# Patient Record
Sex: FEMALE | Race: WHITE | NOT HISPANIC OR LATINO | Employment: OTHER | URBAN - METROPOLITAN AREA
[De-identification: names, ages, dates, MRNs, and addresses within clinical notes are randomized per-mention and may not be internally consistent; named-entity substitution may affect disease eponyms.]

---

## 2017-01-05 ENCOUNTER — AMBULATORY - HEALTHEAST (OUTPATIENT)
Dept: NURSING | Facility: CLINIC | Age: 62
End: 2017-01-05

## 2017-01-05 DIAGNOSIS — Z86.711 HISTORY OF PULMONARY EMBOLISM: ICD-10-CM

## 2017-01-12 ENCOUNTER — AMBULATORY - HEALTHEAST (OUTPATIENT)
Dept: LAB | Facility: CLINIC | Age: 62
End: 2017-01-12

## 2017-01-12 ENCOUNTER — COMMUNICATION - HEALTHEAST (OUTPATIENT)
Dept: NURSING | Facility: CLINIC | Age: 62
End: 2017-01-12

## 2017-01-12 DIAGNOSIS — Z86.711 HISTORY OF PULMONARY EMBOLISM: ICD-10-CM

## 2017-01-13 ENCOUNTER — COMMUNICATION - HEALTHEAST (OUTPATIENT)
Dept: FAMILY MEDICINE | Facility: CLINIC | Age: 62
End: 2017-01-13

## 2017-01-20 ENCOUNTER — OFFICE VISIT - HEALTHEAST (OUTPATIENT)
Dept: FAMILY MEDICINE | Facility: CLINIC | Age: 62
End: 2017-01-20

## 2017-01-20 DIAGNOSIS — Z86.69 HISTORY OF SLEEP APNEA: ICD-10-CM

## 2017-01-20 DIAGNOSIS — R73.03 PREDIABETES: ICD-10-CM

## 2017-01-20 DIAGNOSIS — Z01.818 PREOPERATIVE EXAMINATION: ICD-10-CM

## 2017-01-20 LAB
ATRIAL RATE - MUSE: 90 BPM
CREAT SERPL-MCNC: 0.87 MG/DL (ref 0.6–1.1)
DIASTOLIC BLOOD PRESSURE - MUSE: NORMAL MMHG
GFR SERPL CREATININE-BSD FRML MDRD: >60 ML/MIN/1.73M2
HBA1C MFR BLD: 6.1 % (ref 3.5–6)
INTERPRETATION ECG - MUSE: NORMAL
P AXIS - MUSE: -3 DEGREES
PR INTERVAL - MUSE: 158 MS
QRS DURATION - MUSE: 80 MS
QT - MUSE: 370 MS
QTC - MUSE: 452 MS
R AXIS - MUSE: 31 DEGREES
SYSTOLIC BLOOD PRESSURE - MUSE: NORMAL MMHG
T AXIS - MUSE: 65 DEGREES
VENTRICULAR RATE- MUSE: 90 BPM

## 2017-01-20 ASSESSMENT — MIFFLIN-ST. JEOR: SCORE: 2031.86

## 2017-01-31 ENCOUNTER — RECORDS - HEALTHEAST (OUTPATIENT)
Dept: ADMINISTRATIVE | Facility: OTHER | Age: 62
End: 2017-01-31

## 2017-02-01 ENCOUNTER — AMBULATORY - HEALTHEAST (OUTPATIENT)
Dept: ADMINISTRATIVE | Facility: CLINIC | Age: 62
End: 2017-02-01

## 2017-02-02 ENCOUNTER — AMBULATORY - HEALTHEAST (OUTPATIENT)
Dept: GERIATRICS | Facility: CLINIC | Age: 62
End: 2017-02-02

## 2017-02-06 ENCOUNTER — OFFICE VISIT - HEALTHEAST (OUTPATIENT)
Dept: GERIATRICS | Facility: CLINIC | Age: 62
End: 2017-02-06

## 2017-02-06 DIAGNOSIS — Z95.2 S/P AVR (AORTIC VALVE REPLACEMENT): ICD-10-CM

## 2017-02-06 DIAGNOSIS — G47.9 SLEEP DISTURBANCE: ICD-10-CM

## 2017-02-06 DIAGNOSIS — I10 ESSENTIAL HYPERTENSION: ICD-10-CM

## 2017-02-06 DIAGNOSIS — D62 ACUTE BLOOD LOSS ANEMIA: ICD-10-CM

## 2017-02-06 DIAGNOSIS — I26.99 PULMONARY EMBOLISM (H): ICD-10-CM

## 2017-02-06 DIAGNOSIS — I48.0 PAROXYSMAL ATRIAL FIBRILLATION (H): ICD-10-CM

## 2017-02-07 ENCOUNTER — AMBULATORY - HEALTHEAST (OUTPATIENT)
Dept: CARDIAC REHAB | Facility: HOSPITAL | Age: 62
End: 2017-02-07

## 2017-02-07 ENCOUNTER — AMBULATORY - HEALTHEAST (OUTPATIENT)
Dept: GERIATRICS | Facility: CLINIC | Age: 62
End: 2017-02-07

## 2017-02-07 DIAGNOSIS — Z95.2 S/P AVR (AORTIC VALVE REPLACEMENT): ICD-10-CM

## 2017-02-09 ENCOUNTER — OFFICE VISIT - HEALTHEAST (OUTPATIENT)
Dept: GERIATRICS | Facility: CLINIC | Age: 62
End: 2017-02-09

## 2017-02-09 DIAGNOSIS — I10 ESSENTIAL HYPERTENSION: ICD-10-CM

## 2017-02-09 DIAGNOSIS — Z95.2 S/P AVR (AORTIC VALVE REPLACEMENT): ICD-10-CM

## 2017-02-09 DIAGNOSIS — I48.0 PAROXYSMAL ATRIAL FIBRILLATION (H): ICD-10-CM

## 2017-02-09 DIAGNOSIS — G47.9 SLEEP DISTURBANCE: ICD-10-CM

## 2017-02-09 DIAGNOSIS — I26.99 PULMONARY EMBOLISM (H): ICD-10-CM

## 2017-02-09 DIAGNOSIS — D62 ACUTE BLOOD LOSS ANEMIA: ICD-10-CM

## 2017-02-10 ENCOUNTER — AMBULATORY - HEALTHEAST (OUTPATIENT)
Dept: GERIATRICS | Facility: CLINIC | Age: 62
End: 2017-02-10

## 2017-02-13 ENCOUNTER — AMBULATORY - HEALTHEAST (OUTPATIENT)
Dept: GERIATRICS | Facility: CLINIC | Age: 62
End: 2017-02-13

## 2017-02-13 ENCOUNTER — COMMUNICATION - HEALTHEAST (OUTPATIENT)
Dept: NURSING | Facility: CLINIC | Age: 62
End: 2017-02-13

## 2017-02-13 ENCOUNTER — AMBULATORY - HEALTHEAST (OUTPATIENT)
Dept: LAB | Facility: CLINIC | Age: 62
End: 2017-02-13

## 2017-02-13 DIAGNOSIS — Z86.711 HISTORY OF PULMONARY EMBOLISM: ICD-10-CM

## 2017-02-15 ENCOUNTER — COMMUNICATION - HEALTHEAST (OUTPATIENT)
Dept: NURSING | Facility: CLINIC | Age: 62
End: 2017-02-15

## 2017-02-15 ENCOUNTER — COMMUNICATION - HEALTHEAST (OUTPATIENT)
Dept: GERIATRICS | Facility: CLINIC | Age: 62
End: 2017-02-15

## 2017-02-15 ENCOUNTER — OFFICE VISIT - HEALTHEAST (OUTPATIENT)
Dept: FAMILY MEDICINE | Facility: CLINIC | Age: 62
End: 2017-02-15

## 2017-02-15 DIAGNOSIS — Z86.711 HISTORY OF PULMONARY EMBOLISM: ICD-10-CM

## 2017-02-15 DIAGNOSIS — Z95.2 AORTIC VALVE REPLACED: ICD-10-CM

## 2017-02-16 ENCOUNTER — AMBULATORY - HEALTHEAST (OUTPATIENT)
Dept: CARDIAC REHAB | Facility: HOSPITAL | Age: 62
End: 2017-02-16

## 2017-02-16 DIAGNOSIS — Z95.2 S/P AVR (AORTIC VALVE REPLACEMENT): ICD-10-CM

## 2017-02-21 ENCOUNTER — COMMUNICATION - HEALTHEAST (OUTPATIENT)
Dept: NURSING | Facility: CLINIC | Age: 62
End: 2017-02-21

## 2017-02-21 ENCOUNTER — AMBULATORY - HEALTHEAST (OUTPATIENT)
Dept: LAB | Facility: CLINIC | Age: 62
End: 2017-02-21

## 2017-02-21 DIAGNOSIS — Z86.711 HISTORY OF PULMONARY EMBOLISM: ICD-10-CM

## 2017-02-22 ENCOUNTER — COMMUNICATION - HEALTHEAST (OUTPATIENT)
Dept: FAMILY MEDICINE | Facility: CLINIC | Age: 62
End: 2017-02-22

## 2017-02-22 ENCOUNTER — RECORDS - HEALTHEAST (OUTPATIENT)
Dept: ADMINISTRATIVE | Facility: OTHER | Age: 62
End: 2017-02-22

## 2017-02-28 ENCOUNTER — COMMUNICATION - HEALTHEAST (OUTPATIENT)
Dept: NURSING | Facility: CLINIC | Age: 62
End: 2017-02-28

## 2017-02-28 ENCOUNTER — RECORDS - HEALTHEAST (OUTPATIENT)
Dept: ADMINISTRATIVE | Facility: OTHER | Age: 62
End: 2017-02-28

## 2017-02-28 ENCOUNTER — COMMUNICATION - HEALTHEAST (OUTPATIENT)
Dept: SCHEDULING | Facility: CLINIC | Age: 62
End: 2017-02-28

## 2017-03-01 ENCOUNTER — COMMUNICATION - HEALTHEAST (OUTPATIENT)
Dept: NURSING | Facility: CLINIC | Age: 62
End: 2017-03-01

## 2017-03-01 ENCOUNTER — AMBULATORY - HEALTHEAST (OUTPATIENT)
Dept: CARDIAC REHAB | Facility: HOSPITAL | Age: 62
End: 2017-03-01

## 2017-03-01 DIAGNOSIS — Z86.711 HISTORY OF PULMONARY EMBOLISM: ICD-10-CM

## 2017-03-01 DIAGNOSIS — Z95.2 S/P AVR (AORTIC VALVE REPLACEMENT): ICD-10-CM

## 2017-03-03 ENCOUNTER — COMMUNICATION - HEALTHEAST (OUTPATIENT)
Dept: FAMILY MEDICINE | Facility: CLINIC | Age: 62
End: 2017-03-03

## 2017-03-03 DIAGNOSIS — F32.A DEPRESSION: ICD-10-CM

## 2017-03-03 DIAGNOSIS — E78.5 HYPERLIPIDEMIA: ICD-10-CM

## 2017-03-04 ENCOUNTER — COMMUNICATION - HEALTHEAST (OUTPATIENT)
Dept: FAMILY MEDICINE | Facility: CLINIC | Age: 62
End: 2017-03-04

## 2017-03-04 DIAGNOSIS — J45.21 MILD INTERMITTENT ASTHMA WITH EXACERBATION: ICD-10-CM

## 2017-03-06 ENCOUNTER — AMBULATORY - HEALTHEAST (OUTPATIENT)
Dept: CARDIAC REHAB | Facility: HOSPITAL | Age: 62
End: 2017-03-06

## 2017-03-06 DIAGNOSIS — Z95.2 S/P AVR (AORTIC VALVE REPLACEMENT): ICD-10-CM

## 2017-03-08 ENCOUNTER — AMBULATORY - HEALTHEAST (OUTPATIENT)
Dept: CARDIAC REHAB | Facility: HOSPITAL | Age: 62
End: 2017-03-08

## 2017-03-08 ENCOUNTER — COMMUNICATION - HEALTHEAST (OUTPATIENT)
Dept: NURSING | Facility: CLINIC | Age: 62
End: 2017-03-08

## 2017-03-08 DIAGNOSIS — Z86.711 HISTORY OF PULMONARY EMBOLISM: ICD-10-CM

## 2017-03-08 DIAGNOSIS — Z95.2 S/P AVR (AORTIC VALVE REPLACEMENT): ICD-10-CM

## 2017-03-09 ENCOUNTER — COMMUNICATION - HEALTHEAST (OUTPATIENT)
Dept: FAMILY MEDICINE | Facility: CLINIC | Age: 62
End: 2017-03-09

## 2017-03-09 ENCOUNTER — COMMUNICATION - HEALTHEAST (OUTPATIENT)
Dept: NURSING | Facility: CLINIC | Age: 62
End: 2017-03-09

## 2017-03-09 DIAGNOSIS — Z95.2 AORTIC VALVE REPLACED: ICD-10-CM

## 2017-03-09 DIAGNOSIS — Z86.711 HISTORY OF PULMONARY EMBOLISM: ICD-10-CM

## 2017-03-13 ENCOUNTER — AMBULATORY - HEALTHEAST (OUTPATIENT)
Dept: CARDIAC REHAB | Facility: HOSPITAL | Age: 62
End: 2017-03-13

## 2017-03-13 DIAGNOSIS — Z95.2 S/P AVR (AORTIC VALVE REPLACEMENT): ICD-10-CM

## 2017-03-15 ENCOUNTER — AMBULATORY - HEALTHEAST (OUTPATIENT)
Dept: CARDIAC REHAB | Facility: HOSPITAL | Age: 62
End: 2017-03-15

## 2017-03-15 DIAGNOSIS — Z95.2 S/P AVR (AORTIC VALVE REPLACEMENT): ICD-10-CM

## 2017-03-16 ENCOUNTER — OFFICE VISIT - HEALTHEAST (OUTPATIENT)
Dept: FAMILY MEDICINE | Facility: CLINIC | Age: 62
End: 2017-03-16

## 2017-03-16 DIAGNOSIS — Z23 NEED FOR VACCINATION: ICD-10-CM

## 2017-03-16 DIAGNOSIS — D17.79 LIPOMA OF OTHER SPECIFIED SITES: ICD-10-CM

## 2017-03-22 ENCOUNTER — AMBULATORY - HEALTHEAST (OUTPATIENT)
Dept: CARDIAC REHAB | Facility: HOSPITAL | Age: 62
End: 2017-03-22

## 2017-03-22 ENCOUNTER — AMBULATORY - HEALTHEAST (OUTPATIENT)
Dept: LAB | Facility: HOSPITAL | Age: 62
End: 2017-03-22

## 2017-03-22 ENCOUNTER — COMMUNICATION - HEALTHEAST (OUTPATIENT)
Dept: NURSING | Facility: CLINIC | Age: 62
End: 2017-03-22

## 2017-03-22 DIAGNOSIS — Z86.711 HISTORY OF PULMONARY EMBOLISM: ICD-10-CM

## 2017-03-22 DIAGNOSIS — Z95.2 AORTIC VALVE REPLACED: ICD-10-CM

## 2017-03-22 DIAGNOSIS — Z95.2 S/P AVR (AORTIC VALVE REPLACEMENT): ICD-10-CM

## 2017-03-23 ENCOUNTER — COMMUNICATION - HEALTHEAST (OUTPATIENT)
Dept: FAMILY MEDICINE | Facility: CLINIC | Age: 62
End: 2017-03-23

## 2017-03-23 DIAGNOSIS — J45.21 MILD INTERMITTENT ASTHMA WITH EXACERBATION: ICD-10-CM

## 2017-03-27 ENCOUNTER — AMBULATORY - HEALTHEAST (OUTPATIENT)
Dept: CARDIAC REHAB | Facility: HOSPITAL | Age: 62
End: 2017-03-27

## 2017-03-27 DIAGNOSIS — Z95.2 S/P AVR (AORTIC VALVE REPLACEMENT): ICD-10-CM

## 2017-03-29 ENCOUNTER — AMBULATORY - HEALTHEAST (OUTPATIENT)
Dept: CARDIAC REHAB | Facility: HOSPITAL | Age: 62
End: 2017-03-29

## 2017-03-29 DIAGNOSIS — Z95.2 S/P AVR (AORTIC VALVE REPLACEMENT): ICD-10-CM

## 2017-03-30 ENCOUNTER — COMMUNICATION - HEALTHEAST (OUTPATIENT)
Dept: NURSING | Facility: CLINIC | Age: 62
End: 2017-03-30

## 2017-04-03 ENCOUNTER — OFFICE VISIT - HEALTHEAST (OUTPATIENT)
Dept: RHEUMATOLOGY | Facility: CLINIC | Age: 62
End: 2017-04-03

## 2017-04-03 ENCOUNTER — AMBULATORY - HEALTHEAST (OUTPATIENT)
Dept: CARDIAC REHAB | Facility: HOSPITAL | Age: 62
End: 2017-04-03

## 2017-04-03 ENCOUNTER — COMMUNICATION - HEALTHEAST (OUTPATIENT)
Dept: NURSING | Facility: CLINIC | Age: 62
End: 2017-04-03

## 2017-04-03 DIAGNOSIS — Z86.711 HISTORY OF PULMONARY EMBOLISM: ICD-10-CM

## 2017-04-03 DIAGNOSIS — M19.041 PRIMARY OSTEOARTHRITIS OF BOTH HANDS: ICD-10-CM

## 2017-04-03 DIAGNOSIS — Z95.2 AORTIC VALVE REPLACED: ICD-10-CM

## 2017-04-03 DIAGNOSIS — Z95.2 S/P AVR (AORTIC VALVE REPLACEMENT): ICD-10-CM

## 2017-04-03 DIAGNOSIS — M19.042 PRIMARY OSTEOARTHRITIS OF BOTH HANDS: ICD-10-CM

## 2017-04-03 ASSESSMENT — MIFFLIN-ST. JEOR: SCORE: 1989.23

## 2017-04-05 ENCOUNTER — AMBULATORY - HEALTHEAST (OUTPATIENT)
Dept: CARDIAC REHAB | Facility: HOSPITAL | Age: 62
End: 2017-04-05

## 2017-04-05 DIAGNOSIS — Z95.2 S/P AVR (AORTIC VALVE REPLACEMENT): ICD-10-CM

## 2017-04-10 ENCOUNTER — AMBULATORY - HEALTHEAST (OUTPATIENT)
Dept: CARDIAC REHAB | Facility: HOSPITAL | Age: 62
End: 2017-04-10

## 2017-04-10 DIAGNOSIS — Z95.2 S/P AVR (AORTIC VALVE REPLACEMENT): ICD-10-CM

## 2017-04-17 ENCOUNTER — AMBULATORY - HEALTHEAST (OUTPATIENT)
Dept: CARDIAC REHAB | Facility: HOSPITAL | Age: 62
End: 2017-04-17

## 2017-04-17 DIAGNOSIS — Z95.2 S/P AVR (AORTIC VALVE REPLACEMENT): ICD-10-CM

## 2017-04-24 ENCOUNTER — AMBULATORY - HEALTHEAST (OUTPATIENT)
Dept: CARDIAC REHAB | Facility: HOSPITAL | Age: 62
End: 2017-04-24

## 2017-04-24 DIAGNOSIS — Z95.2 S/P AVR (AORTIC VALVE REPLACEMENT): ICD-10-CM

## 2017-04-25 ENCOUNTER — COMMUNICATION - HEALTHEAST (OUTPATIENT)
Dept: NURSING | Facility: CLINIC | Age: 62
End: 2017-04-25

## 2017-04-27 ENCOUNTER — COMMUNICATION - HEALTHEAST (OUTPATIENT)
Dept: NURSING | Facility: CLINIC | Age: 62
End: 2017-04-27

## 2017-04-27 ENCOUNTER — COMMUNICATION - HEALTHEAST (OUTPATIENT)
Dept: FAMILY MEDICINE | Facility: CLINIC | Age: 62
End: 2017-04-27

## 2017-04-27 ENCOUNTER — OFFICE VISIT - HEALTHEAST (OUTPATIENT)
Dept: FAMILY MEDICINE | Facility: CLINIC | Age: 62
End: 2017-04-27

## 2017-04-27 DIAGNOSIS — Z95.2 AORTIC VALVE REPLACED: ICD-10-CM

## 2017-04-27 DIAGNOSIS — Z86.711 HISTORY OF PULMONARY EMBOLISM: ICD-10-CM

## 2017-04-27 DIAGNOSIS — R05.9 COUGH: ICD-10-CM

## 2017-04-27 DIAGNOSIS — J40 BRONCHITIS: ICD-10-CM

## 2017-05-02 ENCOUNTER — COMMUNICATION - HEALTHEAST (OUTPATIENT)
Dept: FAMILY MEDICINE | Facility: CLINIC | Age: 62
End: 2017-05-02

## 2017-05-02 ENCOUNTER — AMBULATORY - HEALTHEAST (OUTPATIENT)
Dept: FAMILY MEDICINE | Facility: CLINIC | Age: 62
End: 2017-05-02

## 2017-05-04 ENCOUNTER — RECORDS - HEALTHEAST (OUTPATIENT)
Dept: GENERAL RADIOLOGY | Facility: CLINIC | Age: 62
End: 2017-05-04

## 2017-05-04 ENCOUNTER — COMMUNICATION - HEALTHEAST (OUTPATIENT)
Dept: NURSING | Facility: CLINIC | Age: 62
End: 2017-05-04

## 2017-05-04 ENCOUNTER — OFFICE VISIT - HEALTHEAST (OUTPATIENT)
Dept: FAMILY MEDICINE | Facility: CLINIC | Age: 62
End: 2017-05-04

## 2017-05-04 DIAGNOSIS — Z86.711 HISTORY OF PULMONARY EMBOLISM: ICD-10-CM

## 2017-05-04 DIAGNOSIS — J45.909 ASTHMA: ICD-10-CM

## 2017-05-04 DIAGNOSIS — S69.91XA UNSPECIFIED INJURY OF RIGHT WRIST, HAND AND FINGER(S), INITIAL ENCOUNTER: ICD-10-CM

## 2017-05-04 DIAGNOSIS — S69.91XA FINGER INJURY, RIGHT, INITIAL ENCOUNTER: ICD-10-CM

## 2017-05-04 DIAGNOSIS — Z95.2 AORTIC VALVE REPLACED: ICD-10-CM

## 2017-05-04 ASSESSMENT — MIFFLIN-ST. JEOR: SCORE: 1967

## 2017-05-08 ENCOUNTER — COMMUNICATION - HEALTHEAST (OUTPATIENT)
Dept: FAMILY MEDICINE | Facility: CLINIC | Age: 62
End: 2017-05-08

## 2017-05-08 ENCOUNTER — COMMUNICATION - HEALTHEAST (OUTPATIENT)
Dept: SCHEDULING | Facility: CLINIC | Age: 62
End: 2017-05-08

## 2017-05-09 ENCOUNTER — OFFICE VISIT - HEALTHEAST (OUTPATIENT)
Dept: FAMILY MEDICINE | Facility: CLINIC | Age: 62
End: 2017-05-09

## 2017-05-09 ENCOUNTER — COMMUNICATION - HEALTHEAST (OUTPATIENT)
Dept: NURSING | Facility: CLINIC | Age: 62
End: 2017-05-09
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
## 2017-05-09 DIAGNOSIS — J20.9 ACUTE BRONCHITIS: ICD-10-CM

## 2017-05-09 DIAGNOSIS — Z86.711 HISTORY OF PULMONARY EMBOLISM: ICD-10-CM

## 2017-05-09 DIAGNOSIS — Z95.2 AORTIC VALVE REPLACED: ICD-10-CM

## 2017-05-12 ENCOUNTER — COMMUNICATION - HEALTHEAST (OUTPATIENT)
Dept: FAMILY MEDICINE | Facility: CLINIC | Age: 62
End: 2017-05-12

## 2017-05-12 DIAGNOSIS — K21.9 GERD (GASTROESOPHAGEAL REFLUX DISEASE): ICD-10-CM

## 2017-05-17 ENCOUNTER — AMBULATORY - HEALTHEAST (OUTPATIENT)
Dept: CARDIAC REHAB | Facility: HOSPITAL | Age: 62
End: 2017-05-17

## 2017-05-17 ENCOUNTER — AMBULATORY - HEALTHEAST (OUTPATIENT)
Dept: LAB | Facility: HOSPITAL | Age: 62
End: 2017-05-17

## 2017-05-17 ENCOUNTER — COMMUNICATION - HEALTHEAST (OUTPATIENT)
Dept: NURSING | Facility: CLINIC | Age: 62
End: 2017-05-17

## 2017-05-17 DIAGNOSIS — Z95.2 S/P AVR (AORTIC VALVE REPLACEMENT): ICD-10-CM

## 2017-05-17 DIAGNOSIS — Z95.2 AORTIC VALVE REPLACED: ICD-10-CM

## 2017-05-17 DIAGNOSIS — Z86.711 HISTORY OF PULMONARY EMBOLISM: ICD-10-CM

## 2017-05-18 ENCOUNTER — COMMUNICATION - HEALTHEAST (OUTPATIENT)
Dept: FAMILY MEDICINE | Facility: CLINIC | Age: 62
End: 2017-05-18

## 2017-05-22 ENCOUNTER — COMMUNICATION - HEALTHEAST (OUTPATIENT)
Dept: FAMILY MEDICINE | Facility: CLINIC | Age: 62
End: 2017-05-22

## 2017-05-22 ENCOUNTER — OFFICE VISIT - HEALTHEAST (OUTPATIENT)
Dept: FAMILY MEDICINE | Facility: CLINIC | Age: 62
End: 2017-05-22

## 2017-05-22 ENCOUNTER — COMMUNICATION - HEALTHEAST (OUTPATIENT)
Dept: NURSING | Facility: CLINIC | Age: 62
End: 2017-05-22

## 2017-05-22 DIAGNOSIS — K62.5 BRBPR (BRIGHT RED BLOOD PER RECTUM): ICD-10-CM

## 2017-05-22 DIAGNOSIS — R05.9 COUGH: ICD-10-CM

## 2017-05-22 DIAGNOSIS — Z86.711 HISTORY OF PULMONARY EMBOLISM: ICD-10-CM

## 2017-05-22 DIAGNOSIS — Z95.2 AORTIC VALVE REPLACED: ICD-10-CM

## 2017-05-22 DIAGNOSIS — R42 LIGHTHEADED: ICD-10-CM

## 2017-05-22 DIAGNOSIS — R58 BLEEDING: ICD-10-CM

## 2017-05-31 ENCOUNTER — COMMUNICATION - HEALTHEAST (OUTPATIENT)
Dept: NURSING | Facility: CLINIC | Age: 62
End: 2017-05-31

## 2017-06-02 ENCOUNTER — AMBULATORY - HEALTHEAST (OUTPATIENT)
Dept: LAB | Facility: CLINIC | Age: 62
End: 2017-06-02

## 2017-06-02 ENCOUNTER — COMMUNICATION - HEALTHEAST (OUTPATIENT)
Dept: NURSING | Facility: CLINIC | Age: 62
End: 2017-06-02

## 2017-06-02 DIAGNOSIS — Z86.711 HISTORY OF PULMONARY EMBOLISM: ICD-10-CM

## 2017-06-02 DIAGNOSIS — Z95.2 AORTIC VALVE REPLACED: ICD-10-CM

## 2017-06-05 ENCOUNTER — AMBULATORY - HEALTHEAST (OUTPATIENT)
Dept: CARDIAC REHAB | Facility: HOSPITAL | Age: 62
End: 2017-06-05

## 2017-06-05 DIAGNOSIS — Z95.2 S/P AVR (AORTIC VALVE REPLACEMENT): ICD-10-CM

## 2017-06-06 ENCOUNTER — COMMUNICATION - HEALTHEAST (OUTPATIENT)
Dept: FAMILY MEDICINE | Facility: CLINIC | Age: 62
End: 2017-06-06

## 2017-06-06 DIAGNOSIS — I10 HTN (HYPERTENSION): ICD-10-CM

## 2017-06-06 DIAGNOSIS — Z86.711 HISTORY OF PULMONARY EMBOLISM: ICD-10-CM

## 2017-06-09 ENCOUNTER — COMMUNICATION - HEALTHEAST (OUTPATIENT)
Dept: NURSING | Facility: CLINIC | Age: 62
End: 2017-06-09

## 2017-06-09 ENCOUNTER — AMBULATORY - HEALTHEAST (OUTPATIENT)
Dept: LAB | Facility: CLINIC | Age: 62
End: 2017-06-09

## 2017-06-09 DIAGNOSIS — Z95.2 AORTIC VALVE REPLACED: ICD-10-CM

## 2017-06-09 DIAGNOSIS — Z86.711 HISTORY OF PULMONARY EMBOLISM: ICD-10-CM

## 2017-06-09 DIAGNOSIS — R58 BLEEDING: ICD-10-CM

## 2017-06-12 ENCOUNTER — AMBULATORY - HEALTHEAST (OUTPATIENT)
Dept: CARDIAC REHAB | Facility: HOSPITAL | Age: 62
End: 2017-06-12

## 2017-06-12 DIAGNOSIS — Z95.2 S/P AVR (AORTIC VALVE REPLACEMENT): ICD-10-CM

## 2017-06-15 ENCOUNTER — RECORDS - HEALTHEAST (OUTPATIENT)
Dept: GENERAL RADIOLOGY | Facility: CLINIC | Age: 62
End: 2017-06-15

## 2017-06-15 ENCOUNTER — OFFICE VISIT - HEALTHEAST (OUTPATIENT)
Dept: FAMILY MEDICINE | Facility: CLINIC | Age: 62
End: 2017-06-15

## 2017-06-15 ENCOUNTER — COMMUNICATION - HEALTHEAST (OUTPATIENT)
Dept: NURSING | Facility: CLINIC | Age: 62
End: 2017-06-15

## 2017-06-15 DIAGNOSIS — Z95.2 AORTIC VALVE REPLACED: ICD-10-CM

## 2017-06-15 DIAGNOSIS — Z86.711 HISTORY OF PULMONARY EMBOLISM: ICD-10-CM

## 2017-06-15 DIAGNOSIS — J45.909 ASTHMA: ICD-10-CM
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
## 2017-06-15 DIAGNOSIS — I10 ESSENTIAL HYPERTENSION: ICD-10-CM
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
## 2017-06-17 ENCOUNTER — COMMUNICATION - HEALTHEAST (OUTPATIENT)
Dept: FAMILY MEDICINE | Facility: CLINIC | Age: 62
End: 2017-06-17

## 2017-06-17 DIAGNOSIS — Z86.711 HISTORY OF PULMONARY EMBOLISM: ICD-10-CM

## 2017-06-17 DIAGNOSIS — Z95.2 AORTIC VALVE REPLACED: ICD-10-CM

## 2017-06-21 ENCOUNTER — AMBULATORY - HEALTHEAST (OUTPATIENT)
Dept: CARDIAC REHAB | Facility: HOSPITAL | Age: 62
End: 2017-06-21

## 2017-06-21 DIAGNOSIS — Z95.2 S/P AVR (AORTIC VALVE REPLACEMENT): ICD-10-CM

## 2017-06-23 ENCOUNTER — HOSPITAL ENCOUNTER (OUTPATIENT)
Dept: ULTRASOUND IMAGING | Facility: HOSPITAL | Age: 62
Discharge: HOME | End: 2017-06-23
Attending: FAMILY MEDICINE

## 2017-06-23 ENCOUNTER — OFFICE VISIT - HEALTHEAST (OUTPATIENT)
Dept: FAMILY MEDICINE | Facility: CLINIC | Age: 62
End: 2017-06-23

## 2017-06-23 ENCOUNTER — COMMUNICATION - HEALTHEAST (OUTPATIENT)
Dept: FAMILY MEDICINE | Facility: CLINIC | Age: 62
End: 2017-06-23

## 2017-06-23 DIAGNOSIS — Z95.2 AORTIC VALVE REPLACED: ICD-10-CM

## 2017-06-23 DIAGNOSIS — N93.9 VAGINAL BLEEDING: ICD-10-CM

## 2017-06-23 DIAGNOSIS — Z86.711 HISTORY OF PULMONARY EMBOLISM: ICD-10-CM

## 2017-06-26 ENCOUNTER — AMBULATORY - HEALTHEAST (OUTPATIENT)
Dept: FAMILY MEDICINE | Facility: CLINIC | Age: 62
End: 2017-06-26

## 2017-06-26 DIAGNOSIS — N93.9 VAGINAL BLEEDING: ICD-10-CM

## 2017-06-28 ENCOUNTER — AMBULATORY - HEALTHEAST (OUTPATIENT)
Dept: CARDIAC REHAB | Facility: HOSPITAL | Age: 62
End: 2017-06-28

## 2017-06-28 ENCOUNTER — COMMUNICATION - HEALTHEAST (OUTPATIENT)
Dept: NURSING | Facility: CLINIC | Age: 62
End: 2017-06-28

## 2017-06-28 ENCOUNTER — AMBULATORY - HEALTHEAST (OUTPATIENT)
Dept: LAB | Facility: HOSPITAL | Age: 62
End: 2017-06-28

## 2017-06-28 DIAGNOSIS — Z86.711 HISTORY OF PULMONARY EMBOLISM: ICD-10-CM

## 2017-06-28 DIAGNOSIS — Z95.2 S/P AVR (AORTIC VALVE REPLACEMENT): ICD-10-CM

## 2017-06-28 DIAGNOSIS — Z95.2 AORTIC VALVE REPLACED: ICD-10-CM

## 2017-07-03 ENCOUNTER — AMBULATORY - HEALTHEAST (OUTPATIENT)
Dept: CARDIAC REHAB | Facility: HOSPITAL | Age: 62
End: 2017-07-03

## 2017-07-03 DIAGNOSIS — Z95.2 S/P AVR (AORTIC VALVE REPLACEMENT): ICD-10-CM

## 2017-07-05 ENCOUNTER — AMBULATORY - HEALTHEAST (OUTPATIENT)
Dept: CARDIAC REHAB | Facility: HOSPITAL | Age: 62
End: 2017-07-05

## 2017-07-05 DIAGNOSIS — Z95.2 S/P AVR (AORTIC VALVE REPLACEMENT): ICD-10-CM

## 2017-07-07 ENCOUNTER — RECORDS - HEALTHEAST (OUTPATIENT)
Dept: ADMINISTRATIVE | Facility: OTHER | Age: 62
End: 2017-07-07

## 2017-07-10 ENCOUNTER — COMMUNICATION - HEALTHEAST (OUTPATIENT)
Dept: FAMILY MEDICINE | Facility: CLINIC | Age: 62
End: 2017-07-10

## 2017-07-10 DIAGNOSIS — J45.909 ASTHMA: ICD-10-CM

## 2017-07-15 ENCOUNTER — COMMUNICATION - HEALTHEAST (OUTPATIENT)
Dept: FAMILY MEDICINE | Facility: CLINIC | Age: 62
End: 2017-07-15

## 2017-07-15 DIAGNOSIS — Z95.2 AORTIC VALVE REPLACED: ICD-10-CM

## 2017-07-15 DIAGNOSIS — Z79.01 LONG TERM (CURRENT) USE OF ANTICOAGULANTS: ICD-10-CM

## 2017-07-19 ENCOUNTER — COMMUNICATION - HEALTHEAST (OUTPATIENT)
Dept: NURSING | Facility: CLINIC | Age: 62
End: 2017-07-19

## 2017-07-24 ENCOUNTER — COMMUNICATION - HEALTHEAST (OUTPATIENT)
Dept: NURSING | Facility: CLINIC | Age: 62
End: 2017-07-24

## 2017-07-24 ENCOUNTER — AMBULATORY - HEALTHEAST (OUTPATIENT)
Dept: LAB | Facility: CLINIC | Age: 62
End: 2017-07-24

## 2017-07-24 DIAGNOSIS — Z86.711 HISTORY OF PULMONARY EMBOLISM: ICD-10-CM

## 2017-07-24 DIAGNOSIS — Z95.2 AORTIC VALVE REPLACED: ICD-10-CM

## 2017-07-25 ENCOUNTER — COMMUNICATION - HEALTHEAST (OUTPATIENT)
Dept: FAMILY MEDICINE | Facility: CLINIC | Age: 62
End: 2017-07-25

## 2017-07-25 DIAGNOSIS — F32.A DEPRESSION: ICD-10-CM

## 2017-07-28 ENCOUNTER — OFFICE VISIT - HEALTHEAST (OUTPATIENT)
Dept: FAMILY MEDICINE | Facility: CLINIC | Age: 62
End: 2017-07-28

## 2017-07-28 DIAGNOSIS — J45.909 ASTHMA: ICD-10-CM

## 2017-07-28 DIAGNOSIS — Z87.898 HISTORY OF URINE COLOR CHANGES: ICD-10-CM

## 2017-07-28 DIAGNOSIS — R53.83 FATIGUE: ICD-10-CM

## 2017-07-28 DIAGNOSIS — F32.A DEPRESSION: ICD-10-CM

## 2017-07-31 ENCOUNTER — AMBULATORY - HEALTHEAST (OUTPATIENT)
Dept: FAMILY MEDICINE | Facility: CLINIC | Age: 62
End: 2017-07-31

## 2017-08-01 ENCOUNTER — COMMUNICATION - HEALTHEAST (OUTPATIENT)
Dept: FAMILY MEDICINE | Facility: CLINIC | Age: 62
End: 2017-08-01

## 2017-08-08 ENCOUNTER — AMBULATORY - HEALTHEAST (OUTPATIENT)
Dept: NURSING | Facility: CLINIC | Age: 62
End: 2017-08-08

## 2017-08-08 ENCOUNTER — COMMUNICATION - HEALTHEAST (OUTPATIENT)
Dept: FAMILY MEDICINE | Facility: CLINIC | Age: 62
End: 2017-08-08

## 2017-08-09 ENCOUNTER — OFFICE VISIT - HEALTHEAST (OUTPATIENT)
Dept: FAMILY MEDICINE | Facility: CLINIC | Age: 62
End: 2017-08-09

## 2017-08-09 DIAGNOSIS — R06.02 SHORTNESS OF BREATH: ICD-10-CM

## 2017-08-09 DIAGNOSIS — E66.9 OBESITY: ICD-10-CM

## 2017-08-09 DIAGNOSIS — J45.909 ASTHMA: ICD-10-CM

## 2017-08-09 DIAGNOSIS — K14.6 SORENESS OF TONGUE: ICD-10-CM

## 2017-08-13 ENCOUNTER — COMMUNICATION - HEALTHEAST (OUTPATIENT)
Dept: FAMILY MEDICINE | Facility: CLINIC | Age: 62
End: 2017-08-13

## 2017-08-13 DIAGNOSIS — K21.9 GERD (GASTROESOPHAGEAL REFLUX DISEASE): ICD-10-CM

## 2017-08-14 ENCOUNTER — RECORDS - HEALTHEAST (OUTPATIENT)
Dept: ADMINISTRATIVE | Facility: OTHER | Age: 62
End: 2017-08-14

## 2017-08-24 ENCOUNTER — AMBULATORY - HEALTHEAST (OUTPATIENT)
Dept: FAMILY MEDICINE | Facility: CLINIC | Age: 62
End: 2017-08-24

## 2017-08-24 ENCOUNTER — COMMUNICATION - HEALTHEAST (OUTPATIENT)
Dept: FAMILY MEDICINE | Facility: CLINIC | Age: 62
End: 2017-08-24

## 2017-08-28 ENCOUNTER — COMMUNICATION - HEALTHEAST (OUTPATIENT)
Dept: NURSING | Facility: CLINIC | Age: 62
End: 2017-08-28

## 2017-09-01 ENCOUNTER — AMBULATORY - HEALTHEAST (OUTPATIENT)
Dept: LAB | Facility: CLINIC | Age: 62
End: 2017-09-01

## 2017-09-01 ENCOUNTER — COMMUNICATION - HEALTHEAST (OUTPATIENT)
Dept: NURSING | Facility: CLINIC | Age: 62
End: 2017-09-01

## 2017-09-01 DIAGNOSIS — Z95.2 AORTIC VALVE REPLACED: ICD-10-CM

## 2017-09-01 DIAGNOSIS — Z86.711 HISTORY OF PULMONARY EMBOLISM: ICD-10-CM

## 2017-09-12 ENCOUNTER — COMMUNICATION - HEALTHEAST (OUTPATIENT)
Dept: SCHEDULING | Facility: CLINIC | Age: 62
End: 2017-09-12

## 2017-09-13 ENCOUNTER — AMBULATORY - HEALTHEAST (OUTPATIENT)
Dept: FAMILY MEDICINE | Facility: CLINIC | Age: 62
End: 2017-09-13

## 2017-09-13 DIAGNOSIS — D64.9 ANEMIA: ICD-10-CM

## 2017-09-15 ENCOUNTER — AMBULATORY - HEALTHEAST (OUTPATIENT)
Dept: LAB | Facility: CLINIC | Age: 62
End: 2017-09-15

## 2017-09-15 DIAGNOSIS — D64.9 ANEMIA: ICD-10-CM

## 2017-09-25 ENCOUNTER — COMMUNICATION - HEALTHEAST (OUTPATIENT)
Dept: FAMILY MEDICINE | Facility: CLINIC | Age: 62
End: 2017-09-25

## 2017-09-25 ENCOUNTER — COMMUNICATION - HEALTHEAST (OUTPATIENT)
Dept: SCHEDULING | Facility: CLINIC | Age: 62
End: 2017-09-25

## 2017-09-29 ENCOUNTER — OFFICE VISIT - HEALTHEAST (OUTPATIENT)
Dept: FAMILY MEDICINE | Facility: CLINIC | Age: 62
End: 2017-09-29

## 2017-09-29 DIAGNOSIS — Z23 FLU VACCINE NEED: ICD-10-CM

## 2017-09-29 DIAGNOSIS — D50.9 IRON DEFICIENCY ANEMIA: ICD-10-CM

## 2017-09-29 DIAGNOSIS — J06.9 URI (UPPER RESPIRATORY INFECTION): ICD-10-CM

## 2017-10-03 ENCOUNTER — OFFICE VISIT - HEALTHEAST (OUTPATIENT)
Dept: FAMILY MEDICINE | Facility: CLINIC | Age: 62
End: 2017-10-03

## 2017-10-03 ENCOUNTER — COMMUNICATION - HEALTHEAST (OUTPATIENT)
Dept: SCHEDULING | Facility: CLINIC | Age: 62
End: 2017-10-03

## 2017-10-03 DIAGNOSIS — R35.0 URINE FREQUENCY: ICD-10-CM

## 2017-10-03 DIAGNOSIS — N30.00 ACUTE CYSTITIS: ICD-10-CM

## 2017-10-05 ENCOUNTER — COMMUNICATION - HEALTHEAST (OUTPATIENT)
Dept: FAMILY MEDICINE | Facility: CLINIC | Age: 62
End: 2017-10-05

## 2017-10-05 DIAGNOSIS — J45.909 ASTHMA: ICD-10-CM

## 2017-10-06 ENCOUNTER — COMMUNICATION - HEALTHEAST (OUTPATIENT)
Dept: NURSING | Facility: CLINIC | Age: 62
End: 2017-10-06

## 2017-10-06 DIAGNOSIS — Z86.711 HISTORY OF PULMONARY EMBOLISM: ICD-10-CM

## 2017-10-13 ENCOUNTER — RECORDS - HEALTHEAST (OUTPATIENT)
Dept: ADMINISTRATIVE | Facility: OTHER | Age: 62
End: 2017-10-13

## 2017-11-03 ENCOUNTER — COMMUNICATION - HEALTHEAST (OUTPATIENT)
Dept: NURSING | Facility: CLINIC | Age: 62
End: 2017-11-03

## 2017-11-03 ENCOUNTER — RECORDS - HEALTHEAST (OUTPATIENT)
Dept: ADMINISTRATIVE | Facility: OTHER | Age: 62
End: 2017-11-03

## 2017-11-03 ENCOUNTER — OFFICE VISIT - HEALTHEAST (OUTPATIENT)
Dept: FAMILY MEDICINE | Facility: CLINIC | Age: 62
End: 2017-11-03

## 2017-11-03 DIAGNOSIS — Z86.711 HISTORY OF PULMONARY EMBOLISM: ICD-10-CM

## 2017-11-03 DIAGNOSIS — Z95.2 AORTIC VALVE REPLACED: ICD-10-CM

## 2017-11-03 DIAGNOSIS — D64.9 ANEMIA: ICD-10-CM

## 2017-11-03 DIAGNOSIS — F32.A DEPRESSION: ICD-10-CM

## 2017-11-03 DIAGNOSIS — R73.03 PREDIABETES: ICD-10-CM

## 2017-11-03 DIAGNOSIS — J45.909 REACTIVE AIRWAY DISEASE: ICD-10-CM

## 2017-11-03 LAB — HBA1C MFR BLD: 5.7 % (ref 3.5–6)

## 2017-11-07 ENCOUNTER — COMMUNICATION - HEALTHEAST (OUTPATIENT)
Dept: FAMILY MEDICINE | Facility: CLINIC | Age: 62
End: 2017-11-07

## 2017-11-07 ENCOUNTER — AMBULATORY - HEALTHEAST (OUTPATIENT)
Dept: PULMONOLOGY | Facility: OTHER | Age: 62
End: 2017-11-07

## 2017-11-07 DIAGNOSIS — J45.909 REACTIVE AIRWAY DISEASE: ICD-10-CM

## 2017-11-07 DIAGNOSIS — Z95.2 AORTIC VALVE REPLACED: ICD-10-CM

## 2017-11-07 DIAGNOSIS — Z79.01 LONG TERM CURRENT USE OF ANTICOAGULANT THERAPY: ICD-10-CM

## 2017-11-08 ENCOUNTER — COMMUNICATION - HEALTHEAST (OUTPATIENT)
Dept: SLEEP MEDICINE | Facility: CLINIC | Age: 62
End: 2017-11-08

## 2017-12-05 ENCOUNTER — RECORDS - HEALTHEAST (OUTPATIENT)
Dept: PULMONOLOGY | Facility: OTHER | Age: 62
End: 2017-12-05

## 2017-12-05 ENCOUNTER — RECORDS - HEALTHEAST (OUTPATIENT)
Dept: ADMINISTRATIVE | Facility: OTHER | Age: 62
End: 2017-12-05

## 2017-12-05 DIAGNOSIS — J45.909 UNSPECIFIED ASTHMA, UNCOMPLICATED: ICD-10-CM

## 2017-12-06 ENCOUNTER — COMMUNICATION - HEALTHEAST (OUTPATIENT)
Dept: FAMILY MEDICINE | Facility: CLINIC | Age: 62
End: 2017-12-06

## 2017-12-06 DIAGNOSIS — F32.A DEPRESSION: ICD-10-CM

## 2017-12-08 ENCOUNTER — COMMUNICATION - HEALTHEAST (OUTPATIENT)
Dept: NURSING | Facility: CLINIC | Age: 62
End: 2017-12-08

## 2017-12-12 ENCOUNTER — HOSPITAL ENCOUNTER (OUTPATIENT)
Dept: MAMMOGRAPHY | Facility: HOSPITAL | Age: 62
Discharge: HOME | End: 2017-12-12
Attending: FAMILY MEDICINE

## 2017-12-12 DIAGNOSIS — Z12.31 VISIT FOR SCREENING MAMMOGRAM: ICD-10-CM

## 2017-12-13 ENCOUNTER — AMBULATORY - HEALTHEAST (OUTPATIENT)
Dept: LAB | Facility: CLINIC | Age: 62
End: 2017-12-13

## 2017-12-13 ENCOUNTER — COMMUNICATION - HEALTHEAST (OUTPATIENT)
Dept: FAMILY MEDICINE | Facility: CLINIC | Age: 62
End: 2017-12-13

## 2017-12-13 ENCOUNTER — COMMUNICATION - HEALTHEAST (OUTPATIENT)
Dept: NURSING | Facility: CLINIC | Age: 62
End: 2017-12-13

## 2017-12-13 DIAGNOSIS — Z86.711 HISTORY OF PULMONARY EMBOLISM: ICD-10-CM

## 2017-12-13 DIAGNOSIS — Z95.2 AORTIC VALVE REPLACED: ICD-10-CM

## 2017-12-14 ENCOUNTER — OFFICE VISIT - HEALTHEAST (OUTPATIENT)
Dept: PULMONOLOGY | Facility: OTHER | Age: 62
End: 2017-12-14

## 2017-12-14 DIAGNOSIS — J45.40 MODERATE PERSISTENT ASTHMA, UNSPECIFIED WHETHER COMPLICATED: ICD-10-CM

## 2017-12-14 ASSESSMENT — MIFFLIN-ST. JEOR: SCORE: 2066.79

## 2018-01-03 ENCOUNTER — COMMUNICATION - HEALTHEAST (OUTPATIENT)
Dept: FAMILY MEDICINE | Facility: CLINIC | Age: 63
End: 2018-01-03

## 2018-01-03 DIAGNOSIS — J45.909 REACTIVE AIRWAY DISEASE: ICD-10-CM

## 2018-01-13 ENCOUNTER — COMMUNICATION - HEALTHEAST (OUTPATIENT)
Dept: FAMILY MEDICINE | Facility: CLINIC | Age: 63
End: 2018-01-13

## 2018-01-13 DIAGNOSIS — Z86.711 HISTORY OF PULMONARY EMBOLISM: ICD-10-CM

## 2018-01-13 DIAGNOSIS — Z95.2 AORTIC VALVE REPLACED: ICD-10-CM

## 2018-01-13 DIAGNOSIS — Z79.01 LONG TERM CURRENT USE OF ANTICOAGULANT THERAPY: ICD-10-CM

## 2018-01-17 ENCOUNTER — COMMUNICATION - HEALTHEAST (OUTPATIENT)
Dept: NURSING | Facility: CLINIC | Age: 63
End: 2018-01-17

## 2018-01-19 ENCOUNTER — COMMUNICATION - HEALTHEAST (OUTPATIENT)
Dept: NURSING | Facility: CLINIC | Age: 63
End: 2018-01-19

## 2018-01-19 ENCOUNTER — AMBULATORY - HEALTHEAST (OUTPATIENT)
Dept: LAB | Facility: CLINIC | Age: 63
End: 2018-01-19

## 2018-01-19 DIAGNOSIS — Z95.2 AORTIC VALVE REPLACED: ICD-10-CM

## 2018-01-19 DIAGNOSIS — Z86.711 HISTORY OF PULMONARY EMBOLISM: ICD-10-CM

## 2018-01-19 LAB — INR PPP: 2.3 (ref 0.9–1.1)

## 2018-01-24 ENCOUNTER — OFFICE VISIT - HEALTHEAST (OUTPATIENT)
Dept: FAMILY MEDICINE | Facility: CLINIC | Age: 63
End: 2018-01-24

## 2018-01-24 DIAGNOSIS — F43.9 STRESS AT HOME: ICD-10-CM

## 2018-01-24 DIAGNOSIS — G35 MS (MULTIPLE SCLEROSIS) (H): ICD-10-CM

## 2018-01-24 DIAGNOSIS — R49.0 HOARSENESS: ICD-10-CM

## 2018-01-24 DIAGNOSIS — R05.9 COUGH: ICD-10-CM

## 2018-02-01 ENCOUNTER — COMMUNICATION - HEALTHEAST (OUTPATIENT)
Dept: NURSING | Facility: CLINIC | Age: 63
End: 2018-02-01

## 2018-02-01 ENCOUNTER — COMMUNICATION - HEALTHEAST (OUTPATIENT)
Dept: FAMILY MEDICINE | Facility: CLINIC | Age: 63
End: 2018-02-01

## 2018-02-01 DIAGNOSIS — F32.A DEPRESSION: ICD-10-CM

## 2018-02-01 DIAGNOSIS — Z95.2 AORTIC VALVE REPLACED: ICD-10-CM

## 2018-02-01 DIAGNOSIS — Z86.711 HISTORY OF PULMONARY EMBOLISM: ICD-10-CM

## 2018-02-02 ENCOUNTER — COMMUNICATION - HEALTHEAST (OUTPATIENT)
Dept: NURSING | Facility: CLINIC | Age: 63
End: 2018-02-02

## 2018-02-02 ENCOUNTER — AMBULATORY - HEALTHEAST (OUTPATIENT)
Dept: LAB | Facility: CLINIC | Age: 63
End: 2018-02-02

## 2018-02-02 DIAGNOSIS — Z95.2 AORTIC VALVE REPLACED: ICD-10-CM

## 2018-02-02 DIAGNOSIS — Z86.711 HISTORY OF PULMONARY EMBOLISM: ICD-10-CM

## 2018-02-02 LAB — INR PPP: 2.9 (ref 0.9–1.1)

## 2018-02-07 ENCOUNTER — COMMUNICATION - HEALTHEAST (OUTPATIENT)
Dept: FAMILY MEDICINE | Facility: CLINIC | Age: 63
End: 2018-02-07

## 2018-02-07 DIAGNOSIS — E78.5 HYPERLIPIDEMIA: ICD-10-CM

## 2018-02-16 ENCOUNTER — AMBULATORY - HEALTHEAST (OUTPATIENT)
Dept: LAB | Facility: CLINIC | Age: 63
End: 2018-02-16

## 2018-02-16 ENCOUNTER — COMMUNICATION - HEALTHEAST (OUTPATIENT)
Dept: NURSING | Facility: CLINIC | Age: 63
End: 2018-02-16

## 2018-02-16 DIAGNOSIS — Z86.711 HISTORY OF PULMONARY EMBOLISM: ICD-10-CM

## 2018-02-16 DIAGNOSIS — Z95.2 AORTIC VALVE REPLACED: ICD-10-CM

## 2018-02-16 LAB — INR PPP: 3.9 (ref 0.9–1.1)

## 2018-03-02 ENCOUNTER — COMMUNICATION - HEALTHEAST (OUTPATIENT)
Dept: NURSING | Facility: CLINIC | Age: 63
End: 2018-03-02

## 2018-03-02 ENCOUNTER — AMBULATORY - HEALTHEAST (OUTPATIENT)
Dept: LAB | Facility: CLINIC | Age: 63
End: 2018-03-02

## 2018-03-02 DIAGNOSIS — Z86.711 HISTORY OF PULMONARY EMBOLISM: ICD-10-CM

## 2018-03-02 DIAGNOSIS — Z95.2 AORTIC VALVE REPLACED: ICD-10-CM

## 2018-03-02 LAB — INR PPP: 3.2 (ref 0.9–1.1)

## 2018-03-12 ENCOUNTER — OFFICE VISIT - HEALTHEAST (OUTPATIENT)
Dept: OTOLARYNGOLOGY | Facility: CLINIC | Age: 63
End: 2018-03-12

## 2018-03-12 DIAGNOSIS — R49.0 HOARSENESS OF VOICE: ICD-10-CM

## 2018-03-14 ENCOUNTER — OFFICE VISIT - HEALTHEAST (OUTPATIENT)
Dept: FAMILY MEDICINE | Facility: CLINIC | Age: 63
End: 2018-03-14

## 2018-03-14 ENCOUNTER — RECORDS - HEALTHEAST (OUTPATIENT)
Dept: ADMINISTRATIVE | Facility: OTHER | Age: 63
End: 2018-03-14

## 2018-03-14 DIAGNOSIS — Z12.11 SPECIAL SCREENING FOR MALIGNANT NEOPLASMS, COLON: ICD-10-CM

## 2018-03-14 DIAGNOSIS — Z01.818 PREOP EXAMINATION: ICD-10-CM

## 2018-03-16 ENCOUNTER — OFFICE VISIT - HEALTHEAST (OUTPATIENT)
Dept: PULMONOLOGY | Facility: OTHER | Age: 63
End: 2018-03-16

## 2018-03-16 DIAGNOSIS — R06.02 SHORTNESS OF BREATH: ICD-10-CM

## 2018-03-16 DIAGNOSIS — R94.2 RESTRICTIVE VENTILATORY DEFECT: ICD-10-CM

## 2018-03-16 DIAGNOSIS — J45.909 ASTHMA: ICD-10-CM

## 2018-03-21 ENCOUNTER — COMMUNICATION - HEALTHEAST (OUTPATIENT)
Dept: NURSING | Facility: CLINIC | Age: 63
End: 2018-03-21

## 2018-03-21 DIAGNOSIS — Z86.711 HISTORY OF PULMONARY EMBOLISM: ICD-10-CM

## 2018-03-25 ENCOUNTER — RECORDS - HEALTHEAST (OUTPATIENT)
Dept: ADMINISTRATIVE | Facility: OTHER | Age: 63
End: 2018-03-25

## 2018-03-26 ENCOUNTER — RECORDS - HEALTHEAST (OUTPATIENT)
Dept: ADMINISTRATIVE | Facility: OTHER | Age: 63
End: 2018-03-26

## 2018-04-16 ENCOUNTER — RECORDS - HEALTHEAST (OUTPATIENT)
Dept: ADMINISTRATIVE | Facility: OTHER | Age: 63
End: 2018-04-16

## 2018-04-24 ENCOUNTER — RECORDS - HEALTHEAST (OUTPATIENT)
Dept: ADMINISTRATIVE | Facility: OTHER | Age: 63
End: 2018-04-24

## 2018-04-24 ENCOUNTER — TELEPHONE (OUTPATIENT)
Dept: OTOLARYNGOLOGY | Facility: CLINIC | Age: 63
End: 2018-04-24

## 2018-04-24 LAB — INR PPP: 3.2 (ref 0.9–1.1)

## 2018-04-24 NOTE — TELEPHONE ENCOUNTER
Patient's sister is calling to request an appt or work in with Dr. Henriquez on Friday. Patient was seen in ER at [place] and had a balloon placed in her nose for a nose bleed, She was advised to follow up with an ENT on Friday 4/27 to have it removed. Please advise. [phone]

## 2018-04-25 NOTE — TELEPHONE ENCOUNTER
Phone call to Glo, pts sister, who states they have an appointment already scheduled with another ENT clinic.  Ivonne Sultana RN

## 2018-04-27 ENCOUNTER — COMMUNICATION - HEALTHEAST (OUTPATIENT)
Dept: FAMILY MEDICINE | Facility: CLINIC | Age: 63
End: 2018-04-27

## 2018-04-27 DIAGNOSIS — F32.A DEPRESSION: ICD-10-CM

## 2018-04-27 DIAGNOSIS — J45.909 REACTIVE AIRWAY DISEASE: ICD-10-CM

## 2018-05-02 ENCOUNTER — COMMUNICATION - HEALTHEAST (OUTPATIENT)
Dept: SCHEDULING | Facility: CLINIC | Age: 63
End: 2018-05-02

## 2018-05-02 ENCOUNTER — COMMUNICATION - HEALTHEAST (OUTPATIENT)
Dept: FAMILY MEDICINE | Facility: CLINIC | Age: 63
End: 2018-05-02

## 2018-05-02 DIAGNOSIS — F32.A DEPRESSION: ICD-10-CM

## 2018-05-03 ENCOUNTER — RECORDS - HEALTHEAST (OUTPATIENT)
Dept: ADMINISTRATIVE | Facility: OTHER | Age: 63
End: 2018-05-03

## 2018-05-03 LAB — INR PPP: 2.8 (ref 0.9–1.1)

## 2018-05-04 ENCOUNTER — OFFICE VISIT - HEALTHEAST (OUTPATIENT)
Dept: FAMILY MEDICINE | Facility: CLINIC | Age: 63
End: 2018-05-04

## 2018-05-04 ENCOUNTER — COMMUNICATION - HEALTHEAST (OUTPATIENT)
Dept: FAMILY MEDICINE | Facility: CLINIC | Age: 63
End: 2018-05-04

## 2018-05-04 DIAGNOSIS — K62.5 RECTAL BLEEDING: ICD-10-CM

## 2018-05-04 DIAGNOSIS — K64.9 HEMORRHOID: ICD-10-CM

## 2018-05-07 ENCOUNTER — RECORDS - HEALTHEAST (OUTPATIENT)
Dept: ADMINISTRATIVE | Facility: OTHER | Age: 63
End: 2018-05-07

## 2018-05-10 ENCOUNTER — COMMUNICATION - HEALTHEAST (OUTPATIENT)
Dept: SCHEDULING | Facility: CLINIC | Age: 63
End: 2018-05-10

## 2018-05-10 ENCOUNTER — COMMUNICATION - HEALTHEAST (OUTPATIENT)
Dept: NURSING | Facility: CLINIC | Age: 63
End: 2018-05-10

## 2018-05-10 DIAGNOSIS — K21.9 GERD (GASTROESOPHAGEAL REFLUX DISEASE): ICD-10-CM

## 2018-05-11 ENCOUNTER — COMMUNICATION - HEALTHEAST (OUTPATIENT)
Dept: FAMILY MEDICINE | Facility: CLINIC | Age: 63
End: 2018-05-11

## 2018-05-11 DIAGNOSIS — K21.9 GERD (GASTROESOPHAGEAL REFLUX DISEASE): ICD-10-CM

## 2018-05-13 ENCOUNTER — COMMUNICATION - HEALTHEAST (OUTPATIENT)
Dept: NURSING | Facility: CLINIC | Age: 63
End: 2018-05-13

## 2018-05-13 DIAGNOSIS — I10 ESSENTIAL HYPERTENSION: ICD-10-CM

## 2018-05-19 ENCOUNTER — RECORDS - HEALTHEAST (OUTPATIENT)
Dept: ADMINISTRATIVE | Facility: OTHER | Age: 63
End: 2018-05-19

## 2018-05-19 LAB — INR PPP: 2.9 (ref 0.9–1.1)

## 2018-05-23 ENCOUNTER — COMMUNICATION - HEALTHEAST (OUTPATIENT)
Dept: FAMILY MEDICINE | Facility: CLINIC | Age: 63
End: 2018-05-23

## 2018-05-24 ENCOUNTER — OFFICE VISIT - HEALTHEAST (OUTPATIENT)
Dept: OTOLARYNGOLOGY | Facility: CLINIC | Age: 63
End: 2018-05-24

## 2018-05-24 DIAGNOSIS — R04.0 EPISTAXIS: ICD-10-CM

## 2018-05-26 ENCOUNTER — COMMUNICATION - HEALTHEAST (OUTPATIENT)
Dept: FAMILY MEDICINE | Facility: CLINIC | Age: 63
End: 2018-05-26

## 2018-05-26 DIAGNOSIS — F32.A DEPRESSION: ICD-10-CM

## 2018-05-29 ENCOUNTER — OFFICE VISIT - HEALTHEAST (OUTPATIENT)
Dept: FAMILY MEDICINE | Facility: CLINIC | Age: 63
End: 2018-05-29

## 2018-05-29 DIAGNOSIS — J45.909 ASTHMA: ICD-10-CM

## 2018-05-29 DIAGNOSIS — R73.09 IMPAIRED GLUCOSE TOLERANCE TEST: ICD-10-CM

## 2018-05-29 DIAGNOSIS — G35 MULTIPLE SCLEROSIS (H): ICD-10-CM

## 2018-05-29 DIAGNOSIS — F32.A DEPRESSION: ICD-10-CM

## 2018-05-29 DIAGNOSIS — Z79.899 CONTROLLED SUBSTANCE AGREEMENT SIGNED: ICD-10-CM

## 2018-05-29 DIAGNOSIS — F41.1 GENERALIZED ANXIETY DISORDER: ICD-10-CM

## 2018-05-29 DIAGNOSIS — Z86.711 HISTORY OF PULMONARY EMBOLISM: ICD-10-CM

## 2018-05-29 DIAGNOSIS — I10 ESSENTIAL HYPERTENSION: ICD-10-CM

## 2018-05-29 DIAGNOSIS — Z95.2 AORTIC VALVE REPLACED: ICD-10-CM

## 2018-05-29 LAB
HBA1C MFR BLD: 6.1 % (ref 3.5–6)
INR PPP: 3.6 (ref 0.9–1.1)
INR PPP: 3.6 (ref 0.9–1.1)

## 2018-06-04 ENCOUNTER — RECORDS - HEALTHEAST (OUTPATIENT)
Dept: ADMINISTRATIVE | Facility: OTHER | Age: 63
End: 2018-06-04

## 2018-06-19 ENCOUNTER — COMMUNICATION - HEALTHEAST (OUTPATIENT)
Dept: PULMONOLOGY | Facility: OTHER | Age: 63
End: 2018-06-19

## 2018-06-19 DIAGNOSIS — J45.40 MODERATE PERSISTENT ASTHMA, UNSPECIFIED WHETHER COMPLICATED: ICD-10-CM

## 2018-06-22 ENCOUNTER — COMMUNICATION - HEALTHEAST (OUTPATIENT)
Dept: FAMILY MEDICINE | Facility: CLINIC | Age: 63
End: 2018-06-22

## 2018-06-22 DIAGNOSIS — F32.A DEPRESSION: ICD-10-CM

## 2018-06-26 ENCOUNTER — COMMUNICATION - HEALTHEAST (OUTPATIENT)
Dept: ANTICOAGULATION | Facility: CLINIC | Age: 63
End: 2018-06-26

## 2018-06-26 ENCOUNTER — OFFICE VISIT - HEALTHEAST (OUTPATIENT)
Dept: FAMILY MEDICINE | Facility: CLINIC | Age: 63
End: 2018-06-26

## 2018-06-26 DIAGNOSIS — F33.41 RECURRENT MAJOR DEPRESSIVE DISORDER, IN PARTIAL REMISSION (H): ICD-10-CM

## 2018-06-26 DIAGNOSIS — R60.9 EDEMA: ICD-10-CM

## 2018-06-26 DIAGNOSIS — Z95.2 HEART VALVE REPLACED: ICD-10-CM

## 2018-06-26 DIAGNOSIS — F41.1 GENERALIZED ANXIETY DISORDER: ICD-10-CM

## 2018-06-26 LAB
ALBUMIN SERPL-MCNC: 3.8 G/DL (ref 3.5–5)
ALP SERPL-CCNC: 116 U/L (ref 45–120)
ALT SERPL W P-5'-P-CCNC: 20 U/L (ref 0–45)
ANION GAP SERPL CALCULATED.3IONS-SCNC: 11 MMOL/L (ref 5–18)
AST SERPL W P-5'-P-CCNC: 32 U/L (ref 0–40)
BILIRUB SERPL-MCNC: 0.4 MG/DL (ref 0–1)
BUN SERPL-MCNC: 12 MG/DL (ref 8–22)
CALCIUM SERPL-MCNC: 8.9 MG/DL (ref 8.5–10.5)
CHLORIDE BLD-SCNC: 103 MMOL/L (ref 98–107)
CO2 SERPL-SCNC: 27 MMOL/L (ref 22–31)
CREAT SERPL-MCNC: 0.81 MG/DL (ref 0.6–1.1)
GFR SERPL CREATININE-BSD FRML MDRD: >60 ML/MIN/1.73M2
GLUCOSE BLD-MCNC: 123 MG/DL (ref 70–125)
INR PPP: 4.6 (ref 0.9–1.1)
POTASSIUM BLD-SCNC: 4.4 MMOL/L (ref 3.5–5)
PROT SERPL-MCNC: 7.1 G/DL (ref 6–8)
SODIUM SERPL-SCNC: 141 MMOL/L (ref 136–145)

## 2018-06-26 ASSESSMENT — MIFFLIN-ST. JEOR: SCORE: 2097.93

## 2018-06-28 ENCOUNTER — AMBULATORY - HEALTHEAST (OUTPATIENT)
Dept: FAMILY MEDICINE | Facility: CLINIC | Age: 63
End: 2018-06-28

## 2018-06-28 ENCOUNTER — COMMUNICATION - HEALTHEAST (OUTPATIENT)
Dept: FAMILY MEDICINE | Facility: CLINIC | Age: 63
End: 2018-06-28

## 2018-06-28 DIAGNOSIS — G35 MS (MULTIPLE SCLEROSIS) (H): ICD-10-CM

## 2018-07-06 ENCOUNTER — AMBULATORY - HEALTHEAST (OUTPATIENT)
Dept: LAB | Facility: CLINIC | Age: 63
End: 2018-07-06

## 2018-07-06 ENCOUNTER — COMMUNICATION - HEALTHEAST (OUTPATIENT)
Dept: ANTICOAGULATION | Facility: CLINIC | Age: 63
End: 2018-07-06

## 2018-07-06 DIAGNOSIS — Z95.2 HEART VALVE REPLACED: ICD-10-CM

## 2018-07-06 LAB — INR PPP: 4.3 (ref 0.9–1.1)

## 2018-07-11 ENCOUNTER — COMMUNICATION - HEALTHEAST (OUTPATIENT)
Dept: FAMILY MEDICINE | Facility: CLINIC | Age: 63
End: 2018-07-11

## 2018-07-11 DIAGNOSIS — J45.909 REACTIVE AIRWAY DISEASE: ICD-10-CM

## 2018-07-13 ENCOUNTER — RECORDS - HEALTHEAST (OUTPATIENT)
Dept: ADMINISTRATIVE | Facility: OTHER | Age: 63
End: 2018-07-13

## 2018-07-13 ENCOUNTER — COMMUNICATION - HEALTHEAST (OUTPATIENT)
Dept: FAMILY MEDICINE | Facility: CLINIC | Age: 63
End: 2018-07-13

## 2018-07-13 DIAGNOSIS — Z95.2 AORTIC VALVE REPLACED: ICD-10-CM

## 2018-07-13 DIAGNOSIS — Z79.01 LONG TERM CURRENT USE OF ANTICOAGULANT THERAPY: ICD-10-CM

## 2018-07-20 ENCOUNTER — RECORDS - HEALTHEAST (OUTPATIENT)
Dept: ADMINISTRATIVE | Facility: OTHER | Age: 63
End: 2018-07-20

## 2018-07-25 ENCOUNTER — COMMUNICATION - HEALTHEAST (OUTPATIENT)
Dept: ANTICOAGULATION | Facility: CLINIC | Age: 63
End: 2018-07-25

## 2018-07-25 ENCOUNTER — AMBULATORY - HEALTHEAST (OUTPATIENT)
Dept: LAB | Facility: CLINIC | Age: 63
End: 2018-07-25

## 2018-07-25 DIAGNOSIS — Z95.2 HEART VALVE REPLACED: ICD-10-CM

## 2018-07-25 LAB — INR PPP: 3.4 (ref 0.9–1.1)

## 2018-07-30 ENCOUNTER — COMMUNICATION - HEALTHEAST (OUTPATIENT)
Dept: FAMILY MEDICINE | Facility: CLINIC | Age: 63
End: 2018-07-30

## 2018-07-30 DIAGNOSIS — F32.A DEPRESSION: ICD-10-CM

## 2018-08-07 ENCOUNTER — COMMUNICATION - HEALTHEAST (OUTPATIENT)
Dept: FAMILY MEDICINE | Facility: CLINIC | Age: 63
End: 2018-08-07

## 2018-08-07 DIAGNOSIS — R13.10 DYSPHAGIA: ICD-10-CM

## 2018-08-14 ENCOUNTER — COMMUNICATION - HEALTHEAST (OUTPATIENT)
Dept: FAMILY MEDICINE | Facility: CLINIC | Age: 63
End: 2018-08-14

## 2018-08-14 DIAGNOSIS — F32.A DEPRESSION: ICD-10-CM

## 2018-08-15 ENCOUNTER — COMMUNICATION - HEALTHEAST (OUTPATIENT)
Dept: ANTICOAGULATION | Facility: CLINIC | Age: 63
End: 2018-08-15

## 2018-08-15 ENCOUNTER — OFFICE VISIT - HEALTHEAST (OUTPATIENT)
Dept: FAMILY MEDICINE | Facility: CLINIC | Age: 63
End: 2018-08-15

## 2018-08-15 DIAGNOSIS — F32.A DEPRESSION: ICD-10-CM

## 2018-08-15 DIAGNOSIS — Z95.2 HEART VALVE REPLACED: ICD-10-CM

## 2018-08-15 DIAGNOSIS — G35 MULTIPLE SCLEROSIS (H): ICD-10-CM

## 2018-08-15 LAB — INR PPP: 3.5 (ref 0.9–1.1)

## 2018-08-30 ENCOUNTER — COMMUNICATION - HEALTHEAST (OUTPATIENT)
Dept: FAMILY MEDICINE | Facility: CLINIC | Age: 63
End: 2018-08-30

## 2018-08-30 DIAGNOSIS — F32.A DEPRESSION: ICD-10-CM

## 2018-09-12 ENCOUNTER — COMMUNICATION - HEALTHEAST (OUTPATIENT)
Dept: ANTICOAGULATION | Facility: CLINIC | Age: 63
End: 2018-09-12

## 2018-09-20 ENCOUNTER — RECORDS - HEALTHEAST (OUTPATIENT)
Dept: ADMINISTRATIVE | Facility: OTHER | Age: 63
End: 2018-09-20

## 2018-09-28 ENCOUNTER — OFFICE VISIT - HEALTHEAST (OUTPATIENT)
Dept: PULMONOLOGY | Facility: OTHER | Age: 63
End: 2018-09-28

## 2018-09-28 ENCOUNTER — COMMUNICATION - HEALTHEAST (OUTPATIENT)
Dept: ANTICOAGULATION | Facility: CLINIC | Age: 63
End: 2018-09-28

## 2018-09-28 DIAGNOSIS — R94.2 RESTRICTIVE VENTILATORY DEFECT: ICD-10-CM

## 2018-09-28 DIAGNOSIS — J45.21 MILD INTERMITTENT ASTHMA WITH EXACERBATION: ICD-10-CM

## 2018-09-28 DIAGNOSIS — R06.09 DYSPNEA ON EXERTION: ICD-10-CM

## 2018-09-28 DIAGNOSIS — Z95.2 HEART VALVE REPLACED: ICD-10-CM

## 2018-09-28 LAB — INR PPP: 3.5 (ref 0.9–1.1)

## 2018-09-28 ASSESSMENT — MIFFLIN-ST. JEOR: SCORE: 2094.24

## 2018-10-03 ENCOUNTER — AMBULATORY - HEALTHEAST (OUTPATIENT)
Dept: NURSING | Facility: CLINIC | Age: 63
End: 2018-10-03

## 2018-10-10 ENCOUNTER — COMMUNICATION - HEALTHEAST (OUTPATIENT)
Dept: FAMILY MEDICINE | Facility: CLINIC | Age: 63
End: 2018-10-10

## 2018-10-10 DIAGNOSIS — J45.909 REACTIVE AIRWAY DISEASE: ICD-10-CM

## 2018-11-01 ENCOUNTER — HOSPITAL ENCOUNTER (OUTPATIENT)
Dept: ULTRASOUND IMAGING | Facility: HOSPITAL | Age: 63
Discharge: HOME | End: 2018-11-01

## 2018-11-01 ENCOUNTER — OFFICE VISIT - HEALTHEAST (OUTPATIENT)
Dept: FAMILY MEDICINE | Facility: CLINIC | Age: 63
End: 2018-11-01

## 2018-11-01 ENCOUNTER — COMMUNICATION - HEALTHEAST (OUTPATIENT)
Dept: FAMILY MEDICINE | Facility: CLINIC | Age: 63
End: 2018-11-01

## 2018-11-01 DIAGNOSIS — R22.2 ABDOMINAL WALL LUMP: ICD-10-CM

## 2018-11-01 DIAGNOSIS — R06.02 SOB (SHORTNESS OF BREATH): ICD-10-CM

## 2018-11-05 ENCOUNTER — COMMUNICATION - HEALTHEAST (OUTPATIENT)
Dept: FAMILY MEDICINE | Facility: CLINIC | Age: 63
End: 2018-11-05

## 2018-11-05 DIAGNOSIS — E78.5 HYPERLIPIDEMIA: ICD-10-CM

## 2018-11-14 ENCOUNTER — RECORDS - HEALTHEAST (OUTPATIENT)
Dept: ADMINISTRATIVE | Facility: OTHER | Age: 63
End: 2018-11-14

## 2018-11-16 ENCOUNTER — COMMUNICATION - HEALTHEAST (OUTPATIENT)
Dept: ANTICOAGULATION | Facility: CLINIC | Age: 63
End: 2018-11-16

## 2018-11-19 ENCOUNTER — COMMUNICATION - HEALTHEAST (OUTPATIENT)
Dept: FAMILY MEDICINE | Facility: CLINIC | Age: 63
End: 2018-11-19

## 2018-11-19 DIAGNOSIS — Z23 NEED FOR SHINGLES VACCINE: ICD-10-CM

## 2018-11-20 ENCOUNTER — COMMUNICATION - HEALTHEAST (OUTPATIENT)
Dept: ANTICOAGULATION | Facility: CLINIC | Age: 63
End: 2018-11-20

## 2018-11-20 ENCOUNTER — AMBULATORY - HEALTHEAST (OUTPATIENT)
Dept: NURSING | Facility: CLINIC | Age: 63
End: 2018-11-20

## 2018-11-20 ENCOUNTER — AMBULATORY - HEALTHEAST (OUTPATIENT)
Dept: LAB | Facility: CLINIC | Age: 63
End: 2018-11-20

## 2018-11-20 DIAGNOSIS — Z95.2 HEART VALVE REPLACED: ICD-10-CM

## 2018-11-20 DIAGNOSIS — Z23 NEED FOR SHINGLES VACCINE: ICD-10-CM

## 2018-11-20 LAB — INR PPP: 2.4 (ref 0.9–1.1)

## 2018-12-11 ENCOUNTER — COMMUNICATION - HEALTHEAST (OUTPATIENT)
Dept: ANTICOAGULATION | Facility: CLINIC | Age: 63
End: 2018-12-11

## 2018-12-13 ENCOUNTER — AMBULATORY - HEALTHEAST (OUTPATIENT)
Dept: LAB | Facility: CLINIC | Age: 63
End: 2018-12-13

## 2018-12-13 ENCOUNTER — COMMUNICATION - HEALTHEAST (OUTPATIENT)
Dept: ANTICOAGULATION | Facility: CLINIC | Age: 63
End: 2018-12-13

## 2018-12-13 DIAGNOSIS — Z95.2 HEART VALVE REPLACED: ICD-10-CM

## 2018-12-13 LAB — INR PPP: 5.1 (ref 0.9–1.1)

## 2018-12-17 ENCOUNTER — AMBULATORY - HEALTHEAST (OUTPATIENT)
Dept: PULMONOLOGY | Facility: OTHER | Age: 63
End: 2018-12-17

## 2018-12-17 ENCOUNTER — AMBULATORY - HEALTHEAST (OUTPATIENT)
Dept: LAB | Facility: CLINIC | Age: 63
End: 2018-12-17

## 2018-12-17 ENCOUNTER — COMMUNICATION - HEALTHEAST (OUTPATIENT)
Dept: ANTICOAGULATION | Facility: CLINIC | Age: 63
End: 2018-12-17

## 2018-12-17 DIAGNOSIS — J45.40 MODERATE PERSISTENT ASTHMA, UNSPECIFIED WHETHER COMPLICATED: ICD-10-CM

## 2018-12-17 DIAGNOSIS — Z95.2 HEART VALVE REPLACED: ICD-10-CM

## 2018-12-17 LAB — INR PPP: 1.8 (ref 0.9–1.1)

## 2018-12-28 ENCOUNTER — COMMUNICATION - HEALTHEAST (OUTPATIENT)
Dept: FAMILY MEDICINE | Facility: CLINIC | Age: 63
End: 2018-12-28

## 2019-01-02 ENCOUNTER — AMBULATORY - HEALTHEAST (OUTPATIENT)
Dept: LAB | Facility: CLINIC | Age: 64
End: 2019-01-02

## 2019-01-02 ENCOUNTER — COMMUNICATION - HEALTHEAST (OUTPATIENT)
Dept: ANTICOAGULATION | Facility: CLINIC | Age: 64
End: 2019-01-02

## 2019-01-02 DIAGNOSIS — Z95.2 HEART VALVE REPLACED: ICD-10-CM

## 2019-01-02 LAB — INR PPP: 3.5 (ref 0.9–1.1)

## 2019-01-08 ENCOUNTER — COMMUNICATION - HEALTHEAST (OUTPATIENT)
Dept: ANTICOAGULATION | Facility: CLINIC | Age: 64
End: 2019-01-08

## 2019-01-08 DIAGNOSIS — I26.99 PULMONARY EMBOLISM (H): ICD-10-CM

## 2019-01-08 DIAGNOSIS — Z95.2 H/O AORTIC VALVE REPLACEMENT: ICD-10-CM

## 2019-01-11 ENCOUNTER — COMMUNICATION - HEALTHEAST (OUTPATIENT)
Dept: FAMILY MEDICINE | Facility: CLINIC | Age: 64
End: 2019-01-11

## 2019-01-11 ENCOUNTER — AMBULATORY - HEALTHEAST (OUTPATIENT)
Dept: LAB | Facility: CLINIC | Age: 64
End: 2019-01-11

## 2019-01-11 ENCOUNTER — COMMUNICATION - HEALTHEAST (OUTPATIENT)
Dept: ANTICOAGULATION | Facility: CLINIC | Age: 64
End: 2019-01-11

## 2019-01-11 DIAGNOSIS — I26.99 PULMONARY EMBOLISM (H): ICD-10-CM

## 2019-01-11 DIAGNOSIS — Z79.01 LONG TERM CURRENT USE OF ANTICOAGULANT THERAPY: ICD-10-CM

## 2019-01-11 DIAGNOSIS — Z95.2 HEART VALVE REPLACED: ICD-10-CM

## 2019-01-11 DIAGNOSIS — Z95.2 H/O AORTIC VALVE REPLACEMENT: ICD-10-CM

## 2019-01-11 DIAGNOSIS — Z95.2 AORTIC VALVE REPLACED: ICD-10-CM

## 2019-01-11 LAB — INR PPP: 2.9 (ref 0.9–1.1)

## 2019-01-13 ENCOUNTER — COMMUNICATION - HEALTHEAST (OUTPATIENT)
Dept: FAMILY MEDICINE | Facility: CLINIC | Age: 64
End: 2019-01-13

## 2019-01-13 DIAGNOSIS — J45.909 REACTIVE AIRWAY DISEASE: ICD-10-CM

## 2019-01-16 ENCOUNTER — RECORDS - HEALTHEAST (OUTPATIENT)
Dept: ADMINISTRATIVE | Facility: OTHER | Age: 64
End: 2019-01-16

## 2019-01-18 ENCOUNTER — COMMUNICATION - HEALTHEAST (OUTPATIENT)
Dept: SCHEDULING | Facility: CLINIC | Age: 64
End: 2019-01-18

## 2019-01-21 ENCOUNTER — AMBULATORY - HEALTHEAST (OUTPATIENT)
Dept: NURSING | Facility: CLINIC | Age: 64
End: 2019-01-21

## 2019-01-21 ENCOUNTER — COMMUNICATION - HEALTHEAST (OUTPATIENT)
Dept: ANTICOAGULATION | Facility: CLINIC | Age: 64
End: 2019-01-21

## 2019-01-21 ENCOUNTER — AMBULATORY - HEALTHEAST (OUTPATIENT)
Dept: LAB | Facility: CLINIC | Age: 64
End: 2019-01-21

## 2019-01-21 DIAGNOSIS — Z23 NEED FOR SHINGLES VACCINE: ICD-10-CM

## 2019-01-21 DIAGNOSIS — Z95.2 H/O AORTIC VALVE REPLACEMENT: ICD-10-CM

## 2019-01-21 DIAGNOSIS — I26.99 PULMONARY EMBOLISM (H): ICD-10-CM

## 2019-01-21 DIAGNOSIS — Z95.2 HEART VALVE REPLACED: ICD-10-CM

## 2019-01-21 LAB — INR PPP: 3.3 (ref 0.9–1.1)

## 2019-01-24 ENCOUNTER — RECORDS - HEALTHEAST (OUTPATIENT)
Dept: ADMINISTRATIVE | Facility: OTHER | Age: 64
End: 2019-01-24

## 2019-02-14 ENCOUNTER — RECORDS - HEALTHEAST (OUTPATIENT)
Dept: ADMINISTRATIVE | Facility: OTHER | Age: 64
End: 2019-02-14

## 2019-02-19 ENCOUNTER — OFFICE VISIT - HEALTHEAST (OUTPATIENT)
Dept: PULMONOLOGY | Facility: OTHER | Age: 64
End: 2019-02-19

## 2019-02-19 DIAGNOSIS — J45.40 MODERATE PERSISTENT ASTHMA WITHOUT COMPLICATION: ICD-10-CM

## 2019-02-19 DIAGNOSIS — R06.00 DYSPNEA, UNSPECIFIED TYPE: ICD-10-CM

## 2019-02-21 ENCOUNTER — OFFICE VISIT - HEALTHEAST (OUTPATIENT)
Dept: FAMILY MEDICINE | Facility: CLINIC | Age: 64
End: 2019-02-21

## 2019-02-21 DIAGNOSIS — L72.9 INFECTED CYST OF SKIN: ICD-10-CM

## 2019-02-21 DIAGNOSIS — S42.292A OTHER CLOSED DISPLACED FRACTURE OF PROXIMAL END OF LEFT HUMERUS, INITIAL ENCOUNTER: ICD-10-CM

## 2019-02-21 DIAGNOSIS — J45.40 MODERATE PERSISTENT ASTHMA, UNSPECIFIED WHETHER COMPLICATED: ICD-10-CM

## 2019-02-21 DIAGNOSIS — L08.9 INFECTED CYST OF SKIN: ICD-10-CM

## 2019-02-25 ENCOUNTER — COMMUNICATION - HEALTHEAST (OUTPATIENT)
Dept: ANTICOAGULATION | Facility: CLINIC | Age: 64
End: 2019-02-25

## 2019-03-04 ENCOUNTER — COMMUNICATION - HEALTHEAST (OUTPATIENT)
Dept: ANTICOAGULATION | Facility: CLINIC | Age: 64
End: 2019-03-04

## 2019-03-04 ENCOUNTER — AMBULATORY - HEALTHEAST (OUTPATIENT)
Dept: LAB | Facility: CLINIC | Age: 64
End: 2019-03-04

## 2019-03-04 DIAGNOSIS — I26.99 PULMONARY EMBOLISM (H): ICD-10-CM

## 2019-03-04 DIAGNOSIS — Z95.2 HEART VALVE REPLACED: ICD-10-CM

## 2019-03-04 DIAGNOSIS — Z95.2 H/O AORTIC VALVE REPLACEMENT: ICD-10-CM

## 2019-03-04 LAB — INR PPP: 3.1 (ref 0.9–1.1)

## 2019-03-08 ENCOUNTER — COMMUNICATION - HEALTHEAST (OUTPATIENT)
Dept: PULMONOLOGY | Facility: OTHER | Age: 64
End: 2019-03-08

## 2019-03-20 ENCOUNTER — AMBULATORY - HEALTHEAST (OUTPATIENT)
Dept: LAB | Facility: CLINIC | Age: 64
End: 2019-03-20

## 2019-03-20 ENCOUNTER — COMMUNICATION - HEALTHEAST (OUTPATIENT)
Dept: ANTICOAGULATION | Facility: CLINIC | Age: 64
End: 2019-03-20

## 2019-03-20 DIAGNOSIS — Z95.2 H/O AORTIC VALVE REPLACEMENT: ICD-10-CM

## 2019-03-20 DIAGNOSIS — Z95.2 HEART VALVE REPLACED: ICD-10-CM

## 2019-03-20 DIAGNOSIS — I26.99 PULMONARY EMBOLISM (H): ICD-10-CM

## 2019-03-20 LAB — INR PPP: 3.3 (ref 0.9–1.1)

## 2019-04-02 ENCOUNTER — OFFICE VISIT - HEALTHEAST (OUTPATIENT)
Dept: FAMILY MEDICINE | Facility: CLINIC | Age: 64
End: 2019-04-02

## 2019-04-02 ENCOUNTER — COMMUNICATION - HEALTHEAST (OUTPATIENT)
Dept: ANTICOAGULATION | Facility: CLINIC | Age: 64
End: 2019-04-02

## 2019-04-02 ENCOUNTER — COMMUNICATION - HEALTHEAST (OUTPATIENT)
Dept: FAMILY MEDICINE | Facility: CLINIC | Age: 64
End: 2019-04-02

## 2019-04-02 DIAGNOSIS — Z95.2 HEART VALVE REPLACED: ICD-10-CM

## 2019-04-02 DIAGNOSIS — Z01.818 PREOPERATIVE EXAMINATION: ICD-10-CM

## 2019-04-02 DIAGNOSIS — Z12.31 VISIT FOR SCREENING MAMMOGRAM: ICD-10-CM

## 2019-04-02 DIAGNOSIS — I26.99 PULMONARY EMBOLISM (H): ICD-10-CM

## 2019-04-02 DIAGNOSIS — Z95.2 H/O AORTIC VALVE REPLACEMENT: ICD-10-CM

## 2019-04-02 DIAGNOSIS — Z12.11 SCREEN FOR COLON CANCER: ICD-10-CM

## 2019-04-02 DIAGNOSIS — S42.292G OTHER CLOSED DISPLACED FRACTURE OF PROXIMAL END OF LEFT HUMERUS WITH DELAYED HEALING, SUBSEQUENT ENCOUNTER: ICD-10-CM

## 2019-04-02 DIAGNOSIS — F32.A DEPRESSION: ICD-10-CM

## 2019-04-02 LAB
ANION GAP SERPL CALCULATED.3IONS-SCNC: 12 MMOL/L (ref 5–18)
ATRIAL RATE - MUSE: 75 BPM
BUN SERPL-MCNC: 12 MG/DL (ref 8–22)
CALCIUM SERPL-MCNC: 9.4 MG/DL (ref 8.5–10.5)
CHLORIDE BLD-SCNC: 103 MMOL/L (ref 98–107)
CO2 SERPL-SCNC: 30 MMOL/L (ref 22–31)
CREAT SERPL-MCNC: 0.87 MG/DL (ref 0.6–1.1)
DIASTOLIC BLOOD PRESSURE - MUSE: NORMAL MMHG
ERYTHROCYTE [DISTWIDTH] IN BLOOD BY AUTOMATED COUNT: 14.5 % (ref 11–14.5)
GFR SERPL CREATININE-BSD FRML MDRD: >60 ML/MIN/1.73M2
GLUCOSE BLD-MCNC: 115 MG/DL (ref 70–125)
HCT VFR BLD AUTO: 42.1 % (ref 35–47)
HGB BLD-MCNC: 13.5 G/DL (ref 12–16)
INR PPP: 3.3 (ref 0.9–1.1)
INTERPRETATION ECG - MUSE: NORMAL
MCH RBC QN AUTO: 29.6 PG (ref 27–34)
MCHC RBC AUTO-ENTMCNC: 32.1 G/DL (ref 32–36)
MCV RBC AUTO: 92 FL (ref 80–100)
P AXIS - MUSE: 14 DEGREES
PLATELET # BLD AUTO: 250 THOU/UL (ref 140–440)
PMV BLD AUTO: 8.5 FL (ref 7–10)
POTASSIUM BLD-SCNC: 4.4 MMOL/L (ref 3.5–5)
PR INTERVAL - MUSE: 142 MS
QRS DURATION - MUSE: 80 MS
QT - MUSE: 408 MS
QTC - MUSE: 455 MS
R AXIS - MUSE: 46 DEGREES
RBC # BLD AUTO: 4.57 MILL/UL (ref 3.8–5.4)
SODIUM SERPL-SCNC: 145 MMOL/L (ref 136–145)
SYSTOLIC BLOOD PRESSURE - MUSE: NORMAL MMHG
T AXIS - MUSE: 103 DEGREES
VENTRICULAR RATE- MUSE: 75 BPM
WBC: 8 THOU/UL (ref 4–11)

## 2019-04-04 ASSESSMENT — MIFFLIN-ST. JEOR: SCORE: 2067.02

## 2019-04-08 ENCOUNTER — SURGERY - HEALTHEAST (OUTPATIENT)
Dept: SURGERY | Facility: CLINIC | Age: 64
End: 2019-04-08

## 2019-04-09 ENCOUNTER — ANESTHESIA - HEALTHEAST (OUTPATIENT)
Dept: SURGERY | Facility: CLINIC | Age: 64
End: 2019-04-09

## 2019-04-10 ENCOUNTER — SURGERY - HEALTHEAST (OUTPATIENT)
Dept: SURGERY | Facility: CLINIC | Age: 64
End: 2019-04-10

## 2019-04-10 ASSESSMENT — MIFFLIN-ST. JEOR: SCORE: 2038.45

## 2019-04-11 ASSESSMENT — MIFFLIN-ST. JEOR: SCORE: 2038.45

## 2019-04-12 ENCOUNTER — AMBULATORY - HEALTHEAST (OUTPATIENT)
Dept: MEDSURG UNIT | Facility: CLINIC | Age: 64
End: 2019-04-12

## 2019-04-12 ENCOUNTER — HOME CARE/HOSPICE - HEALTHEAST (OUTPATIENT)
Dept: HOME HEALTH SERVICES | Facility: HOME HEALTH | Age: 64
End: 2019-04-12

## 2019-04-14 ENCOUNTER — COMMUNICATION - HEALTHEAST (OUTPATIENT)
Dept: FAMILY MEDICINE | Facility: CLINIC | Age: 64
End: 2019-04-14

## 2019-04-14 DIAGNOSIS — J45.909 REACTIVE AIRWAY DISEASE: ICD-10-CM

## 2019-04-15 ENCOUNTER — AMBULATORY - HEALTHEAST (OUTPATIENT)
Dept: CARE COORDINATION | Facility: CLINIC | Age: 64
End: 2019-04-15

## 2019-04-15 ENCOUNTER — COMMUNICATION - HEALTHEAST (OUTPATIENT)
Dept: ANTICOAGULATION | Facility: CLINIC | Age: 64
End: 2019-04-15

## 2019-04-15 ENCOUNTER — COMMUNICATION - HEALTHEAST (OUTPATIENT)
Dept: FAMILY MEDICINE | Facility: CLINIC | Age: 64
End: 2019-04-15

## 2019-04-15 DIAGNOSIS — I27.20 PULMONARY HYPERTENSION (H): ICD-10-CM

## 2019-04-15 DIAGNOSIS — Z95.2 HEART VALVE REPLACED: ICD-10-CM

## 2019-04-15 DIAGNOSIS — S42.292G CLOSED 3-PART FRACTURE OF PROXIMAL HUMERUS WITH DELAYED HEALING, LEFT: ICD-10-CM

## 2019-04-15 DIAGNOSIS — F41.1 GENERALIZED ANXIETY DISORDER: ICD-10-CM

## 2019-04-15 DIAGNOSIS — G35 MULTIPLE SCLEROSIS (H): ICD-10-CM

## 2019-04-15 LAB — INR PPP: 2.2 (ref 0.9–1.1)

## 2019-04-16 ENCOUNTER — RECORDS - HEALTHEAST (OUTPATIENT)
Dept: ADMINISTRATIVE | Facility: OTHER | Age: 64
End: 2019-04-16

## 2019-04-16 ENCOUNTER — COMMUNICATION - HEALTHEAST (OUTPATIENT)
Dept: NURSING | Facility: CLINIC | Age: 64
End: 2019-04-16

## 2019-04-16 ENCOUNTER — COMMUNICATION - HEALTHEAST (OUTPATIENT)
Dept: CARE COORDINATION | Facility: CLINIC | Age: 64
End: 2019-04-16

## 2019-04-16 ENCOUNTER — COMMUNICATION - HEALTHEAST (OUTPATIENT)
Dept: FAMILY MEDICINE | Facility: CLINIC | Age: 64
End: 2019-04-16

## 2019-04-16 DIAGNOSIS — Z86.711 HISTORY OF PULMONARY EMBOLISM: ICD-10-CM

## 2019-04-16 DIAGNOSIS — Z01.818 PREOPERATIVE EXAMINATION: ICD-10-CM

## 2019-04-16 DIAGNOSIS — Z95.2 AORTIC VALVE REPLACED: ICD-10-CM

## 2019-04-16 DIAGNOSIS — Z95.2 HEART VALVE REPLACED: ICD-10-CM

## 2019-04-17 ENCOUNTER — RECORDS - HEALTHEAST (OUTPATIENT)
Dept: ADMINISTRATIVE | Facility: OTHER | Age: 64
End: 2019-04-17

## 2019-04-17 ENCOUNTER — OFFICE VISIT - HEALTHEAST (OUTPATIENT)
Dept: FAMILY MEDICINE | Facility: CLINIC | Age: 64
End: 2019-04-17

## 2019-04-17 ENCOUNTER — COMMUNICATION - HEALTHEAST (OUTPATIENT)
Dept: ANTICOAGULATION | Facility: CLINIC | Age: 64
End: 2019-04-17

## 2019-04-17 ENCOUNTER — AMBULATORY - HEALTHEAST (OUTPATIENT)
Dept: FAMILY MEDICINE | Facility: CLINIC | Age: 64
End: 2019-04-17

## 2019-04-17 DIAGNOSIS — I26.99 PULMONARY EMBOLISM (H): ICD-10-CM

## 2019-04-17 DIAGNOSIS — D64.9 ANEMIA, UNSPECIFIED TYPE: ICD-10-CM

## 2019-04-17 DIAGNOSIS — Z95.2 H/O AORTIC VALVE REPLACEMENT: ICD-10-CM

## 2019-04-17 DIAGNOSIS — S42.202S CLOSED FRACTURE OF PROXIMAL END OF LEFT HUMERUS, UNSPECIFIED FRACTURE MORPHOLOGY, SEQUELA: ICD-10-CM

## 2019-04-17 DIAGNOSIS — Z95.2 HEART VALVE REPLACED: ICD-10-CM

## 2019-04-17 DIAGNOSIS — E83.42 HYPOMAGNESEMIA: ICD-10-CM

## 2019-04-17 LAB
ERYTHROCYTE [DISTWIDTH] IN BLOOD BY AUTOMATED COUNT: 14.8 % (ref 11–14.5)
HCT VFR BLD AUTO: 27.5 % (ref 35–47)
HGB BLD-MCNC: 8.8 G/DL (ref 12–16)
INR PPP: 2.3 (ref 0.9–1.1)
MAGNESIUM SERPL-MCNC: 1.7 MG/DL (ref 1.8–2.6)
MCH RBC QN AUTO: 28.5 PG (ref 27–34)
MCHC RBC AUTO-ENTMCNC: 32 G/DL (ref 32–36)
MCV RBC AUTO: 89 FL (ref 80–100)
PLATELET # BLD AUTO: 315 THOU/UL (ref 140–440)
PMV BLD AUTO: 7.9 FL (ref 7–10)
RBC # BLD AUTO: 3.09 MILL/UL (ref 3.8–5.4)
WBC: 8.9 THOU/UL (ref 4–11)

## 2019-04-18 ENCOUNTER — RECORDS - HEALTHEAST (OUTPATIENT)
Dept: ADMINISTRATIVE | Facility: OTHER | Age: 64
End: 2019-04-18

## 2019-04-18 ENCOUNTER — AMBULATORY - HEALTHEAST (OUTPATIENT)
Dept: FAMILY MEDICINE | Facility: CLINIC | Age: 64
End: 2019-04-18

## 2019-04-18 ENCOUNTER — COMMUNICATION - HEALTHEAST (OUTPATIENT)
Dept: FAMILY MEDICINE | Facility: CLINIC | Age: 64
End: 2019-04-18

## 2019-04-18 DIAGNOSIS — E83.42 HYPOMAGNESEMIA: ICD-10-CM

## 2019-04-19 ENCOUNTER — COMMUNICATION - HEALTHEAST (OUTPATIENT)
Dept: FAMILY MEDICINE | Facility: CLINIC | Age: 64
End: 2019-04-19

## 2019-04-19 ENCOUNTER — AMBULATORY - HEALTHEAST (OUTPATIENT)
Dept: FAMILY MEDICINE | Facility: CLINIC | Age: 64
End: 2019-04-19

## 2019-04-19 DIAGNOSIS — E83.42 HYPOMAGNESEMIA: ICD-10-CM

## 2019-04-19 DIAGNOSIS — Z95.2 HEART VALVE REPLACED: ICD-10-CM

## 2019-04-19 LAB — INR PPP: 2.5 (ref 0.9–1.1)

## 2019-04-22 ENCOUNTER — COMMUNICATION - HEALTHEAST (OUTPATIENT)
Dept: NURSING | Facility: CLINIC | Age: 64
End: 2019-04-22

## 2019-04-22 ENCOUNTER — COMMUNICATION - HEALTHEAST (OUTPATIENT)
Dept: FAMILY MEDICINE | Facility: CLINIC | Age: 64
End: 2019-04-22

## 2019-04-22 ENCOUNTER — RECORDS - HEALTHEAST (OUTPATIENT)
Dept: ADMINISTRATIVE | Facility: OTHER | Age: 64
End: 2019-04-22

## 2019-04-22 DIAGNOSIS — F32.A DEPRESSION: ICD-10-CM

## 2019-04-22 DIAGNOSIS — Z95.2 HEART VALVE REPLACED: ICD-10-CM

## 2019-04-22 LAB — INR PPP: 2.3 (ref 0.9–1.1)

## 2019-04-24 LAB
BASOPHILS # BLD AUTO: 0 10E9/L (ref 0–0.2)
BASOPHILS NFR BLD AUTO: 0.4 %
DIFFERENTIAL METHOD BLD: ABNORMAL
EOSINOPHIL # BLD AUTO: 0.3 10E9/L (ref 0–0.7)
EOSINOPHIL NFR BLD AUTO: 3.5 %
ERYTHROCYTE [DISTWIDTH] IN BLOOD BY AUTOMATED COUNT: 17.5 % (ref 10–15)
HCT VFR BLD AUTO: 28.5 % (ref 35–47)
HGB BLD-MCNC: 8.1 G/DL (ref 11.7–15.7)
IMM GRANULOCYTES # BLD: 0.1 10E9/L (ref 0–0.4)
IMM GRANULOCYTES NFR BLD: 0.7 %
LYMPHOCYTES # BLD AUTO: 1.9 10E9/L (ref 0.8–5.3)
LYMPHOCYTES NFR BLD AUTO: 25.9 %
MCH RBC QN AUTO: 28.5 PG (ref 26.5–33)
MCHC RBC AUTO-ENTMCNC: 28.4 G/DL (ref 31.5–36.5)
MCV RBC AUTO: 100 FL (ref 78–100)
MONOCYTES # BLD AUTO: 0.6 10E9/L (ref 0–1.3)
MONOCYTES NFR BLD AUTO: 8.1 %
NEUTROPHILS # BLD AUTO: 4.4 10E9/L (ref 1.6–8.3)
NEUTROPHILS NFR BLD AUTO: 61.4 %
NRBC # BLD AUTO: 0.2 10*3/UL
NRBC BLD AUTO-RTO: 2 /100
PLATELET # BLD AUTO: 451 10E9/L (ref 150–450)
RBC # BLD AUTO: 2.84 10E12/L (ref 3.8–5.2)
WBC # BLD AUTO: 7.2 10E9/L (ref 4–11)

## 2019-04-24 PROCEDURE — 85025 COMPLETE CBC W/AUTO DIFF WBC: CPT | Performed by: FAMILY MEDICINE

## 2019-04-26 ENCOUNTER — COMMUNICATION - HEALTHEAST (OUTPATIENT)
Dept: FAMILY MEDICINE | Facility: CLINIC | Age: 64
End: 2019-04-26

## 2019-04-26 ENCOUNTER — RECORDS - HEALTHEAST (OUTPATIENT)
Dept: FAMILY MEDICINE | Facility: CLINIC | Age: 64
End: 2019-04-26

## 2019-04-26 DIAGNOSIS — Z95.2 HEART VALVE REPLACED: ICD-10-CM

## 2019-04-26 LAB — INR PPP: 2.6 (ref 0.9–1.1)

## 2019-04-30 ENCOUNTER — COMMUNICATION - HEALTHEAST (OUTPATIENT)
Dept: NURSING | Facility: CLINIC | Age: 64
End: 2019-04-30

## 2019-04-30 ENCOUNTER — MEDICAL CORRESPONDENCE (OUTPATIENT)
Dept: HEALTH INFORMATION MANAGEMENT | Facility: CLINIC | Age: 64
End: 2019-04-30

## 2019-04-30 ENCOUNTER — RECORDS - HEALTHEAST (OUTPATIENT)
Dept: ADMINISTRATIVE | Facility: OTHER | Age: 64
End: 2019-04-30

## 2019-04-30 ENCOUNTER — COMMUNICATION - HEALTHEAST (OUTPATIENT)
Dept: FAMILY MEDICINE | Facility: CLINIC | Age: 64
End: 2019-04-30

## 2019-04-30 DIAGNOSIS — Z95.2 HEART VALVE REPLACED: ICD-10-CM

## 2019-04-30 LAB
ERYTHROCYTE [DISTWIDTH] IN BLOOD BY AUTOMATED COUNT: 16.8 % (ref 10–15)
FERRITIN SERPL-MCNC: 126 NG/ML (ref 8–252)
HCT VFR BLD AUTO: 33.5 % (ref 35–47)
HGB BLD-MCNC: 9.2 G/DL (ref 11.7–15.7)
INR PPP: 2.6 (ref 0.9–1.1)
MAGNESIUM SERPL-MCNC: 2.6 MG/DL (ref 1.6–2.3)
MCH RBC QN AUTO: 27.4 PG (ref 26.5–33)
MCHC RBC AUTO-ENTMCNC: 27.5 G/DL (ref 31.5–36.5)
MCV RBC AUTO: 100 FL (ref 78–100)
PLATELET # BLD AUTO: 513 10E9/L (ref 150–450)
RBC # BLD AUTO: 3.36 10E12/L (ref 3.8–5.2)
WBC # BLD AUTO: 6.1 10E9/L (ref 4–11)

## 2019-04-30 PROCEDURE — 85027 COMPLETE CBC AUTOMATED: CPT | Performed by: FAMILY MEDICINE

## 2019-04-30 PROCEDURE — 82728 ASSAY OF FERRITIN: CPT | Performed by: FAMILY MEDICINE

## 2019-04-30 PROCEDURE — 83735 ASSAY OF MAGNESIUM: CPT | Performed by: FAMILY MEDICINE

## 2019-05-01 ENCOUNTER — COMMUNICATION - HEALTHEAST (OUTPATIENT)
Dept: FAMILY MEDICINE | Facility: CLINIC | Age: 64
End: 2019-05-01

## 2019-05-01 DIAGNOSIS — Z95.2 AORTIC VALVE REPLACED: ICD-10-CM

## 2019-05-01 DIAGNOSIS — Z79.01 LONG TERM CURRENT USE OF ANTICOAGULANT THERAPY: ICD-10-CM

## 2019-05-07 ENCOUNTER — COMMUNICATION - HEALTHEAST (OUTPATIENT)
Dept: FAMILY MEDICINE | Facility: CLINIC | Age: 64
End: 2019-05-07

## 2019-05-07 DIAGNOSIS — Z95.2 HEART VALVE REPLACED: ICD-10-CM

## 2019-05-07 LAB — INR PPP: 2 (ref 0.9–1.1)

## 2019-05-08 ENCOUNTER — COMMUNICATION - HEALTHEAST (OUTPATIENT)
Dept: FAMILY MEDICINE | Facility: CLINIC | Age: 64
End: 2019-05-08

## 2019-05-08 DIAGNOSIS — I10 ESSENTIAL HYPERTENSION: ICD-10-CM

## 2019-05-14 ENCOUNTER — COMMUNICATION - HEALTHEAST (OUTPATIENT)
Dept: FAMILY MEDICINE | Facility: CLINIC | Age: 64
End: 2019-05-14

## 2019-05-14 DIAGNOSIS — Z95.2 HEART VALVE REPLACED: ICD-10-CM

## 2019-05-14 LAB — INR PPP: 1.5 (ref 0.9–1.1)

## 2019-05-15 ENCOUNTER — RECORDS - HEALTHEAST (OUTPATIENT)
Dept: ADMINISTRATIVE | Facility: OTHER | Age: 64
End: 2019-05-15

## 2019-05-17 ENCOUNTER — COMMUNICATION - HEALTHEAST (OUTPATIENT)
Dept: FAMILY MEDICINE | Facility: CLINIC | Age: 64
End: 2019-05-17

## 2019-05-17 DIAGNOSIS — Z95.2 HEART VALVE REPLACED: ICD-10-CM

## 2019-05-17 LAB — INR PPP: 2.7 (ref 0.9–1.1)

## 2019-05-20 ENCOUNTER — RECORDS - HEALTHEAST (OUTPATIENT)
Dept: ADMINISTRATIVE | Facility: OTHER | Age: 64
End: 2019-05-20

## 2019-05-21 ENCOUNTER — COMMUNICATION - HEALTHEAST (OUTPATIENT)
Dept: FAMILY MEDICINE | Facility: CLINIC | Age: 64
End: 2019-05-21

## 2019-05-21 ENCOUNTER — RECORDS - HEALTHEAST (OUTPATIENT)
Dept: ADMINISTRATIVE | Facility: OTHER | Age: 64
End: 2019-05-21

## 2019-05-21 DIAGNOSIS — Z95.2 HEART VALVE REPLACED: ICD-10-CM

## 2019-05-21 LAB — INR PPP: 3.6 (ref 0.9–1.1)

## 2019-05-24 ENCOUNTER — COMMUNICATION - HEALTHEAST (OUTPATIENT)
Dept: FAMILY MEDICINE | Facility: CLINIC | Age: 64
End: 2019-05-24

## 2019-05-24 DIAGNOSIS — Z95.2 HEART VALVE REPLACED: ICD-10-CM

## 2019-05-24 LAB — INR PPP: 3.6 (ref 0.9–1.1)

## 2019-05-31 ENCOUNTER — COMMUNICATION - HEALTHEAST (OUTPATIENT)
Dept: FAMILY MEDICINE | Facility: CLINIC | Age: 64
End: 2019-05-31

## 2019-05-31 DIAGNOSIS — Z95.2 HEART VALVE REPLACED: ICD-10-CM

## 2019-05-31 LAB — INR PPP: 4.1 (ref 0.9–1.1)

## 2019-06-03 ENCOUNTER — RECORDS - HEALTHEAST (OUTPATIENT)
Dept: ADMINISTRATIVE | Facility: OTHER | Age: 64
End: 2019-06-03

## 2019-06-06 ENCOUNTER — AMBULATORY - HEALTHEAST (OUTPATIENT)
Dept: LAB | Facility: CLINIC | Age: 64
End: 2019-06-06

## 2019-06-06 ENCOUNTER — RECORDS - HEALTHEAST (OUTPATIENT)
Dept: ADMINISTRATIVE | Facility: OTHER | Age: 64
End: 2019-06-06

## 2019-06-06 ENCOUNTER — COMMUNICATION - HEALTHEAST (OUTPATIENT)
Dept: ANTICOAGULATION | Facility: CLINIC | Age: 64
End: 2019-06-06

## 2019-06-06 DIAGNOSIS — E83.42 HYPOMAGNESEMIA: ICD-10-CM

## 2019-06-06 DIAGNOSIS — D64.9 ANEMIA, UNSPECIFIED TYPE: ICD-10-CM

## 2019-06-06 DIAGNOSIS — I26.99 PULMONARY EMBOLISM (H): ICD-10-CM

## 2019-06-06 DIAGNOSIS — Z95.2 HEART VALVE REPLACED: ICD-10-CM

## 2019-06-06 DIAGNOSIS — Z95.2 H/O AORTIC VALVE REPLACEMENT: ICD-10-CM

## 2019-06-06 LAB
ERYTHROCYTE [DISTWIDTH] IN BLOOD BY AUTOMATED COUNT: 15.3 % (ref 11–14.5)
HCT VFR BLD AUTO: 36.6 % (ref 35–47)
HGB BLD-MCNC: 11.4 G/DL (ref 12–16)
INR PPP: 3.2 (ref 0.9–1.1)
MAGNESIUM SERPL-MCNC: 2.1 MG/DL (ref 1.8–2.6)
MCH RBC QN AUTO: 26.2 PG (ref 27–34)
MCHC RBC AUTO-ENTMCNC: 31.1 G/DL (ref 32–36)
MCV RBC AUTO: 84 FL (ref 80–100)
PLATELET # BLD AUTO: 294 THOU/UL (ref 140–440)
PMV BLD AUTO: 8.2 FL (ref 7–10)
RBC # BLD AUTO: 4.35 MILL/UL (ref 3.8–5.4)
WBC: 6.6 THOU/UL (ref 4–11)

## 2019-06-12 ENCOUNTER — COMMUNICATION - HEALTHEAST (OUTPATIENT)
Dept: FAMILY MEDICINE | Facility: CLINIC | Age: 64
End: 2019-06-12

## 2019-06-20 ENCOUNTER — COMMUNICATION - HEALTHEAST (OUTPATIENT)
Dept: ANTICOAGULATION | Facility: CLINIC | Age: 64
End: 2019-06-20

## 2019-06-24 ENCOUNTER — COMMUNICATION - HEALTHEAST (OUTPATIENT)
Dept: ANTICOAGULATION | Facility: CLINIC | Age: 64
End: 2019-06-24

## 2019-06-24 ENCOUNTER — AMBULATORY - HEALTHEAST (OUTPATIENT)
Dept: LAB | Facility: CLINIC | Age: 64
End: 2019-06-24

## 2019-06-24 DIAGNOSIS — I26.99 PULMONARY EMBOLISM (H): ICD-10-CM

## 2019-06-24 DIAGNOSIS — Z95.2 HEART VALVE REPLACED: ICD-10-CM

## 2019-06-24 DIAGNOSIS — Z95.2 H/O AORTIC VALVE REPLACEMENT: ICD-10-CM

## 2019-06-24 LAB — INR PPP: 3 (ref 0.9–1.1)

## 2019-06-28 ENCOUNTER — OFFICE VISIT - HEALTHEAST (OUTPATIENT)
Dept: PULMONOLOGY | Facility: OTHER | Age: 64
End: 2019-06-28

## 2019-06-28 DIAGNOSIS — J45.40 MODERATE PERSISTENT ASTHMA WITHOUT COMPLICATION: ICD-10-CM

## 2019-06-28 DIAGNOSIS — R94.2 RESTRICTIVE VENTILATORY DEFECT: ICD-10-CM

## 2019-06-28 ASSESSMENT — MIFFLIN-ST. JEOR: SCORE: 2085.17

## 2019-07-02 ENCOUNTER — COMMUNICATION - HEALTHEAST (OUTPATIENT)
Dept: ANTICOAGULATION | Facility: CLINIC | Age: 64
End: 2019-07-02

## 2019-07-02 ENCOUNTER — RECORDS - HEALTHEAST (OUTPATIENT)
Dept: ADMINISTRATIVE | Facility: OTHER | Age: 64
End: 2019-07-02

## 2019-07-02 ENCOUNTER — OFFICE VISIT - HEALTHEAST (OUTPATIENT)
Dept: FAMILY MEDICINE | Facility: CLINIC | Age: 64
End: 2019-07-02

## 2019-07-02 DIAGNOSIS — E83.42 HYPOMAGNESEMIA: ICD-10-CM

## 2019-07-02 DIAGNOSIS — Z95.2 H/O AORTIC VALVE REPLACEMENT: ICD-10-CM

## 2019-07-02 DIAGNOSIS — F33.41 RECURRENT MAJOR DEPRESSIVE DISORDER, IN PARTIAL REMISSION (H): ICD-10-CM

## 2019-07-02 DIAGNOSIS — Z96.612 HISTORY OF LEFT SHOULDER REPLACEMENT: ICD-10-CM

## 2019-07-02 DIAGNOSIS — D64.9 ANEMIA, UNSPECIFIED TYPE: ICD-10-CM

## 2019-07-02 DIAGNOSIS — E87.6 HYPOKALEMIA: ICD-10-CM

## 2019-07-02 DIAGNOSIS — Z95.2 HEART VALVE REPLACED: ICD-10-CM

## 2019-07-02 DIAGNOSIS — I26.99 PULMONARY EMBOLISM (H): ICD-10-CM

## 2019-07-02 LAB
ANION GAP SERPL CALCULATED.3IONS-SCNC: 9 MMOL/L (ref 5–18)
BUN SERPL-MCNC: 13 MG/DL (ref 8–22)
CALCIUM SERPL-MCNC: 9 MG/DL (ref 8.5–10.5)
CHLORIDE BLD-SCNC: 100 MMOL/L (ref 98–107)
CO2 SERPL-SCNC: 30 MMOL/L (ref 22–31)
CREAT SERPL-MCNC: 0.8 MG/DL (ref 0.6–1.1)
ERYTHROCYTE [DISTWIDTH] IN BLOOD BY AUTOMATED COUNT: 15.5 % (ref 11–14.5)
GFR SERPL CREATININE-BSD FRML MDRD: >60 ML/MIN/1.73M2
GLUCOSE BLD-MCNC: 143 MG/DL (ref 70–125)
HCT VFR BLD AUTO: 35.1 % (ref 35–47)
HGB BLD-MCNC: 10.9 G/DL (ref 12–16)
INR PPP: 3.5 (ref 0.9–1.1)
MAGNESIUM SERPL-MCNC: 2 MG/DL (ref 1.8–2.6)
MCH RBC QN AUTO: 26.3 PG (ref 27–34)
MCHC RBC AUTO-ENTMCNC: 31.1 G/DL (ref 32–36)
MCV RBC AUTO: 85 FL (ref 80–100)
PLATELET # BLD AUTO: 288 THOU/UL (ref 140–440)
PMV BLD AUTO: 8.5 FL (ref 7–10)
POTASSIUM BLD-SCNC: 4.1 MMOL/L (ref 3.5–5)
RBC # BLD AUTO: 4.15 MILL/UL (ref 3.8–5.4)
SODIUM SERPL-SCNC: 139 MMOL/L (ref 136–145)
WBC: 7.8 THOU/UL (ref 4–11)

## 2019-07-09 ENCOUNTER — RECORDS - HEALTHEAST (OUTPATIENT)
Dept: ADMINISTRATIVE | Facility: OTHER | Age: 64
End: 2019-07-09

## 2019-07-19 ENCOUNTER — COMMUNICATION - HEALTHEAST (OUTPATIENT)
Dept: FAMILY MEDICINE | Facility: CLINIC | Age: 64
End: 2019-07-19

## 2019-07-22 ENCOUNTER — RECORDS - HEALTHEAST (OUTPATIENT)
Dept: ADMINISTRATIVE | Facility: OTHER | Age: 64
End: 2019-07-22

## 2019-07-30 ENCOUNTER — COMMUNICATION - HEALTHEAST (OUTPATIENT)
Dept: ANTICOAGULATION | Facility: CLINIC | Age: 64
End: 2019-07-30

## 2019-08-01 ENCOUNTER — AMBULATORY - HEALTHEAST (OUTPATIENT)
Dept: LAB | Facility: CLINIC | Age: 64
End: 2019-08-01

## 2019-08-01 ENCOUNTER — COMMUNICATION - HEALTHEAST (OUTPATIENT)
Dept: ANTICOAGULATION | Facility: CLINIC | Age: 64
End: 2019-08-01

## 2019-08-01 DIAGNOSIS — Z95.2 H/O AORTIC VALVE REPLACEMENT: ICD-10-CM

## 2019-08-01 DIAGNOSIS — I26.99 PULMONARY EMBOLISM (H): ICD-10-CM

## 2019-08-01 DIAGNOSIS — Z95.2 HEART VALVE REPLACED: ICD-10-CM

## 2019-08-01 LAB — INR PPP: 3.3 (ref 0.9–1.1)

## 2019-08-05 ENCOUNTER — RECORDS - HEALTHEAST (OUTPATIENT)
Dept: ADMINISTRATIVE | Facility: OTHER | Age: 64
End: 2019-08-05

## 2019-08-05 ENCOUNTER — COMMUNICATION - HEALTHEAST (OUTPATIENT)
Dept: FAMILY MEDICINE | Facility: CLINIC | Age: 64
End: 2019-08-05

## 2019-08-05 DIAGNOSIS — R13.10 DYSPHAGIA: ICD-10-CM

## 2019-08-05 DIAGNOSIS — E78.5 HYPERLIPIDEMIA: ICD-10-CM

## 2019-08-09 ENCOUNTER — COMMUNICATION - HEALTHEAST (OUTPATIENT)
Dept: FAMILY MEDICINE | Facility: CLINIC | Age: 64
End: 2019-08-09

## 2019-08-15 ENCOUNTER — COMMUNICATION - HEALTHEAST (OUTPATIENT)
Dept: FAMILY MEDICINE | Facility: CLINIC | Age: 64
End: 2019-08-15

## 2019-08-15 DIAGNOSIS — F33.41 RECURRENT MAJOR DEPRESSIVE DISORDER, IN PARTIAL REMISSION (H): ICD-10-CM

## 2019-08-16 ENCOUNTER — COMMUNICATION - HEALTHEAST (OUTPATIENT)
Dept: FAMILY MEDICINE | Facility: CLINIC | Age: 64
End: 2019-08-16

## 2019-08-21 ENCOUNTER — COMMUNICATION - HEALTHEAST (OUTPATIENT)
Dept: FAMILY MEDICINE | Facility: CLINIC | Age: 64
End: 2019-08-21

## 2019-08-21 DIAGNOSIS — J45.40 MODERATE PERSISTENT ASTHMA, UNSPECIFIED WHETHER COMPLICATED: ICD-10-CM

## 2019-08-26 ENCOUNTER — COMMUNICATION - HEALTHEAST (OUTPATIENT)
Dept: FAMILY MEDICINE | Facility: CLINIC | Age: 64
End: 2019-08-26

## 2019-08-28 ENCOUNTER — COMMUNICATION - HEALTHEAST (OUTPATIENT)
Dept: FAMILY MEDICINE | Facility: CLINIC | Age: 64
End: 2019-08-28

## 2019-09-05 ENCOUNTER — AMBULATORY - HEALTHEAST (OUTPATIENT)
Dept: FAMILY MEDICINE | Facility: CLINIC | Age: 64
End: 2019-09-05

## 2019-09-06 ENCOUNTER — COMMUNICATION - HEALTHEAST (OUTPATIENT)
Dept: ANTICOAGULATION | Facility: CLINIC | Age: 64
End: 2019-09-06

## 2019-09-08 ENCOUNTER — COMMUNICATION - HEALTHEAST (OUTPATIENT)
Dept: FAMILY MEDICINE | Facility: CLINIC | Age: 64
End: 2019-09-08

## 2019-09-08 DIAGNOSIS — Z95.2 AORTIC VALVE REPLACED: ICD-10-CM

## 2019-09-08 DIAGNOSIS — Z79.01 LONG TERM CURRENT USE OF ANTICOAGULANT THERAPY: ICD-10-CM

## 2019-09-17 ENCOUNTER — OFFICE VISIT - HEALTHEAST (OUTPATIENT)
Dept: FAMILY MEDICINE | Facility: CLINIC | Age: 64
End: 2019-09-17

## 2019-09-17 ENCOUNTER — COMMUNICATION - HEALTHEAST (OUTPATIENT)
Dept: ANTICOAGULATION | Facility: CLINIC | Age: 64
End: 2019-09-17

## 2019-09-17 DIAGNOSIS — Z95.2 HEART VALVE REPLACED: ICD-10-CM

## 2019-09-17 DIAGNOSIS — M25.50 POLYARTHRALGIA: ICD-10-CM

## 2019-09-17 DIAGNOSIS — I10 ESSENTIAL HYPERTENSION: ICD-10-CM

## 2019-09-17 DIAGNOSIS — Z00.00 HEALTH CARE MAINTENANCE: ICD-10-CM

## 2019-09-17 DIAGNOSIS — F41.1 GENERALIZED ANXIETY DISORDER: ICD-10-CM

## 2019-09-17 DIAGNOSIS — I48.0 PAROXYSMAL ATRIAL FIBRILLATION (H): ICD-10-CM

## 2019-09-17 DIAGNOSIS — E66.01 MORBID OBESITY (H): ICD-10-CM

## 2019-09-17 DIAGNOSIS — I26.99 PULMONARY EMBOLISM (H): ICD-10-CM

## 2019-09-17 DIAGNOSIS — E78.2 MIXED HYPERLIPIDEMIA: ICD-10-CM

## 2019-09-17 DIAGNOSIS — G35 MULTIPLE SCLEROSIS (H): ICD-10-CM

## 2019-09-17 DIAGNOSIS — Z95.2 H/O AORTIC VALVE REPLACEMENT: ICD-10-CM

## 2019-09-17 DIAGNOSIS — I27.20 PULMONARY HYPERTENSION (H): ICD-10-CM

## 2019-09-17 LAB
ERYTHROCYTE [DISTWIDTH] IN BLOOD BY AUTOMATED COUNT: 16.4 % (ref 11–14.5)
HCT VFR BLD AUTO: 36.8 % (ref 35–47)
HGB BLD-MCNC: 11.7 G/DL (ref 12–16)
INR PPP: 3.1 (ref 0.9–1.1)
MCH RBC QN AUTO: 26.8 PG (ref 27–34)
MCHC RBC AUTO-ENTMCNC: 31.7 G/DL (ref 32–36)
MCV RBC AUTO: 84 FL (ref 80–100)
PLATELET # BLD AUTO: 284 THOU/UL (ref 140–440)
PMV BLD AUTO: 8.2 FL (ref 7–10)
RBC # BLD AUTO: 4.36 MILL/UL (ref 3.8–5.4)
WBC: 7.4 THOU/UL (ref 4–11)

## 2019-09-17 ASSESSMENT — MIFFLIN-ST. JEOR: SCORE: 2039.81

## 2019-09-23 ENCOUNTER — COMMUNICATION - HEALTHEAST (OUTPATIENT)
Dept: FAMILY MEDICINE | Facility: CLINIC | Age: 64
End: 2019-09-23

## 2019-09-26 ENCOUNTER — OFFICE VISIT - HEALTHEAST (OUTPATIENT)
Dept: PULMONOLOGY | Facility: OTHER | Age: 64
End: 2019-09-26

## 2019-09-26 ENCOUNTER — COMMUNICATION - HEALTHEAST (OUTPATIENT)
Dept: PULMONOLOGY | Facility: OTHER | Age: 64
End: 2019-09-26

## 2019-09-26 DIAGNOSIS — J96.01 ACUTE RESPIRATORY FAILURE WITH HYPOXIA (H): ICD-10-CM

## 2019-09-26 DIAGNOSIS — J96.11 CHRONIC RESPIRATORY FAILURE WITH HYPOXIA (H): ICD-10-CM

## 2019-09-26 DIAGNOSIS — J45.50 SEVERE PERSISTENT ASTHMA, UNSPECIFIED WHETHER COMPLICATED (H): ICD-10-CM

## 2019-10-03 ENCOUNTER — COMMUNICATION - HEALTHEAST (OUTPATIENT)
Dept: PULMONOLOGY | Facility: OTHER | Age: 64
End: 2019-10-03

## 2019-10-03 ENCOUNTER — OFFICE VISIT - HEALTHEAST (OUTPATIENT)
Dept: FAMILY MEDICINE | Facility: CLINIC | Age: 64
End: 2019-10-03

## 2019-10-03 ENCOUNTER — AMBULATORY - HEALTHEAST (OUTPATIENT)
Dept: FAMILY MEDICINE | Facility: CLINIC | Age: 64
End: 2019-10-03

## 2019-10-03 DIAGNOSIS — K13.79 MOUTH SORE: ICD-10-CM

## 2019-10-03 DIAGNOSIS — Z12.31 VISIT FOR SCREENING MAMMOGRAM: ICD-10-CM

## 2019-10-03 DIAGNOSIS — I27.20 PULMONARY HYPERTENSION (H): ICD-10-CM

## 2019-10-03 DIAGNOSIS — Z95.2 AORTIC VALVE REPLACED: ICD-10-CM

## 2019-10-03 DIAGNOSIS — E66.01 MORBID OBESITY (H): ICD-10-CM

## 2019-10-03 DIAGNOSIS — M19.90 OSTEOARTHRITIS, UNSPECIFIED OSTEOARTHRITIS TYPE, UNSPECIFIED SITE: ICD-10-CM

## 2019-10-03 DIAGNOSIS — G35 MULTIPLE SCLEROSIS (H): ICD-10-CM

## 2019-10-03 DIAGNOSIS — Z96.612 HISTORY OF LEFT SHOULDER REPLACEMENT: ICD-10-CM

## 2019-10-03 DIAGNOSIS — G35 MS (MULTIPLE SCLEROSIS) (H): ICD-10-CM

## 2019-10-03 DIAGNOSIS — Z95.2 HEART VALVE REPLACED: ICD-10-CM

## 2019-10-03 ASSESSMENT — PATIENT HEALTH QUESTIONNAIRE - PHQ9: SUM OF ALL RESPONSES TO PHQ QUESTIONS 1-9: 9

## 2019-10-09 ENCOUNTER — COMMUNICATION - HEALTHEAST (OUTPATIENT)
Dept: FAMILY MEDICINE | Facility: CLINIC | Age: 64
End: 2019-10-09

## 2019-10-10 ENCOUNTER — COMMUNICATION - HEALTHEAST (OUTPATIENT)
Dept: SCHEDULING | Facility: CLINIC | Age: 64
End: 2019-10-10

## 2019-10-10 ENCOUNTER — COMMUNICATION - HEALTHEAST (OUTPATIENT)
Dept: FAMILY MEDICINE | Facility: CLINIC | Age: 64
End: 2019-10-10

## 2019-10-10 ENCOUNTER — OFFICE VISIT - HEALTHEAST (OUTPATIENT)
Dept: FAMILY MEDICINE | Facility: CLINIC | Age: 64
End: 2019-10-10

## 2019-10-10 DIAGNOSIS — M79.644 PAIN OF RIGHT MIDDLE FINGER: ICD-10-CM

## 2019-10-10 DIAGNOSIS — M79.642 PAIN OF LEFT HAND: ICD-10-CM

## 2019-10-10 DIAGNOSIS — M25.532 LEFT WRIST PAIN: ICD-10-CM

## 2019-10-11 ENCOUNTER — COMMUNICATION - HEALTHEAST (OUTPATIENT)
Dept: FAMILY MEDICINE | Facility: CLINIC | Age: 64
End: 2019-10-11

## 2019-10-15 ENCOUNTER — RECORDS - HEALTHEAST (OUTPATIENT)
Dept: ADMINISTRATIVE | Facility: OTHER | Age: 64
End: 2019-10-15

## 2019-10-16 ENCOUNTER — RECORDS - HEALTHEAST (OUTPATIENT)
Dept: ADMINISTRATIVE | Facility: OTHER | Age: 64
End: 2019-10-16

## 2019-10-21 ENCOUNTER — RECORDS - HEALTHEAST (OUTPATIENT)
Dept: ADMINISTRATIVE | Facility: OTHER | Age: 64
End: 2019-10-21

## 2019-10-22 ENCOUNTER — RECORDS - HEALTHEAST (OUTPATIENT)
Dept: ADMINISTRATIVE | Facility: OTHER | Age: 64
End: 2019-10-22

## 2019-10-23 ENCOUNTER — COMMUNICATION - HEALTHEAST (OUTPATIENT)
Dept: FAMILY MEDICINE | Facility: CLINIC | Age: 64
End: 2019-10-23

## 2019-10-23 DIAGNOSIS — F32.A DEPRESSION: ICD-10-CM

## 2019-11-05 ENCOUNTER — COMMUNICATION - HEALTHEAST (OUTPATIENT)
Dept: ANTICOAGULATION | Facility: CLINIC | Age: 64
End: 2019-11-05

## 2019-11-21 ENCOUNTER — AMBULATORY - HEALTHEAST (OUTPATIENT)
Dept: LAB | Facility: CLINIC | Age: 64
End: 2019-11-21

## 2019-11-21 ENCOUNTER — HOSPITAL ENCOUNTER (OUTPATIENT)
Dept: MAMMOGRAPHY | Facility: CLINIC | Age: 64
Discharge: HOME | End: 2019-11-21
Attending: FAMILY MEDICINE

## 2019-11-21 ENCOUNTER — COMMUNICATION - HEALTHEAST (OUTPATIENT)
Dept: ANTICOAGULATION | Facility: CLINIC | Age: 64
End: 2019-11-21

## 2019-11-21 ENCOUNTER — COMMUNICATION - HEALTHEAST (OUTPATIENT)
Dept: FAMILY MEDICINE | Facility: CLINIC | Age: 64
End: 2019-11-21

## 2019-11-21 DIAGNOSIS — I26.99 PULMONARY EMBOLISM (H): ICD-10-CM

## 2019-11-21 DIAGNOSIS — Z95.2 H/O AORTIC VALVE REPLACEMENT: ICD-10-CM

## 2019-11-21 DIAGNOSIS — Z95.2 HEART VALVE REPLACED: ICD-10-CM

## 2019-11-21 DIAGNOSIS — Z12.31 VISIT FOR SCREENING MAMMOGRAM: ICD-10-CM

## 2019-11-21 LAB — INR PPP: 2.9 (ref 0.9–1.1)

## 2019-11-25 ENCOUNTER — OFFICE VISIT - HEALTHEAST (OUTPATIENT)
Dept: PULMONOLOGY | Facility: OTHER | Age: 64
End: 2019-11-25

## 2019-11-25 DIAGNOSIS — J96.11 CHRONIC RESPIRATORY FAILURE WITH HYPOXIA (H): ICD-10-CM

## 2019-11-25 DIAGNOSIS — J45.40 MODERATE PERSISTENT ASTHMA WITHOUT COMPLICATION: ICD-10-CM

## 2019-11-26 ENCOUNTER — RECORDS - HEALTHEAST (OUTPATIENT)
Dept: ADMINISTRATIVE | Facility: OTHER | Age: 64
End: 2019-11-26

## 2019-11-26 ENCOUNTER — AMBULATORY - HEALTHEAST (OUTPATIENT)
Dept: PULMONOLOGY | Facility: OTHER | Age: 64
End: 2019-11-26

## 2019-12-11 ENCOUNTER — AMBULATORY - HEALTHEAST (OUTPATIENT)
Dept: LAB | Facility: CLINIC | Age: 64
End: 2019-12-11

## 2019-12-11 ENCOUNTER — COMMUNICATION - HEALTHEAST (OUTPATIENT)
Dept: ANTICOAGULATION | Facility: CLINIC | Age: 64
End: 2019-12-11

## 2019-12-11 DIAGNOSIS — Z95.2 H/O AORTIC VALVE REPLACEMENT: ICD-10-CM

## 2019-12-11 DIAGNOSIS — I26.99 PULMONARY EMBOLISM (H): ICD-10-CM

## 2019-12-11 DIAGNOSIS — Z95.2 HEART VALVE REPLACED: ICD-10-CM

## 2019-12-11 LAB — INR PPP: 1.8 (ref 0.9–1.1)

## 2019-12-13 ENCOUNTER — COMMUNICATION - HEALTHEAST (OUTPATIENT)
Dept: ANTICOAGULATION | Facility: CLINIC | Age: 64
End: 2019-12-13

## 2019-12-13 DIAGNOSIS — Z95.2 H/O AORTIC VALVE REPLACEMENT: ICD-10-CM

## 2019-12-13 DIAGNOSIS — I26.99 PULMONARY EMBOLISM (H): ICD-10-CM

## 2019-12-17 ENCOUNTER — AMBULATORY - HEALTHEAST (OUTPATIENT)
Dept: FAMILY MEDICINE | Facility: CLINIC | Age: 64
End: 2019-12-17

## 2019-12-17 ENCOUNTER — COMMUNICATION - HEALTHEAST (OUTPATIENT)
Dept: ANTICOAGULATION | Facility: CLINIC | Age: 64
End: 2019-12-17

## 2019-12-17 ENCOUNTER — COMMUNICATION - HEALTHEAST (OUTPATIENT)
Dept: FAMILY MEDICINE | Facility: CLINIC | Age: 64
End: 2019-12-17

## 2019-12-17 DIAGNOSIS — Z95.2 HEART VALVE REPLACED: ICD-10-CM

## 2019-12-17 DIAGNOSIS — B37.2 YEAST INFECTION OF THE SKIN: ICD-10-CM

## 2019-12-20 ENCOUNTER — COMMUNICATION - HEALTHEAST (OUTPATIENT)
Dept: ANTICOAGULATION | Facility: CLINIC | Age: 64
End: 2019-12-20

## 2019-12-26 ENCOUNTER — AMBULATORY - HEALTHEAST (OUTPATIENT)
Dept: LAB | Facility: CLINIC | Age: 64
End: 2019-12-26

## 2019-12-26 ENCOUNTER — COMMUNICATION - HEALTHEAST (OUTPATIENT)
Dept: ANTICOAGULATION | Facility: CLINIC | Age: 64
End: 2019-12-26

## 2019-12-26 DIAGNOSIS — I26.99 PULMONARY EMBOLISM (H): ICD-10-CM

## 2019-12-26 DIAGNOSIS — Z95.2 HEART VALVE REPLACED: ICD-10-CM

## 2019-12-26 DIAGNOSIS — Z95.2 H/O AORTIC VALVE REPLACEMENT: ICD-10-CM

## 2019-12-26 LAB — INR PPP: 4 (ref 0.9–1.1)

## 2020-01-02 ENCOUNTER — COMMUNICATION - HEALTHEAST (OUTPATIENT)
Dept: FAMILY MEDICINE | Facility: CLINIC | Age: 65
End: 2020-01-02

## 2020-01-13 ENCOUNTER — AMBULATORY - HEALTHEAST (OUTPATIENT)
Dept: PULMONOLOGY | Facility: OTHER | Age: 65
End: 2020-01-13

## 2020-01-13 ENCOUNTER — COMMUNICATION - HEALTHEAST (OUTPATIENT)
Dept: ANTICOAGULATION | Facility: CLINIC | Age: 65
End: 2020-01-13

## 2020-01-15 ENCOUNTER — AMBULATORY - HEALTHEAST (OUTPATIENT)
Dept: LAB | Facility: CLINIC | Age: 65
End: 2020-01-15

## 2020-01-15 ENCOUNTER — COMMUNICATION - HEALTHEAST (OUTPATIENT)
Dept: ANTICOAGULATION | Facility: CLINIC | Age: 65
End: 2020-01-15

## 2020-01-15 DIAGNOSIS — Z95.2 H/O AORTIC VALVE REPLACEMENT: ICD-10-CM

## 2020-01-15 DIAGNOSIS — Z95.2 HEART VALVE REPLACED: ICD-10-CM

## 2020-01-15 DIAGNOSIS — I26.99 PULMONARY EMBOLISM (H): ICD-10-CM

## 2020-01-15 DIAGNOSIS — Z00.00 HEALTH CARE MAINTENANCE: ICD-10-CM

## 2020-01-15 LAB
ALBUMIN SERPL-MCNC: 3.7 G/DL (ref 3.5–5)
ALP SERPL-CCNC: 108 U/L (ref 45–120)
ALT SERPL W P-5'-P-CCNC: 19 U/L (ref 0–45)
ANION GAP SERPL CALCULATED.3IONS-SCNC: 8 MMOL/L (ref 5–18)
AST SERPL W P-5'-P-CCNC: 35 U/L (ref 0–40)
BILIRUB SERPL-MCNC: 0.6 MG/DL (ref 0–1)
BUN SERPL-MCNC: 14 MG/DL (ref 8–22)
CALCIUM SERPL-MCNC: 9.3 MG/DL (ref 8.5–10.5)
CHLORIDE BLD-SCNC: 97 MMOL/L (ref 98–107)
CO2 SERPL-SCNC: 36 MMOL/L (ref 22–31)
CREAT SERPL-MCNC: 0.91 MG/DL (ref 0.6–1.1)
GFR SERPL CREATININE-BSD FRML MDRD: >60 ML/MIN/1.73M2
GLUCOSE BLD-MCNC: 150 MG/DL (ref 70–125)
INR PPP: 3.8 (ref 0.9–1.1)
POTASSIUM BLD-SCNC: 4.2 MMOL/L (ref 3.5–5)
PROT SERPL-MCNC: 7 G/DL (ref 6–8)
SODIUM SERPL-SCNC: 141 MMOL/L (ref 136–145)

## 2020-01-16 ENCOUNTER — AMBULATORY - HEALTHEAST (OUTPATIENT)
Dept: FAMILY MEDICINE | Facility: CLINIC | Age: 65
End: 2020-01-16

## 2020-01-16 ENCOUNTER — COMMUNICATION - HEALTHEAST (OUTPATIENT)
Dept: FAMILY MEDICINE | Facility: CLINIC | Age: 65
End: 2020-01-16

## 2020-01-16 DIAGNOSIS — R73.9 HYPERGLYCEMIA: ICD-10-CM

## 2020-01-16 DIAGNOSIS — Z95.2 AORTIC VALVE REPLACED: ICD-10-CM

## 2020-01-16 DIAGNOSIS — Z79.01 LONG TERM CURRENT USE OF ANTICOAGULANT THERAPY: ICD-10-CM

## 2020-01-30 ENCOUNTER — COMMUNICATION - HEALTHEAST (OUTPATIENT)
Dept: ANTICOAGULATION | Facility: CLINIC | Age: 65
End: 2020-01-30

## 2020-02-06 ENCOUNTER — RECORDS - HEALTHEAST (OUTPATIENT)
Dept: ANTICOAGULATION | Facility: CLINIC | Age: 65
End: 2020-02-06

## 2020-02-13 ENCOUNTER — OFFICE VISIT - HEALTHEAST (OUTPATIENT)
Dept: PULMONOLOGY | Facility: OTHER | Age: 65
End: 2020-02-13

## 2020-02-13 DIAGNOSIS — J45.50 SEVERE PERSISTENT ASTHMA, UNSPECIFIED WHETHER COMPLICATED (H): ICD-10-CM

## 2020-02-19 ENCOUNTER — AMBULATORY - HEALTHEAST (OUTPATIENT)
Dept: LAB | Facility: CLINIC | Age: 65
End: 2020-02-19

## 2020-02-19 ENCOUNTER — COMMUNICATION - HEALTHEAST (OUTPATIENT)
Dept: ANTICOAGULATION | Facility: CLINIC | Age: 65
End: 2020-02-19

## 2020-02-19 DIAGNOSIS — I26.99 PULMONARY EMBOLISM (H): ICD-10-CM

## 2020-02-19 DIAGNOSIS — Z95.2 H/O AORTIC VALVE REPLACEMENT: ICD-10-CM

## 2020-02-19 DIAGNOSIS — Z95.2 HEART VALVE REPLACED: ICD-10-CM

## 2020-02-19 LAB — INR PPP: 4.5 (ref 0.9–1.1)

## 2020-02-23 ENCOUNTER — COMMUNICATION - HEALTHEAST (OUTPATIENT)
Dept: FAMILY MEDICINE | Facility: CLINIC | Age: 65
End: 2020-02-23

## 2020-02-23 DIAGNOSIS — F32.A DEPRESSION: ICD-10-CM

## 2020-02-26 ENCOUNTER — COMMUNICATION - HEALTHEAST (OUTPATIENT)
Dept: ANTICOAGULATION | Facility: CLINIC | Age: 65
End: 2020-02-26

## 2020-02-26 ENCOUNTER — AMBULATORY - HEALTHEAST (OUTPATIENT)
Dept: LAB | Facility: CLINIC | Age: 65
End: 2020-02-26

## 2020-02-26 DIAGNOSIS — I26.99 PULMONARY EMBOLISM (H): ICD-10-CM

## 2020-02-26 DIAGNOSIS — Z95.2 H/O AORTIC VALVE REPLACEMENT: ICD-10-CM

## 2020-02-26 DIAGNOSIS — Z95.2 HEART VALVE REPLACED: ICD-10-CM

## 2020-02-26 LAB — INR PPP: 3.4 (ref 0.9–1.1)

## 2020-03-09 ENCOUNTER — COMMUNICATION - HEALTHEAST (OUTPATIENT)
Dept: FAMILY MEDICINE | Facility: CLINIC | Age: 65
End: 2020-03-09

## 2020-03-09 DIAGNOSIS — Z23 NEED FOR HEPATITIS B VACCINATION: ICD-10-CM

## 2020-03-11 ENCOUNTER — AMBULATORY - HEALTHEAST (OUTPATIENT)
Dept: NURSING | Facility: CLINIC | Age: 65
End: 2020-03-11

## 2020-03-11 ENCOUNTER — COMMUNICATION - HEALTHEAST (OUTPATIENT)
Dept: ANTICOAGULATION | Facility: CLINIC | Age: 65
End: 2020-03-11

## 2020-03-11 ENCOUNTER — AMBULATORY - HEALTHEAST (OUTPATIENT)
Dept: LAB | Facility: CLINIC | Age: 65
End: 2020-03-11

## 2020-03-11 DIAGNOSIS — Z95.2 H/O AORTIC VALVE REPLACEMENT: ICD-10-CM

## 2020-03-11 DIAGNOSIS — Z95.2 HEART VALVE REPLACED: ICD-10-CM

## 2020-03-11 DIAGNOSIS — R73.9 HYPERGLYCEMIA: ICD-10-CM

## 2020-03-11 DIAGNOSIS — Z23 NEED FOR HEPATITIS B VACCINATION: ICD-10-CM

## 2020-03-11 DIAGNOSIS — I26.99 PULMONARY EMBOLISM (H): ICD-10-CM

## 2020-03-11 LAB
HBA1C MFR BLD: 6.5 % (ref 3.5–6)
INR PPP: 4.6 (ref 0.9–1.1)

## 2020-03-17 ENCOUNTER — COMMUNICATION - HEALTHEAST (OUTPATIENT)
Dept: FAMILY MEDICINE | Facility: CLINIC | Age: 65
End: 2020-03-17

## 2020-03-18 ENCOUNTER — COMMUNICATION - HEALTHEAST (OUTPATIENT)
Dept: FAMILY MEDICINE | Facility: CLINIC | Age: 65
End: 2020-03-18

## 2020-03-18 DIAGNOSIS — Z23 NEED FOR HEPATITIS B BOOSTER VACCINATION: ICD-10-CM

## 2020-03-27 ENCOUNTER — COMMUNICATION - HEALTHEAST (OUTPATIENT)
Dept: ANTICOAGULATION | Facility: CLINIC | Age: 65
End: 2020-03-27

## 2020-03-27 ENCOUNTER — COMMUNICATION - HEALTHEAST (OUTPATIENT)
Dept: FAMILY MEDICINE | Facility: CLINIC | Age: 65
End: 2020-03-27

## 2020-03-27 ENCOUNTER — NURSE TRIAGE (OUTPATIENT)
Dept: NURSING | Facility: CLINIC | Age: 65
End: 2020-03-27

## 2020-04-01 ENCOUNTER — COMMUNICATION - HEALTHEAST (OUTPATIENT)
Dept: FAMILY MEDICINE | Facility: CLINIC | Age: 65
End: 2020-04-01

## 2020-04-06 ENCOUNTER — COMMUNICATION - HEALTHEAST (OUTPATIENT)
Dept: FAMILY MEDICINE | Facility: CLINIC | Age: 65
End: 2020-04-06

## 2020-04-06 ENCOUNTER — OFFICE VISIT - HEALTHEAST (OUTPATIENT)
Dept: FAMILY MEDICINE | Facility: CLINIC | Age: 65
End: 2020-04-06

## 2020-04-06 DIAGNOSIS — G35 MULTIPLE SCLEROSIS (H): ICD-10-CM

## 2020-04-06 DIAGNOSIS — M25.50 POLYARTHRALGIA: ICD-10-CM

## 2020-04-06 NOTE — ASSESSMENT & PLAN NOTE
Sever degree in hips and back. Hospital bed to improve sleep and breathing as well as transitions still needed. Continue current hospital bed use.

## 2020-04-06 NOTE — ASSESSMENT & PLAN NOTE
Recommended by neurology that she no longer drives. Was signed up for cardiopulmonary rehab but had no transportation. Will work with care management for possible metro mobility or other transportation options for medical therapies now that not driving.

## 2020-04-09 ENCOUNTER — COMMUNICATION - HEALTHEAST (OUTPATIENT)
Dept: ANTICOAGULATION | Facility: CLINIC | Age: 65
End: 2020-04-09

## 2020-04-09 ENCOUNTER — AMBULATORY - HEALTHEAST (OUTPATIENT)
Dept: LAB | Facility: CLINIC | Age: 65
End: 2020-04-09

## 2020-04-09 DIAGNOSIS — I26.99 PULMONARY EMBOLISM (H): ICD-10-CM

## 2020-04-09 DIAGNOSIS — Z95.2 HEART VALVE REPLACED: ICD-10-CM

## 2020-04-09 DIAGNOSIS — Z95.2 H/O AORTIC VALVE REPLACEMENT: ICD-10-CM

## 2020-04-09 LAB — INR PPP: 2.2 (ref 0.9–1.1)

## 2020-04-14 ENCOUNTER — COMMUNICATION - HEALTHEAST (OUTPATIENT)
Dept: FAMILY MEDICINE | Facility: CLINIC | Age: 65
End: 2020-04-14

## 2020-04-23 ENCOUNTER — COMMUNICATION - HEALTHEAST (OUTPATIENT)
Dept: ANTICOAGULATION | Facility: CLINIC | Age: 65
End: 2020-04-23

## 2020-04-23 ENCOUNTER — AMBULATORY - HEALTHEAST (OUTPATIENT)
Dept: LAB | Facility: CLINIC | Age: 65
End: 2020-04-23

## 2020-04-23 DIAGNOSIS — I26.99 PULMONARY EMBOLISM (H): ICD-10-CM

## 2020-04-23 DIAGNOSIS — Z95.2 HEART VALVE REPLACED: ICD-10-CM

## 2020-04-23 DIAGNOSIS — Z95.2 H/O AORTIC VALVE REPLACEMENT: ICD-10-CM

## 2020-04-23 LAB — INR PPP: 3.8 (ref 0.9–1.1)

## 2020-05-01 ENCOUNTER — COMMUNICATION - HEALTHEAST (OUTPATIENT)
Dept: FAMILY MEDICINE | Facility: CLINIC | Age: 65
End: 2020-05-01

## 2020-05-01 DIAGNOSIS — I10 ESSENTIAL HYPERTENSION: ICD-10-CM

## 2020-05-04 ENCOUNTER — COMMUNICATION - HEALTHEAST (OUTPATIENT)
Dept: FAMILY MEDICINE | Facility: CLINIC | Age: 65
End: 2020-05-04

## 2020-05-04 DIAGNOSIS — J45.909 REACTIVE AIRWAY DISEASE: ICD-10-CM

## 2020-05-08 ENCOUNTER — COMMUNICATION - HEALTHEAST (OUTPATIENT)
Dept: ANTICOAGULATION | Facility: CLINIC | Age: 65
End: 2020-05-08

## 2020-05-13 ENCOUNTER — OFFICE VISIT - HEALTHEAST (OUTPATIENT)
Dept: PULMONOLOGY | Facility: OTHER | Age: 65
End: 2020-05-13

## 2020-05-13 DIAGNOSIS — J45.40 MODERATE PERSISTENT ASTHMA WITHOUT COMPLICATION: ICD-10-CM

## 2020-05-13 DIAGNOSIS — J45.41 MODERATE PERSISTENT ASTHMA WITH EXACERBATION: ICD-10-CM

## 2020-05-13 ASSESSMENT — MIFFLIN-ST. JEOR: SCORE: 2027.33

## 2020-05-14 ENCOUNTER — AMBULATORY - HEALTHEAST (OUTPATIENT)
Dept: LAB | Facility: CLINIC | Age: 65
End: 2020-05-14

## 2020-05-14 ENCOUNTER — COMMUNICATION - HEALTHEAST (OUTPATIENT)
Dept: ANTICOAGULATION | Facility: CLINIC | Age: 65
End: 2020-05-14

## 2020-05-14 DIAGNOSIS — Z95.2 H/O AORTIC VALVE REPLACEMENT: ICD-10-CM

## 2020-05-14 DIAGNOSIS — Z95.2 HEART VALVE REPLACED: ICD-10-CM

## 2020-05-14 DIAGNOSIS — I26.99 PULMONARY EMBOLISM (H): ICD-10-CM

## 2020-05-14 LAB — INR PPP: 4.4 (ref 0.9–1.1)

## 2020-05-25 ENCOUNTER — COMMUNICATION - HEALTHEAST (OUTPATIENT)
Dept: FAMILY MEDICINE | Facility: CLINIC | Age: 65
End: 2020-05-25

## 2020-05-25 DIAGNOSIS — Z79.01 LONG TERM CURRENT USE OF ANTICOAGULANT THERAPY: ICD-10-CM

## 2020-05-25 DIAGNOSIS — Z95.2 AORTIC VALVE REPLACED: ICD-10-CM

## 2020-05-27 ENCOUNTER — COMMUNICATION - HEALTHEAST (OUTPATIENT)
Dept: ANTICOAGULATION | Facility: CLINIC | Age: 65
End: 2020-05-27

## 2020-05-27 ENCOUNTER — AMBULATORY - HEALTHEAST (OUTPATIENT)
Dept: LAB | Facility: CLINIC | Age: 65
End: 2020-05-27

## 2020-05-27 DIAGNOSIS — Z95.2 H/O AORTIC VALVE REPLACEMENT: ICD-10-CM

## 2020-05-27 DIAGNOSIS — I26.99 PULMONARY EMBOLISM (H): ICD-10-CM

## 2020-05-27 DIAGNOSIS — Z95.2 HEART VALVE REPLACED: ICD-10-CM

## 2020-05-27 LAB — INR PPP: 2.4 (ref 0.9–1.1)

## 2020-06-03 ENCOUNTER — COMMUNICATION - HEALTHEAST (OUTPATIENT)
Dept: FAMILY MEDICINE | Facility: CLINIC | Age: 65
End: 2020-06-03

## 2020-06-03 DIAGNOSIS — F32.A DEPRESSION: ICD-10-CM

## 2020-06-09 ENCOUNTER — COMMUNICATION - HEALTHEAST (OUTPATIENT)
Dept: FAMILY MEDICINE | Facility: CLINIC | Age: 65
End: 2020-06-09

## 2020-06-09 DIAGNOSIS — F32.A DEPRESSION: ICD-10-CM

## 2020-06-12 ENCOUNTER — COMMUNICATION - HEALTHEAST (OUTPATIENT)
Dept: FAMILY MEDICINE | Facility: CLINIC | Age: 65
End: 2020-06-12

## 2020-06-12 ENCOUNTER — COMMUNICATION - HEALTHEAST (OUTPATIENT)
Dept: ANTICOAGULATION | Facility: CLINIC | Age: 65
End: 2020-06-12

## 2020-06-17 ENCOUNTER — COMMUNICATION - HEALTHEAST (OUTPATIENT)
Dept: ANTICOAGULATION | Facility: CLINIC | Age: 65
End: 2020-06-17

## 2020-06-17 ENCOUNTER — AMBULATORY - HEALTHEAST (OUTPATIENT)
Dept: LAB | Facility: CLINIC | Age: 65
End: 2020-06-17

## 2020-06-17 DIAGNOSIS — I26.99 PULMONARY EMBOLISM (H): ICD-10-CM

## 2020-06-17 DIAGNOSIS — Z95.2 HEART VALVE REPLACED: ICD-10-CM

## 2020-06-17 DIAGNOSIS — Z95.2 H/O AORTIC VALVE REPLACEMENT: ICD-10-CM

## 2020-06-17 LAB — INR PPP: 3.4 (ref 0.9–1.1)

## 2020-07-01 ENCOUNTER — AMBULATORY - HEALTHEAST (OUTPATIENT)
Dept: LAB | Facility: CLINIC | Age: 65
End: 2020-07-01

## 2020-07-01 ENCOUNTER — COMMUNICATION - HEALTHEAST (OUTPATIENT)
Dept: ANTICOAGULATION | Facility: CLINIC | Age: 65
End: 2020-07-01

## 2020-07-01 ENCOUNTER — AMBULATORY - HEALTHEAST (OUTPATIENT)
Dept: NURSING | Facility: CLINIC | Age: 65
End: 2020-07-01

## 2020-07-01 DIAGNOSIS — Z95.2 HEART VALVE REPLACED: ICD-10-CM

## 2020-07-01 DIAGNOSIS — Z23 NEED FOR HEPATITIS B BOOSTER VACCINATION: ICD-10-CM

## 2020-07-01 DIAGNOSIS — I26.99 PULMONARY EMBOLISM (H): ICD-10-CM

## 2020-07-01 DIAGNOSIS — Z95.2 H/O AORTIC VALVE REPLACEMENT: ICD-10-CM

## 2020-07-01 LAB — INR PPP: 3.2 (ref 0.9–1.1)

## 2020-07-31 ENCOUNTER — COMMUNICATION - HEALTHEAST (OUTPATIENT)
Dept: FAMILY MEDICINE | Facility: CLINIC | Age: 65
End: 2020-07-31

## 2020-07-31 DIAGNOSIS — E78.5 HYPERLIPIDEMIA: ICD-10-CM

## 2020-07-31 DIAGNOSIS — R13.10 DYSPHAGIA: ICD-10-CM

## 2020-08-02 ENCOUNTER — COMMUNICATION - HEALTHEAST (OUTPATIENT)
Dept: FAMILY MEDICINE | Facility: CLINIC | Age: 65
End: 2020-08-02

## 2020-08-02 DIAGNOSIS — F33.41 RECURRENT MAJOR DEPRESSIVE DISORDER, IN PARTIAL REMISSION (H): ICD-10-CM

## 2020-08-02 DIAGNOSIS — J45.909 REACTIVE AIRWAY DISEASE: ICD-10-CM

## 2020-08-18 ENCOUNTER — AMBULATORY - HEALTHEAST (OUTPATIENT)
Dept: LAB | Facility: CLINIC | Age: 65
End: 2020-08-18

## 2020-08-18 ENCOUNTER — OFFICE VISIT - HEALTHEAST (OUTPATIENT)
Dept: PULMONOLOGY | Facility: OTHER | Age: 65
End: 2020-08-18

## 2020-08-18 ENCOUNTER — COMMUNICATION - HEALTHEAST (OUTPATIENT)
Dept: ANTICOAGULATION | Facility: CLINIC | Age: 65
End: 2020-08-18

## 2020-08-18 DIAGNOSIS — R06.89 HYPOVENTILATION: ICD-10-CM

## 2020-08-18 DIAGNOSIS — Z95.2 H/O AORTIC VALVE REPLACEMENT: ICD-10-CM

## 2020-08-18 DIAGNOSIS — I26.99 PULMONARY EMBOLISM (H): ICD-10-CM

## 2020-08-18 DIAGNOSIS — Z95.2 HEART VALVE REPLACED: ICD-10-CM

## 2020-08-18 DIAGNOSIS — J98.4 RESTRICTIVE LUNG DISEASE: ICD-10-CM

## 2020-08-18 DIAGNOSIS — J45.50 SEVERE PERSISTENT ASTHMA, UNSPECIFIED WHETHER COMPLICATED (H): ICD-10-CM

## 2020-08-18 DIAGNOSIS — R53.82 CHRONIC FATIGUE: ICD-10-CM

## 2020-08-18 LAB — INR PPP: 2.3 (ref 0.9–1.1)

## 2020-08-18 ASSESSMENT — MIFFLIN-ST. JEOR: SCORE: 1981.97

## 2020-09-09 ENCOUNTER — HOSPITAL ENCOUNTER (OUTPATIENT)
Dept: RESPIRATORY THERAPY | Facility: CLINIC | Age: 65
Discharge: HOME | End: 2020-09-09

## 2020-09-09 DIAGNOSIS — J98.4 RESTRICTIVE LUNG DISEASE: ICD-10-CM

## 2020-09-09 DIAGNOSIS — R06.89 HYPOVENTILATION: ICD-10-CM

## 2020-09-09 LAB
BASE EXCESS BLDA CALC-SCNC: 2 MMOL/L
COHGB MFR BLD: 95.9 % (ref 96–97)
HCO3, ARTERIAL CALC - HISTORICAL: 25.6 MMOL/L (ref 23–29)
O2/TOTAL GAS SETTING VFR VENT: ABNORMAL %
OXYHEMOGLOBIN - HISTORICAL: 94.5 % (ref 96–97)
PCO2 BLD: 45 MM HG (ref 35–45)
PH BLD: 7.38 [PH] (ref 7.37–7.44)
PO2 BLD: 80 MM HG (ref 80–90)
TEMPERATURE: 37 DEGREES C
VENTILATION MODE: ABNORMAL

## 2020-09-11 ENCOUNTER — COMMUNICATION - HEALTHEAST (OUTPATIENT)
Dept: ANTICOAGULATION | Facility: CLINIC | Age: 65
End: 2020-09-11

## 2020-09-17 ENCOUNTER — AMBULATORY - HEALTHEAST (OUTPATIENT)
Dept: NURSING | Facility: CLINIC | Age: 65
End: 2020-09-17

## 2020-09-17 ENCOUNTER — AMBULATORY - HEALTHEAST (OUTPATIENT)
Dept: LAB | Facility: CLINIC | Age: 65
End: 2020-09-17

## 2020-09-17 ENCOUNTER — COMMUNICATION - HEALTHEAST (OUTPATIENT)
Dept: ANTICOAGULATION | Facility: CLINIC | Age: 65
End: 2020-09-17

## 2020-09-17 ENCOUNTER — COMMUNICATION - HEALTHEAST (OUTPATIENT)
Dept: FAMILY MEDICINE | Facility: CLINIC | Age: 65
End: 2020-09-17

## 2020-09-17 DIAGNOSIS — Z23 NEED FOR HEPATITIS B BOOSTER VACCINATION: ICD-10-CM

## 2020-09-17 DIAGNOSIS — I26.99 PULMONARY EMBOLISM (H): ICD-10-CM

## 2020-09-17 DIAGNOSIS — Z95.2 HEART VALVE REPLACED: ICD-10-CM

## 2020-09-17 DIAGNOSIS — Z95.2 H/O AORTIC VALVE REPLACEMENT: ICD-10-CM

## 2020-09-17 DIAGNOSIS — F32.A DEPRESSION: ICD-10-CM

## 2020-09-17 LAB — INR PPP: 4 (ref 0.9–1.1)

## 2020-09-22 ENCOUNTER — COMMUNICATION - HEALTHEAST (OUTPATIENT)
Dept: PULMONOLOGY | Facility: OTHER | Age: 65
End: 2020-09-22

## 2020-09-24 ENCOUNTER — COMMUNICATION - HEALTHEAST (OUTPATIENT)
Dept: PULMONOLOGY | Facility: OTHER | Age: 65
End: 2020-09-24

## 2020-09-25 VITALS — WEIGHT: 293 LBS | BODY MASS INDEX: 41.95 KG/M2 | HEIGHT: 70 IN

## 2020-09-25 RX ORDER — ALBUTEROL SULFATE 90 UG/1
2 AEROSOL, METERED RESPIRATORY (INHALATION) EVERY 6 HOURS PRN
COMMUNITY
Start: 2015-12-23 | End: 2024-08-16

## 2020-09-25 RX ORDER — WARFARIN SODIUM 2.5 MG/1
2.5-5 TABLET ORAL
COMMUNITY
Start: 2019-01-14 | End: 2021-09-27

## 2020-09-25 RX ORDER — LORAZEPAM 0.5 MG/1
0.5 TABLET ORAL
COMMUNITY
Start: 2016-08-24 | End: 2021-09-01

## 2020-09-25 RX ORDER — METOPROLOL TARTRATE 25 MG/1
25 TABLET, FILM COATED ORAL 2 TIMES DAILY
COMMUNITY
Start: 2017-01-31 | End: 2022-03-28

## 2020-09-25 RX ORDER — ACYCLOVIR 400 MG/1
400 TABLET ORAL 2 TIMES DAILY
COMMUNITY
Start: 2016-09-30

## 2020-09-25 RX ORDER — MAGNESIUM OXIDE 400 MG/1
400 TABLET ORAL DAILY
COMMUNITY
Start: 2019-04-18 | End: 2023-05-18

## 2020-09-25 RX ORDER — ACETAMINOPHEN 325 MG/1
325-650 TABLET ORAL EVERY 6 HOURS PRN
COMMUNITY
Start: 2017-01-31

## 2020-09-25 RX ORDER — ASPIRIN 81 MG/1
81 TABLET, CHEWABLE ORAL DAILY
COMMUNITY
Start: 2017-01-31

## 2020-09-25 RX ORDER — SIMVASTATIN 20 MG
20 TABLET ORAL AT BEDTIME
COMMUNITY
Start: 2016-11-04 | End: 2022-06-14

## 2020-09-25 RX ORDER — POTASSIUM CHLORIDE 750 MG/1
10 CAPSULE, EXTENDED RELEASE ORAL DAILY
COMMUNITY
Start: 2018-05-22

## 2020-09-25 RX ORDER — LISINOPRIL 5 MG/1
5 TABLET ORAL DAILY
COMMUNITY
Start: 2017-01-31 | End: 2022-03-28

## 2020-09-25 RX ORDER — MONTELUKAST SODIUM 10 MG/1
10 TABLET ORAL AT BEDTIME
COMMUNITY
Start: 2019-02-21 | End: 2022-06-28

## 2020-09-25 RX ORDER — BUDESONIDE AND FORMOTEROL FUMARATE DIHYDRATE 160; 4.5 UG/1; UG/1
2 AEROSOL RESPIRATORY (INHALATION) 2 TIMES DAILY
COMMUNITY
Start: 2018-10-11 | End: 2023-05-15

## 2020-09-25 RX ORDER — NYSTATIN 100000 [USP'U]/G
POWDER TOPICAL
COMMUNITY
Start: 2016-11-20 | End: 2021-09-28

## 2020-09-25 RX ORDER — VENLAFAXINE HYDROCHLORIDE 75 MG/1
CAPSULE, EXTENDED RELEASE ORAL
COMMUNITY
Start: 2016-11-09 | End: 2022-05-04

## 2020-09-25 RX ORDER — FUROSEMIDE 40 MG
60 TABLET ORAL DAILY
COMMUNITY
Start: 2019-01-29 | End: 2022-06-28

## 2020-09-25 RX ORDER — ALBUTEROL SULFATE 0.83 MG/ML
2.5 SOLUTION RESPIRATORY (INHALATION)
COMMUNITY
Start: 2017-08-09 | End: 2021-09-01

## 2020-09-25 RX ORDER — AMOXICILLIN 500 MG/1
CAPSULE ORAL
COMMUNITY
Start: 2018-10-12 | End: 2021-08-06

## 2020-09-25 RX ORDER — AMOXICILLIN 250 MG
1 CAPSULE ORAL
COMMUNITY
Start: 2019-04-12 | End: 2021-09-28

## 2020-09-25 ASSESSMENT — MIFFLIN-ST. JEOR: SCORE: 2018.72

## 2020-09-25 NOTE — PROGRESS NOTES
"Eileen Donald is a 64 year old female who is being evaluated via a billable telephone visit.      The patient has been notified of following:     \"This telephone visit will be conducted via a call between you and your physician/provider. We have found that certain health care needs can be provided without the need for a physical exam.  This service lets us provide the care you need with a short phone conversation.  If a prescription is necessary we can send it directly to your pharmacy.  If lab work is needed we can place an order for that and you can then stop by our lab to have the test done at a later time.    Telephone visits are billed at different rates depending on your insurance coverage. During this emergency period, for some insurers they may be billed the same as an in-person visit.  Please reach out to your insurance provider with any questions.    If during the course of the call the physician/provider feels a telephone visit is not appropriate, you will not be charged for this service.\"    Patient has given verbal consent for Telephone visit?  Yes    What phone number would you like to be contacted at? [phone]    How would you like to obtain your AVS? Mail a copy    Tri[employer] was used for audio/visual.    Video was not available for this visit.    I spent a total of 21 minutes of  phone encounter and in preparation for this clinic visit.    Enhanced STOP-BAN    Thank you for the opportunity to participate in the care of  Eileen Donald.    Assessment and Plan:    In summary Eileen Donald is a 64 year old year old female here for sleep disturbance.  1.  Hypersomnia/snoring/dyspnea on exertion/hypercarbia   Eileen Donald has high risk for obstructive sleep apnea based on the history of hypersomnia, snoring and dyspnea on exertion. I educated the patient on the underlying pathophysiology of obstructive sleep apnea. We reviewed the risks associated with sleep apnea, including " increased cardiovascular risk and overall death. We talked about treatments briefly. I recommend getting an baseline nocturnal polysomnography. The patient should return to the clinic to discuss results and treatment option in a patient-centered approach.    History of cranial facial surgery? No    History of present illness:    She is a 64 year old female who comes to the virtual clinic with chief complaint of excessive daytime sleepiness that is been going on for approximately 3 to 4 years.  While the patient denies any episodes of witnessed apnea, she has been told that she does have loud snoring during sleep.  She is currently being treated for high blood pressure.  She does have a neck collar size of 18 inches or more.  Her pulmonologist is concerned because of her dyspnea on exertion with hypercarbia on blood gas.  Would like her to get ruled out for obstructive sleep.  The patient's review of systems is otherwise negative.     Ideal Sleep-Wake Cycle(devoid of societal pressure):    Patient would try to initiate sleep at around 2AM with a sleep latency of 5 minutes. The patient would have zero awakenings. Final wake up time is around 10AM.    Patient told to return in one week after the sleep study is interpreted.    Past Medical History  Past Medical History:   Diagnosis Date     Anxiety      Asthma      Depression      History of pulmonary embolism      HTN (hypertension)      Multiple sclerosis (H)      Obesity      Osteoarthritis         Past Surgical History  Past Surgical History:   Procedure Laterality Date     aortic valve surgery          Meds  Current Outpatient Medications   Medication Sig Dispense Refill     acetaminophen (TYLENOL) 325 MG tablet Take 325-650 mg by mouth       acyclovir (ZOVIRAX) 400 MG tablet Take 400 mg by mouth       albuterol (PROVENTIL HFA) 108 (90 Base) MCG/ACT inhaler Inhale 2 puffs into the lungs       albuterol (PROVENTIL) (2.5 MG/3ML) 0.083% neb solution Inhale 2.5 mg into  the lungs       amoxicillin (AMOXIL) 500 MG capsule TAKE 4 CAPSULES BY MOUTH 30 TO 60 MINUTES PRIOR TO PROCEDURE       aspirin (ASA) 81 MG chewable tablet Take 81 mg by mouth       budesonide-formoterol (SYMBICORT) 160-4.5 MCG/ACT Inhaler USE 2 INHALATIONS TWICE A DAY       esomeprazole (NEXIUM) 20 MG DR capsule TAKE 1 CAPSULE DAILY       furosemide (LASIX) 40 MG tablet Take 60 mg by mouth       lisinopril (ZESTRIL) 5 MG tablet TAKE 1 TABLET DAILY       LORazepam (ATIVAN) 0.5 MG tablet Take 0.5 mg by mouth       magnesium oxide (MAG-OX) 400 MG tablet Take 400 mg by mouth       metoprolol tartrate (LOPRESSOR) 25 MG tablet TAKE 1 TABLET TWICE A DAY       montelukast (SINGULAIR) 10 MG tablet Take 10 mg by mouth       nystatin (NYSTOP) 501572 UNIT/GM external powder        potassium chloride ER (MICRO-K) 10 MEQ CR capsule Take 20 mEq by mouth       senna-docusate (SENOKOT-S/PERICOLACE) 8.6-50 MG tablet Take 1 tablet by mouth       simvastatin (ZOCOR) 20 MG tablet Take 20 mg by mouth       venlafaxine (EFFEXOR-XR) 75 MG 24 hr capsule TAKE 3 CAPSULES BY MOUTH EVERY DAY       warfarin ANTICOAGULANT (COUMADIN) 2.5 MG tablet Take 2.5-5 mg by mouth       witch hazel-glycerin (MEDI PADS) pad Apply 1 each topically          Allergies  Morphine; Sulfa drugs; and Azithromycin     Social History  Social History     Socioeconomic History     Marital status: Single     Spouse name: Not on file     Number of children: Not on file     Years of education: Not on file     Highest education level: Not on file   Occupational History     Not on file   Social Needs     Financial resource strain: Not on file     Food insecurity     Worry: Not on file     Inability: Not on file     Transportation needs     Medical: Not on file     Non-medical: Not on file   Tobacco Use     Smoking status: Never Smoker     Smokeless tobacco: Never Used   Substance and Sexual Activity     Alcohol use: Not Currently     Drug use: Never     Sexual activity: Not  Currently   Lifestyle     Physical activity     Days per week: Not on file     Minutes per session: Not on file     Stress: Not on file   Relationships     Social connections     Talks on phone: Not on file     Gets together: Not on file     Attends Mu-ism service: Not on file     Active member of club or organization: Not on file     Attends meetings of clubs or organizations: Not on file     Relationship status: Not on file     Intimate partner violence     Fear of current or ex partner: Not on file     Emotionally abused: Not on file     Physically abused: Not on file     Forced sexual activity: Not on file   Other Topics Concern     Not on file   Social History Narrative     Not on file        Family History  Family History   Problem Relation Age of Onset     Snoring Mother      Snoring Father      Sleep Apnea Sister       Review of Systems:  Constitutional: Negative except as noted in HPI.   Eyes: Negative except as noted in HPI.   ENT: Negative except as noted in HPI.   Cardiovascular: Negative except as noted in HPI.   Respiratory: Negative except as noted in HPI.   Gastrointestinal: Negative except as noted in HPI.   Genitourinary: Negative except as noted in HPI.   Musculoskeletal: Negative except as noted in HPI.   Integumentary: Negative except as noted in HPI.   Neurological: Negative except as noted in HPI.   Psychiatric: Negative except as noted in HPI.   Endocrine: Negative except as noted in HPI.   Hematologic/Lymphatic: Negative except as noted in HPI.      Electrophysiology Latest Ref Rng & Units 4/24/2019 4/30/2019   WBC 4.0 - 11.0 10e9/L 7.2 6.1   RBC 3.8 - 5.2 10e12/L 2.84(L) 3.36(L)   Hemoglobin 11.7 - 15.7 g/dL 8.1(L) 9.2(L)   Hematocrit 35.0 - 47.0 % 28.5(L) 33.5(L)   MCV 78 - 100 fl 100 100   MCHC 31.5 - 36.5 g/dL 28.4(L) 27.5(L)   RDW 10.0 - 15.0 % 17.5(H) 16.8(H)   Platelets 150 - 450 10e9/L 451(H) 513(H)          Labs/Studies:     No results found for: PH, PHARTERIAL, PO2, [id number],  SAT, PCO2, HCO3, BASEEXCESS, JENNIFER, BEB  No results found for: TSH  No results found for: GLC  Lab Results   Component Value Date    HGB 9.2 (L) 04/30/2019    HGB 8.1 (L) 04/24/2019     No results found for: BUN, CR  No results found for: AST, ALT, GGT, ALKPHOS, BILITOTAL, BILICONJ, BILIDIRECT, DANE  No results found for: UAMP, UBARB, BENZODIAZEUR, UCANN, UCOC, OPIT, UPCP    No components found for: FERRITIN      Patient verbalized understanding of these issues, agrees with the plan and all questions were answered today. Patient was given an opportuntity to voice any other symptoms or concerns not listed above. Patient did not have any other symptoms or concerns.        Charles Padilla DO  Board Certified in Internal Medicine and Sleep Medicine    (Note created with Dragon voice recognition and unintended spelling errors and word substitutions may occur)

## 2020-09-25 NOTE — PATIENT INSTRUCTIONS
Your BMI is Body mass index is 43.76 kg/m .  Weight management is a personal decision.  If you are interested in exploring weight loss strategies, the following discussion covers the approaches that may be successful. Body mass index (BMI) is one way to tell whether you are at a healthy weight, overweight, or obese. It measures your weight in relation to your height.  A BMI of 18.5 to 24.9 is in the healthy range. A person with a BMI of 25 to 29.9 is considered overweight, and someone with a BMI of 30 or greater is considered obese. More than two-thirds of American adults are considered overweight or obese.  Being overweight or obese increases the risk for further weight gain. Excess weight may lead to heart disease and diabetes.  Creating and following plans for healthy eating and physical activity may help you improve your health.  Weight control is part of healthy lifestyle and includes exercise, emotional health, and healthy eating habits. Careful eating habits lifelong are the mainstay of weight control. Though there are significant health benefits from weight loss, long-term weight loss with diet alone may be very difficult to achieve- studies show long-term success with dietary management in less than 10% of people. Attaining a healthy weight may be especially difficult to achieve in those with severe obesity. In some cases, medications, devices and surgical management might be considered.  What can you do?  If you are overweight or obese and are interested in methods for weight loss, you should discuss this with your provider.     Consider reducing daily calorie intake by 500 calories.     Keep a food journal.     Avoiding skipping meals, consider cutting portions instead.    Diet combined with exercise helps maintain muscle while optimizing fat loss. Strength training is particularly important for building and maintaining muscle mass. Exercise helps reduce stress, increase energy, and improves fitness.  Increasing exercise without diet control, however, may not burn enough calories to loose weight.       Start walking three days a week 10-20 minutes at a time    Work towards walking thirty minutes five days a week     Eventually, increase the speed of your walking for 1-2 minutes at time    In addition, we recommend that you review healthy lifestyles and methods for weight loss available through the National Institutes of Health patient information sites:  http://win.niddk.nih.gov/publications/index.htm    And look into health and wellness programs that may be available through your health insurance provider, employer, local community center, or bianca club.    Weight management plan: Patient was referred to their PCP to discuss a diet and exercise plan.  Patient education: What is a sleep study?     What is a sleep study? -- A sleep study is a test that measures how well you sleep and checks for sleep problems. For some sleep studies, you stay overnight in a sleep lab at a hospital or sleep center.     What happens during a sleep study? -- Before you go to sleep, a technician attaches small, sticky patches called  electrodes  to your head, chest, and legs. He or she will also place a small tube beneath your nose and might wrap 1 or 2 belts around your chest.   Each of these items has wires that connect to monitors. The monitors record your movement, brain activity, breathing, and other body functions while you sleep.  If you have a history of trouble falling asleep, your doctor might prescribe a medicine to help you fall asleep in the lab. If you have never taken the medicine before, your doctor might ask you take it on a night before your sleep study to see how it affects you.   Why might my doctor order a sleep study? -- Your doctor will order a sleep study if he or she thinks you have sleep apnea or a different condition that makes you:   ?Have sudden jerking leg movements while you sleep, called  periodic limb  movements.    ?Feel very sleepy during the day and fall asleep all of a sudden, called  narcolepsy.    ?Have trouble falling asleep or staying asleep over a long period of time, called  chronic insomnia.    ?Do odd things while you sleep, such as walking.  How should I prepare for a sleep study? -- On the day of your sleep study, you should:   ?Avoid alcohol   ?Avoid drinking coffee, tea, sodas, and other drinks that have caffeine in the afternoon and evening   ?Take all of your regular medicines     The cost of care estimate line is [phone]. They are able to give the patient an estimate of the charges and also an estimate of their insurance coverage/patient responsibility.   After your sleep study is performed, please call us at [phone] or [phone]  to schedule for a follow up to review the results of the sleep study.    Please bring one tab of low dose melatonin 3 mg or less to the night of the study.    Melatonin intake is completely voluntary.    You may take own melatonin after arrival to sleep center. Do not drive or operate machinery after intake of melatonin.

## 2020-09-28 ENCOUNTER — VIRTUAL VISIT (OUTPATIENT)
Dept: SLEEP MEDICINE | Facility: CLINIC | Age: 65
End: 2020-09-28
Payer: COMMERCIAL

## 2020-09-28 DIAGNOSIS — R06.83 SNORING: ICD-10-CM

## 2020-09-28 DIAGNOSIS — R06.09 DOE (DYSPNEA ON EXERTION): ICD-10-CM

## 2020-09-28 DIAGNOSIS — G47.10 HYPERSOMNIA: Primary | ICD-10-CM

## 2020-09-28 DIAGNOSIS — R06.89 HYPERCARBIA: ICD-10-CM

## 2020-09-28 PROCEDURE — 99202 OFFICE O/P NEW SF 15 MIN: CPT | Mod: 95 | Performed by: INTERNAL MEDICINE

## 2020-10-02 ENCOUNTER — COMMUNICATION - HEALTHEAST (OUTPATIENT)
Dept: FAMILY MEDICINE | Facility: CLINIC | Age: 65
End: 2020-10-02

## 2020-10-02 ENCOUNTER — COMMUNICATION - HEALTHEAST (OUTPATIENT)
Dept: ANTICOAGULATION | Facility: CLINIC | Age: 65
End: 2020-10-02

## 2020-10-02 DIAGNOSIS — J45.909 REACTIVE AIRWAY DISEASE: ICD-10-CM

## 2020-10-08 ENCOUNTER — COMMUNICATION - HEALTHEAST (OUTPATIENT)
Dept: ANTICOAGULATION | Facility: CLINIC | Age: 65
End: 2020-10-08

## 2020-10-08 ENCOUNTER — AMBULATORY - HEALTHEAST (OUTPATIENT)
Dept: LAB | Facility: CLINIC | Age: 65
End: 2020-10-08

## 2020-10-08 DIAGNOSIS — Z95.2 H/O AORTIC VALVE REPLACEMENT: ICD-10-CM

## 2020-10-08 DIAGNOSIS — Z95.2 HEART VALVE REPLACED: ICD-10-CM

## 2020-10-08 DIAGNOSIS — I26.99 PULMONARY EMBOLISM (H): ICD-10-CM

## 2020-10-08 LAB — INR PPP: 3.5 (ref 0.9–1.1)

## 2020-10-09 ENCOUNTER — COMMUNICATION - HEALTHEAST (OUTPATIENT)
Dept: FAMILY MEDICINE | Facility: CLINIC | Age: 65
End: 2020-10-09

## 2020-10-09 DIAGNOSIS — Z95.2 AORTIC VALVE REPLACED: ICD-10-CM

## 2020-10-09 DIAGNOSIS — Z79.01 LONG TERM CURRENT USE OF ANTICOAGULANT THERAPY: ICD-10-CM

## 2020-10-12 ENCOUNTER — COMMUNICATION - HEALTHEAST (OUTPATIENT)
Dept: FAMILY MEDICINE | Facility: CLINIC | Age: 65
End: 2020-10-12

## 2020-10-12 DIAGNOSIS — F32.A DEPRESSION: ICD-10-CM

## 2020-10-13 ENCOUNTER — COMMUNICATION - HEALTHEAST (OUTPATIENT)
Dept: FAMILY MEDICINE | Facility: CLINIC | Age: 65
End: 2020-10-13

## 2020-10-13 DIAGNOSIS — E56.9 VITAMIN DEFICIENCY: ICD-10-CM

## 2020-10-22 ENCOUNTER — COMMUNICATION - HEALTHEAST (OUTPATIENT)
Dept: FAMILY MEDICINE | Facility: CLINIC | Age: 65
End: 2020-10-22

## 2020-10-22 DIAGNOSIS — J45.40 MODERATE PERSISTENT ASTHMA, UNSPECIFIED WHETHER COMPLICATED: ICD-10-CM

## 2020-10-29 ENCOUNTER — COMMUNICATION - HEALTHEAST (OUTPATIENT)
Dept: ANTICOAGULATION | Facility: CLINIC | Age: 65
End: 2020-10-29

## 2020-11-09 ENCOUNTER — AMBULATORY - HEALTHEAST (OUTPATIENT)
Dept: LAB | Facility: CLINIC | Age: 65
End: 2020-11-09

## 2020-11-09 ENCOUNTER — COMMUNICATION - HEALTHEAST (OUTPATIENT)
Dept: ANTICOAGULATION | Facility: CLINIC | Age: 65
End: 2020-11-09

## 2020-11-09 DIAGNOSIS — I26.99 PULMONARY EMBOLISM (H): ICD-10-CM

## 2020-11-09 DIAGNOSIS — Z95.2 H/O AORTIC VALVE REPLACEMENT: ICD-10-CM

## 2020-11-09 DIAGNOSIS — Z95.2 HEART VALVE REPLACED: ICD-10-CM

## 2020-11-09 LAB — INR PPP: 2.7 (ref 0.9–1.1)

## 2020-11-23 ENCOUNTER — RECORDS - HEALTHEAST (OUTPATIENT)
Dept: ADMINISTRATIVE | Facility: OTHER | Age: 65
End: 2020-11-23

## 2020-11-25 ENCOUNTER — AMBULATORY - HEALTHEAST (OUTPATIENT)
Dept: ANTICOAGULATION | Facility: CLINIC | Age: 65
End: 2020-11-25

## 2020-12-01 ENCOUNTER — COMMUNICATION - HEALTHEAST (OUTPATIENT)
Dept: FAMILY MEDICINE | Facility: CLINIC | Age: 65
End: 2020-12-01

## 2020-12-01 DIAGNOSIS — J45.909 REACTIVE AIRWAY DISEASE: ICD-10-CM

## 2020-12-10 ENCOUNTER — RECORDS - HEALTHEAST (OUTPATIENT)
Dept: ADMINISTRATIVE | Facility: OTHER | Age: 65
End: 2020-12-10

## 2020-12-10 ENCOUNTER — COMMUNICATION - HEALTHEAST (OUTPATIENT)
Dept: ANTICOAGULATION | Facility: CLINIC | Age: 65
End: 2020-12-10

## 2020-12-10 ENCOUNTER — AMBULATORY - HEALTHEAST (OUTPATIENT)
Dept: LAB | Facility: CLINIC | Age: 65
End: 2020-12-10

## 2020-12-10 DIAGNOSIS — Z95.2 H/O AORTIC VALVE REPLACEMENT: ICD-10-CM

## 2020-12-10 DIAGNOSIS — I26.99 PULMONARY EMBOLISM (H): ICD-10-CM

## 2020-12-10 DIAGNOSIS — Z95.2 HEART VALVE REPLACED: ICD-10-CM

## 2020-12-10 LAB — INR PPP: 2.9 (ref 0.9–1.1)

## 2021-01-15 ENCOUNTER — COMMUNICATION - HEALTHEAST (OUTPATIENT)
Dept: LAB | Facility: CLINIC | Age: 66
End: 2021-01-15

## 2021-01-15 ENCOUNTER — COMMUNICATION - HEALTHEAST (OUTPATIENT)
Dept: ANTICOAGULATION | Facility: CLINIC | Age: 66
End: 2021-01-15

## 2021-01-15 DIAGNOSIS — Z86.711 HISTORY OF PULMONARY EMBOLISM: ICD-10-CM

## 2021-01-15 DIAGNOSIS — I48.0 PAROXYSMAL ATRIAL FIBRILLATION (H): ICD-10-CM

## 2021-01-15 DIAGNOSIS — Z95.2 AORTIC VALVE REPLACED: ICD-10-CM

## 2021-01-21 ENCOUNTER — COMMUNICATION - HEALTHEAST (OUTPATIENT)
Dept: ANTICOAGULATION | Facility: CLINIC | Age: 66
End: 2021-01-21

## 2021-01-21 ENCOUNTER — COMMUNICATION - HEALTHEAST (OUTPATIENT)
Dept: FAMILY MEDICINE | Facility: CLINIC | Age: 66
End: 2021-01-21

## 2021-01-21 ENCOUNTER — AMBULATORY - HEALTHEAST (OUTPATIENT)
Dept: LAB | Facility: CLINIC | Age: 66
End: 2021-01-21

## 2021-01-21 DIAGNOSIS — I48.0 PAROXYSMAL ATRIAL FIBRILLATION (H): ICD-10-CM

## 2021-01-21 DIAGNOSIS — Z86.711 HISTORY OF PULMONARY EMBOLISM: ICD-10-CM

## 2021-01-21 DIAGNOSIS — Z95.2 AORTIC VALVE REPLACED: ICD-10-CM

## 2021-01-21 DIAGNOSIS — Z79.01 LONG TERM CURRENT USE OF ANTICOAGULANT THERAPY: ICD-10-CM

## 2021-01-21 DIAGNOSIS — Z95.2 HEART VALVE REPLACED: ICD-10-CM

## 2021-01-21 LAB — INR PPP: 2.1 (ref 0.9–1.1)

## 2021-01-22 ENCOUNTER — COMMUNICATION - HEALTHEAST (OUTPATIENT)
Dept: PULMONOLOGY | Facility: OTHER | Age: 66
End: 2021-01-22

## 2021-01-28 ENCOUNTER — RECORDS - HEALTHEAST (OUTPATIENT)
Dept: ADMINISTRATIVE | Facility: OTHER | Age: 66
End: 2021-01-28

## 2021-01-29 ENCOUNTER — COMMUNICATION - HEALTHEAST (OUTPATIENT)
Dept: FAMILY MEDICINE | Facility: CLINIC | Age: 66
End: 2021-01-29

## 2021-01-29 DIAGNOSIS — I10 ESSENTIAL HYPERTENSION: ICD-10-CM

## 2021-02-01 ENCOUNTER — COMMUNICATION - HEALTHEAST (OUTPATIENT)
Dept: ANTICOAGULATION | Facility: CLINIC | Age: 66
End: 2021-02-01

## 2021-02-01 ENCOUNTER — OFFICE VISIT - HEALTHEAST (OUTPATIENT)
Dept: FAMILY MEDICINE | Facility: CLINIC | Age: 66
End: 2021-02-01

## 2021-02-01 DIAGNOSIS — I27.20 PULMONARY HYPERTENSION (H): ICD-10-CM

## 2021-02-01 DIAGNOSIS — Z95.2 AORTIC VALVE REPLACED: ICD-10-CM

## 2021-02-01 DIAGNOSIS — Z12.31 VISIT FOR SCREENING MAMMOGRAM: ICD-10-CM

## 2021-02-01 DIAGNOSIS — I26.99 OTHER PULMONARY EMBOLISM WITHOUT ACUTE COR PULMONALE, UNSPECIFIED CHRONICITY (H): ICD-10-CM

## 2021-02-01 DIAGNOSIS — Z86.711 HISTORY OF PULMONARY EMBOLISM: ICD-10-CM

## 2021-02-01 DIAGNOSIS — R06.09 DYSPNEA ON EXERTION: ICD-10-CM

## 2021-02-01 DIAGNOSIS — E66.01 MORBID OBESITY (H): ICD-10-CM

## 2021-02-01 DIAGNOSIS — G35 MULTIPLE SCLEROSIS (H): ICD-10-CM

## 2021-02-01 DIAGNOSIS — F33.41 RECURRENT MAJOR DEPRESSIVE DISORDER, IN PARTIAL REMISSION (H): ICD-10-CM

## 2021-02-01 DIAGNOSIS — J96.11 CHRONIC RESPIRATORY FAILURE WITH HYPOXIA (H): ICD-10-CM

## 2021-02-01 DIAGNOSIS — Z95.2 HEART VALVE REPLACED: ICD-10-CM

## 2021-02-01 DIAGNOSIS — I48.0 PAROXYSMAL ATRIAL FIBRILLATION (H): ICD-10-CM

## 2021-02-01 DIAGNOSIS — E11.9 DIABETES MELLITUS, NEW ONSET (H): ICD-10-CM

## 2021-02-01 DIAGNOSIS — Z12.11 SPECIAL SCREENING FOR MALIGNANT NEOPLASMS, COLON: ICD-10-CM

## 2021-02-01 DIAGNOSIS — E56.9 VITAMIN DEFICIENCY: ICD-10-CM

## 2021-02-01 DIAGNOSIS — Z00.00 ANNUAL PHYSICAL EXAM: ICD-10-CM

## 2021-02-01 DIAGNOSIS — Z11.59 SCREENING FOR VIRAL DISEASE: ICD-10-CM

## 2021-02-01 DIAGNOSIS — R73.09 IMPAIRED GLUCOSE TOLERANCE TEST: ICD-10-CM

## 2021-02-01 DIAGNOSIS — J44.9 CHRONIC OBSTRUCTIVE PULMONARY DISEASE, UNSPECIFIED COPD TYPE (H): ICD-10-CM

## 2021-02-01 LAB
BASOPHILS # BLD AUTO: 0.1 THOU/UL (ref 0–0.2)
BASOPHILS NFR BLD AUTO: 1 % (ref 0–2)
CHOLEST SERPL-MCNC: 245 MG/DL
EOSINOPHIL # BLD AUTO: 0.3 THOU/UL (ref 0–0.4)
EOSINOPHIL NFR BLD AUTO: 4 % (ref 0–6)
ERYTHROCYTE [DISTWIDTH] IN BLOOD BY AUTOMATED COUNT: 14.6 % (ref 11–14.5)
FASTING STATUS PATIENT QL REPORTED: NO
HBA1C MFR BLD: 7.5 %
HCT VFR BLD AUTO: 43.8 % (ref 35–47)
HDLC SERPL-MCNC: 72 MG/DL
HGB BLD-MCNC: 13.5 G/DL (ref 12–16)
HIV 1+2 AB+HIV1 P24 AG SERPL QL IA: NEGATIVE
IMM GRANULOCYTES # BLD: 0 THOU/UL
IMM GRANULOCYTES NFR BLD: 0 %
INR PPP: 2.1 (ref 0.9–1.1)
LDLC SERPL CALC-MCNC: 122 MG/DL
LYMPHOCYTES # BLD AUTO: 1.8 THOU/UL (ref 0.8–4.4)
LYMPHOCYTES NFR BLD AUTO: 21 % (ref 20–40)
MCH RBC QN AUTO: 29.7 PG (ref 27–34)
MCHC RBC AUTO-ENTMCNC: 30.8 G/DL (ref 32–36)
MCV RBC AUTO: 97 FL (ref 80–100)
MONOCYTES # BLD AUTO: 0.9 THOU/UL (ref 0–0.9)
MONOCYTES NFR BLD AUTO: 11 % (ref 2–10)
NEUTROPHILS # BLD AUTO: 5.5 THOU/UL (ref 2–7.7)
NEUTROPHILS NFR BLD AUTO: 64 % (ref 50–70)
PLATELET # BLD AUTO: 272 THOU/UL (ref 140–440)
PMV BLD AUTO: 11.6 FL (ref 8.5–12.5)
RBC # BLD AUTO: 4.54 MILL/UL (ref 3.8–5.4)
TRIGL SERPL-MCNC: 256 MG/DL
TSH SERPL DL<=0.005 MIU/L-ACNC: 2.54 UIU/ML (ref 0.3–5)
WBC: 8.6 THOU/UL (ref 4–11)

## 2021-02-01 ASSESSMENT — PATIENT HEALTH QUESTIONNAIRE - PHQ9: SUM OF ALL RESPONSES TO PHQ QUESTIONS 1-9: 7

## 2021-02-02 LAB
25(OH)D3 SERPL-MCNC: 48.8 NG/ML (ref 30–80)
25(OH)D3 SERPL-MCNC: 48.8 NG/ML (ref 30–80)
HCV AB SERPL QL IA: NEGATIVE

## 2021-02-03 ENCOUNTER — AMBULATORY - HEALTHEAST (OUTPATIENT)
Dept: FAMILY MEDICINE | Facility: CLINIC | Age: 66
End: 2021-02-03

## 2021-02-03 DIAGNOSIS — E11.9 DIABETES MELLITUS, NEW ONSET (H): ICD-10-CM

## 2021-02-04 ENCOUNTER — COMMUNICATION - HEALTHEAST (OUTPATIENT)
Dept: FAMILY MEDICINE | Facility: CLINIC | Age: 66
End: 2021-02-04

## 2021-02-04 ENCOUNTER — AMBULATORY - HEALTHEAST (OUTPATIENT)
Dept: FAMILY MEDICINE | Facility: CLINIC | Age: 66
End: 2021-02-04

## 2021-02-04 DIAGNOSIS — E11.9 DIABETES MELLITUS, NEW ONSET (H): ICD-10-CM

## 2021-02-04 LAB
ALBUMIN SERPL-MCNC: 4 G/DL (ref 3.5–5)
ALP SERPL-CCNC: 107 U/L (ref 45–120)
ALT SERPL W P-5'-P-CCNC: 24 U/L (ref 0–45)
ANION GAP SERPL CALCULATED.3IONS-SCNC: 17 MMOL/L (ref 5–18)
AST SERPL W P-5'-P-CCNC: 36 U/L (ref 0–40)
BILIRUB SERPL-MCNC: 0.5 MG/DL (ref 0–1)
BUN SERPL-MCNC: 17 MG/DL (ref 8–22)
CALCIUM SERPL-MCNC: 9.4 MG/DL (ref 8.5–10.5)
CHLORIDE BLD-SCNC: 99 MMOL/L (ref 98–107)
CO2 SERPL-SCNC: 26 MMOL/L (ref 22–31)
CREAT SERPL-MCNC: 0.83 MG/DL (ref 0.6–1.1)
GFR SERPL CREATININE-BSD FRML MDRD: >60 ML/MIN/1.73M2
GLUCOSE BLD-MCNC: 131 MG/DL (ref 70–125)
POTASSIUM BLD-SCNC: 4 MMOL/L (ref 3.5–5)
PROT SERPL-MCNC: 7.1 G/DL (ref 6–8)
SODIUM SERPL-SCNC: 142 MMOL/L (ref 136–145)

## 2021-02-05 ENCOUNTER — AMBULATORY - HEALTHEAST (OUTPATIENT)
Dept: GASTROENTEROLOGY | Facility: CLINIC | Age: 66
End: 2021-02-05

## 2021-02-05 DIAGNOSIS — Z11.59 ENCOUNTER FOR SCREENING FOR OTHER VIRAL DISEASES: ICD-10-CM

## 2021-02-12 ENCOUNTER — COMMUNICATION - HEALTHEAST (OUTPATIENT)
Dept: FAMILY MEDICINE | Facility: CLINIC | Age: 66
End: 2021-02-12

## 2021-02-17 ENCOUNTER — COMMUNICATION - HEALTHEAST (OUTPATIENT)
Dept: ANTICOAGULATION | Facility: CLINIC | Age: 66
End: 2021-02-17

## 2021-02-17 ENCOUNTER — AMBULATORY - HEALTHEAST (OUTPATIENT)
Dept: LAB | Facility: CLINIC | Age: 66
End: 2021-02-17

## 2021-02-17 DIAGNOSIS — Z86.711 HISTORY OF PULMONARY EMBOLISM: ICD-10-CM

## 2021-02-17 DIAGNOSIS — Z95.2 HEART VALVE REPLACED: ICD-10-CM

## 2021-02-17 DIAGNOSIS — Z95.2 AORTIC VALVE REPLACED: ICD-10-CM

## 2021-02-17 DIAGNOSIS — I48.0 PAROXYSMAL ATRIAL FIBRILLATION (H): ICD-10-CM

## 2021-02-17 LAB — INR PPP: 2.6 (ref 0.9–1.1)

## 2021-02-19 ENCOUNTER — AMBULATORY - HEALTHEAST (OUTPATIENT)
Dept: EDUCATION SERVICES | Facility: CLINIC | Age: 66
End: 2021-02-19

## 2021-02-19 DIAGNOSIS — E11.9 DIABETES MELLITUS, NEW ONSET (H): ICD-10-CM

## 2021-02-24 ENCOUNTER — COMMUNICATION - HEALTHEAST (OUTPATIENT)
Dept: ANTICOAGULATION | Facility: CLINIC | Age: 66
End: 2021-02-24

## 2021-02-28 ENCOUNTER — COMMUNICATION - HEALTHEAST (OUTPATIENT)
Dept: FAMILY MEDICINE | Facility: CLINIC | Age: 66
End: 2021-02-28

## 2021-02-28 DIAGNOSIS — F32.A DEPRESSION: ICD-10-CM

## 2021-03-01 ENCOUNTER — COMMUNICATION - HEALTHEAST (OUTPATIENT)
Dept: FAMILY MEDICINE | Facility: CLINIC | Age: 66
End: 2021-03-01

## 2021-03-01 DIAGNOSIS — J45.909 REACTIVE AIRWAY DISEASE: ICD-10-CM

## 2021-03-03 ENCOUNTER — OFFICE VISIT - HEALTHEAST (OUTPATIENT)
Dept: PULMONOLOGY | Facility: OTHER | Age: 66
End: 2021-03-03

## 2021-03-03 ENCOUNTER — TELEPHONE (OUTPATIENT)
Dept: SLEEP MEDICINE | Facility: CLINIC | Age: 66
End: 2021-03-03

## 2021-03-03 DIAGNOSIS — J45.40 MODERATE PERSISTENT ASTHMA, UNSPECIFIED WHETHER COMPLICATED: ICD-10-CM

## 2021-03-03 DIAGNOSIS — J45.50 SEVERE PERSISTENT ASTHMA, UNSPECIFIED WHETHER COMPLICATED (H): ICD-10-CM

## 2021-03-03 DIAGNOSIS — J45.909 REACTIVE AIRWAY DISEASE: ICD-10-CM

## 2021-03-03 ASSESSMENT — MIFFLIN-ST. JEOR: SCORE: 1936.61

## 2021-03-04 ENCOUNTER — TELEPHONE (OUTPATIENT)
Dept: SLEEP MEDICINE | Facility: CLINIC | Age: 66
End: 2021-03-04

## 2021-03-04 NOTE — TELEPHONE ENCOUNTER
LVMTCB- informed pt she is approved for her sleep study and if she wants to move her study to a sooner date she can call me to reschedule. em  
No

## 2021-03-11 ENCOUNTER — AMBULATORY - HEALTHEAST (OUTPATIENT)
Dept: LAB | Facility: CLINIC | Age: 66
End: 2021-03-11

## 2021-03-11 ENCOUNTER — COMMUNICATION - HEALTHEAST (OUTPATIENT)
Dept: ANTICOAGULATION | Facility: CLINIC | Age: 66
End: 2021-03-11

## 2021-03-11 DIAGNOSIS — I48.0 PAROXYSMAL ATRIAL FIBRILLATION (H): ICD-10-CM

## 2021-03-11 DIAGNOSIS — Z95.2 AORTIC VALVE REPLACED: ICD-10-CM

## 2021-03-11 DIAGNOSIS — Z86.711 HISTORY OF PULMONARY EMBOLISM: ICD-10-CM

## 2021-03-11 DIAGNOSIS — Z95.2 HEART VALVE REPLACED: ICD-10-CM

## 2021-03-11 LAB — INR PPP: 3 (ref 0.9–1.1)

## 2021-03-15 ENCOUNTER — THERAPY VISIT (OUTPATIENT)
Dept: SLEEP MEDICINE | Facility: CLINIC | Age: 66
End: 2021-03-15
Payer: COMMERCIAL

## 2021-03-15 ENCOUNTER — COMMUNICATION - HEALTHEAST (OUTPATIENT)
Dept: FAMILY MEDICINE | Facility: CLINIC | Age: 66
End: 2021-03-15

## 2021-03-15 DIAGNOSIS — R06.89 HYPERCARBIA: ICD-10-CM

## 2021-03-15 DIAGNOSIS — R06.83 SNORING: ICD-10-CM

## 2021-03-15 DIAGNOSIS — R06.09 DOE (DYSPNEA ON EXERTION): ICD-10-CM

## 2021-03-15 DIAGNOSIS — Z95.2 HEART VALVE REPLACED: ICD-10-CM

## 2021-03-15 DIAGNOSIS — G47.10 HYPERSOMNIA: ICD-10-CM

## 2021-03-15 PROCEDURE — 95810 POLYSOM 6/> YRS 4/> PARAM: CPT | Performed by: INTERNAL MEDICINE

## 2021-03-17 ENCOUNTER — OFFICE VISIT - HEALTHEAST (OUTPATIENT)
Dept: FAMILY MEDICINE | Facility: CLINIC | Age: 66
End: 2021-03-17

## 2021-03-17 DIAGNOSIS — Z01.818 PREOP GENERAL PHYSICAL EXAM: ICD-10-CM

## 2021-03-17 LAB
ALBUMIN SERPL-MCNC: 3.9 G/DL (ref 3.5–5)
ALP SERPL-CCNC: 108 U/L (ref 45–120)
ALT SERPL W P-5'-P-CCNC: 33 U/L (ref 0–45)
ANION GAP SERPL CALCULATED.3IONS-SCNC: 14 MMOL/L (ref 5–18)
AST SERPL W P-5'-P-CCNC: 49 U/L (ref 0–40)
ATRIAL RATE - MUSE: 80 BPM
BILIRUB SERPL-MCNC: 0.4 MG/DL (ref 0–1)
BUN SERPL-MCNC: 14 MG/DL (ref 8–22)
CALCIUM SERPL-MCNC: 8.8 MG/DL (ref 8.5–10.5)
CHLORIDE BLD-SCNC: 99 MMOL/L (ref 98–107)
CO2 SERPL-SCNC: 30 MMOL/L (ref 22–31)
CREAT SERPL-MCNC: 0.92 MG/DL (ref 0.6–1.1)
DIASTOLIC BLOOD PRESSURE - MUSE: NORMAL
ERYTHROCYTE [DISTWIDTH] IN BLOOD BY AUTOMATED COUNT: 14 % (ref 11–14.5)
GFR SERPL CREATININE-BSD FRML MDRD: >60 ML/MIN/1.73M2
GLUCOSE BLD-MCNC: 129 MG/DL (ref 70–125)
HCT VFR BLD AUTO: 43.4 % (ref 35–47)
HGB BLD-MCNC: 13.6 G/DL (ref 12–16)
INTERPRETATION ECG - MUSE: NORMAL
MCH RBC QN AUTO: 30.3 PG (ref 27–34)
MCHC RBC AUTO-ENTMCNC: 31.3 G/DL (ref 32–36)
MCV RBC AUTO: 97 FL (ref 80–100)
P AXIS - MUSE: 56 DEGREES
PLATELET # BLD AUTO: 284 THOU/UL (ref 140–440)
PMV BLD AUTO: 10.6 FL (ref 7–10)
POTASSIUM BLD-SCNC: 4.1 MMOL/L (ref 3.5–5)
PR INTERVAL - MUSE: 168 MS
PROT SERPL-MCNC: 7 G/DL (ref 6–8)
QRS DURATION - MUSE: 80 MS
QT - MUSE: 392 MS
QTC - MUSE: 452 MS
R AXIS - MUSE: 42 DEGREES
RBC # BLD AUTO: 4.49 MILL/UL (ref 3.8–5.4)
SODIUM SERPL-SCNC: 143 MMOL/L (ref 136–145)
SYSTOLIC BLOOD PRESSURE - MUSE: NORMAL
T AXIS - MUSE: 95 DEGREES
VENTRICULAR RATE- MUSE: 80 BPM
WBC: 7.2 THOU/UL (ref 4–11)

## 2021-03-19 ENCOUNTER — AMBULATORY - HEALTHEAST (OUTPATIENT)
Dept: LAB | Facility: CLINIC | Age: 66
End: 2021-03-19

## 2021-03-19 DIAGNOSIS — Z11.59 ENCOUNTER FOR SCREENING FOR OTHER VIRAL DISEASES: ICD-10-CM

## 2021-03-20 LAB
SARS-COV-2 PCR COMMENT: NORMAL
SARS-COV-2 RNA SPEC QL NAA+PROBE: NEGATIVE
SARS-COV-2 VIRUS SPECIMEN SOURCE: NORMAL

## 2021-03-21 ENCOUNTER — COMMUNICATION - HEALTHEAST (OUTPATIENT)
Dept: SCHEDULING | Facility: CLINIC | Age: 66
End: 2021-03-21

## 2021-03-22 ENCOUNTER — COMMUNICATION - HEALTHEAST (OUTPATIENT)
Dept: FAMILY MEDICINE | Facility: CLINIC | Age: 66
End: 2021-03-22

## 2021-03-22 ENCOUNTER — ANESTHESIA - HEALTHEAST (OUTPATIENT)
Dept: SURGERY | Facility: CLINIC | Age: 66
End: 2021-03-22

## 2021-03-22 ENCOUNTER — SURGERY - HEALTHEAST (OUTPATIENT)
Dept: SURGERY | Facility: CLINIC | Age: 66
End: 2021-03-22

## 2021-03-22 ENCOUNTER — AMBULATORY - HEALTHEAST (OUTPATIENT)
Dept: ANTICOAGULATION | Facility: CLINIC | Age: 66
End: 2021-03-22

## 2021-03-22 DIAGNOSIS — Z95.2 HEART VALVE REPLACED: ICD-10-CM

## 2021-03-24 LAB — SLPCOMP: NORMAL

## 2021-03-26 ENCOUNTER — COMMUNICATION - HEALTHEAST (OUTPATIENT)
Dept: ANTICOAGULATION | Facility: CLINIC | Age: 66
End: 2021-03-26

## 2021-03-26 ENCOUNTER — AMBULATORY - HEALTHEAST (OUTPATIENT)
Dept: LAB | Facility: CLINIC | Age: 66
End: 2021-03-26

## 2021-03-26 DIAGNOSIS — Z86.711 HISTORY OF PULMONARY EMBOLISM: ICD-10-CM

## 2021-03-26 DIAGNOSIS — I48.0 PAROXYSMAL ATRIAL FIBRILLATION (H): ICD-10-CM

## 2021-03-26 DIAGNOSIS — Z95.2 AORTIC VALVE REPLACED: ICD-10-CM

## 2021-03-26 DIAGNOSIS — Z95.2 HEART VALVE REPLACED: ICD-10-CM

## 2021-03-26 LAB — INR PPP: 1.5 (ref 0.9–1.1)

## 2021-03-30 ENCOUNTER — COMMUNICATION - HEALTHEAST (OUTPATIENT)
Dept: ANTICOAGULATION | Facility: CLINIC | Age: 66
End: 2021-03-30

## 2021-03-30 ENCOUNTER — AMBULATORY - HEALTHEAST (OUTPATIENT)
Dept: LAB | Facility: CLINIC | Age: 66
End: 2021-03-30

## 2021-03-30 DIAGNOSIS — I48.0 PAROXYSMAL ATRIAL FIBRILLATION (H): ICD-10-CM

## 2021-03-30 DIAGNOSIS — Z95.2 HEART VALVE REPLACED: ICD-10-CM

## 2021-03-30 DIAGNOSIS — Z86.711 HISTORY OF PULMONARY EMBOLISM: ICD-10-CM

## 2021-03-30 DIAGNOSIS — Z95.2 AORTIC VALVE REPLACED: ICD-10-CM

## 2021-03-30 LAB — INR PPP: 2.6 (ref 0.9–1.1)

## 2021-04-01 NOTE — PROGRESS NOTES
Vinicio Donald is a 65 year old female who is being evaluated via a billable telephone visit.       What phone number would you like to be contacted at?@ [phone]  Home Phone [phone]   Work Phone Not on file.   Mobile [phone]   Home Phone [phone]       How would you like to obtain your AVS? MyChart       Telephone Virtual Visit Details     Type of service:  Telephone Virtual Visit     Start Time: 3:30pm  End Time: 4pm    Virtual visit for review of sleep testing results.     Assessment:  - Technically inadequate PSG with  minutes.  - No significant sleep-disordered breathing observed, though with mildly elevated RDI (AHI 0.4, RDI 9.7)  - Potential that severity under-reported given that patient on nocturnal supplemental oxygen at 1 LPM (was added prior to sleep onset for SpO2 <= 88% for 5+ minutes during wakefulness), highly fragmented sleep and minimal REM observed.  - TCM not suggestive of acute hypoventilation, though pre-study blood gases were not collected and evidence of elevated CO2 on BMP's.  - Bruxism observed.    Plan:  - Recommend continuing nocturnal supplemental oxygen at 1-2 LPM  - Given her significant daytime symptoms, multi-factorial SOB / KHAN, evidence of hypercarbia, low total sleep time and potential that sleep-disordered breathing were masked by oxygen, we will plan to repeat PSG.  For the PSG, will order pre-study VBG, perform TCM, and plan to start on room air and to start treatment with bilevel PAP spontaneous if / when sleep-associated hypoxemia observed.   Will also prescribe zolpidem 10mg for night of sleep test.    SUBJECTIVE:  Eileen Donald is a 65 year old year old female.    9/28/2020 - Initial consult with Dr. Padilla.  Concerns of snoring, KHAN, hypersomnia, report of hypercarbia on blood gases from pulmonology.  STOPBANG 6.  Probable delayed sleep phase.  Plan for in-lab PSG.    Today - She returns for review of sleep testing.    DIAGNOSTIC  "POLYSOMNOGRAPHY REPORT   Patient: PALOMA OSEGUERA   YOB: 1955   Study Date: 3/15/2021   MRN: [id number]   Referring Provider: -   Ordering Provider: Charles Padilla DO   Indications for Polysomnography: The patient is a 65-year-old Female who is 5' 10\" and weighs 305.0 lbs. Her BMI is 44.2, Lottie sleepiness scale 6 and neck circumference is 49 cm. Relevant medical history includes (PTSD and TBI). A diagnostic polysomnogram was performed to re-evaluate for sleep apnea.   Polysomnogram Data: A full night polysomnogram recorded the standard physiologic parameters including EEG, EOG, EMG, ECG, nasal and oral airflow. Respiratory parameters of chest and abdominal movements were recorded with respiratory inductance plethysmography. Oxygen saturation was recorded by pulse oximetry. Hypopnea scoring rule used: 1B 4%.   Sleep Architecture: The patient had a grossly elevated sleep latency with poor sleep efficiency. The patient s sleep architecture was fragmented.   The total recording time of the polysomnogram was 431.6 minutes. The total sleep time was 160.5 minutes. Sleep latency was grossly elevated at 110.4 minutes without the use of a sleep aid. REM latency was 252.5 minutes. Arousal index was increased at 81.1 arousals per hour. Sleep efficiency was decreased at 37.2%. Wake after sleep onset was 160.0 minutes. The patient spent 50.8% of total sleep time in Stage N1, 42.7% in Stage N2, 4.7% in Stage N3, and 1.9% in REM. Time in REM supine was 3.0 minutes.   Respiration: The patient s RDI was elevated at 9.7 events per hour consistent with Upper Airway Resistance Syndrome (UARS) with the lowest O2 Sat of 82.2%. However, the patient s AHI might have been blunted by the addition of nasal canula oxygen at 1L/min.       Events ? The polysomnogram revealed a presence of 0 obstructive, 0 central, and 0 mixed apneas resulting in an apnea index of - events per hour. There were 1 obstructive hypopnea and 0 " central hypopneas resulting in an obstructive hypopnea index of 0.4 and central hypopnea index of 0 events per hour. The combined apnea/hypopnea index was 0.4 events per hour (central apnea/hypopnea index was 0 events per hour). The REM AHI was 0 events per hour. The supine AHI was 0.4 events per hour. The RERA index was 9.3 events per hour. The RDI was 9.7 events per hour. The patient s overall AHI might have been blunted by the addition of oxygen via nasal canula 1L/min.       Snoring - was reported as moderate.       Respiratory rate and pattern - were notable for obstructive apneas, hypopneas and RERA s       Sustained Sleep Associated Hypoventilation - Transcutaneous carbon dioxide monitoring was used; however significant hypoventilation was not present with a maximum change from 44.5 to 51.0 mmHg and 0 minutes at or greater than 55 mmHg.       Sleep Associated Hypoxemia - (Greater than 5 minutes O2 sat at or below 88%) was not present. Baseline oxygen saturation was 94.4%. Lowest oxygen saturation was 82.2%. Time spent less than or equal to 88% was 0 minutes. Time spent less than or equal to 89% was 0 minutes. However, oxygen via nasal canula was added at 1L/min     Movement Activity: Sleep related bruxism was appreciated.       Periodic Limb Activity - There were 24 PLMs during the entire study. The PLM index was 9.0 movements per hour. The PLM Arousal Index was 4.1 per hour.       REM EMG Activity - Excessive transient/sustained muscle activity was not present.       Nocturnal Behavior - Abnormal sleep related behaviors were not noted during sleep.       Bruxism - appreciated.     Cardiac Summary: Within normal limits   The average pulse rate was 80.1 bpm. The minimum pulse rate was 68.5 bpm while the maximum pulse rate was 90.0 bpm. Arrhythmias were not noted.   Assessment:       The patient had a grossly elevated sleep latency with poor sleep efficiency. The patient s sleep architecture was fragmented.        The patient s RDI was elevated at 9.7 events per hour consistent with Upper Airway Resistance Syndrome (UARS) with the lowest O2 Sat of 82.2%. However, the patient s AHI might have been blunted by the addition of nasal canula oxygen at 1L/min.       Sleep related bruxism was appreciated.     Recommendations:       Patient may be a candidate for dental appliance through referral to Sleep Dentistry for the treatment of UARS or socially disruptive snoring.       Consider use of nocturnal mouthguard to prevent enamel erosion.       Consider repeat sleep study without nasal canula oxygen       Advice regarding the risks of drowsy driving.       Suggest optimizing sleep schedule and avoiding sleep deprivation.       Weight management (if BMI > 30).     Diagnostic Codes:   Unspecified Sleep Disturbance G47.9   Bruxism G47.63   3/15/2021 [facility] Study (305.0 lbs) - AHI 0.4, RDI 9.7, Supine AHI 0.4, REM AHI -, Low O2 82.2%, Time Spent ?88% 0 minutes / Time Spent ?89% 0 minutes. _____________________________________    ELECTRONICALLY SIGNED BY: EUSEBIA MORALES DO 03/23/21          HEMALATHA Total Score: 16    Past medical history:    There are no active problems to display for this patient.      10 point ROS of systems including Constitutional, Eyes, Respiratory, Cardiovascular, Gastroenterology, Genitourinary, Integumentary, Muscularskeletal, Psychiatric were all negative except for pertinent positives noted in my HPI.    OBJECTIVE:  There were no vitals taken for this visit.    Physical Exam:  healthy, alert and no distress  PSYCH: Alert and oriented times 3; coherent speech, normal   rate and volume, able to articulate logical thoughts, able   to abstract reason, no tangential thoughts, no hallucinations   or delusions  His affect is normal  RESP: No cough, no audible wheezing, able to talk in full sentences  Remainder of exam unable to be completed due to telephone visits    ---  This note was written  with the assistance of the Dragon voice-dictation technology software. The final document, although reviewed, may contain errors. For corrections, please contact the office.    Matthew Erwin MD    Sleep Medicine  [facility]  ([phone])  [facility]  ([phone])

## 2021-04-02 ENCOUNTER — VIRTUAL VISIT (OUTPATIENT)
Dept: SLEEP MEDICINE | Facility: CLINIC | Age: 66
End: 2021-04-02
Payer: COMMERCIAL

## 2021-04-02 DIAGNOSIS — G47.10 HYPERSOMNIA: Primary | ICD-10-CM

## 2021-04-02 DIAGNOSIS — R06.83 SNORING: ICD-10-CM

## 2021-04-02 DIAGNOSIS — E11.69 TYPE 2 DIABETES MELLITUS WITH OTHER SPECIFIED COMPLICATION, UNSPECIFIED WHETHER LONG TERM INSULIN USE (H): ICD-10-CM

## 2021-04-02 DIAGNOSIS — R06.09 DOE (DYSPNEA ON EXERTION): ICD-10-CM

## 2021-04-02 DIAGNOSIS — R06.89 HYPERCARBIA: ICD-10-CM

## 2021-04-02 PROCEDURE — 99214 OFFICE O/P EST MOD 30 MIN: CPT | Mod: 95 | Performed by: FAMILY MEDICINE

## 2021-04-05 ENCOUNTER — AMBULATORY - HEALTHEAST (OUTPATIENT)
Dept: NURSING | Facility: CLINIC | Age: 66
End: 2021-04-05

## 2021-04-09 ENCOUNTER — AMBULATORY - HEALTHEAST (OUTPATIENT)
Dept: EDUCATION SERVICES | Facility: CLINIC | Age: 66
End: 2021-04-09

## 2021-04-09 ENCOUNTER — COMMUNICATION - HEALTHEAST (OUTPATIENT)
Dept: EDUCATION SERVICES | Facility: CLINIC | Age: 66
End: 2021-04-09

## 2021-04-09 DIAGNOSIS — E11.9 DIABETES MELLITUS, NEW ONSET (H): ICD-10-CM

## 2021-04-14 ENCOUNTER — COMMUNICATION - HEALTHEAST (OUTPATIENT)
Dept: ANTICOAGULATION | Facility: CLINIC | Age: 66
End: 2021-04-14

## 2021-04-14 ENCOUNTER — AMBULATORY - HEALTHEAST (OUTPATIENT)
Dept: LAB | Facility: CLINIC | Age: 66
End: 2021-04-14

## 2021-04-14 DIAGNOSIS — Z86.711 HISTORY OF PULMONARY EMBOLISM: ICD-10-CM

## 2021-04-14 DIAGNOSIS — Z95.2 HEART VALVE REPLACED: ICD-10-CM

## 2021-04-14 DIAGNOSIS — Z95.2 AORTIC VALVE REPLACED: ICD-10-CM

## 2021-04-14 DIAGNOSIS — I48.0 PAROXYSMAL ATRIAL FIBRILLATION (H): ICD-10-CM

## 2021-04-14 LAB — INR PPP: 2.6 (ref 0.9–1.1)

## 2021-04-26 ENCOUNTER — AMBULATORY - HEALTHEAST (OUTPATIENT)
Dept: NURSING | Facility: CLINIC | Age: 66
End: 2021-04-26

## 2021-04-27 ENCOUNTER — COMMUNICATION - HEALTHEAST (OUTPATIENT)
Dept: FAMILY MEDICINE | Facility: CLINIC | Age: 66
End: 2021-04-27

## 2021-04-27 ENCOUNTER — RECORDS - HEALTHEAST (OUTPATIENT)
Dept: ADMINISTRATIVE | Facility: OTHER | Age: 66
End: 2021-04-27

## 2021-04-27 DIAGNOSIS — R13.10 DYSPHAGIA: ICD-10-CM

## 2021-04-27 DIAGNOSIS — E78.5 HYPERLIPIDEMIA: ICD-10-CM

## 2021-04-29 ENCOUNTER — COMMUNICATION - HEALTHEAST (OUTPATIENT)
Dept: FAMILY MEDICINE | Facility: CLINIC | Age: 66
End: 2021-04-29

## 2021-04-29 DIAGNOSIS — I10 ESSENTIAL HYPERTENSION: ICD-10-CM

## 2021-04-29 DIAGNOSIS — F33.41 RECURRENT MAJOR DEPRESSIVE DISORDER, IN PARTIAL REMISSION (H): ICD-10-CM

## 2021-05-03 ENCOUNTER — COMMUNICATION - HEALTHEAST (OUTPATIENT)
Dept: ANTICOAGULATION | Facility: CLINIC | Age: 66
End: 2021-05-03

## 2021-05-03 ENCOUNTER — COMMUNICATION - HEALTHEAST (OUTPATIENT)
Dept: FAMILY MEDICINE | Facility: CLINIC | Age: 66
End: 2021-05-03

## 2021-05-03 ENCOUNTER — OFFICE VISIT - HEALTHEAST (OUTPATIENT)
Dept: FAMILY MEDICINE | Facility: CLINIC | Age: 66
End: 2021-05-03

## 2021-05-03 DIAGNOSIS — Z86.711 HISTORY OF PULMONARY EMBOLISM: ICD-10-CM

## 2021-05-03 DIAGNOSIS — Z95.2 HEART VALVE REPLACED: ICD-10-CM

## 2021-05-03 DIAGNOSIS — J30.2 SEASONAL ALLERGIC RHINITIS, UNSPECIFIED TRIGGER: ICD-10-CM

## 2021-05-03 DIAGNOSIS — G35 MULTIPLE SCLEROSIS (H): ICD-10-CM

## 2021-05-03 DIAGNOSIS — I48.0 PAROXYSMAL ATRIAL FIBRILLATION (H): ICD-10-CM

## 2021-05-03 DIAGNOSIS — J45.50 SEVERE PERSISTENT ASTHMA, UNSPECIFIED WHETHER COMPLICATED (H): ICD-10-CM

## 2021-05-03 DIAGNOSIS — J45.21 MILD INTERMITTENT ASTHMA WITH EXACERBATION: ICD-10-CM

## 2021-05-03 DIAGNOSIS — E11.9 DIABETES MELLITUS, NEW ONSET (H): ICD-10-CM

## 2021-05-03 DIAGNOSIS — D12.6 ADENOMATOUS POLYP OF COLON, UNSPECIFIED PART OF COLON: ICD-10-CM

## 2021-05-03 DIAGNOSIS — Z95.2 AORTIC VALVE REPLACED: ICD-10-CM

## 2021-05-03 LAB
HBA1C MFR BLD: 7.3 %
INR PPP: 1.7 (ref 0.9–1.1)

## 2021-05-03 ASSESSMENT — MIFFLIN-ST. JEOR: SCORE: 1918.47

## 2021-05-06 ENCOUNTER — COMMUNICATION - HEALTHEAST (OUTPATIENT)
Dept: FAMILY MEDICINE | Facility: CLINIC | Age: 66
End: 2021-05-06

## 2021-05-11 ENCOUNTER — COMMUNICATION - HEALTHEAST (OUTPATIENT)
Dept: ANTICOAGULATION | Facility: CLINIC | Age: 66
End: 2021-05-11

## 2021-05-11 ENCOUNTER — AMBULATORY - HEALTHEAST (OUTPATIENT)
Dept: LAB | Facility: CLINIC | Age: 66
End: 2021-05-11

## 2021-05-11 DIAGNOSIS — Z95.2 HEART VALVE REPLACED: ICD-10-CM

## 2021-05-11 DIAGNOSIS — Z95.2 AORTIC VALVE REPLACED: ICD-10-CM

## 2021-05-11 DIAGNOSIS — I48.0 PAROXYSMAL ATRIAL FIBRILLATION (H): ICD-10-CM

## 2021-05-11 DIAGNOSIS — Z86.711 HISTORY OF PULMONARY EMBOLISM: ICD-10-CM

## 2021-05-11 LAB — INR PPP: 3 (ref 0.9–1.1)

## 2021-05-18 ENCOUNTER — RECORDS - HEALTHEAST (OUTPATIENT)
Dept: FAMILY MEDICINE | Facility: CLINIC | Age: 66
End: 2021-05-18

## 2021-05-25 ENCOUNTER — RECORDS - HEALTHEAST (OUTPATIENT)
Dept: ADMINISTRATIVE | Facility: CLINIC | Age: 66
End: 2021-05-25

## 2021-05-26 ENCOUNTER — RECORDS - HEALTHEAST (OUTPATIENT)
Dept: ADMINISTRATIVE | Facility: CLINIC | Age: 66
End: 2021-05-26

## 2021-05-26 ASSESSMENT — PATIENT HEALTH QUESTIONNAIRE - PHQ9: SUM OF ALL RESPONSES TO PHQ QUESTIONS 1-9: 9

## 2021-05-27 ENCOUNTER — RECORDS - HEALTHEAST (OUTPATIENT)
Dept: ADMINISTRATIVE | Facility: CLINIC | Age: 66
End: 2021-05-27

## 2021-05-27 VITALS
OXYGEN SATURATION: 90 % | HEART RATE: 87 BPM | HEIGHT: 68 IN | SYSTOLIC BLOOD PRESSURE: 134 MMHG | BODY MASS INDEX: 44.1 KG/M2 | DIASTOLIC BLOOD PRESSURE: 71 MMHG | WEIGHT: 291 LBS

## 2021-05-27 ASSESSMENT — PATIENT HEALTH QUESTIONNAIRE - PHQ9: SUM OF ALL RESPONSES TO PHQ QUESTIONS 1-9: 7

## 2021-05-27 NOTE — TELEPHONE ENCOUNTER
ACN called and followed up with patient and she stated she did not received the dosing instructions from the Home Care nurse yet.    Updated patient that she needs to continue with bridging with Lovenox until INR is 2.5 per Dr Smith.  Instructed to take Warfarin 2.5 mg dose today, continue with Lovenox injections.  Updated that Lovenox refill sent to her pharmacy ( [facility]) today.    Made aware that INR will be checked tomorrow with OV with PCP.    Patient verbalized understanding and agrees to plan.    Radha Slater RN

## 2021-05-27 NOTE — TELEPHONE ENCOUNTER
Spoke with Home Care RN Kalyani and updated that ACN gave instructions to patient yesterday.  Instructed to have the patient take scheduled warfarin 2.5 mg today, continue Lovenox injections until INR is 2.5.  INR will be check tomorrow with scheduled OV with PCP.    Lovenox refill will be sent to patient's pharmacy : Haleigh Slater RN

## 2021-05-27 NOTE — TELEPHONE ENCOUNTER
Left message to call back for: Bobbi   Information to relay to patient:  Left detailed message ok for orders.

## 2021-05-27 NOTE — PROGRESS NOTES
Received intake call for home oxygen at 11:08AM. Reviewed patient's chart; Patient qualifies under Medicare guidelines and all documentation is in the chart including a good order.   11:25AM- Called to offer choice and patient is okay with [facility] setting them up. Discussed equipment with patient and informed them that we would be to bedside with oxygen in the next 2 hours.   12:15PM- Spoke with care coordinatorLorena, confirmed we received the order, and provided them with ETA of oxygen.

## 2021-05-27 NOTE — TELEPHONE ENCOUNTER
ACN called and left a message for Home Care nurse Kalyani and updated of INR result and dosing instructions given to patient as stated below.    Instructed to check patient's INR on 4/19/19 and to call ACN to confirm receipt of message.    Radha Slater RN

## 2021-05-27 NOTE — TELEPHONE ENCOUNTER
Who is calling:  Nurse Auguste  Reason for Call:  Kalyani still waiting for callback and instruction regarding patient dosing. Kalyani spoke with nurse yesterday and was to also get a call back, per Kalyani.  Date of last appointment with primary care: [id number]  Okay to leave a detailed message: Yes

## 2021-05-27 NOTE — PATIENT INSTRUCTIONS - HE
Hold aspirin and vitamins and herbal medications the week prior to surgery  -hold lisinopril day of surgery  -hold warfarin 4 days prior to surgery  -start lovenox injections  when not taking warfarin  -Day of surgery only take metoprolol and symbicort

## 2021-05-27 NOTE — PROGRESS NOTES
TCM DISCHARGE FOLLOW UP CALL    Discharge Date:  4/12/2019  Reason for hospital stay (discharge diagnosis)::  (L) TSA  Are you feeling better, the same or worse since your discharge?:  Patient is feeling better (pain manageable. Still has arm spasms. She is wearing immobilizer except for bathing. CMS intact . drg dry and intact)  Do you feel like you have a plan in the event of a health emergency?: Yes (She talks with her  or kids.)    As part of your discharge plan, were  home care services ordered for you?: Yes    Have you seen them yet, or are they scheduled to visit?: Yes    Do you have any follow up visits scheduled with your PCP or Specialist?:  Yes, with PCP and Yes, with Specialist (3/17 PCP.)  (RN) Is PCP appt scheduled soon enough (within 14 days of discharge date)?: Yes    Who are you seeing and when is it scheduled?:  Ortho 4/22

## 2021-05-27 NOTE — TELEPHONE ENCOUNTER
ACN called and spoke with patient and updated that ACN is still waiting for PCP response regarding bridging INR goal.  Patient reported that she has 2 more Lovenox syringes left and will need prescription refill if needs to continue doses.    Patient verbalized understanding.    Radha Slater RN

## 2021-05-27 NOTE — ANESTHESIA POSTPROCEDURE EVALUATION
Patient: Eileen Donald  LEFT REVERSE TOTAL SHOULDER ARTHROPLASTY  Anesthesia type: general    Patient location: PACU  Last vitals:   Vitals Value Taken Time   /97 4/10/2019 11:01 AM   Temp 36.8  C (98.3  F) 4/10/2019 10:12 AM   Pulse 91 4/10/2019 11:02 AM   Resp 23 4/10/2019 10:57 AM   SpO2 92 % 4/10/2019 11:02 AM   Vitals shown include unvalidated device data.  Post vital signs: stable  Level of consciousness: awake and responds to simple questions  Post-anesthesia pain: pain controlled  Post-anesthesia nausea and vomiting: no  Pulmonary: unassisted, return to baseline  Cardiovascular: stable and blood pressure at baseline  Hydration: adequate  Anesthetic events: no    QCDR Measures:  ASA# 11 - Gloria-op Cardiac Arrest: ASA11B - Patient did NOT experience unanticipated cardiac arrest  ASA# 12 - Gloria-op Mortality Rate: ASA12B - Patient did NOT die  ASA# 13 - PACU Re-Intubation Rate: ASA13B - Patient did NOT require a new airway mgmt  ASA# 10 - Composite Anes Safety: ASA10A - No serious adverse event    Additional Notes: Recovery as anticipated from general anesthetic.  Pt comfortable with block.  No apparent complications.

## 2021-05-27 NOTE — PROGRESS NOTES
Attempt 1: Care Guide called patient.  If this patient is returning my call, please transfer to Lorena at ext [phone].  LMTCB- re: [facility] Referral placed on 4/15/19    Plan- to offer [facility]   Patient does have clinic appt 4/17 @10:50am

## 2021-05-27 NOTE — TELEPHONE ENCOUNTER
ACN called [facility] to verify next Home Care visit.  Someone will send a message to the covering nurse to relay the message to have INR check on 4/19/19.    Awaiting call back.    Radha Slater RN

## 2021-05-27 NOTE — TELEPHONE ENCOUNTER
ANTICOAGULATION  MANAGEMENT    Assessment     Today's INR result of 3.3 is Therapeutic (goal INR of 2.5-3.5)        Warfarin taken as previously instructed    No new diet changes affecting INR    No new medication/supplements affecting INR    Continues to tolerate warfarin with no reported s/s of bleeding or thromboembolism     Previous INR was Therapeutic     Pre OP visit with PCP today    Scheduled Procedure: LEFT REVERSE TOTAL SHOULDER ARTHROPLASTY    Surgery Date:  4/10/2019     Surgery Location: [facility], fax [phone]      Surgeon:  Dr Luis Antonio Telles     Plan:     Spoke with Eileen regarding INR result and instructed:     Warfarin Dosing Instructions:  Continue current warfarin dose    5 mg every Mon, Thu; 2.5 mg all other days      (0 % change)    Pre procedure warfarin interruption bridging plans per PCP order:  1. Hold warfarin 4 days prior to procedure  2. Bridge with Lovenox as instructed by PCP.Reminded patient not to take Lovenox on day of surgery    Instructed patient to follow up no later than: 2-3 days after discharge    Education provided: importance of therapeutic range and target INR goal and significance of current INR result    Eileen verbalizes understanding and agrees to warfarin dosing plan.    Instructed to call the [facility] for any changes, questions or concerns. (#[phone])   ?   Radha Slater RN    Subjective/Objective:      Eileen Donald, a 63 y.o. female is on warfarin.     Eileen reports:     Home warfarin dose: as updated on anticoagulation calendar per template     Missed doses: No     Medication changes:  No     S/S of bleeding or thromboembolism:  No     New Injury or illness:  No     Changes in diet or alcohol consumption:  No     Upcoming surgery, procedure or cardioversion:  Yes: shoulder surgery    Anticoagulation Episode Summary     Current INR goal:   2.5-3.5   TTR:   83.0 % (9 mo)   Next INR check:   4/9/2019   INR from last check:   3.30  (4/2/2019)   Weekly max warfarin dose:      Target end date:      INR check location:      Preferred lab:      Send INR reminders to:   ANTICOAGULATION POOL C (DTN,VAD,CGR,GAV)    Indications    Heart valve replaced [Z95.2]           Comments:            Anticoagulation Care Providers     Provider Role Specialty Phone number    Tito Salazar MD Referring Family Medicine [phone]

## 2021-05-27 NOTE — ANESTHESIA CARE TRANSFER NOTE
Last vitals:   Vitals:    04/10/19 1012   BP: 157/73   Pulse: 82   Resp: 20   Temp: 36.8  C (98.3  F)   SpO2: 96%     Patient's level of consciousness is drowsy  Spontaneous respirations: yes  Maintains airway independently: yes  Dentition unchanged: yes  Oropharynx: oropharynx clear of all foreign objects    QCDR Measures:  ASA# 20 - Surgical Safety Checklist: WHO surgical safety checklist completed prior to induction    PQRS# 430 - Adult PONV Prevention: 4558F - Pt received => 2 anti-emetic agents (different classes) preop & intraop  ASA# 8 - Peds PONV Prevention: NA - Not pediatric patient, not GA or 2 or more risk factors NOT present  PQRS# 424 - Gloria-op Temp Management: 4559F - At least one body temp DOCUMENTED => 35.5C or 95.9F within required timeframe  PQRS# 426 - PACU Transfer Protocol: - Transfer of care checklist used  ASA# 14 - Acute Post-op Pain: ASA14B - Patient did NOT experience pain >= 7 out of 10

## 2021-05-27 NOTE — TELEPHONE ENCOUNTER
RN cannot approve Refill Request    RN can NOT refill this medication med is not covered by policy/route to provider     . Last office visit: 2/21/2019 Tito Salazar MD Last Physical: 4/2/2019 Last MTM visit: Visit date not found Last visit same specialty: 2/21/2019 Tito Salazar MD.  Next visit within 3 mo: Visit date not found  Next physical within 3 mo: Visit date not found      Daysi Mohan, [facility] Triage/Med Refill 4/15/2019    Requested Prescriptions   Pending Prescriptions Disp Refills     SYMBICORT 160-4.5 mcg/actuation inhaler [Pharmacy Med Name: SYMBICORT INH AEROSOL 160/4.5]  3     Sig: USE 2 INHALATIONS TWICE A DAY       There is no refill protocol information for this order

## 2021-05-27 NOTE — TELEPHONE ENCOUNTER
ACN called and updated patient that Home Care Nurse is coming to check her INR on 4/19.  Instructed to hold off on Lovenox injection on 4/19 until INR result is discussed with ACN.    Patient verbalized understanding and agrees to plan.    Radha Slater RN

## 2021-05-27 NOTE — TELEPHONE ENCOUNTER
----- Message from Tito Salazar MD sent at 4/17/2019  4:57 PM CDT -----  Blood levels have dropped since hospitalization from 10.5-8.8.  We will need to recheck this again in 1 week.  Please make a lab visit in 1 week

## 2021-05-27 NOTE — TELEPHONE ENCOUNTER
ACN called and spoke with patient and instructed to take scheduled 5 mg today and continue with Lovenox injections tonight and tomorrow AM.    ACN will be calling her back tomorrow with updates/response from MD.    Patient verbalized understanding and agrees to plan.    Radha Slater RN

## 2021-05-27 NOTE — TELEPHONE ENCOUNTER
Dr. Salazar or covering provider,    Marilee was discharged to home with [facility] post left reverse total shoulder arthroplasty    INR today 2.2 ( Goal l Range 2.5-3.5)    She is currently bridging with Lovenox 150 mg every 12 hours.    Do you want her to continue bridging with Lovenox until INR is = > 2.5?    Please advise.    Radha Slater RN

## 2021-05-27 NOTE — TELEPHONE ENCOUNTER
ANTICOAGULATION  MANAGEMENT    Assessment     Today's INR result of 2.3 is Subtherapeutic (goal INR of 2.5-3.5)        Warfarin taken as previously instructed     Currently bridging with Lovenox until INR is 2.5    No new diet changes affecting INR    No new medication/supplements affecting INR    Continues to tolerate warfarin with no reported s/s of bleeding or thromboembolism     Previous INR was Subtherapeutic    Plan:     Spoke with Eileen regarding INR result and instructed:     Warfarin Dosing Instructions:  Take a booster dose of 5 mg today then take scheduled dose of 5 mg tomorrow  ,continue Lovenox injections until INR is 2.5    Instructed patient to follow up no later than: 4/19/19  Updated patient that ACN will be calling Home Nurse and will update of plan.    Education provided: importance of therapeutic range and target INR goal and significance of current INR result    Eileen verbalizes understanding and agrees to warfarin dosing plan.    Instructed to call the [facility] for any changes, questions or concerns. (#[phone])   ?   Radha Slater RN    Subjective/Objective:      Eileen Donald, a 63 y.o. female is on warfarin.     Eileen reports:     Home warfarin dose: verbally confirmed home dose with Marilee and updated on anticoagulation calendar     Missed doses: No     Medication changes:  No     S/S of bleeding or thromboembolism:  No     New Injury or illness:  No     Changes in diet or alcohol consumption:  No     Upcoming surgery, procedure or cardioversion:  No    Anticoagulation Episode Summary     Current INR goal:   2.5-3.5   TTR:   81.0 % (9.3 mo)   Next INR check:   4/19/2019   INR from last check:   2.30! (4/17/2019)   Weekly max warfarin dose:      Target end date:      INR check location:      Preferred lab:      Send INR reminders to:   ANTICOAGULATION POOL C (DTN,VAD,CGR,GAV)    Indications    Heart valve replaced [Z95.2]           Comments:             Anticoagulation Care Providers     Provider Role Specialty Phone number    Tito Salazar MD Referring Family Medicine [phone]

## 2021-05-27 NOTE — TELEPHONE ENCOUNTER
ANTICOAGULATION  MANAGEMENT: Discharge Continuity of Care Review    Hospital admission on  4/10-4/12 for left reverse total shoulder arthroplasty.    Discharge disposition: Home    INR Results:       Recent labs: (last 7 days)     04/10/19  0634 04/11/19  0613 04/12/19  0320 04/15/19   INR 1.13* 1.23* 1.57* 2.20*       Warfarin inpatient management: Held and resumed on 4/10 and currently bridging with Lovenox    Warfarin discharge instructions: home regimen continued     Medication Changes Affecting Anticoagulation: Yes: Hydrocodone/acetaminophen    Additional Factors Affecting Anticoagulation: Yes: post op pian    Plan     See INR check encounter dated today.  Anticoagulation calendar updated    Radha Slater, RN

## 2021-05-27 NOTE — TELEPHONE ENCOUNTER
Notified of results and recommendations. Appointment scheduled for new Wednesday 4/24 for lab appointment.

## 2021-05-27 NOTE — TELEPHONE ENCOUNTER
RN cannot approve Refill Request    RN can NOT refill this medication overdue for office visits and/or labs.    Nitin Hagan, [facility] Triage/Med Refill 4/4/2019    Requested Prescriptions   Pending Prescriptions Disp Refills     venlafaxine (EFFEXOR-XR) 75 MG 24 hr capsule [Pharmacy Med Name: VENLAFAXINE HCL ER CAPS 75MG] 270 capsule 1     Sig: TAKE 3 CAPSULES DAILY    Venlafaxine/Desvenlafaxine Refill Protocol Failed - 4/2/2019 11:17 PM       Failed - Fasting lipid cascade in last year    Cholesterol   Date Value Ref Range Status   11/09/2016 220 (H) <=199 mg/dL Final     Triglycerides   Date Value Ref Range Status   11/09/2016 134 <=149 mg/dL Final     HDL Cholesterol   Date Value Ref Range Status   11/09/2016 70 >=50 mg/dL Final     LDL Calculated   Date Value Ref Range Status   11/09/2016 123 <=129 mg/dL Final     Patient Fasting > 8hrs?   Date Value Ref Range Status   11/09/2016 Yes  Final            Passed - LFT or AST or ALT in last year    Albumin   Date Value Ref Range Status   06/26/2018 3.8 3.5 - 5.0 g/dL Final     Bilirubin, Total   Date Value Ref Range Status   06/26/2018 0.4 0.0 - 1.0 mg/dL Final     Alkaline Phosphatase   Date Value Ref Range Status   06/26/2018 116 45 - 120 U/L Final     AST   Date Value Ref Range Status   06/26/2018 32 0 - 40 U/L Final     ALT   Date Value Ref Range Status   06/26/2018 20 0 - 45 U/L Final     Protein, Total   Date Value Ref Range Status   06/26/2018 7.1 6.0 - 8.0 g/dL Final               Passed - PCP or prescribing provider visit in last year    Last office visit with prescriber/PCP: 2/21/2019 Tito Salazar MD OR same dept: 2/21/2019 Tito Salazar MD OR same specialty: 2/21/2019 Ttio Salazar MD  Last physical: 4/2/2019 Last MTM visit: Visit date not found   Next visit within 3 mo: Visit date not found  Next physical within 3 mo: Visit date not found  Prescriber OR PCP: Tito Salazar MD  Last diagnosis associated with  med order: 1. Depression  - venlafaxine (EFFEXOR-XR) 75 MG 24 hr capsule [Pharmacy Med Name: VENLAFAXINE HCL ER CAPS 75MG]; TAKE 3 CAPSULES DAILY  Dispense: 270 capsule; Refill: 1    If protocol passes may refill for 12 months if within 3 months of last provider visit (or a total of 15 months).            Passed - Blood Pressure in last year    BP Readings from Last 1 Encounters:   04/02/19 132/74

## 2021-05-27 NOTE — PROGRESS NOTES
ASSESSMENT/PLAN  1. Closed fracture of proximal end of left humerus, unspecified fracture morphology, sequela  Patient is here for hospital follow-up  Patient was hospitalized with a left shoulder replacement total reversal secondary to humerus fracture  All medications were reconciled  Patient tolerated the procedure and is undergoing physical therapy and has follow-up scheduled with orthopedics at this time  She has home care  She is residing at her sister's home at this time  Pain is controlled with twice daily dosing of narcotic medication and currently twice a day muscle relaxants  Bandages still applied on incision line but otherwise arm is showing minimal bruising and she is in a sling restricting range of motion  No medication changes made today    2. Anemia, unspecified type  Suspected slight anemia after the surgery noted and we will recheck hemoglobin today to assess stability  Follow-up based on results  - HM2(CBC w/o Differential)    3. Hypomagnesemia  Patient had slightly lower levels of magnesium and we will recheck magnesium today-if remains low we will have to start on supplement  - Magnesium    4. Pulmonary embolism (H)  Patient is due for an INR check is currently on Lovenox injections  Plan is to remain on Lovenox injections and Coumadin until INR is above 2.5 and then continue on Coumadin  - INR    5. H/O aortic valve replacement  Please see #4 above  - INR        SUBJECTIVE:   Chief Complaint   Patient presents with     Follow-up     left shoulder replacement 4/10-4/12     Concerns     swollen tongue x 2 days     Eileen Donald 63 y.o. female    Current Outpatient Medications   Medication Sig Dispense Refill     acyclovir (ZOVIRAX) 400 MG tablet Take 400 mg by mouth 2 (two) times a day.        albuterol (PROAIR HFA;PROVENTIL HFA;VENTOLIN HFA) 90 mcg/actuation inhaler Inhale 2 puffs every 6 (six) hours as needed for wheezing. 1 Inhaler 3     aspirin 81 mg chewable tablet Chew 81 mg daily.               AVONEX 30 mcg/0.5 mL PnKt 1 application once a week.              calcium, as carbonate, (TUMS) 200 mg calcium (500 mg) chewable tablet Chew 1 tablet daily as needed.              enoxaparin (LOVENOX) 150 mg/mL injection Inject 1 mL (150 mg total) under the skin every 12 (twelve) hours. 6 Syringe 0     esomeprazole (NEXIUM) 20 MG capsule TAKE 1 CAPSULE DAILY 90 capsule 3     furosemide (LASIX) 40 MG tablet Take 1.5 tablets (60 mg total) by mouth daily. Take additional afternoon dose for weight gain > 2 lbs in one day. 60 tablet 0     gabapentin (NEURONTIN) 300 MG capsule Take 1 capsule (300 mg total) by mouth daily. 30 capsule 0     glycerin/witch hazel leaf (WITCH HAZEL-GLYCERIN, HAMAMEL, TOP) Apply 1 each topically as needed. Tucks             HYDROcodone-acetaminophen 5-325 mg per tablet Take 1-2 tablets by mouth every 4 (four) hours as needed for pain. 42 tablet 0     hydrOXYzine HCl (ATARAX) 25 MG tablet Take 1 tablet (25 mg total) by mouth every 6 (six) hours as needed (pain, muscle spasms, itching, anxiety). Hold for sedation. 20 tablet 0     lisinopril (PRINIVIL,ZESTRIL) 5 MG tablet TAKE 1 TABLET DAILY 90 tablet 3     LORazepam (ATIVAN) 0.5 MG tablet Take 1 tablet (0.5 mg total) by mouth daily as needed for anxiety. 30 tablet 0     metoprolol tartrate (LOPRESSOR) 25 MG tablet TAKE 1 TABLET TWICE A  tablet 3     potassium chloride (MICRO-K) 10 mEq CR capsule Take 20 mEq by mouth daily.       senna-docusate (PERICOLACE) 8.6-50 mg tablet Take 1 tablet by mouth 2 (two) times a day as needed for constipation.  0     simvastatin (ZOCOR) 20 MG tablet Take 1 tablet (20 mg total) by mouth at bedtime. 90 tablet 2     SYMBICORT 160-4.5 mcg/actuation inhaler USE 2 INHALATIONS TWICE A DAY 1 Inhaler 10     venlafaxine (EFFEXOR-XR) 75 MG 24 hr capsule Take 3 capsules (225 mg total) by mouth daily. 90 capsule 0     warfarin (COUMADIN/JANTOVEN) 2.5 MG tablet Take 1-2 tablets (2.5-5 mg total) by mouth  daily. Adjust dose per INR results as instructed. (Patient taking differently: Take 2.5-5 mg by mouth daily. Adjust dose per INR results as instructed.      ) 130 tablet 1     albuterol (PROVENTIL) 2.5 mg /3 mL (0.083 %) nebulizer solution Take 3 mL (2.5 mg total) by nebulization every 6 (six) hours as needed for wheezing. 75 vial 2     montelukast (SINGULAIR) 10 mg tablet Take 1 tablet (10 mg total) by mouth at bedtime. 30 tablet 6     No current facility-administered medications for this visit.      Allergies: Morphine; Sulfa (sulfonamide antibiotics); and Azithromycin   Patient's last menstrual period was 11/28/2013.    HPI:   Patient is here for hospital follow-up  Due to displacement of a humerus fracture patient underwent surgery and left total shoulder reversal  Patient tolerated procedure and is here for follow-up  She currently is pain controlled with twice daily dosing of narcotics and muscle relaxants  Her hemoglobin dropped about three-point status post surgery and we are and recheck those levels today to assess stability  She has home care at this time and physical therapy has started  She is residing at her sister's house  She periodically has some muscle spasms which is alleviated with hydroxyzine    Blood pressure is at goal range  Patient is on Lovenox with her history of valve replacement until INR is greater than 2.5  INR drawn today  Magnesium was found to be slightly low during her    Discussed the importance of physical therapy  Overall patient is doing well and tolerated the surgery well we will continue to follow with orthopedics and physical therapy    ROS: negative except as per HPI    OBJECTIVE:   The patient appears well, alert, oriented x 3, in no distress.  /74 (Patient Site: Right Arm, Patient Position: Sitting, Cuff Size: Adult Regular)   Pulse 97   Temp 97.3  F (36.3  C) (Oral)   Resp 18   Wt (!) 326 lb 6.4 oz (148.1 kg)   LMP 11/28/2013   BMI 48.91 kg/m      Lungs:  clear, good air entry, no wheezes, rhonchi or rales.   Cardiac: S1 and S2 normal, no murmurs, regular rate and rhythm.   Abdomen: normal bowel sounds, soft, obese BMI 48  Extremities:LUE in sling, minimal bruising- bandage in place- no erythema  Neurological: normal, no focal findings.  Skin: clear, dry, no rashes/lesions  Psych- normal mood and affect      Pt states an understanding and agrees to the above plan.

## 2021-05-27 NOTE — TELEPHONE ENCOUNTER
INR result is 2.2  INR   Date Value Ref Range Status   04/12/2019 1.57 (H) 0.90 - 1.10 Final       Will the patient be seen, or did they already see, MD or CNP today? No    Most Recent Warfarin dose day/week  Sunday Monday Tuesday Wednesday Thursday Friday Saturday    5 mg 2.5 mg 2.5 mg 5 mg 2.5 mg 2.5 mg     Sunday Monday Tuesday Wednesday Thursday Friday Saturday   2.5 mg             Has the patient missed any doses of Coumadin, Warfarin, Jantoven in the past 7 days? No    Has the patients medications changed since the last visit? Yes Start 4/3 Novolog 150 mg every 12 hours and 4/12 Percocet 5-325 mg 2 tabs every 4 hrs PRN and Hydroxyzine 25 mg every 6 hrs PRN and Colace and O2 therapy  Has the patient experienced any bleeding recently? No    Has the patient experienced any injuries or illness recently? Yes Left Shoulder replacement 4/10    Has the patient experienced any 'new' shortness of breath, severe headaches, or changes in vision recently? No    Has the patient had any changes in their diet, or alcohol consumption? No    Is the patient here today to prepare for any type of upcoming surgery, procedure, or for a cardioversion procedure? No    What phone number can we reach the patient at today? [phone] Kalyani.

## 2021-05-27 NOTE — TELEPHONE ENCOUNTER
RUDY called and spoke with Kalyani and she confirmed that she received the message left by ACN and she will check the patient's INR on 4/19.    Radha Slater RN

## 2021-05-27 NOTE — ANESTHESIA PREPROCEDURE EVALUATION
Anesthesia Evaluation      Patient summary reviewed   No history of anesthetic complications     Airway   Mallampati: II  Neck ROM: full   Pulmonary - normal exam   (+) asthma  shortness of breath, sleep apnea on no CPAP, ,                          Cardiovascular - normal exam  (+) hypertension, valvular problems/murmurs, CAD, ,        ROS comment: History of AVR in 2017.  Pt denies recent CP.  Chronic shortness of breath with exertion unchanged and without recent exacerbation.       Neuro/Psych - negative ROS     Endo/Other    (+) diabetes mellitus type 2, obesity,      GI/Hepatic/Renal - negative ROS           Dental                         Anesthesia Plan  Planned anesthetic: general endotracheal and peripheral nerve block    ASA 3   Induction: intravenous   Anesthetic plan and risks discussed with: patient    Post-op plan: routine recovery        Plan preoperative placement of single shot interscalene nerve block for postoperative pain control per surgeon request.

## 2021-05-27 NOTE — TELEPHONE ENCOUNTER
Who is calling:  [facility] nurse Kalyani  Reason for Call:  Nurse was calling to let acn know she just checked her messages and wanted to let ACN know she did receive the message on next due INR.  Nurse confirm 4/19/19 INR  Date of last appointment with primary care:  04/17/19  Has the patient been recently seen:  Yes  Okay to leave a detailed message: Yes

## 2021-05-27 NOTE — TELEPHONE ENCOUNTER
Orders being requested: OT 2/X a week for 3 weeks  Reason service is needed/diagnosis: Ongoing for ADL, IDL, breathing techniques, and energy conservation   When are orders needed by: as soon as possible  Where to send Orders: Phone:  [phone] Bobbi Floresay to leave detailed message?  Yes

## 2021-05-27 NOTE — PROGRESS NOTES
Preoperative Exam    Scheduled Procedure: LEFT REVERSE TOTAL SHOULDER ARTHROPLASTY  Surgery Date:  4/10/2019   Surgery Location: [facility], fax [phone]    Surgeon:  Dr Luis Antonio Telles     Assessment/Plan:     1. Preoperative examination  Patient is cleared for surgery at this time with no anticipated complication.  - Electrocardiogram Perform and Read  - enoxaparin (LOVENOX) 150 mg/mL injection; Inject 1 mL (150 mg total) under the skin every 12 (twelve) hours.  Dispense: 10 Syringe; Refill: 0  - HM2(CBC w/o Differential)  - Basic Metabolic Panel    2. Other closed displaced fracture of proximal end of left humerus with delayed healing, subsequent encounter  Patient has had an injury x2 to the shoulder first about a year ago and the second in a few months ago  Left humerus fracture with displacement  Patient is in need of a total left reverse shoulder arthroplasty    Surgical Procedure Risk: Intermediate (reported cardiac risk generally 1-5%)  Have you had prior anesthesia?: Yes  Have you or any family members had a previous anesthesia reaction:  Yes: Patient has tolerated anesthesia prior without complication  Do you or any family members have a history of a clotting or bleeding disorder?: Yes: Patient has a history of a blood clot  Cardiac Risk Assessment: increased risk for major cardiac complications based on  body habitus, shortness of breath and deconditioning and MS    Patient approved for surgery with general or local anesthesia.    Please Note: Patient has seen both her cardiologist and pulmonary specialist for shortness of breath-this is thought to be secondary to deconditioning and body habitus  Patient is being bridged with Lovenox for her aortic valve    Functional Status: Partially Dependent: Pt will have family at home for help upon discharge- unaware if she will have mobility help for at PT sessions- she is working on this.  Patient plans to recover at home with family.     Subjective:       Eileen Donald is a 63 y.o. female who presents for a preoperative consultation.  Patient is here for left-sided humeral fracture which is displaced.  Initially injured over a year ago was then reinjured total subsequent to a second fall.  Displacement of the humerus fracture is in need of surgical correction.  Patient has tolerated previous surgeries in the past.  She has baseline shortness of breath and deconditioning which is been worked up by cardiology and pulmonary-shortness of breath thought secondary to chronic deconditioning and body habitus.  She is status post aortic valve replacement.  Patient is aware of what medications to hold prior to surgery.  Due to aortic valve replacement patient will be bridged with Lovenox based on weight while holding warfarin 4 days prior to surgery.    All other systems reviewed and are negative, other than those listed in the HPI.    Pertinent History  Do you have difficulty breathing or chest pain after walking up a flight of stairs: Yes: Patient has baseline shortness of breath with minimal exertion  History of obstructive sleep apnea: Yes: Patient has sleep apnea  Steroid use in the last 6 months: Yes: Patient is not on active steroids  Frequent Aspirin/NSAID use: Yes: Warfarin will be on hold as well as aspirin  Prior Blood Transfusion: No  Prior Blood Transfusion Reaction: No  If for some reason prior to, during or after the procedure, if it is medically indicated, would you be willing to have a blood transfusion?:  There is no transfusion refusal.    Current Outpatient Medications   Medication Sig Dispense Refill     acetaminophen (TYLENOL) 500 MG tablet Take 325-650 mg by mouth every 6 (six) hours as needed for pain (1-2 tab PRN).        acyclovir (ZOVIRAX) 400 MG tablet Take 400 mg by mouth 2 (two) times a day.        albuterol (PROAIR HFA;PROVENTIL HFA;VENTOLIN HFA) 90 mcg/actuation inhaler Inhale 2 puffs every 6 (six) hours as needed for wheezing. 1  Inhaler 3     albuterol (PROVENTIL) 2.5 mg /3 mL (0.083 %) nebulizer solution Take 3 mL (2.5 mg total) by nebulization every 6 (six) hours as needed for wheezing. 75 vial 2     aspirin 81 mg chewable tablet Chew 81 mg.       AVONEX 30 mcg/0.5 mL PnKt once a week.       calcium, as carbonate, (TUMS) 200 mg calcium (500 mg) chewable tablet Chew 1 tablet daily.       esomeprazole (NEXIUM) 20 MG capsule TAKE 1 CAPSULE DAILY 90 capsule 3     furosemide (LASIX) 40 MG tablet TK 1 T PO QAM  6     gabapentin (NEURONTIN) 300 MG capsule Take 1 capsule (300 mg total) by mouth daily. 30 capsule 0     lisinopril (PRINIVIL,ZESTRIL) 5 MG tablet TAKE 1 TABLET DAILY 90 tablet 3     LORazepam (ATIVAN) 0.5 MG tablet Take 1 tablet (0.5 mg total) by mouth daily as needed for anxiety. 30 tablet 0     metoprolol tartrate (LOPRESSOR) 25 MG tablet TAKE 1 TABLET TWICE A  tablet 3     montelukast (SINGULAIR) 10 mg tablet Take 1 tablet (10 mg total) by mouth at bedtime. 30 tablet 6     potassium chloride (MICRO-K) 10 mEq CR capsule TK 2 CS PO ONCE DAILY WITH A MEAL  6     simvastatin (ZOCOR) 20 MG tablet Take 1 tablet (20 mg total) by mouth at bedtime. 90 tablet 2     SYMBICORT 160-4.5 mcg/actuation inhaler USE 2 INHALATIONS TWICE A DAY 1 Inhaler 3     venlafaxine (EFFEXOR-XR) 75 MG 24 hr capsule Take 3 capsules (225 mg total) by mouth daily. 90 capsule 0     warfarin (COUMADIN/JANTOVEN) 2.5 MG tablet Take 1-2 tablets (2.5-5 mg total) by mouth daily. Adjust dose per INR results as instructed. 130 tablet 1     No current facility-administered medications for this visit.         Allergies   Allergen Reactions     Morphine      Sulfa (Sulfonamide Antibiotics)      Azithromycin Nausea And Vomiting and Rash       Patient Active Problem List   Diagnosis     Depression     Asthma     Hypertension     Multiple Sclerosis     Hyperlipidemia     Impaired Glucose Tolerance Test     Dysphagia     Benign Adenomatous Polyp Of The Large Intestine      History of pulmonary embolism     Obesity     Generalized anxiety disorder     Primary osteoarthritis of both hands     Polyarthralgia     Aortic valve replaced     Controlled substance agreement signed     Heart valve replaced       Past Medical History:   Diagnosis Date     Aortic valve replaced 2/15/2017     Aortic valve stenosis 01/24/2017     Asthma     Created by Conversion      Hypertension     Created by Conversion  Replacement Utility updated for latest IMO load     Multiple sclerosis (H)     Created by Conversion      Obesity 10/29/2015     Pre-diabetes      Pulmonary embolism (H)        Past Surgical History:   Procedure Laterality Date     mechanical aortic prosthesis  01/24/2017     REDUCTION MAMMAPLASTY  1994       Social History     Socioeconomic History     Marital status:      Spouse name: Not on file     Number of children: Not on file     Years of education: Not on file     Highest education level: Not on file   Occupational History     Not on file   Social Needs     Financial resource strain: Not on file     Food insecurity:     Worry: Not on file     Inability: Not on file     Transportation needs:     Medical: Not on file     Non-medical: Not on file   Tobacco Use     Smoking status: Never Smoker     Smokeless tobacco: Never Used   Substance and Sexual Activity     Alcohol use: No     Drug use: No     Sexual activity: Not on file   Lifestyle     Physical activity:     Days per week: Not on file     Minutes per session: Not on file     Stress: Not on file   Relationships     Social connections:     Talks on phone: Not on file     Gets together: Not on file     Attends Voodoo service: Not on file     Active member of club or organization: Not on file     Attends meetings of clubs or organizations: Not on file     Relationship status: Not on file     Intimate partner violence:     Fear of current or ex partner: Not on file     Emotionally abused: Not on file     Physically abused: Not on  file     Forced sexual activity: Not on file   Other Topics Concern     Not on file   Social History Narrative    Lives with sister in [place] due to help with medical issues.     and daughter live in [place].    Plans to return to be with family hopefully soon.    For MS- last used medications about 3 months. In remission. Needs to be back on medications as Neurologist has left.        Drew Hahn MD  5/29/2018           Patient Care Team:  Tito Salazar MD as PCP - General (Family Medicine)          Objective:     There were no vitals filed for this visit.      Physical Exam:  Physical Examination: General appearance - alert, well appearing, and in no distress  Mental status - alert, oriented to person, place, and time  Ears - bilateral TM's and external ear canals normal  Mouth - mucous membranes moist, pharynx normal without lesions  Lymphatics - no palpable lymphadenopathy, no hepatosplenomegaly  Chest - clear to auscultation, no wheezes, rales or rhonchi, symmetric air entry  Heart - RRR, known murmur noted with valve replacement  Abdomen - soft, nontender, nondistended, no masses or organomegaly  Obese BMI 48  Back exam - full range of motion, no tenderness, palpable spasm or pain on motion  Neurological - alert, oriented, normal speech, no focal findings or movement disorder noted  Musculoskeletal - no joint tenderness, deformity or swelling, LUE with dcrease ROM secondary to fracture  Extremities - peripheral pulses normal, no pedal edema, no clubbing or cyanosis  Skin - normal coloration and turgor, no rashes, no suspicious skin lesions noted    There are no Patient Instructions on file for this visit.    EKG: EKG personally reviewed by me showing normal sinus rhythm with no changes in comparison to EKG from 2017    Labs:  No labs were ordered during this visit    Immunization History   Administered Date(s) Administered     INFLUENZA,RECOMBINANT,INJ,PF QUADRIVALENT 18+YRS  10/03/2018     Influenza [id number], 01/13/2010     Influenza, inj, historic,unspecified 10/17/2008     Influenza, seasonal,quad inj 36+ mos 09/11/2015, 09/13/2016, 09/29/2017     Influenza, seasonal,quad inj 6-35 mos 09/16/2010, 09/20/2011, 09/21/2012, 09/23/2014     Influenza,seasonal quad, PF 10/07/2013     Pneumo Conj 13-V (2010&after) 10/28/2015     Pneumo Polysac 23-V 10/31/2003     Td,adult,historic,unspecified 06/20/2006     Tdap 06/20/2006, 09/13/2016     ZOSTER, LIVE 03/16/2017     ZOSTER, RECOMBINANT, IM 11/20/2018, 01/21/2019           Electronically signed by Tito Salazar MD 04/02/19 1:06 PM

## 2021-05-27 NOTE — ANESTHESIA PROCEDURE NOTES
Peripheral Block    Patient location during procedure: pre-op  Start time: 4/10/2019 7:05 AM  End time: 4/10/2019 7:16 AM  post-op analgesia per surgeon order as noted in medical record  Staffing:  Performing  Anesthesiologist: Kurtis Ridley MD  Preanesthetic Checklist  Completed: patient identified, site marked, risks, benefits, and alternatives discussed, timeout performed, consent obtained, airway assessed, oxygen available, suction available, emergency drugs available and hand hygiene performed  Peripheral Block  Block type: brachial plexus, interscalene  Prep: ChloraPrep  Patient position: sitting  Patient monitoring: continuous pulse oximetry, heart rate, blood pressure and cardiac monitor  Laterality: left  Injection technique: ultrasound guided    Ultrasound used to visualize needle placement in proximity to nerve being blocked: yes   Permanent ultrasound image captured for medical record  Sterile gel and probe cover used for ultrasound.    Needle  Needle type: Stimuplex   Needle gauge: 21 G  Needle length: 4 in  no peripheral nerve catheter placed  Assessment  Injection assessment: no difficulty with injection, negative aspiration for heme, no paresthesia on injection and incremental injection

## 2021-05-28 ENCOUNTER — RECORDS - HEALTHEAST (OUTPATIENT)
Dept: ADMINISTRATIVE | Facility: CLINIC | Age: 66
End: 2021-05-28

## 2021-05-28 ASSESSMENT — ASTHMA QUESTIONNAIRES
ACT_TOTALSCORE: 13
ACT_TOTALSCORE: 12
ACT_TOTALSCORE: 13
ACT_TOTALSCORE: 14
ACT_TOTALSCORE: 13
ACT_TOTALSCORE: 15
ACT_TOTALSCORE: 15
ACT_TOTALSCORE: 14
ACT_TOTALSCORE: 13

## 2021-05-28 NOTE — TELEPHONE ENCOUNTER
INR result is 2.3  INR   Date Value Ref Range Status   04/19/2019 2.50 (!) 0.9 - 1.1 Final       Will the patient be seen, or did they already see, MD or CNP today? Yes remove dressing for incision (orthopedic provider)    Most Recent Warfarin dose day/week  Sunday Monday Tuesday Wednesday Thursday Friday Saturday    5 2.5 2.5 5 2.5 2.5     Sunday Monday Tuesday Wednesday Thursday Friday Saturday   2.5             Has the patient missed any doses of Coumadin, Warfarin, Jantoven in the past 7 days? No    Has the patients medications changed since the last visit? Yes Lovenox stopped on 4/20; Magnesium 40mg daily started on 4/20    Has the patient experienced any bleeding recently? No    Has the patient experienced any injuries or illness recently? No    Has the patient experienced any 'new' shortness of breath, severe headaches, or changes in vision recently? No    Has the patient had any changes in their diet, or alcohol consumption? No    Is the patient here today to prepare for any type of upcoming surgery, procedure, or for a cardioversion procedure? No    What phone number can we reach the patient at today? [facility] nurse [phone].

## 2021-05-28 NOTE — TELEPHONE ENCOUNTER
INR result is 1.5  INR   Date Value Ref Range Status   05/07/2019 2.00 (!) 0.9 - 1.1 Final       Will the patient be seen, or did they already see, MD or CNP today? no    Most Recent Warfarin dose day/week  Sunday Monday Tuesday Wednesday Thursday Friday Saturday    5 5 2.5 5 5 5     Sunday Monday Tuesday Wednesday Thursday Friday Saturday   5             Has the patient missed any doses of Coumadin, Warfarin, Jantoven in the past 7 days? No    Has the patients medications changed since the last visit? Yes , not taking magnesium anymore    Has the patient experienced any bleeding recently? No    Has the patient experienced any injuries or illness recently? No    Has the patient experienced any 'new' shortness of breath, severe headaches, or changes in vision recently? No    Has the patient had any changes in their diet, or alcohol consumption? No    Is the patient here today to prepare for any type of upcoming surgery, procedure, or for a cardioversion procedure? No    What phone number can we reach the patient at today? home phone listed in demographics.

## 2021-05-28 NOTE — TELEPHONE ENCOUNTER
ANTICOAGULATION  MANAGEMENT- Home Care/Care Facility Result    Assessment     Today's INR result of 2.0 is Subtherapeutic (goal INR of 2.5-3.5)        Warfarin taken as previously instructed    No new diet changes affecting INR    No new medication/supplements affecting INR    Continues to tolerate warfarin with no reported s/s of bleeding or thromboembolism     Previous INR was Therapeutic    Plan:     Spoke with Kalyani with [facility] discussed INR result and instructed:     Warfarin Dosing Instructions: havae the patient take 5 mg booster dose today then continue current warfarin dose    5 mg every Mon, Thu; 2.5 mg all other days      (0 % change)    Next INR to be drawn: one week.    Education provided: importance of therapeutic range and target INR goal and significance of current INR result    Kalyani verbalizes understanding and agrees to warfarin dosing plan.   ?   Radha Slater RN    Subjective/Objective:      Eileen Donald, a 63 y.o. female is established on warfarin.     Home care/care facility RN's report of Eileen INR, recent warfarin dosing, diet changes, medication changes, and symptoms is documented below.    Additional findings: none    Anticoagulation Episode Summary     Current INR goal:   2.5-3.5   TTR:   77.9 % (10 mo)   Next INR check:   5/14/2019   INR from last check:   2.00! (5/7/2019)   Weekly max warfarin dose:      Target end date:      INR check location:      Preferred lab:      Send INR reminders to:   ANTICOAGULATION POOL C (DTN,VAD,CGR,GAV)    Indications    Heart valve replaced [Z95.2]           Comments:            Anticoagulation Care Providers     Provider Role Specialty Phone number    Tito Salazar MD Referring Family Medicine [phone]

## 2021-05-28 NOTE — TELEPHONE ENCOUNTER
ANTICOAGULATION  MANAGEMENT- Home Care/Care Facility Result    Assessment     Today's INR result of 2.50 is Therapeutic (goal INR of 2.5-3.5)        Warfarin taken as previously instructed    No new diet changes affecting INR    No new medication/supplements affecting INR     Continues to tolerate warfarin with no reported s/s of bleeding or thromboembolism     Previous INR was Subtherapeutic at 2.30 on 4/17/19.    S/p LEFT reverse total shoulder arthroplasty, 4/10/19.    Plan:     Spoke with MJ Auguste from [facility] discussed INR result and instructed:    1.  INR today is now therapeutic at 2.5.  However, today is the first day is therapeutic.  Patient has 4 syringes left of Lovenox.  Advised to continue with Lovenox to overlap therapy and ensure INR stability.  Then STOP Lovenox injections on 4/20/19 to complete therapy.   2.  INR status check scheduled on Mon. 4/22/19.    Warfarin Dosing Instructions:   - Continue current warfarin dose 5 mg daily on Mon/Thurs; and 2.5 mg daily rest of week.    Next INR to be drawn:   Scheduled on 4/22/19.    Education provided: importance of consistent vitamin K intake, target INR goal and significance of current INR result and importance of notifying clinic for changes in medications    Kalyani verbalizes understanding and agrees to warfarin dosing plan.   ?   Alondra Lora RN    Subjective/Objective:      Eileen Donald, a 63 y.o. female is established on warfarin.     Home care/care facility RN's report of Eileen INR, recent warfarin dosing, diet changes, medication changes, and symptoms is documented below.    Additional findings: verbally confirmed home dose with Kalyani and updated on anticoagulation calendar    Anticoagulation Episode Summary     Current INR goal:   2.5-3.5   TTR:   80.5 % (9.4 mo)   Next INR check:   4/22/2019   INR from last check:   2.50 (4/19/2019)   Weekly max warfarin dose:      Target end date:      INR check location:       Preferred lab:      Send INR reminders to:   ANTICOAGULATION POOL C (DTN,VAD,CGR,GAV)    Indications    Heart valve replaced [Z95.2]           Comments:            Anticoagulation Care Providers     Provider Role Specialty Phone number    Tito Salazar MD Referring [facility] [phone]

## 2021-05-28 NOTE — TELEPHONE ENCOUNTER
ANTICOAGULATION  MANAGEMENT- Home Care/Care Facility Result    Assessment     Today's INR result of 2.7 is Therapeutic (goal INR of 2.5-3.5)        Warfarin taken as previously instructed    No new diet changes affecting INR    No new medication/supplements affecting INR    Continues to tolerate warfarin with no reported s/s of bleeding or thromboembolism     Previous INR was Subtherapeutic    Plan:     Spoke with Kalyani, Home Care Nurse discussed INR result and instructed:     Warfarin Dosing Instructions: Continue current warfarin dose    2.5 mg every Mon, Fri; 5 mg all other days      (0 % change)    Next INR to be drawn: 5/21/19    Education provided: importance of therapeutic range    Kalyani verbalizes understanding and agrees to warfarin dosing plan.   ?   Radha Slater RN    Subjective/Objective:      Eileen Donald, a 63 y.o. female is established on warfarin.     Home care/care facility RN's report of Eileen INR, recent warfarin dosing, diet changes, medication changes, and symptoms is documented below.    Additional findings: none    Anticoagulation Episode Summary     Current INR goal:   2.5-3.5   TTR:   75.5 % (10.3 mo)   Next INR check:   5/21/2019   INR from last check:   2.70 (5/17/2019)   Weekly max warfarin dose:      Target end date:      INR check location:      Preferred lab:      Send INR reminders to:   ANTICOAGULATION POOL C (DTN,VAD,CGR,GAV)    Indications    Heart valve replaced [Z95.2]           Comments:            Anticoagulation Care Providers     Provider Role Specialty Phone number    Tito Salaazr MD Referring Family Medicine [phone]

## 2021-05-28 NOTE — TELEPHONE ENCOUNTER
----- Message from Tito Salazar MD sent at 4/18/2019  5:30 PM CDT -----  Please inform patient that magnesium levels remain low- i sent a magnesium oral supplement for her to start to her pharmacy

## 2021-05-28 NOTE — TELEPHONE ENCOUNTER
INR result is 2.7  INR   Date Value Ref Range Status   05/14/2019 1.50 (!) 0.9 - 1.1 Final       Will the patient be seen, or did they already see, MD or CNP today? No    Most Recent Warfarin dose day/week  Sunday Monday Tuesday Wednesday Thursday Friday Saturday Sunday Monday Tuesday Wednesday Thursday Friday Saturday     7.5 5 5         Has the patient missed any doses of Coumadin, Warfarin, Jantoven in the past 7 days? No    Has the patients medications changed since the last visit? No    Has the patient experienced any bleeding recently? No    Has the patient experienced any injuries or illness recently? No    Has the patient experienced any 'new' shortness of breath, severe headaches, or changes in vision recently? No    Has the patient had any changes in their diet, or alcohol consumption? No    Is the patient here today to prepare for any type of upcoming surgery, procedure, or for a cardioversion procedure? No    What phone number can we reach the patient at today? [phone].

## 2021-05-28 NOTE — TELEPHONE ENCOUNTER
Who is calling:  Home care  Reason for Call:  hemoglobin levels on 4/17 8.8 and on 4/24 it was 8.1. Wondering if he has any new recommendations?     Started on magnesium on 4/20 and home care nurse would maximus to know if she should re draw to check levels? Also if he would like to have home care re draw also for the hemoglobin levels next week?

## 2021-05-28 NOTE — TELEPHONE ENCOUNTER
RN cannot approve Refill Request    RN can NOT refill this medication med is not covered by policy/route to provider.       Daysi Mohan, Care Connection Triage/Med Refill 5/1/2019    Requested Prescriptions   Pending Prescriptions Disp Refills     warfarin (COUMADIN/JANTOVEN) 2.5 MG tablet [Pharmacy Med Name: WARFARIN TABS 2.5MG] 130 tablet 1     Sig: TAKE 1 TO 2 TABLETS (2.5 TO 5 MG TOTAL) DAILY. ADJUST DOSE PER INR RESULTS AS INSTRUCTED.       Warfarin Refill Protocol  Failed - 5/1/2019 12:54 AM        Failed -  Route to appropriate pool/provider     Last Anticoagulation Summary:   Anticoagulation Episode Summary     Current INR goal:   2.5-3.5   TTR:   79.3 % (9.8 mo)   Next INR check:   5/7/2019   INR from last check:   2.60 (4/30/2019)   Weekly max warfarin dose:      Target end date:      INR check location:      Preferred lab:      Send INR reminders to:   ANTICOAGULATION POOL C (DTN,VAD,CGR,GAV)    Indications    Heart valve replaced [Z95.2]           Comments:            Anticoagulation Care Providers     Provider Role Specialty Phone number    Tito Salazar MD Referring Family Medicine [phone]                Passed - Provider visit in last year     Last office visit with prescriber/PCP: 4/17/2019 Tito Salazar MD OR same dept: 4/17/2019 Tito Salazar MD OR same specialty: 4/17/2019 Tito Salazar MD  Last physical: 4/2/2019 Last MTM visit: Visit date not found    Next appt within 3 mo: Visit date not found Next physical within 3 mo: Visit date not found  Prescriber OR PCP: Tito Salazar MD  Last diagnosis associated with med order: 1. Aortic valve replaced  - warfarin (COUMADIN/JANTOVEN) 2.5 MG tablet [Pharmacy Med Name: WARFARIN TABS 2.5MG]; TAKE 1 TO 2 TABLETS (2.5 TO 5 MG TOTAL) DAILY. ADJUST DOSE PER INR RESULTS AS INSTRUCTED.  Dispense: 130 tablet; Refill: 1    2. Long term current use of anticoagulant therapy  - warfarin (COUMADIN/JANTOVEN) 2.5 MG  tablet [Pharmacy Med Name: WARFARIN TABS 2.5MG]; TAKE 1 TO 2 TABLETS (2.5 TO 5 MG TOTAL) DAILY. ADJUST DOSE PER INR RESULTS AS INSTRUCTED.  Dispense: 130 tablet; Refill: 1    If protocol passes may refill for 6 months if within 3 months of last provider visit (or a total of 9 months).

## 2021-05-28 NOTE — TELEPHONE ENCOUNTER
Who is calling:  Judy guajardo [facility]  Reason for Call:  Caller needs clarification on a lab order for a Hem 2 panel.  Date of last appointment with primary care: 4/17/19  Okay to leave a detailed message: Yes

## 2021-05-28 NOTE — TELEPHONE ENCOUNTER
I called Judy back and discussed which order she needed clarification on.  She does not have a Hem2 panel.  She does not know what lab that is.  She called [place] lab and they did not know either.    I advised that it is ordered as Hem2(CBC w/o differential).  She then understood what labs were being ordered.      Advised to call back if additional questions.

## 2021-05-28 NOTE — TELEPHONE ENCOUNTER
Refill Approved    Rx renewed per Medication Renewal Policy. Medication was last renewed on: 5/13/18 for 90/3 lisinopril;  Metoprolol 5/13/18 for 180/3  Last OV 4/17/19    Suki Nolasco, [facility] Connection Triage/Med Refill 5/8/2019     Requested Prescriptions   Pending Prescriptions Disp Refills     lisinopril (PRINIVIL,ZESTRIL) 5 MG tablet [Pharmacy Med Name: LISINOPRIL TABS 5MG] 90 tablet 3     Sig: TAKE 1 TABLET DAILY       Ace Inhibitors Refill Protocol Passed - 5/8/2019 12:48 AM        Passed - PCP or prescribing provider visit in past 12 months       Last office visit with prescriber/PCP: 4/17/2019 Tito Salazar MD OR same dept: 4/17/2019 Tito Salazar MD OR same specialty: 4/17/2019 Tito Salazar MD  Last physical: 4/2/2019 Last MTM visit: Visit date not found   Next visit within 3 mo: Visit date not found  Next physical within 3 mo: Visit date not found  Prescriber OR PCP: Tito Salazar MD  Last diagnosis associated with med order: 1. Essential hypertension  - lisinopril (PRINIVIL,ZESTRIL) 5 MG tablet [Pharmacy Med Name: LISINOPRIL TABS 5MG]; TAKE 1 TABLET DAILY  Dispense: 90 tablet; Refill: 3  - metoprolol tartrate (LOPRESSOR) 25 MG tablet [Pharmacy Med Name: METOPROLOL TARTRATE TABS 25MG]; TAKE 1 TABLET TWICE A DAY  Dispense: 180 tablet; Refill: 3    If protocol passes may refill for 12 months if within 3 months of last provider visit (or a total of 15 months).             Passed - Serum Potassium in past 12 months     Lab Results   Component Value Date    Potassium 4.1 04/12/2019             Passed - Blood pressure filed in past 12 months     BP Readings from Last 1 Encounters:   04/17/19 126/74             Passed - Serum Creatinine in past 12 months     Creatinine   Date Value Ref Range Status   04/12/2019 1.09 0.60 - 1.10 mg/dL Final             metoprolol tartrate (LOPRESSOR) 25 MG tablet [Pharmacy Med Name: METOPROLOL TARTRATE TABS 25MG] 180 tablet 3     Sig: TAKE 1  TABLET TWICE A DAY       Beta-Blockers Refill Protocol Passed - 5/8/2019 12:48 AM        Passed - PCP or prescribing provider visit in past 12 months or next 3 months     Last office visit with prescriber/PCP: 4/17/2019 Tito Salazar MD OR same dept: 4/17/2019 Tito Salazar MD OR same specialty: 4/17/2019 Tito Salazar MD  Last physical: 4/2/2019 Last MTM visit: Visit date not found   Next visit within 3 mo: Visit date not found  Next physical within 3 mo: Visit date not found  Prescriber OR PCP: Tito Salazar MD  Last diagnosis associated with med order: 1. Essential hypertension  - lisinopril (PRINIVIL,ZESTRIL) 5 MG tablet [Pharmacy Med Name: LISINOPRIL TABS 5MG]; TAKE 1 TABLET DAILY  Dispense: 90 tablet; Refill: 3  - metoprolol tartrate (LOPRESSOR) 25 MG tablet [Pharmacy Med Name: METOPROLOL TARTRATE TABS 25MG]; TAKE 1 TABLET TWICE A DAY  Dispense: 180 tablet; Refill: 3    If protocol passes may refill for 12 months if within 3 months of last provider visit (or a total of 15 months).             Passed - Blood pressure filed in past 12 months     BP Readings from Last 1 Encounters:   04/17/19 126/74

## 2021-05-28 NOTE — TELEPHONE ENCOUNTER
Left message to call back for: Orders  Information to relay to patient:  See message below.  Please relay information.

## 2021-05-28 NOTE — TELEPHONE ENCOUNTER
INR result is 2.6 today  INR   Date Value Ref Range Status   04/26/2019 2.60 (!) 0.9 - 1.1 Final       Will the patient be seen, or did they already see, MD or CNP today? No, going to see Dr. Salazar on [id number]    Most Recent Warfarin dose day/week  Sunday Monday Tuesday Wednesday Thursday Friday Saturday        2.5 2.5     Sunday Monday Tuesday Wednesday Thursday Friday Saturday   2.5 5        -Last INR on Friday 04-26-19 was 2.6.    Has the patient missed any doses of Coumadin, Warfarin, Jantoven in the past 7 days? No    Has the patients medications changed since the last visit? No    Has the patient experienced any bleeding recently? No, no signs of any bleeding    Has the patient experienced any injuries or illness recently? No, patient said that she had anxiety last night but this is not new    Has the patient experienced any 'new' shortness of breath, severe headaches, or changes in vision recently? No    Has the patient had any changes in their diet, or alcohol consumption? No    Is the patient here today to prepare for any type of upcoming surgery, procedure, or for a cardioversion procedure? No    What phone number can we reach the patient at today? Transferring over

## 2021-05-28 NOTE — TELEPHONE ENCOUNTER
Lab Results   Component Value Date    INR 2.60 (!) 04/30/2019    INR 2.60 (!) 04/26/2019    INR 2.30 (!) 04/22/2019       Patient's current Warfarin doses:    5 mg every Mon, Thu; 2.5 mg all other days       Next INR check is on 5/7/19      Patient's last OV with PCP was on 4/17/19    Warfarin prescription 3 month supply and one refill sent to patient's pharmacy today.    Radha Slater RN

## 2021-05-28 NOTE — TELEPHONE ENCOUNTER
INR result is 2.6  INR   Date Value Ref Range Status   04/22/2019 2.30 (!) 0.9 - 1.1 Final       Will the patient be seen, or did they already see, MD or CNP today? No    Most Recent Warfarin dose day/week  Sunday Monday Tuesday Wednesday Thursday Friday Saturday Sunday Monday Tuesday Wednesday Thursday Friday Saturday    7.5 2.5 2.5 5         Has the patient missed any doses of Coumadin, Warfarin, Jantoven in the past 7 days? No    Has the patients medications changed since the last visit? No    Has the patient experienced any bleeding recently? No    Has the patient experienced any injuries or illness recently? No    Has the patient experienced any 'new' shortness of breath, severe headaches, or changes in vision recently? No    Has the patient had any changes in their diet, or alcohol consumption? No    Is the patient here today to prepare for any type of upcoming surgery, procedure, or for a cardioversion procedure? No    What phone number can we reach the patient at today? [phone].

## 2021-05-28 NOTE — TELEPHONE ENCOUNTER
ANTICOAGULATION  MANAGEMENT- Home Care/Care Facility Result    Assessment     Today's INR result of 1.5 is Subtherapeutic (goal INR of 2.5-3.5)        Warfarin taken as previously instructed    No new diet changes affecting INR    No new medication/supplements affecting INR    Continues to tolerate warfarin with no reported s/s of bleeding or thromboembolism     Previous INR was Subtherapeutic    Plan:     Spoke with [facility] RN discussed INR result and instructed:     Warfarin Dosing Instructions: have the patient take 7.5 mg booster dose today, 5 mg dose tomorrow and Thurs   Plan is to change dosing to 2.5 mg Monday, Fri;5 mg all other days (20 % change)    Next INR to be drawn: 5/17/19    Education provided: importance of therapeutic range and target INR goal and significance of current INR result    Kalyani  verbalizes understanding and agrees to warfarin dosing plan.   ?   Radha Slater RN    Subjective/Objective:      Eileen Donald, a 63 y.o. female is established on warfarin.     Home care/care facility RN's report of Eileen INR, recent warfarin dosing, diet changes, medication changes, and symptoms is documented below.    Additional findings: template incorrect; verbally confirmed home dose with [facility] nurse and updated on anticoagulation calendar    Anticoagulation Episode Summary     Current INR goal:   2.5-3.5   TTR:   76.1 % (10.2 mo)   Next INR check:   5/17/2019   INR from last check:   1.50! (5/14/2019)   Weekly max warfarin dose:      Target end date:      INR check location:      Preferred lab:      Send INR reminders to:   ANTICOAGULATION POOL C (DTN,VAD,CGR,GAV)    Indications    Heart valve replaced [Z95.2]           Comments:            Anticoagulation Care Providers     Provider Role Specialty Phone number    Tito Salazar MD Referring Family Medicine [phone]

## 2021-05-28 NOTE — TELEPHONE ENCOUNTER
Orders being requested: Speech therapy home visit to eval and treat.   Reason service is needed/diagnosis: OT was at the patient's home today.  The patient is coughing on water and spitting it out.  Patient reports this is a frequent event.   When are orders needed by: ASAPWhere to send Orders: Phone:  [phone]  Okay to leave detailed message?  Yes

## 2021-05-28 NOTE — TELEPHONE ENCOUNTER
ANTICOAGULATION  MANAGEMENT- Home Care/Care Facility Result    Assessment     Today's INR result of 2.6 is Therapeutic (goal INR of 2.5-3.5)        Warfarin taken as previously instructed    No new diet changes affecting INR    No new medication/supplements affecting INR    Continues to tolerate warfarin with no reported s/s of bleeding or thromboembolism     Previous INR was Subtherapeutic     Judy reported that she has a call out to patient's PCP to report hemoglobin that is trending down    Plan:     Spoke with [facility] RN discussed INR result and instructed:     Warfarin Dosing Instructions: Continue current warfarin dose    5 mg every Mon, Thu; 2.5 mg all other days       (0 % change)    Next INR to be drawn: 4/30/19    Education provided: importance of therapeutic range and target INR goal and significance of current INR result    Judy verbalizes understanding and agrees to warfarin dosing plan.   ?   Radha Slater RN    Subjective/Objective:      Eileen Donald, a 63 y.o. female is established on warfarin.     Home care/care facility RN's report of Eileen INR, recent warfarin dosing, diet changes, medication changes, and symptoms is documented below.    Additional findings: none    Anticoagulation Episode Summary     Current INR goal:   2.5-3.5   TTR:   79.0 % (9.6 mo)   Next INR check:   4/30/2019   INR from last check:   2.60 (4/26/2019)   Weekly max warfarin dose:      Target end date:      INR check location:      Preferred lab:      Send INR reminders to:   ANTICOAGULATION POOL C (DTN,VAD,CGR,GAV)    Indications    Heart valve replaced [Z95.2]           Comments:            Anticoagulation Care Providers     Provider Role Specialty Phone number    Tito Salazar MD Referring Family Medicine [phone]

## 2021-05-28 NOTE — TELEPHONE ENCOUNTER
INR result is 2.5  INR   Date Value Ref Range Status   04/17/2019 2.30 (H) 0.90 - 1.10 Final       Will the patient be seen, or did they already see, MD or CNP today? No    Most Recent Warfarin dose day/week  Sunday Monday Tuesday Wednesday Thursday Friday Saturday Sunday Monday Tuesday Wednesday Thursday Friday Saturday      5 5         Has the patient missed any doses of Coumadin, Warfarin, Jantoven in the past 7 days? No    Has the patients medications changed since the last visit? Yes, will be starting magnesium    Has the patient experienced any bleeding recently? No    Has the patient experienced any injuries or illness recently? No    Has the patient experienced any 'new' shortness of breath, severe headaches, or changes in vision recently? No    Has the patient had any changes in their diet, or alcohol consumption? No    Is the patient here today to prepare for any type of upcoming surgery, procedure, or for a cardioversion procedure? No    What phone number can we reach the patient at today? [phone].

## 2021-05-28 NOTE — PROGRESS NOTES
Patient was identified as a potential candidate for the Clinic Care Coordination program and has declined participating.   I will discontinue outreach to the patient at this time.  If the provider feels this patient is a good fit in the future I have asked them to resend to the [facility] referral bucket.   I have also provided the patient with my contact information should they change their mind.     Patient is currently receiving receiving lab draw visits at home for her INR  Patient states she is doing well, declined enrollment this time

## 2021-05-28 NOTE — TELEPHONE ENCOUNTER
INR result is 2.0  INR   Date Value Ref Range Status   04/30/2019 2.60 (!) 0.9 - 1.1 Final       Will the patient be seen, or did they already see, MD or CNP today? No    Most Recent Warfarin dose day/week  Sunday Monday Tuesday Wednesday Thursday Friday Saturday     2.5 2.5 5 2.5 2.5     Sunday Monday Tuesday Wednesday Thursday Friday Saturday   2.5 5            Has the patient missed any doses of Coumadin, Warfarin, Jantoven in the past 7 days? No    Has the patients medications changed since the last visit? No    Has the patient experienced any bleeding recently? No    Has the patient experienced any injuries or illness recently? No    Has the patient experienced any 'new' shortness of breath, severe headaches, or changes in vision recently? No    Has the patient had any changes in their diet, or alcohol consumption? No    Is the patient here today to prepare for any type of upcoming surgery, procedure, or for a cardioversion procedure? No    What phone number can we reach the patient at today? home phone listed in demographics.

## 2021-05-28 NOTE — PROGRESS NOTES
Attempt 2: Care Guide called patient.  If this patient is returning my call, please transfer to Lorena at ext [phone].  LMTCB: [facility] Referral    New Referral Entered: 4/15/19    Provider: Dr. Salazar     DX:  Pulmonary hypertension (H)  Generalized anxiety disorder  Multiple sclerosis (H)  Closed 3-part fracture of proximal humerus with delayed healing, left    Comments from Provider:  Patient was recently hospitalized at [facility] for left reverse total should arthroplasty. Patient was discharged home with home oxygen and orders for [facility]. She will additionally have family with her to assist her at all times. Patient has a history HTN, prediabetes, asthma, aortic valve replacement, history of pulmonary embolism, hyperlipidemia, generalized anxiety disorder and depression, GERD, morbid obesity, multiple sclerosis.    Insurance: [employer] of [employer]/ Medicare    Resources Needed:  SW/RN    Outreach Attempts: 4/16, 4/22    Next Outreach : 4/26/19     Patient has office visit 4/24/19 @ [facility] at 1:15pm

## 2021-05-28 NOTE — TELEPHONE ENCOUNTER
Left message to call back for: Bobbi  Information to relay to patient:  Left detailed message, ok for verbal orders

## 2021-05-28 NOTE — TELEPHONE ENCOUNTER
ANTICOAGULATION  MANAGEMENT- Home Care/Care Facility Result    Assessment     Today's INR result of 2.3 is Subtherapeutic (goal INR of 2.5-3.5)        Warfarin taken as previously instructed    No new diet changes affecting INR     Started Magnesium 40 mg daily on 4/20/19.    No interaction expected between Magnesium and warfarin    Continues to tolerate warfarin with no reported s/s of bleeding or thromboembolism     Previous INR was Therapeutic    Plan:     Spoke with Kalyani, Home Care nurse and Marilee discussed INR result and instructed:     Warfarin Dosing Instructions: take one time booster dose of 7.5 mg today then continue current warfarin dose    5 mg every Mon, Thu; 2.5 mg all other days     (0 % change)    Next INR to be drawn: 4/26/19    Education provided: importance of therapeutic range, target INR goal and significance of current INR result and no interaction anticipated between warfarin and Magnesium    Kalyani and Marilee verbalizes understanding and agrees to warfarin dosing plan.   ?   Radha Slater RN    Subjective/Objective:      Eileen Donald, a 63 y.o. female is established on warfarin.     Home care/care facility RN's report of Eileen INR, recent warfarin dosing, diet changes, medication changes, and symptoms is documented below.    Additional findings: none    Anticoagulation Episode Summary     Current INR goal:   2.5-3.5   TTR:   79.7 % (9.5 mo)   Next INR check:   4/26/2019   INR from last check:   2.30! (4/22/2019)   Weekly max warfarin dose:      Target end date:      INR check location:      Preferred lab:      Send INR reminders to:   ANTICOAGULATION POOL C (DTN,VAD,CGR,GAV)    Indications    Heart valve replaced [Z95.2]           Comments:            Anticoagulation Care Providers     Provider Role Specialty Phone number    Tito Salazar MD Referring Family Medicine [phone]

## 2021-05-28 NOTE — TELEPHONE ENCOUNTER
Patient notified and will start taking supplement. Has lab appointment scheduled for 4/24/19 to recheck Hgb, do you also want to recheck magnesium?

## 2021-05-28 NOTE — TELEPHONE ENCOUNTER
ANTICOAGULATION  MANAGEMENT- Home Care/Care Facility Result    Assessment     Today's INR result of 2.6 is Therapeutic (goal INR of 2.5-3.5)        Warfarin taken as previously instructed    No new diet changes affecting INR    No new medication/supplements affecting INR    Continues to tolerate warfarin with no reported s/s of bleeding or thromboembolism     Previous INR was Therapeutic    Plan:     Spoke with Weston with [facility] discussed INR result and instructed:     Warfarin Dosing Instructions: Continue current warfarin dose    5 mg every Mon, Thu; 2.5 mg all other days      (0 % change)    Next INR to be drawn: one week    Education provided: importance of therapeutic range    Nasmath verbalizes understanding and agrees to warfarin dosing plan.   ?   Radha Slater RN    Subjective/Objective:      Eileen Donald, a 63 y.o. female is established on warfarin.     Home care/care facility RN's report of Eileen INR, recent warfarin dosing, diet changes, medication changes, and symptoms is documented below.    Additional findings: verbally confirmed home dose with Nascristian and updated on anticoagulation calendar    Anticoagulation Episode Summary     Current INR goal:   2.5-3.5   TTR:   79.3 % (9.8 mo)   Next INR check:   5/7/2019   INR from last check:   2.60 (4/30/2019)   Weekly max warfarin dose:      Target end date:      INR check location:      Preferred lab:      Send INR reminders to:   ANTICOAGULATION POOL C (DTN,VAD,CGR,GAV)    Indications    Heart valve replaced [Z95.2]           Comments:            Anticoagulation Care Providers     Provider Role Specialty Phone number    Tito Salazar MD Referring Family Medicine [phone]

## 2021-05-29 ENCOUNTER — RECORDS - HEALTHEAST (OUTPATIENT)
Dept: ADMINISTRATIVE | Facility: CLINIC | Age: 66
End: 2021-05-29

## 2021-05-29 NOTE — TELEPHONE ENCOUNTER
ANTICOAGULATION  MANAGEMENT- Home Care/Care Facility Result    Assessment     Today's INR result of 3.60 is Supratherapeutic (goal INR of 2.5-3.5)        Warfarin taken as previously instructed    No new diet changes affecting INR     No new medication/supplements affecting INR    Continues to tolerate warfarin with YES reported s/s of bleeding.   - reported nose bleed from dryness and use of nasal cannula from O2.   - immediate hemostasis achieved.    Previous INR was Supratherapeutic at 3.60 on 5/21/19.    RN will do one more INR visit on 5/31, then discharge from Home Care.    Plan:     [facility]    1.  Marilee did not want to change warfarin dose.  She reported INR needs to be in the 3.9 range due to heart valve replaced. Informed her, PCP has her INR target goal of 2.5 - 3.5     (on warfarin for s/p AVR - [employer] mechanical prosthesis, 1/2017 and Hx of PE / blood clots).   2. (INR slightly supratherapeutic), advised to eat extra helping of salad or vegetable of her choice.    Warfarin Dosing Instructions:  (verified has 2.5mg tabs)   - Continue current warfarin dose 2.5 mg daily on Mon/Fri; and 5 mg daily rest of week.    Next INR to be drawn:  One wk.   Scheduled on 5/31/19.    Education provided: impact of vitamin K foods on INR and target INR goal and significance of current INR result    Kalyani BARKER verbalizes understanding and agrees to warfarin dosing plan.   ?   Alondra Lora RN    Subjective/Objective:      Eileen Donald, a 63 y.o. female is established on warfarin.     Home care/care facility RN's report of Eileen INR, recent warfarin dosing, diet changes, medication changes, and symptoms is documented below.    Additional findings: verbally confirmed home dose with MJ Auguste from [facility] and updated on anticoagulation calendar    Anticoagulation Episode Summary     Current INR goal:   2.5-3.5   TTR:    75.0 % (10.6 mo)   Next INR check:   5/31/2019   INR from last check:   3.60! (5/24/2019)   Weekly max warfarin dose:      Target end date:      INR check location:      Preferred lab:      Send INR reminders to:   ANTICOAGULATION POOL C (DTN,VAD,CGR,GAV)    Indications    Heart valve replaced [Z95.2]           Comments:            Anticoagulation Care Providers     Provider Role Specialty Phone number    Tito Salazar MD Referring Family Medicine [phone]

## 2021-05-29 NOTE — TELEPHONE ENCOUNTER
ANTICOAGULATION  MANAGEMENT- Home Care/Care Facility Result    Assessment     Today's INR result of 4.10 is Supratherapeutic (goal INR of 2.5-3.5)        Warfarin taken as previously instructed    No new diet changes affecting INR    No new medication/supplements affecting INR    Continues to tolerate warfarin with no reported s/s of bleeding or thromboembolism     Previous INR was slightly Supratherapeutic at 3.60 on 5/24/19.    Will be discharged from home care today, 5/31/19.  Marilee will now go to clinic for future INR's.    Plan:     Spoke with Kalyani from [facility] discussed INR result and instructed:     Warfarin Dosing Instructions:   (Verified 2.5mg tabs)   - advised to HOLD warfarin dose today,   - then continue current warfarin dose 2.5 mg daily on Monday / Friday; and 5 mg daily rest of week.    Next INR to be drawn:  One wk.   - scheduled on 6/7/19 @ [facility].    Education provided: target INR goal and significance of current INR result    Kalyani verbalizes understanding and agrees to warfarin dosing plan.   ?   Alondra Lora RN    Subjective/Objective:      Eileen Donald, a 63 y.o. female is established on warfarin.     Home care/care facility RN's report of Eileen INR, recent warfarin dosing, diet changes, medication changes, and symptoms is documented below.    Additional findings: verbally confirmed home dose with Kalyani and updated on anticoagulation calendar    - reported slight bleeding from O2 nasal cannula and relates it to dryness from her oxygen.    Anticoagulation Episode Summary     Current INR goal:   2.5-3.5   TTR:   73.4 % (10.8 mo)   Next INR check:   6/7/2019   INR from last check:   4.10! (5/31/2019)   Weekly max warfarin dose:      Target end date:      INR check location:      Preferred lab:      Send INR reminders to:   ANTICOAGULATION POOL C (DTN,VAD,CGR,GAV)    Indications    Heart valve replaced [Z95.2]           Comments:            Anticoagulation Care  Providers     Provider Role Specialty Phone number    Tito Salazar MD Referring Family Medicine [phone]

## 2021-05-29 NOTE — TELEPHONE ENCOUNTER
Medication Question or Clarification  Who is calling: Patient  What medication are you calling about? (include dose and sig)     magnesium oxide (MAG-OX) 400 mg (241.3 mg magnesium) tablet 30 tablet 2 4/18/2019     Sig - Route: Take 1 tablet (400 mg total) by mouth daily. - Oral      Who prescribed the medication?: pcp  What is your question/concern?: Patient reports she has not been taking this medication now for 3 weeks. Had a normal lab result. Should she be taking this medication? Please advise.  Pharmacy: [facility]  Okay to leave a detailed message?: Yes  Site CMT - Please call the pharmacy to obtain any additional needed information.

## 2021-05-29 NOTE — TELEPHONE ENCOUNTER
ANTICOAGULATION  MANAGEMENT    Assessment     Today's INR result of 4.1 is Supratherapeutic (goal INR of 2.5-3.5)        Warfarin taken as previously instructed    No new diet changes affecting INR    No new medication/supplements affecting INR    Small nosebleed due to oxygen  Drying out nares    Previous INR was Supratherapeutic on a lower dose of warfarin last week    Plan:     Spoke with Eileen regarding INR result. Eileen wanted clarification on her dosing as her INR was elevated this week and last week.    Warfarin Dosing Instructions:  agreeable to change maintenance dose due to patient's INR trending up . Hold warfarin today then change dose to 2.5 mg daily on Mon/Wed/Fri; and 5 mg daily rest of week  (8 % change)    Instructed patient to follow up no later than: one week    Education provided: target INR goal and significance of current INR result and importance of following up for INR monitoring at instructed interval    Eileen verbalizes understanding and agrees to warfarin dosing plan.    Instructed to call the [facility] for any changes, questions or concerns. (#[phone])   ?   Kristie Yanez RN    Subjective/Objective:      Eileen Donald, a 63 y.o. female is on warfarin.     Eileen reports:     Home warfarin dose: verbalized correct dose taken     Missed doses: No     Medication changes:  No     S/S of bleeding or thromboembolism:  Yes small nosebleed due to oxygen drying out nares     New Injury or illness:  No     Changes in diet or alcohol consumption:  No     Upcoming surgery, procedure or cardioversion:  No    Anticoagulation Episode Summary     Current INR goal:   2.5-3.5   TTR:   73.4 % (10.8 mo)   Next INR check:   6/7/2019   INR from last check:      Weekly max warfarin dose:      Target end date:      INR check location:      Preferred lab:      Send INR reminders to:   ANTICOAGULATION POOL C (DTN,VAD,CGR,GAV)    Indications    Heart valve replaced [Z95.2]            Comments:            Anticoagulation Care Providers     Provider Role Specialty Phone number    Tito Salazar MD Referring Family Medicine [phone]

## 2021-05-29 NOTE — TELEPHONE ENCOUNTER
Reason contacted:  Returning call  Information relayed:  See msg below  Additional questions:  No  Further follow-up needed:  No

## 2021-05-29 NOTE — TELEPHONE ENCOUNTER
INR result is   INR   Date Value Ref Range Status   05/24/2019 3.60 (!) 0.9 - 1.1 Final     5/31/19                    4.1    Will the patient be seen, or did they already see, MD or CNP today? No    Most Recent Warfarin dose day/week  Sunday Monday Tuesday Wednesday Thursday Friday Saturday        2.5 5.0     Sunday Monday Tuesday Wednesday Thursday Friday Saturday   5.0 2.5 5.0 5.0 5.0         Has the patient missed any doses of Coumadin, Warfarin, Jantoven in the past 7 days? No    Has the patients medications changed since the last visit? No    Has the patient experienced any bleeding recently? Yes small, little nose bleed, uses oxygen and it gets dry    Has the patient experienced any injuries or illness recently? No    Has the patient experienced any 'new' shortness of breath, severe headaches, or changes in vision recently? No    Has the patient had any changes in their diet, or alcohol consumption? No    Is the patient here today to prepare for any type of upcoming surgery, procedure, or for a cardioversion procedure? No    What phone number can we reach the patient at today? Kalyani - [phone].

## 2021-05-29 NOTE — TELEPHONE ENCOUNTER
ANTICOAGULATION  MANAGEMENT    Assessment     Today's INR result of 3.0 is Therapeutic (goal INR of 2.5-3.5)        Warfarin taken as previously instructed    No new diet changes affecting INR    No new medication/supplements affecting INR    Continues to tolerate warfarin with no reported s/s of bleeding or thromboembolism     Previous INR was Therapeutic    Plan:     Spoke with Eileen regarding INR result and instructed:     Warfarin Dosing Instructions:  Continue current warfarin dose 2.5 mg daily on mon/wed/fri; and 5 mg daily rest of week  (0 % change)    Instructed patient to follow up no later than: one week with kaela Hunt verbalizes understanding and agrees to warfarin dosing plan.    Instructed to call the [facility] for any changes, questions or concerns. (#[phone])   ?   Dany Cramer RN    Subjective/Objective:      Eileen Donald, a 63 y.o. female is on warfarin.     Eileen reports:     Home warfarin dose: as updated on anticoagulation calendar per template     Missed doses: No     Medication changes:  No     S/S of bleeding or thromboembolism:  No     New Injury or illness:  No     Changes in diet or alcohol consumption:  No     Upcoming surgery, procedure or cardioversion:  No    Anticoagulation Episode Summary     Current INR goal:   2.5-3.5   TTR:   74.0 % (11.6 mo)   Next INR check:   7/2/2019   INR from last check:   3.00 (6/24/2019)   Weekly max warfarin dose:      Target end date:      INR check location:      Preferred lab:      Send INR reminders to:   [facility]    Indications    Heart valve replaced [Z95.2]           Comments:            Anticoagulation Care Providers     Provider Role Specialty Phone number    Tito Salazar MD Referring Family Medicine [phone]

## 2021-05-29 NOTE — TELEPHONE ENCOUNTER
Who is calling:  Patient  Reason for Call:  Requests ACN call her back with dosing instructions  Date of last appointment with primary care: 4/02/19  Okay to leave a detailed message: No

## 2021-05-29 NOTE — TELEPHONE ENCOUNTER
ANTICOAGULATION  MANAGEMENT- Home Care/Care Facility Result    Assessment     Today's INR result of 3.6 is Supratherapeutic (goal INR of 2.5-3.5)        Warfarin taken as previously instructed    No new diet changes affecting INR    No new medication/supplements affecting INR    Continues to tolerate warfarin with no reported s/s of bleeding or thromboembolism     Previous INR was Therapeutic    Plan:     Spoke with home care nurse Kalyani discussed INR result and instructed:     Warfarin Dosing Instructions: Take lower dose of 2.5mg tonight, then take normal dose of 5mg Wed and Thurs      Next INR to be drawn: 5/24 to determine if a reduction in maintenance dose is necessary     Education provided: target INR goal and significance of current INR result    Kalyani verbalizes understanding and agrees to warfarin dosing plan.   Note that home care will be ending next week  ?   Kristie Yanez RN    Subjective/Objective:      Eileen Donald, a 63 y.o. female is established on warfarin.     Home care/care facility RN's report of Eileen INR, recent warfarin dosing, diet changes, medication changes, and symptoms is documented below.    Additional findings: none    Anticoagulation Episode Summary     Current INR goal:   2.5-3.5   TTR:   75.7 % (10.5 mo)   Next INR check:   5/24/2019   INR from last check:   3.60! (5/21/2019)   Weekly max warfarin dose:      Target end date:      INR check location:      Preferred lab:      Send INR reminders to:   ANTICOAGULATION POOL C (DTN,VAD,CGR,GAV)    Indications    Heart valve replaced [Z95.2]           Comments:            Anticoagulation Care Providers     Provider Role Specialty Phone number    Tito Salazar MD Referring Family Medicine [phone]

## 2021-05-29 NOTE — TELEPHONE ENCOUNTER
ANTICOAGULATION  MANAGEMENT PROGRAM    Eileenpenny Donald is overdue for INR check.  Reminder call made.    Spoke with Marilee and scheduled INR appointment on 6/24/19 .    Radha Slater RN

## 2021-05-29 NOTE — TELEPHONE ENCOUNTER
INR result is   INR   Date Value Ref Range Status   05/21/2019 3.60 (!) 0.9 - 1.1 Final     5/24/19  3.6   Will the patient be seen, or did they already see, MD or CNP today? No    Most Recent Warfarin dose day/week  Sunday Monday Tuesday Wednesday Thursday Friday Saturday        2.5 5.0     Sunday Monday Tuesday Wednesday Thursday Friday Saturday   5.0 2.5 2.5 5.0 5.0         Has the patient missed any doses of Coumadin, Warfarin, Jantoven in the past 7 days? No    Has the patients medications changed since the last visit? No    Has the patient experienced any bleeding recently? Yes, dryness from Oxygen caused a bit of bleeding from nose but has stopped    Has the patient experienced any injuries or illness recently? No    Has the patient experienced any 'new' shortness of breath, severe headaches, or changes in vision recently? No    Has the patient had any changes in their diet, or alcohol consumption? No    Is the patient here today to prepare for any type of upcoming surgery, procedure, or for a cardioversion procedure? No    What phone number can we reach the patient at today? Home Care [phone].

## 2021-05-29 NOTE — TELEPHONE ENCOUNTER
INR result is 3.6  INR   Date Value Ref Range Status   05/17/2019 2.70 (!) 0.9 - 1.1 Final       Will the patient be seen, or did they already see, MD or CNP today? No    Most Recent Warfarin dose day/week  Sunday Monday Tuesday Wednesday Thursday Friday Saturday     7.5 5 5 2.5 5     Sunday Monday Tuesday Wednesday Thursday Friday Saturday   5 2.5            Has the patient missed any doses of Coumadin, Warfarin, Jantoven in the past 7 days? No    Has the patients medications changed since the last visit? No    Has the patient experienced any bleeding recently? Yes Nose bleed when she sleeps    Has the patient experienced any injuries or illness recently? No    Has the patient experienced any 'new' shortness of breath, severe headaches, or changes in vision recently? No    Has the patient had any changes in their diet, or alcohol consumption? No    Is the patient here today to prepare for any type of upcoming surgery, procedure, or for a cardioversion procedure? No    What phone number can we reach the patient at today? Kalyani [phone]

## 2021-05-29 NOTE — TELEPHONE ENCOUNTER
ANTICOAGULATION  MANAGEMENT    Assessment     Today's INR result of 3.2 is Therapeutic (goal INR of 2.5-3.5)        Warfarin recently held as instructed which may be affecting INR    No new diet changes affecting INR    No new medication/supplements affecting INR    Continues to tolerate warfarin with no reported s/s of bleeding or thromboembolism     Previous INR was Supratherapeutic    Plan:     Spoke with Eileen regarding INR result and instructed:     Warfarin Dosing Instructions:  Continue current warfarin dose    2.5 mg every Mon, Wed, Fri; 5 mg all other days      (0 % change)    Instructed patient to follow up no later than:one week - patient stated that it does not work for her due to therapy schedule - appointment scheduled for 6/10/19.    Education provided: importance of therapeutic range, target INR goal and significance of current INR result, importance of taking warfarin as instructed and monitoring for bleeding signs and symptoms    Eileen verbalizes understanding and agrees to warfarin dosing plan.    Instructed to call the [facility] for any changes, questions or concerns. (#[phone])   ?   Radha Slater RN    Subjective/Objective:      Eileen Donald, a 63 y.o. female is on warfarin.     Eileen reports:     Home warfarin dose: verbally confirmed home dose with Marilee and updated on anticoagulation calendar     Missed doses: Yes: as instructed.     Medication changes:  No     S/S of bleeding or thromboembolism:  No     New Injury or illness:  No     Changes in diet or alcohol consumption:  No     Upcoming surgery, procedure or cardioversion:  No    Anticoagulation Episode Summary     Current INR goal:   2.5-3.5   TTR:   72.6 % (11 mo)   Next INR check:   6/13/2019   INR from last check:   3.20 (6/6/2019)   Weekly max warfarin dose:      Target end date:      INR check location:      Preferred lab:      Send INR reminders to:   [facility]    Heart  valve replaced [Z95.2]           Comments:            Anticoagulation Care Providers     Provider Role Specialty Phone number    Tito Salazar MD [facility] Medicine [phone]

## 2021-05-30 ENCOUNTER — COMMUNICATION - HEALTHEAST (OUTPATIENT)
Dept: FAMILY MEDICINE | Facility: CLINIC | Age: 66
End: 2021-05-30

## 2021-05-30 ENCOUNTER — RECORDS - HEALTHEAST (OUTPATIENT)
Dept: ADMINISTRATIVE | Facility: CLINIC | Age: 66
End: 2021-05-30

## 2021-05-30 VITALS — WEIGHT: 293 LBS | BODY MASS INDEX: 42.47 KG/M2

## 2021-05-30 VITALS — BODY MASS INDEX: 43.48 KG/M2 | WEIGHT: 293 LBS

## 2021-05-30 VITALS — BODY MASS INDEX: 42.9 KG/M2 | WEIGHT: 293 LBS

## 2021-05-30 VITALS — BODY MASS INDEX: 43.62 KG/M2 | WEIGHT: 293 LBS

## 2021-05-30 VITALS — WEIGHT: 293 LBS | HEIGHT: 70 IN | BODY MASS INDEX: 41.95 KG/M2

## 2021-05-30 VITALS — BODY MASS INDEX: 41.95 KG/M2 | HEIGHT: 70 IN | WEIGHT: 293 LBS

## 2021-05-30 VITALS — BODY MASS INDEX: 42.47 KG/M2 | WEIGHT: 293 LBS

## 2021-05-30 VITALS — WEIGHT: 293 LBS | BODY MASS INDEX: 42.18 KG/M2

## 2021-05-30 VITALS — BODY MASS INDEX: 42.76 KG/M2 | WEIGHT: 293 LBS

## 2021-05-30 VITALS — BODY MASS INDEX: 42.72 KG/M2 | WEIGHT: 293 LBS

## 2021-05-30 VITALS — BODY MASS INDEX: 42.62 KG/M2 | WEIGHT: 293 LBS

## 2021-05-30 VITALS — WEIGHT: 293 LBS | BODY MASS INDEX: 42.76 KG/M2

## 2021-05-30 VITALS — WEIGHT: 293 LBS | BODY MASS INDEX: 43.05 KG/M2

## 2021-05-30 VITALS — WEIGHT: 293 LBS | BODY MASS INDEX: 42.9 KG/M2

## 2021-05-30 DIAGNOSIS — F32.A DEPRESSION: ICD-10-CM

## 2021-05-30 NOTE — PROGRESS NOTES
ASSESSMENT/PLAN  1. History of left shoulder replacement  Patient is status post left reverse shoulder replacement a couple months ago  She is continued with physical therapy but has not been compliant with regular exercises  She has marked decreased range of motion we discussed with her the importance of doing her physical therapy 7 days of the week  Patient acknowledges and understands that she needs to do this on a more regular basis and just needs to convince herself to do so    2. Hypomagnesemia  Patient is has had problems with magnesium being low in the past and she is not taking a magnesium supplement at this time  We will check magnesium levels today and follow-up based on results  - Magnesium    3. Anemia, unspecified type  Patient has had anemia in the past with some slow improvement recently we will check hemoglobin levels today  - HM2(CBC w/o Differential)    4. Recurrent major depressive disorder, in partial remission (H)  Patient does not feel that her current depression has been under adequate control we discussed adding bupropion to her current medications to see if we can have more improvement  She has tolerated this medication in the past  She will update us in a few weeks with change of symptoms  - buPROPion (WELLBUTRIN XL) 150 MG 24 hr tablet; Take 1 tablet (150 mg total) by mouth every morning.  Dispense: 90 tablet; Refill: 0    5. Hypokalemia  The patient is inquired about potassium levels  She was supposed to be on replacement 2 tablets daily but has not been compliant with that therapy  We will check potassium levels today and follow-up based on results  - Basic Metabolic Panel    6. Pulmonary embolism (H)  Patient is due for an INR  - INR    7. H/O aortic valve replacement  Please see #6 above  - INR        SUBJECTIVE:   Chief Complaint   Patient presents with     Follow-up     Left reverse total shoulder arthroplasty on 04/10/19     INR Check     Eileen Donald 63 y.o. female    Current  Outpatient Medications   Medication Sig Dispense Refill     acyclovir (ZOVIRAX) 400 MG tablet Take 400 mg by mouth 2 (two) times a day.        albuterol (PROAIR HFA;PROVENTIL HFA;VENTOLIN HFA) 90 mcg/actuation inhaler Inhale 2 puffs every 6 (six) hours as needed for wheezing. 1 Inhaler 3     aspirin 81 mg chewable tablet Chew 81 mg daily.              AVONEX 30 mcg/0.5 mL PnKt 1 application once a week.              calcium, as carbonate, (TUMS) 200 mg calcium (500 mg) chewable tablet Chew 1 tablet daily as needed.              esomeprazole (NEXIUM) 20 MG capsule TAKE 1 CAPSULE DAILY 90 capsule 3     furosemide (LASIX) 40 MG tablet Take 1.5 tablets (60 mg total) by mouth daily. Take additional afternoon dose for weight gain > 2 lbs in one day. 60 tablet 0     glycerin/witch hazel leaf (WITCH HAZEL-GLYCERIN, HAMAMEL, TOP) Apply 1 each topically as needed. Tucks             HYDROcodone-acetaminophen 5-325 mg per tablet Take 1-2 tablets by mouth every 4 (four) hours as needed for pain. 42 tablet 0     hydrOXYzine HCl (ATARAX) 25 MG tablet Take 1 tablet (25 mg total) by mouth every 6 (six) hours as needed (pain, muscle spasms, itching, anxiety). Hold for sedation. 20 tablet 0     lisinopril (PRINIVIL,ZESTRIL) 5 MG tablet Take 1 tablet (5 mg total) by mouth daily. 90 tablet 3     LORazepam (ATIVAN) 0.5 MG tablet Take 1 tablet (0.5 mg total) by mouth daily as needed for anxiety. 30 tablet 0     metoprolol tartrate (LOPRESSOR) 25 MG tablet Take 1 tablet (25 mg total) by mouth 2 (two) times a day. 180 tablet 3     potassium chloride (MICRO-K) 10 mEq CR capsule Take 20 mEq by mouth daily.       senna-docusate (PERICOLACE) 8.6-50 mg tablet Take 1 tablet by mouth 2 (two) times a day as needed for constipation.  0     simvastatin (ZOCOR) 20 MG tablet Take 1 tablet (20 mg total) by mouth at bedtime. 90 tablet 2     SYMBICORT 160-4.5 mcg/actuation inhaler USE 2 INHALATIONS TWICE A DAY 1 Inhaler 10     venlafaxine (EFFEXOR-XR) 75  MG 24 hr capsule TAKE 3 CAPSULES BY MOUTH EVERY DAY 90 capsule 0     warfarin (COUMADIN/JANTOVEN) 2.5 MG tablet Take 1-2 tablets (2.5-5 mg total) by mouth daily. Adjust dose per INR results as instructed. 130 tablet 1     albuterol (PROVENTIL) 2.5 mg /3 mL (0.083 %) nebulizer solution Take 3 mL (2.5 mg total) by nebulization every 6 (six) hours as needed for wheezing. 75 vial 2     buPROPion (WELLBUTRIN XL) 150 MG 24 hr tablet Take 1 tablet (150 mg total) by mouth every morning. 90 tablet 0     montelukast (SINGULAIR) 10 mg tablet Take 1 tablet (10 mg total) by mouth at bedtime. 30 tablet 6     No current facility-administered medications for this visit.      Allergies: Morphine; Sulfa (sulfonamide antibiotics); and Azithromycin   Patient's last menstrual period was 11/28/2013.    HPI:   Patient is here for follow-up after left shoulder replacement  Patient has been doing some physical therapy but has not been compliant with regular exercises  She has marked decreased range of motion and we discussed with her necessity of regular therapy  She expresses understanding the need for regular PT  She is no longer receiving this at home but will be going to their clinic  Discussed with her the need of doing these exercises even when she is not a PT    Patient was started on supplemental oxygen currently is on 2 L on nasal cannula  She is hoping that she will build be removed off this in the future  She is following with pulmonary  Patient is due for an INR she has a history of valve replacement and a PE    Patient is found herself to be extremely tired and is lacking motivation  She feels that she is out of breath with minimal exertion  Discussing with her and her 2 children who are present they feel like her depression is not under adequate control and patient is in agreement  We discussed adding bupropion to her current venlafaxine    Patient has had problems with potassium and magnesium levels in the past is no longer  on magnesium supplement and has not been compliant with taking her potassium  We will check levels today follow-up based on results  Patient has had problems with anemia in the past we will check hemoglobin levels  Patient is due for an INR    ROS: negative except as per HPI    OBJECTIVE:   The patient appears well, alert, oriented x 3, in no distress.  /42 (Patient Site: Left Arm, Patient Position: Sitting, Cuff Size: Adult Regular)   Pulse 89   Temp 97.4  F (36.3  C) (Oral)   Resp 16   Wt (!) 319 lb 3.2 oz (144.8 kg)   LMP 11/28/2013   BMI 47.83 kg/m      Lungs: clear, good air entry, no wheezes, rhonchi or rales.   Cardiac: S1 and S2 normal, no murmurs, regular rate and rhythm.   Abdomen: normal bowel sounds, soft, obese BMI 47  Extremities: show no edema, normal peripheral pulses. Minimal ROM of LUE  Neurological: normal, no focal findings.  Skin: clear, dry, no rashes/lesions  Psych- normal mood and affect      Pt states an understanding and agrees to the above plan.  Greater than 25 minutes was spent today in interview and examination with Eileen Donald with more than 50% of that time in counseling and coordination of care.

## 2021-05-30 NOTE — TELEPHONE ENCOUNTER
ANTICOAGULATION  MANAGEMENT    Assessment     Today's INR result of 3.5 is Therapeutic (goal INR of 2.5-3.5)        Warfarin taken as previously instructed    No new diet changes affecting INR    No interaction expected between Bupropion and warfarin    Continues to tolerate warfarin with no reported s/s of bleeding or thromboembolism     Previous INR was Therapeutic    Plan:     Spoke with Maci regarding INR result and instructed:     Warfarin Dosing Instructions:  Continue current warfarin dose 2.5 mg daily on Mon/Wed/Fri; and 5 mg daily rest of week  (0 % change)    Instructed patient to follow up no later than: 3 weeks    Education provided: importance of therapeutic range and potential interaction between warfarin and bupropion    Maci verbalizes understanding and agrees to warfarin dosing plan.    Instructed to call the [facility] for any changes, questions or concerns. (#[phone])   ?   Kristie Yanez RN    Subjective/Objective:      Eileen CASPER Shabana, a 63 y.o. female is on warfarin.     Eileen reports:     Home warfarin dose: as updated on anticoagulation calendar per template     Missed doses: No     Medication changes:  No     S/S of bleeding or thromboembolism:  No     New Injury or illness:  No     Changes in diet or alcohol consumption:  No     Upcoming surgery, procedure or cardioversion:  No    Anticoagulation Episode Summary     Current INR goal:   2.5-3.5   TTR:   74.6 % (11.9 mo)   Next INR check:   7/23/2019   INR from last check:   3.50 (7/2/2019)   Weekly max warfarin dose:      Target end date:      INR check location:      Preferred lab:      Send INR reminders to:   [facility]    Indications    Heart valve replaced [Z95.2]           Comments:            Anticoagulation Care Providers     Provider Role Specialty Phone number    Tito Salazar MD Referring Family Medicine [phone]

## 2021-05-30 NOTE — PATIENT INSTRUCTIONS - HE
I think we should stay on the symbicort and albuterol.    We will have you come back and see me in 3 months and we will recheck your oxygen.

## 2021-05-30 NOTE — TELEPHONE ENCOUNTER
ANTICOAGULATION  MANAGEMENT PROGRAM    Eileen Donald is overdue for INR check.  Reminder call made.    Spoke with Marilee and scheduled INR appointment on 8/1 .    Kristie Yanez RN

## 2021-05-30 NOTE — PROGRESS NOTES
Pulmonary follow-up note  6/28/2019    Referring Physician: Dr. Salazar  Reason for follow-up: Wheezing and asthma with shortness of breath, moderate persistent asthma      Problem List:     Patient Active Problem List   Diagnosis     Depression     Asthma     Benign essential hypertension     Multiple Sclerosis     Hyperlipidemia     Impaired Glucose Tolerance Test     Dysphagia     Benign Adenomatous Polyp Of The Large Intestine     History of pulmonary embolism     Morbid obesity (H)     Generalized anxiety disorder     Primary osteoarthritis of both hands     Polyarthralgia     Aortic valve replaced     Controlled substance agreement signed     Heart valve replaced     Closed 3-part fracture of proximal humerus with delayed healing, left     Acute on chronic respiratory failure with hypoxia (H)     Pulmonary hypertension (H)       History:     Mrs. Eileen Donald is a 64 yo female with PMH significant for bicuspid aortic valve s/p AVR, history of EIB when she was younger in her 20s, HTN, MS that is well controlled, and PE who presented to pulmonary clinic for evaluation of asthma.  She stated she had always done well up until her AVR in 1/17.  She noticed that she was more short of breath, and that she had wheezing breath sounds.  She didn't really cough much, and it is nonproductive.  She did note that she had had increased phlegm and her throat felt congested.  She denied any orthopnea.  She was started on symbicort about 3 months prior to her initial visit and her shortness of breath improved.  But she still had some increased phlegm.  She is not sure that it is asthma though.  She does have several sisters that have asthma.    Today at follow up, she states her breathing is doing better.  It was doing much better but then she fractured her left humeral head/greater tuberosity, which she has now had repaired.  She feels that the pain is doing much better.  She is very limited in how much she can move it,  but overall feels it is okay.  She is still doing rehab for her shoulder.  We did discuss pulmonary rehab, but she needs to complete the shoulder rehab first.    Past Medical History:   Diagnosis Date     Anxiety      Aortic valve replaced 2/15/2017     Aortic valve stenosis 01/24/2017     Asthma     Created by Conversion      Chronic shortness of breath      Coronary artery disease      Diabetes mellitus (H)     borderline     History of blood clots      Hypertension     Created by Conversion  Replacement Utility updated for latest IMO load     Multiple sclerosis (H)     Created by Conversion      Obesity 10/29/2015     Panic attacks      Pre-diabetes      Pulmonary embolism (H)      Sleep apnea      Past Surgical History:   Procedure Laterality Date     CARPAL TUNNEL RELEASE       mechanical aortic prosthesis  01/24/2017     MD RECONSTR TOTAL SHOULDER IMPLANT Left 4/10/2019    Procedure: LEFT REVERSE TOTAL SHOULDER ARTHROPLASTY;  Surgeon: Luis Antonio Telles MD;  Location: [facility];  Service: Orthopedics     REDUCTION MAMMAPLASTY  1994     Social History     Socioeconomic History     Marital status:      Spouse name: Not on file     Number of children: Not on file     Years of education: Not on file     Highest education level: Not on file   Occupational History     Not on file   Social Needs     Financial resource strain: Not on file     Food insecurity:     Worry: Not on file     Inability: Not on file     Transportation needs:     Medical: Not on file     Non-medical: Not on file   Tobacco Use     Smoking status: Never Smoker     Smokeless tobacco: Never Used   Substance and Sexual Activity     Alcohol use: No     Drug use: No     Sexual activity: Not on file   Lifestyle     Physical activity:     Days per week: Not on file     Minutes per session: Not on file     Stress: Not on file   Relationships     Social connections:     Talks on phone: Not on file     Gets together: Not on file      Attends [employer]ism service: Not on file     Active member of club or organization: Not on file     Attends meetings of clubs or organizations: Not on file     Relationship status: Not on file     Intimate partner violence:     Fear of current or ex partner: Not on file     Emotionally abused: Not on file     Physically abused: Not on file     Forced sexual activity: Not on file   Other Topics Concern     Not on file   Social History Narrative    Lives with sister in [place] due to help with medical issues.     and daughter live in [place].    Plans to return to be with family hopefully soon.    For MS- last used medications about 3 months. In remission. Needs to be back on medications as Neurologist has left.        Drew Hahn MD  5/29/2018         Family History   Problem Relation Age of Onset     Heart attack Father      Heart attack Sister      Heart attack Brother      Allergies   Allergen Reactions     Morphine Other (See Comments)     hallicinations     Sulfa (Sulfonamide Antibiotics) Hives     Azithromycin Nausea And Vomiting and Rash       Review of Systems - 10 point review of systems is negative except what is mentioned in the HPI.  Noted for easing bruising and decreased wound healing.        Medications:     Current Outpatient Medications on File Prior to Visit   Medication Sig Dispense Refill     acyclovir (ZOVIRAX) 400 MG tablet Take 400 mg by mouth 2 (two) times a day.        albuterol (PROAIR HFA;PROVENTIL HFA;VENTOLIN HFA) 90 mcg/actuation inhaler Inhale 2 puffs every 6 (six) hours as needed for wheezing. 1 Inhaler 3     aspirin 81 mg chewable tablet Chew 81 mg daily.              AVONEX 30 mcg/0.5 mL PnKt 1 application once a week.              calcium, as carbonate, (TUMS) 200 mg calcium (500 mg) chewable tablet Chew 1 tablet daily as needed.              esomeprazole (NEXIUM) 20 MG capsule TAKE 1 CAPSULE DAILY 90 capsule 3     furosemide (LASIX) 40 MG tablet Take 1.5 tablets  (60 mg total) by mouth daily. Take additional afternoon dose for weight gain > 2 lbs in one day. 60 tablet 0     glycerin/witch hazel leaf (WITCH HAZEL-GLYCERIN, HAMAMEL, TOP) Apply 1 each topically as needed. Tucks             HYDROcodone-acetaminophen 5-325 mg per tablet Take 1-2 tablets by mouth every 4 (four) hours as needed for pain. 42 tablet 0     hydrOXYzine HCl (ATARAX) 25 MG tablet Take 1 tablet (25 mg total) by mouth every 6 (six) hours as needed (pain, muscle spasms, itching, anxiety). Hold for sedation. 20 tablet 0     lisinopril (PRINIVIL,ZESTRIL) 5 MG tablet Take 1 tablet (5 mg total) by mouth daily. 90 tablet 3     LORazepam (ATIVAN) 0.5 MG tablet Take 1 tablet (0.5 mg total) by mouth daily as needed for anxiety. 30 tablet 0     metoprolol tartrate (LOPRESSOR) 25 MG tablet Take 1 tablet (25 mg total) by mouth 2 (two) times a day. 180 tablet 3     potassium chloride (MICRO-K) 10 mEq CR capsule Take 20 mEq by mouth daily.       senna-docusate (PERICOLACE) 8.6-50 mg tablet Take 1 tablet by mouth 2 (two) times a day as needed for constipation.  0     simvastatin (ZOCOR) 20 MG tablet Take 1 tablet (20 mg total) by mouth at bedtime. 90 tablet 2     SYMBICORT 160-4.5 mcg/actuation inhaler USE 2 INHALATIONS TWICE A DAY 1 Inhaler 10     venlafaxine (EFFEXOR-XR) 75 MG 24 hr capsule TAKE 3 CAPSULES BY MOUTH EVERY DAY 90 capsule 0     warfarin (COUMADIN/JANTOVEN) 2.5 MG tablet Take 1-2 tablets (2.5-5 mg total) by mouth daily. Adjust dose per INR results as instructed. 130 tablet 1     [DISCONTINUED] enoxaparin (LOVENOX) 150 mg/mL injection Inject 1 mL (150 mg total) under the skin every 12 (twelve) hours. 6 Syringe 0     [DISCONTINUED] gabapentin (NEURONTIN) 300 MG capsule Take 1 capsule (300 mg total) by mouth daily. 30 capsule 0     [DISCONTINUED] magnesium oxide (MAG-OX) 400 mg (241.3 mg magnesium) tablet Take 1 tablet (400 mg total) by mouth daily. 30 tablet 2     albuterol (PROVENTIL) 2.5 mg /3 mL (0.083 %)  nebulizer solution Take 3 mL (2.5 mg total) by nebulization every 6 (six) hours as needed for wheezing. 75 vial 2     montelukast (SINGULAIR) 10 mg tablet Take 1 tablet (10 mg total) by mouth at bedtime. 30 tablet 6     No current facility-administered medications on file prior to visit.        Exam/Data:   Vitals  Vitals:    06/28/19 1431   BP: 130/80   Pulse: 89   Resp: 12   SpO2: 94%       EXAM:  GEN: Alert and oriented x3, NAD  HEENT: Nares patent, posterior oropharynx clear.  RESPIRATORY: CTAB.  No wheeze or rales  CARDIOVASCULAR: RRR, no m/r/g  GASTROINTESTINAL: Round, soft, NT/ND  HEM/ONC: no adenopathy, no ecchymosis  MSK: no clubbing.  Sitting in a wheel chair.   NEURO: cranial nerves appear grossly intact, muscle strength equal  SKIN: no rash or ulcerations      DATA    PFT DATA:  FEV1/FVC is 0.90 and is normal.  FEV1 is 58% predicted and is reduced.  FVC is 49% predicted and reduced.  There was no improvement in spirometry after a single inhaled dose of bronchodilator.  TLC is 69% predicted and is reduced.  RV is 79% predicted and is reduced.  DLCO is 82% predicted and is normal when it   is corrected for hemoglobin.     Impression:  Full Pulmonary Function Test is abnormal.  PFTs are consistent with moderate-severe restrictive disease.  Spirometry is not consistent with reversibility.  There is no hyperinflation.  There is no air-trapping.  Diffusion capacity when corrected for hemoglobin is normal.    NIOX: 20 ppb and is normal.    IMAGING:  CTA CHEST PE RUN  8/7/2017 3:29 PM     INDICATION: dyspnea  TECHNIQUE: Helical acquisition through the chest was performed during the arterial phase of contrast enhancement using IV contrast. 2D and 3D reconstructions were performed by the CT technologist. Dose reduction techniques were used.   IV CONTRAST: Iohexol (Omni) 100 mL  COMPARISON: March 10, 2016     FINDINGS:  ANGIOGRAM CHEST: Negative for pulmonary emboli. Negative for thoracic aortic dissection and  aneurysms.     LUNGS AND PLEURA: No pleural effusions. Linear atelectasis right lower lung zone unchanged from the previous study.     MEDIASTINUM: Postop median sternotomy and aortic valve.. No pericardial effusion.     LIMITED UPPER ABDOMEN: Hepatic steatosis.     MUSCULOSKELETAL: Negative.     IMPRESSION:   CONCLUSION:  1.  Negative for pulmonary emboli  2.  Postop aortic valve replacement  3.  Hepatic steatosis    Personally reviewed the CT with patient.  Appears to have scattered areas of mosaic attenuation, otherwise normal.    Assessment/Plan:   Eileen Donald is a 63 y.o. female PMH significant for bicuspid aortic valve s/p AVR, history of EIB when she was younger in her 20s, HTN, MS that is well controlled, and PE who presents to pulmonary clinic for evaluation of asthma.    1. Likely asthma, moderate persistent:  She has had good improvement in symptoms while on symbicort.  Her NIOX previously was normal, but on ICS therapy.  She has mosaic attenuation, chest tightness, wheezing, improves with beta agonists, and family history of asthma, making this likely asthma.  Her PFTs do not show obstruction, but rather restriction.  She continues to use Singulair.  Her symptoms continue to respond to ICS and bronchodilator therapy.  Some of her issues are likely due MS and debility, but I want her to use her albuterol more as needed, so we can get a sense of how much she needs.  I did discuss with her using an antimuscarinic as well, likely spiriva or incruse, if she is having to use the albuterol all the time.  She will let us know if she wants to try this.  She is doing okay otherwise so we will continue with Symbicort and Singulair.    2. Restrictive Ventilatory Defect:  I think this is related to her body habitus, but could also be related to her MS.  Her diffusing capacity is normal.  I will see how her breathing does with the addition of singulair but since it has not significantly improved and more  concerned that this is due to some neuromuscular weakness.  She has been followed by Dr. Bear in neurology and has not had major issues with her MS. Her CTA does not show any ILD.      3. Dyspnea on Exertion:  Multifactorial.  Likely due to asthma not well controlled, compounded by obesity and MS, and furthered by debility.  She has also responded well to increased diuretic.  I would also consider pulmonary rehab at follow up if we have not made much headway.    Recommend:  - continue with symbicort 2 puffs BID  - albuterol as needed, either 2 puffs by inhaler or nebulizer  - singulair 10 mg daily  - consider starting antimuscarinic inhaler with spiriva or incruse  - RTC in 3 months    I spent > 15 minutes in consultation with > 50% time spent face-to-face.      Ryan Grajeda, DO

## 2021-05-31 ENCOUNTER — RECORDS - HEALTHEAST (OUTPATIENT)
Dept: ADMINISTRATIVE | Facility: CLINIC | Age: 66
End: 2021-05-31

## 2021-05-31 VITALS — WEIGHT: 293 LBS | BODY MASS INDEX: 45.48 KG/M2

## 2021-05-31 VITALS — BODY MASS INDEX: 41.95 KG/M2 | WEIGHT: 293 LBS | HEIGHT: 70 IN

## 2021-05-31 VITALS — BODY MASS INDEX: 42.18 KG/M2 | WEIGHT: 293 LBS

## 2021-05-31 VITALS — BODY MASS INDEX: 45.2 KG/M2 | WEIGHT: 293 LBS

## 2021-05-31 VITALS — WEIGHT: 293 LBS | BODY MASS INDEX: 44.85 KG/M2

## 2021-05-31 VITALS — BODY MASS INDEX: 45.8 KG/M2 | WEIGHT: 293 LBS

## 2021-05-31 VITALS — BODY MASS INDEX: 43.91 KG/M2 | WEIGHT: 293 LBS

## 2021-05-31 VITALS — WEIGHT: 293 LBS | BODY MASS INDEX: 43.19 KG/M2

## 2021-05-31 VITALS — WEIGHT: 293 LBS | BODY MASS INDEX: 45.64 KG/M2

## 2021-05-31 VITALS — BODY MASS INDEX: 46.78 KG/M2 | WEIGHT: 293 LBS

## 2021-05-31 NOTE — TELEPHONE ENCOUNTER
Left message to call back for: medication clarification  Information to relay to patient:  Lmtcb, Please relay message below from  and assist with scheduling a nurse only bp check.

## 2021-05-31 NOTE — TELEPHONE ENCOUNTER
If patient doesn't know if she has been taking this- she should make a blood pressure check to check blood pressure prior to starting.

## 2021-05-31 NOTE — TELEPHONE ENCOUNTER
Refill Request  Did you contact pharmacy: No  Medication name:   Requested Prescriptions     Pending Prescriptions Disp Refills     buPROPion (WELLBUTRIN XL) 150 MG 24 hr tablet 90 tablet 0     Sig: Take 1 tablet (150 mg total) by mouth every morning.     Who prescribed the medication: Dr Salazar  Pharmacy Name and Location: Changing pharmacy to [facility]    Fax request received from pharmacy states patient is requesting a 90 day supply to be sent to [facility]    Okay to leave a detailed message: yes

## 2021-05-31 NOTE — TELEPHONE ENCOUNTER
RN cannot approve Refill Request    RN can NOT refill this medication med is not covered by policy/route to provider. Last office visit: 7/2/2019 Tito Salazar MD Last Physical: 4/2/2019 Last MTM visit: Visit date not found Last visit same specialty: 7/2/2019 Tito Salazar MD.  Next visit within 3 mo: Visit date not found  Next physical within 3 mo: Visit date not found      Mary Alice Milan, [facility] Triage/Med Refill 8/22/2019    Requested Prescriptions   Pending Prescriptions Disp Refills     montelukast (SINGULAIR) 10 mg tablet [Pharmacy Med Name: MONTELUKAST SOD TABS 10MG] 30 tablet 12     Sig: TAKE 1 TABLET AT BEDTIME       There is no refill protocol information for this order

## 2021-05-31 NOTE — TELEPHONE ENCOUNTER
ANTICOAGULATION  MANAGEMENT    Assessment     Today's INR result of 3.3 is Therapeutic (goal INR of 2.5-3.5)        Warfarin taken as previously instructed    No new diet changes affecting INR    No new medication/supplements affecting INR    Continues to tolerate warfarin with no reported s/s of bleeding or thromboembolism     Previous INR was Therapeutic    Plan:     Left a detailed message for Eileen regarding INR result and instructed:     Warfarin Dosing Instructions:  Continue current warfarin dose 2.5 mg daily on mon/wed/fri; and 5 mg daily rest of week  (0 % change)    Instructed patient to follow up no later than: one month      Instructed to call the [facility] for any changes, questions or concerns. (#[phone])   ?   Dany Cramer RN    Subjective/Objective:      Eileen Donald, a 63 y.o. female is on warfarin.     Eileen reports:     Home warfarin dose: as updated on anticoagulation calendar per template     Missed doses: No     Medication changes:  No     S/S of bleeding or thromboembolism:  No     New Injury or illness:  No     Changes in diet or alcohol consumption:  No     Upcoming surgery, procedure or cardioversion:  No    Anticoagulation Episode Summary     Current INR goal:   2.5-3.5   TTR:   76.6 % (1.1 y)   Next INR check:   8/29/2019   INR from last check:   3.30 (8/1/2019)   Weekly max warfarin dose:      Target end date:      INR check location:      Preferred lab:      Send INR reminders to:   [facility]    Indications    Heart valve replaced [Z95.2]           Comments:            Anticoagulation Care Providers     Provider Role Specialty Phone number    Tito Salazar MD Referring Family Medicine [phone]

## 2021-05-31 NOTE — TELEPHONE ENCOUNTER
Refill Approved    Rx renewed per Medication Renewal Policy. Medication was last renewed on 7/2/19.    Ov: 7/2/19    Brittani Lara, [facility] Triage/Med Refill 8/15/2019     Requested Prescriptions   Pending Prescriptions Disp Refills     buPROPion (WELLBUTRIN XL) 150 MG 24 hr tablet 90 tablet 0     Sig: Take 1 tablet (150 mg total) by mouth every morning.       Tricyclics/Misc Antidepressant/Antianxiety Meds Refill Protocol Passed - 8/15/2019  8:01 AM        Passed - PCP or prescribing provider visit in last year     Last office visit with prescriber/PCP: 7/2/2019 Tito Salazar MD OR same dept: 7/2/2019 Tito Salazar MD OR same specialty: 7/2/2019 Tito Salazar MD  Last physical: 4/2/2019 Last MTM visit: Visit date not found   Next visit within 3 mo: Visit date not found  Next physical within 3 mo: Visit date not found  Prescriber OR PCP: Tito Salazar MD  Last diagnosis associated with med order: 1. Recurrent major depressive disorder, in partial remission (H)  - buPROPion (WELLBUTRIN XL) 150 MG 24 hr tablet; Take 1 tablet (150 mg total) by mouth every morning.  Dispense: 90 tablet; Refill: 0    If protocol passes may refill for 12 months if within 3 months of last provider visit (or a total of 15 months).

## 2021-05-31 NOTE — TELEPHONE ENCOUNTER
Refill Approved    Rx renewed per Medication Renewal Policy. Medication was last renewed on :  Nexium on 8/7/2018 for 90/3;  Simvastatin on 11/6/2018 for 90/2  Last OV 7/2/2019  Suki Nolasco, [facility] Triage/Med Refill 8/5/2019     Requested Prescriptions   Pending Prescriptions Disp Refills     simvastatin (ZOCOR) 20 MG tablet [Pharmacy Med Name: SIMVASTATIN TABS 20MG] 90 tablet 2     Sig: TAKE 1 TABLET AT BEDTIME       Statins Refill Protocol (Hmg CoA Reductase Inhibitors) Passed - 8/5/2019 12:12 AM        Passed - PCP or prescribing provider visit in past 12 months      Last office visit with prescriber/PCP: 7/2/2019 Tito Salazar MD OR same dept: 7/2/2019 Tito Salazar MD OR same specialty: 7/2/2019 Tito Salazar MD  Last physical: 4/2/2019 Last MTM visit: Visit date not found   Next visit within 3 mo: Visit date not found  Next physical within 3 mo: Visit date not found  Prescriber OR PCP: Tito Salazar MD  Last diagnosis associated with med order: 1. Hyperlipidemia  - simvastatin (ZOCOR) 20 MG tablet [Pharmacy Med Name: SIMVASTATIN TABS 20MG]; TAKE 1 TABLET AT BEDTIME  Dispense: 90 tablet; Refill: 2    2. Dysphagia  - esomeprazole (NEXIUM) 20 MG capsule [Pharmacy Med Name: ESOMEPRAZOLE MAG DR CAPS 20MG]; TAKE 1 CAPSULE DAILY  Dispense: 90 capsule; Refill: 3    If protocol passes may refill for 12 months if within 3 months of last provider visit (or a total of 15 months).             esomeprazole (NEXIUM) 20 MG capsule [Pharmacy Med Name: ESOMEPRAZOLE MAG DR CAPS 20MG] 90 capsule 3     Sig: TAKE 1 CAPSULE DAILY       GI Medications Refill Protocol Passed - 8/5/2019 12:12 AM        Passed - PCP or prescribing provider visit in last 12 or next 3 months.     Last office visit with prescriber/PCP: 7/2/2019 Tito Salazar MD OR same dept: 7/2/2019 Tito Salazar MD OR same specialty: 7/2/2019 Tito Salazar MD  Last physical: 4/2/2019 Last MTM  visit: Visit date not found   Next visit within 3 mo: Visit date not found  Next physical within 3 mo: Visit date not found  Prescriber OR PCP: Tito Salazar MD  Last diagnosis associated with med order: 1. Hyperlipidemia  - simvastatin (ZOCOR) 20 MG tablet [Pharmacy Med Name: SIMVASTATIN TABS 20MG]; TAKE 1 TABLET AT BEDTIME  Dispense: 90 tablet; Refill: 2    2. Dysphagia  - esomeprazole (NEXIUM) 20 MG capsule [Pharmacy Med Name: ESOMEPRAZOLE MAG DR CAPS 20MG]; TAKE 1 CAPSULE DAILY  Dispense: 90 capsule; Refill: 3    If protocol passes may refill for 12 months if within 3 months of last provider visit (or a total of 15 months).

## 2021-05-31 NOTE — TELEPHONE ENCOUNTER
Medication Question or Clarification  Who is calling: Patient  What medication are you calling about? (include dose and sig)   lisinopril (PRINIVIL,ZESTRIL) 5 MG tablet 90 tablet 3 5/8/2019     Sig - Route: Take 1 tablet (5 mg total) by mouth daily. - Oral    Sent to pharmacy as: lisinopril (PRINIVIL,ZESTRIL) 5 MG tablet        Who prescribed the medication?:   Tito Salazar MD  What is your question/concern?:   Patient states she mixed up her bottles of medications and doesn't remember if she is supposed to take above medication.  Pharmacy:   N/A  Okay to leave a detailed message?: Yes  Site CMT - Please call the pharmacy to obtain any additional needed information.

## 2021-05-31 NOTE — TELEPHONE ENCOUNTER
Called [employer] and they need CPT codes for hospital bed and lift chair orders. Prior authorization will need to be submitted by calling [phone] with these CPT codes.

## 2021-05-31 NOTE — TELEPHONE ENCOUNTER
Patient Returning Call  Reason for call:  Returning phone call.  Information relayed to patient:  Below message relayed to patient.  Patient states she will call back and schedule a blood pressure check at a later date.  Patient has additional questions:  No  If YES, what are your questions/concerns:  No additional questions at this time.  Okay to leave a detailed message?: No call back needed

## 2021-06-01 ENCOUNTER — RECORDS - HEALTHEAST (OUTPATIENT)
Dept: ADMINISTRATIVE | Facility: CLINIC | Age: 66
End: 2021-06-01

## 2021-06-01 VITALS — BODY MASS INDEX: 46.96 KG/M2 | WEIGHT: 293 LBS

## 2021-06-01 VITALS — WEIGHT: 293 LBS | BODY MASS INDEX: 47.35 KG/M2

## 2021-06-01 VITALS — BODY MASS INDEX: 43.4 KG/M2 | HEIGHT: 69 IN | WEIGHT: 293 LBS

## 2021-06-01 VITALS — BODY MASS INDEX: 46.92 KG/M2 | WEIGHT: 293 LBS

## 2021-06-01 NOTE — TELEPHONE ENCOUNTER
ANTICOAGULATION  MANAGEMENT PROGRAM    Eileen Donald is overdue for INR check.  Reminder call made.    Spoke with Marilee and scheduled INR appointment on 9/9 .    Kristie Yanez RN

## 2021-06-01 NOTE — TELEPHONE ENCOUNTER
Central PA team  [phone]  Pool: HE PA MED ([id number])          PA has been initiated.       PA form completed and faxed insurance via Cover My Meds     Key:  [id number] - PA Case ID: [id number]     Medication:  Levalbuterol Tartrate 45MCG/ACT aerosol    Insurance:  [facility]        Response will be received via fax and may take up to 5-10 business days depending on plan

## 2021-06-01 NOTE — TELEPHONE ENCOUNTER
Please contact patient  Please inform her that we can place orders but the insurance is going to require a face-to-face meeting which is an office visit at clinic to specifically discuss the needs of these medical supplies and why  This needs to be documented usually between 1 and 3 months prior to the order  We will need the patient to make an office visit to specifically discussed the need for a lift chair and hospital bed

## 2021-06-01 NOTE — PROGRESS NOTES
Assessment and Plan:       1. Health care maintenance  The patient is not fasting today she will return when fasting to obtain blood work  - Comprehensive Metabolic Panel; Future  - HM2(CBC w/o Differential)  - Lipid [place] FASTING; Future    2. Morbid obesity (H)  Patient is aware of her weight and the need for weight loss  We discussed diet and exercise  - Lift Chair with Seat Lift Mechanism  - Wheelchair, manual Standard Manual    3. Generalized anxiety disorder  Patient is on venlafaxine for anxiety  Feels her current symptoms are controlled with venlafaxine  Would like to continue with current dosing    4. Polyarthralgia  With patient's multiple medical comorbidities including arthritic problems multiple sclerosis heart disease pulmonary hypertension or morbid obesity patient has difficulty with ambulation  She like to obtain durable medical equipment at home to facilitate her ability to move and be more ambulatory safely  She like to obtain a lift chair for home as well as a hospital bed  Due to her pulmonary hypertension she has difficulty with ambulation over longer periods and when out of the home she like to have the availability of a wheelchair orders placed for wheelchair and lift chair for the patient to pursue at [facility] and patient will look into medical equipment supplier for further evaluation of a hospital bed for home  - Lift Chair with Seat Lift Mechanism  - Wheelchair, manual Standard Manual    5. Heart valve replaced  Patient follows with cardiology  She is on anticoagulation    6. Pulmonary hypertension (H)  Patient is on supplemental oxygen  She does get out of breath very shortly which she is being followed with with cardiology and pulmonary-likely thought is a multifactorial problem with her shortness of breath between her lungs cardiac and physical deconditioning  Encouraged patient to continue with trying to get physical activity  She is following with pulmonary and they are  considering possibly pulmonary rehab  - Lift Chair with Seat Lift Mechanism  - Wheelchair, manual Standard Manual    7. Multiple sclerosis (H)  Patient follows with neurology for her multiple sclerosis  She would benefit from a lift chair in hospital bed at home to help facilitate getting in and out of bed or chair safely  - Lift Chair with Seat Lift Mechanism  - Wheelchair, manual Standard Manual    8. Paroxysmal atrial fibrillation (H)  Patient is on warfarin and is monitoring INRs  Follows with cardiology  Is rate controlled  Clinical examination today seems to be in sinus rhythm    9. Pulmonary embolism (H)  For patient's history of pulmonary embolism she will remain anticoagulation  - INR    10. H/O aortic valve replacement  See #9 above  - INR    11. Mixed hyperlipidemia  Patient is on statin therapy at this time we will check cholesterol when she returns fasting    12. Essential hypertension  Blood pressure at goal range continue with current medications  Check kidney function and electrolytes    The patient's current medical problems were reviewed.    The following high BMI interventions were performed this visit: encouragement to exercise and weight monitoring  The following health maintenance schedule was reviewed with the patient and provided in printed form in the after visit summary:   Health Maintenance   Topic Date Due     HEPATITIS C SCREENING  1955     HIV SCREENING  10/22/1971     MEDICARE ANNUAL WELLNESS VISIT  11/09/2017     COLONOSCOPY  09/08/2018     DEPRESSION FOLLOW UP  09/14/2018     MAMMOGRAM  12/12/2018     ADVANCE CARE PLANNING  05/08/2022 (Originally 6/1/2015)     ASTHMA CONTROL TEST  07/02/2020     PAP SMEAR  11/09/2021     TD 18+ HE  09/13/2026     INFLUENZA VACCINE RULE BASED  Completed     TDAP ADULT ONE TIME DOSE  Completed     ZOSTER VACCINES  Completed        Subjective:   Chief Complaint: Eileen Donald is an 63 y.o. female here for an Annual Wellness visit.   HPI:  Patient is here for annual exam  Patient continues to have difficulty with ambulation due to multiple comorbidities  She had difficulty getting in and out of her chair in and out of bed  We discussed today trying to obtain a lift chair for home as well as a wheelchair when she is outside of her home and needing to travel for longer distances  She is in a work on getting a medical supply company in the area that supplies hospital beds we will try to obtain her one for home  She follows with multiple specialties including neurology for her MS, pulmonary for her pulmonary hypertension, cardiology for her valve replacement and atrial fibrillation-she is on continuous oxygen supplement at this time at 2 L-she continues to have marked shortness of breath and the thought is that this is multifactorial.  Pulmonary is considering pulmonary rehab in the near future she has follow-up with them-continue with current oxygen at this time.  Encouraged her to continue to try to be more mobile in her home.  She continues with her home physical therapy exercises for her shoulder after surgery.  She still has limited range of motion.  She is on anticoagulation for history of atrial fibrillation and as well as valve replacement.  She is on venlafaxine for anxiety and she feels medication is working well.  She is on statin therapy for cholesterol and antihypertensive medication-we will be checking labs but at a future date if she is not fasting today.  She is aware of her weight and the need for weight loss.  We discussed diet.  Currently is living at her sister's home does most of the cooking for her.  For safety with ambulation and moving around as well as a hospital bed.    Review of Systems:    Please see above.  The rest of the review of systems are negative for all systems.    Patient Care Team:  Tito Salazar MD as PCP - General (Family Medicine)  Tito Salazar MD as Assigned PCP     Patient Active Problem List    Diagnosis     Depression     Asthma     Benign essential hypertension     Multiple Sclerosis     Hyperlipidemia     Impaired Glucose Tolerance Test     Dysphagia     Benign Adenomatous Polyp Of The Large Intestine     History of pulmonary embolism     Morbid obesity (H)     Generalized anxiety disorder     Primary osteoarthritis of both hands     Polyarthralgia     Aortic valve replaced     Controlled substance agreement signed     Heart valve replaced     Closed 3-part fracture of proximal humerus with delayed healing, left     Acute on chronic respiratory failure with hypoxia (H)     Pulmonary hypertension (H)     Paroxysmal atrial fibrillation (H)     Past Medical History:   Diagnosis Date     Anxiety      Aortic valve replaced 2/15/2017     Aortic valve stenosis 01/24/2017     Asthma     Created by Conversion      Chronic shortness of breath      Coronary artery disease      Diabetes mellitus (H)     borderline     History of blood clots      Hypertension     Created by Conversion  Replacement Utility updated for latest IMO load     Multiple sclerosis (H)     Created by Conversion      Obesity 10/29/2015     Panic attacks      Pre-diabetes      Pulmonary embolism (H)      Sleep apnea       Past Surgical History:   Procedure Laterality Date     CARPAL TUNNEL RELEASE       mechanical aortic prosthesis  01/24/2017     ND RECONSTR TOTAL SHOULDER IMPLANT Left 4/10/2019    Procedure: LEFT REVERSE TOTAL SHOULDER ARTHROPLASTY;  Surgeon: Luis Antonio Telles MD;  Location: [facility];  Service: Orthopedics     REDUCTION MAMMAPLASTY  1994      Family History   Problem Relation Age of Onset     Heart attack Father      Heart attack Sister      Heart attack Brother       Social History     Socioeconomic History     Marital status:      Spouse name: Not on file     Number of children: Not on file     Years of education: Not on file     Highest education level: Not on file   Occupational History     Not on file    Social Needs     Financial resource strain: Not on file     Food insecurity:     Worry: Not on file     Inability: Not on file     Transportation needs:     Medical: Not on file     Non-medical: Not on file   Tobacco Use     Smoking status: Never Smoker     Smokeless tobacco: Never Used   Substance and Sexual Activity     Alcohol use: No     Drug use: No     Sexual activity: Not on file   Lifestyle     Physical activity:     Days per week: Not on file     Minutes per session: Not on file     Stress: Not on file   Relationships     Social connections:     Talks on phone: Not on file     Gets together: Not on file     Attends Oriental orthodox service: Not on file     Active member of club or organization: Not on file     Attends meetings of clubs or organizations: Not on file     Relationship status: Not on file     Intimate partner violence:     Fear of current or ex partner: Not on file     Emotionally abused: Not on file     Physically abused: Not on file     Forced sexual activity: Not on file   Other Topics Concern     Not on file   Social History Narrative    Lives with sister in [place] due to help with medical issues.     and daughter live in [place].    Plans to return to be with family hopefully soon.    For MS- last used medications about 3 months. In remission. Needs to be back on medications as Neurologist has left.        Drew Hahn MD  5/29/2018          Current Outpatient Medications   Medication Sig Dispense Refill     acyclovir (ZOVIRAX) 400 MG tablet Take 400 mg by mouth 2 (two) times a day.        albuterol (PROAIR HFA;PROVENTIL HFA;VENTOLIN HFA) 90 mcg/actuation inhaler Inhale 2 puffs every 6 (six) hours as needed for wheezing. 1 Inhaler 3     aspirin 81 mg chewable tablet Chew 81 mg daily.              AVONEX 30 mcg/0.5 mL PnKt 1 application once a week.              buPROPion (WELLBUTRIN XL) 150 MG 24 hr tablet Take 1 tablet (150 mg total) by mouth every morning. 90 tablet 3      "calcium, as carbonate, (TUMS) 200 mg calcium (500 mg) chewable tablet Chew 1 tablet daily as needed.              esomeprazole (NEXIUM) 20 MG capsule Take 1 capsule (20 mg total) by mouth daily. 90 capsule 3     furosemide (LASIX) 40 MG tablet Take 1.5 tablets (60 mg total) by mouth daily. Take additional afternoon dose for weight gain > 2 lbs in one day. 60 tablet 0     glycerin/witch hazel leaf (WITCH HAZEL-GLYCERIN, HAMAMEL, TOP) Apply 1 each topically as needed. Tucks             lisinopril (PRINIVIL,ZESTRIL) 5 MG tablet Take 1 tablet (5 mg total) by mouth daily. 90 tablet 3     LORazepam (ATIVAN) 0.5 MG tablet Take 1 tablet (0.5 mg total) by mouth daily as needed for anxiety. 30 tablet 0     metoprolol tartrate (LOPRESSOR) 25 MG tablet Take 1 tablet (25 mg total) by mouth 2 (two) times a day. 180 tablet 3     montelukast (SINGULAIR) 10 mg tablet TAKE 1 TABLET AT BEDTIME 30 tablet 12     potassium chloride (MICRO-K) 10 mEq CR capsule Take 20 mEq by mouth daily.       senna-docusate (PERICOLACE) 8.6-50 mg tablet Take 1 tablet by mouth 2 (two) times a day as needed for constipation.  0     simvastatin (ZOCOR) 20 MG tablet Take 1 tablet (20 mg total) by mouth at bedtime. 90 tablet 3     SYMBICORT 160-4.5 mcg/actuation inhaler USE 2 INHALATIONS TWICE A DAY 1 Inhaler 10     venlafaxine (EFFEXOR-XR) 75 MG 24 hr capsule TAKE 3 CAPSULES BY MOUTH EVERY DAY 90 capsule 0     warfarin (COUMADIN/JANTOVEN) 2.5 MG tablet TAKE 1 TO 2 TABLETS DAILY (ADJUST DOSE PER INR RESULTS AS INSTRUCTED) 130 tablet 1     albuterol (PROVENTIL) 2.5 mg /3 mL (0.083 %) nebulizer solution Take 3 mL (2.5 mg total) by nebulization every 6 (six) hours as needed for wheezing. 75 vial 2     No current facility-administered medications for this visit.       Objective:   Vital Signs:   Visit Vitals  /72 (Patient Site: Left Arm, Patient Position: Sitting, Cuff Size: Adult Large)   Pulse 90   Temp 97.3  F (36.3  C) (Oral)   Ht 5' 8.5\" (1.74 m)   Wt " (!) 316 lb (143.3 kg)   LMP 11/28/2013   SpO2 96%   BMI 47.35 kg/m         VisionScreening:  No exam data present     PHYSICAL EXAM  Physical Examination: General appearance - alert, well appearing, and in no distress, sitting in chair with supplemental O2 at 2L  Mental status - alert, oriented to person, place, and time  Eyes - pupils equal and reactive, extraocular eye movements intact  Ears - bilateral TM's and external ear canals normal  Neck - supple, no significant adenopathy  Chest - clear to auscultation, no wheezes, rales or rhonchi, symmetric air entry  Heart - valve click noted, RRR  Abdomen - soft, nontender, nondistended, no masses or organomegaly  Neurological - alert, oriented, normal speech, no focal findings or movement disorder noted  Musculoskeletal - LUE decreased ROM due to surgery  Extremities - peripheral pulses normal, no pedal edema, no clubbing or cyanosis  Skin - normal coloration and turgor, no rashes, no suspicious skin lesions noted      Assessment Results 9/17/2019   Activities of Daily Living 5-6 - Severe functional impairment   Instrumental Activities of Daily Living 5-6 - Severe functional impairment   Mini Cog Total Score 4   Some recent data might be hidden     A Mini-Cog score of 0-2 suggests the possibility of dementia, score of 3-5 suggests no dementia    Identified Health Risks:     The patient was provided with suggestions to help her develop a healthy physical lifestyle.   She is at risk for lack of exercise and has been provided with information to increase physical activity for the benefit of her well-being.  The patient was counseled and encouraged to consider modifying their diet and eating habits. She was provided with information on recommended healthy diet options.  The patient reports that she has difficulty with activities of daily living.   The patient reports that she has difficulty with instrumental activities of daily living.  The patient was provided with  written information regarding signs of hearing loss.  Information on urinary incontinence and treatment options given to patient.  The patient was provided with suggestions to help her develop a healthy emotional lifestyle.   The patient s PHQ-9 score is consistent with moderate depression.    She is at risk for falling and has been provided with information to reduce the risk of falling at home.  Information regarding advance directives (living sheets), including where she can download the appropriate form, was provided to the patient via the AVS.

## 2021-06-01 NOTE — TELEPHONE ENCOUNTER
Prior Authorization Request  Who s requesting:  Pharmacy  [facility]  Medication Name: levalbuterol HFA  Ins[place] [employer] [place]  Insurance Member ID Number:  [id number]  Informed patient that prior authorizations can take up to 10 business days for response:   No  Okay to leave a detailed message: No      Patient has tried and failed albuterol, ventolin, proair.    Cannot use these products due to extreme anxiousness and tachy arrythmias.

## 2021-06-01 NOTE — PROGRESS NOTES
Pulmonary follow-up note  9/26/2019    Referring Physician: Dr. Salazar  Reason for follow-up: Wheezing and asthma with shortness of breath, moderate to severe persistent asthma      Problem List:     Patient Active Problem List   Diagnosis     Depression     Asthma     Benign essential hypertension     Multiple Sclerosis     Hyperlipidemia     Impaired Glucose Tolerance Test     Dysphagia     Benign Adenomatous Polyp Of The Large Intestine     History of pulmonary embolism     Morbid obesity (H)     Generalized anxiety disorder     Primary osteoarthritis of both hands     Polyarthralgia     Aortic valve replaced     Controlled substance agreement signed     Heart valve replaced     Closed 3-part fracture of proximal humerus with delayed healing, left     Acute on chronic respiratory failure with hypoxia (H)     Pulmonary hypertension (H)     Paroxysmal atrial fibrillation (H)       History:     Mrs. Eileen Donald is a 62 yo female with PMH significant for bicuspid aortic valve s/p AVR, history of EIB when she was younger in her 20s, HTN, MS that is well controlled, and PE who presented to pulmonary clinic for evaluation of asthma.  She stated she had always done well up until her AVR in 1/17.  She noticed that she was more short of breath, and that she had wheezing breath sounds.  She didn't really cough much, and it is nonproductive.  She did note that she had had increased phlegm and her throat felt congested.  She denied any orthopnea.  She was started on symbicort about 3 months prior to her initial visit and her shortness of breath improved.  But she still had some increased phlegm.  She is not sure that it is asthma though.  She does have several sisters that have asthma.    Today at follow up, she states her breathing is doing better overall, but not really improved since her last visit.    It was doing much better but then she fractured her left humeral head/greater tuberosity, which she has now had  repaired.    As her shoulder has improved, her breathing has really not.  She still gets good relief with albuterol, and mostly uses her inhaler but will also use her neb on occasion.  She also notes her oxygen will still go down when she exerts herself.  She also has had heart rates into the 140s.  She does not correlate the tachycardia to hypoxia, exertion, or albuterol, but is unsure.  She gets anxious as well.  No new chest pain, increased dyspnea, cough, or sputum production.  She remains therapeutic on her anticoagulation    Past Medical History:   Diagnosis Date     Anxiety      Aortic valve replaced 2/15/2017     Aortic valve stenosis 01/24/2017     Asthma     Created by Conversion      Chronic shortness of breath      Coronary artery disease      Diabetes mellitus (H)     borderline     History of blood clots      Hypertension     Created by Conversion  Replacement Utility updated for latest IMO load     Multiple sclerosis (H)     Created by Conversion      Obesity 10/29/2015     Panic attacks      Pre-diabetes      Pulmonary embolism (H)      Sleep apnea      Past Surgical History:   Procedure Laterality Date     CARPAL TUNNEL RELEASE       mechanical aortic prosthesis  01/24/2017     KS RECONSTR TOTAL SHOULDER IMPLANT Left 4/10/2019    Procedure: LEFT REVERSE TOTAL SHOULDER ARTHROPLASTY;  Surgeon: Luis Antonio Telles MD;  Location: [facility];  Service: Orthopedics     REDUCTION MAMMAPLASTY  1994     Social History     Socioeconomic History     Marital status:      Spouse name: Not on file     Number of children: Not on file     Years of education: Not on file     Highest education level: Not on file   Occupational History     Not on file   Social Needs     Financial resource strain: Not on file     Food insecurity:     Worry: Not on file     Inability: Not on file     Transportation needs:     Medical: Not on file     Non-medical: Not on file   Tobacco Use     Smoking status: Never Smoker      Smokeless tobacco: Never Used   Substance and Sexual Activity     Alcohol use: No     Drug use: No     Sexual activity: Not on file   Lifestyle     Physical activity:     Days per week: Not on file     Minutes per session: Not on file     Stress: Not on file   Relationships     Social connections:     Talks on phone: Not on file     Gets together: Not on file     Attends Restorationism service: Not on file     Active member of club or organization: Not on file     Attends meetings of clubs or organizations: Not on file     Relationship status: Not on file     Intimate partner violence:     Fear of current or ex partner: Not on file     Emotionally abused: Not on file     Physically abused: Not on file     Forced sexual activity: Not on file   Other Topics Concern     Not on file   Social History Narrative    Lives with sister in [place] due to help with medical issues.     and daughter live in [place].    Plans to return to be with family hopefully soon.    For MS- last used medications about 3 months. In remission. Needs to be back on medications as Neurologist has left.        Drew Hahn MD  5/29/2018         Family History   Problem Relation Age of Onset     Heart attack Father      Heart attack Sister      Heart attack Brother      Allergies   Allergen Reactions     Morphine Other (See Comments)     hallicinations     Sulfa (Sulfonamide Antibiotics) Hives     Azithromycin Nausea And Vomiting and Rash       Review of Systems - 10 point review of systems is negative except what is mentioned in the HPI.  Noted for easing bruising and decreased wound healing.        Medications:     Current Outpatient Medications on File Prior to Visit   Medication Sig Dispense Refill     acyclovir (ZOVIRAX) 400 MG tablet Take 400 mg by mouth 2 (two) times a day.        albuterol (PROAIR HFA;PROVENTIL HFA;VENTOLIN HFA) 90 mcg/actuation inhaler Inhale 2 puffs every 6 (six) hours as needed for wheezing. 1 Inhaler 3      aspirin 81 mg chewable tablet Chew 81 mg daily.              AVONEX 30 mcg/0.5 mL PnKt 1 application once a week.              buPROPion (WELLBUTRIN XL) 150 MG 24 hr tablet Take 1 tablet (150 mg total) by mouth every morning. 90 tablet 3     calcium, as carbonate, (TUMS) 200 mg calcium (500 mg) chewable tablet Chew 1 tablet daily as needed.              esomeprazole (NEXIUM) 20 MG capsule Take 1 capsule (20 mg total) by mouth daily. 90 capsule 3     furosemide (LASIX) 40 MG tablet Take 1.5 tablets (60 mg total) by mouth daily. Take additional afternoon dose for weight gain > 2 lbs in one day. 60 tablet 0     glycerin/witch hazel leaf (WITCH HAZEL-GLYCERIN, HAMAMEL, TOP) Apply 1 each topically as needed. Tucks             lisinopril (PRINIVIL,ZESTRIL) 5 MG tablet Take 1 tablet (5 mg total) by mouth daily. 90 tablet 3     LORazepam (ATIVAN) 0.5 MG tablet Take 1 tablet (0.5 mg total) by mouth daily as needed for anxiety. 30 tablet 0     metoprolol tartrate (LOPRESSOR) 25 MG tablet Take 1 tablet (25 mg total) by mouth 2 (two) times a day. 180 tablet 3     montelukast (SINGULAIR) 10 mg tablet TAKE 1 TABLET AT BEDTIME 30 tablet 12     OXYGEN-AIR DELIVERY SYSTEMS [facility] Use As Directed. DME: [employer]       potassium chloride (MICRO-K) 10 mEq CR capsule Take 20 mEq by mouth daily.       senna-docusate (PERICOLACE) 8.6-50 mg tablet Take 1 tablet by mouth 2 (two) times a day as needed for constipation.  0     simvastatin (ZOCOR) 20 MG tablet Take 1 tablet (20 mg total) by mouth at bedtime. 90 tablet 3     SYMBICORT 160-4.5 mcg/actuation inhaler USE 2 INHALATIONS TWICE A DAY 1 Inhaler 10     venlafaxine (EFFEXOR-XR) 75 MG 24 hr capsule TAKE 3 CAPSULES BY MOUTH EVERY DAY 90 capsule 0     warfarin (COUMADIN/JANTOVEN) 2.5 MG tablet TAKE 1 TO 2 TABLETS DAILY (ADJUST DOSE PER INR RESULTS AS INSTRUCTED) 130 tablet 1     albuterol (PROVENTIL) 2.5 mg /3 mL (0.083 %) nebulizer solution Take 3 mL (2.5 mg total) by nebulization every 6  (six) hours as needed for wheezing. 75 vial 2     No current facility-administered medications on file prior to visit.        Exam/Data:   Vitals  Vitals:    09/26/19 1101   BP: 136/72   Pulse: 80   Resp: 20   SpO2: 95%       EXAM:  GEN: Alert and oriented x3, NAD  HEENT: Nares patent, posterior oropharynx clear.  RESPIRATORY: CTAB.  No wheeze or rales  CARDIOVASCULAR: RRR, no m/r/g  GASTROINTESTINAL: Round, soft, NT/ND  HEM/ONC: no adenopathy, no ecchymosis  MSK: no clubbing.  Sitting in a wheel chair.   NEURO: cranial nerves appear grossly intact, muscle strength equal  SKIN: no rash or ulcerations      DATA    PFT DATA:  FEV1/FVC is 0.90 and is normal.  FEV1 is 58% predicted and is reduced.  FVC is 49% predicted and reduced.  There was no improvement in spirometry after a single inhaled dose of bronchodilator.  TLC is 69% predicted and is reduced.  RV is 79% predicted and is reduced.  DLCO is 82% predicted and is normal when it   is corrected for hemoglobin.     Impression:  Full Pulmonary Function Test is abnormal.  PFTs are consistent with moderate-severe restrictive disease.  Spirometry is not consistent with reversibility.  There is no hyperinflation.  There is no air-trapping.  Diffusion capacity when corrected for hemoglobin is normal.    NIOX: 20 ppb and is normal.    IMAGING:  CTA CHEST PE RUN  8/7/2017 3:29 PM     INDICATION: dyspnea  TECHNIQUE: Helical acquisition through the chest was performed during the arterial phase of contrast enhancement using IV contrast. 2D and 3D reconstructions were performed by the CT technologist. Dose reduction techniques were used.   IV CONTRAST: Iohexol (Omni) 100 mL  COMPARISON: March 10, 2016     FINDINGS:  ANGIOGRAM CHEST: Negative for pulmonary emboli. Negative for thoracic aortic dissection and aneurysms.     LUNGS AND PLEURA: No pleural effusions. Linear atelectasis right lower lung zone unchanged from the previous study.     MEDIASTINUM: Postop median sternotomy  and aortic valve.. No pericardial effusion.     LIMITED UPPER ABDOMEN: Hepatic steatosis.     MUSCULOSKELETAL: Negative.     IMPRESSION:   CONCLUSION:  1.  Negative for pulmonary emboli  2.  Postop aortic valve replacement  3.  Hepatic steatosis    Personally reviewed the CT with patient.  Appears to have scattered areas of mosaic attenuation, otherwise normal.    Assessment/Plan:   Eileen Donald is a 63 y.o. female PMH significant for bicuspid aortic valve s/p AVR, history of EIB when she was younger in her 20s, HTN, MS that is well controlled, and PE who presents to pulmonary clinic for evaluation of asthma.    1. Likely asthma, moderate persistent:  She has had good improvement in symptoms while on symbicort.  Her NIOX previously was normal, but on ICS therapy.  She has mosaic attenuation, chest tightness, wheezing, improves with beta agonists, and family history of asthma, making this likely asthma.  Her PFTs do not show obstruction, but rather restriction.  She continues to use Singulair.  Her symptoms continue to respond to ICS and bronchodilator therapy.  Some of her issues are likely due MS and debility, but I want her to use her albuterol more as needed, so we can get a sense of how much she needs. We will add in incruse daily.  I also want to try xopenex inhaler to see if some of her symptoms are due to tachycardia and anxiousness driven by albuterol. Both medication were sent to the pharmacy as 1 month supply, but she will call if she finds benefit and wants this transitioned to 3 month supply.    I will also refer her to pulmonary rehab as she has recovered from her shoulder surgery.    2. Restrictive Ventilatory Defect:  I think this is related to her body habitus, but could also be related to her MS.  Her diffusing capacity is normal.  I will see how her breathing does with the addition of singulair but since it has not significantly improved and more concerned that this is due to some  neuromuscular weakness.  She has been followed by Dr. Bear in neurology and has not had major issues with her MS. Her CTA does not show any ILD.      3. Dyspnea on Exertion:  Multifactorial.  Likely due to asthma not well controlled, compounded by obesity and MS, and furthered by debility.  She has also responded well to increased diuretic.  I would also consider pulmonary rehab at follow up if we have not made much headway.    Recommend:  - continue with symbicort 2 puffs two times a day  - add in incruse daily  - switch albuterol to lev-albuterol inhaler  - singulair 10 mg daily  - pulmonary rehab referral  - RTC in 2 months    I spent > 25 minutes in consultation with > 50% time spent face-to-face.      Ryan Grajeda, DO

## 2021-06-01 NOTE — TELEPHONE ENCOUNTER
Please contact medical supply and see what they need from us- pt will be requesting for power wheel chair and hospital bed.  Dr. Salazar

## 2021-06-01 NOTE — PATIENT INSTRUCTIONS - HE
I want you to start using the Incruse inhaler.    So the maintenance inhalers are:  1. Sybmicort, 2 puffs twice a day  2. Incruse, once a day    You can use the rescue inhaler as needed.   - but we will try Xoepenex instead of albuterol.    Stay on the singulair    If you notice any new symptoms that may be adverse affects of incruse, give us a call and stop the medication.    We will refer you to pulmonary rehab.

## 2021-06-01 NOTE — TELEPHONE ENCOUNTER
RN cannot approve Refill Request    RN can NOT refill this medication med is not covered by policy/route to provider       Daysi Mohan, [facility] Triage/Med Refill 9/9/2019    Requested Prescriptions   Pending Prescriptions Disp Refills     warfarin (COUMADIN/JANTOVEN) 2.5 MG tablet [Pharmacy Med Name: WARFARIN TABS 2.5MG] 130 tablet 5     Sig: TAKE 1 TO 2 TABLETS DAILY (ADJUST DOSE PER INR RESULTS AS INSTRUCTED)       Warfarin Refill Protocol  Failed - 9/8/2019 11:36 PM        Failed -  Route to appropriate pool/provider     Last Anticoagulation Summary:   Anticoagulation Episode Summary     Current INR goal:   2.5-3.5   TTR:   77.2 %   Next INR check:   8/29/2019   INR from last check:   3.30 (8/1/2019)   Weekly max warfarin dose:      Target end date:      INR check location:      Preferred lab:      Send INR reminders to:   [facility]    Indications    Heart valve replaced [Z95.2]           Comments:            Anticoagulation Care Providers     Provider Role Specialty Phone number    Tito Salazar MD Referring Family Medicine [phone]                Passed - Provider visit in last year     Last office visit with prescriber/PCP: 7/2/2019 Tito Salazar MD OR same dept: 7/2/2019 Tito Salazar MD OR same specialty: 7/2/2019 Tito Salazar MD  Last physical: 4/2/2019 Last MTM visit: Visit date not found    Next appt within 3 mo: Visit date not found Next physical within 3 mo: Visit date not found  Prescriber OR PCP: Tito Salazar MD  Last diagnosis associated with med order: 1. Aortic valve replaced  - warfarin (COUMADIN/JANTOVEN) 2.5 MG tablet [Pharmacy Med Name: WARFARIN TABS 2.5MG]; TAKE 1 TO 2 TABLETS DAILY (ADJUST DOSE PER INR RESULTS AS INSTRUCTED)  Dispense: 130 tablet; Refill: 5    2. Long term current use of anticoagulant therapy  - warfarin (COUMADIN/JANTOVEN) 2.5 MG tablet [Pharmacy Med Name: WARFARIN TABS 2.5MG]; TAKE 1 TO 2 TABLETS  DAILY (ADJUST DOSE PER INR RESULTS AS INSTRUCTED)  Dispense: 130 tablet; Refill: 5    If protocol passes may refill for 6 months if within 3 months of last provider visit (or a total of 9 months).

## 2021-06-01 NOTE — TELEPHONE ENCOUNTER
ANTICOAGULATION  MANAGEMENT    Assessment     Today's INR result of 3.1 is Therapeutic (goal INR of 2.5-3.5)        Warfarin taken as previously instructed    No new diet changes affecting INR    No new medication/supplements affecting INR    Continues to tolerate warfarin with no reported s/s of bleeding or thromboembolism     Previous INR was Therapeutic    Plan:     Left a detailed message for Eileen regarding INR result and instructed:     Warfarin Dosing Instructions:  Continue current warfarin dose 2.5 mg daily on Mon/Wed/Fri; and 5 mg daily rest of week  (0 % change)    Instructed patient to follow up no later than: 4-6 weeks    Education provided: importance of therapeutic range    Instructed to call the [facility] for any changes, questions or concerns. (#[phone])   ?   Kristie Yanez RN    Subjective/Objective:      Eileen Donald, a 63 y.o. female is on warfarin.     Eileen reports:     Home warfarin dose: as updated on anticoagulation calendar per template     Missed doses: No     Medication changes:  No     S/S of bleeding or thromboembolism:  No     New Injury or illness:  No     Changes in diet or alcohol consumption:  No     Upcoming surgery, procedure or cardioversion:  No    Anticoagulation Episode Summary     Current INR goal:   2.5-3.5   TTR:   79.7 %   Next INR check:   10/29/2019   INR from last check:   3.10 (9/17/2019)   Weekly max warfarin dose:      Target end date:      INR check location:      Preferred lab:      Send INR reminders to:   [facility]    Indications    Heart valve replaced [Z95.2]           Comments:            Anticoagulation Care Providers     Provider Role Specialty Phone number    Tito Salazar MD Referring Family Medicine [phone]

## 2021-06-02 ENCOUNTER — COMMUNICATION - HEALTHEAST (OUTPATIENT)
Dept: ANTICOAGULATION | Facility: CLINIC | Age: 66
End: 2021-06-02

## 2021-06-02 ENCOUNTER — RECORDS - HEALTHEAST (OUTPATIENT)
Dept: ADMINISTRATIVE | Facility: CLINIC | Age: 66
End: 2021-06-02

## 2021-06-02 VITALS — BODY MASS INDEX: 48.4 KG/M2 | WEIGHT: 293 LBS

## 2021-06-02 VITALS — BODY MASS INDEX: 48.57 KG/M2 | WEIGHT: 293 LBS

## 2021-06-02 VITALS — WEIGHT: 293 LBS | BODY MASS INDEX: 48.37 KG/M2

## 2021-06-02 VITALS — HEIGHT: 69 IN | WEIGHT: 293 LBS | BODY MASS INDEX: 43.4 KG/M2

## 2021-06-02 VITALS — WEIGHT: 293 LBS | BODY MASS INDEX: 43.4 KG/M2 | HEIGHT: 69 IN

## 2021-06-02 VITALS — BODY MASS INDEX: 48.91 KG/M2 | WEIGHT: 293 LBS

## 2021-06-02 VITALS — BODY MASS INDEX: 48.55 KG/M2 | WEIGHT: 293 LBS

## 2021-06-02 NOTE — TELEPHONE ENCOUNTER
Left message to call back for: Appointment  Information to relay to patient:  Left detailed message stating message below from

## 2021-06-02 NOTE — PROGRESS NOTES
ASSESSMENT/PLAN  1. History of left shoulder replacement  Patient is status post shoulder replacement left  Was reaching back yesterday and felt a pop in her left shoulder with discomfort  She has not noticed any bruising today she is back to her normal range of motion and has no discomfort  Discussed with her the might of been a slight tendon that moved versus a calcification breakdown  With her normal range of motion with I would just monitor at this time    2. Mouth sore  Patient has an aphthous ulcer in her mouth  Discussed topical chlorhexidine  Contact me if not improving  - chlorhexidine (PERIDEX) 0.12 % solution; Swish and spit 5mL three times daily  Dispense: 473 mL; Refill: 0    3. Visit for screening mammogram  Patient is due for mammogram  - Mammo Screening Bilateral; Future    4. Multiple Sclerosis  Due to patient's progressing multiple medical conditions including MS pulmonary hypertension status post aortic valve replacement osteoarthritis and her morbid obesity patient is requesting the need for medical equipment in the home  She is having difficulty with ambulation getting in and out of bed or chair as well as ambulation in general  Discussed with her trying to obtain a hospital bed at home, a lift chair and the possibility of a powered wheelchair  Her symptoms have been increasing in regards to her shortness of breath and physical deconditioning  This is a face-to-face encounter stating that she is in need of these above devices  This would not only improve mobility but help slow progression of disease  These will prevent skin breakdown and decrease her risk of falls getting in and out of bed as well as out of a seated position in her home    5. Pulmonary hypertension (H)  Please see #4 above    6. Heart valve replaced  Please see #4 above    7. Morbid obesity (H)  Please see #4 above    8. Osteoarthritis, unspecified osteoarthritis type, unspecified site  Please see #4 above        SUBJECTIVE:  "  Chief Complaint   Patient presents with     Follow-up     Recheck left shoulder, yesterday while reaching for something heard a \"pop\"      Mouth Lesions     Sore in mouth      Eileen Donald 63 y.o. female    Current Outpatient Medications   Medication Sig Dispense Refill     acyclovir (ZOVIRAX) 400 MG tablet Take 400 mg by mouth 2 (two) times a day.        aspirin 81 mg chewable tablet Chew 81 mg daily.              AVONEX 30 mcg/0.5 mL PnKt 1 application once a week.              buPROPion (WELLBUTRIN XL) 150 MG 24 hr tablet Take 1 tablet (150 mg total) by mouth every morning. 90 tablet 3     calcium, as carbonate, (TUMS) 200 mg calcium (500 mg) chewable tablet Chew 1 tablet daily as needed.              esomeprazole (NEXIUM) 20 MG capsule Take 1 capsule (20 mg total) by mouth daily. 90 capsule 3     furosemide (LASIX) 40 MG tablet Take 1.5 tablets (60 mg total) by mouth daily. Take additional afternoon dose for weight gain > 2 lbs in one day. 60 tablet 0     glycerin/witch hazel leaf (WITCH HAZEL-GLYCERIN, HAMAMEL, TOP) Apply 1 each topically as needed. Tucks             levalbuterol (XOPENEX HFA) 45 mcg/actuation inhaler Inhale 1-2 puffs every 4 (four) hours as needed for wheezing. 1 Inhaler 12     lisinopril (PRINIVIL,ZESTRIL) 5 MG tablet Take 1 tablet (5 mg total) by mouth daily. 90 tablet 3     LORazepam (ATIVAN) 0.5 MG tablet Take 1 tablet (0.5 mg total) by mouth daily as needed for anxiety. 30 tablet 0     metoprolol tartrate (LOPRESSOR) 25 MG tablet Take 1 tablet (25 mg total) by mouth 2 (two) times a day. 180 tablet 3     montelukast (SINGULAIR) 10 mg tablet TAKE 1 TABLET AT BEDTIME 30 tablet 12     OXYGEN-AIR DELIVERY SYSTEMS [facility] Use As Directed. DME: [facility]       potassium chloride (MICRO-K) 10 mEq CR capsule Take 20 mEq by mouth daily.       senna-docusate (PERICOLACE) 8.6-50 mg tablet Take 1 tablet by mouth 2 (two) times a day as needed for constipation.  0     simvastatin (ZOCOR) 20 MG tablet " Take 1 tablet (20 mg total) by mouth at bedtime. 90 tablet 3     SYMBICORT 160-4.5 mcg/actuation inhaler USE 2 INHALATIONS TWICE A DAY 1 Inhaler 10     umeclidinium (INCRUSE ELLIPTA) 62.5 mcg/actuation DsDv inhaler Inhale 1 puff daily. 30 each 11     venlafaxine (EFFEXOR-XR) 75 MG 24 hr capsule TAKE 3 CAPSULES BY MOUTH EVERY DAY 90 capsule 0     warfarin (COUMADIN/JANTOVEN) 2.5 MG tablet TAKE 1 TO 2 TABLETS DAILY (ADJUST DOSE PER INR RESULTS AS INSTRUCTED) 130 tablet 1     albuterol (PROAIR HFA;PROVENTIL HFA;VENTOLIN HFA) 90 mcg/actuation inhaler Inhale 2 puffs every 6 (six) hours as needed for wheezing. 1 Inhaler 3     albuterol (PROVENTIL) 2.5 mg /3 mL (0.083 %) nebulizer solution Take 3 mL (2.5 mg total) by nebulization every 6 (six) hours as needed for wheezing. 75 vial 2     chlorhexidine (PERIDEX) 0.12 % solution Swish and spit 5mL three times daily 473 mL 0     No current facility-administered medications for this visit.      Allergies: Morphine; Sulfa (sulfonamide antibiotics); and Azithromycin   Patient's last menstrual period was 11/28/2013.    HPI:   Patient is here for a mouth sore-she has an aphthous ulcer on examination we discussed topical chlorhexidine    She was moving her arm to pick someone up and heard a pop yesterday she had pain but today is no longer having any pain  She is noted no bruising  She has her normal range of motion and we discussed monitoring at this time    Due to patient's multiple medical conditions and progression of disease patient is having more difficulty ambulating, having increasing shortness of breath is chronically on oxygen is having difficulty getting in and out of bed and out of chair-we have recommend that she try to obtain durable medical equipment for home-specifically a hospital bed, lift chair, and a powered wheelchair that she will use in an outside of the home.  Is a face-to-face encounter indicating that these are needed.  We will send these orders to medical  supply company that she is hoping to follow with.    ROS: negative except as per HPI    OBJECTIVE:   The patient appears well, alert, oriented x 3, in no distress.  /61 (Patient Site: Right Arm, Patient Position: Sitting, Cuff Size: Adult Large)   Pulse 74   Temp 96.8  F (36  C) (Oral)   Resp 22   Wt (!) 317 lb 9.6 oz (144.1 kg)   LMP 11/28/2013   SpO2 94%   BMI 47.59 kg/m      HEENT: Aphthous ulcer left side of tongue 2 to 3 mm in diameter  Lungs: clear, good air entry, no wheezes, rhonchi or rales.   Cardiac: Regular rate and rhythm, no murmur with valve replacement  Abdomen: normal bowel sounds, soft, morbid obesity BMI 47  Extremities: show no edema, normal peripheral pulses.   Neurological: normal, no focal findings.  Skin: clear, dry, no rashes/lesions  Psych- normal mood and affect      Pt states an understanding and agrees to the above plan.

## 2021-06-02 NOTE — TELEPHONE ENCOUNTER
Call from patient.  States he saw Dr. Grajeda 9/26 and he prescribed some new inhalers.  She does not feel the Incruse is doing anything for her, in fact, feels it is making her breathing worse.  Wondering if she should keep giving this a try or just stop??  She has also tried the levalbuterol just once and does not feel this works as well as the albuterol, but she will keep trying this as she has only tried it once.    She is still taking the symbicort two times a day, and will continue the incruse if Dr. Grajeda thinks she should continue??

## 2021-06-02 NOTE — PROGRESS NOTES
DIAGNOSIS:  1. Pain of left hand  XR Hand Left 3 or More VWS   2. Left wrist pain  XR Wrist Left 3 or More VWS   3. Pain of right middle finger  XR Finger Right 2 or More VWS       PLAN:     xrays read as normal and reviewed with pt and her daughter (note that subsequent radiology report noted what appeared to be an osteophyte fracture involving the right middle finger).    Tylenol for pain.    Heat or ice            HPI:  Fell at home yesterday when she turned from answering the door.  Unsure of why she fell.   No dog or rug to trip on.   Doesn't remember being dizzy and there was no LOC.    Has pain in the right middle finger and the left thumb and wrist.  Also buttocks pain  Didn't hit her head.          Current Outpatient Medications on File Prior to Visit   Medication Sig Dispense Refill     acyclovir (ZOVIRAX) 400 MG tablet Take 400 mg by mouth 2 (two) times a day.        aspirin 81 mg chewable tablet Chew 81 mg daily.              AVONEX 30 mcg/0.5 mL PnKt 1 application once a week.              buPROPion (WELLBUTRIN XL) 150 MG 24 hr tablet Take 1 tablet (150 mg total) by mouth every morning. 90 tablet 3     calcium, as carbonate, (TUMS) 200 mg calcium (500 mg) chewable tablet Chew 1 tablet daily as needed.              chlorhexidine (PERIDEX) 0.12 % solution Swish and spit 5mL three times daily 473 mL 0     esomeprazole (NEXIUM) 20 MG capsule Take 1 capsule (20 mg total) by mouth daily. 90 capsule 3     furosemide (LASIX) 40 MG tablet Take 1.5 tablets (60 mg total) by mouth daily. Take additional afternoon dose for weight gain > 2 lbs in one day. 60 tablet 0     levalbuterol (XOPENEX HFA) 45 mcg/actuation inhaler Inhale 1-2 puffs every 4 (four) hours as needed for wheezing. 1 Inhaler 12     lisinopril (PRINIVIL,ZESTRIL) 5 MG tablet Take 1 tablet (5 mg total) by mouth daily. 90 tablet 3     LORazepam (ATIVAN) 0.5 MG tablet Take 1 tablet (0.5 mg total) by mouth daily as needed for anxiety. 30 tablet 0      metoprolol tartrate (LOPRESSOR) 25 MG tablet Take 1 tablet (25 mg total) by mouth 2 (two) times a day. 180 tablet 3     montelukast (SINGULAIR) 10 mg tablet TAKE 1 TABLET AT BEDTIME 30 tablet 12     OXYGEN-AIR DELIVERY SYSTEMS [facility] Use As Directed. DME: [employer]       potassium chloride (MICRO-K) 10 mEq CR capsule Take 20 mEq by mouth daily.       senna-docusate (PERICOLACE) 8.6-50 mg tablet Take 1 tablet by mouth 2 (two) times a day as needed for constipation.  0     simvastatin (ZOCOR) 20 MG tablet Take 1 tablet (20 mg total) by mouth at bedtime. 90 tablet 3     SYMBICORT 160-4.5 mcg/actuation inhaler USE 2 INHALATIONS TWICE A DAY 1 Inhaler 10     umeclidinium (INCRUSE ELLIPTA) 62.5 mcg/actuation DsDv inhaler Inhale 1 puff daily. 30 each 11     venlafaxine (EFFEXOR-XR) 75 MG 24 hr capsule TAKE 3 CAPSULES BY MOUTH EVERY DAY 90 capsule 0     warfarin (COUMADIN/JANTOVEN) 2.5 MG tablet TAKE 1 TO 2 TABLETS DAILY (ADJUST DOSE PER INR RESULTS AS INSTRUCTED) 130 tablet 1     albuterol (PROAIR HFA;PROVENTIL HFA;VENTOLIN HFA) 90 mcg/actuation inhaler Inhale 2 puffs every 6 (six) hours as needed for wheezing. 1 Inhaler 3     albuterol (PROVENTIL) 2.5 mg /3 mL (0.083 %) nebulizer solution Take 3 mL (2.5 mg total) by nebulization every 6 (six) hours as needed for wheezing. 75 vial 2     glycerin/witch hazel leaf (WITCH HAZEL-GLYCERIN, HAMAMEL, TOP) Apply 1 each topically as needed. Tucks             No current facility-administered medications on file prior to visit.        Pmh: reviewed  Psh: reviewed  Allergy:  reviewed      EXAM:    /75   Pulse 96   Temp 97.1  F (36.2  C) (Oral)   Resp 28   Wt (!) 315 lb 3.2 oz (143 kg)   LMP 11/28/2013   SpO2 96%   BMI 47.23 kg/m    GEN:   ALERT, NAD, ORIENTED TIMES THREE  LUNGS: CTA  COR: RRR WITHOUT MURMUR  SKIN: NO RASH , ULCERS OR LESIONS NOTED  MS: tx over the dorsum of the left hand is non-focal.  tx over the right middle finger, non-focal.  EXT: WITHOUT  EDEMA/SWELLING    No results found for this or any previous visit (from the past 168 hour(s)).    X-rays of the right middle finger, left hand and left wrist show  NO FRACTURE     Lab Results   Component Value Date    INR 3.10 (H) 09/17/2019    INR 3.30 (H) 08/01/2019    INR 3.50 (H) 07/02/2019

## 2021-06-02 NOTE — TELEPHONE ENCOUNTER
Refill Given    Refill given per Policy, patient informed they are overdue for Labs   OV 10/3/19    Trish Hurley, [facility] Triage/Med Refill 10/23/2019    Requested Prescriptions   Pending Prescriptions Disp Refills     venlafaxine (EFFEXOR-XR) 75 MG 24 hr capsule [Pharmacy Med Name: VENLAFAXINE HCL ER CAPS 75MG] 270 capsule 4     Sig: TAKE 3 CAPSULES DAILY       Venlafaxine/Desvenlafaxine Refill Protocol Failed - 10/23/2019 11:15 PM        Failed - LFT or AST or ALT in last year     Albumin   Date Value Ref Range Status   06/26/2018 3.8 3.5 - 5.0 g/dL Final     Bilirubin, Total   Date Value Ref Range Status   06/26/2018 0.4 0.0 - 1.0 mg/dL Final     Alkaline Phosphatase   Date Value Ref Range Status   06/26/2018 116 45 - 120 U/L Final     AST   Date Value Ref Range Status   06/26/2018 32 0 - 40 U/L Final     ALT   Date Value Ref Range Status   06/26/2018 20 0 - 45 U/L Final     Protein, Total   Date Value Ref Range Status   06/26/2018 7.1 6.0 - 8.0 g/dL Final                Failed - Fasting lipid cascade in last year     Cholesterol   Date Value Ref Range Status   11/09/2016 220 (H) <=199 mg/dL Final     Triglycerides   Date Value Ref Range Status   11/09/2016 134 <=149 mg/dL Final     HDL Cholesterol   Date Value Ref Range Status   11/09/2016 70 >=50 mg/dL Final     LDL Calculated   Date Value Ref Range Status   11/09/2016 123 <=129 mg/dL Final     Patient Fasting > 8hrs?   Date Value Ref Range Status   11/09/2016 Yes  Final             Passed - PCP or prescribing provider visit in last year     Last office visit with prescriber/PCP: 10/3/2019 Tito Salazar MD OR same dept: 10/10/2019 Antoine Webb MD OR same specialty: 10/10/2019 Antoine Webb MD  Last physical: 9/17/2019 Last MTM visit: Visit date not found   Next visit within 3 mo: Visit date not found  Next physical within 3 mo: Visit date not found  Prescriber OR PCP: Tito Salazar MD  Last diagnosis associated  with med order: 1. Depression  - venlafaxine (EFFEXOR-XR) 75 MG 24 hr capsule [Pharmacy Med Name: VENLAFAXINE HCL ER CAPS 75MG]; TAKE 3 CAPSULES DAILY  Dispense: 270 capsule; Refill: 4    If protocol passes may refill for 12 months if within 3 months of last provider visit (or a total of 15 months).             Passed - Blood Pressure in last year     BP Readings from Last 1 Encounters:   10/10/19 139/75

## 2021-06-02 NOTE — TELEPHONE ENCOUNTER
"Pt is calling in about a fall she had yesterday, and wants to know if she should be seen. Pt reports she did not hit her head, but did fall on her buttocks, is unable to look and see if it is bruised, but it hurts. Pt reports she also hurt her middle finger on the right hand, and it is swollen, bruised and purple. Pt thinks it is broken. Pt has splinted it for now, but it is painful.   Pt also hurt her left hand and wrist, and this is also painful but concerning because she had a recent shoulder replacement about a month ago. Pt rates pain \"5\".   Per protocol pt should be seen in the office today. Pt was transferred to scheduling and made an appointment with Dr. Webb for 1230 today, but is requesting a message be sent to the clinic to see if Dr Salazar could squeeze her in to be seen with him instead.  Please call Eileen at  [phone] and let her know if this would be possible.     Provider please advise.      Rikki Oliveros RN [facility] Triage/Medication Refill    Reason for Disposition    Finger joint can't be opened (straightened) or closed (bent) completely    Protocols used: FINGER INJURY-A-OH      "

## 2021-06-02 NOTE — TELEPHONE ENCOUNTER
Was not able to reach patient by phone.  Left detailed VM message with my phone number to call back if any questions.  Per Dr. Grajeda:  Eileen can stop the Incruse if it is making her breathing worse. She can use albuterol if it works better, the xopenex was to try to avoid side effects. Whichever she prefers.

## 2021-06-02 NOTE — TELEPHONE ENCOUNTER
Reason contacted:  Xray results   Information relayed:  Message from provider below.   Additional questions:  No  Further follow-up needed:  No  Okay to leave a detailed message:  Yes

## 2021-06-02 NOTE — TELEPHONE ENCOUNTER
Spoke to  [facility], they would need face to face notes and Rx for power wheelchair and hospital bed.

## 2021-06-02 NOTE — TELEPHONE ENCOUNTER
Name of form/paperwork: Other:  [facility] form  Have you been seen for this request: Yes:  Last seen by Dr. Salazar on 10/3/19  Do we have the form: Yes- Received 10/9/19  When is form needed by: N/A  How would you like the form returned: Faxed  Fax Number: [phone]  Patient Notified form requests are processed in 3-5 business days: No comments on when form should be completed and faxed. Placed in purple team's work bin.   Okay to leave a detailed message? Yes.      repeat

## 2021-06-02 NOTE — TELEPHONE ENCOUNTER
----- Message from Antoine Webb MD sent at 10/10/2019  6:17 PM CDT -----  PLEASE VERIFY PT HAS RECEIVED THIS MYCHART MESSAGE  Rito Hunt:  There is a fracture of one of the osteoarthritis lumps (OSTEOPHYTE) of the right middle finger.  There is no treatment required other than splinting which is recommended until pain subsides (4-6 weeks).

## 2021-06-03 VITALS
HEART RATE: 74 BPM | OXYGEN SATURATION: 94 % | TEMPERATURE: 96.8 F | WEIGHT: 293 LBS | DIASTOLIC BLOOD PRESSURE: 61 MMHG | BODY MASS INDEX: 47.59 KG/M2 | RESPIRATION RATE: 22 BRPM | SYSTOLIC BLOOD PRESSURE: 124 MMHG

## 2021-06-03 VITALS
DIASTOLIC BLOOD PRESSURE: 72 MMHG | BODY MASS INDEX: 43.4 KG/M2 | SYSTOLIC BLOOD PRESSURE: 116 MMHG | HEART RATE: 90 BPM | HEIGHT: 69 IN | OXYGEN SATURATION: 96 % | WEIGHT: 293 LBS | TEMPERATURE: 97.3 F

## 2021-06-03 VITALS
HEART RATE: 80 BPM | OXYGEN SATURATION: 95 % | DIASTOLIC BLOOD PRESSURE: 72 MMHG | BODY MASS INDEX: 47.2 KG/M2 | SYSTOLIC BLOOD PRESSURE: 136 MMHG | RESPIRATION RATE: 20 BRPM | WEIGHT: 293 LBS

## 2021-06-03 VITALS
DIASTOLIC BLOOD PRESSURE: 75 MMHG | RESPIRATION RATE: 28 BRPM | SYSTOLIC BLOOD PRESSURE: 139 MMHG | BODY MASS INDEX: 47.23 KG/M2 | TEMPERATURE: 97.1 F | WEIGHT: 293 LBS | OXYGEN SATURATION: 96 % | HEART RATE: 96 BPM

## 2021-06-03 VITALS — BODY MASS INDEX: 47.83 KG/M2 | WEIGHT: 293 LBS

## 2021-06-03 VITALS — WEIGHT: 293 LBS | BODY MASS INDEX: 43.4 KG/M2 | HEIGHT: 69 IN

## 2021-06-03 NOTE — TELEPHONE ENCOUNTER
ANTICOAGULATION  MANAGEMENT PROGRAM    Eileen Donald is overdue for INR check.     Spoke with Marilee and scheduled INR appointment on 11/11.      Kristie Yanez RN

## 2021-06-03 NOTE — PROGRESS NOTES
Oxygen saturation walk test    Patient oxygen saturation on RA at rest is 91%.  Oxygen saturation while ambulating 150ft on RA is 84%.    Oxygen pulse dose testing  While ambulating 150ft on 3LPM at pulse dose, oxygen saturation is 88%.     Pt is unable to physically walk more than 150 ft.     DME Provider: [employer]    Patient is ambulatory within his/her home.

## 2021-06-03 NOTE — TELEPHONE ENCOUNTER
Left message at Marilee's dental office asking for call back regarding upcoming extraction  [facility] [phone] Dr Casey

## 2021-06-03 NOTE — TELEPHONE ENCOUNTER
ANTICOAGULATION  MANAGEMENT PROGRAM    Eileen Donald is overdue for INR check.     First reminder letter sent.       Zaynab Aguilar

## 2021-06-03 NOTE — PATIENT INSTRUCTIONS - HE
I am glad the Incruse helped.    I want you to stay on the Incruse and Symbicort.    Lets see you back in 3 months.

## 2021-06-03 NOTE — TELEPHONE ENCOUNTER
Who is calling:  Patient  Reason for Call:  The patient is requesting advise regarding a tooth extraction she needs as soon possible and taking warfarin. Please advise at the number provided.    Okay to leave a detailed message: Yes

## 2021-06-03 NOTE — PROGRESS NOTES
"Pulmonary follow-up note  11/25/2019    Referring Physician: Dr. Salazar  Reason for follow-up: Wheezing and asthma with shortness of breath, moderate to severe persistent asthma      Problem List:     Patient Active Problem List   Diagnosis     Depression     Asthma     Benign essential hypertension     Multiple Sclerosis     Hyperlipidemia     Impaired Glucose Tolerance Test     Dysphagia     Benign Adenomatous Polyp Of The Large Intestine     History of pulmonary embolism     Morbid obesity (H)     Generalized anxiety disorder     Primary osteoarthritis of both hands     Polyarthralgia     Aortic valve replaced     Controlled substance agreement signed     Heart valve replaced     Closed 3-part fracture of proximal humerus with delayed healing, left     Acute on chronic respiratory failure with hypoxia (H)     Pulmonary hypertension (H)     Paroxysmal atrial fibrillation (H)       History:     Mrs. Eileen Donald is a 64 yo female with PMH significant for bicuspid aortic valve s/p AVR, history of EIB when she was younger in her 20s, HTN, MS that is well controlled, and PE who presented to pulmonary clinic for evaluation of asthma.  She stated she had always done well up until her AVR in 1/17.  She noticed that she was more short of breath, and that she had wheezing breath sounds.  She didn't really cough much, and it is nonproductive.  She did note that she had had increased phlegm and her throat felt congested.  She denied any orthopnea.  She was started on symbicort about 3 months prior to her initial visit and her shortness of breath improved.  But she still had some increased phlegm.  She is not sure that it is asthma though.  She does have several sisters that have asthma.    Today at follow up, she states her breathing is doing better overall, and she feels that the Incruse has helped significantly.  She does get \"lightheaded\", but she is not sure it is any more frequent than before.  Her MS has " progressed and she is now on an q 6 month infusion.  She still gets good relief with JACOB as lev-albuterol, and mostly uses her inhaler but will also use her neb on occasion.  She also notes her oxygen will still go down when she exerts herself.  Her heart rate still goes up, but only into the 120s, not 140s like previous  She gets anxious as well.  No new chest pain, increased dyspnea, cough, or sputum production.  She remains therapeutic on her anticoagulation    Today she does inquire about a smaller POC, and plans to try to purchase her own unit.    Past Medical History:   Diagnosis Date     Anxiety      Aortic valve replaced 2/15/2017     Aortic valve stenosis 01/24/2017     Asthma     Created by Conversion      Chronic shortness of breath      Coronary artery disease      Diabetes mellitus (H)     borderline     History of blood clots      Hypertension     Created by Conversion  Replacement Utility updated for latest IMO load     Multiple sclerosis (H)     Created by Conversion      Obesity 10/29/2015     Panic attacks      Pre-diabetes      Pulmonary embolism (H)      Sleep apnea      Past Surgical History:   Procedure Laterality Date     CARPAL TUNNEL RELEASE       mechanical aortic prosthesis  01/24/2017     LA RECONSTR TOTAL SHOULDER IMPLANT Left 4/10/2019    Procedure: LEFT REVERSE TOTAL SHOULDER ARTHROPLASTY;  Surgeon: Luis Antonio Telles MD;  Location: [facility];  Service: Orthopedics     REDUCTION MAMMAPLASTY  1994     Social History     Socioeconomic History     Marital status:      Spouse name: Not on file     Number of children: Not on file     Years of education: Not on file     Highest education level: Not on file   Occupational History     Not on file   Social Needs     Financial resource strain: Not on file     Food insecurity:     Worry: Not on file     Inability: Not on file     Transportation needs:     Medical: Not on file     Non-medical: Not on file   Tobacco Use     Smoking  status: Never Smoker     Smokeless tobacco: Never Used   Substance and Sexual Activity     Alcohol use: No     Drug use: No     Sexual activity: Not on file   Lifestyle     Physical activity:     Days per week: Not on file     Minutes per session: Not on file     Stress: Not on file   Relationships     Social connections:     Talks on phone: Not on file     Gets together: Not on file     Attends Gnosticism service: Not on file     Active member of club or organization: Not on file     Attends meetings of clubs or organizations: Not on file     Relationship status: Not on file     Intimate partner violence:     Fear of current or ex partner: Not on file     Emotionally abused: Not on file     Physically abused: Not on file     Forced sexual activity: Not on file   Other Topics Concern     Not on file   Social History Narrative    Lives with sister in [place] due to help with medical issues.     and daughter live in [place].    Plans to return to be with family hopefully soon.    For MS- last used medications about 3 months. In remission. Needs to be back on medications as Neurologist has left.        Drew Hahn MD  5/29/2018         Family History   Problem Relation Age of Onset     Heart attack Father      Heart attack Sister      Heart attack Brother      Breast cancer Neg Hx      Allergies   Allergen Reactions     Morphine Other (See Comments)     hallicinations     Sulfa (Sulfonamide Antibiotics) Hives     Azithromycin Nausea And Vomiting and Rash       Review of Systems - 10 point review of systems is negative except what is mentioned in the HPI.  Noted for easing bruising and decreased wound healing.        Medications:     Current Outpatient Medications on File Prior to Visit   Medication Sig Dispense Refill     acyclovir (ZOVIRAX) 400 MG tablet Take 400 mg by mouth 2 (two) times a day.        amoxicillin (AMOXIL) 500 MG capsule Take 500 mg by mouth 3 (three) times a day.       aspirin 81 mg  chewable tablet Chew 81 mg daily.              AVONEX 30 mcg/0.5 mL PnKt 1 application once a week.              buPROPion (WELLBUTRIN XL) 150 MG 24 hr tablet Take 1 tablet (150 mg total) by mouth every morning. 90 tablet 3     calcium, as carbonate, (TUMS) 200 mg calcium (500 mg) chewable tablet Chew 1 tablet daily as needed.              chlorhexidine (PERIDEX) 0.12 % solution Swish and spit 5mL three times daily 473 mL 0     cholecalciferol, vitamin D3, (VITAMIN D3) 5,000 unit Tab Take 5,000 Units by mouth.       esomeprazole (NEXIUM) 20 MG capsule Take 1 capsule (20 mg total) by mouth daily. 90 capsule 3     furosemide (LASIX) 40 MG tablet Take 1.5 tablets (60 mg total) by mouth daily. Take additional afternoon dose for weight gain > 2 lbs in one day. 60 tablet 0     levalbuterol (XOPENEX HFA) 45 mcg/actuation inhaler Inhale 1-2 puffs every 4 (four) hours as needed for wheezing. 1 Inhaler 12     lisinopril (PRINIVIL,ZESTRIL) 5 MG tablet Take 1 tablet (5 mg total) by mouth daily. 90 tablet 3     LORazepam (ATIVAN) 0.5 MG tablet Take 1 tablet (0.5 mg total) by mouth daily as needed for anxiety. 30 tablet 0     metoprolol tartrate (LOPRESSOR) 25 MG tablet Take 1 tablet (25 mg total) by mouth 2 (two) times a day. 180 tablet 3     montelukast (SINGULAIR) 10 mg tablet TAKE 1 TABLET AT BEDTIME 30 tablet 12     OXYGEN-AIR DELIVERY SYSTEMS [facility] Use As Directed. DME: [employer]       potassium chloride (MICRO-K) 10 mEq CR capsule Take 20 mEq by mouth daily.       senna-docusate (PERICOLACE) 8.6-50 mg tablet Take 1 tablet by mouth 2 (two) times a day as needed for constipation.  0     simvastatin (ZOCOR) 20 MG tablet Take 1 tablet (20 mg total) by mouth at bedtime. 90 tablet 3     SYMBICORT 160-4.5 mcg/actuation inhaler USE 2 INHALATIONS TWICE A DAY 1 Inhaler 10     umeclidinium (INCRUSE ELLIPTA) 62.5 mcg/actuation DsDv inhaler Inhale 1 puff daily. 30 each 11     venlafaxine (EFFEXOR-XR) 75 MG 24 hr capsule TAKE 3 CAPSULES  DAILY 270 capsule 0     warfarin (COUMADIN/JANTOVEN) 2.5 MG tablet TAKE 1 TO 2 TABLETS DAILY (ADJUST DOSE PER INR RESULTS AS INSTRUCTED) 130 tablet 1     albuterol (PROVENTIL) 2.5 mg /3 mL (0.083 %) nebulizer solution Take 3 mL (2.5 mg total) by nebulization every 6 (six) hours as needed for wheezing. 75 vial 2     No current facility-administered medications on file prior to visit.        Exam/Data:   Vitals  Vitals:    11/25/19 1029   BP: 128/82   Pulse: 79   Resp: 12   SpO2: 96%       EXAM:  GEN: Alert and oriented x3, NAD  HEENT: Nares patent, posterior oropharynx clear.  RESPIRATORY: CTAB.  No wheeze or rales  CARDIOVASCULAR: RRR, no m/r/g  GASTROINTESTINAL: Round, soft, NT/ND  HEM/ONC: no adenopathy, no ecchymosis  MSK: no clubbing.  Sitting in a wheel chair.   NEURO: cranial nerves appear grossly intact, muscle strength equal  SKIN: no rash or ulcerations      DATA    PFT DATA:  FEV1/FVC is 0.90 and is normal.  FEV1 is 58% predicted and is reduced.  FVC is 49% predicted and reduced.  There was no improvement in spirometry after a single inhaled dose of bronchodilator.  TLC is 69% predicted and is reduced.  RV is 79% predicted and is reduced.  DLCO is 82% predicted and is normal when it   is corrected for hemoglobin.     Impression:  Full Pulmonary Function Test is abnormal.  PFTs are consistent with moderate-severe restrictive disease.  Spirometry is not consistent with reversibility.  There is no hyperinflation.  There is no air-trapping.  Diffusion capacity when corrected for hemoglobin is normal.    NIOX: 20 ppb and is normal.    Modified Walk Test 11/25/19  O2 at rest is < 89%.  This improves to >88% with 2LPM O2.  With ambulation her oxygen decreases to <88%, but improves to >88% with 3 LPM    IMAGING:  CTA CHEST PE RUN  8/7/2017 3:29 PM     INDICATION: dyspnea  TECHNIQUE: Helical acquisition through the chest was performed during the arterial phase of contrast enhancement using IV contrast. 2D and 3D  reconstructions were performed by the CT technologist. Dose reduction techniques were used.   IV CONTRAST: Iohexol (Omni) 100 mL  COMPARISON: March 10, 2016     FINDINGS:  ANGIOGRAM CHEST: Negative for pulmonary emboli. Negative for thoracic aortic dissection and aneurysms.     LUNGS AND PLEURA: No pleural effusions. Linear atelectasis right lower lung zone unchanged from the previous study.     MEDIASTINUM: Postop median sternotomy and aortic valve.. No pericardial effusion.     LIMITED UPPER ABDOMEN: Hepatic steatosis.     MUSCULOSKELETAL: Negative.     IMPRESSION:   CONCLUSION:  1.  Negative for pulmonary emboli  2.  Postop aortic valve replacement  3.  Hepatic steatosis    Personally reviewed the CT with patient.  Appears to have scattered areas of mosaic attenuation, otherwise normal.    Assessment/Plan:   Eileen Donald is a 64 y.o. female PMH significant for bicuspid aortic valve s/p AVR, history of EIB when she was younger in her 20s, HTN, MS that is well controlled, and PE who presents to pulmonary clinic for evaluation of asthma.    1. Likely asthma, moderate persistent:  She has had good improvement in symptoms while on symbicort.  Her NIOX previously was normal, but on ICS therapy.  She has mosaic attenuation, chest tightness, wheezing, improves with beta agonists, and family history of asthma, making this likely asthma.  Her PFTs do not show obstruction, but rather restriction.  She continues to use Singulair.  Her symptoms continue to respond to ICS and bronchodilator therapy.  Some of her issues are likely due MS and debility, but I want her to use her albuterol more as needed, so we can get a sense of how much she needs. We will continue with incruse daily. Her HR is much better on Xopenex as well.    2. Restrictive Ventilatory Defect:  I think this is related to her body habitus, but could also be related to her MS.  Her diffusing capacity is normal.  I will see how her breathing does with the  addition of singulair but since it has not significantly improved and more concerned that this is due to some neuromuscular weakness.  She has been followed by Dr. Bear in neurology and has not had major issues with her MS. Her CTA does not show any ILD.      3. Dyspnea on Exertion:  Multifactorial.  Likely due to asthma not well controlled, compounded by obesity and MS, and furthered by debility.  She has also responded well to increased diuretic.  I would also consider pulmonary rehab at follow up if we have not made much headway.    4. MS, now progressive: she was switched over to Ocrevus.     Recommend:  - continue with symbicort 2 puffs two times a day  - incruse daily  - lev-albuterol inhaler as needed  - singulair 10 mg daily  - pulmonary rehab  - RTC in 3 months    I spent > 25 minutes in consultation with > 50% time spent face-to-face.      Ryan Grajeda, DO

## 2021-06-03 NOTE — TELEPHONE ENCOUNTER
ANTICOAGULATION  MANAGEMENT    Assessment     Today's INR result of 2.9 is Therapeutic (goal INR of 2.5-3.5)        Warfarin taken as previously instructed    No new diet changes affecting INR    No new medication/supplements affecting INR    Continues to tolerate warfarin with no reported s/s of bleeding or thromboembolism     Previous INR was Therapeutic     Marilee said some dental work was cancelled because she had not reminded the dentist of her warfarin. Left message with Dental office to call back with inr preference for extraction. [facility] [phone]    Plan:     Spoke with Eileen regarding INR result and instructed:     Warfarin Dosing Instructions:  Continue current warfarin dose 2.5 mg daily on mon/wed/fri; and 5 mg daily rest of week  (0 % change)    Instructed patient to follow up no later than: one month or prn dental work  .    Instructed to call the [facility] for any changes, questions or concerns. (#[phone])   ?   Dany Cramer RN    Subjective/Objective:      Eileen Donald, a 64 y.o. female is on warfarin.     Eileen reports:     Home warfarin dose: as updated on anticoagulation calendar per template     Missed doses: No     Medication changes:  No     S/S of bleeding or thromboembolism:  No     New Injury or illness:  No     Changes in diet or alcohol consumption:  No     Upcoming surgery, procedure or cardioversion:  No    Anticoagulation Episode Summary     Current INR goal:   2.5-3.5   TTR:   81.2 % (1 y)   Next INR check:   12/19/2019   INR from last check:   2.90 (11/21/2019)   Weekly max warfarin dose:      Target end date:      INR check location:      Preferred lab:      Send INR reminders to:   [facility]    Indications    Heart valve replaced [Z95.2]           Comments:            Anticoagulation Care Providers     Provider Role Specialty Phone number    Tito Salazar MD Referring Family Medicine [phone]

## 2021-06-04 VITALS
DIASTOLIC BLOOD PRESSURE: 82 MMHG | OXYGEN SATURATION: 96 % | HEART RATE: 79 BPM | SYSTOLIC BLOOD PRESSURE: 128 MMHG | HEIGHT: 69 IN | RESPIRATION RATE: 12 BRPM | BODY MASS INDEX: 47.23 KG/M2

## 2021-06-04 VITALS
HEIGHT: 68 IN | SYSTOLIC BLOOD PRESSURE: 130 MMHG | BODY MASS INDEX: 44.41 KG/M2 | HEART RATE: 108 BPM | OXYGEN SATURATION: 95 % | WEIGHT: 293 LBS | DIASTOLIC BLOOD PRESSURE: 72 MMHG

## 2021-06-04 VITALS — WEIGHT: 293 LBS | BODY MASS INDEX: 44.41 KG/M2 | HEIGHT: 68 IN

## 2021-06-04 VITALS
DIASTOLIC BLOOD PRESSURE: 80 MMHG | HEIGHT: 69 IN | RESPIRATION RATE: 18 BRPM | BODY MASS INDEX: 47.23 KG/M2 | SYSTOLIC BLOOD PRESSURE: 130 MMHG | OXYGEN SATURATION: 95 % | HEART RATE: 100 BPM

## 2021-06-04 NOTE — TELEPHONE ENCOUNTER
ANTICOAGULATION  MANAGEMENT    Assessment     Today's INR result of 4.0 is Supratherapeutic (goal INR of 2.5-3.5)        Warfarin taken as previously instructed    Change in diet for holidays may be affecting diet and INR.     Patient states she did not take the Fluconazole last week as previously planned    No new medication/supplements affecting INR    Continues to tolerate warfarin with no reported s/s of bleeding or thromboembolism     Previous INR was Subtherapeutic    Plan:     Spoke with Eileen regarding INR result and instructed:     Warfarin Dosing Instructions:  one time lower dose of 2.5mg tonight then continue current warfarin dose 2.5 mg daily on Mon/Wed/Fri; and 5 mg daily rest of week  (0 % change)    Instructed patient to follow up no later than: two weeks    Education provided: importance of therapeutic range and target INR goal and significance of current INR result    Eileen verbalizes understanding and agrees to warfarin dosing plan.    Instructed to call the [facility] for any changes, questions or concerns. (#[phone])   ?   Kristie Yanez RN    Subjective/Objective:      Eileen Donald, a 64 y.o. female is on warfarin.     Eileen reports:     Home warfarin dose: as updated on anticoagulation calendar per template     Missed doses: No     Medication changes:  No     S/S of bleeding or thromboembolism:  No     New Injury or illness:  No     Changes in diet or alcohol consumption:  Eileen states she ate many different foods than normal due to holidays and had more heartburn due to it     Upcoming surgery, procedure or cardioversion:  No    Anticoagulation Episode Summary     Current INR goal:   2.5-3.5   TTR:   81.8 % (1 y)   Next INR check:   1/9/2020   INR from last check:   4.00! (12/26/2019)   Weekly max warfarin dose:      Target end date:      INR check location:      Preferred lab:      Send INR reminders to:   [facility]  valve replaced [Z95.2]           Comments:            Anticoagulation Care Providers     Provider Role Specialty Phone number    Tito Salazar MD [facility] [phone]

## 2021-06-04 NOTE — TELEPHONE ENCOUNTER
ANTICOAGULATION  MANAGEMENT PROGRAM    Eileen Donald is overdue for INR check.     Left message to call and schedule INR appointment as soon as possible.      Kristie Yanez RN

## 2021-06-04 NOTE — TELEPHONE ENCOUNTER
Called and clarified with patient. She was recently on Amoxicillin X7-8 days for dental, now has yeast infection, vaginal itching. Requesting Rx sent to [facility] in [place].

## 2021-06-04 NOTE — TELEPHONE ENCOUNTER
Medication Request  Medication name: UTI medication  Pharmacy Name and Location: [facility] #[id number]  Reason for request: possible UTI   When did you use medication last?:  n/a  Patient offered appointment:  patient declined  Okay to leave a detailed message: yes    [phone]      FYI:  Patient stated that she got a UTI from taking antibiotics. Patient stated that she was treated last time with a UTI medication and is not sure of the name. Patient declined nurse triage. Patient declined appointment.    Last UTI office visit and lab was in 2010.

## 2021-06-04 NOTE — TELEPHONE ENCOUNTER
ANTICOAGULATION  MANAGEMENT - BPA Interacting Medication Review    Interacting medication(s): Fluconazole with warfarin.    Duration: 1 tablet on day one and day three    New medication?:  Yes, interaction per Micromedex, may increase INR and risk of bleeding.    Plan:    Spoke with Eileen regarding potential interaction.     Warfarin instructions: continue current warfarin dose    Follow up INR recommended in:  12/19     Anticoagulation calendar updated    Kristie Yanez RN

## 2021-06-04 NOTE — TELEPHONE ENCOUNTER
ANTICOAGULATION  MANAGEMENT    Assessment     Today's INR result of 1.8 is Subtherapeutic (goal INR of 2.5-3.5)        Missed dose(s) may be affecting INR. Missed dose on Sunday    increased protein may be affecting diet and INR    Interaction between Amoxicillin and warfarin may be affecting INR. Has been taking 2 tablets daily for 7 days.     Continues to tolerate warfarin with no reported s/s of bleeding or thromboembolism     Previous INR was Therapeutic    Plan:     Spoke with Eileen regarding INR result and instructed:     Warfarin Dosing Instructions:  one time booster dose of 5mg tonight then continue current warfarin dose 2.5 mg daily on Mon/wed/Fri; and 5 mg daily rest of week  (0 % change)    Instructed patient to follow up no later than: one week    Education provided: importance of therapeutic range, target INR goal and significance of current INR result and potential interaction between warfarin and Amoxicillin    Eileen verbalizes understanding and agrees to warfarin dosing plan.    Instructed to call the [facility] for any changes, questions or concerns. (#[phone])   ?   Kristie Yanez RN    Subjective/Objective:      Eileen Donald, a 64 y.o. female is on warfarin.     Eileen reports:     Home warfarin dose: verbally confirmed home dose with Marilee and updated on anticoagulation calendar     Missed doses: Yes: missed dose on Sunday     Medication changes:  Yes: Amoxicillin (has been taking for 7 days for dental procedure)     S/S of bleeding or thromboembolism:  No     New Injury or illness:  No     Changes in diet or alcohol consumption:  Yes: increased protein     Upcoming surgery, procedure or cardioversion:  Yes: one tooth extracted tomorrow    Anticoagulation Episode Summary     Current INR goal:   2.5-3.5   TTR:   80.8 % (1 y)   Next INR check:   12/18/2019   INR from last check:   1.80! (12/11/2019)   Weekly max warfarin dose:      Target end date:      INR check  location:      Preferred lab:      Send INR reminders to:   [facility]    Indications    Heart valve replaced [Z95.2]           Comments:            Anticoagulation Care Providers     Provider Role Specialty Phone number    Tito Salazar MD Referring Family Medicine [phone]

## 2021-06-04 NOTE — TELEPHONE ENCOUNTER
FYI - Status Update  Who is Calling: Patient  Update: Wishes to speak with ACN nurse. Had been given a prescription for a medication for yeast infection, but believes she was told it would affect her INR, so she did not take it. Wants to discuss with nurse.   Okay to leave a detailed message?:  Yes

## 2021-06-04 NOTE — TELEPHONE ENCOUNTER
Spoke with Marilee who was wondering if the Fluconacole was the medication that would interact with her INR. Patient was prescribed this medication on 12/17 but stated on 12/26 she had never started that medication. Marilee states she may take it now and verbalizes understanding that she will need an INR sooner. Marilee states she will call back to schedule if she decides to take it.

## 2021-06-04 NOTE — TELEPHONE ENCOUNTER
Anticoagulation Annual Referral Renewal Review    Eileen Donald's chart reviewed for annual renewal of referral to anticoagulation monitoring.        Criteria for anticoagulation nurse and/or pharmacist renewal met   Warfarin indication: PE and Mechanical AVR Yes, per indication   Current with INR monitoring/compliant Yes Yes   Date of last office visit 10/10/19 Yes, had office visit within last year   Time in Therapeutic Range (TTR) 73.11 % Yes, TTR > 60%       Eileen Donald met all criteria for anticoagulation management program initiated renewal.  New INR standing orders and anticoagulation referral renewal placed.      Kristie Yanez, MJ  4:22 PM

## 2021-06-05 VITALS — BODY MASS INDEX: 45.16 KG/M2 | WEIGHT: 293 LBS

## 2021-06-05 VITALS
OXYGEN SATURATION: 99 % | HEART RATE: 87 BPM | WEIGHT: 293 LBS | HEIGHT: 68 IN | BODY MASS INDEX: 44.41 KG/M2 | DIASTOLIC BLOOD PRESSURE: 60 MMHG | SYSTOLIC BLOOD PRESSURE: 122 MMHG

## 2021-06-05 VITALS
TEMPERATURE: 97.5 F | SYSTOLIC BLOOD PRESSURE: 130 MMHG | BODY MASS INDEX: 43.22 KG/M2 | DIASTOLIC BLOOD PRESSURE: 67 MMHG | OXYGEN SATURATION: 94 % | WEIGHT: 293 LBS | RESPIRATION RATE: 28 BRPM | HEART RATE: 95 BPM

## 2021-06-05 VITALS
SYSTOLIC BLOOD PRESSURE: 139 MMHG | RESPIRATION RATE: 28 BRPM | DIASTOLIC BLOOD PRESSURE: 74 MMHG | BODY MASS INDEX: 45.16 KG/M2 | WEIGHT: 293 LBS | HEART RATE: 93 BPM

## 2021-06-05 NOTE — PROGRESS NOTES
VM message from Fanny at [facility]. States she has enrolled this patient in their Case Management Program and she is available to assist with care coordination or education.    Fanny can be reached at:  [phone] or [phone]

## 2021-06-05 NOTE — TELEPHONE ENCOUNTER
ANTICOAGULATION  MANAGEMENT PROGRAM    Eileen Donald is overdue for INR check.     Left message to call and schedule INR appointment as soon as possible.      Cierra Escobar RN

## 2021-06-05 NOTE — TELEPHONE ENCOUNTER
ANTICOAGULATION  MANAGEMENT PROGRAM    Eileen Donald is overdue for INR check.     Spoke with Marilee and scheduled INR appointment on 1/15.      Kristie Yanez RN

## 2021-06-05 NOTE — TELEPHONE ENCOUNTER
ANTICOAGULATION  MANAGEMENT    Assessment     Today's INR result of 3.8 is Supratherapeutic (goal INR of 2.5-3.5)        Warfarin taken as previously instructed    No new diet changes affecting INR    No new medication/supplements affecting INR    Continues to tolerate warfarin with no reported s/s of bleeding or thromboembolism     Previous INR was Supratherapeutic    Plan:     Spoke with Eileen regarding INR result and instructed:     Warfarin Dosing Instructions:  Change warfarin dose to 5 mg daily on Sun/Tues/Thurs; and 2.5 mg daily rest of week  (9 % change, patient states she would rather change her dose than take a one time lower dose)    Instructed patient to follow up no later than: two weeks    Education provided: importance of therapeutic range    Eileen verbalizes understanding and agrees to warfarin dosing plan.    Instructed to call the [facility] for any changes, questions or concerns. (#[phone])   ?   Kristie Yanez RN    Subjective/Objective:      Eileen Donald, a 64 y.o. female is on warfarin.     Eileen reports:     Home warfarin dose: as updated on anticoagulation calendar per template     Missed doses: No     Medication changes:  No     S/S of bleeding or thromboembolism:  No     New Injury or illness:  No     Changes in diet or alcohol consumption:  No     Upcoming surgery, procedure or cardioversion:  No    Anticoagulation Episode Summary     Current INR goal:   2.5-3.5   TTR:   76.3 % (1 y)   Next INR check:   1/29/2020   INR from last check:   3.80! (1/15/2020)   Weekly max warfarin dose:      Target end date:      INR check location:      Preferred lab:      Send INR reminders to:   [facility]    Indications    Heart valve replaced [Z95.2]           Comments:            Anticoagulation Care Providers     Provider Role Specialty Phone number    Tito Salazar MD Referring Family Medicine [phone]

## 2021-06-05 NOTE — TELEPHONE ENCOUNTER
RN cannot approve Refill Request    RN can NOT refill this medication Protocol failed and NO refill given.      Daysi Mohan, [facility] Triage/Med Refill 1/17/2020    Requested Prescriptions   Pending Prescriptions Disp Refills     warfarin ANTICOAGULANT (COUMADIN/JANTOVEN) 2.5 MG tablet [Pharmacy Med Name: WARFARIN TABS 2.5MG] 130 tablet 5     Sig: TAKE 1 TO 2 TABLETS DAILY (ADJUST DOSE PER INR RESULTS AS INSTRUCTED)       Warfarin Refill Protocol  Failed - 1/16/2020 11:58 PM        Failed -  Route to appropriate pool/provider     Last Anticoagulation Summary:   Anticoagulation Episode Summary     Current INR goal:   2.5-3.5   TTR:   76.2 % (11.9 mo)   Next INR check:   1/29/2020   INR from last check:   3.80! (1/15/2020)   Weekly max warfarin dose:      Target end date:      INR check location:      Preferred lab:      Send INR reminders to:   [facility]    Indications    Heart valve replaced [Z95.2]           Comments:            Anticoagulation Care Providers     Provider Role Specialty Phone number    Tito Salazar MD Referring Family Medicine [phone]                Passed - Provider visit in last year     Last office visit with prescriber/PCP: 10/3/2019 Tito Salazar MD OR same dept: 10/10/2019 Antoine Webb MD OR same specialty: 10/10/2019 Antoine Webb MD  Last physical: 9/17/2019 Last MTM visit: Visit date not found    Next appt within 3 mo: Visit date not found Next physical within 3 mo: Visit date not found  Prescriber OR PCP: Tito Salazar MD  Last diagnosis associated with med order: 1. Aortic valve replaced  - warfarin ANTICOAGULANT (COUMADIN/JANTOVEN) 2.5 MG tablet [Pharmacy Med Name: WARFARIN TABS 2.5MG]; TAKE 1 TO 2 TABLETS DAILY (ADJUST DOSE PER INR RESULTS AS INSTRUCTED)  Dispense: 130 tablet; Refill: 5    2. Long term current use of anticoagulant therapy  - warfarin ANTICOAGULANT (COUMADIN/JANTOVEN) 2.5 MG tablet [Pharmacy  Med Name: WARFARIN TABS 2.5MG]; TAKE 1 TO 2 TABLETS DAILY (ADJUST DOSE PER INR RESULTS AS INSTRUCTED)  Dispense: 130 tablet; Refill: 5    If protocol passes may refill for 6 months if within 3 months of last provider visit (or a total of 9 months).

## 2021-06-06 ENCOUNTER — HEALTH MAINTENANCE LETTER (OUTPATIENT)
Age: 66
End: 2021-06-06

## 2021-06-06 NOTE — TELEPHONE ENCOUNTER
RUDY called and updated patient that per INR check encounter dated 3/11  warfarin dose to 5 mg daily on Sun, Tues; and 2.5 mg daily rest of week .    She has no other concerns at this time.    Radha Slater RN

## 2021-06-06 NOTE — TELEPHONE ENCOUNTER
ANTICOAGULATION  MANAGEMENT    Assessment     Today's INR result of 4.5 is Supratherapeutic (goal INR of 2.5-3.5)        More warfarin taken than instructed which may be affecting INR    No new diet changes affecting INR    No new medication/supplements affecting INR    Continues to tolerate warfarin with no reported s/s of bleeding or thromboembolism     Previous INR was Supratherapeutic    Plan:     Spoke with Eileen regarding INR result and instructed:     Warfarin Dosing Instructions:  hold warfarin today then continue current warfarin dose 5 mg daily on Sun/tues/thurs; and 2.5 mg daily rest of week  (0 % change)    Instructed patient to follow up no later than: 7-10 days    Education provided: importance of therapeutic range, target INR goal and significance of current INR result and importance of taking warfarin as instructed    Eileen verbalizes understanding and agrees to warfarin dosing plan.    Instructed to call the [facility] for any changes, questions or concerns. (#[phone])   ?   Kristie Yanez RN    Subjective/Objective:      Eileen Donald, a 64 y.o. female is on warfarin.     Eileen reports:     Home warfarin dose: verbally confirmed home dose with Eileen and updated on anticoagulation calendar     Missed doses: No     Medication changes:  No     S/S of bleeding or thromboembolism:  No     New Injury or illness:  No     Changes in diet or alcohol consumption:  No     Upcoming surgery, procedure or cardioversion:  No    Anticoagulation Episode Summary     Current INR goal:   2.5-3.5   TTR:   66.6 % (1 y)   Next INR check:   2/28/2020   INR from last check:   4.50! (2/19/2020)   Weekly max warfarin dose:      Target end date:      INR check location:      Preferred lab:      Send INR reminders to:   [facility]    Indications    Heart valve replaced [Z95.2]           Comments:            Anticoagulation Care Providers     Provider Role Specialty Phone number     Tito Salazar MD [facility] Medicine [phone]

## 2021-06-06 NOTE — PROGRESS NOTES
Pulmonary follow-up note  2/13/2020    Referring Physician: Dr. Salazar  Reason for follow-up: Wheezing and asthma with shortness of breath, moderate to severe persistent asthma      Problem List:     Patient Active Problem List   Diagnosis     Depression     Asthma     Benign essential hypertension     Multiple Sclerosis     Hyperlipidemia     Impaired Glucose Tolerance Test     Dysphagia     Benign Adenomatous Polyp Of The Large Intestine     History of pulmonary embolism     Morbid obesity (H)     Generalized anxiety disorder     Primary osteoarthritis of both hands     Polyarthralgia     Aortic valve replaced     Controlled substance agreement signed     Heart valve replaced     Closed 3-part fracture of proximal humerus with delayed healing, left     Acute on chronic respiratory failure with hypoxia (H)     Pulmonary hypertension (H)     Paroxysmal atrial fibrillation (H)       History:     Mrs. Eileen Donald is a 65 yo female with PMH significant for bicuspid aortic valve s/p AVR, history of EIB when she was younger in her 20s, HTN, MS that is well controlled, and PE who presented to pulmonary clinic for evaluation of asthma.  She stated she had always done well up until her AVR in 1/17.  She noticed that she was more short of breath, and that she had wheezing breath sounds.  She didn't really cough much, and it is nonproductive.  She did note that she had had increased phlegm and her throat felt congested.  She denied any orthopnea.  She was started on symbicort about 3 months prior to her initial visit and her shortness of breath improved.  But she still had some increased phlegm.  She is not sure that it is asthma though.  She does have several sisters that have asthma.    Today at follow up, she states her breathing is doing better overall, and she feels that the Incruse has helped significantly.  In addition she was seen by her cardiologist Dr. Hall who had increased her lasix which also helped a  lot.  She still gets good relief with JACOB as lev-albuterol, and mostly uses her inhaler but will also use her neb on occasion.  She also notes her oxygen will still go down when she exerts herself.  She feels her HR and anxiousness is much better on the Xopenex  No new chest pain, increased dyspnea, cough, or sputum production.  She remains therapeutic on her anticoagulation  She ultimately feels she is doing very well, and notes she finally home after recovering from her left should repair  She did not start any pulmonary rehab due to transportation issues, but plans to try to sort this out soon    Past Medical History:   Diagnosis Date     Anxiety      Aortic valve replaced 2/15/2017     Aortic valve stenosis 01/24/2017     Asthma     Created by Conversion      Chronic shortness of breath      Coronary artery disease      Diabetes mellitus (H)     borderline     History of blood clots      Hypertension     Created by Conversion  Replacement Utility updated for latest IMO load     Multiple sclerosis (H)     Created by Conversion      Obesity 10/29/2015     Panic attacks      Pre-diabetes      Pulmonary embolism (H)      Sleep apnea      Past Surgical History:   Procedure Laterality Date     CARPAL TUNNEL RELEASE       mechanical aortic prosthesis  01/24/2017     ND RECONSTR TOTAL SHOULDER IMPLANT Left 4/10/2019    Procedure: LEFT REVERSE TOTAL SHOULDER ARTHROPLASTY;  Surgeon: Luis Antonio Telles MD;  Location: [facility];  Service: Orthopedics     REDUCTION MAMMAPLASTY  1994     Social History     Socioeconomic History     Marital status:      Spouse name: Not on file     Number of children: Not on file     Years of education: Not on file     Highest education level: Not on file   Occupational History     Not on file   Social Needs     Financial resource strain: Not on file     Food insecurity:     Worry: Not on file     Inability: Not on file     Transportation needs:     Medical: Not on file      Non-medical: Not on file   Tobacco Use     Smoking status: Never Smoker     Smokeless tobacco: Never Used   Substance and Sexual Activity     Alcohol use: No     Drug use: No     Sexual activity: Not on file   Lifestyle     Physical activity:     Days per week: Not on file     Minutes per session: Not on file     Stress: Not on file   Relationships     Social connections:     Talks on phone: Not on file     Gets together: Not on file     Attends Sikhism service: Not on file     Active member of club or organization: Not on file     Attends meetings of clubs or organizations: Not on file     Relationship status: Not on file     Intimate partner violence:     Fear of current or ex partner: Not on file     Emotionally abused: Not on file     Physically abused: Not on file     Forced sexual activity: Not on file   Other Topics Concern     Not on file   Social History Narrative    Lives with sister in [place] due to help with medical issues.     and daughter live in [place].    Plans to return to be with family hopefully soon.    For MS- last used medications about 3 months. In remission. Needs to be back on medications as Neurologist has left.        Drew Hahn MD  5/29/2018         Family History   Problem Relation Age of Onset     Heart attack Father      Heart attack Sister      Heart attack Brother      Breast cancer Neg Hx      Allergies   Allergen Reactions     Morphine Other (See Comments)     hallicinations     Sulfa (Sulfonamide Antibiotics) Hives     Azithromycin Nausea And Vomiting and Rash       Review of Systems - 10 point review of systems is negative except what is mentioned in the HPI.          Medications:     Current Outpatient Medications on File Prior to Visit   Medication Sig Dispense Refill     acyclovir (ZOVIRAX) 400 MG tablet Take 400 mg by mouth 2 (two) times a day.        aspirin 81 mg chewable tablet Chew 81 mg daily.              buPROPion (WELLBUTRIN XL) 150 MG 24 hr  tablet Take 1 tablet (150 mg total) by mouth every morning. 90 tablet 3     calcium, as carbonate, (TUMS) 200 mg calcium (500 mg) chewable tablet Chew 1 tablet daily as needed.              cholecalciferol, vitamin D3, (VITAMIN D3) 5,000 unit Tab Take 5,000 Units by mouth.       esomeprazole (NEXIUM) 20 MG capsule Take 1 capsule (20 mg total) by mouth daily. 90 capsule 3     fluconazole (DIFLUCAN) 150 MG tablet Take one tablet on day 1 and day 3 2 tablet 0     furosemide (LASIX) 40 MG tablet Take 1.5 tablets (60 mg total) by mouth daily. Take additional afternoon dose for weight gain > 2 lbs in one day. 60 tablet 0     levalbuterol (XOPENEX HFA) 45 mcg/actuation inhaler Inhale 1-2 puffs every 4 (four) hours as needed for wheezing. 1 Inhaler 12     lisinopril (PRINIVIL,ZESTRIL) 5 MG tablet Take 1 tablet (5 mg total) by mouth daily. 90 tablet 3     metoprolol tartrate (LOPRESSOR) 25 MG tablet Take 1 tablet (25 mg total) by mouth 2 (two) times a day. 180 tablet 3     montelukast (SINGULAIR) 10 mg tablet TAKE 1 TABLET AT BEDTIME 30 tablet 12     OXYGEN[place]AIR [place]IV[place] [facility] [place]: [place]       potassium chloride (MICRO-K) 10 mEq CR capsule Take 20 mEq by mouth daily.       senna-docusate (PERICOLACE) 8.6-50 mg tablet Take 1 tablet by mouth 2 (two) times a day as needed for constipation.  0     SYMBICORT 160-4.5 mcg/actuation inhaler USE 2 INHALATIONS TWICE A DAY 1 Inhaler 10     umeclidinium (INCRUSE ELLIPTA) 62.5 mcg/actuation DsDv inhaler Inhale 1 puff daily. 30 each 11     venlafaxine (EFFEXOR-XR) 75 MG 24 hr capsule TAKE 3 CAPSULES DAILY 270 capsule 0     warfarin ANTICOAGULANT (COUMADIN/JANTOVEN) 2.5 MG tablet TAKE 1 TO 2 TABLETS DAILY (ADJUST DOSE PER INR RESULTS AS INSTRUCTED) 130 tablet 1     [DISCONTINUED] AVONEX 30 mcg/0.5 mL PnKt 1 application once a week.              albuterol (PROVENTIL) 2.5 mg /3 mL (0.083 %) nebulizer solution Take 3 mL (2.5 mg total) by nebulization every 6 (six)  hours as needed for wheezing. 75 vial 2     amoxicillin (AMOXIL) 500 MG capsule Take 500 mg by mouth 3 (three) times a day.       chlorhexidine (PERIDEX) 0.12 % solution Swish and spit 5mL three times daily 473 mL 0     LORazepam (ATIVAN) 0.5 MG tablet Take 1 tablet (0.5 mg total) by mouth daily as needed for anxiety. 30 tablet 0     simvastatin (ZOCOR) 20 MG tablet Take 1 tablet (20 mg total) by mouth at bedtime. 90 tablet 3     No current facility-administered medications on file prior to visit.        Exam/Data:   Vitals  Vitals:    02/13/20 1344   BP: 130/80   Pulse: 100   Resp: 18   SpO2: 95%       EXAM:  GEN: Alert and oriented x3, NAD  HEENT: Nares patent, posterior oropharynx clear.  RESPIRATORY: CTAB.  No wheeze or rales  CARDIOVASCULAR: RRR, no m/r/g  GASTROINTESTINAL: Round, soft, NT/ND  HEM/ONC: no adenopathy, no ecchymosis  MSK: no clubbing.  Sitting in a wheel chair.   NEURO: cranial nerves appear grossly intact, muscle strength equal  SKIN: no rash or ulcerations      DATA    PFT DATA:  FEV1/FVC is 0.90 and is normal.  FEV1 is 58% predicted and is reduced.  FVC is 49% predicted and reduced.  There was no improvement in spirometry after a single inhaled dose of bronchodilator.  TLC is 69% predicted and is reduced.  RV is 79% predicted and is reduced.  DLCO is 82% predicted and is normal when it   is corrected for hemoglobin.     Impression:  Full Pulmonary Function Test is abnormal.  PFTs are consistent with moderate-severe restrictive disease.  Spirometry is not consistent with reversibility.  There is no hyperinflation.  There is no air-trapping.  Diffusion capacity when corrected for hemoglobin is normal.    NIOX: 20 ppb and is normal.    Modified Walk Test 11/25/19  O2 at rest is < 89%.  This improves to >88% with 2LPM O2.  With ambulation her oxygen decreases to <88%, but improves to >88% with 3 LPM    IMAGING:  CTA CHEST PE RUN  8/7/2017 3:29 PM     INDICATION: dyspnea  TECHNIQUE: Helical  acquisition through the chest was performed during the arterial phase of contrast enhancement using IV contrast. 2D and 3D reconstructions were performed by the CT technologist. Dose reduction techniques were used.   IV CONTRAST: Iohexol (Omni) 100 mL  COMPARISON: March 10, 2016     FINDINGS:  ANGIOGRAM CHEST: Negative for pulmonary emboli. Negative for thoracic aortic dissection and aneurysms.     LUNGS AND PLEURA: No pleural effusions. Linear atelectasis right lower lung zone unchanged from the previous study.     MEDIASTINUM: Postop median sternotomy and aortic valve.. No pericardial effusion.     LIMITED UPPER ABDOMEN: Hepatic steatosis.     MUSCULOSKELETAL: Negative.     IMPRESSION:   CONCLUSION:  1.  Negative for pulmonary emboli  2.  Postop aortic valve replacement  3.  Hepatic steatosis    Personally reviewed the CT with patient.  Appears to have scattered areas of mosaic attenuation, otherwise normal.    Assessment/Plan:   Eileen Donald is a 64 y.o. female PMH significant for bicuspid aortic valve s/p AVR, history of EIB when she was younger in her 20s, HTN, MS that is well controlled, and PE who presents to pulmonary clinic for evaluation of asthma.    1. Likely asthma, moderate persistent:  She has had good improvement in symptoms while on symbicort.  Her NIOX previously was normal, but on ICS therapy.  She has mosaic attenuation, chest tightness, wheezing, improves with beta agonists, and family history of asthma, making this likely asthma.  Her PFTs do not show obstruction, but rather restriction.  She continues to use Singulair.  Her symptoms continue to respond to ICS and bronchodilator therapy.  Some of her issues are likely due MS and debility, but I want her to use her albuterol more as needed, so we can get a sense of how much she needs. We will continue with incruse daily. Her HR is much better on Xopenex as well.    2. Restrictive Ventilatory Defect:  I think this is related to her body  habitus, but could also be related to her MS.  Her diffusing capacity is normal. She has been followed by Dr. Bear in neurology and has not had major issues with her MS. Her CTA does not show any ILD.      3. Dyspnea on Exertion:  Multifactorial.  She is improved significantly with maximal inhaled therapy and increased diuretics. I think she would still benefit from pulmonary rehab, but she had issues with transportation as she is no longer driving. We will see how she does over the next few months, and she will work on setting up transportation.    4. MS, now progressive: she was switched over to Ocrevus.     Recommend:  - continue with symbicort 2 puffs two times a day  - incruse daily  - lev-albuterol inhaler as needed  - singulair 10 mg daily  - pulmonary rehab in the next coming months   - RTC in 3 months    I spent > 25 minutes in consultation with > 50% time spent face-to-face.      Ryan Grajeda, DO

## 2021-06-06 NOTE — TELEPHONE ENCOUNTER
ANTICOAGULATION  MANAGEMENT    Assessment     Today's INR result of 3.4 is Therapeutic (goal INR of 2.5-3.5)        Less warfarin taken than instructed which may be affecting INR. Held 2/19, missed 2/20, took 5mg 2/21 and 2/22    No new diet changes affecting INR    No new medication/supplements affecting INR    Continues to tolerate warfarin with no reported s/s of bleeding or thromboembolism     Previous INR was Supratherapeutic    Plan:     Spoke with Eileen regarding INR result and instructed:     Warfarin Dosing Instructions:  resume previously instructed dose 5 mg daily on Sun/Tues/Thurs; and 2.5 mg daily rest of week  (0 % change)    Instructed patient to follow up no later than: 7-10 days    Education provided: importance of therapeutic range and target INR goal and significance of current INR result  Eileen wrote down instructions and read back correctly  Eileen verbalizes understanding and agrees to warfarin dosing plan.    Instructed to call the [facility] for any changes, questions or concerns. (#[phone])   ?   Kristie Yanez RN    Subjective/Objective:      Eileen Donald, a 64 y.o. female is on warfarin.     Eileen reports:     Home warfarin dose: verbally confirmed home dose with Eileen and updated on anticoagulation calendar     Missed doses: Yes: one dose missed     Medication changes:  No     S/S of bleeding or thromboembolism:  No     New Injury or illness:  No     Changes in diet or alcohol consumption:  No     Upcoming surgery, procedure or cardioversion:  No    Anticoagulation Episode Summary     Current INR goal:   2.5-3.5   TTR:   64.8 % (1 y)   Next INR check:   3/9/2020   INR from last check:   3.40 (2/26/2020)   Weekly max warfarin dose:      Target end date:      INR check location:      Preferred lab:      Send INR reminders to:   [facility]    Indications    Heart valve replaced [Z95.2]           Comments:            Anticoagulation Care  Providers     Provider Role Specialty Phone number    Tito Salazar MD Referring Family Medicine [phone]

## 2021-06-06 NOTE — TELEPHONE ENCOUNTER
Patient scheduled a nurse only appt for 03/11/20 at 1:30PM for her 1st Hep B shot. Please place order if appropriate.    Thank you!

## 2021-06-06 NOTE — TELEPHONE ENCOUNTER
New Appointment Needed  What is the reason for the visit:  Hep B shot  Provider Preference: Nurse  How soon do you need to be seen?: 03/23 with INR lab  Waitlist offered?: No  Okay to leave a detailed message:  Yes

## 2021-06-06 NOTE — TELEPHONE ENCOUNTER
Who is calling:  Patient   Reason for Call:  Want's to discuss dosage for last couple weeks. See below  Date of last appointment with primary care:   Okay to leave a detailed message: Yes    Patient's medicine machine tipped over and she lost her paper that dosage was written on.    Please call and advise.

## 2021-06-07 ENCOUNTER — COMMUNICATION - HEALTHEAST (OUTPATIENT)
Dept: ANTICOAGULATION | Facility: CLINIC | Age: 66
End: 2021-06-07

## 2021-06-07 ENCOUNTER — HOSPITAL ENCOUNTER (OUTPATIENT)
Dept: MAMMOGRAPHY | Facility: CLINIC | Age: 66
Discharge: HOME | End: 2021-06-07
Payer: MEDICARE

## 2021-06-07 ENCOUNTER — AMBULATORY - HEALTHEAST (OUTPATIENT)
Dept: LAB | Facility: CLINIC | Age: 66
End: 2021-06-07

## 2021-06-07 DIAGNOSIS — I48.0 PAROXYSMAL ATRIAL FIBRILLATION (H): ICD-10-CM

## 2021-06-07 DIAGNOSIS — Z86.711 HISTORY OF PULMONARY EMBOLISM: ICD-10-CM

## 2021-06-07 DIAGNOSIS — Z95.2 AORTIC VALVE REPLACED: ICD-10-CM

## 2021-06-07 DIAGNOSIS — Z12.31 VISIT FOR SCREENING MAMMOGRAM: ICD-10-CM

## 2021-06-07 DIAGNOSIS — Z95.2 HEART VALVE REPLACED: ICD-10-CM

## 2021-06-07 LAB — INR PPP: 2.3 (ref 0.9–1.1)

## 2021-06-07 NOTE — TELEPHONE ENCOUNTER
Who is calling: Ariana From [facility]   Reason for Call:  Caller is calling for follow up on Hospital bed denial from patient insurance caller requested a call from clinic nurse Tabby SPRAGUE to discuss directly .  Date of last appointment with primary care: 04/06/2020  Okay to leave a detailed message: No

## 2021-06-07 NOTE — PROGRESS NOTES
"Eileen Donald is a 64 y.o. female who is being evaluated via a billable telephone visit.      The patient has been notified of following:     \"This telephone visit will be conducted via a call between you and your physician/provider. We have found that certain health care needs can be provided without the need for a physical exam.  This service lets us provide the care you need with a short phone conversation.  If a prescription is necessary we can send it directly to your pharmacy.  If lab work is needed we can place an order for that and you can then stop by our lab to have the test done at a later time.    If during the course of the call the physician/provider feels a telephone visit is not appropriate, you will not be charged for this service.\"     Patient has given verbal consent to a Telephone visit? Yes    Eileen Donald complains of    Chief Complaint   Patient presents with     Hospital bed qualification     Needs to visit to qualify for hospital bed due to MS, arthritis and breathing.         I have reviewed and updated the patient's Past Medical History, Social History, Family History and Medication List.    ALLERGIES  Morphine; Sulfa (sulfonamide antibiotics); and Azithromycin    Sera Toussaint CMA     Assessment/Plan:     Problem List Items Addressed This Visit     Multiple Sclerosis     Recommended by neurology that she no longer drives. Was signed up for cardiopulmonary rehab but had no transportation. Will work with care management for possible metro mobility or other transportation options for medical therapies now that not driving.         Polyarthralgia - Primary     Sever degree in hips and back. Hospital bed to improve sleep and breathing as well as transitions still needed. Continue current hospital bed use.             Return in about 4 months (around 8/6/2020) for Hypertension.    Subjective:   64 y.o. female presents for longstanding history of polyarthralgia and multiple sclerosis.  " Given the multiple sclerosis along with some of its advancements as well as the significant arthritis of her back, hips and hands as well as her underlying shortness of breath from her pulmonary hypertension patient was found necessary for use of a hospital bed for sleep.  Also did update her neurologist is now recommended that she stop driving.  Her children found out about the recommendation and have now taken away her mode of transportation.  She was also recently set up for pulmonary and cardiac rehab and went over and was evaluated and plan to start of the program.  However transportation is now difficulty for her to get there.  She can no longer drive herself which made it almost impossible for her to meet the aspects of the program of attending twice a week and she did not complete the enrollment.    Denies any change in her shortness of breath.  She has had no swelling beyond normal in her lower extremities.  Denies any worsening of shortness of breath.  Still at risk for falls and does use a walker full-time even in the house.  The bed helps her transition as well as just the head and leg level to prevent lower extremity edema as well as improve her breathing while sleeping at night.        Review of Systems   Constitutional: Negative for chills, fatigue and fever.   Respiratory: Negative for choking and wheezing.    Gastrointestinal: Negative for abdominal pain.   Genitourinary: Negative for difficulty urinating.   Musculoskeletal: Positive for arthralgias, back pain and gait problem. Negative for neck stiffness.   Neurological: Negative for dizziness, speech difficulty, light-headedness and headaches.        History     Reviewed By Date/Time Sections Reviewed    Aristides Beckwith DO 4/6/2020  3:10 PM Medical, Surgical, Tobacco, Family, Socioeconomic           Objective:   There were no vitals filed for this visit.  Physical Exam  Due to viral pandemic this visit was handled telephonically  This  note has been dictated using voice recognition software. Any grammatical or context distortions are unintentional and inherent to the software       Phone call duration:  14 minutes 7 seconds minutes   Single

## 2021-06-07 NOTE — TELEPHONE ENCOUNTER
Please get number for peer to peer- I am willing to do peer to peer to get cost of bed covered.  Dr. Salazar

## 2021-06-07 NOTE — TELEPHONE ENCOUNTER
Who is calling:  Ariana from [facility]  Reason for Call:  Requesting additional information on need for hospital bed and update on patient's condition. Please fax information to Attention: Ariana @ Fax # [phone]  Date of last appointment with primary care: 10/10/19  Okay to leave a detailed message: Yes

## 2021-06-07 NOTE — TELEPHONE ENCOUNTER
Who is calling: Ariana  Reason for Call:  Caller is needing to know the update on the hospital bed. Caller would like a call back on regards to information needed for a prior auth for patient's insurance.    Date of last appointment with primary care: 04/06/20  Okay to leave a detailed message: Yes  [phone]

## 2021-06-07 NOTE — TELEPHONE ENCOUNTER
ANTICOAGULATION  MANAGEMENT    Assessment     Yesterday's INR result of 2.2 is Subtherapeutic (goal INR of 2.5-3.5)        Warfarin taken as previously instructed    No new diet changes affecting INR    No new medication/supplements affecting INR    Continues to tolerate warfarin with no reported s/s of bleeding or thromboembolism     Previous INR was Supratherapeutic    Plan:     Spoke with Eileen regarding INR result and instructed:     Warfarin Dosing Instructions:  Change warfarin dose to 5 mg daily on Sundays, Tuesdays, and Fridays; and 2.5 mg daily rest of week  (11.1 % change)    Instructed patient to follow up no later than: 2 weeks     Education provided: importance of therapeutic range, importance of following up for INR monitoring at instructed interval, importance of taking warfarin as instructed and monitoring for clotting signs and symptoms    Marilee verbalizes understanding and agrees to warfarin dosing plan.    Instructed to call the [facility] for any changes, questions or concerns. (#[phone])   ?   Kimmie Ohara RN    Subjective/Objective:      Eileen Donald, a 64 y.o. female is on warfarin.     Eileen reports:     Home warfarin dose: verbally confirmed home dose with Marilee and updated on anticoagulation calendar     Missed doses: No     Medication changes:  No     S/S of bleeding or thromboembolism:  No     New Injury or illness:  No     Changes in diet or alcohol consumption:  No     Upcoming surgery, procedure or cardioversion:  No    Anticoagulation Episode Summary     Current INR goal:   2.5-3.5   TTR:   57.9 % (1 y)   Next INR check:   4/23/2020   INR from last check:   2.20! (4/9/2020)   Weekly max warfarin dose:      Target end date:      INR check location:      Preferred lab:      Send INR reminders to:   [facility]    Indications    Heart valve replaced [Z95.2]           Comments:            Anticoagulation Care Providers     Provider Role Specialty Phone  number    Tito Salazar MD [facility] Medicine [phone]

## 2021-06-07 NOTE — TELEPHONE ENCOUNTER
Anticoagulation Management    Unable to reach Eileen today.    Today's INR result of 2.2 is Subtherapeutic (goal INR of 2.5-3.5).     Follow up required to discuss out of range INR .    Left message to take a booster dose of warfarin,  5 mg tonight.       ACN to follow up    Kimmie Ohara RN

## 2021-06-07 NOTE — TELEPHONE ENCOUNTER
Refill Approved    Rx renewed per Medication Renewal Policy. Medication was last renewed on 5/8/19.    Daysi Mohan, [facility] Triage/Med Refill 5/4/2020     Requested Prescriptions   Pending Prescriptions Disp Refills     metoprolol tartrate (LOPRESSOR) 25 MG tablet [Pharmacy Med Name: METOPROLOL TARTRATE TABS 25MG] 180 tablet 3     Sig: TAKE 1 TABLET TWICE A DAY       Beta-Blockers Refill Protocol Passed - 5/1/2020 11:23 PM        Passed - PCP or prescribing provider visit in past 12 months or next 3 months     Last office visit with prescriber/PCP: 10/3/2019 Tito Salazar MD OR same dept: 10/10/2019 Antoine Webb MD OR same specialty: 10/10/2019 Antoine Webb MD  Last physical: 9/17/2019 Last MTM visit: Visit date not found   Next visit within 3 mo: Visit date not found  Next physical within 3 mo: Visit date not found  Prescriber OR PCP: Tito Salazar MD  Last diagnosis associated with med order: 1. Essential hypertension  - metoprolol tartrate (LOPRESSOR) 25 MG tablet [Pharmacy Med Name: METOPROLOL TARTRATE TABS 25MG]; TAKE 1 TABLET TWICE A DAY  Dispense: 180 tablet; Refill: 3  - lisinopriL (PRINIVIL,ZESTRIL) 5 MG tablet [Pharmacy Med Name: LISINOPRIL TABS 5MG]; TAKE 1 TABLET DAILY  Dispense: 90 tablet; Refill: 3    If protocol passes may refill for 12 months if within 3 months of last provider visit (or a total of 15 months).             Passed - Blood pressure filed in past 12 months     BP Readings from Last 1 Encounters:   02/13/20 130/80                lisinopriL (PRINIVIL,ZESTRIL) 5 MG tablet [Pharmacy Med Name: LISINOPRIL TABS 5MG] 90 tablet 3     Sig: TAKE 1 TABLET DAILY       Ace Inhibitors Refill Protocol Passed - 5/1/2020 11:23 PM        Passed - PCP or prescribing provider visit in past 12 months       Last office visit with prescriber/PCP: 10/3/2019 Tito Salazar MD OR same dept: 10/10/2019 Antoine Webb MD OR same specialty: 10/10/2019  Antoine Webb MD  Last physical: 9/17/2019 Last MTM visit: Visit date not found   Next visit within 3 mo: Visit date not found  Next physical within 3 mo: Visit date not found  Prescriber OR PCP: Tito Salazar MD  Last diagnosis associated with med order: 1. Essential hypertension  - metoprolol tartrate (LOPRESSOR) 25 MG tablet [Pharmacy Med Name: METOPROLOL TARTRATE TABS 25MG]; TAKE 1 TABLET TWICE A DAY  Dispense: 180 tablet; Refill: 3  - lisinopriL (PRINIVIL,ZESTRIL) 5 MG tablet [Pharmacy Med Name: LISINOPRIL TABS 5MG]; TAKE 1 TABLET DAILY  Dispense: 90 tablet; Refill: 3    If protocol passes may refill for 12 months if within 3 months of last provider visit (or a total of 15 months).             Passed - Serum Potassium in past 12 months     Lab Results   Component Value Date    Potassium 4.2 01/15/2020             Passed - Blood pressure filed in past 12 months     BP Readings from Last 1 Encounters:   02/13/20 130/80             Passed - Serum Creatinine in past 12 months     Creatinine   Date Value Ref Range Status   01/15/2020 0.91 0.60 - 1.10 mg/dL Final

## 2021-06-07 NOTE — TELEPHONE ENCOUNTER
Who is calling:  Patient   Reason for Call:  Patient calling back to let ANC & clinic know she completed the [facility] telephone call as instructed.    Patient states she just got of telephone with [facility] and was told she was okay and to go ahead with her appointment.  Date of last appointment with primary care:   Okay to leave a detailed message: Yes

## 2021-06-07 NOTE — TELEPHONE ENCOUNTER
RN cannot approve Refill Request    RN can NOT refill this medication med is not covered by policy/route to provider     . Last office visit: 10/3/2019 Tito Salazar MD Last Physical: 9/17/2019 Last MTM visit: Visit date not found Last visit same specialty: 10/10/2019 Antoine Webb MD.  Next visit within 3 mo: Visit date not found  Next physical within 3 mo: Visit date not found      Daysi Mohan, [facility] Connection Triage/Med Refill 5/5/2020    Requested Prescriptions   Pending Prescriptions Disp Refills     SYMBICORT 160-4.5 mcg/actuation inhaler [Pharmacy Med Name: SYMBICORT INH AEROSOL 160/4.5MCG] 1 Inhaler 11     Sig: USE 2 INHALATIONS TWICE A DAY       There is no refill protocol information for this order

## 2021-06-07 NOTE — TELEPHONE ENCOUNTER
Left detailed message with insurance co. To please call back to set up a peer to peer early next week.

## 2021-06-07 NOTE — TELEPHONE ENCOUNTER
Called and spoke with Ariana. She received a call this morning from Bobbi with Eileen's insurance [facility] stating that the hospital bed is not covered. They will cover rental for 3 months. Her insurance does not cover out patient DME long term rental or purchase. The rental would be $175 a month. Requesting a peer to peer appeal for the remainder of the cost of the bed. Once she rents the bed for 1 year she owns it. The remaining cost they are trying to get covered is $1,170 this is after the 3 months of covered rental.   Asked about doing an appeal letter Ariana stated that a peer to peer would be better but if letter was preferred by the provider that we could do that, the process is just longer.   She did receive and submit to insurance the VV done with Dr Beckwith last week.

## 2021-06-07 NOTE — TELEPHONE ENCOUNTER
Never received call again- set up for 11 am Monday- no phone call   Please contact and see how we can arrange Peer to Peer

## 2021-06-07 NOTE — TELEPHONE ENCOUNTER
ANTICOAGULATION  MANAGEMENT    Assessment     Today's INR result of 3.80 is Supratherapeutic (goal INR of 2.5-3.5)        Warfarin taken differently than instructed, but no impact to total weekly dose    - will change anticoagulation tracking.    No new diet changes affecting INR    No new medication/supplements affecting INR    Continues to tolerate warfarin with no reported s/s of bleeding or thromboembolism     Previous INR was Subtherapeutic at 2.20 on 4/10/20.    Plan:     Spoke with Marilee regarding INR result and instructed:     Warfarin Dosing Instructions:   (has 2.5mg tabs)   - today, advised one time lower dose with 2.5mg warfarin, then continue current warfarin dose 5 mg daily on Sun/Tues/Thurs; and 2.5 mg daily rest of week.    Instructed patient to follow up no later than:  1-2 wks.    Education provided: importance of consistent vitamin K intake and target INR goal and significance of current INR result    Marilee verbalizes understanding and agrees to warfarin dosing plan.    Instructed to call the [facility] for any changes, questions or concerns. (#[phone])   ?   Alondra Lora RN    Subjective/Objective:      Eileen Donald, a 64 y.o. female is on warfarin.     Eileen reports:     Home warfarin dose: template incorrect; verbally confirmed home dose with Marilee and updated on anticoagulation calendar - will adjust anticoagulation tracking to reflect how patient is taking warfarin.     Missed doses: No     Medication changes:  No     S/S of bleeding or thromboembolism:  No     New Injury or illness:  No     Changes in diet or alcohol consumption:  No     Upcoming surgery, procedure or cardioversion:  No    Anticoagulation Episode Summary     Current INR goal:   2.5-3.5   TTR:   59.5 % (1 y)   Next INR check:   5/7/2020   INR from last check:   3.80! (4/23/2020)   Weekly max warfarin dose:      Target end date:      INR check location:      Preferred lab:      [facility]    Indications    Heart valve replaced [Z95.2]           Comments:            Anticoagulation Care Providers     Provider Role Specialty Phone number    Tito Salazar MD Referring Family Medicine [phone]

## 2021-06-07 NOTE — TELEPHONE ENCOUNTER
Kevin Lane had peer to peer set up for today at 12:30. We never received a call.  will reach out to the insurance company tomorrow and we will call her with what the outcome is.

## 2021-06-07 NOTE — TELEPHONE ENCOUNTER
Who is calling:  Ariana clemens   Reason for Call:  Checking to see if peer to peer review was done? insurance is denying  the long term rental or remainder of purchase price for her bed.  Physician can call to appeal [phone]  Case number to reference [id number] , Have 5 business days from today to do peer to peer review.  Date of last appointment with primary care: 04/06/20  Okay to leave a detailed message: Yes

## 2021-06-07 NOTE — TELEPHONE ENCOUNTER
ANTICOAGULATION  MANAGEMENT PROGRAM    Eileen Donald is overdue for INR check.     Spoke with Marilee and scheduled INR appointment on 4/9.      Kristie Yanez RN

## 2021-06-07 NOTE — TELEPHONE ENCOUNTER
ANTICOAGULATION  MANAGEMENT PROGRAM    Eileen Donald is overdue for INR check.     Spoke with Marilee who declined to schedule INR at this time. If calling back please schedule as soon as possible.      Kristie Yanez RN

## 2021-06-07 NOTE — TELEPHONE ENCOUNTER
Spoke to Ariana at [facility] and they are needing updated pt status. Just needing to know if pt still has current diagnosis and limitations for needing the hospital bed. Ariana said a phone visit should be fine for insurance

## 2021-06-07 NOTE — TELEPHONE ENCOUNTER
Upcoming Appointment Question  When is the appointment: 3/30/2020  What is your appointment for?: INR  Who is your appointment scheduled with?: Any available  What is your question/concern?: patient stated she has a cough due to drainage from her allergies and that is ongoing, not new. She is aware that a nurse may call her regarding this.  Okay to leave a detailed message?: Yes

## 2021-06-07 NOTE — TELEPHONE ENCOUNTER
Left message for insurance to call back to set up a peer to peer with Dr Salazar. He is available this morning.

## 2021-06-07 NOTE — TELEPHONE ENCOUNTER
Please reach out to company again- peer to peer never called yesterday, please re-arrange another peer to peer perhaps early next week.  Dr. Salazar

## 2021-06-08 NOTE — TELEPHONE ENCOUNTER
ANTICOAGULATION  MANAGEMENT    Assessment     Today's INR result of 2.4 is Subtherapeutic (goal INR of 2.5-3.5)        Missed dose(s) may be affecting INR Marilee is not sure if she missed    No new diet changes affecting INR    No new medication/supplements affecting INR    Continues to tolerate warfarin with no reported s/s of bleeding or thromboembolism     Previous INR was Therapeutic    Plan:     Spoke with Eileen regarding INR result and instructed:     Warfarin Dosing Instructions:  Continue current warfarin dose 5 mg daily on sun/tue; and 2.5 mg daily rest of week  (0 % change)    Instructed patient to follow up no later than: two weeks    Marilee verbalizes understanding and agrees to warfarin dosing plan.    Instructed to call the [facility] for any changes, questions or concerns. (#[phone])   ?   Dany Cramer RN    Subjective/Objective:      Eileen Donald, a 64 y.o. female is on warfarin.     Eileen reports:     Home warfarin dose: as updated on anticoagulation calendar per template     Missed doses: maybe     Medication changes:  No     S/S of bleeding or thromboembolism:  No     New Injury or illness:  No     Changes in diet or alcohol consumption:  No     Upcoming surgery, procedure or cardioversion:  No    Anticoagulation Episode Summary     Current INR goal:   2.5-3.5   TTR:   58.4 % (1 y)   Next INR check:   6/10/2020   INR from last check:   2.40! (5/27/2020)   Weekly max warfarin dose:      Target end date:      INR check location:      Preferred lab:      Send INR reminders to:   [facility]    Indications    Heart valve replaced [Z95.2]           Comments:            Anticoagulation Care Providers     Provider Role Specialty Phone number    Tito Salazar MD Referring Family Medicine [phone]

## 2021-06-08 NOTE — TELEPHONE ENCOUNTER
RN cannot approve Refill Request    RN can NOT refill this medication Protocol failed and NO refill given.       Daysi Mohan, [facility] Triage/Med Refill 6/5/2020    Requested Prescriptions   Pending Prescriptions Disp Refills     venlafaxine (EFFEXOR-XR) 75 MG 24 hr capsule [Pharmacy Med Name: VENLAFAXINE ER 75MG CAPSULES] 270 capsule 3     Sig: TAKE 3 CAPSULES BY MOUTH EVERY DAY       Venlafaxine/Desvenlafaxine Refill Protocol Failed - 6/3/2020  3:31 AM        Failed - Fasting lipid cascade in last year     Cholesterol   Date Value Ref Range Status   11/09/2016 220 (H) <=199 mg/dL Final     Triglycerides   Date Value Ref Range Status   11/09/2016 134 <=149 mg/dL Final     HDL Cholesterol   Date Value Ref Range Status   11/09/2016 70 >=50 mg/dL Final     LDL Calculated   Date Value Ref Range Status   11/09/2016 123 <=129 mg/dL Final     Patient Fasting > 8hrs?   Date Value Ref Range Status   11/09/2016 Yes  Final             Passed - LFT or AST or ALT in last year     Albumin   Date Value Ref Range Status   01/15/2020 3.7 3.5 - 5.0 g/dL Final     Bilirubin, Total   Date Value Ref Range Status   01/15/2020 0.6 0.0 - 1.0 mg/dL Final     Alkaline Phosphatase   Date Value Ref Range Status   01/15/2020 108 45 - 120 U/L Final     AST   Date Value Ref Range Status   01/15/2020 35 0 - 40 U/L Final     ALT   Date Value Ref Range Status   01/15/2020 19 0 - 45 U/L Final     Protein, Total   Date Value Ref Range Status   01/15/2020 7.0 6.0 - 8.0 g/dL Final                Passed - PCP or prescribing provider visit in last year     Last office visit with prescriber/PCP: 10/3/2019 Tito Salazar MD OR same dept: 10/10/2019 Antoine Webb MD OR same specialty: 10/10/2019 Antoine Webb MD  Last physical: 9/17/2019 Last MTM visit: Visit date not found   Next visit within 3 mo: Visit date not found  Next physical within 3 mo: Visit date not found  Prescriber OR PCP: Tito Salazar MD  Last  diagnosis associated with med order: 1. Depression  - venlafaxine (EFFEXOR-XR) 75 MG 24 hr capsule [Pharmacy Med Name: VENLAFAXINE ER 75MG CAPSULES]; TAKE 3 CAPSULES BY MOUTH EVERY DAY  Dispense: 90 capsule; Refill: 3    If protocol passes may refill for 12 months if within 3 months of last provider visit (or a total of 15 months).             Passed - Blood Pressure in last year     BP Readings from Last 1 Encounters:   02/13/20 130/80

## 2021-06-08 NOTE — TELEPHONE ENCOUNTER
ANTICOAGULATION  MANAGEMENT PROGRAM    Eileen Donald is overdue for INR check.     Spoke with Marilee and scheduled INR appointment on 5/13.      Kristie Yanez RN

## 2021-06-08 NOTE — TELEPHONE ENCOUNTER
Patient had a face to face for hospital bed on 10/03/19 and had the form for the hospital bed is in media on 10/16/19.   Please advise.

## 2021-06-08 NOTE — PROGRESS NOTES
"[facility] Patient Outreach:     Discharge Date::  2-10-17      Reason for TCU stay (discharge diagnosis)::  Aortic valve replacement      Are you feeling better, the same or worse since your discharge?:  Patient is feeling better          As part of your discharge plan, did they discuss home care with you?: Yes        Have your seen them yet, or are they scheduled to visit?: Yes                Do you have any follow up visits scheduled with your PCP or Specialist?:  Yes, with PCP (went today \")      (RN) Is it scheduled soon enough (3-5 days)?: Yes                    "

## 2021-06-08 NOTE — PROGRESS NOTES
ASSESSMENT & PLAN:  Eileen Donald was seen in preoperative consultation in preparation for aortic valve replacement.  This is a high risk surgery and the patient has increased risk for major cardiac complications based on exam and history- based on her history of stenosis, sleep apnea, obesity, history of a pulmonary embolism and deconditioning.  She does not because of multiple medical conditions exercise on a regular basis so exercise tolerance is unable to be accurately obtained.  1. Preoperative examination  Patient is cleared for surgery at this time with no anticipated complication  - Electrocardiogram Perform and Read  - Creatinine  - Potassium  - HM2(CBC w/o Differential)    2. Prediabetes  History of prediabetes we will check a hemoglobin A1c today  - Glycosylated Hemoglobin A1c    3. History of sleep apnea  With her history of sleep apnea some increased risk for surgery  Discussed with the need for likely going back on her CPAP device after surgery      There are no Patient Instructions on file for this visit.    Orders Placed This Encounter   Procedures     Creatinine     Potassium     HM2(CBC w/o Differential)     Glycosylated Hemoglobin A1c     Electrocardiogram Perform and Read     There are no discontinued medications.    No Follow-up on file.    CHIEF COMPLAINT:  Chief Complaint   Patient presents with     Pre-op Exam     Aortic valve replacement on 1/24/2017 at [facility] with Dr Oro       HISTORY OF PRESENT ILLNESS:  Eileen is a 61 y.o. year-old female here for a pre-operative consultation. The exam is requested by Dr. Oro in preparation for aortic valve replacement to be performed at [facility] on January 24, 2017. Today s examination on 01/20/17 is done to review the underlying surgical condition of aortic valve stenosis, clear for anesthesia, and review medical problems with appropriate changes in medications. She was told she does not need to bridge with Lovenox, and her  last dose of warfarin prior to surgery was yesterday. It was discussed which medications to take. She has a training day the day before surgery, and she was told not to use one of her inhalers. It was discussed that it was likely her albuterol inhaler that they do no want her to use. She is going to have her valve replaced with a mechanical valve. She thinks she will stay at the hospital for four or five days. Her last surgery was in 1999 for breast reduction; that is the only surgery she has ever had, which she handled well. She had a repeat echo done at [facility], which showed severe stenosis. It was discussed that this is a high risk surgery, but that she needs to have it done.     Eileen has tolerated previous surgeries well without bleeding or anesthesia difficulty.      Sleep Apnea: She has a history of sleep apnea, but she does not use her CPAP machine.  She has not used it for quite some time.    Hx Pulmonary Embolism: She had her pulmonary embolism after breast surgery.     REVIEW OF SYSTEMS:   It is generally hip pain that limits her ability walk much of a distance. She also becomes short of breath, but she attributes this more to stress and anxiety from her hip pain. She does not have stairs in her house and takes an electric scooter when shopping. She has six to eight steps to get up to her house. She is able to get up those, but she does not know if she would be able to do much more than that. She does not have any loose teeth and was recently seen at the dentist. Her kidney function and potassium levels were normal when they were checked in November. All other systems are negative.     PFSH:  Reviewed, as below.     Social History     Social History     Marital status:      Spouse name: N/A     Number of children: N/A     Years of education: N/A     Occupational History     Not on file.     Social History Main Topics     Smoking status: Never Smoker     Smokeless tobacco: Not on file  "    Alcohol use No     Drug use: No     Sexual activity: Not on file     Other Topics Concern     Not on file     Social History Narrative       Past Medical History   Diagnosis Date     Asthma      HLD (hyperlipidemia)      HTN (hypertension)      MS (multiple sclerosis)      Pre-diabetes      Pulmonary embolism        Past Surgical History   Procedure Laterality Date     Reduction mammaplasty  1994       Allergies   Allergen Reactions     Sulfa (Sulfonamide Antibiotics)        Active Ambulatory Problems     Diagnosis Date Noted     Depression      Asthma      Hypertension      Multiple Sclerosis      Hyperlipidemia      Impaired Glucose Tolerance Test      Dysphagia      Benign Adenomatous Polyp Of The Large Intestine      History of pulmonary embolism 10/30/2014     Obesity 10/29/2015     Generalized anxiety disorder 06/15/2016     Primary osteoarthritis of both hands 08/31/2016     Polyarthralgia 11/30/2016     Resolved Ambulatory Problems     Diagnosis Date Noted     Osteoarthritis      Lumbar Instability      Past Medical History   Diagnosis Date     HLD (hyperlipidemia)      HTN (hypertension)      MS (multiple sclerosis)      Pre-diabetes      Pulmonary embolism        No family history on file.    VITALS:  Vitals:    01/20/17 1048   BP: 124/86   Patient Site: Right Arm   Patient Position: Sitting   Cuff Size: Adult Large   Pulse: 92   Resp: 20   Temp: 98.3  F (36.8  C)   TempSrc: Oral   Weight: (!) 309 lb (140.2 kg)   Height: 5' 10\" (1.778 m)     Wt Readings from Last 3 Encounters:   01/20/17 (!) 309 lb (140.2 kg)   11/09/16 (!) 313 lb (142 kg)   11/03/16 (!) 316 lb (143.3 kg)     PHYSICAL:  GENERAL APPEARANCE: Alert, cooperative, no distress, appears stated age   LUNGS: Clear to auscultation bilaterally, respirations unlabored   HEART: IV/VI systolic murmur best heard at left sternal border.  HEAD: Normocephalic, without obvious abnormality, atraumatic   EYES: PERRL, conjunctiva/corneas clear, EOM's intact "   EARS: Normal TM's and external ear canals, both ears   NOSE: Nares normal, mucosa normal, no drainage   THROAT: Lips, mucosa, and tongue normal; teeth and gums normal   NECK: Supple, symmetrical, trachea midline, no adenopathy; thyroid: not enlarged, symmetric, no tenderness/mass/nodules; no carotid bruit or JVD   BACK: Symmetric, no curvature, ROM normal, no CVA tenderness   ABDOMEN: Soft, obese,non-tender, bowel sounds active all four quadrants, no masses, no organomegaly   MUSCULOSKELETAL: Normal range of motion. No joint swelling or deformity.   EXTREMITIES: Extremities normal, atraumatic, no cyanosis or edema, pain in left hip with ambulation  LYMPH NODES: Cervical, supraclavicular, and axillary nodes normal   NEUROLOGIC: CNII-XII intact. Normal strength, sensation and reflexes   throughout   PSYCHIATRIC: Normal mood and affect.    EKG: Normal sinus rhythm with no acute changes. Poor R-wave progression - body habits limit EKG.     ADDITIONAL HISTORY SUMMARIZED (FROM OLD RECORDS OR HISTORY FROM SOMEONE OTHER THAN THE PATIENT OR ANOTHER HEALTHCARE PROVIDER) (2 TOTAL): Reviewed 12/28/2016 Dr. Oro cardiovascular surgery note regarding aortic valve stenosis.     DECISION TO OBTAIN EXTRA INFORMATION (OLD RECORDS REQUESTED OR HISTORY FROM ANOTHER PERSON OR ACCESSING CARE EVERYWHERE) (1 TOTAL): MAXIMO signed for access to Care Everywhere     RADIOLOGY TESTS SUMMARIZED OR ORDERED (XRAY/CT/MRI/DXA) (1 TOTAL): Reviewed 12/6/2016 cardiac MRI and CT coronary angiogram    LABS REVIEWED OR ORDERED (1 TOTAL): Labs from 11/9/2016 reviewed. Labs ordered.     MEDICINE TESTS SUMMARIZED OR ORDERED (EKG/ECHO) (1 TOTAL): EKG ordered. Echo from 11/9/2016 and 12/22/2016 reviewed    INDEPENDENT REVIEW OF EKG OR X-RAY (2 EACH): EKG from today read.     The visit lasted a total of 17 minutes face to face with the patient. Over 50% of the time was spent counseling and educating the patient about her upcoming surgery.    Jose Miguel MOHAMUD,  am scribing for and in the presence of, Dr. Salazar.    I, Dr. Salazar, personally performed the services described in this documentation, as scribed by Jose Miguel Reyes in my presence, and it is both accurate and complete.    MEDICATIONS:  Current Outpatient Prescriptions   Medication Sig Dispense Refill     acetaminophen (TYLENOL) 500 MG tablet Take 500 mg by mouth every 6 (six) hours as needed for pain (1-2 tab PRN).       acyclovir (ZOVIRAX) 400 MG tablet Take 400 mg by mouth 2 (two) times a day.        albuterol (VENTOLIN HFA) 90 mcg/actuation inhaler Inhale 2 puffs every 6 (six) hours as needed for wheezing. 1 Inhaler 0     AVONEX 30 mcg/0.5 mL PnKt once a week.       calcium, as carbonate, (TUMS) 200 mg calcium (500 mg) chewable tablet Chew 1 tablet daily.       fluticasone-salmeterol (ADVAIR DISKUS) 250-50 mcg/dose DISKUS Inhale 1 puff 2 (two) times a day. 180 each 0     gabapentin (NEURONTIN) 300 MG capsule Take 300 mg by mouth daily.  2     LORazepam (ATIVAN) 0.5 MG tablet TAKE ONE TABLET BY MOUTH EVERY DAY FOR ANXIETY 20 tablet 0     losartan-hydrochlorothiazide (HYZAAR) 100-25 mg per tablet TAKE ONE TABLET BY MOUTH EVERY DAY 90 tablet 0     nystatin (MYCOSTATIN) powder Apply to affected area twice daily 60 g 0     omeprazole (PRILOSEC) 20 MG capsule TAKE 1 CAPSULE BY MOUTH TWICE DAILY 6 AM AND 4  capsule 1     simvastatin (ZOCOR) 20 MG tablet Take 1 tablet (20 mg total) by mouth bedtime. 90 tablet 1     venlafaxine (EFFEXOR-XR) 75 MG 24 hr capsule TAKE 3 CAPSULES BY MOUTH EVERY DAY 90 capsule 0     warfarin (COUMADIN) 2.5 MG tablet 2.5 mg on Mon, Wed; 5 mg all other days or as last directed by clinic 50 tablet 0     No current facility-administered medications for this visit.        Total Data Points: 8

## 2021-06-08 NOTE — PROGRESS NOTES
"Eileen Donald is a 64 y.o. female who is being evaluated via a billable telephone visit.      The patient has been notified of following:     \"This telephone visit will be conducted via a call between you and your physician/provider. We have found that certain health care needs can be provided without the need for a physical exam.  This service lets us provide the care you need with a short phone conversation.  If a prescription is necessary we can send it directly to your pharmacy.  If lab work is needed we can place an order for that and you can then stop by our lab to have the test done at a later time.    Telephone visits are billed at different rates depending on your insurance coverage. During this emergency period, for some insurers they may be billed the same as an in-person visit.  Please reach out to your insurance provider with any questions.    If during the course of the call the physician/provider feels a telephone visit is not appropriate, you will not be charged for this service.\"    Patient has given verbal consent to a Telephone visit? Yes    What phone number would you like to be contacted at? [phone]  Patient would like to receive their AVS by AVS Preference: Liz.    Additional provider notes:   Marilee Donald is a 63 yo female followed in pulmonary clinic for asthma, restrictive lung disease due to MS, and chronic hypoxic respiratory failure.  -Today she states she is doing well  -she is not able to get out much due to the pandemic, but is walking more and trying to stay somewhat active  -she feels her new infusion for her MS is helping with her strength  -she notes she does still get wheezing, and this is more in the evening  -the xopenex works well for her, and does not have nearly as much tremor or anxiousness with it  -she feels like her allergies are acting up as well, leading to the wheezing  -her ACT today is 13  -we discussed a course of prednisone, as her symptoms are more in " the evening, but she doesn't feel its that bad to need prednisone  -we will have her  some prednisone to have on hand if her allergies do worsen  -she notes she is using her O2 as needed during the day, and her O2 saturations are mostly 92% and above  -she can drop into the 80s though, but recovers quickly  -she also continues with O2 at night    A/P  Eileen Donald is a 64 y.o. female PMH significant for bicuspid aortic valve s/p AVR, history of EIB when she was younger in her 20s, HTN, MS that is well controlled, and PE who presents to pulmonary clinic for evaluation of asthma.     1. Likely asthma, moderate persistent:  She has had good improvement in symptoms while on symbicort.  Her NIOX previously was normal, but on ICS therapy.  She has mosaic attenuation, chest tightness, wheezing, improves with beta agonists, and family history of asthma, making this likely asthma.  Her PFTs do not show obstruction, but rather restriction.  She continues to use Singulair.  Her symptoms continue to respond to ICS and bronchodilator therapy.  Some of her issues are likely due MS and debility, but I want her to use her albuterol more as needed, so we can get a sense of how much she needs. We will continue with incruse daily. Her HR is much better on Xopenex as well.  -will provide prednisone to have on hand     2. Restrictive Ventilatory Defect:  I think this is related to her body habitus, but could also be related to her MS.  Her diffusing capacity is normal. She has been followed by Dr. Bear in neurology and has not had major issues with her MS. Her CTA does not show any ILD.       3. Dyspnea on Exertion:  Multifactorial.  She is improved significantly with maximal inhaled therapy and increased diuretics. I think she would still benefit from pulmonary rehab, but she had issues with transportation as she is no longer driving. We will see how she does over the next few months, and she will work on setting up  transportation.     4. MS, now progressive: she was switched over to Ocrevus.      Recommend:  - continue with symbicort 2 puffs two times a day  - incruse daily  - lev-albuterol inhaler as needed  - singulair 10 mg daily  - will provide prednisone to have on hand, and will have her call clinic if she starts it  - pulmonary rehab in the next coming months after pandemic is over  - RTC in 3 months    Phone call duration: 18 minutes    Ryan Grajeda,

## 2021-06-08 NOTE — PATIENT INSTRUCTIONS - HE
I am glad things are going pretty well.    If your allergies start getting worse, prednisone may be beneficial.  I sent in some prednisone for you to have on hand.  Please call and let us know if you have to start using it.

## 2021-06-08 NOTE — PROGRESS NOTES
[facility]        Visit Type: H & P (s/p AVR, h/o PE, anemia)    Code Status:  FULL CODE  Facility:  [facility] [[id number]]          PCP: Tito Salazar MD       PHONE: [phone]     FAX:[phone]        ASSESSMENT/PLAN:  1. S/P AVR (aortic valve replacement)  Stable. INR goal .5 - 3.5. INR 3.37, give Coumadin 5 mg q. day, check INR 2/7. Continue Percocet for pain management. Discussed recent benefits of narcotic use   2. Acute blood loss anemia  Hemoglobin 9.0 on discharge, recheck hemogram   3. Hypertension  Stable on lisinopril   4. Pulmonary embolism,  h/o  Currently on Coumadin   5. Sleep disturbance  Refuses melatonin, will monitor   6. Paroxysmal atrial fibrillation  Continue on amiodarone and Lopressor. Check amiodarone and TSH levels         HISTORY OF PRESENT ILLNESS:   Eileen Donald is a 61 y.o. female with a history of Hypertension, MS,bicuspid aortic valve with aortic valve stenosis, history of PE and morbid obesity who underwent aortic valve repair on 1/24/17. Postoperatively, she developed paroxysmal atrial fibrillation and was started on amiodarone and Lopressor with good rhythm and rate control. She also had acute blood loss anemia with her lowest hemoglobin at 9.0. She also developed fluid volume overload and treated with IV and oral Lasix.    Currently, she is stable and states that she feels stronger and able to do more with physical therapy. Her appetite is improving although she still has poor sleep. She denies any nausea or vomiting or abdominal pain. She does not have any constipation or diarrhea. She denies any cough, fevers or chills, chest pains or shortness of breath. She does not have any dizziness or lightheadedness.    Other review of systems are negative.      PAST MEDICAL/SURGICAL HISTORY:  Past Medical History:   Diagnosis Date     Aortic valve stenosis 01/24/2017     Asthma      HLD (hyperlipidemia)      HTN  (hypertension)      MS (multiple sclerosis)      Osteoarthritis      Pre-diabetes      Pulmonary embolism      Past Surgical History:   Procedure Laterality Date     mechanical aortic prosthesis  01/24/2017     REDUCTION MAMMAPLASTY  1994       SOCIAL HISTORY:  Social History     Social History     Marital status:      Spouse name: N/A     Number of children: N/A     Years of education: N/A     Occupational History     Not on file.     Social History Main Topics     Smoking status: Never Smoker     Smokeless tobacco: Not on file     Alcohol use No     Drug use: No     Sexual activity: Not on file     Other Topics Concern     Not on file     Social History Narrative       FAMILY HISTORY:  Family History   Problem Relation Age of Onset     Heart attack Father      Heart attack Sister      Heart attack Brother        MEDICATIONS:  Current Outpatient Prescriptions on File Prior to Visit   Medication Sig     acetaminophen (TYLENOL) 500 MG tablet Take 325-650 mg by mouth every 6 (six) hours as needed for pain (1-2 tab PRN).      acyclovir (ZOVIRAX) 400 MG tablet Take 400 mg by mouth 2 (two) times a day.      albuterol (VENTOLIN HFA) 90 mcg/actuation inhaler Inhale 2 puffs every 6 (six) hours as needed for wheezing.     amiodarone (PACERONE) 200 MG tablet Take 400 mg by mouth daily.     aspirin 81 mg chewable tablet Chew 81 mg daily.     AVONEX 30 mcg/0.5 mL PnKt once a week.     calcium, as carbonate, (TUMS) 200 mg calcium (500 mg) chewable tablet Chew 1 tablet daily.     fluticasone-salmeterol (ADVAIR DISKUS) 250-50 mcg/dose DISKUS Inhale 1 puff 2 (two) times a day.     furosemide (LASIX) 40 MG tablet Take 40 mg by mouth daily.     gabapentin (NEURONTIN) 300 MG capsule Take 300 mg by mouth daily.     lisinopril (PRINIVIL,ZESTRIL) 5 MG tablet Take 5 mg by mouth daily.     LORazepam (ATIVAN) 0.5 MG tablet Take 0.5 mg by mouth daily as needed for anxiety.     metoprolol tartrate (LOPRESSOR) 25 MG tablet Take 25 mg by  mouth 2 (two) times a day.     nystatin (MYCOSTATIN) powder Apply to affected area twice daily     omeprazole (PRILOSEC) 20 MG capsule TAKE 1 CAPSULE BY MOUTH TWICE DAILY 6 AM AND 4 PM     potassium chloride SA (K-DUR,KLOR-CON) 20 MEQ tablet Take 20 mEq by mouth daily.     senna-docusate (PERICOLACE) 8.6-50 mg tablet Take 2 tablets by mouth 2 (two) times a day.     simvastatin (ZOCOR) 20 MG tablet Take 1 tablet (20 mg total) by mouth bedtime.     venlafaxine (EFFEXOR-XR) 75 MG 24 hr capsule TAKE 3 CAPSULES BY MOUTH EVERY DAY     No current facility-administered medications on file prior to visit.        ALLERGIES:  Allergies   Allergen Reactions     Morphine      Sulfa (Sulfonamide Antibiotics)          PHYSICAL EXAMINATION:  141/64, 97.4, 72, 16, 98%  General: Awake, Alert, oriented x3, not in any form of acute distress, answers questions appropriately, follows simple commands, conversant, obese   HEENT: Pink conjunctiva, anicteric sclerae, oral mucosa is moist  NECK: Supple, without any lymphadenopathy, thyromegaly or any masses  LUNG: Clear to auscultation with good chest expansion. There are no crackles or wheezes, normal AP diameter  BACK: No kyphosis of the thoracic spine  CVS: There is decrease S1  S2 with metallic click, there are no murmurs or heaves, rhythm is regular.  Thoracotomy site clean, no discharge, mild erythema along surgical site  ABDOMEN: Globular and soft, nontender to palpation, no organomegaly, good bowel sounds  EXTREMITIES: Good range of motion on both upper and lower extremities, no pedal edema, no cyanosis or clubbing, no calf tenderness  SKIN: Warm and dry, no rashes or erythema noted    LABS:  Lab Results   Component Value Date    WBC 6.7 01/20/2017    HGB 14.6 01/20/2017    HCT 43.9 01/20/2017    MCV 96 01/20/2017     01/20/2017     Lab Results   Component Value Date    CREATININE 0.94 02/03/2017    BUN 12 02/03/2017     02/03/2017    K 4.5 02/03/2017     02/03/2017     CO2 28 02/03/2017           45 minutes of total time spent, greater than 55% of the time spent in coordination of care and counseling regarding the above medical issues and plan of care. I have reviewed the patient's medical records, labs and medications.       Electronically signed by:Mouna Aparicio MD

## 2021-06-08 NOTE — PROGRESS NOTES
"Cardiac Rehab  Phase II Assessment    Assessment Date: 2-16-17    Diagnosis: Aortica Valve Stenosis    Procedure: AVR  Date of Onset: 1-24-17  ICD/Pacemaker: No   Post-op Complications: None  ECG History: Post OP Afib, SR with 1st degree AVB, LBBB  EF%:57-62%  Past Medical History:   Patient Active Problem List   Diagnosis     Depression     Asthma     Hypertension     Multiple Sclerosis     Hyperlipidemia     Impaired Glucose Tolerance Test     Dysphagia     Benign Adenomatous Polyp Of The Large Intestine     History of pulmonary embolism     Obesity     Generalized anxiety disorder     Primary osteoarthritis of both hands     Polyarthralgia     Aortic valve replaced         Physical Assessment  Precautions/ Physical Limitations: Hips-arthritis  Oxygen: No  O2 Sats: 95-97% on RA Edema: None  Incisions: Healing  Sleeping Pattern: poor-Very interupted  Appetite: good   Nutrition Risk Screen: Will follow up with dietician    Pain  Location: Hips  Characteristics:\"Hip locks up\"  Intensity: (0-10 scale) 10  Current Pain Management: Tylenol  Intervention: NA  Response: Relief after tylenol starts working    Psychosocial/ Emotional Health  1. In the past 12 months, have you been in a relationship where you have been abused physically, emotionally, sexually or financially? No  notified: NA  2. Who do you turn to for emotional support?: Daughters,   3. Do you have cultural or spiritual needs? No  4. Have there been any major life changes in the past 12 months? Yes AVR surgery, and Lost 2 family members in a year    Referral Information  Primary Physician: Tito Salazar MD  Cardiologist: Dr. Carmichael  Surgeon: Dr. Oro    Home exercise/Equipment: No    Patient's long-term goal(s): Lose weight, get in better shape, walk further    1. Living Accommodations: Home Steps: Yes      Support people at home: , 1 daughter   2. Marital Status:   3. Family is able to assist with cares      " Hindu/Community involvement: No  4. Recreation/Hobbies: Go to movies, fishing, sit on dock

## 2021-06-08 NOTE — PROGRESS NOTES
[facility]        Visit Type: Review Of Multiple Medical Conditions (s/p AVR, h/o pE, anemia)    Code Status:  FULL CODE  Facility:  [facility] [[id number]]          PCP: Tito Salazar MD       PHONE: [phone]     FAX:[phone]        ASSESSMENT/PLAN:  1. S/P AVR (aortic valve replacement)     2. Acute blood loss anemia     3. Hypertension     4. Pulmonary embolism     5. Paroxysmal atrial fibrillation     6. Sleep disturbance       1. S/P AVR (aortic valve replacement)  Stable. INR goal 2.5 - 3.5. INR 3.67, give Coumadin 2 mg today, check INR 2/10.  D/c Percocet and continue tylenol prn for pain management. Patient still on lasix, consider d/c if no fluid overload at next primary MD visit.   2. Acute blood loss anemia  Hemoglobin 9.0 on discharge now at 11.1   3. Hypertension  Stable on lisinopril   4. Pulmonary embolism,  h/o  Currently on Coumadin   5. Sleep disturbance  Refuses melatonin, will monitor   6. Paroxysmal atrial fibrillation  Continue on amiodarone and Lopressor.  Amiodarone level is low at 0.4 and TSH level borderline high at 4.73.  Will follow with cardiology/primary MD         HISTORY OF PRESENT ILLNESS:   Eileen Donald is a 61 y.o. female with a history of Hypertension, MS,bicuspid aortic valve with aortic valve stenosis, history of PE and morbid obesity who underwent aortic valve repair on 1/24/17. Postoperatively, she developed paroxysmal atrial fibrillation and was started on amiodarone and Lopressor with good rhythm and rate control. She also had acute blood loss anemia with her lowest hemoglobin at 9.0. She also developed fluid volume overload and treated with IV and oral Lasix.    Currently, she is stable and states that she feels stronger and able to do more with physical therapy. Her appetite is improving although she still has poor sleep but refuses her melatonin. She denies any nausea or vomiting or abdominal  pain. She does not have any constipation or diarrhea. She denies any cough, fevers or chills, chest pains or shortness of breath. She does not have any dizziness or lightheadedness.  She wants to be off her narcotics.    Other review of systems are negative.      PAST MEDICAL/SURGICAL HISTORY:  Past Medical History:   Diagnosis Date     Aortic valve stenosis 01/24/2017     Asthma      HLD (hyperlipidemia)      HTN (hypertension)      MS (multiple sclerosis)      Osteoarthritis      Pre-diabetes      Pulmonary embolism      Past Surgical History:   Procedure Laterality Date     mechanical aortic prosthesis  01/24/2017     REDUCTION MAMMAPLASTY  1994       SOCIAL HISTORY:  Social History     Social History     Marital status:      Spouse name: N/A     Number of children: N/A     Years of education: N/A     Occupational History     Not on file.     Social History Main Topics     Smoking status: Never Smoker     Smokeless tobacco: Not on file     Alcohol use No     Drug use: No     Sexual activity: Not on file     Other Topics Concern     Not on file     Social History Narrative       FAMILY HISTORY:  Family History   Problem Relation Age of Onset     Heart attack Father      Heart attack Sister      Heart attack Brother        MEDICATIONS:  Current Outpatient Prescriptions on File Prior to Visit   Medication Sig     acetaminophen (TYLENOL) 500 MG tablet Take 325-650 mg by mouth every 6 (six) hours as needed for pain (1-2 tab PRN).      acyclovir (ZOVIRAX) 400 MG tablet Take 400 mg by mouth 2 (two) times a day.      albuterol (VENTOLIN HFA) 90 mcg/actuation inhaler Inhale 2 puffs every 6 (six) hours as needed for wheezing.     aspirin 81 mg chewable tablet Chew 81 mg daily.     AVONEX 30 mcg/0.5 mL PnKt once a week.     calcium, as carbonate, (TUMS) 200 mg calcium (500 mg) chewable tablet Chew 1 tablet daily.     fluticasone-salmeterol (ADVAIR DISKUS) 250-50 mcg/dose DISKUS Inhale 1 puff 2 (two) times a day.      furosemide (LASIX) 40 MG tablet Take 40 mg by mouth daily.     gabapentin (NEURONTIN) 300 MG capsule Take 300 mg by mouth daily.     lisinopril (PRINIVIL,ZESTRIL) 5 MG tablet Take 5 mg by mouth daily.     LORazepam (ATIVAN) 0.5 MG tablet Take 0.5 mg by mouth daily as needed for anxiety.     metoprolol tartrate (LOPRESSOR) 25 MG tablet Take 25 mg by mouth 2 (two) times a day.     nystatin (MYCOSTATIN) powder Apply to affected area twice daily     omeprazole (PRILOSEC) 20 MG capsule TAKE 1 CAPSULE BY MOUTH TWICE DAILY 6 AM AND 4 PM     potassium chloride SA (K-DUR,KLOR-CON) 20 MEQ tablet Take 20 mEq by mouth daily.     senna-docusate (PERICOLACE) 8.6-50 mg tablet Take 2 tablets by mouth 2 (two) times a day.     simvastatin (ZOCOR) 20 MG tablet Take 1 tablet (20 mg total) by mouth bedtime.     venlafaxine (EFFEXOR-XR) 75 MG 24 hr capsule TAKE 3 CAPSULES BY MOUTH EVERY DAY     WARFARIN SODIUM (WARFARIN ORAL) Take by mouth. 2/10/17 INR 3.58. Take 2mg 2/10 then 4mg daily. Next INR 2/13/17 2/7/17 INR 3.15 Take 4.5mg daily.  Next INR 2/9/17.         ALLERGIES:  Allergies   Allergen Reactions     Morphine      Sulfa (Sulfonamide Antibiotics)          PHYSICAL EXAMINATION:  151/90, 97.0, 75, 16, 98%  General: Awake, Alert, oriented x3, not in any form of acute distress, answers questions appropriately, follows simple commands, conversant, obese   HEENT: Pink conjunctiva, anicteric sclerae, oral mucosa is moist  NECK: Supple, without any lymphadenopathy, thyromegaly or any masses  LUNG: Clear to auscultation with good chest expansion. There are no crackles or wheezes, normal AP diameter  BACK: No kyphosis of the thoracic spine  CVS: There is decrease S1  S2 with metallic click, there are no murmurs or heaves, rhythm is regular.  Thoracotomy site clean, no discharge, mild erythema along surgical site  ABDOMEN: Globular and soft, nontender to palpation, no organomegaly, good bowel sounds  EXTREMITIES: Good range of motion on  both upper and lower extremities, no pedal edema, no cyanosis or clubbing, no calf tenderness  SKIN: Warm and dry, no rashes or erythema noted    LABS:  Lab Results   Component Value Date    WBC 8.6 02/09/2017    HGB 11.1 (L) 02/09/2017    HCT 36.0 02/09/2017     (H) 02/09/2017     02/09/2017     Lab Results   Component Value Date    CREATININE 0.95 02/09/2017    BUN 11 02/09/2017     02/09/2017    K 4.7 02/09/2017     02/09/2017    CO2 29 02/09/2017           Electronically signed by:Mouna Aparicio MD

## 2021-06-08 NOTE — PROGRESS NOTES
ASSESSMENT / Plan:  1. History of pulmonary embolism  INR will be checked today-patient's new goal range is 2.5-3.5 with valve replacement  - INR    2. Aortic valve replaced  Patient is doing great status post aortic valve replacement  She is happy that she has had the procedure done and excited to see hopefully good symptomatic improvement over the next few months as she proceeds to cardiac rehab  She is anxious for her daughters to be evaluated with echocardiogram and will help her arrange those appointments  Patient is doing well status post surgery  Reviewed hospital records  No changes to medications with the patient today  Patient did have some slight anemia after surgery we will monitor moving forward    I have reconciled all of the medications.       There are no Patient Instructions on file for this visit.    No orders of the defined types were placed in this encounter.    Medications Discontinued During This Encounter   Medication Reason     aspirin 81 mg chewable tablet Duplicate order     lisinopril (PRINIVIL,ZESTRIL) 5 MG tablet Duplicate order     potassium chloride SA (K-DUR,KLOR-CON) 20 MEQ tablet Therapy completed       No Follow-up on file.    CHIEF COMPLAINT:  Chief Complaint   Patient presents with     Hospital Visit Follow Up     Heart surgery 1/24/17, discuss new medications        HISTORY OF PRESENT ILLNESS:  Eileen is a 61 y.o. female presenting to the clinic today for a hospital follow up. She was admitted on 1/24/2017 for aortic valve replacement. She was contacted after discharge on 1/31/2017 and was advised to follow up with her PCP post discharge. Recovery is going well. She has been doing exercises at home and has her first cardiac rehab appointment tomorrow. She spent some time in a transitional care facility and did well there. She has been fatigued since the surgery, but it was discussed that this is normal. Her hemoglobin dropped slightly after surgery and was 11.1 g/dL the last  time she had it checked on 2/09. She had a mechanical valve placed; she is anticoagulating on warfarin and has been doing regular INR checks. She does not know what dose of warfarin she was being given daily in the hospital, but she has been administering it on her own at home now. She has been in contact with the anticoagulation nurses. The incision is healing well. Her daughter has been helping her out a lot at home, which she is grateful for. She is glad she had surgery done and is excited to see how she feels in the coming months. She was told that there was not any damage done to her heart yet.     REVIEW OF SYSTEMS:   Her blood pressure is in a good range today. She took Tamiflu while in the hospital because it was going around. All other systems are negative.    PFSH:  She cannot drive until 2/24, which has been frustrating for her. The cardiologist recommended that her daughters each get scheduled for an echo.     TOBACCO USE:  History   Smoking Status     Never Smoker   Smokeless Tobacco     Not on file       VITALS:  Vitals:    02/15/17 1050   BP: 124/64   Patient Site: Right Arm   Patient Position: Sitting   Cuff Size: Adult Large   Pulse: 74   Resp: 24   Temp: 98  F (36.7  C)   TempSrc: Oral   Weight: (!) 304 lb (137.9 kg)     Wt Readings from Last 3 Encounters:   02/15/17 (!) 304 lb (137.9 kg)   01/20/17 (!) 309 lb (140.2 kg)   11/09/16 (!) 313 lb (142 kg)       PHYSICAL EXAM:  GENERAL APPEARANCE: Alert, cooperative, no distress, appears stated age. She ambulates with a walker.   LUNGS: Clear to auscultation bilaterally, respirations unlabored .  HEART: Normal rhythm with mechanical valve noted.   SKIN: Clean, dry, no redness along incision line, no sign of infection.   Abd: obese  EXTREMITIES: Extremities normal, atraumatic, no cyanosis or edema  NEUROLOGIC: Grossly normal  PSYCHOLOGIC: Normal mood and affect    ADDITIONAL HISTORY SUMMARIZED (FROM OLD RECORDS OR HISTORY FROM SOMEONE OTHER THAN THE  PATIENT OR ANOTHER HEALTHCARE PROVIDER) (2 TOTAL): Hospital notes from Care Everywhere reviewed regarding recent AVR    DECISION TO OBTAIN EXTRA INFORMATION (OLD RECORDS REQUESTED OR HISTORY FROM ANOTHER PERSON OR ACCESSING CARE EVERYWHERE) (1 TOTAL): Care Everywhere accessed.     RADIOLOGY TESTS SUMMARIZED OR ORDERED (XRAY/CT/MRI/DXA) (1 TOTAL): None.    LABS REVIEWED OR ORDERED (1 TOTAL): Recent labs from Care Everywhere reviewed. Labs ordered.     MEDICINE TESTS SUMMARIZED OR ORDERED (EKG/ECHO) (1 TOTAL): Echo from Care Everywhere reviewed.     INDEPENDENT REVIEW OF EKG OR X-RAY (2 EACH): None.     The visit lasted a total of 17 minutes face to face with the patient. Over 50% of the time was spent counseling and educating the patient about her recent aortic valve replacement.    IJose Miguel, am scribing for and in the presence of, Dr. Salazar.    I, Dr. Salazar, personally performed the services described in this documentation, as scribed by Jose Miguel Reyes in my presence, and it is both accurate and complete.    MEDICATIONS:  Current Outpatient Prescriptions   Medication Sig Dispense Refill     acetaminophen (TYLENOL) 500 MG tablet Take 325-650 mg by mouth every 6 (six) hours as needed for pain (1-2 tab PRN).        acyclovir (ZOVIRAX) 400 MG tablet Take 400 mg by mouth 2 (two) times a day.        albuterol (VENTOLIN HFA) 90 mcg/actuation inhaler Inhale 2 puffs every 6 (six) hours as needed for wheezing. 1 Inhaler 0     amiodarone (PACERONE) 200 MG tablet TAKE ONE TABLET BY MOUTH ONCE DAILY UNTIL 2-27-17  0     aspirin 81 mg chewable tablet Chew 81 mg.       AVONEX 30 mcg/0.5 mL PnKt once a week.       calcium, as carbonate, (TUMS) 200 mg calcium (500 mg) chewable tablet Chew 1 tablet daily.       fluticasone-salmeterol (ADVAIR DISKUS) 250-50 mcg/dose DISKUS Inhale 1 puff 2 (two) times a day. 180 each 0     furosemide (LASIX) 40 MG tablet Take 40 mg by mouth daily.       gabapentin (NEURONTIN) 300 MG  capsule Take 300 mg by mouth daily.  2     lisinopril (PRINIVIL,ZESTRIL) 5 MG tablet Take 5 mg by mouth.       LORazepam (ATIVAN) 0.5 MG tablet Take 0.5 mg by mouth daily as needed for anxiety.       metoprolol tartrate (LOPRESSOR) 25 MG tablet Take 25 mg by mouth 2 (two) times a day.       nystatin (MYCOSTATIN) powder Apply to affected area twice daily 60 g 0     omeprazole (PRILOSEC) 20 MG capsule TAKE 1 CAPSULE BY MOUTH TWICE DAILY 6 AM AND 4  capsule 1     senna-docusate (PERICOLACE) 8.6-50 mg tablet Take 2 tablets by mouth 2 (two) times a day.       simvastatin (ZOCOR) 20 MG tablet Take 1 tablet (20 mg total) by mouth bedtime. 90 tablet 1     venlafaxine (EFFEXOR-XR) 75 MG 24 hr capsule TAKE 3 CAPSULES BY MOUTH EVERY DAY 90 capsule 0     WARFARIN SODIUM (WARFARIN ORAL) Take by mouth. 2/10/17 INR 3.58. Take 2mg 2/10 then 4mg daily. Next INR 2/13/17 2/7/17 INR 3.15 Take 4.5mg daily.  Next INR 2/9/17.       No current facility-administered medications for this visit.        Total data points: 5

## 2021-06-08 NOTE — PROGRESS NOTES
ITP ASSESSMENT   Assessment Day: Initial  Session Number: 1  Precautions: Sternal  Diagnosis: Valve  Risk Stratification: High  Referring Provider: Ryan Oro MD  EXERCISE  Exercise Assessment: Initial     Pt able to exercise at 2.2 METS for 10 minutes on Nustep.                         Exercise Plan  Goals Next 30 days  ADL'S: Walk 8 steps at a time, Move back into her home (Staying at [facility])  Leisure: Very light housework  Work: Hook up new shower head/Shower bench    Education Goals: Patient can state cardiac s/s and appropriate emergency response.;Has system for taking medication.;Medication review  Education Goals Met: Has system for taking medication.    Exercise Prescription  Exercise Mode: Bike;Nustep;Stairs;Arm Erg.  Frequency: 1-2 days/week  Duration: 15-30 minutes  Intensity / THR: 20-30 beats above resting heart rate  RPE 11-14  Progression / Met level: 2.5  Resistive Training?: No    Current Exercise (mins/week): 1    Interventions  Home Exercise:  Mode: Walking  Frequency: 3 times/day,4-5 days/week  Duration: 5-10 minutes    Education Material : Educational videos;Individual education and counseling;Offer educational classes;Provide written material    Education Completed  Exercise Education Completed: Cardiac Anatomy;Signs and Symptoms;Medication review;Emergency Plan;RPE;Home Exercise;Warm up/cool down;FITT Principles;BP/HR Reponse to exercise;Benefits of Exercise            Exercise Follow-up/Discharge  Follow up/Discharge: Encouraged pt to start home walking 3x for 5 minutes/day NUTRITION  Nutrition Assessment: Initial    Nutrition Risk Factors:  Overweight;Dyslipidemia  Cholesterol: 220  LDL: 123  HDL: 70  Triglycerides: 134    Nutrition Plan  Interventions  Nutrition Interventions: Diet consult;Therapist/Patient discussion;Diet class;Educational videos;Provide with written material      Education Completed  Nutrition Education Completed: Risk factor overview    Goals  Nutrition Goals (Next  "30 days): Patient can identify their risk factors for CAD;Patient will follow a low sodium diet;Provide Rate your Plate Survey;Review Dietitian schedule;Patient will follow a low saturated fat diet;Patient knows appropriate portion size;Patient will lose weight    Goals Met  Nutrition Goals Met: Patient can identify their risk factors for CAD;Provided Rate your Plate Survey;Reviewed Dietitian schedule    Height, Weight, and  BMI  Weight: 303 lb (137.4 kg)  Height: 5' 10\" (1.778 m)  BMI: 43.48    Nutrition Follow-up  Follow-up/Discharge: Pt motivated to lose weight       Other Risk Factors  Other Risk Factor Assessment: Initial    HTN Risk Factor: Hypertension    Pre Exercise BP: 129/80  Post Exercise BP: 114/68    Hypertension Plan  Goals  HTN Goals: Follow low sodium diet;Take medication as prescribed;Exercises regularly    Goals Met  HTN Goals Met: Take medication as prescribed    HTN Interventions  HTN Interventions: Diet consult;Therapist/patient discussion;Provide written material;Offer educational videos;Offer educational classes    HTN Education Completed  HTN Education Completed: Risk factor overview    Tobacco Risk Factor: NA      Risk Factor Follow-up   Follow-up/Discharge: Encouraged low sodium diet   PSYCHOSOCIAL  Psychosocial Assessment: Initial     [place] COOP Q of L Summary Score: 25    MONE-D Score: 6    Psychosocial Risk Factor: Stress    Psychosocial Plan  Interventions  If MONE-D > 15 send letter to MD  Interventions: Offer Social Service consult;Offer Spiritual Care consult;Offer educational videos and classes;Provide written material;Individual education and counseling    Education Completed  Education Completed: S/S of depression;Effects of stress on body;Relaxation/Coping Techniques    Goals  Goals (Next 30 days): Identified Support system;Oriented to stress management classes;Identify stressors;Improvement in [employer] COOP score;Practicing stress management skills    Goals Met  Goals Met: " Identified Support system;Identify stressors    Psychosocial Follow-up  Follow-up/Discharge: Gave Info on Stress and Depression           Patient involved in Goal setting?: Yes    Signature: _____________________________________________________________    Date: __________________    Time: __________________

## 2021-06-08 NOTE — TELEPHONE ENCOUNTER
Medication: Lorazepam    Last Date Filled Per Epic 8/30/2018 #30     pulled: NO  No  within the last year     Only PCP Prescribing?: YES    Taken as prescribed from physician notes?: YES      CSA in last year: NO    Random Utox in last year: NO    Opioids + benzodiazepines? NO

## 2021-06-08 NOTE — TELEPHONE ENCOUNTER
RN cannot approve Refill Request    RN can NOT refill this medication Protocol failed and NO refill given.       Daysi Mohan, [facility] Triage/Med Refill 5/27/2020    Requested Prescriptions   Pending Prescriptions Disp Refills     warfarin ANTICOAGULANT (COUMADIN/JANTOVEN) 2.5 MG tablet [Pharmacy Med Name: WARFARIN TABS 2.5MG] 130 tablet 4     Sig: TAKE 1 TO 2 TABLETS DAILY (ADJUST DOSE PER INR RESULTS AS INSTRUCTED)       Warfarin Refill Protocol  Failed - 5/25/2020 11:34 PM        Failed -  Route to appropriate pool/provider     Last Anticoagulation Summary:   Anticoagulation Episode Summary     Current INR goal:   2.5-3.5   TTR:   58.7 % (11.7 mo)   Next INR check:   5/28/2020   INR from last check:   4.40! (5/14/2020)   Weekly max warfarin dose:      Target end date:      INR check location:      Preferred lab:      Send INR reminders to:   [facility]    Indications    Heart valve replaced [Z95.2]           Comments:            Anticoagulation Care Providers     Provider Role Specialty Phone number    Tito Salazar MD Referring Family Medicine [phone]                Passed - Provider visit in last year     Last office visit with prescriber/PCP: 10/3/2019 Tito Salazar MD OR same dept: 10/10/2019 Antoine Webb MD OR same specialty: 10/10/2019 Antoine Webb MD  Last physical: 9/17/2019 Last MTM visit: Visit date not found    Next appt within 3 mo: Visit date not found Next physical within 3 mo: Visit date not found  Prescriber OR PCP: Tito Salazar MD  Last diagnosis associated with med order: 1. Aortic valve replaced  - warfarin ANTICOAGULANT (COUMADIN/JANTOVEN) 2.5 MG tablet [Pharmacy Med Name: WARFARIN TABS 2.5MG]; TAKE 1 TO 2 TABLETS DAILY (ADJUST DOSE PER INR RESULTS AS INSTRUCTED)  Dispense: 130 tablet; Refill: 4    2. Long term current use of anticoagulant therapy  - warfarin ANTICOAGULANT (COUMADIN/JANTOVEN) 2.5 MG tablet [Pharmacy  Med Name: WARFARIN TABS 2.5MG]; TAKE 1 TO 2 TABLETS DAILY (ADJUST DOSE PER INR RESULTS AS INSTRUCTED)  Dispense: 130 tablet; Refill: 4    If protocol passes may refill for 6 months if within 3 months of last provider visit (or a total of 9 months).

## 2021-06-08 NOTE — TELEPHONE ENCOUNTER
ANTICOAGULATION  MANAGEMENT    Assessment     Today's INR result of 3.4 is Therapeutic (goal INR of 2.5-3.5)        Warfarin taken as previously instructed    No new diet changes affecting INR    No new medication/supplements affecting INR    Continues to tolerate warfarin with no reported s/s of bleeding or thromboembolism     Previous INR was Subtherapeutic    Plan:     Spoke with Eileen regarding INR result and instructed:     Warfarin Dosing Instructions:  Continue current warfarin dose    5 mg every Sun, Tue; 2.5 mg all other days      (0 % change)    Instructed patient to follow up no later than: 2 weeks    Education provided: importance of therapeutic range and importance of following up for INR monitoring at instructed interval    Eileen verbalizes understanding and agrees to warfarin dosing plan.    Instructed to call the [facility] for any changes, questions or concerns. (#[phone])   ?   Radha Slater RN    Subjective/Objective:      Eileen Donald, a 64 y.o. female is on warfarin.     Eileen reports:     Home warfarin dose: as updated on anticoagulation calendar per template     Missed doses: No     Medication changes:  No     S/S of bleeding or thromboembolism:  No     New Injury or illness:  No     Changes in diet or alcohol consumption:  No     Upcoming surgery, procedure or cardioversion:  No    Anticoagulation Episode Summary     Current INR goal:   2.5-3.5   TTR:   59.8 % (1 y)   Next INR check:   7/1/2020   INR from last check:   3.40 (6/17/2020)   Weekly max warfarin dose:      Target end date:      INR check location:      Preferred lab:      Send INR reminders to:   [facility]    Indications    Heart valve replaced [Z95.2]           Comments:            Anticoagulation Care Providers     Provider Role Specialty Phone number    Tito Salazar MD Referring Family Medicine [phone]

## 2021-06-08 NOTE — TELEPHONE ENCOUNTER
Controlled Substance Refill Request  Medication Name:   Requested Prescriptions     Pending Prescriptions Disp Refills     LORazepam (ATIVAN) 0.5 MG tablet 30 tablet 0     Sig: Take 1 tablet (0.5 mg total) by mouth daily as needed for anxiety.     Date Last Fill: 8/30/2018  Requested Pharmacy: [facility]  Submit electronically to pharmacy  Controlled Substance Agreement on file:   Encounter-Level CSA Scan Date:    There are no encounter-level csa scan date.        Last office visit:  4/6/20
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
## 2021-06-09 NOTE — PROGRESS NOTES
ITP ASSESSMENT   Assessment Day: 30 Day  Session Number: 5  Precautions: Sternal  Diagnosis: Valve  Risk Stratification: High  Referring Provider: Dr. Oro  EXERCISE  Exercise Assessment: Reassessment                           Exercise Plan  Goals Next 30 days  ADL'S: Move back home within 1 week, Finish taxes, Lose 5lbs  Leisure: Shopping with daughter, Check with gym for Nustep  Work: Hook up new shower head/Shower bench    Education Goals: Has system for taking medication.  Education Goals Met: Medication review.;Has system for taking medication.                        Goals Met  Initial ADL's goals met: Walked 8 steps  Initial Leisure goals met: Showerhead/shower bench installed at their house  Intial Work goals met: Completed all excpet for moving back home  Initial Progression: Will move back home within the week    Exercise Prescription  Exercise Mode: Nustep;Arm Erg.;Stairs;Hallway Walking  Frequency: 2 days/week  Duration: 20-30 minutes  Intensity / THR: 20-30 beats above resting heart rate  RPE 11-14  Progression / Met level: 2.6  Resistive Training?: No    Current Exercise (mins/week): 1    Interventions  Home Exercise:  Mode: Walking  Frequency: 2-3x/day 4 days/week  Duration: 5-10 minutes    Education Material : Educational videos;Provide written material;Individual education and counseling;Offer educational classes    Education Completed  Exercise Education Completed: Cardiac Anatomy;Signs and Symptoms;Medication review;RPE;Emergency Plan;Home Exercise;FITT Principles;Warm up/cool down;Benefits of Exercise;BP/HR Reponse to exercise            Exercise Follow-up/Discharge  Follow up/Discharge: Encouraged pt to start walking program at home         NUTRITION  Nutrition Assessment: Reassessment    Nutrition Risk Factors:  Nutrition Risk Factors: Dyslipidemia;Overweight (Pre diabetic)  Cholesterol: 220  LDL: 123  HDL: 70  Triglycerides: 134    Nutrition Plan  Interventions  Nutrition Interventions: Diet  "class;Provide with written material      Education Completed  Nutrition Education Completed: Low Saturated fat diet;Low sodium diet;Risk factor overview    Goals  Nutrition Goals (Next 30 days): Patient will follow a low saturated fat diet;Patient will lose weight;Patient knows appropriate portion size;Patient will follow a low sodium diet    Goals Met  Nutrition Goals Met: Patient can identify their risk factors for CAD;Patient follows a low sodium diet;Provided Rate your Plate Survey;Reviewed Dietitian schedule;Patient states following a low saturated fat diet;Patient knows appropriate portion size    Height, Weight, and  BMI  Weight: 298 lb (135.2 kg)  Height: 5' 10\" (1.778 m)  BMI: 42.76    Nutrition Follow-up  Follow-up/Discharge: Pt met with dietician, will encourage healthy diet     Other Risk Factors  Other Risk Factor Assessment: Reassessment    HTN Risk Factor: Hypertension    Pre Exercise BP: 138/80  Post Exercise BP: 128/76    Hypertension Plan  Goals  HTN Goals: Follow low sodium diet;Take medication as prescribed;Exercises regularly    Goals Met  HTN Goals Met: Follow low sodium diet;Take medication as prescribed    HTN Interventions  HTN Interventions: Diet consult;Therapist/patient discussion;Provide written material;Offer educational videos;Offer educational classes    HTN Education Completed  HTN Education Completed: Medication review;Risk factor overview;Low sodium class    Tobacco Risk Factor: NA      Risk Factor Follow-up   Follow-up/Discharge: Encouraged low sodium diet       PSYCHOSOCIAL  Psychosocial Assessment: Reassessment     Jacob HUSSEIN Q of L Summary Score: 25    MONE-D Score: 6    Psychosocial Risk Factor: Stress    Psychosocial Plan  Interventions  Interventions: Offer Social Service consult;Offer Spiritual Care consult;Offer educational videos and classes;Provide written material;Individual education and counseling    Education Completed  Education Completed: S/S of " depression;Effects of stress on body;Relaxation/Coping Techniques    Goals  Goals (Next 30 days): Identified Support system;Identify stressors;Improvement in [employer] COOP score    Goals Met  Goals Met: Identified Support system;Identify stressors;Oriented to stress management classes;Practicing stress management skills    Psychosocial Follow-up  Follow-up/Discharge: Gave Info on Stress and Depression         Patient involved in Goal setting?: Yes    Signature: _____________________________________________________________    Date: __________________    Time: __________________

## 2021-06-09 NOTE — PROGRESS NOTES
Eileen Handyuld has participated in 6 sessions of Phase II Cardiac Rehab.    Progress Report:   Cardiac Rehab Treatment Progress Report 3/1/2017 3/6/2017 3/8/2017 3/13/2017 3/15/2017   Weight 298 lbs 297 lbs 298 lbs 298 lbs 299 lbs   Pre Exercise  HR 89 74 84 76 85   Pre Exercise /74 120/62 128/68 138/80 126/68   Nustep Peak Heart Rate 98 93 95 89 107   Nustep Peak Blood Pressure 146/80 134/70 140/58 132/74 166/70   Heart Rate 94 81 80 73 95   Post Exercise /80 126/78 128/76 124/78 150/70   ECG SR SR SR Sinus Rhythm SR   Total Exercise Minutes 23 24 33 35 35         Current Status:  Currently exercising without complaints or symptoms.      If Physician recommends change in treatment plan, please place orders.        __________________________________________________      _____________  Signature                                                                                                  DateSee Doc Flowsheet

## 2021-06-09 NOTE — PROGRESS NOTES
ASSESSMENT & PLAN:  1.Lipoma  Will monitor for now- if any changes for concerns U/S for further eval  Pt is in agreement  2.Vaccines  Pt aware of possible SE with vaccine and hx of MS and taking Avenox- would like to proceed      There are no Patient Instructions on file for this visit.    Orders Placed This Encounter   Procedures     Varicella-zoster vaccine subq     Medications Discontinued During This Encounter   Medication Reason     senna-docusate (PERICOLACE) 8.6-50 mg tablet Therapy completed     amiodarone (PACERONE) 200 MG tablet Therapy completed       No Follow-up on file.    CHIEF COMPLAINT:  Chief Complaint   Patient presents with     Mass     c/o lump in the LLQ X2 weeks, not having any pain/discomfort      Immunizations     Would like to get shingles vaccine        HISTORY OF PRESENT ILLNESS:  Eileen is a 61 y.o. female presenting to the clinic today with complaints of a mass.     Mass: She has a lump on her LLQ. She found it when she was taking a shower about two weeks ago. It does not hurt, but she would like to know what it is. It has not changed in the past two weeks. It was discussed that she does not need to do anything about it for now. She inquires if it can go away on its own.     S/P Aortic Valve Replacement: She continues to go to cardiac rehab S/P aortic valve replacement. She ran out of some of her medications and had to go without taking them yesterday. It ended up being fine, but it was discussed that discontinuing metoprolol can had a rebound effect. It is up to her to decide when she is done with rehab.     Health Maintenance: She would like the shingles vaccine today. It was discussed that the shingles vaccine might not be recommended because she gets Avonex, but she would like to get the vaccine regardless. We discussed possible SE.    REVIEW OF SYSTEMS:   All other systems are negative.    PFSH:  Reviewed, as below.     TOBACCO USE:  History   Smoking Status     Never Smoker    Smokeless Tobacco     Not on file       VITALS:  Vitals:    03/16/17 1314   BP: 128/70   Patient Site: Right Arm   Patient Position: Sitting   Cuff Size: Adult Large   Pulse: 66   Resp: 18   Temp: 97.9  F (36.6  C)   TempSrc: Oral   Weight: (!) 300 lb (136.1 kg)     Wt Readings from Last 3 Encounters:   03/16/17 (!) 300 lb (136.1 kg)   03/15/17 (!) 299 lb (135.6 kg)   03/13/17 (!) 298 lb (135.2 kg)       QUALITY MEASURES:  The following high BMI interventions were performed this visit: encouragement to exercise and weight monitoring    PHYSICAL EXAM:  GENERAL APPEARANCE: Alert, cooperative, no distress, appears stated age   LUNGS: Clear to auscultation bilaterally, respirations unlabored .  HEART: Regular rate and rhythm, S1 and S2 normal, no murmur, rub, or gallop. No lower extremity edema.   SKIN: Smooth 1 cm mobile cyst, left lower abdomen, superior to inguinal canal. Suspect lipoma  NEUROLOGIC: No gross focal deficits  PSYCHOLOGIC: Normal mood and affect      ADDITIONAL HISTORY SUMMARIZED (FROM OLD RECORDS OR HISTORY FROM SOMEONE OTHER THAN THE PATIENT OR ANOTHER HEALTHCARE PROVIDER) (2 TOTAL): None.     DECISION TO OBTAIN EXTRA INFORMATION (OLD RECORDS REQUESTED OR HISTORY FROM ANOTHER PERSON OR ACCESSING CARE EVERYWHERE) (1 TOTAL): None.     RADIOLOGY TESTS SUMMARIZED OR ORDERED (XRAY/CT/MRI/DXA) (1 TOTAL): None.    LABS REVIEWED OR ORDERED (1 TOTAL): None.    MEDICINE TESTS SUMMARIZED OR ORDERED (EKG/ECHO) (1 TOTAL): None.    INDEPENDENT REVIEW OF EKG OR X-RAY (2 EACH): None.     The visit lasted a total of 10 minutes face to face with the patient. Over 50% of the time was spent counseling and educating the patient about her mass and vaccinations.    IJose Miguel, am scribing for and in the presence of, Dr. Salazar.    I, Dr. Salazar, personally performed the services described in this documentation, as scribed by Jose Miguel Reyes in my presence, and it is both accurate and  complete.    MEDICATIONS:  Current Outpatient Prescriptions   Medication Sig Dispense Refill     acetaminophen (TYLENOL) 500 MG tablet Take 325-650 mg by mouth every 6 (six) hours as needed for pain (1-2 tab PRN).        acyclovir (ZOVIRAX) 400 MG tablet Take 400 mg by mouth 2 (two) times a day.        ADVAIR DISKUS 250-50 mcg/dose DISKUS USE 1 INHALATION TWICE A  each 1     albuterol (VENTOLIN HFA) 90 mcg/actuation inhaler Inhale 2 puffs every 6 (six) hours as needed for wheezing. 1 Inhaler 0     aspirin 81 mg chewable tablet Chew 81 mg.       AVONEX 30 mcg/0.5 mL PnKt once a week.       calcium, as carbonate, (TUMS) 200 mg calcium (500 mg) chewable tablet Chew 1 tablet daily.       furosemide (LASIX) 40 MG tablet Take 40 mg by mouth daily.       gabapentin (NEURONTIN) 300 MG capsule Take 300 mg by mouth daily.  2     lisinopril (PRINIVIL,ZESTRIL) 5 MG tablet Take 1 tablet (5 mg total) by mouth daily. 90 tablet 0     LORazepam (ATIVAN) 0.5 MG tablet Take 0.5 mg by mouth daily as needed for anxiety.       metoprolol tartrate (LOPRESSOR) 25 MG tablet Take 1 tablet (25 mg total) by mouth 2 (two) times a day. 180 tablet 0     nystatin (MYCOSTATIN) powder Apply to affected area twice daily 60 g 0     omeprazole (PRILOSEC) 20 MG capsule TAKE 1 CAPSULE BY MOUTH TWICE DAILY 6 AM AND 4  capsule 1     simvastatin (ZOCOR) 20 MG tablet TAKE 1 TABLET AT BEDTIME 90 tablet 3     venlafaxine (EFFEXOR-XR) 75 MG 24 hr capsule TAKE 3 CAPSULES DAILY 270 capsule 1     warfarin (COUMADIN) 2.5 MG tablet Take one to two tablets (2.5 to 5 mg) by mouth daily. Adjust dose based on INR results as directed. 60 tablet 0     WARFARIN SODIUM (WARFARIN ORAL) Take by mouth. 2/10/17 INR 3.58. Take 2mg 2/10 then 4mg daily. Next INR 2/13/17 2/7/17 INR 3.15 Take 4.5mg daily.  Next INR 2/9/17.       No current facility-administered medications for this visit.        Total Data Points: 0

## 2021-06-09 NOTE — PROGRESS NOTES
ASSESSMENT AND PLAN:  Eileen Donald 61 y.o. female  is here with severe exacerbation of pain in her right ring finger PIP.  She is well described to have osteoarthritis of the hands.  She has osteoarthritis of the hip joints and has been asked to consider replacement.  She has background of multiple sclerosis.  Since her previous visit she has had aortic valve replaced.  She is on anticoagulation.  Most recent INR was in therapeutic range done last week.  She would like to proceed with the injection into her right ring finger PIP which is done with 10 mg of methylprednisolone and Marcaine under ultrasound guidance.  She is not a candidate for nonsteroidals and she is aware of that.  We can repeat these injections as long as these provide her with durable relief. Return for follow-up on an as-needed basis.  Diagnoses and all orders for this visit:    Primary osteoarthritis of both hands  -     methylPREDNISolone acetate injection 10 mg (DEPO-MEDROL); Inject 0.25 mL (10 mg total) into the joint once.    Aortic valve replaced  -     INR    History of pulmonary embolism  -     INR          HISTORY OF PRESENTING ILLNESS:  Eileen Donald 61 y.o. is here for a  severe flare up of pain.  Joints affected include the right PIP(s): ring finger. This has gone on for several weeks ago. Pain is described as pulsating, sharp and shooting. It is continuously.  Her symptoms are severe. The symptoms are gradually worsening. Symptoms include pain, tenderness to touch, swelling, limited range of motion.  Treatment to date has been without significant relief.  In the past a corticosteroid injection which was a painful experience did provide her with a prolonged relief this was done most recently in August of last year.    Further historical information, including ROS and limitation in activities as noted in the multidimensional health assessment questionnaire scanned in the EMR and in the assessment and plan  section.    ALLERGIES:Morphine and Sulfa (sulfonamide antibiotics)    PAST MEDICAL/ACTIVE PROBLEMS/MEDICATION/SOCIAL DATA  Past Medical History:   Diagnosis Date     Aortic valve stenosis 01/24/2017     Asthma      HLD (hyperlipidemia)      HTN (hypertension)      MS (multiple sclerosis)      Osteoarthritis      Pre-diabetes      Pulmonary embolism      History   Smoking Status     Never Smoker   Smokeless Tobacco     Not on file     Patient Active Problem List   Diagnosis     Depression     Asthma     Hypertension     Multiple Sclerosis     Hyperlipidemia     Impaired Glucose Tolerance Test     Dysphagia     Benign Adenomatous Polyp Of The Large Intestine     History of pulmonary embolism     Obesity     Generalized anxiety disorder     Primary osteoarthritis of both hands     Polyarthralgia     Aortic valve replaced     Current Outpatient Prescriptions   Medication Sig Dispense Refill     acetaminophen (TYLENOL) 500 MG tablet Take 325-650 mg by mouth every 6 (six) hours as needed for pain (1-2 tab PRN).        acyclovir (ZOVIRAX) 400 MG tablet Take 400 mg by mouth 2 (two) times a day.        ADVAIR DISKUS 250-50 mcg/dose DISKUS USE 1 INHALATION TWICE A  each 1     aspirin 81 mg chewable tablet Chew 81 mg.       AVONEX 30 mcg/0.5 mL PnKt once a week.       calcium, as carbonate, (TUMS) 200 mg calcium (500 mg) chewable tablet Chew 1 tablet daily.       gabapentin (NEURONTIN) 300 MG capsule Take 300 mg by mouth daily.  2     lisinopril (PRINIVIL,ZESTRIL) 5 MG tablet Take 1 tablet (5 mg total) by mouth daily. 90 tablet 0     LORazepam (ATIVAN) 0.5 MG tablet Take 0.5 mg by mouth daily as needed for anxiety.       metoprolol tartrate (LOPRESSOR) 25 MG tablet Take 1 tablet (25 mg total) by mouth 2 (two) times a day. 180 tablet 0     nystatin (MYCOSTATIN) powder Apply to affected area twice daily 60 g 0     omeprazole (PRILOSEC) 20 MG capsule TAKE 1 CAPSULE BY MOUTH TWICE DAILY 6 AM AND 4  capsule 1      "simvastatin (ZOCOR) 20 MG tablet TAKE 1 TABLET AT BEDTIME 90 tablet 3     venlafaxine (EFFEXOR-XR) 75 MG 24 hr capsule TAKE 3 CAPSULES DAILY 270 capsule 1     VENTOLIN HFA 90 mcg/actuation inhaler USE 2 INHALATIONS EVERY 6 HOURS AS NEEDED FOR WHEEZING 18 g 2     warfarin (COUMADIN) 2.5 MG tablet Take one to two tablets (2.5 to 5 mg) by mouth daily. Adjust dose based on INR results as directed. 60 tablet 0     WARFARIN SODIUM (WARFARIN ORAL) Take by mouth. 2/10/17 INR 3.58. Take 2mg 2/10 then 4mg daily. Next INR 2/13/17 2/7/17 INR 3.15 Take 4.5mg daily.  Next INR 2/9/17.       Current Facility-Administered Medications   Medication Dose Route Frequency Provider Last Rate Last Dose     methylPREDNISolone acetate injection 10 mg (DEPO-MEDROL)  10 mg Intra-articular Once CARMEN Waldrop         DETAILED EXAMINATION (six area) :  Vitals:    04/03/17 1039   BP: 128/76   Patient Site: Right Arm   Patient Position: Sitting   Cuff Size: Adult Regular   Pulse: 84   Weight: (!) 299 lb 9.6 oz (135.9 kg)   Height: 5' 10\" (1.778 m)     Alert oriented. Head including the face is examined for malar rash, heliotropes, scarring, lupus pernio. Eyes examined for redness such as in episcleritis/scleritis, periorbital lesions.   Neck examined  for lymph nodes, range of motion Both upper and lower extremities (all four) examined for swollen, warm &/or  tender joints, range of motion, rash, muscle weakness, edema. The salient normal / abnormal findings are appended.  She has an audible aortic valve click.     Widespread Heberden's nodes, tender swollen right ring finger PIP.  No JLT of the knees.    LAB / IMAGING DATA:  ALT   Date Value Ref Range Status   11/09/2016 33 0 - 45 U/L Final   11/06/2015 37 0 - 45 U/L Final   10/28/2015 42 0 - 45 U/L Final     Albumin   Date Value Ref Range Status   11/09/2016 3.9 3.5 - 5.0 g/dL Final   11/06/2015 3.6 3.5 - 5.0 g/dL Final   10/28/2015 3.8 3.5 - 5.0 g/dL Final     Creatinine   Date Value Ref " Range Status   02/09/2017 0.95 0.60 - 1.10 mg/dL Final   02/03/2017 0.94 0.60 - 1.10 mg/dL Final   01/20/2017 0.87 0.60 - 1.10 mg/dL Final       WBC   Date Value Ref Range Status   02/09/2017 8.6 4.0 - 11.0 thou/uL Final   01/20/2017 6.7 4.0 - 11.0 thou/uL Final   10/30/2014 8.6 4.0 - 11.0 thou/uL Final     Hemoglobin   Date Value Ref Range Status   02/09/2017 11.1 (L) 12.0 - 16.0 g/dL Final   01/20/2017 14.6 12.0 - 16.0 g/dL Final   03/10/2016 13.8 12.0 - 16.0 g/dL Final     Platelets   Date Value Ref Range Status   02/09/2017 344 140 - 440 thou/uL Final   01/20/2017 236 140 - 440 thou/uL Final   03/10/2016 238 140 - 440 thou/uL Final       No results found for: KEITH, RF, SEDRATE

## 2021-06-10 NOTE — TELEPHONE ENCOUNTER
Refill Approved    Rx renewed per Medication Renewal Policy. Medication was last renewed on 8/15/19.    Daysi Mohan, [facility] Triage/Med Refill 8/3/2020     Requested Prescriptions   Pending Prescriptions Disp Refills     buPROPion (WELLBUTRIN XL) 150 MG 24 hr tablet [Pharmacy Med Name: BUPROPION HCL XL TABS 150MG] 90 tablet 3     Sig: TAKE 1 TABLET EVERY MORNING       Tricyclics/Misc Antidepressant/Antianxiety Meds Refill Protocol Passed - 8/2/2020 11:19 PM        Passed - PCP or prescribing provider visit in last year     Last office visit with prescriber/PCP: 10/3/2019 Tito Salazar MD OR same dept: 10/10/2019 Antoine Webb MD OR same specialty: 10/10/2019 Antoine Webb MD  Last physical: 9/17/2019 Last MTM visit: Visit date not found   Next visit within 3 mo: Visit date not found  Next physical within 3 mo: Visit date not found  Prescriber OR PCP: Tito Salazar MD  Last diagnosis associated with med order: 1. Recurrent major depressive disorder, in partial remission (H)  - buPROPion (WELLBUTRIN XL) 150 MG 24 hr tablet [Pharmacy Med Name: BUPROPION HCL XL TABS 150MG]; TAKE 1 TABLET EVERY MORNING  Dispense: 90 tablet; Refill: 3    2. Reactive airway disease  - SYMBICORT 160-4.5 mcg/actuation inhaler [Pharmacy Med Name: SYMBICORT INH AEROSOL 160/4.5MCG]; USE 2 INHALATIONS TWICE A DAY  Dispense: 1 Inhaler; Refill: 11    If protocol passes may refill for 12 months if within 3 months of last provider visit (or a total of 15 months).                SYMBICORT 160-4.5 mcg/actuation inhaler [Pharmacy Med Name: SYMBICORT INH AEROSOL 160/4.5MCG] 1 Inhaler 11     Sig: USE 2 INHALATIONS TWICE A DAY       There is no refill protocol information for this order

## 2021-06-10 NOTE — TELEPHONE ENCOUNTER
ANTICOAGULATION  MANAGEMENT    Assessment     Today's INR result of 2.3 is Subtherapeutic (goal INR of 2.5-3.5)        Missed dose(s) may be affecting INR    No new diet changes affecting INR    No new medication/supplements affecting INR    Continues to tolerate warfarin with no reported s/s of bleeding or thromboembolism     Previous INR was Subtherapeutic    Plan:     Spoke on phone with Eileen regarding INR result and instructed:     Warfarin Dosing Instructions:  5 mg booster dose today then continue current warfarin dose    5 mg every Mon, Wed; 2.5 mg all other days     (0 % change)    Instructed patient to follow up no later than: 2 weeks - prefers to call to make appointment stated that her  is going to have back surgery and she needs to talk to her daughter to bring her to appointment.    Education provided: importance of therapeutic range and importance of taking warfarin as instructed    Marilee verbalizes understanding and agrees to warfarin dosing plan.    Instructed to call the [facility] for any changes, questions or concerns. (#[phone])   ?   Radha Slater RN    Subjective/Objective:      Eileen Donald, a 64 y.o. female is on warfarin.     Eileen reports:     Home warfarin dose: as updated on anticoagulation calendar per template     Missed doses: Yes: per patient she likely missed taking 2.5 mg dose on Sunday.     Medication changes:  No     S/S of bleeding or thromboembolism:  No     New Injury or illness:  No     Changes in diet or alcohol consumption:  No     Upcoming surgery, procedure or cardioversion:  No    Anticoagulation Episode Summary     Current INR goal:   2.5-3.5   TTR:   56.9 % (1 y)   Next INR check:   9/1/2020   INR from last check:   2.30! (8/18/2020)   Weekly max warfarin dose:      Target end date:      INR check location:      Preferred lab:      Send INR reminders to:   [facility]    Indications    Heart valve replaced [Z95.2]            Comments:            Anticoagulation Care Providers     Provider Role Specialty Phone number    Ttio Salazar MD Referring Family Medicine [phone]

## 2021-06-10 NOTE — PROGRESS NOTES
HPI:  Some red blood in the stool last night and today.  Feeling light headed on standing.    No black stools.  Still has a cough.  At appt 5-9-17 treated with a 5 day course of prednisone and a z-alek.  Low grade fever of 99.7  Yesterday.     COLONOSCOPY 9-8-08      Current Outpatient Prescriptions on File Prior to Visit   Medication Sig Dispense Refill     acetaminophen (TYLENOL) 500 MG tablet Take 325-650 mg by mouth every 6 (six) hours as needed for pain (1-2 tab PRN).        acyclovir (ZOVIRAX) 400 MG tablet Take 400 mg by mouth 2 (two) times a day.        ADVAIR DISKUS 250-50 mcg/dose DISKUS USE 1 INHALATION TWICE A  each 1     aspirin 81 mg chewable tablet Chew 81 mg.       AVONEX 30 mcg/0.5 mL PnKt once a week.       calcium, as carbonate, (TUMS) 200 mg calcium (500 mg) chewable tablet Chew 1 tablet daily.       codeine-guaiFENesin (GUAIFENESIN AC)  mg/5 mL liquid Take 5 mL by mouth 2 (two) times a day as needed for cough. 240 mL 0     gabapentin (NEURONTIN) 300 MG capsule Take 300 mg by mouth daily.  2     lisinopril (PRINIVIL,ZESTRIL) 5 MG tablet Take 1 tablet (5 mg total) by mouth daily. 90 tablet 0     LORazepam (ATIVAN) 0.5 MG tablet Take 0.5 mg by mouth daily as needed for anxiety.       metoprolol tartrate (LOPRESSOR) 25 MG tablet Take 1 tablet (25 mg total) by mouth 2 (two) times a day. 180 tablet 0     nystatin (MYCOSTATIN) powder Apply to affected area twice daily 60 g 0     omeprazole (PRILOSEC) 20 MG capsule TAKE 1 CAPSULE TWICE DAILY, 6 A.M. AND 4 P.M. 180 capsule 0     simvastatin (ZOCOR) 20 MG tablet TAKE 1 TABLET AT BEDTIME 90 tablet 3     venlafaxine (EFFEXOR-XR) 75 MG 24 hr capsule TAKE 3 CAPSULES DAILY 270 capsule 1     VENTOLIN HFA 90 mcg/actuation inhaler USE 2 INHALATIONS EVERY 6 HOURS AS NEEDED FOR WHEEZING 18 g 2     warfarin (COUMADIN) 2.5 MG tablet Take one to two tablets (2.5 to 5 mg) by mouth daily. Adjust dose based on INR results as directed. 60 tablet 0      WARFARIN SODIUM (WARFARIN ORAL) Take by mouth. 2/10/17 INR 3.58. Take 2mg 2/10 then 4mg daily. Next INR 2/13/17 2/7/17 INR 3.15 Take 4.5mg daily.  Next INR 2/9/17.       No current facility-administered medications on file prior to visit.        Pmh: reviewed  Psh: reviewed  Allergy:  reviewed      EXAM:    /60 (Patient Site: Right Arm, Patient Position: Standing, Cuff Size: Adult Large)  Pulse 86  Temp 97.6  F (36.4  C) (Oral)   Resp 18  LMP 11/28/2013     ORTHOSTATICS:  SUPINE        /70    PULSE 90                                  STANDING    BP  100/60   PULSE 86    GEN:   ALERT, NAD, ORIENTED TIMES THREE  HEENT:  PALE LIPS AND PALPEBRAL CONJUNCTIVA. TMS NL, THR CLEAR  LUNGS: CTA  COR: RRR WITHOUT MURMUR  ABD: SOFT, +BS, NONTX WITHOUT GUARDING OR PERITONEAL SIGNS  SKIN: NO RASH , ULCERS OR LESIONS NOTED  EXT: WITHOUT EDEMA OR SWELLING    Recent Results (from the past 168 hour(s))   INR    Collection Time: 05/17/17  2:22 PM   Result Value Ref Range    INR 2.28 (H) 0.90 - 1.10   HM1 (CBC with Diff)    Collection Time: 05/22/17 12:19 PM   Result Value Ref Range    WBC 9.8 4.0 - 11.0 thou/uL    RBC 3.83 3.80 - 5.40 mill/uL    Hemoglobin 11.1 (L) 12.0 - 16.0 g/dL    Hematocrit 34.8 (L) 35.0 - 47.0 %    MCV 91 80 - 100 fL    MCH 29.1 27.0 - 34.0 pg    MCHC 32.0 32.0 - 36.0 g/dL    RDW 14.2 11.0 - 14.5 %    Platelets 386 140 - 440 thou/uL    MPV 7.6 7.0 - 10.0 fL    Neutrophils % 51 50 - 70 %    Lymphocytes % 38 20 - 40 %    Monocytes % 7 2 - 10 %    Eosinophils % 4 0 - 6 %    Basophils % 1 0 - 2 %    Neutrophils Absolute 5.0 2.0 - 7.7 thou/uL    Lymphocytes Absolute 3.7 0.8 - 4.4 thou/uL    Monocytes Absolute 0.7 0.0 - 0.9 thou/uL    Eosinophils Absolute 0.4 0.0 - 0.4 thou/uL    Basophils Absolute 0.1 0.0 - 0.2 thou/uL   INR    Collection Time: 05/22/17 12:19 PM   Result Value Ref Range    INR 4.50 (H) 0.90 - 1.10     Hemoglobin unchanged from Feb 2017    DIAGNOSIS:  1. BRBPR (bright red blood per  rectum)  HM1(CBC and Differential)    HM1 (CBC with Diff)   2. Lightheaded  HM1(CBC and Differential)    HM1 (CBC with Diff)   3. Cough     4. Aortic valve replaced  INR   5. History of pulmonary embolism  INR       PLAN:  Patient Instructions   Drink 6-8 glasses of water today    Warfarin as per ACM today    If you start having heavier rectal bleeding or more/worse lightheadedness then call 911 and go to ER.    AVOID USE OF LORAZEPAM  Approx 25 min spent with pt > 50% counseling and coordination of care

## 2021-06-10 NOTE — PROGRESS NOTES
Pulmonary follow-up note  8/18/2020    Referring Physician: Dr. Salazar    Reason for follow-up: Asthma, hypoxemic respiratory failure, exertional dyspnea    History:     Chart reviewed and summarized, patient of Dr. Grajeda's transitioning care:    Mrs. Eileen Donald is a 63 yo female with PMH significant for bicuspid aortic valve s/p AVR, history of EIB when she was younger in her 20s, HTN, multiple sclerosis that is well controlled, and PE who presented to pulmonary clinic for evaluation of asthma.      She has since been treated with symbicort, incruse and prn albuterol, she feels these have helped. She has several sisters with asthma. She still gets good relief with JACOB as lev-albuterol, and mostly uses her inhaler but will also use her neb on occasion. She was discharged on oxygen after a shoulder surgery, and has been unclear on her use of it since then. Using albuterol about once per week.    Today she notes that she has shortness of breath both at rest at times and exertion. She has recorded SpO2s in 70s at home by report. At rest today she is 94%. She declines to ambulate, is presently in a wheelchair and feels she would be too unsteady, and notes her baseline is moving from her bed, or chair to bathroom at home.     ACT 14  HCO3 30, 36  2015 AHI 5 - prior CASA dx, then was told on re-test she does not need her CPAP anymore - she previously found it very helpful.      Past Medical History:   Diagnosis Date     Anxiety      Aortic valve replaced 2/15/2017     Aortic valve stenosis 01/24/2017     Asthma     Created by Conversion      Chronic shortness of breath      Coronary artery disease      Diabetes mellitus (H)     borderline     History of blood clots      Hypertension     Created by Conversion  Replacement Utility updated for latest IMO load     Multiple sclerosis (H)     Created by Conversion      Obesity 10/29/2015     Panic attacks      Pre-diabetes      Pulmonary embolism (H)      Sleep apnea       Past Surgical History:   Procedure Laterality Date     CARPAL TUNNEL RELEASE       mechanical aortic prosthesis  01/24/2017     VT RECONSTR TOTAL SHOULDER IMPLANT Left 4/10/2019    Procedure: LEFT REVERSE TOTAL SHOULDER ARTHROPLASTY;  Surgeon: Luis Antonio Telles MD;  Location: [facility];  Service: Orthopedics     REDUCTION MAMMAPLASTY  1994       Family History   Problem Relation Age of Onset     Heart attack Father      Heart attack Sister      Heart attack Brother      Breast cancer Neg Hx      Allergies   Allergen Reactions     Morphine Other (See Comments)     hallicinations     Sulfa (Sulfonamide Antibiotics) Hives     Azithromycin Nausea And Vomiting and Rash     Review of Systems - 10 point review of systems is negative except what is mentioned in the HPI.      Medications:     Reviewed    Exam/Data:   Vitals  Vitals:    08/18/20 1244   BP: 130/72   Pulse: (!) 108   SpO2: 95%     EXAM:  GEN: Alert and oriented x3, NAD, in wheelchair  HEENT: Nares patent, posterior oropharynx clear.  RESPIRATORY: CTAB. Decreased. No wheeze or rales  CARDIOVASCULAR: RRR, no m/r/g  GASTROINTESTINAL: Round, soft, NT/ND  HEM/ONC: no adenopathy, no ecchymosis  MSK: no clubbing, no edema in LE  NEURO: cranial nerves appear grossly intact, muscle strength equal  SKIN: no rash or ulcerations    PFT DATA:  FEV1/FVC is 0.90 and is normal.  FEV1 is 58% predicted and is reduced.  FVC is 49% predicted and reduced.  There was no improvement in spirometry after a single inhaled dose of bronchodilator.  TLC is 69% predicted and is reduced.  RV is 79% predicted and is reduced.  DLCO is 82% predicted and is normal when it   is corrected for hemoglobin.     Impression:  Full Pulmonary Function Test is abnormal.  PFTs are consistent with moderate-severe restrictive disease.  Spirometry is not consistent with reversibility.  There is no hyperinflation.  There is no air-trapping.  Diffusion capacity when corrected for hemoglobin is  normal.    NIOX: 20 ppb and is normal.    Modified Walk Test 11/25/19  O2 at rest is < 89%.  This improves to >88% with 2LPM O2.  With ambulation her oxygen decreases to <88%, but improves to >88% with 3 LPM    IMAGING:  CTA CHEST PE RUN  8/7/2017 3:29 PM     INDICATION: dyspnea  TECHNIQUE: Helical acquisition through the chest was performed during the arterial phase of contrast enhancement using IV contrast. 2D and 3D reconstructions were performed by the CT technologist. Dose reduction techniques were used.   IV CONTRAST: Iohexol (Omni) 100 mL  COMPARISON: March 10, 2016     FINDINGS:  ANGIOGRAM CHEST: Negative for pulmonary emboli. Negative for thoracic aortic dissection and aneurysms.     LUNGS AND PLEURA: No pleural effusions. Linear atelectasis right lower lung zone unchanged from the previous study.     MEDIASTINUM: Postop median sternotomy and aortic valve.. No pericardial effusion.     LIMITED UPPER ABDOMEN: Hepatic steatosis.     MUSCULOSKELETAL: Negative.     IMPRESSION:   CONCLUSION:  1.  Negative for pulmonary emboli  2.  Postop aortic valve replacement  3.  Hepatic steatosis    Assessment/Plan:     Eileen Donald is a 65 yo female with PMH significant for bicuspid aortic valve s/p AVR, history of EIB when she was younger in her 20s, HTN, multiple sclerosis and PE who is followed in pulmonary clinic for asthma, and restrictive lung disease.       1. Likely asthma, moderate persistent:    This is not borne out on PFT, but per her prior pulmonologist:  She has had good improvement in symptoms while on symbicort.  Her NIOX previously was normal, but on ICS therapy.  She has mosaic attenuation, chest tightness, wheezing, improves with beta agonists, and family history of asthma, making this likely asthma.  Her PFTs do not show obstruction, but rather restriction.  She continues to use Singulair.  Her symptoms continue to respond to ICS and bronchodilator therapy.  Some of her issues are likely due MS and  debility.    2. Restrictive Ventilatory Defect:   Related to morbid obesity, with neuromuscular disease/weakness likely contributing. Her chemistries suggest significant chronic CO2 retention with baseline HCO3 30-36, no prior ABG. This could be neuromuscular, or CASA/OHS. I am surprised her PSG in 2015 had an AHI of 5.       3. Dyspnea on Exertion, chronic hypoxemic and likely hypercarbic respiratory failure:  Multifactorial.  She is improved significantly with maximal inhaled therapy and increased diuretics.    Recommend:  - continue with symbicort, incruse and prn JACOB  - singulair 10 mg daily  - She still does not have transportation for pulmonary rehab - I am unsure she can reasonably participate given her inability to ambulate due to weakness  - resting ABG to assess for chronic hypercapnia  - Sleep clinic referral - recommend sleep study with end tidal CO2 monitoring, concern for CASA, and hypoventilation due to neuromuscular disease/restrictive lung disease.  - continue supplemental oxygen with minimal exertion at home - unable to test today       Follow-up in 6 months      Shameka David MD  Pulmonary and Critical Care Medicine  [facility]  Office: [phone]  Pager: [phone]

## 2021-06-10 NOTE — PROGRESS NOTES
HPI: had AVR in Jan 2017.  Now with a cough for a couple days.  occas green, brown phlegm.  No fever or chills.  Some worening of baseline SOB with her cold sxs.  Has asthma maintained on advair.  Using a resuce inhaler 1-3 times a day.     ACT:  12    Current Outpatient Prescriptions on File Prior to Visit   Medication Sig Dispense Refill     acetaminophen (TYLENOL) 500 MG tablet Take 325-650 mg by mouth every 6 (six) hours as needed for pain (1-2 tab PRN).        acyclovir (ZOVIRAX) 400 MG tablet Take 400 mg by mouth 2 (two) times a day.        ADVAIR DISKUS 250-50 mcg/dose DISKUS USE 1 INHALATION TWICE A  each 1     aspirin 81 mg chewable tablet Chew 81 mg.       AVONEX 30 mcg/0.5 mL PnKt once a week.       calcium, as carbonate, (TUMS) 200 mg calcium (500 mg) chewable tablet Chew 1 tablet daily.       gabapentin (NEURONTIN) 300 MG capsule Take 300 mg by mouth daily.  2     lisinopril (PRINIVIL,ZESTRIL) 5 MG tablet Take 1 tablet (5 mg total) by mouth daily. 90 tablet 0     LORazepam (ATIVAN) 0.5 MG tablet Take 0.5 mg by mouth daily as needed for anxiety.       metoprolol tartrate (LOPRESSOR) 25 MG tablet Take 1 tablet (25 mg total) by mouth 2 (two) times a day. 180 tablet 0     nystatin (MYCOSTATIN) powder Apply to affected area twice daily 60 g 0     omeprazole (PRILOSEC) 20 MG capsule TAKE 1 CAPSULE BY MOUTH TWICE DAILY 6 AM AND 4  capsule 1     simvastatin (ZOCOR) 20 MG tablet TAKE 1 TABLET AT BEDTIME 90 tablet 3     venlafaxine (EFFEXOR-XR) 75 MG 24 hr capsule TAKE 3 CAPSULES DAILY 270 capsule 1     VENTOLIN HFA 90 mcg/actuation inhaler USE 2 INHALATIONS EVERY 6 HOURS AS NEEDED FOR WHEEZING 18 g 2     warfarin (COUMADIN) 2.5 MG tablet Take one to two tablets (2.5 to 5 mg) by mouth daily. Adjust dose based on INR results as directed. 60 tablet 0     WARFARIN SODIUM (WARFARIN ORAL) Take by mouth. 2/10/17 INR 3.58. Take 2mg 2/10 then 4mg daily. Next INR 2/13/17 2/7/17 INR 3.15 Take 4.5mg  daily.  Next INR 2/9/17.       No current facility-administered medications on file prior to visit.        Pmh: reviewed  Psh: reviewed  Allergy:  reviewed      EXAM:    /78 (Patient Site: Left Arm, Patient Position: Sitting, Cuff Size: Adult Large)  Pulse 82  Temp 98  F (36.7  C) (Oral)   Resp 20  Wt (!) 297 lb 11.2 oz (135 kg)  LMP 11/28/2013  SpO2 94%  BMI 42.72 kg/m2  GEN:   ALERT, NAD, ORIENTED TIMES THREE  HEENT: TMS NL, PERRL, THR CLEAR  NECK: SUPPLE WITHOUT ADENOPATHY OR THYROMEGALY  LUNGS: CTA  COR: RRR WITHOUT MURMUR  EXT: WITHOUT EDEMA OR SWELLING    CXR: NO INFILTREATE    Lab Results   Component Value Date    INR 3.40 (H) 04/03/2017    INR 2.14 (H) 03/22/2017    INR 3.68 (H) 03/08/2017       No results found for this or any previous visit (from the past 168 hour(s)).    DIAGNOSIS:  1. Cough  XR Chest PA and Lateral   2. Bronchitis         PLAN:  Patient Instructions   FOLLOW UP IF:   FEVER, CHILLS, SHORTNESS OF BREATH WORSENING, OR COUGH >2 WEEKS.    ASTHMA FOLLOW UP IN A MONTH

## 2021-06-10 NOTE — PATIENT INSTRUCTIONS - HE
It was a pleasure to see you today.    I would like you to be seen in sleep clinic to discuss a test to check your breathing at night, and check your oxygen and carbon dioxide levels while you sleep. Sometimes in conditions like MS, you don't breathe deeply enough at night.    I have sent in your incruse prescription.      Shameka David MD  Pulmonary and Critical Care Medicine  [facility]  Office: [phone]  Pager: [phone]

## 2021-06-10 NOTE — PROGRESS NOTES
SUBJECTIVE   Eileen Donald is a 61 y.o. old female with a past medical history of asthma, depression, anxiety, and hypertension, MS, and h/o PE and a recent aortic valve replacement on chronic anticoagulation who presented to clinic today for further evaluation of ongoing cough and SOB. Her last dose of prednisone was yesterday. She started getting sick about 1-2 weeks ago with a cough, feeling low energy. CXR on 4/27/2017 was normal. She was given prednisone on 5/4/2017 which helped open up her chest and also codeine for cough at night time. She finds herself still wheezing and has only used albuterol inhaler 1-2 times a day but feels that she could use more. She's on Advair for asthma maintenance. She has been having low grade fever highest is 99.3 and chills. Has been taking Tylenol for sore throat and low grade fevers. Cough is productive with sticky, thick, clear whitish green phlegm, day and night. Reports SOB if she moves around a lot. Does vomit if she coughs a lot. She denies chest pain, abdominal pain, urinary/stool symptoms, or diarrhea or rash.  Several family members have been sick, one with bronchitis, another one with bronchiole related issue and cough. Still has cough syrup with codeine at home.     While stepping onto the examination table patient missed a step and fell backward onto her buttocks and did not hit her head or lose consciousness, patient was able to immediately get up and ambulate to the chair without pain or difficulty.     Review of Systems:  A 12 point comprehensive review of systems was negative except as noted in HPI.    Medical History  Active Ambulatory (Non-Hospital) Problems    Diagnosis     Aortic valve replaced     Polyarthralgia     Primary osteoarthritis of both hands     Generalized anxiety disorder     Obesity     History of pulmonary embolism     Depression     Asthma     Hypertension     Multiple Sclerosis     Hyperlipidemia     Impaired Glucose Tolerance Test      Dysphagia     Benign Adenomatous Polyp Of The Large Intestine     Past Medical History:   Diagnosis Date     Aortic valve stenosis 01/24/2017     Asthma      HLD (hyperlipidemia)      HTN (hypertension)      MS (multiple sclerosis)      Osteoarthritis      Pre-diabetes      Pulmonary embolism        Surgical History  She  has a past surgical history that includes Reduction mammaplasty (1994) and mechanical aortic prosthesis (01/24/2017).    Social History  Reviewed, and she  reports that she has never smoked. She does not have any smokeless tobacco history on file. She reports that she does not drink alcohol or use illicit drugs.    Family History  Reviewed, and family history includes Heart attack in her brother, father, and sister.    Medications  Reviewed and reconciled.      Allergies  Allergies   Allergen Reactions     Morphine      Sulfa (Sulfonamide Antibiotics)          OBJECTIVE  Physical Exam:  Vital signs: /88  Pulse 74  Temp 97.9  F (36.6  C) (Oral)   Resp 16  Wt (!) 294 lb (133.4 kg)  LMP 11/28/2013  SpO2 95%  BMI 42.18 kg/m2  Weight: (!) 294 lb (133.4 kg)    General appearance: pleasant, appears stated age, cooperative and in no distress  Eyes: EOMs intact, PERRL, conjunctivae normal.   ENT: moist oral mucosa, posterior oropharynx normal, TMs normal. No sinus tenderness on palpation  Lymph: no cervical/supraclavicular adenopathy  Respiratory: clear to auscultation bilaterally, good air movement throughout, no wheezing or crackles, speaking full sentences using accessory muscles of neck, prolonged expiratory phase appreciated   Cardiovascular: regular rate and rhythm, no murmur appreciated, no leg edema  Abdomen: active bowel sounds, soft, non-tender, non-distended  Musculoskeletal: warm and well perfused, strong and symmetric dorsalis pedal pulses, strength 5/5 and equal bilaterally , nontender to palpation all of body joints/neck/spine, gait stable, wearing splint to her 4th right  finger.   Skin: no rashes  Neuro: alert oriented x 3, grossly normal otherwise  Psych: normal affect, appropriate conversation     ASSESSMENT/ PLAN    1. Acute bronchitis  61 y.o. old female with a past medical history of asthma, depression, anxiety, and hypertension, MS, and h/o PE and a recent aortic valve replacement on chronic anticoagulation who presented to clinic today for further evaluation of ongoing cough and SOB ongoing for about 2 weeks and not improving despite a course of prednisone and negative CXR. Patient appears well today on exam, normal vital signs and O2 saturation, normal cardiac exam. Pulmonary exam is mostly unremarkable, maybe prolonged expiratory phase throughout but otherwise normal. Most likely ongoing asthma exacerbation secondary to acute bacterial bronchitis. Reviewed her visits recently and reviewed XR report. Will treat with a second course of prednisone 40 mg x 5 days and a course of Z-monae. I don't think it's necessary to pursue further lab work or repeat chest imaging. RTC in 1-2 weeks if not improving. Patient comfortable with plan  - azithromycin (ZITHROMAX Z-MONAE) 250 MG tablet; Take 2 tablets (500 mg) on  Day 1,  followed by 1 tablet (250 mg) once daily on Days 2 through 5.  Dispense: 6 tablet; Refill: 0  - predniSONE (DELTASONE) 20 MG tablet; Take 2 tablets (40 mg total) by mouth daily for 5 days.  Dispense: 10 tablet; Refill: 0    2. Accident due to mechanical fall without injury, initial encounter  Patient fell backward as she stood up too fast and missed the step as she was trying to get on the examination table. Mechanical fall, witnessed by me. Thorough MSK exam is normal. No head injury witnessed and no LOC. Patient reports no pain. Advised patient to return to clinic prn if she develops symptoms.     3. Aortic valve replaced  4. History of pulmonary embolism  - INR      Rosa Lanza MD

## 2021-06-10 NOTE — TELEPHONE ENCOUNTER
Refill Approved    Rx renewed per Medication Renewal Policy. Medication was last renewed on 8/5/19, last OV 4/6/20.    Yaima Rodrigues, [facility] Triage/Med Refill 8/1/2020     Requested Prescriptions   Pending Prescriptions Disp Refills     esomeprazole (NEXIUM) 20 MG capsule [Pharmacy Med Name: ESOMEPRAZOLE MAGNESIUM DR CAPS 20MG] 90 capsule 3     Sig: TAKE 1 CAPSULE DAILY       GI Medications Refill Protocol Passed - 7/31/2020 12:02 AM        Passed - PCP or prescribing provider visit in last 12 or next 3 months.     Last office visit with prescriber/PCP: 10/3/2019 Tito Salazar MD OR same dept: 10/10/2019 Antoine Webb MD OR same specialty: 10/10/2019 Antoine Webb MD  Last physical: 9/17/2019 Last MTM visit: Visit date not found   Next visit within 3 mo: Visit date not found  Next physical within 3 mo: Visit date not found  Prescriber OR PCP: Tito Salazar MD  Last diagnosis associated with med order: 1. Dysphagia  - esomeprazole (NEXIUM) 20 MG capsule [Pharmacy Med Name: ESOMEPRAZOLE MAGNESIUM DR CAPS 20MG]; TAKE 1 CAPSULE DAILY  Dispense: 90 capsule; Refill: 3    2. Hyperlipidemia  - simvastatin (ZOCOR) 20 MG tablet [Pharmacy Med Name: SIMVASTATIN TABS 20MG]; TAKE 1 TABLET AT BEDTIME  Dispense: 90 tablet; Refill: 3    If protocol passes may refill for 12 months if within 3 months of last provider visit (or a total of 15 months).                simvastatin (ZOCOR) 20 MG tablet [Pharmacy Med Name: SIMVASTATIN TABS 20MG] 90 tablet 3     Sig: TAKE 1 TABLET AT BEDTIME       Statins Refill Protocol (Hmg CoA Reductase Inhibitors) Passed - 7/31/2020 12:02 AM        Passed - PCP or prescribing provider visit in past 12 months      Last office visit with prescriber/PCP: 10/3/2019 Tito Salazar MD OR same dept: 10/10/2019 Antoine Webb MD OR same specialty: 10/10/2019 Antoine Webb MD  Last physical: 9/17/2019 Last MTM visit: Visit date not found    Next visit within 3 mo: Visit date not found  Next physical within 3 mo: Visit date not found  Prescriber OR PCP: Tito Salazar MD  Last diagnosis associated with med order: 1. Dysphagia  - esomeprazole (NEXIUM) 20 MG capsule [Pharmacy Med Name: ESOMEPRAZOLE MAGNESIUM DR CAPS 20MG]; TAKE 1 CAPSULE DAILY  Dispense: 90 capsule; Refill: 3    2. Hyperlipidemia  - simvastatin (ZOCOR) 20 MG tablet [Pharmacy Med Name: SIMVASTATIN TABS 20MG]; TAKE 1 TABLET AT BEDTIME  Dispense: 90 tablet; Refill: 3    If protocol passes may refill for 12 months if within 3 months of last provider visit (or a total of 15 months).

## 2021-06-10 NOTE — PROGRESS NOTES
"ITP ASSESSMENT   Assessment Day: 60 Day  Session Number: 13  Precautions: Sternal/ Falls  Diagnosis: Valve  Risk Stratification: High  Referring Provider: Dr Oro  EXERCISE  Exercise Assessment: Reassessment   Tolerating 30-40 min of exercise in CR without cardiac sx's. Met levels are 1.9-3.3                       Exercise Plan  Goals Next 30 days  ADL'S: Move back home.   Leisure: Walk and play with my dog.  Work: Increase home exercise.    Education Goals: All goals in this section met  Education Goals Met: Patient can state cardiac s/s and appropriate emergency response.;Has system for taking medication.;Medication review.                        Goals Met  30 day ADL'S goals met: Pt has not moved back home yet, \"I am still living with my sister\"- goal not met  30 day Leisure goals met: Pt has resumed shopping with daughter  30 Day Progression: Pt is slowly progressing, new goals set.    Initial ADL's goals met: Walked 8 steps  Initial Leisure goals met: Showerhead/shower bench installed at their house  Intial Work goals met: Completed all excpet for moving back home  Initial Progression: Will move back home within the week    Exercise Prescription  Exercise Mode: Treadmill;Nustep;Arm Erg.;Hallway Walking  Frequency: 2 x a week  Duration: 30-40 min  Intensity / THR: 20-30 beats above resting heart rate  RPE 11-14  Progression / Met level: 2.6-3  Resistive Training?: Yes    Current Exercise (mins/week): 110    Interventions  Home Exercise:  Mode: Walking  Frequency: 3-4 x a week  Duration: 5-10 min for 1 or 2 x a day    Education Material : Educational videos;Provide written material;Individual education and counseling;Offer educational classes    Education Completed  Exercise Education Completed: Cardiac Anatomy;Signs and Symptoms;Medication review;RPE;Emergency Plan;Home Exercise;Warm up/cool down;FITT Principles;BP/HR Reponse to exercise;Benefits of Exercise;End point of exercise            Exercise " "Follow-up/Discharge  Follow up/Discharge: Reviewed home walking program NUTRITION  Nutrition Assessment: Reassessment    Nutrition Risk Factors:  Nutrition Risk Factors: Dyslipidemia;Overweight    Nutrition Plan  Interventions  Nutrition Interventions: Diet consult;Diet class;Provide with written material;Therapist/Patient discussion;Educational videos  Rate Your Plate Score: 47    Education Completed  Nutrition Education Completed: Low Saturated fat diet;Low sodium diet;Weight management;Label reading class;Dining Out class;Risk factor overview    Goals  Nutrition Goals (Next 30 days): Patient will follow a low sodium diet;Patient will follow a low saturated fat diet;Patient will lose weight    Goals Met  Nutrition Goals Met: Patient knows appropriate portion size;Patient can identify their risk factors for CAD    Height, Weight, and  BMI  Weight: 296 lb (134.3 kg)  Height: 5' 10\" (1.778 m)  BMI: 42.47    Nutrition Follow-up  Follow-up/Discharge: Enc pt to continue to follow a heart healthy diet and continue on weight loss       Other Risk Factors  Other Risk Factor Assessment: Reassessment    HTN Risk Factor: Hypertension    Pre Exercise BP: 132/72  Post Exercise BP: 122/68    Hypertension Plan  Goals  HTN Goals: Follow low sodium diet;Take medication as prescribed;Exercises regularly    Goals Met  HTN Goals Met: Follow low sodium diet;Take medication as prescribed    HTN Interventions  HTN Interventions: Diet consult;Therapist/patient discussion;Provide written material;Offer educational videos;Offer educational classes    HTN Education Completed  HTN Education Completed: Medication review;Risk factor overview;Low sodium class    Tobacco Risk Factor: NA      Risk Factor Follow-up   Follow-up/Discharge: Enc to provide education and support for risk factor management.   PSYCHOSOCIAL  Psychosocial Assessment: Reassessment   \"I do have lots of stress\"  Psychosocial Risk Factor: Stress    Psychosocial " Plan  Interventions  Interventions: Offer Social Service consult;Offer Spiritual Care consult;Offer educational videos and classes;Provide written material;Individual education and counseling    Education Completed  Education Completed: S/S of depression;Effects of stress on body;Relaxation/Coping Techniques    Goals  Goals (Next 30 days): Identified Support system;Identify stressors;[facility]    Goals Met  Goals Met: Identified Support system;Identify stressors;Oriented to stress management classes;Practicing stress management skills    Psychosocial Follow-up  Follow-up/Discharge: Reviewed stress management options to use           Patient involved in Goal setting?: Yes    Signature: _____________________________________________________________    Date: __________________    Time: __________________See Doc Flowsheet

## 2021-06-10 NOTE — PROGRESS NOTES
Assessment & Plan:  1. Finger injury, right, initial encounter  Patient with an avulsion fracture of her fourth DIP on the right hand.  As she has arthritis and recently had aortic valve replacement she is not a surgical candidate.  We will treat with splinting likely mallet finger.  Splint was placed waiting to her to keep it in extension for 6 weeks.  She will follow-up with Dr. Salazar  - XR Finger Right 2 or More VWS; Future    2. Aortic valve replaced  Patient with recent aortic valve replacement.  Due for an INR  - INR    3. History of pulmonary embolism  Patient history of PE on Coumadin due for INR today  - INR    4.Asthma Exacerbation  Patient with asthma exacerbation , plan to treat with prednisone burst for 5 days.  If patient develops fever or any signs of infection she should be reevaluated        Orders Placed This Encounter   Procedures     XR Finger Right 2 or More VWS     Standing Status:   Future     Number of Occurrences:   1     Standing Expiration Date:   5/5/2018     Scheduling Instructions:      AP/OBL/LAT     Order Specific Question:   Specify Finger     Answer:   4th Finger     Order Specific Question:   Reason for Exam (Describe Symptoms):     Answer:   Pt fell 2 duff ago - appears to have rotationa deformity of distal aspect.  Eval for fracture of DIP     Order Specific Question:   Is the patient pregnant?     Answer:   No     Order Specific Question:   Can the procedure be changed per Radiologist protocol?     Answer:   Yes     There are no discontinued medications.    No Follow-up on file.          This note was created use Dragon Dictation.  There may be sound alike errors present.       Chief Complaint:   Chief Complaint   Patient presents with     Fall     fell a couple days ago.  injuries to both hands, right shin and left foot     Cough       History of Present Illness:  Eileen is a 61 y.o. female presenting to the clinic today with concerns about injuries after fall 2 days  "ago.  Patient recently had an aortic valve replacement and was staying with her sister during recovery.  Try to let the dog out when she went out slipped off a cement step and landed on her right leg, shoulder and arm.  Patient has abrasions on her right leg, both feet, multiple fingers.  Patient is concerned about her right ring finger as there is significant ecchymosis and pain.  She is worried about a fracture.  She has been icing it.  Patient does have a history of significant arthritis.    Patient also complains of a cough.  She has a history of asthma and uses Advair twice daily.  She is currently using her albuterol 3-4 times daily.  Patient states she does get an asthma exacerbation that requires steroids every couple years    Review of Systems:  All other systems are negative.     PFSH:  Recent aortic valve replacement    Tobacco Use:  History   Smoking Status     Never Smoker   Smokeless Tobacco     Not on file       Vitals:  Vitals:    05/04/17 1420   BP: 110/68   Patient Site: Left Arm   Patient Position: Sitting   Cuff Size: Adult Large   Pulse: 100   Resp: 16   Weight: (!) 294 lb 11.2 oz (133.7 kg)   Height: 5' 10\" (1.778 m)     Wt Readings from Last 3 Encounters:   05/04/17 (!) 294 lb 11.2 oz (133.7 kg)   04/27/17 (!) 297 lb 11.2 oz (135 kg)   04/24/17 (!) 299 lb (135.6 kg)     Body mass index is 42.29 kg/(m^2).      Physical Exam:  Constitutional:  Reveals an alert, cooperative,  female in no acute distress.  Vitals:  Per nursing notes.  Ears:  Clear.  Oropharynx:  Without posterior nasal drainage or thrush.  Neck:  Supple, no lymphadenopathy,  thyroid not palpable.  Cardiac:  Regular rate and rhythm without murmurs, rubs, or gallops. Aortic click heard.   Lungs:  Respiratory effort normal.  Poor air movement and occasional end inspiratory wheeze  Skin: Multiple abrasions on hands and legs without evidence of infection  Extremities: Right fourth digit slight rotational deformity tenderness over DIP " joint.  Significant ecchymosis  Neurologic:  No gross focal deficits.    Psychiatric:  Mood appropriate, memory intact.   Reviewed x-ray of the fourth digit - Shows an avulsion fracture over the dorsal aspect of the DIP joint with minimal displacement    Data Reviewed:  Additional history summarized (from old records or history from someone other than the patient or another healthcare provider) (2 TOTAL): 2 - reviewed discharge summary for surgery.     Decision to obtain extra information (old records requested or history from another person or accessing Care Everywhere) (1 TOTAL): None.     Radiology tests summarized or ordered (XRAY/CT/MRI/DXA) (1 TOTAL): 1 - ordered xray  Labs reviewed or ordered (1 TOTAL): None.    Medicine tests summarized or ordered (EKG/ECHO) (1 TOTAL): None.    Independent review of EKG or X-Ray (2 EACH): 2 - reviewed xray.       The visit lasted a total of 45 minutes face to face with the patient. Over 50% of the time was spent counseling and educating the patient about injuries from fall.        Medications:  Current Outpatient Prescriptions   Medication Sig Dispense Refill     acetaminophen (TYLENOL) 500 MG tablet Take 325-650 mg by mouth every 6 (six) hours as needed for pain (1-2 tab PRN).        acyclovir (ZOVIRAX) 400 MG tablet Take 400 mg by mouth 2 (two) times a day.        ADVAIR DISKUS 250-50 mcg/dose DISKUS USE 1 INHALATION TWICE A  each 1     aspirin 81 mg chewable tablet Chew 81 mg.       AVONEX 30 mcg/0.5 mL PnKt once a week.       calcium, as carbonate, (TUMS) 200 mg calcium (500 mg) chewable tablet Chew 1 tablet daily.       codeine-guaiFENesin (GUAIFENESIN AC)  mg/5 mL liquid Take 5 mL by mouth 2 (two) times a day as needed for cough. 240 mL 0     gabapentin (NEURONTIN) 300 MG capsule Take 300 mg by mouth daily.  2     lisinopril (PRINIVIL,ZESTRIL) 5 MG tablet Take 1 tablet (5 mg total) by mouth daily. 90 tablet 0     LORazepam (ATIVAN) 0.5 MG tablet Take 0.5  mg by mouth daily as needed for anxiety.       metoprolol tartrate (LOPRESSOR) 25 MG tablet Take 1 tablet (25 mg total) by mouth 2 (two) times a day. 180 tablet 0     nystatin (MYCOSTATIN) powder Apply to affected area twice daily 60 g 0     omeprazole (PRILOSEC) 20 MG capsule TAKE 1 CAPSULE BY MOUTH TWICE DAILY 6 AM AND 4  capsule 1     simvastatin (ZOCOR) 20 MG tablet TAKE 1 TABLET AT BEDTIME 90 tablet 3     venlafaxine (EFFEXOR-XR) 75 MG 24 hr capsule TAKE 3 CAPSULES DAILY 270 capsule 1     VENTOLIN HFA 90 mcg/actuation inhaler USE 2 INHALATIONS EVERY 6 HOURS AS NEEDED FOR WHEEZING 18 g 2     warfarin (COUMADIN) 2.5 MG tablet Take one to two tablets (2.5 to 5 mg) by mouth daily. Adjust dose based on INR results as directed. 60 tablet 0     WARFARIN SODIUM (WARFARIN ORAL) Take by mouth. 2/10/17 INR 3.58. Take 2mg 2/10 then 4mg daily. Next INR 2/13/17 2/7/17 INR 3.15 Take 4.5mg daily.  Next INR 2/9/17.       predniSONE (DELTASONE) 20 MG tablet Take 3 tablets (60 mg total) by mouth daily for 5 days. 15 tablet 0     No current facility-administered medications for this visit.        Total Data Points: 5

## 2021-06-10 NOTE — TELEPHONE ENCOUNTER
EXAM: CHEST 2 VIEWS, PA and lateral

DATE: 3/18/2019  Time stamp on exam: 3:10 PM

INDICATION: Preoperative

COMPARISON: None



FINDINGS:

LINES/TUBES: None



LUNGS: No consolidations or edema. 



PLEURA: No effusions or pneumothorax.



HEART AND MEDIASTINUM: Normal size and contour.



BONES AND SOFT TISSUES: Mild degenerative changes of the spine. 



IMPRESSION:

No acute thoracic abnormality.











Signed by: Dr. Daniel Olsen DO on 3/18/2019 4:01 PM RN cannot approve Refill Request    RN can NOT refill this medication med is not covered by policy/route to provider. Last office visit: 10/3/2019 Tito Salazar MD Last Physical: 9/17/2019 Last MTM visit: Visit date not found Last visit same specialty: 10/10/2019 Antoine Webb MD.  Next visit within 3 mo: Visit date not found  Next physical within 3 mo: Visit date not found      Daysi Mohan, [facility] Triage/Med Refill 8/3/2020    Requested Prescriptions   Pending Prescriptions Disp Refills     SYMBICORT 160-4.5 mcg/actuation inhaler [Pharmacy Med Name: SYMBICORT INH AEROSOL 160/4.5MCG] 1 Inhaler 11     Sig: USE 2 INHALATIONS TWICE A DAY       There is no refill protocol information for this order      Signed Prescriptions Disp Refills    buPROPion (WELLBUTRIN XL) 150 MG 24 hr tablet 90 tablet 2     Sig: TAKE 1 TABLET EVERY MORNING       Tricyclics/Misc Antidepressant/Antianxiety Meds Refill Protocol Passed - 8/2/2020 11:19 PM        Passed - PCP or prescribing provider visit in last year     Last office visit with prescriber/PCP: 10/3/2019 Tito Salazar MD OR same dept: 10/10/2019 Antoine Webb MD OR same specialty: 10/10/2019 Antoine Webb MD  Last physical: 9/17/2019 Last MTM visit: Visit date not found   Next visit within 3 mo: Visit date not found  Next physical within 3 mo: Visit date not found  Prescriber OR PCP: Tito Salazar MD  Last diagnosis associated with med order: 1. Recurrent major depressive disorder, in partial remission (H)  - buPROPion (WELLBUTRIN XL) 150 MG 24 hr tablet; TAKE 1 TABLET EVERY MORNING  Dispense: 90 tablet; Refill: 2    2. Reactive airway disease  - SYMBICORT 160-4.5 mcg/actuation inhaler [Pharmacy Med Name: SYMBICORT INH AEROSOL 160/4.5MCG]; USE 2 INHALATIONS TWICE A DAY  Dispense: 1 Inhaler; Refill: 11    If protocol passes may refill for 12 months if within 3 months of last provider visit (or a total of 15 months).

## 2021-06-10 NOTE — PROGRESS NOTES
"ITP ASSESSMENT   Assessment Day: 90 Day  Session Number: 15  Precautions: Sternals/Falls  Diagnosis: Valve  Risk Stratification: High  Referring Provider: Dr. Oro  EXERCISE  Exercise Assessment: Reassessment     Pt has been sick tolerated 20 minutes of exercise today at 2.6 mets without any complaints.                         Exercise Plan  Goals Next 30 days  ADL'S: Move back home  Leisure: Walk and play with my dog.  Work: Increase Home exercise    Education Goals: All goals in this section met  Education Goals Met: Patient can state cardiac s/s and appropriate emergency response.;Has system for taking medication.;Medication review.                        Goals Met  60 day ADL'S goals met: Pt has not moved home yet due to continued health reasons  60 day Leisure goals met: Pt has resumed walking and playing with her dog.  60 day Work goals met: Pt has not increased home exercise yet.  60 Day Progression: Pt has been sick and reports having a fall which has prevented her to meet her goals.    30 day ADL'S goals met: Pt has not moved back home yet, \"I am still living with my sister\"- goal not met  30 day Leisure goals met: Pt has resumed shopping with daughter  30 Day Progression: Pt is slowly progressing, new goals set.    Initial ADL's goals met: Walked 8 steps  Initial Leisure goals met: Showerhead/shower bench installed at their house  Intial Work goals met: Completed all excpet for moving back home  Initial Progression: Will move back home within the week    Exercise Prescription  Exercise Mode: Treadmill;Nustep;Arm Erg.;Hallway Walking  Frequency: 2x/week  Duration: 30-40 min  Intensity / THR: 20-30 beats above resting heart rate  RPE 11-14  Progression / Met level: 2.6-3.5  Resistive Training?: Yes    Current Exercise (mins/week): 110    Interventions  Home Exercise:  Mode: Walking  Frequency: 1-2x/day  Duration: 10-15 min    Education Material : Educational videos;Provide written material;Individual " "education and counseling;Offer educational classes    Education Completed  Exercise Education Completed: Cardiac Anatomy;Signs and Symptoms;Medication review;RPE;Emergency Plan;Home Exercise;Warm up/cool down;FITT Principles;BP/HR Reponse to exercise;Benefits of Exercise;End point of exercise            Exercise Follow-up/Discharge  Follow up/Discharge: Reviewed home exercise program NUTRITION  Nutrition Assessment: Reassessment    Nutrition Risk Factors:  Nutrition Risk Factors: Dyslipidemia;Overweight  Cholesterol: 220  LDL: 123  HDL: 70  Triglycerides: 134    Nutrition Plan  Interventions  Nutrition Interventions: Diet consult;Diet class;Provide with written material;Therapist/Patient discussion;Educational videos  Rate Your Plate Score: 47    Education Completed  Nutrition Education Completed: Low Saturated fat diet;Low sodium diet;Weight management;Label reading class;Dining Out class;Risk factor overview    Goals  Nutrition Goals (Next 30 days): Patient will follow a low sodium diet;Patient will follow a low saturated fat diet;Patient will lose weight    Goals Met  Nutrition Goals Met: Patient knows appropriate portion size;Patient can identify their risk factors for CAD    Height, Weight, and  BMI  Weight: 294 lb (133.4 kg)  Height: 5' 10\" (1.778 m)  BMI: 42.18    Nutrition Follow-up  Follow-up/Discharge: Enc pt to continue to follow a heart healthy diet and continue on weight loss       Other Risk Factors  Other Risk Factor Assessment: Reassessment    HTN Risk Factor: Hypertension    Pre Exercise BP: 116/68  Post Exercise BP: 122/60    Hypertension Plan  Goals  HTN Goals: Exercises regularly    Goals Met  HTN Goals Met: Follow low sodium diet;Take medication as prescribed    HTN Interventions  HTN Interventions: Diet consult;Therapist/patient discussion;Provide written material;Offer educational videos;Offer educational classes    HTN Education Completed  HTN Education Completed: Medication review;Risk " factor overview;Low sodium class    Tobacco Risk Factor: NA    Risk Factor Follow-up   Follow-up/Discharge: Continue support for risk factor management.   PSYCHOSOCIAL  Psychosocial Assessment: Reassessment     Psychosocial Risk Factor: Stress    Psychosocial Plan  Interventions  Interventions: Offer Social Service consult;Offer Spiritual Care consult;Offer educational videos and classes;Provide written material;Individual education and counseling    Education Completed  Education Completed: S/S of depression;Effects of stress on body;Relaxation/Coping Techniques    Goals  Goals (Next 30 days): Practicing stress management skills    Goals Met  Goals Met: Identified Support system;Identify stressors;Oriented to stress management classes;Practicing stress management skills    Psychosocial Follow-up  Follow-up/Discharge: Reviewed stress management options to use           Patient involved in Goal setting?: Yes    Signature: _____________________________________________________________    Date: __________________    Time: __________________

## 2021-06-11 NOTE — TELEPHONE ENCOUNTER
FYI - Status Update  Who is Calling: Patient  Update: Requesting a one month supply be submitted to [facility] due to being out of medication. Patient would like the additional refills submitted to [facility]. Question if request can be processed today.  Okay to leave a detailed message?:  No return call needed

## 2021-06-11 NOTE — PROGRESS NOTES
"ITP ASSESSMENT   Assessment Day: 120 Day  Session Number: 18  Precautions: Sternal/Falls  Diagnosis: Valve  Risk Stratification: High  Referring Provider: Dr. Oro  EXERCISE  Exercise Assessment: Reassessment     Tolerates 30-40' at 2.6 METs                         Exercise Plan  Goals Next 30 days  ADL'S: Move home  Leisure: Short walks throughout the day  Work: Pay bills/sort paperwork    Education Goals: All goals in this section met  Education Goals Met: Patient can state cardiac s/s and appropriate emergency response.;Has system for taking medication.;Medication review.                        Goals Met  90 day ADL'S goals met: Pt moved home for a few days, then back to her sisters - goal in progress  90 day Work goals met: Pt is doing some seated exercises with arms/legs, no aerobic exercise - goal not met  90 Day Progress: Pt states she doesn't feel she has the energy to do things. will progress as tolerates, encouraged to get up and move at home    60 day ADL'S goals met: Pt has not moved home yet due to continued health reasons  60 day Leisure goals met: Pt has resumed walking and playing with her dog.  60 day Work goals met: Pt has not increased home exercise yet.  60 Day Progression: Pt has been sick and reports having a fall which has prevented her to meet her goals.    30 day ADL'S goals met: Pt has not moved back home yet, \"I am still living with my sister\"- goal not met  30 day Leisure goals met: Pt has resumed shopping with daughter  30 Day Progression: Pt is slowly progressing, new goals set.    Initial ADL's goals met: Walked 8 steps  Initial Leisure goals met: Showerhead/shower bench installed at their house  Intial Work goals met: Completed all excpet for moving back home  Initial Progression: Will move back home within the week    Exercise Prescription  Exercise Mode: Treadmill;Nustep;Arm Erg.;Hallway Walking  Frequency: 2x/week  Duration: 30-40'  Intensity / THR: 20-30 beats above resting " "heart rate  RPE 11-14  Progression / Met level: 2.6-3.4  Resistive Training?: Yes    Current Exercise (mins/week): 110    Interventions  Home Exercise:  Mode: Walk  Frequency: 3-4x/day  Duration: 5-10'    Education Material : Educational videos;Provide written material;Individual education and counseling;Offer educational classes    Education Completed  Exercise Education Completed: Cardiac Anatomy;Signs and Symptoms;Medication review;RPE;Emergency Plan;Home Exercise;Warm up/cool down;FITT Principles;BP/HR Reponse to exercise;Benefits of Exercise;End point of exercise            Exercise Follow-up/Discharge  Follow up/Discharge: Reviewed home exercise, encouraged increase in aerobic exercise NUTRITION  Nutrition Assessment: Reassessment    Nutrition Risk Factors:  Nutrition Risk Factors: Dyslipidemia;Overweight  Cholesterol: 220  LDL: 123  HDL: 70  Triglycerides: 134    Nutrition Plan  Interventions  Nutrition Interventions: Diet consult;Diet class;Therapist/Patient discussion;Educational videos;Provide with written material  Rate Your Plate Score: 47    Education Completed  Nutrition Education Completed: Low Saturated fat diet;Risk factor overview;Low sodium diet;Weight management    Goals  Nutrition Goals (Next 30 days): Patient will lose weight    Goals Met  Nutrition Goals Met: Patient can identify their risk factors for CAD;Patient follows a low sodium diet;Completed Nutritional Risk Screen;Provided Rate your Plate Survey;Reviewed Dietitian schedule;Patient states following a low saturated fat diet;Patient knows appropriate portion size    Height, Weight, and  BMI  Weight: 301 lb (136.5 kg)  Height: 5' 10\" (1.778 m)  BMI: 43.19    Nutrition Follow-up  Follow-up/Discharge: Reviewed components of heart healthy diet, enc low salt, low fat       Other Risk Factors  Other Risk Factor Assessment: Reassessment    HTN Risk Factor: Hypertension    Pre Exercise BP: 134/70  Post Exercise BP: 102/64    Hypertension " Plan  Goals  HTN Goals: Exercises regularly    Goals Met  HTN Goals Met: Follow low sodium diet;Take medication as prescribed    HTN Interventions  HTN Interventions: Diet consult;Therapist/patient discussion;Provide written material;Offer educational videos;Offer educational classes    HTN Education Completed  HTN Education Completed: Low sodium diet;Medication review;Risk factor overview    Tobacco Risk Factor: NA    Risk Factor Follow-up   Follow-up/Discharge: Reviewed all risk factors and importance of risk factor modification PSYCHOSOCIAL  Psychosocial Assessment: Reassessment     Psychosocial Risk Factor: Stress    Psychosocial Plan  Interventions  Interventions: Offer Social Service consult;Offer Spiritual Care consult;Offer educational videos and classes;Provide written material;Individual education and counseling    Education Completed  Education Completed: Relaxation/Coping Techniques;Effects of stress on body    Goals  Goals (Next 30 days): Practicing stress management skills    Goals Met  Goals Met: Identified Support system;Oriented to stress management classes;Identify stressors    Psychosocial Follow-up  Follow-up/Discharge: Pt attended emotional aspects class, reviewed importance of stress mgmt           Patient involved in Goal setting?: Yes    Signature: _____________________________________________________________    Date: __________________    Time: __________________

## 2021-06-11 NOTE — TELEPHONE ENCOUNTER
ANTICOAGULATION  MANAGEMENT    Assessment     Today's INR result of 4.00 is Supratherapeutic (goal INR of 2.5-3.5)        Warfarin taken as previously instructed    No new diet changes affecting INR    No new medication/supplements affecting INR    - vaccinated today with seasonal (quad) flu shot and also had 3rd Hepatitis B vaccine, adult.    Continues to tolerate warfarin with no reported s/s of bleeding or thromboembolism     Previous INR was Subtherapeutic at 2.30 on 8/18/20.    Plan:     Spoke on phone with Marilee regarding INR result and instructed:     Warfarin Dosing Instructions:  (has 2.5mg tabs)   - today, advised one time lower dose wih 1.25mg warfarin,   - then continue current warfarin dose 5 mg daily on Mon/Wed; and 2.5 mg daily rest of week.    Instructed patient to follow up no later than:  1-2 wks.   - prefers to call back and schedule, due to relying on someone for transportation.    Education provided: importance of consistent vitamin K intake and target INR goal and significance of current INR result    Marilee verbalizes understanding and agrees to warfarin dosing plan.    Instructed to call the [facility] for any changes, questions or concerns. (#[phone])   ?   Alondra Lora RN    Subjective/Objective:      Eileen Donald, a 64 y.o. female is on warfarin.     Eileen reports:     Home warfarin dose: as updated on anticoagulation calendar per template     Missed doses: No     Medication changes:  Yes: Flu shot and 3rd dose of Hepatitis B vaccine.   - per [employer], no known interactions with warfarin.     S/S of bleeding or thromboembolism:  No     New Injury or illness:  No     Changes in diet or alcohol consumption:  No     Upcoming surgery, procedure or cardioversion:  No    Anticoagulation Episode Summary     Current INR goal:   2.5-3.5   TTR:   53.5 % (1 y)   Next INR check:   10/1/2020   INR from last check:   4.00! (9/17/2020)   Weekly max warfarin dose:      Target end  date:      INR check location:      Preferred lab:      [facility]    Indications    Heart valve replaced [Z95.2]           Comments:            Anticoagulation Care Providers     Provider Role Specialty Phone number    Tito Salazar MD Referring [facility] [phone]

## 2021-06-11 NOTE — PROGRESS NOTES
If this patient is returning our call please transfer to  [phone].   has called this patient and has been unsuccessful in reaching this patient for 6 months now. This patient has also not returned any messages. This patient has been sent a letter and unenrolled from the patient outreach list. Patient can be referred again if there is a need for care coordination in the future.

## 2021-06-11 NOTE — PROGRESS NOTES
ASSESSMENT & PLAN:    1. Essential hypertension  Blood pressure at goal continue his current medications at this time  Refill sent to the pharmacy  - lisinopril (PRINIVIL,ZESTRIL) 5 MG tablet; Take 1 tablet (5 mg total) by mouth daily.  Dispense: 30 tablet; Refill: 0  - metoprolol tartrate (LOPRESSOR) 25 MG tablet; Take 1 tablet (25 mg total) by mouth 2 (two) times a day.  Dispense: 60 tablet; Refill: 0    2. Fall  Reviewed x-rays with the patient today at her previous x-rays with her  Fracture looks to be healing well I would continue with finger bracing for the next few weeks before discontinuing bracing  No signs of acute fracture on forearm likely contusion discussed with patient continue to monitor  We will send to radiology for final read  - XR Hand Right 3 or More VWS; Future  - XR Forearm Right; Future    3. Aortic valve replaced  Status post valve replacement continue with warfarin at this time  INR due today  Likely cause of some increased bleeding in the last month  Since surgery patient has an anemia not resolving well we will recheck blood levels in another 3 weeks as long as patient remains a symptomatic and no signs of blood loss  - INR    4. History of pulmonary embolism  INR due today  - INR    5. Asthma  Not doing well current inhaler feels symptoms to be better controlled  Do a trial course of Dulera and have her discontinue Advair at this time  Update in 3 weeks with improvement of symptoms or not, when she comes to recheck hemoglobin levels      There are no Patient Instructions on file for this visit.    Orders Placed This Encounter   Procedures     XR Hand Right 3 or More VWS     Standing Status:   Future     Number of Occurrences:   1     Standing Expiration Date:   6/16/2018     Scheduling Instructions:      AP/OBL/LAT     Order Specific Question:   Is the patient pregnant?     Answer:   No     Order Specific Question:   Can the procedure be changed per Radiologist protocol?     Answer:    Yes     XR Forearm Right     Standing Status:   Future     Number of Occurrences:   1     Standing Expiration Date:   6/16/2018     Scheduling Instructions:      AP/LAT     Order Specific Question:   Is the patient pregnant?     Answer:   No     Order Specific Question:   Can the procedure be changed per Radiologist protocol?     Answer:   Yes     Medications Discontinued During This Encounter   Medication Reason     codeine-guaiFENesin (GUAIFENESIN AC)  mg/5 mL liquid Therapy completed     WARFARIN SODIUM (WARFARIN ORAL) Duplicate order     lisinopril (PRINIVIL,ZESTRIL) 5 MG tablet Reorder     metoprolol tartrate (LOPRESSOR) 25 MG tablet Reorder       No Follow-up on file.    CHIEF COMPLAINT:  Chief Complaint   Patient presents with     Fall     Follow up - recheck right fourth finger, intermittent right forearm pain, check left foot abrasion - still not healing      Cough     Ongoing cough, shortness of breath - worse with the humidity/heat        HISTORY OF PRESENT ILLNESS:  Eileen is a 61 y.o. female presenting to the clinic today to follow up on a fall and with complaints of a cough.     Fall: She suffered a fall last month and fractured her right fourth finger. She has been wearing a splint on the finger, but it continues to hurt. She notes that she bumps the finger regularly. She also injured her right forearm when she fell, but she never had an X-ray done of it. There is one spot in particular on the forearm that is very tender to palpation. She does not like wearing the brace because it rubs on her skin, but it was recommended she continue to do so. It was discussed that her forearm X-ray is negative for fracture and that her finger seems to be well aligned and is healing.     Cough: She has had more trouble with her breathing lately, and she has been coughing a lot. It has been humid, and she notes that humidity is always difficult for her. It is also hard for her to exercise. Recently, she has  been more compliant with Advair than she ever has been before. She has been using her albuterol inhaler somewhat frequently. She has had to use it more since she started rehab. She has been on Advair for a long time, and she does not think she has tried any other maintenance inhalers. She would be interested in switching to a different inhaler.     Rectal Bleeding. She was seen in the clinic for rectal bleeding on 5/22/2017. She was seeing bright red blood in the toilet and notes that she had one episode of dark stool when this first started. As soon as she cut back on warfarin, she seemed to stop bleeding. She states that she stopped bleeding two weeks ago. Her lab work on 5/22 showed that her hemoglobin was down to 11.1 g/dL. Her blood level was 14.6 g/dL in January. Of note, she had valve replacement surgery at the end of January and suffered some blood loss after that. Her most recent hemoglobin was down to 10.0 g/dL on 6/9/2017. Her last colonoscopy on record was in 2008. She thinks she has had one since then but is not positive. She has had some lightheadedness but denies abdominal pain. She has not had any red or black in her stool lately.     REVIEW OF SYSTEMS:   She has joint destruction in her fingers from arthritis. She has received injections in her fingers, but she continues to have pain. She is working on losing weight. All other systems are negative.    PFSH:  She eats a lot of fruit and tuna. She is trying to eat a better diet.     TOBACCO USE:  History   Smoking Status     Never Smoker   Smokeless Tobacco     Not on file       VITALS:  Vitals:    06/15/17 0805   BP: 126/68   Patient Site: Right Arm   Patient Position: Sitting   Cuff Size: Adult Large   Pulse: 78   Resp: 24   Temp: 98.2  F (36.8  C)   TempSrc: Oral   Weight: (!) 301 lb (136.5 kg)     Wt Readings from Last 3 Encounters:   06/15/17 (!) 301 lb (136.5 kg)   05/17/17 (!) 294 lb (133.4 kg)   05/09/17 (!) 294 lb (133.4 kg)       QUALITY  MEASURES:  The following high BMI interventions were performed this visit: encouragement to exercise and weight monitoring    PHYSICAL EXAM:  GENERAL APPEARANCE: Alert, cooperative, no distress, appears stated age. She ambulates with a cane.   LUNGS: Clear to auscultation bilaterally, respirations unlabored .  HEART: Regular rate and rhythm, S1 and S2 normal, no murmur, rub, or gallop.   MUSCULOSKELETAL: [place] nodules scattered throughout joints of hands. -still swelling noted in PIP joint 4th digit  NEUROLOGIC: No gross focal deficits  PSYCHOLOGIC: Normal mood and affect    R hand X-ray: Joint destruction. Fracture in right fourth finger is well aligned and appears to be healing when compared to 5/4 X-ray. Advanced arthritic changes noted in other joins  R arm X-ray: Right forearm negative for fracture.       ADDITIONAL HISTORY SUMMARIZED (FROM OLD RECORDS OR HISTORY FROM SOMEONE OTHER THAN THE PATIENT OR ANOTHER HEALTHCARE PROVIDER) (2 TOTAL): Reviewed 5/22 Dr. Webb note regarding rectal bleeding. Reviewed 5/4 Dr. Jewell note regarding fall.     DECISION TO OBTAIN EXTRA INFORMATION (OLD RECORDS REQUESTED OR HISTORY FROM ANOTHER PERSON OR ACCESSING CARE EVERYWHERE) (1 TOTAL): None.     RADIOLOGY TESTS SUMMARIZED OR ORDERED (XRAY/CT/MRI/DXA) (1 TOTAL): R hand X-ray ordered. R arm X-ray ordered.     LABS REVIEWED OR ORDERED (1 TOTAL): Blood levels since January reviewed. INR ordered.     MEDICINE TESTS SUMMARIZED OR ORDERED (EKG/ECHO) (1 TOTAL): None.    INDEPENDENT REVIEW OF EKG OR X-RAY (2 EACH): Personally interpreted right arm X-ray from today. Personally interpreted finger X-ray from today. Personally interpreted finger X-ray from 5/4/2017 for comparison.     The visit lasted a total of 27 minutes face to face with the patient. Over 50% of the time was spent counseling and educating the patient about her fall, shortness of breath, and rectal bleeding.    IJose Miguel, am scribing for and in the  presence of, Dr. Salazar.    I, Dr. Salazar, personally performed the services described in this documentation, as scribed by Jose Miguel Reyes in my presence, and it is both accurate and complete.    MEDICATIONS:  Current Outpatient Prescriptions   Medication Sig Dispense Refill     acetaminophen (TYLENOL) 500 MG tablet Take 325-650 mg by mouth every 6 (six) hours as needed for pain (1-2 tab PRN).        acyclovir (ZOVIRAX) 400 MG tablet Take 400 mg by mouth 2 (two) times a day.        ADVAIR DISKUS 250-50 mcg/dose DISKUS USE 1 INHALATION TWICE A  each 1     aspirin 81 mg chewable tablet Chew 81 mg.       AVONEX 30 mcg/0.5 mL PnKt once a week.       calcium, as carbonate, (TUMS) 200 mg calcium (500 mg) chewable tablet Chew 1 tablet daily.       gabapentin (NEURONTIN) 300 MG capsule Take 300 mg by mouth daily.  2     LORazepam (ATIVAN) 0.5 MG tablet Take 0.5 mg by mouth daily as needed for anxiety.       nystatin (MYCOSTATIN) powder Apply to affected area twice daily 60 g 0     omeprazole (PRILOSEC) 20 MG capsule TAKE 1 CAPSULE TWICE DAILY, 6 A.M. AND 4 P.M. 180 capsule 0     simvastatin (ZOCOR) 20 MG tablet TAKE 1 TABLET AT BEDTIME 90 tablet 3     venlafaxine (EFFEXOR-XR) 75 MG 24 hr capsule TAKE 3 CAPSULES DAILY 270 capsule 1     VENTOLIN HFA 90 mcg/actuation inhaler USE 2 INHALATIONS EVERY 6 HOURS AS NEEDED FOR WHEEZING 18 g 2     warfarin (COUMADIN) 2.5 MG tablet TAKE 1 TO 2 TABLETS (2.5 MG TO 5 MG) DAILY, ADJUST DOSE BASE ON INR RESULTS AS DIRECTED 110 tablet 0     lisinopril (PRINIVIL,ZESTRIL) 5 MG tablet Take 1 tablet (5 mg total) by mouth daily. 30 tablet 0     metoprolol tartrate (LOPRESSOR) 25 MG tablet Take 1 tablet (25 mg total) by mouth 2 (two) times a day. 60 tablet 0     mometasone-formoterol (DULERA) 200-5 mcg/actuation HFAA inhaler Inhale 2 puffs 2 (two) times a day. 1 Inhaler 0     No current facility-administered medications for this visit.        Total Data Points: 10

## 2021-06-11 NOTE — TELEPHONE ENCOUNTER
RN cannot approve Refill Request    RN can NOT refill this medication Protocol failed and NO refill given. Last office visit: 10/3/2019 Tito Slaazar MD Last Physical: 9/17/2019 Last MTM visit: Visit date not found Last visit same specialty: 10/10/2019 Antoine Webb MD.  Next visit within 3 mo: Visit date not found  Next physical within 3 mo: Visit date not found      Daysi Mohan, [facility] Triage/Med Refill 9/18/2020    Requested Prescriptions   Pending Prescriptions Disp Refills     venlafaxine (EFFEXOR-XR) 75 MG 24 hr capsule [Pharmacy Med Name: VENLAFAXINE ER 75MG CAPSULES] 270 capsule 0     Sig: TAKE 3 CAPSULES BY MOUTH EVERY DAY       Venlafaxine/Desvenlafaxine Refill Protocol Failed - 9/18/2020  3:36 PM        Failed - Fasting lipid cascade in last year     Cholesterol   Date Value Ref Range Status   11/09/2016 220 (H) <=199 mg/dL Final     Triglycerides   Date Value Ref Range Status   11/09/2016 134 <=149 mg/dL Final     HDL Cholesterol   Date Value Ref Range Status   11/09/2016 70 >=50 mg/dL Final     LDL Calculated   Date Value Ref Range Status   11/09/2016 123 <=129 mg/dL Final     Patient Fasting > 8hrs?   Date Value Ref Range Status   11/09/2016 Yes  Final             Passed - LFT or AST or ALT in last year     Albumin   Date Value Ref Range Status   01/15/2020 3.7 3.5 - 5.0 g/dL Final     Bilirubin, Total   Date Value Ref Range Status   01/15/2020 0.6 0.0 - 1.0 mg/dL Final     Alkaline Phosphatase   Date Value Ref Range Status   01/15/2020 108 45 - 120 U/L Final     AST   Date Value Ref Range Status   01/15/2020 35 0 - 40 U/L Final     ALT   Date Value Ref Range Status   01/15/2020 19 0 - 45 U/L Final     Protein, Total   Date Value Ref Range Status   01/15/2020 7.0 6.0 - 8.0 g/dL Final                Passed - PCP or prescribing provider visit in last year     Last office visit with prescriber/PCP: 10/3/2019 Tito Salazar MD OR same dept: 10/10/2019 Antoine Webb  MD [facility] OR same specialty: 10/10/2019 Antoine Webb MD  Last physical: 9/17/2019 Last MTM visit: Visit date not found   Next visit within 3 mo: Visit date not found  Next physical within 3 mo: Visit date not found  Prescriber OR PCP: Tito Salazar MD  Last diagnosis associated with med order: 1. Depression  - venlafaxine (EFFEXOR-XR) 75 MG 24 hr capsule [Pharmacy Med Name: VENLAFAXINE ER 75MG CAPSULES]; TAKE 3 CAPSULES BY MOUTH EVERY DAY  Dispense: 270 capsule; Refill: 0    If protocol passes may refill for 12 months if within 3 months of last provider visit (or a total of 15 months).             Passed - Blood Pressure in last year     BP Readings from Last 1 Encounters:   08/18/20 130/72

## 2021-06-11 NOTE — PROGRESS NOTES
RESPIRATORY CARE NOTE     Patient Name: Eileen Doanld  Today's Date: 9/9/2020     Problem List  Patient Active Problem List   Diagnosis     Depression     Asthma     Benign essential hypertension     Multiple Sclerosis     Hyperlipidemia     Impaired Glucose Tolerance Test     Dysphagia     Benign Adenomatous Polyp Of The Large Intestine     History of pulmonary embolism     Morbid obesity (H)     Generalized anxiety disorder     Primary osteoarthritis of both hands     Polyarthralgia     Aortic valve replaced     Controlled substance agreement signed     Heart valve replaced     Closed 3-part fracture of proximal humerus with delayed healing, left     Acute on chronic respiratory failure with hypoxia (H)     Pulmonary hypertension (H)     Paroxysmal atrial fibrillation (H)           Arterial blood gas drawn on room air. Ang test done. Patient left in no distress                Suki Stein

## 2021-06-11 NOTE — TELEPHONE ENCOUNTER
ANTICOAGULATION  MANAGEMENT PROGRAM    Eileen Donald is overdue for INR check.     Spoke with Marilee who declined to schedule INR at this time. If calling back please schedule as soon as possible.    - she will call next week, her  brings her to appts.   - she will coincide an INR, flu shot and Hepatitis B shot.      Alondra Lora RN

## 2021-06-11 NOTE — PROGRESS NOTES
ASSESSMENT & PLAN:    1. Vaginal bleeding  Will obtain U/S for further evaluation- possible referral to gynecology based on results  Check hgb today and f/u based on results  If worsening symptoms over weekend to go to ER  Pt is in agreement  - HM2(CBC w/o Differential)  - US Pelvis With Transvaginal Non OB; Future    2. Aortic valve replaced  Due for INR today  - INR    3. History of pulmonary embolism  See above  - INR        There are no Patient Instructions on file for this visit.    Orders Placed This Encounter   Procedures     US Pelvis With Transvaginal Non OB     Standing Status:   Future     Number of Occurrences:   1     Standing Expiration Date:   6/23/2018     Order Specific Question:   Is the patient pregnant?     Answer:   No     Order Specific Question:   Can the procedure be changed per Radiologist protocol?     Answer:   Yes     HM2(CBC w/o Differential)     Medications Discontinued During This Encounter   Medication Reason     ADVAIR DISKUS 250-50 mcg/dose DISKUS Formulary change       No Follow-up on file.    CHIEF COMPLAINT:  Chief Complaint   Patient presents with     Vaginal Bleeding     c/o spotting X2 days, small amount, still having fatigue        HISTORY OF PRESENT ILLNESS:  Eileen is a 61 y.o. female presenting to the clinic today with complaints of vaginal bleeding.     Vaginal Bleeding: She has had light spotting for the past two days. She has seen some red and pink when wiping. She has also seen some small clots drip into the toilet. She is fairly certain this is coming from the vaginal area rather than rectal. Of note, she is anticoagulating on warfarin. She does not think she has ever had imaging done of her abdomen and pelvis. She stopped having menstrual cycles about a year and a half or two years ago. She was seen for bleeding earlier this year, which she though was rectal, but she is not sure now. She has been anemic. When asked if she is actively bleeding, she states she could  be spotting right now. She has been wearing a pad; there has been some blood on it, but it has not been saturated.     REVIEW OF SYSTEMS:   Her fatigue is worsening. She denies new pain or back pain. She has not had any cramping. All other systems are negative.    PFSH:  Reviewed, as below.     TOBACCO USE:  History   Smoking Status     Never Smoker   Smokeless Tobacco     Not on file       VITALS:  Vitals:    06/23/17 1107   BP: 122/64   Patient Site: Right Arm   Patient Position: Sitting   Cuff Size: Adult Large   Pulse: 70   Resp: 16   Temp: 98.3  F (36.8  C)   TempSrc: Oral   Weight: (!) 306 lb (138.8 kg)     Wt Readings from Last 3 Encounters:   06/23/17 (!) 306 lb (138.8 kg)   06/15/17 (!) 301 lb (136.5 kg)   05/17/17 (!) 294 lb (133.4 kg)       PHYSICAL EXAM:  GENERAL APPEARANCE: Alert, cooperative, no distress, appears stated age. She ambulates with a cane.   LUNGS: Clear to auscultation bilaterally, respirations unlabored .  HEART: Regular rate and rhythm, S1 and S2 normal, murmur noted and known   NEUROLOGIC: No gross focal deficits  PSYCHOLOGIC: Normal mood and affect      ADDITIONAL HISTORY SUMMARIZED (FROM OLD RECORDS OR HISTORY FROM SOMEONE OTHER THAN THE PATIENT OR ANOTHER HEALTHCARE PROVIDER) (2 TOTAL): None.     DECISION TO OBTAIN EXTRA INFORMATION (OLD RECORDS REQUESTED OR HISTORY FROM ANOTHER PERSON OR ACCESSING CARE EVERYWHERE) (1 TOTAL): None.     RADIOLOGY TESTS SUMMARIZED OR ORDERED (XRAY/CT/MRI/DXA) (1 TOTAL): Ultrasound ordered    LABS REVIEWED OR ORDERED (1 TOTAL): Labs from 6/9/2017 reviewed. Labs ordered.     MEDICINE TESTS SUMMARIZED OR ORDERED (EKG/ECHO) (1 TOTAL): None.    INDEPENDENT REVIEW OF EKG OR X-RAY (2 EACH): None.     The visit lasted a total of 10 minutes face to face with the patient. Over 50% of the time was spent counseling and educating the patient about her vaginal bleeding.    Jose Miguel MOHAMUD, am scribing for and in the presence of, Dr. Salazar.    IDr. Salazar,  personally performed the services described in this documentation, as scribed by Jose Miguel Reyes in my presence, and it is both accurate and complete.    MEDICATIONS:  Current Outpatient Prescriptions   Medication Sig Dispense Refill     acetaminophen (TYLENOL) 500 MG tablet Take 325-650 mg by mouth every 6 (six) hours as needed for pain (1-2 tab PRN).        acyclovir (ZOVIRAX) 400 MG tablet Take 400 mg by mouth 2 (two) times a day.        aspirin 81 mg chewable tablet Chew 81 mg.       AVONEX 30 mcg/0.5 mL PnKt once a week.       calcium, as carbonate, (TUMS) 200 mg calcium (500 mg) chewable tablet Chew 1 tablet daily.       gabapentin (NEURONTIN) 300 MG capsule Take 300 mg by mouth daily.  2     lisinopril (PRINIVIL,ZESTRIL) 5 MG tablet Take 1 tablet (5 mg total) by mouth daily. 30 tablet 0     LORazepam (ATIVAN) 0.5 MG tablet Take 0.5 mg by mouth daily as needed for anxiety.       metoprolol tartrate (LOPRESSOR) 25 MG tablet Take 1 tablet (25 mg total) by mouth 2 (two) times a day. 60 tablet 0     mometasone-formoterol (DULERA) 200-5 mcg/actuation HFAA inhaler Inhale 2 puffs 2 (two) times a day. 1 Inhaler 0     nystatin (MYCOSTATIN) powder Apply to affected area twice daily 60 g 0     omeprazole (PRILOSEC) 20 MG capsule TAKE 1 CAPSULE TWICE DAILY, 6 A.M. AND 4 P.M. 180 capsule 0     simvastatin (ZOCOR) 20 MG tablet TAKE 1 TABLET AT BEDTIME 90 tablet 3     venlafaxine (EFFEXOR-XR) 75 MG 24 hr capsule TAKE 3 CAPSULES DAILY 270 capsule 1     VENTOLIN HFA 90 mcg/actuation inhaler USE 2 INHALATIONS EVERY 6 HOURS AS NEEDED FOR WHEEZING 18 g 2     warfarin (COUMADIN) 2.5 MG tablet TAKE 1 TO 2 TABLETS (2.5 MG TO 5 MG) DAILY, ADJUST DOSE BASE ON INR RESULTS AS DIRECTED 110 tablet 0     warfarin (JANTOVEN) 2.5 MG tablet Take 2.5mg (1 tab) on Tue and Thur, and 5mg (2 tabs) on all other days.  OR AS DIRECTED.  Adjust dose based on INR. 150 tablet 1     No current facility-administered medications for this visit.        Total  Data Points: 2

## 2021-06-12 NOTE — TELEPHONE ENCOUNTER
RN cannot approve Refill Request    RN can NOT refill this medication Protocol failed and NO refill given. Last office visit: 10/3/2019 Tito Salazar MD Last Physical: 9/17/2019 Last MTM visit: Visit date not found Last visit same specialty: 10/10/2019 Antoine Webb MD.  Next visit within 3 mo: Visit date not found  Next physical within 3 mo: Visit date not found      Daysi Mohan, [facility] Triage/Med Refill 10/10/2020    Requested Prescriptions   Pending Prescriptions Disp Refills     warfarin ANTICOAGULANT (COUMADIN/JANTOVEN) 2.5 MG tablet [Pharmacy Med Name: WARFARIN TABS 2.5MG] 135 tablet 4     Sig: TAKE 1 TO 2 TABLETS DAILY (ADJUST DOSE PER INR RESULTS AS DIRECTED)       Warfarin Refill Protocol  Failed - 10/9/2020 11:21 PM        Failed -  Route to appropriate pool/provider     Last Anticoagulation Summary:   Anticoagulation Episode Summary     Current INR goal:  2.5-3.5   TTR:  47.5 % (1 y)   Next INR check:  10/22/2020   INR from last check:  3.50 (10/8/2020)   Weekly max warfarin dose:     Target end date:     INR check location:     Preferred lab:     Send INR reminders to:  [facility]    Indications    Heart valve replaced [Z95.2]           Comments:           Anticoagulation Care Providers     Provider Role Specialty Phone number    Tito Salazar MD Referring Family Medicine [phone]                Passed - Provider visit in last year     Last office visit with prescriber/PCP: 10/3/2019 Tito Salazar MD OR same dept: 10/10/2019 Antoine Webb MD OR same specialty: 10/10/2019 Antoine Webb MD  Last physical: 9/17/2019 Last MTM visit: Visit date not found    Next appt within 3 mo: Visit date not found Next physical within 3 mo: Visit date not found  Prescriber OR PCP: Tito Salazar MD  Last diagnosis associated with med order: 1. Aortic valve replaced  - warfarin ANTICOAGULANT (COUMADIN/JANTOVEN) 2.5 MG tablet  [Pharmacy Med Name: WARFARIN TABS 2.5MG]; TAKE 1 TO 2 TABLETS DAILY (ADJUST DOSE PER INR RESULTS AS DIRECTED)  Dispense: 135 tablet; Refill: 4    2. Long term current use of anticoagulant therapy  - warfarin ANTICOAGULANT (COUMADIN/JANTOVEN) 2.5 MG tablet [Pharmacy Med Name: WARFARIN TABS 2.5MG]; TAKE 1 TO 2 TABLETS DAILY (ADJUST DOSE PER INR RESULTS AS DIRECTED)  Dispense: 135 tablet; Refill: 4    If protocol passes may refill for 6 months if within 3 months of last provider visit (or a total of 9 months).

## 2021-06-12 NOTE — PROGRESS NOTES
SUBJECTIVE:   Chief Complaint   Patient presents with     Follow-up     ED follow up for shortness of breath, still having shortness of breath with activity      Oral Pain     c/o having mouth pain for a few weeks, some swelling in the tongue      Eileen Donald 61 y.o. female    Current Outpatient Prescriptions   Medication Sig Dispense Refill     acetaminophen (TYLENOL) 500 MG tablet Take 325-650 mg by mouth every 6 (six) hours as needed for pain (1-2 tab PRN).        acyclovir (ZOVIRAX) 400 MG tablet Take 400 mg by mouth 2 (two) times a day.        amoxicillin (AMOXIL) 500 MG capsule Take 2,000 mg by mouth. 1 hour prior to dental work       aspirin 81 mg chewable tablet Chew 81 mg.       AVONEX 30 mcg/0.5 mL PnKt once a week.       buPROPion (WELLBUTRIN XL) 150 MG 24 hr tablet Take 1 tablet (150 mg total) by mouth every morning. 30 tablet 2     calcium, as carbonate, (TUMS) 200 mg calcium (500 mg) chewable tablet Chew 1 tablet daily.       ferrous sulfate 325 (65 FE) MG tablet Take 1 tablet (325 mg total) by mouth daily with breakfast. 30 tablet 3     fluticasone-salmeterol (ADVAIR) 250-50 mcg/dose DISKUS Inhale 1 puff 2 (two) times a day. 1 each 1     gabapentin (NEURONTIN) 300 MG capsule Take 300 mg by mouth daily.  2     lisinopril (PRINIVIL,ZESTRIL) 5 MG tablet Take 1 tablet (5 mg total) by mouth daily. 30 tablet 0     LORazepam (ATIVAN) 0.5 MG tablet Take 0.5 mg by mouth daily as needed for anxiety.       metoprolol tartrate (LOPRESSOR) 25 MG tablet Take 1 tablet (25 mg total) by mouth 2 (two) times a day. 60 tablet 0     nystatin (MYCOSTATIN) powder Apply to affected area twice daily 60 g 0     omeprazole (PRILOSEC) 20 MG capsule TAKE 1 CAPSULE TWICE DAILY, 6 A.M. AND 4 P.M. 180 capsule 0     simvastatin (ZOCOR) 20 MG tablet TAKE 1 TABLET AT BEDTIME 90 tablet 3     venlafaxine (EFFEXOR-XR) 75 MG 24 hr capsule TAKE 3 CAPSULES DAILY 270 capsule 0     VENTOLIN HFA 90 mcg/actuation inhaler USE 2 INHALATIONS  EVERY 6 HOURS AS NEEDED FOR WHEEZING 18 g 2     warfarin (COUMADIN) 2.5 MG tablet TAKE 1 TO 2 TABLETS (2.5 MG TO 5 MG) DAILY, ADJUST DOSE BASE ON INR RESULTS AS DIRECTED 110 tablet 0     warfarin (COUMADIN) 2.5 MG tablet Takes 2.5mg on Tues/Thurs and 5mg all other days, by mouth as directed.  Dose adjusted based on INR results. 170 tablet 0     warfarin (JANTOVEN) 2.5 MG tablet Take 2.5mg (1 tab) on Tue and Thur, and 5mg (2 tabs) on all other days.  OR AS DIRECTED.  Adjust dose based on INR. 150 tablet 1     albuterol (PROVENTIL) 2.5 mg /3 mL (0.083 %) nebulizer solution Take 3 mL (2.5 mg total) by nebulization every 6 (six) hours as needed for wheezing. 75 vial 2     predniSONE (DELTASONE) 20 MG tablet Take 1 tablet (20 mg total) by mouth daily. 7 tablet 0     No current facility-administered medications for this visit.      Allergies: Morphine and Sulfa (sulfonamide antibiotics)   Patient's last menstrual period was 11/28/2013.    HPI:   62 yo F here for f/u on ER visit for shortness of breath and discussion over her weight and pain in her tongue over last couple of weeks.  Pt was seen for shortness of breath- we reviewed her ER visit with her today- she did better with nebulizer treatment and we discussed getting her one for her home.  She still is feeling not back to par and is wheezing- we discussed a burst of steroids.  She has been trying to eat better to help herself with weight loss as she knows she is overweight but has been eating a lot of fruit and other acidic foods- noted some irritation with her tongue and wondered if they could be related.    ROS: negative except as per HPI    OBJECTIVE:   The patient appears well, alert, oriented x 3, in no distress.  /74 (Patient Site: Right Arm, Patient Position: Sitting, Cuff Size: Adult Large)  Pulse 78  Temp 98.4  F (36.9  C) (Oral)   Resp 24  Wt (!) 315 lb (142.9 kg)  LMP 11/28/2013  BMI 45.2 kg/m2  Mouth- tongue with no abnormal lesions  Lungs: BL  wheezing noted with no rhonchi- wheezing in BL upper apices   Cardiac: S1 and S2 normal, noted murmur which is known  Abdomen: normal bowel sounds, soft, obese  Extremities: show no edema, normal peripheral pulses.   Neurological: normal, no focal findings.  Skin: clear, dry, no rashes/lesions  Psych- normal mood and affect      ASSESSMENT/PLAN  1. Shortness of breath  Suspect unctontrolled reactive airway- will do burst of steroids next week and see how her breathing responds  Sent to pharmacy  Nebulizer given to patient and albuterol sent to pharmacy  If using regularly will have to look into alternative medication for her controller  Reviewed imaging and labs and ER notes with patient today  She is in agreement to plan    2. Soreness of tongue  Discussed change in diet and cutting back on acidic and citrus  Will continue to monitor    3. Obesity  Discussed diet and exercise    4. Asthma  Will do burst of steroids  F/u if not improving  Do not suspect with improvement with nebulizer shortness of breath is secondary to valvular replacement complications- but will further investigate if not improving.      Pt states an understanding and agrees to the above plan.  Greater than 25 minutes was spent today in interview and examination with Eileen Doanld with more than 50% of that time in counseling and coordination of care.

## 2021-06-12 NOTE — TELEPHONE ENCOUNTER
Who is calling:  Patient   Reason for Call:  Caller is needing to know when is she suppose to come back for her next vaccine on the Hep B.   Date of last appointment with primary care:   Okay to leave a detailed message: Yes

## 2021-06-12 NOTE — PROGRESS NOTES
Pt had a no show/no call on 7/12/17, 7/17/17, and 7/19/17. Had made multiple attempts to get a hold of pt and had been unsuccessful. Will D/c chart. Pt had completed 21 cardiac rehab sessions.

## 2021-06-12 NOTE — TELEPHONE ENCOUNTER
ANTICOAGULATION  MANAGEMENT PROGRAM    Eileen Donald is overdue for INR check.     Spoke with Marilee who declined to schedule INR at this time. Needs to arrange transportation. If calling back please schedule as soon as possible.      Cierra Escobar RN

## 2021-06-12 NOTE — PROGRESS NOTES
SUBJECTIVE:   Chief Complaint   Patient presents with     Follow-up     Breathing/energy level, would like to talk about her depression, and also her urine has been clear/pale yellow and she is worried that is not normal.      Eileen Donald 61 y.o. female    Current Outpatient Prescriptions   Medication Sig Dispense Refill     acetaminophen (TYLENOL) 500 MG tablet Take 325-650 mg by mouth every 6 (six) hours as needed for pain (1-2 tab PRN).        acyclovir (ZOVIRAX) 400 MG tablet Take 400 mg by mouth 2 (two) times a day.        aspirin 81 mg chewable tablet Chew 81 mg.       calcium, as carbonate, (TUMS) 200 mg calcium (500 mg) chewable tablet Chew 1 tablet daily.       gabapentin (NEURONTIN) 300 MG capsule Take 300 mg by mouth daily.  2     lisinopril (PRINIVIL,ZESTRIL) 5 MG tablet Take 1 tablet (5 mg total) by mouth daily. 30 tablet 0     LORazepam (ATIVAN) 0.5 MG tablet Take 0.5 mg by mouth daily as needed for anxiety.       metoprolol tartrate (LOPRESSOR) 25 MG tablet Take 1 tablet (25 mg total) by mouth 2 (two) times a day. 60 tablet 0     nystatin (MYCOSTATIN) powder Apply to affected area twice daily 60 g 0     omeprazole (PRILOSEC) 20 MG capsule TAKE 1 CAPSULE TWICE DAILY, 6 A.M. AND 4 P.M. 180 capsule 0     simvastatin (ZOCOR) 20 MG tablet TAKE 1 TABLET AT BEDTIME 90 tablet 3     venlafaxine (EFFEXOR-XR) 75 MG 24 hr capsule TAKE 3 CAPSULES DAILY 270 capsule 0     VENTOLIN HFA 90 mcg/actuation inhaler USE 2 INHALATIONS EVERY 6 HOURS AS NEEDED FOR WHEEZING 18 g 2     warfarin (COUMADIN) 2.5 MG tablet TAKE 1 TO 2 TABLETS (2.5 MG TO 5 MG) DAILY, ADJUST DOSE BASE ON INR RESULTS AS DIRECTED 110 tablet 0     warfarin (COUMADIN) 2.5 MG tablet Takes 2.5mg on Tues/Thurs and 5mg all other days, by mouth as directed.  Dose adjusted based on INR results. 170 tablet 0     warfarin (JANTOVEN) 2.5 MG tablet Take 2.5mg (1 tab) on Tue and Thur, and 5mg (2 tabs) on all other days.  OR AS DIRECTED.  Adjust dose based on  INR. 150 tablet 1     AVONEX 30 mcg/0.5 mL PnKt once a week.       buPROPion (WELLBUTRIN XL) 150 MG 24 hr tablet Take 1 tablet (150 mg total) by mouth every morning. 30 tablet 2     fluticasone-salmeterol (ADVAIR) 250-50 mcg/dose DISKUS Inhale 1 puff 2 (two) times a day. 1 each 1     No current facility-administered medications for this visit.      Allergies: Morphine and Sulfa (sulfonamide antibiotics)   Patient's last menstrual period was 11/28/2013.    HPI:   62 yo F here with her daughter today for follow up on breathing problems, anemia, depression and change in urine coloration.  She feels her breathing was better while on Advair and not as well controlled with Dulera- would like to change back to Advair.  We will have her start back on Advair at this time.  She has noticed some color changes to her urine- but has had no urinary symptoms- she states her urine has become clearer with the water intake increase she has done- reassurance was given that this is a good thing- clear urine shows she is hydrated.  She continues to have fatigue- she was having some problems with bleeding but this has stopped- she does have a history of anemia- iron deficiency- she would like these labs checked again.  We also discussed her depression and she doesn't feel her depression is under ideal control and wanted to know about medicatoin changes to help- discussed adding wellbutrin to her current medication.  Discussed that this could be leading to some of her fatigue as well- but very likely is multifactorial.    ROS: negative except as per HPI    OBJECTIVE:   The patient appears well, alert, oriented x 3, in no distress.  /72 (Patient Site: Right Arm, Patient Position: Sitting, Cuff Size: Adult Large)  Pulse 90  Resp 18  Wt (!) 312 lb 9 oz (141.8 kg)  LMP 11/28/2013  BMI 44.85 kg/m2      Lungs: clear, good air entry, no wheezes, rhonchi or rales.   Cardiac: S1 and S2 normal, murmur known- valve replacement- regular  rate and rhythm.   Abdomen: normal bowel sounds, soft, obese  Extremities: show no edema, normal peripheral pulses.   Neurological: normal, no focal findings.  Skin: clear, dry, no rashes/lesions  Psych- normal mood and affect      ASSESSMENT/PLAN  1. Fatigue  Discussed nature likely multifactorial- will check labs and f/u based on results  Change inhaler and add Wellbutrin to her medications  F/u in one month  - HM2(CBC w/o Differential)  - Ferritin  - Thyroid Cascade    2. Asthma  Change dulera back to Advair- pt felt better on this inhaler  She has inhalers left from previous and doesn't need refill    3. Depression  Feels she could have better control of her symptoms- would like to add Wellbutrin after discussion  F/u in one month    4. History of urine color changes  Reassurance given- discussed with increased hydration this is normal      Pt states an understanding and agrees to the above plan.  Greater than 25 minutes was spent today in interview and examination with Eileen Doanld with more than 50% of that time in counseling and coordination of care.

## 2021-06-12 NOTE — TELEPHONE ENCOUNTER
Medication Question or Clarification  Who is calling: patient   What medication are you calling about (include dose and sig)?:    Disp Refills Start End    venlafaxine (EFFEXOR-XR) 75 MG 24 hr capsule 270 capsule 0 9/21/2020     Sig: TAKE 3 CAPSULES BY MOUTH EVERY DAY    Sent to pharmacy as: venlafaxine ER 75 mg capsule,extended release 24 hr (EFFEXOR-XR)    E-Prescribing Status: Receipt confirmed by pharmacy (9/21/2020  7:12 AM CDT)        Who prescribed the medication?: Tito Salazar MD   What is your question/concern?: [facility]er stated [facility] been trying to contact Tito Salazar MD on regards to the medication. And needing to answer some questions but no respond and caller is needing this medication as soon as possible due to being out. Caller stated that they see the medication but cant refill it until Tito Salazar MD calls them back to answer the questions.   Requested Pharmacy: [facility]  Okay to leave a detailed message?: Yes

## 2021-06-12 NOTE — TELEPHONE ENCOUNTER
ANTICOAGULATION  MANAGEMENT    Assessment     Today's INR result of 2.7 is Therapeutic (goal INR of 2.5-3.5)        Warfarin taken as previously instructed    No new diet changes affecting INR    No new medication/supplements affecting INR    Continues to tolerate warfarin with no reported s/s of bleeding or thromboembolism     Previous INR was Therapeutic    Plan:     Spoke on phone with Eileen regarding INR result and instructed:     Warfarin Dosing Instructions:  Continue current warfarin dose    5 mg every Mon, Wed; 2.5 mg all other days     (0 % change)    Instructed patient to follow up no later than: 4 weeks - appointment made.    Education provided: importance of following up for INR monitoring at instructed interval and importance of notifying clinic for changes in medications    Marilee verbalizes understanding and agrees to warfarin dosing plan.    Instructed to call the [facility] for any changes, questions or concerns. (#[phone])   ?   Radha Slater RN    Subjective/Objective:      Eileen Donald, a 65 y.o. female is on warfarin.     Eileen reports:     Home warfarin dose: as updated on anticoagulation calendar per template     Missed doses: No     Medication changes:  No     S/S of bleeding or thromboembolism:  No     New Injury or illness:  No     Changes in diet or alcohol consumption:  No     Upcoming surgery, procedure or cardioversion:  No    Anticoagulation Episode Summary     Current INR goal:  2.5-3.5   TTR:  47.7 % (1 y)   Next INR check:  12/7/2020   INR from last check:  2.70 (11/9/2020)   Weekly max warfarin dose:     Target end date:     INR check location:     Preferred lab:     Send INR reminders to:  [facility]    Indications    Heart valve replaced [Z95.2]           Comments:           Anticoagulation Care Providers     Provider Role Specialty Phone number    Tito Salazar MD Referring Family Medicine [phone]

## 2021-06-12 NOTE — PROGRESS NOTES
ANTICOAGULATION  MANAGEMENT    Reviewed records from recent Emergency Room Visit for shortness of breathing.  Per chart review:  .The patient has been doing well during her ER stay.  Her workup today is unremarkable including chest CT.  She was given a single nebulizer treatment and states that this significantly improved her symptoms.  I have encouraged her to continue using her maintenance inhaler along with her rescue inhaler as directed.  At this time I am not suspicious for any other serious underlying etiologies and feel that she can be safely discharged home with outpatient follow-up and she is comfortable with this plan.    No adjustment to anticoagulation plan needed    Radha Slater RN

## 2021-06-12 NOTE — TELEPHONE ENCOUNTER
ANTICOAGULATION  MANAGEMENT PROGRAM    Eileen Donald is overdue for INR check.     Spoke with Marilee who declined to schedule INR at this time. If calling back please schedule as soon as possible.    - will go on 10/5 or 10/6.  Waiting for confirmation with friend who will be taking her to appt.      Alondra Lora RN

## 2021-06-12 NOTE — TELEPHONE ENCOUNTER
RN cannot approve Refill Request    RN can NOT refill this medication med is not covered by policy/route to provider. Last office visit: 10/3/2019 Tito Salazar MD Last Physical: 9/17/2019 Last MTM visit: Visit date not found Last visit same specialty: 10/10/2019 Antoine Webb MD.  Next visit within 3 mo: Visit date not found  Next physical within 3 mo: Visit date not found      Lis Fitzgerald, [facility] Triage/Med Refill 10/5/2020    Requested Prescriptions   Pending Prescriptions Disp Refills     SYMBICORT 160-4.5 mcg/actuation inhaler [Pharmacy Med Name: SYMBICORT INH AEROSOL 160/4.5MCG] 1 Inhaler 11     Sig: USE 2 INHALATIONS TWICE A DAY       There is no refill protocol information for this order

## 2021-06-12 NOTE — TELEPHONE ENCOUNTER
ANTICOAGULATION  MANAGEMENT    Assessment     Today's INR result of 3.5 is Therapeutic (goal INR of 2.5-3.5)        Warfarin taken as previously instructed    No new diet changes affecting INR    No new medication/supplements affecting INR    Continues to tolerate warfarin with no reported s/s of bleeding or thromboembolism     Previous INR was Supratherapeutic    Plan:     Spoke on phone with Eileen regarding INR result and instructed:     Warfarin Dosing Instructions:  Continue current warfarin dose    5 mg every Mon, Wed; 2.5 mg all other days       (0 % change)    Instructed patient to follow up no later than: 2 weeks - prefers to call to make appointment.    Education provided: importance of therapeutic range    Marilee verbalizes understanding and agrees to warfarin dosing plan.    Instructed to call the [facility] for any changes, questions or concerns. (#[phone])   ?   Radha Slater RN    Subjective/Objective:      Eileen Donald, a 64 y.o. female is on warfarin.     Eileen reports:     Home warfarin dose: as updated on anticoagulation calendar per template     Missed doses: No     Medication changes:  No     S/S of bleeding or thromboembolism:  No     New Injury or illness:  No     Changes in diet or alcohol consumption:  No     Upcoming surgery, procedure or cardioversion:  No    Anticoagulation Episode Summary     Current INR goal:  2.5-3.5   TTR:  47.8 % (1 y)   Next INR check:  10/22/2020   INR from last check:  3.50 (10/8/2020)   Weekly max warfarin dose:     Target end date:     INR check location:     Preferred lab:     Send INR reminders to:  [facility]    Indications    Heart valve replaced [Z95.2]           Comments:           Anticoagulation Care Providers     Provider Role Specialty Phone number    Tito Salazar MD Referring Family Medicine [phone]

## 2021-06-12 NOTE — TELEPHONE ENCOUNTER
RN cannot approve Refill Request    RN can NOT refill this medication med is not covered by policy/route to provider. Last office visit: 10/3/2019 Tito Salazar MD Last Physical: 9/17/2019 Last MTM visit: Visit date not found Last visit same specialty: 10/10/2019 Antoine Webb MD.  Next visit within 3 mo: Visit date not found  Next physical within 3 mo: Visit date not found      Heidi Miller, [facility] Triage/Med Refill 10/22/2020    Requested Prescriptions   Pending Prescriptions Disp Refills     montelukast (SINGULAIR) 10 mg tablet [Pharmacy Med Name: MONTELUKAST SODIUM TABS 10MG] 30 tablet 11     Sig: TAKE 1 TABLET AT BEDTIME       There is no refill protocol information for this order

## 2021-06-12 NOTE — TELEPHONE ENCOUNTER
Pt had 3 shots of hep B- she doesn't need further - she was questioning when to come back in.  Dr. Salazar

## 2021-06-12 NOTE — TELEPHONE ENCOUNTER
venlafaxine (EFFEXOR-XR) 75 MG 24 hr capsule was never sent to [facility] pharmacy. Please send prescription to [facility].

## 2021-06-12 NOTE — TELEPHONE ENCOUNTER
ANTICOAGULATION  MANAGEMENT PROGRAM    Eileen Donald is overdue for INR check.     Left message to call and schedule INR appointment as soon as possible.      Alondra Lora RN

## 2021-06-12 NOTE — TELEPHONE ENCOUNTER
Medication Request  Medication name:    Disp Refills Start End    cholecalciferol, vitamin D3, (VITAMIN D3) 5,000 unit Tab        Sig - Route: Take 5,000 Units by mouth. - Oral    Class: Historical Med        Requested Pharmacy: n/a  Reason for request: Caller is needing to know if she can get refills on this medication but was also told that she should contact her doctor before getting refills.   When did you use medication last?:    Patient offered appointment:  patient declined  Okay to leave a detailed message: no

## 2021-06-13 NOTE — PROGRESS NOTES
"Anticoagulation Annual Referral Renewal Review    Eileen Donald's chart reviewed for annual renewal of referral to anticoagulation monitoring.        Criteria for anticoagulation nurse and/or pharmacist renewal met   Warfarin indication: Mechanical AVR Yes, per indication   Current with INR monitoring/compliant Yes Yes   Date of last office visit 4/6/20 Yes, had office visit within last year   Time in Therapeutic Range (TTR) 48 % No, TTR < 60 %       Eileen Donald did NOT meet all criteria for anticoagulation management program initiated renewal and requires provider review. Using dot phrase, \".acmrenewalprovider\", please advise if Eileen's anticoagulation management referral should be renewed or if patient should be seen in office to review anticoagulation therapy      Cierra Escobar RN  5:26 PM        "

## 2021-06-13 NOTE — TELEPHONE ENCOUNTER
ANTICOAGULATION  MANAGEMENT    Assessment     Today's INR result of 2.9 is Therapeutic (goal INR of 2.5-3.5)        Warfarin taken as previously instructed    No new diet changes affecting INR    No new medication/supplements affecting INR    Continues to tolerate warfarin with no reported s/s of bleeding or thromboembolism     Previous INR was Therapeutic    Plan:     Left detailed message for Eileen regarding INR result and instructed:     Warfarin Dosing Instructions:  Continue current warfarin dose    5 mg every Mon, Wed; 2.5 mg all other days      (0 % change)    Instructed patient to follow up no later than: 4 weeks    Education provided: target INR goal and significance of current INR result        Instructed to call the [facility] for any changes, questions or concerns. (#[phone])   ?   Radha Slater RN    Subjective/Objective:      Eileen Donald, a 65 y.o. female is on warfarin.     Eileen reports:     Home warfarin dose: as updated on anticoagulation calendar per template     Missed doses: No     Medication changes:  No     S/S of bleeding or thromboembolism:  No     New Injury or illness:  No     Changes in diet or alcohol consumption:  No     Upcoming surgery, procedure or cardioversion:  No    Anticoagulation Episode Summary     Current INR goal:  2.5-3.5   TTR:  51.2 % (1 y)   Next INR check:  1/7/2021   INR from last check:  2.90 (12/10/2020)   Weekly max warfarin dose:     Target end date:     INR check location:     Preferred lab:     Send INR reminders to:  [facility]    Indications    Heart valve replaced [Z95.2]           Comments:           Anticoagulation Care Providers     Provider Role Specialty Phone number    Tito Salazar MD Referring Family Medicine [phone]

## 2021-06-13 NOTE — PROGRESS NOTES
SUBJECTIVE:   Chief Complaint   Patient presents with     URI     Coughing X5-6 days, shortness of breath, having hot flashes/feeling flushed      Eileen CASPER Donald 61 y.o. female    Current Outpatient Prescriptions   Medication Sig Dispense Refill     acetaminophen (TYLENOL) 500 MG tablet Take 325-650 mg by mouth every 6 (six) hours as needed for pain (1-2 tab PRN).        acyclovir (ZOVIRAX) 400 MG tablet Take 400 mg by mouth 2 (two) times a day.        albuterol (PROVENTIL) 2.5 mg /3 mL (0.083 %) nebulizer solution Take 3 mL (2.5 mg total) by nebulization every 6 (six) hours as needed for wheezing. 75 vial 2     amoxicillin (AMOXIL) 500 MG capsule Take 2,000 mg by mouth. 1 hour prior to dental work       aspirin 81 mg chewable tablet Chew 81 mg.       AVONEX 30 mcg/0.5 mL PnKt once a week.       budesonide-formoterol (SYMBICORT) 160-4.5 mcg/actuation inhaler Inhale 2 puffs 2 (two) times a day. 1 Inhaler 1     buPROPion (WELLBUTRIN XL) 150 MG 24 hr tablet Take 1 tablet (150 mg total) by mouth every morning. 90 tablet 1     calcium, as carbonate, (TUMS) 200 mg calcium (500 mg) chewable tablet Chew 1 tablet daily.       ferrous sulfate 325 (65 FE) MG tablet Take 1 tablet (325 mg total) by mouth daily with breakfast. 90 tablet 1     gabapentin (NEURONTIN) 300 MG capsule Take 300 mg by mouth daily.  2     lisinopril (PRINIVIL,ZESTRIL) 5 MG tablet Take 1 tablet (5 mg total) by mouth daily. 30 tablet 0     LORazepam (ATIVAN) 0.5 MG tablet Take 0.5 mg by mouth daily as needed for anxiety.       metoprolol tartrate (LOPRESSOR) 25 MG tablet Take 1 tablet (25 mg total) by mouth 2 (two) times a day. 60 tablet 0     nystatin (MYCOSTATIN) powder Apply to affected area twice daily 60 g 0     omeprazole (PRILOSEC) 20 MG capsule TAKE 1 CAPSULE TWICE DAILY, 6 A.M. AND 4 P.M. 180 capsule 2     predniSONE (DELTASONE) 20 MG tablet Take 1 tablet (20 mg total) by mouth daily. 7 tablet 0     simvastatin (ZOCOR) 20 MG tablet TAKE 1  TABLET AT BEDTIME 90 tablet 3     venlafaxine (EFFEXOR-XR) 75 MG 24 hr capsule TAKE 3 CAPSULES DAILY 270 capsule 0     VENTOLIN HFA 90 mcg/actuation inhaler USE 2 INHALATIONS EVERY 6 HOURS AS NEEDED FOR WHEEZING 18 g 2     warfarin (COUMADIN) 2.5 MG tablet TAKE 1 TO 2 TABLETS (2.5 MG TO 5 MG) DAILY, ADJUST DOSE BASE ON INR RESULTS AS DIRECTED 110 tablet 0     warfarin (COUMADIN) 2.5 MG tablet Takes 2.5mg on Tues/Thurs and 5mg all other days, by mouth as directed.  Dose adjusted based on INR results. 170 tablet 0     warfarin (JANTOVEN) 2.5 MG tablet Take 2.5mg (1 tab) on Tue and Thur, and 5mg (2 tabs) on all other days.  OR AS DIRECTED.  Adjust dose based on INR. 150 tablet 1     No current facility-administered medications for this visit.      Allergies: Morphine and Sulfa (sulfonamide antibiotics)   Patient's last menstrual period was 11/28/2013.    HPI:   Pt presents with ongoing fatigue but a cough as well- for the past few days has been having more of congestion and a cough.  She has been for months fatigued and wondering what she can do about it- it has been improving and we spent time showing her the trend of her hgb over the past few months- iron deficiency anemia looks to be slowing improving with the increase of her hgb again and size of RBC's increasing showing improvement in iron stores.  The increasing hgb levels over the past few months has been correlated with her feeling better as well.  The cough is mainly non-productive- she did have prednisone back at he beginning of last month which helped her- but then in this last week seemed to have picked up something new again.  She would like flu vaccine today.      ROS: negative except as per HPI    OBJECTIVE:   The patient appears well, alert, oriented x 3, in no distress.  /74 (Patient Site: Left Arm, Patient Position: Sitting, Cuff Size: Adult Large)  Pulse 80  Temp 98.3  F (36.8  C) (Oral)   Resp 28  Wt (!) 317 lb (143.8 kg)  LMP 11/28/2013   SpO2 94%  BMI 45.48 kg/m2    HEENT:  Nose/mouth moist, no erythema/lesions. Neck supple. No adenopathy or thyromegaly. TM's normal BL  Lungs: clear, good air entry, no wheezes, rhonchi or rales.   Cardiac: S1 and S2 normal, murmur noted- regular rate and rhythm.   Abdomen: normal bowel sounds, soft without tenderness, guarding, mass or organomegaly.   Extremities: show no edema, normal peripheral pulses.   Neurological: normal, no focal findings.  Skin: clear, dry, no rashes/lesions  Psych- normal mood and affect      ASSESSMENT/PLAN  1. Flu vaccine need  Will update with vaccine today  - Influenza, Seasonal,Quad Inj, 36+ MOS    2. URI (upper respiratory infection)  Discussed suspect viral in etiology  Push rest and hydration  Cont to monitor- f/u if not improving over the next week    3. Iron deficiency anemia  Long discussion over anemia with patient today  Reviewed blood work and labs  Have seen improvement in hgb and RBC size with iron supplement  - will continue at this time  Pt has noted some improvement and understands we expect further improvement with increasing Hbg levels      Pt states an understanding and agrees to the above plan.  Greater than 25 minutes was spent today in interview and examination with Eileen Donald with more than 50% of that time in counseling and coordination of care.

## 2021-06-13 NOTE — TELEPHONE ENCOUNTER
RN cannot approve Refill Request    RN can NOT refill this medication med is not covered by policy/route to provider. Last office visit: 10/3/2019 Tito Salazar MD Last Physical: 9/17/2019 Last MTM visit: Visit date not found Last visit same specialty: 10/10/2019 Antoine Webb MD.  Next visit within 3 mo: Visit date not found  Next physical within 3 mo: Visit date not found      Daysi Mohan, [facility] [place] Connection Triage/Med Refill 12/1/2020    Requested Prescriptions   Pending Prescriptions Disp Refills     SYMBICORT 160-4.5 mcg/actuation inhaler [Pharmacy Med Name: SYMBICORT INH AEROSOL 160/4.5MCG] 1 Inhaler 11     Sig: USE 2 INHALATIONS TWICE A DAY       There is no refill protocol information for this order

## 2021-06-13 NOTE — PROGRESS NOTES
ASSESSMENT/PLAN  1. Reactive airway disease  Pt would like referral to pulmonary which I am in agreement- I think shortness of breath is certainly multi-factorial including lungs, deconditioning and anemia- but with improving Hgb levels, echo showing good EF s/p valve replacement- concerns for pulmonary etiology and deconditioning left- could benefit from different inhalers because she did note some improvement with prednisone last month- she would like to speak with pulmonary in regards to this- will place referral  In the mean time- will start inhaled atrovent to see if this aids in improvement  - budesonide-formoterol (SYMBICORT) 160-4.5 mcg/actuation inhaler; Inhale 2 puffs 2 (two) times a day.  Dispense: 1 Inhaler; Refill: 1  - Ambulatory referral to Pulmonology    2. Prediabetes  Has his- a1c at goal range- will continue to monitor  - Glycosylated Hemoglobin A1c    3. Depression  Refills sent to the pharmacy  - buPROPion (WELLBUTRIN XL) 150 MG 24 hr tablet; Take 1 tablet (150 mg total) by mouth every morning.  Dispense: 90 tablet; Refill: 1    4. Anemia  Hgb improving - now back at normal range- was likely contributing prior to breathing problems but no longer suspected  - HM2(CBC w/o Differential)    5. Aortic valve replaced  Hx of replacement- reviewed cardiology records- doing well an not likely component of shortness of breath  Due for INR today  - INR    6. History of pulmonary embolism  Check INR  - INR        SUBJECTIVE:   Chief Complaint   Patient presents with     Follow-up     asthma follow up, shortness of breath with excertion     Eileen Donald 62 y.o. female    Current Outpatient Prescriptions   Medication Sig Dispense Refill     acyclovir (ZOVIRAX) 400 MG tablet Take 400 mg by mouth 2 (two) times a day.        albuterol (PROVENTIL) 2.5 mg /3 mL (0.083 %) nebulizer solution Take 3 mL (2.5 mg total) by nebulization every 6 (six) hours as needed for wheezing. 75 vial 2     aspirin 81 mg chewable  tablet Chew 81 mg.       AVONEX 30 mcg/0.5 mL PnKt once a week.       budesonide-formoterol (SYMBICORT) 160-4.5 mcg/actuation inhaler Inhale 2 puffs 2 (two) times a day. 1 Inhaler 1     buPROPion (WELLBUTRIN XL) 150 MG 24 hr tablet Take 1 tablet (150 mg total) by mouth every morning. 90 tablet 1     calcium, as carbonate, (TUMS) 200 mg calcium (500 mg) chewable tablet Chew 1 tablet daily.       ferrous sulfate 325 (65 FE) MG tablet Take 1 tablet (325 mg total) by mouth daily with breakfast. 90 tablet 1     furosemide (LASIX) 40 MG tablet TK 1 T PO QAM  6     gabapentin (NEURONTIN) 300 MG capsule Take 300 mg by mouth daily.  2     lisinopril (PRINIVIL,ZESTRIL) 5 MG tablet Take 1 tablet (5 mg total) by mouth daily. 30 tablet 0     LORazepam (ATIVAN) 0.5 MG tablet Take 0.5 mg by mouth daily as needed for anxiety.       metoprolol tartrate (LOPRESSOR) 25 MG tablet Take 1 tablet (25 mg total) by mouth 2 (two) times a day. 60 tablet 0     nystatin (MYCOSTATIN) powder Apply to affected area twice daily 60 g 0     omeprazole (PRILOSEC) 20 MG capsule TAKE 1 CAPSULE TWICE DAILY, 6 A.M. AND 4 P.M. 180 capsule 2     potassium chloride (MICRO-K) 10 mEq CR capsule TK 2 CS PO ONCE DAILY WITH A MEAL  6     simvastatin (ZOCOR) 20 MG tablet TAKE 1 TABLET AT BEDTIME 90 tablet 3     venlafaxine (EFFEXOR-XR) 75 MG 24 hr capsule TAKE 3 CAPSULES DAILY 270 capsule 0     VENTOLIN HFA 90 mcg/actuation inhaler USE 2 INHALATIONS EVERY 6 HOURS AS NEEDED FOR WHEEZING 18 g 2     acetaminophen (TYLENOL) 500 MG tablet Take 325-650 mg by mouth every 6 (six) hours as needed for pain (1-2 tab PRN).        amoxicillin (AMOXIL) 500 MG capsule Take 2,000 mg by mouth. 1 hour prior to dental work       ipratropium (ATROVENT) 0.02 % nebulizer solution Take 2.5 mL (0.5 mg total) by nebulization 3 (three) times a day. 250 mL 0     mometasone-formoterol (DULERA) 200-5 mcg/actuation HFAA inhaler Inhale 2 puffs 2 (two) times a day. 13 g 0     warfarin (COUMADIN)  2.5 MG tablet Takes 2.5mg on Tues/Thurs and 5mg all other days, by mouth as directed.  Dose adjusted based on INR results. 170 tablet 0     No current facility-administered medications for this visit.      Allergies: Morphine and Sulfa (sulfonamide antibiotics)   Patient's last menstrual period was 11/28/2013.    HPI:   Pt is here for ongoing problems with shortness of breath- she has a complex PMH with aortic valve replacement, MS, ongoing trouble with anemia and asthma- all of which could be part of her breathing problem as well as deconditioning.  She has been having improvement with Hgb levels and currently today has a normal reading- had follow up last month with cariology and echo showing good function of valve replacement and EF- so anemia and valve replacement not as likely contributing to shortness of breath.  She did have steroids, prednisone last month and felt better while on the medication- and then worsened afterwards- discussed with her that this could be due to lung etiology and we could try alternative inhalers- she would like referral to pulmonary for further work up.  She dd have a lot of smoke exposure growing up.  She acknowledges that her physical conditioning is lacking- but feels there is something else going on.  Discussed adding in nebulized atrovent to see if this can help stabilize things while she awaits pulmonary consult- at least this will give them more information to see if this helps with her breathing.  She has been battling anemia for months- but today is back at baseline.  She has a hx of pre-diabetes- concerns for elevated sugars.  hgba1c at goal range today below 6%- She is not a diabetic.  She is due for INR    ROS: negative except as per HPI    OBJECTIVE:   The patient appears well, alert, oriented x 3, in no distress.  /72 (Patient Site: Left Arm, Patient Position: Sitting, Cuff Size: Adult Large)  Pulse 84  Temp 97.6  F (36.4  C) (Oral)   Resp 26  Wt (!) 318 lb  1.6 oz (144.3 kg)  LMP 11/28/2013  SpO2 97%  BMI 45.64 kg/m2    Lungs: clear, good air entry, no wheezes, rhonchi or rales.   Cardiac: S1 and S2 normal, no murmurs, regular rate and rhythm.   Abdomen: normal bowel sounds, soft, morbid obesity- BMI > 40  Extremities: show no edema, normal peripheral pulses.   Neurological: normal, no focal findings.  Skin: clear, dry, no rashes/lesions  Psych- normal mood and affect      Pt states an understanding and agrees to the above plan.  Greater than 25 minutes was spent today in interview and examination with Eileen Donald with more than 50% of that time in counseling and coordination of care.

## 2021-06-14 NOTE — TELEPHONE ENCOUNTER
RN cannot approve Refill Request    RN can NOT refill this medication PCP messaged that patient is overdue for Labs. Last office visit: 10/3/2019 Tito Salazar MD Last Physical: 9/17/2019 Last MTM visit: Visit date not found Last visit same specialty: 10/10/2019 Antoine Webb MD.  Next visit within 3 mo: Visit date not found  Next physical within 3 mo: Visit date not found      Yaima Rodrigues, [facility] Triage/Med Refill 1/30/2021    Requested Prescriptions   Pending Prescriptions Disp Refills     lisinopriL (PRINIVIL,ZESTRIL) 5 MG tablet [Pharmacy Med Name: LISINOPRIL TABS 5MG] 90 tablet 3     Sig: TAKE 1 TABLET DAILY       Ace Inhibitors Refill Protocol Failed - 1/29/2021 11:25 PM        Failed - Serum Potassium in past 12 months     No results found for: LN-POTASSIUM          Failed - Serum Creatinine in past 12 months     Creatinine   Date Value Ref Range Status   01/15/2020 0.91 0.60 - 1.10 mg/dL Final             Passed - PCP or prescribing provider visit in past 12 months       Last office visit with prescriber/PCP: 10/3/2019 Tito Salazar MD OR same dept: Visit date not found OR same specialty: 10/10/2019 Antoine Webb MD  Last physical: 9/17/2019 Last MTM visit: Visit date not found   Next visit within 3 mo: Visit date not found  Next physical within 3 mo: Visit date not found  Prescriber OR PCP: Tito Salazar MD  Last diagnosis associated with med order: 1. Essential hypertension  - lisinopriL (PRINIVIL,ZESTRIL) 5 MG tablet [Pharmacy Med Name: LISINOPRIL TABS 5MG]; TAKE 1 TABLET DAILY  Dispense: 90 tablet; Refill: 3  - metoprolol tartrate (LOPRESSOR) 25 MG tablet [Pharmacy Med Name: METOPROLOL TARTRATE TABS 25MG]; TAKE 1 TABLET TWICE A DAY  Dispense: 180 tablet; Refill: 3    If protocol passes may refill for 12 months if within 3 months of last provider visit (or a total of 15 months).             Passed - Blood pressure filed in past 12 months     BP  Readings from Last 1 Encounters:   08/18/20 130/72                metoprolol tartrate (LOPRESSOR) 25 MG tablet [Pharmacy Med Name: METOPROLOL TARTRATE TABS 25MG] 180 tablet 3     Sig: TAKE 1 TABLET TWICE A DAY       Beta-Blockers Refill Protocol Passed - 1/29/2021 11:25 PM        Passed - PCP or prescribing provider visit in past 12 months or next 3 months     Last office visit with prescriber/PCP: 10/3/2019 Tito Salazar MD OR same dept: Visit date not found OR same specialty: 10/10/2019 Antoine Webb MD  Last physical: 9/17/2019 Last MTM visit: Visit date not found   Next visit within 3 mo: Visit date not found  Next physical within 3 mo: Visit date not found  Prescriber OR PCP: Tito Salazar MD  Last diagnosis associated with med order: 1. Essential hypertension  - lisinopriL (PRINIVIL,ZESTRIL) 5 MG tablet [Pharmacy Med Name: LISINOPRIL TABS 5MG]; TAKE 1 TABLET DAILY  Dispense: 90 tablet; Refill: 3  - metoprolol tartrate (LOPRESSOR) 25 MG tablet [Pharmacy Med Name: METOPROLOL TARTRATE TABS 25MG]; TAKE 1 TABLET TWICE A DAY  Dispense: 180 tablet; Refill: 3    If protocol passes may refill for 12 months if within 3 months of last provider visit (or a total of 15 months).             Passed - Blood pressure filed in past 12 months     BP Readings from Last 1 Encounters:   08/18/20 130/72

## 2021-06-14 NOTE — PROGRESS NOTES
Pulmonary Consult Note  12/14/2017    Referring Physician: Dr. Salazar  Reason for Consult: Wheezing and asthma      Problem List:     Patient Active Problem List   Diagnosis     Depression     Asthma     Hypertension     Multiple Sclerosis     Hyperlipidemia     Impaired Glucose Tolerance Test     Dysphagia     Benign Adenomatous Polyp Of The Large Intestine     History of pulmonary embolism     Obesity     Generalized anxiety disorder     Primary osteoarthritis of both hands     Polyarthralgia     Aortic valve replaced       History:     Mrs. Eileen Donald is a 61 yo female with PMH significant for bicuspid aortic valve s/p AVR, history of EIB when she was younger in her 20s, HTN, MS that is well controlled, and PE who presents to pulmonary clinic for evaluation of asthma.  She states she had always done well up until her AVR in 1/17.  She noticed that she was more short of breath, and that she had wheezing breath sounds.  She doesn't really cough much, and it is nonproductive.  She does note that she has had increased phlegm and her throat feels congested.  She denies any orthopnea.  She was started on symbicort about 3 months ago and her shortness of breath improved.  But she still has some increased phlegm.  She is not sure that it is asthma though.  She does have several sisters that have asthma.    Past Medical History:   Diagnosis Date     Aortic valve stenosis 01/24/2017     Asthma      HLD (hyperlipidemia)      HTN (hypertension)      MS (multiple sclerosis)      Osteoarthritis      Pre-diabetes      Pulmonary embolism      Past Surgical History:   Procedure Laterality Date     mechanical aortic prosthesis  01/24/2017     REDUCTION MAMMAPLASTY  1994     Social History     Social History     Marital status:      Spouse name: N/A     Number of children: N/A     Years of education: N/A     Occupational History     Not on file.     Social History Main Topics     Smoking status: Never Smoker      Smokeless tobacco: Not on file     Alcohol use No     Drug use: No     Sexual activity: Not on file     Other Topics Concern     Not on file     Social History Narrative     Family History   Problem Relation Age of Onset     Heart attack Father      Heart attack Sister      Heart attack Brother      Allergies   Allergen Reactions     Morphine      Sulfa (Sulfonamide Antibiotics)        Review of Systems - 10 point review of systems is negative except what is mentioned in the HPI.        Medications:     Current Outpatient Prescriptions on File Prior to Visit   Medication Sig Dispense Refill     acetaminophen (TYLENOL) 500 MG tablet Take 325-650 mg by mouth every 6 (six) hours as needed for pain (1-2 tab PRN).        acyclovir (ZOVIRAX) 400 MG tablet Take 400 mg by mouth 2 (two) times a day.        albuterol (PROVENTIL) 2.5 mg /3 mL (0.083 %) nebulizer solution Take 3 mL (2.5 mg total) by nebulization every 6 (six) hours as needed for wheezing. 75 vial 2     aspirin 81 mg chewable tablet Chew 81 mg.       AVONEX 30 mcg/0.5 mL PnKt once a week.       budesonide-formoterol (SYMBICORT) 160-4.5 mcg/actuation inhaler Inhale 2 puffs 2 (two) times a day. 1 Inhaler 1     buPROPion (WELLBUTRIN XL) 150 MG 24 hr tablet Take 1 tablet (150 mg total) by mouth every morning. 90 tablet 1     calcium, as carbonate, (TUMS) 200 mg calcium (500 mg) chewable tablet Chew 1 tablet daily.       ferrous sulfate 325 (65 FE) MG tablet Take 1 tablet (325 mg total) by mouth daily with breakfast. 90 tablet 1     furosemide (LASIX) 40 MG tablet TK 1 T PO QAM  6     ipratropium (ATROVENT) 0.02 % nebulizer solution Take 2.5 mL (0.5 mg total) by nebulization 3 (three) times a day. 250 mL 0     lisinopril (PRINIVIL,ZESTRIL) 5 MG tablet Take 1 tablet (5 mg total) by mouth daily. 30 tablet 0     LORazepam (ATIVAN) 0.5 MG tablet Take 0.5 mg by mouth daily as needed for anxiety.       metoprolol tartrate (LOPRESSOR) 25 MG tablet Take 1 tablet (25 mg  total) by mouth 2 (two) times a day. 60 tablet 0     mometasone-formoterol (DULERA) 200-5 mcg/actuation HFAA inhaler Inhale 2 puffs 2 (two) times a day. 13 g 0     omeprazole (PRILOSEC) 20 MG capsule TAKE 1 CAPSULE TWICE DAILY, 6 A.M. AND 4 P.M. 180 capsule 2     potassium chloride (MICRO-K) 10 mEq CR capsule TK 2 CS PO ONCE DAILY WITH A MEAL  6     simvastatin (ZOCOR) 20 MG tablet TAKE 1 TABLET AT BEDTIME 90 tablet 3     venlafaxine (EFFEXOR-XR) 75 MG 24 hr capsule TAKE 3 CAPSULES DAILY 270 capsule 0     VENTOLIN HFA 90 mcg/actuation inhaler USE 2 INHALATIONS EVERY 6 HOURS AS NEEDED FOR WHEEZING 18 g 2     warfarin (COUMADIN) 2.5 MG tablet TAKE 1 TABLET (2.5 MG) ON TUESDAY AND THURSDAY, AND 2 TABLETS (5 MG) ALL OTHER DAYS AS DIRECTED. (DOSE ADJUSTED BASED ON INR RESULTS) 170 tablet 0     amoxicillin (AMOXIL) 500 MG capsule Take 2,000 mg by mouth. 1 hour prior to dental work       gabapentin (NEURONTIN) 300 MG capsule Take 300 mg by mouth daily.  2     No current facility-administered medications on file prior to visit.            Exam/Data:   Vitals  Vitals:    12/14/17 1356   BP: 110/62   Pulse: (!) 102   Resp: 18   SpO2: 94%       EXAM:  GEN: Alert and oriented x3, NAD  HEENT: Nares patent, posterior oropharynx clear.  RESPIRATORY: CTAB.  No wheeze or rales  CARDIOVASCULAR: RRR, no m/r/g  GASTROINTESTINAL: Round, soft, NT/ND  HEM/ONC: no adenopathy, no ecchymosis  MSK: no clubbing.  Ambulates normally, increased kyphosis  NEURO: cranial nerves appear grossly intact, muscle strength equal  SKIN: no rash or ulcerations      DATA    PFT DATA:  FEV1/FVC is 0.90 and is normal.  FEV1 is 58% predicted and is reduced.  FVC is 49% predicted and reduced.  There was no improvement in spirometry after a single inhaled dose of bronchodilator.  TLC is 69% predicted and is reduced.  RV is 79% predicted and is reduced.  DLCO is 82% predicted and is normal when it   is corrected for hemoglobin.     Impression:  Full Pulmonary  Function Test is abnormal.  PFTs are consistent with moderate-severe restrictive disease.  Spirometry is not consistent with reversibility.  There is no hyperinflation.  There is no air-trapping.  Diffusion capacity when corrected for hemoglobin is normal.    NIOX: 20 ppb and is normal.    IMAGING:  CTA CHEST PE RUN  8/7/2017 3:29 PM     INDICATION: dyspnea  TECHNIQUE: Helical acquisition through the chest was performed during the arterial phase of contrast enhancement using IV contrast. 2D and 3D reconstructions were performed by the CT technologist. Dose reduction techniques were used.   IV CONTRAST: Iohexol (Omni) 100 mL  COMPARISON: March 10, 2016     FINDINGS:  ANGIOGRAM CHEST: Negative for pulmonary emboli. Negative for thoracic aortic dissection and aneurysms.     LUNGS AND PLEURA: No pleural effusions. Linear atelectasis right lower lung zone unchanged from the previous study.     MEDIASTINUM: Postop median sternotomy and aortic valve.. No pericardial effusion.     LIMITED UPPER ABDOMEN: Hepatic steatosis.     MUSCULOSKELETAL: Negative.     IMPRESSION:   CONCLUSION:  1.  Negative for pulmonary emboli  2.  Postop aortic valve replacement  3.  Hepatic steatosis    Personally reviewed the CT with patient.  Appears to have scattered areas of mosaic attenuation, otherwise normal.    Assessment/Plan:   Eileen Donald is a 62 y.o. female PMH significant for bicuspid aortic valve s/p AVR, history of EIB when she was younger in her 20s, HTN, MS that is well controlled, and PE who presents to pulmonary clinic for evaluation of asthma.    1. Likely asthma, moderate persistent:  She has had good improvement in symptoms while on symbicort.  She does have some voice hoarseness and this could be related to budesonide.  Her NIOX is normal but she has been on budesonide for several months.  Her PFTs do not show obstruction.  Her CTA previously has demonstrated some mosaic attenuation.  I will add singulair to her regimen  as well.      2. Restrictive Ventilatory Defect:  I think this is related to her body habitus, but could also be related to her MS.  Her diffusing capacity is normal.  I will see how her breathing does with the addition of singulair.  She has been followed by Dr. Bear in neurology and has not had major issues with her MS.  Her CTA does not show any ILD.    Recommend:  - continue with symbicort 2 puffs BID  - albuterol as needed  - start singulair 10 mg daily  - RTC in 3 months    I spent > 60 minutes in consultation with > 50% time spent face-to-face.      Ryan Grajeda, DO

## 2021-06-14 NOTE — TELEPHONE ENCOUNTER
ANTICOAGULATION  MANAGEMENT PROGRAM    Eileen Donald is overdue for INR check.     Spoke with Marilee and scheduled INR appointment on 1/21 @ [facility].      Alondra Lora RN

## 2021-06-14 NOTE — TELEPHONE ENCOUNTER
Marilee  has an upcoming lab appointment.  There is either no standing order or it will  before the appointment.  Please place appropriate orders.    Thanks,     Lab

## 2021-06-14 NOTE — PROGRESS NOTES
Rerouting to PCP to please advise, TTR is too low to auto-renew referral to [facility]. Do you want us to continue to manage?

## 2021-06-14 NOTE — PROGRESS NOTES
Assessment:     1. Annual physical exam  Lipid Cascade RANDOM    HM1(CBC and Differential)   2. Vitamin deficiency  cholecalciferol, vitamin D3, (VITAMIN D3) 5,000 unit Tab    Vitamin D, Total (25-Hydroxy)   3. Visit for screening mammogram  Mammo Screening Bilateral   4. Dyspnea on exertion     5. Morbid obesity (H)     6. Special screening for malignant neoplasms, colon  Ambulatory referral for Colonoscopy   7. Paroxysmal atrial fibrillation (H)  Thyroid [place]    INR   8. Impaired glucose tolerance test  Glycosylated Hemoglobin A1c   9. Screening for viral disease  Hepatitis C Antibody (Anti-HCV)    HIV Antigen/Antibody Diagnostic [facility]   10. History of pulmonary embolism  INR   11. Aortic valve replaced  INR   12. Multiple sclerosis (H)     13. Other pulmonary embolism without acute cor pulmonale, unspecified chronicity (H)     14. Chronic obstructive pulmonary disease, unspecified COPD type (H)     15. Pulmonary hypertension (H)     16. Recurrent major depressive disorder, in partial remission (H)     17. Chronic respiratory failure with hypoxia (H)       Medical decision making: Annual physical exam in a patient with multiple sclerosis and other high risk conditions.  Best practice advisory mentions above high risk conditions.    Reviewed her follow-up with the specialists including neurology, cardiology and pulmonology.  Discussed about health maintenance and Pap smear that patient defers for now.  I think this is reasonable as she has been in a monogamous relationship and last Pap smear in 2016 was normal with HPV negative.  If she desires, we can pursue a Pap smear in near future.    Patient usually follows with Dr. Salazar and I spent extra 20 minutes reviewing her multiple medical conditions and specialists recommendation.  Plan:      All questions answered.  Blood tests: A1c, CBC cholesterol and TSH.  Educational material distributed.  Follow up: Return in about 1 year (around 2/1/2022) for Annual  physical.       Subjective:     Chief Complaint   Patient presents with     Annual Exam     Pt is not is fasting today        Eileen Donald is a 65 y.o. female who presents for annual exam. The patient has no complaints today. The patient is not sexually active. GYN screening history: last pap: was normal. The patient is not taking hormone replacement therapy. Patient denies post-menopausal vaginal bleeding.. The patient wears seatbelts: yes. The patient participates in regular exercise: not asked. Has the patient ever been transfused or tattooed?: not asked. The patient reports that there is not domestic violence in her life.     Has rash lesion on back that she would like me to check    Patient has MS with some decline.  She has been asked by her family members not to drive now.  She does have underlying atrial fibrillation, pulmonary hypertension, COPD.  Regarding her memory, she has been told by family members that there may be some concerns.  However, patient is able to accurately describe recent events including her follow-up with a specialist.    Patient is following with the  Neurology at last visit 2020  Patient is following with pulmonology and last visit 2021  Patient follows with cardiology last visit 2020 at [facility]    Menstrual History:  OB History        2    Para   2    Term   2            AB        Living           SAB        TAB        Ectopic        Multiple        Live Births                      Patient's last menstrual period was 2013.       The following portions of the patient's history were reviewed and updated as appropriate: allergies, current medications, past family history, past medical history, past social history, past surgical history and problem list.    Review of Systems  Constitutional: negative  Respiratory: negative  Cardiovascular: negative      Objective:     /67 (Patient Site: Left Arm, Patient Position: Sitting, Cuff Size:  Adult Large)   Pulse 95   Temp 97.5  F (36.4  C) (Oral)   Resp 28   Wt (!) 301 lb 3.2 oz (136.6 kg)   LMP 11/28/2013   SpO2 94%   BMI 45.80 kg/m       Patient appears in her usual state of health, using a wheelchair for ambulation.  The area lesion on the back is a fleshy mole.  This is discussed with her.

## 2021-06-15 PROBLEM — Z95.2 AORTIC VALVE REPLACED: Status: ACTIVE | Noted: 2017-02-15

## 2021-06-15 NOTE — TELEPHONE ENCOUNTER
[12:18 PM] Cornelia Greco      Hi There. I received a VM for another one of your patients.  Eileen Donald [birth year].  [facility] requesting Hold orders for Upcoming Colonoscopy. It looks like there's already an encounter dated  for this.  Darcie from [facility] asking for orders [phone].    - sent message to Renee.

## 2021-06-15 NOTE — TELEPHONE ENCOUNTER
[facility] - requesting bridge orders and a four day hold for a colonscopy on 3/22/21. they'd like a callback at [phone] Fanny

## 2021-06-15 NOTE — TELEPHONE ENCOUNTER
"[facility]-PROCEDURAL ANTICOAGULATION  MANAGEMENT    Assessment     Warfarin interruption plan for colonoscopy on 03/22/2021.    Aortic Valve Replacement and history of PE, history of post-op afib    In a patient with AVR with additional thrombotic risk factors the 2020 AHA/ACC guideline suggests bridging is reasonable in an individual with the risks of bleeding weighed against the benefits of thromboembolism prevention    Plan       Pre-Procedure:    Hold warfarin until after procedure starting: Thursday 3/18/2021    Contact pharmacist if pre-procedure INR is >3 or <2.5    Lovenox 100 mg subq Q 12 hrs (0.75 mg/kg Q 12 hr for CrCl 98 mL/min and BMI >/= 40 per [facility] dose adjustment policy)     Start Lovenox: Saturday 3/20/2021    Last dose of Lovenox prior to procedure: Damian 3/21/2021       Post-Procedure:    Resume home warfarin dose if okay with provider doing procedure on night of procedure, 3/22/2021 PM: Ok for ACN to boost her with restart    Resume Lovenox ~ 24 hrs post procedure when okay with provider doing procedure. Call clinic prior to restarting if polyps removed. Continue until INR >= 2.5 x 2    Recheck INR 4-6 days after resuming warfarin   ?   Estelita Galvan, Pharmacy Student     Renee Wilks, PharmD, preceptor       Subjective/Objective:      Eileen Donald, a 65 y.o. female    Goal INR Range: 2.5-3.5    Patient bridged in past: Patient bridged in the past with shoulder procedure 4/2019    Pertinent History: 1/2017 AVR with [facility] mechanical valve    Wt Readings from Last 3 Encounters:   03/03/21 (!) 133.8 kg (295 lb)   02/01/21 (!) 136.6 kg (301 lb 3.2 oz)   08/18/20 (!) 138.3 kg (305 lb)        Ideal body weight: 63.9 kg (140 lb 14 oz)  Adjusted ideal body weight: 91.9 kg (202 lb 8.4 oz)     Estimated body mass index is 44.85 kg/m  as calculated from the following:    Height as of 3/3/21: 5' 8\" (1.727 m).    Weight as of 3/3/21: 133.8 kg (295 lb).    Lab Results   Component Value Date "    INR 2.60 (H) 02/17/2021    INR 2.10 (H) 02/01/2021    INR 2.10 (H) 01/21/2021     Lab Results   Component Value Date    HGB 13.5 02/01/2021    HCT 43.8 02/01/2021     02/01/2021     Lab Results   Component Value Date    CREATININE 0.83 02/01/2021    CREATININE 0.91 01/15/2020    CREATININE 0.80 07/02/2019     Estimated CrCl: 98 mL/min, with adjusted body weight of 91.9 kg and SCr of 0.83

## 2021-06-15 NOTE — TELEPHONE ENCOUNTER
New Appointment Needed  What is the reason for the visit:    Pre-Op Appt Request  When is the surgery? :  3/22  Where is the surgery?:   [facility]  Who is the surgeon? :  Sam Weems MD  What type of surgery is being done?: Colonoscopy   Provider Preference: Any available  How soon do you need to be seen?: within 30 days of procedure  Waitlist offered?: No  Okay to leave a detailed message:  Yes

## 2021-06-15 NOTE — TELEPHONE ENCOUNTER
ANTICOAGULATION  MANAGEMENT    Assessment     Today's INR result of 2.60 is Therapeutic (goal INR of 2.5-3.5)        Less warfarin taken than instructed which may be affecting INR    No new diet changes affecting INR    No new medication/supplements affecting INR    Continues to tolerate warfarin with no reported s/s of bleeding or thromboembolism     Previous INR was Subtherapeutic at 2.10 from 2/1/21.    Colonoscopy scheduled on 3/22/21 with [facility]    Plan:     Spoke on phone with Marilee regarding INR result and instructed:      Warfarin Dosing Instructions:    - Continue current warfarin dose 5 mg daily on Mon/Wed/Fri; and 2.5 mg daily rest of week.    Instructed patient to follow up no later than:  1-2 wks.   - INR schedule don 3/2/21 @ [facility].    Education provided: importance of consistent vitamin K intake and target INR goal and significance of current INR result    Marilee verbalizes understanding and agrees to warfarin dosing plan.    Instructed to call the [facility] for any changes, questions or concerns. (#[phone])   ?   Alondra Lora RN    Subjective/Objective:      Eileen Donald, a 65 y.o. female is on warfarin. Marilee Hunt reports:     Home warfarin dose: template incorrect; verbally confirmed home dose with Marilee and updated on anticoagulation calendar     Missed doses: No     Medication changes:  No     S/S of bleeding or thromboembolism:  No     New Injury or illness:  No     Changes in diet or alcohol consumption:  No     Upcoming surgery, procedure or cardioversion:  Yes:  Colonoscopy on 3/22/21.    Anticoagulation Episode Summary     Current INR goal:  2.5-3.5   TTR:  55.8 % (1 y)   Next INR check:  3/3/2021   INR from last check:  2.60 (2/17/2021)   Weekly max warfarin dose:     Target end date:     INR check location:     Preferred lab:     Send INR reminders to:  [facility]    Indications    Heart valve replaced [Z95.2]           Comments:           Anticoagulation  Care Providers     Provider Role Specialty Phone number    Tito Salazar MD Referring Family Medicine [phone]

## 2021-06-15 NOTE — TELEPHONE ENCOUNTER
ANTICOAGULATION  MANAGEMENT    Assessment     Today's INR result of 3 is Therapeutic (goal INR of 2.5-3.5)        Less warfarin taken than instructed which may be affecting INR. Marilee has been taking 5 mg every Mon, Wed since last INR. Anticoag calendar updated to reflect maintenance that she reported.    No new diet changes affecting INR    No new medication/supplements affecting INR    Continues to tolerate warfarin with no reported s/s of bleeding or thromboembolism     Previous INR was Therapeutic    Plan:     Spoke on phone with Marilee regarding INR result and instructed:      Warfarin Dosing Instructions:  Continue current warfarin dose 5 mg daily on Monday and Wednesday; and 2.5 mg daily rest of week  (0 % change)    Instructed patient to follow up no later than: 4-6 days following 3/22 colonoscopy    Education provided: importance of following up for INR monitoring at instructed interval. Instructed on 2/24/21 procedural plan.      Enoxaparin (Lovenox) education provided. Reviewed: role of enoxaparin in bridge therapy, role of enoxaparin in setting of new PE and/or DVT, prescribed enoxaparin dose and frequency, recommended injection site and site rotation, proper technique for administration of subcutaneous injection, disposal of syringes, Eileen's bridge plan reviewed as outlined in encounter on 2/24/21, monitoring for signs and symptoms of bruising and bleeding and monitoring for signs and symptoms of clotting.    Marilee verbalizes understanding on use of enoxaparin (Lovenox) and agrees to warfarin dosing plan.    Instructed to call the [facility] for any changes, questions or concerns. (#[phone])   ?   Laurie HumphreyD    Subjective/Objective:      Eileen CASPER Donald, a 65 y.o. female is on warfarin. Marilee Hunt reports:     Home warfarin dose: verbally confirmed home dose with Marilee and updated on anticoagulation calendar     Missed doses: No     Medication changes:  No     S/S of  bleeding or thromboembolism:  No     New Injury or illness:  No     Changes in diet or alcohol consumption:  No     Upcoming surgery, procedure or cardioversion:  Yes: colonoscopy 3/22    Anticoagulation Episode Summary     Current INR goal:  2.5-3.5   TTR:  61.5 % (1 y)   Next INR check:  3/3/2021   INR from last check:  2.60 (2/17/2021)   Most recent INR:   3.00 (3/11/2021)   Weekly max warfarin dose:     Target end date:     INR check location:     Preferred lab:     Send INR reminders to:  [facility]    Indications    Heart valve replaced [Z95.2]           Comments:           Anticoagulation Care Providers     Provider Role Specialty Phone number    Tito Salazar MD Referring Family Medicine [phone]

## 2021-06-15 NOTE — TELEPHONE ENCOUNTER
RN cannot approve Refill Request    RN can NOT refill this medication med is not covered by policy/route to provider. Last office visit: 10/3/2019 Tito Salazar MD Last Physical: 9/17/2019 Last MTM visit: Visit date not found Last visit same specialty: 10/10/2019 Antoine Webb MD.  Next visit within 3 mo: Visit date not found  Next physical within 3 mo: Visit date not found      Antoine Fuentes, [facility] Connection Triage/Med Refill 3/1/2021    Requested Prescriptions   Pending Prescriptions Disp Refills     SYMBICORT 160-4.5 mcg/actuation inhaler [Pharmacy Med Name: SYMBICORT INH AEROSOL 160/4.5MCG] 1 Inhaler 11     Sig: USE 2 INHALATIONS TWICE A DAY       There is no refill protocol information for this order

## 2021-06-15 NOTE — TELEPHONE ENCOUNTER
----- Message from Drew Hahn MD sent at 2/3/2021  8:32 PM CST -----  Please inform patient that she does have blood sugars in diabetic range.  I have sent her a detailed MyChart message.  She should follow with diabetes educator.    Drew Hahn MD  2/3/2021

## 2021-06-15 NOTE — PATIENT INSTRUCTIONS - HE
It was a pleasure to see you today.    Continue the symbicort, incruse, and as needed levalbuterol.    We will reach out to the sleep clinic to find out what happened with the sleep study referral. I apologize that was lost.    Your peak flow meter - normal for women ~400, but everyone is a little different. Check it a few times on a good day breathing-wise to understand your own baseline. This can be low in the setting of an asthma exacerbation.      Follow up in 1 year.      Shameka David MD  Pulmonary and Critical Care Medicine  [facility]  Office: [phone]

## 2021-06-15 NOTE — PROGRESS NOTES
Type of Service: Telephone Visit/ 60 minutes    Patient would like to receive their AVS by AVS Preference: Liz.     Assessment:   --initial DM education for Marilee  --patient has limited mobility, she is able to ambulate with the use of a cane or walker, she has limited endurance due to shortness of breath.  --patient is consuming 60+ oz water/day, she has a regular pepsi 2-3x/week  --patient states that she has a sweet tooth: chocolate, oreos, ice cream  --two-three meals/day:  Am: cereal/milk/two pieces of peanut butter toast  Lunch: skips 4x/week, if skips, she will have crackers as a snack at 3pm  Dinner: pizza or egg sandwich  Snacks: sweets  No ETOH intake, no tobacco use    Blood glucose record:  Not currently testing, testing supplies ordered 2/29/21    Medications prescribed:  NA for Diabetes    Education:  --What is Diabetes, SMBG, goals for BG and A1C  --s/s of hyperglycemia and hypoglycemia/treatment  --discussed nutrition: simple carb counting, portions, reading labels, healthy snack ideas, hydration  --discussed options for exercise: Sit and Be Fit      Plan:   1. Test glucose once daily: alternate between fasting and two hours post meal.  2. Review sit and be fit exercise program.  3. Limit carbohydrate at meals to 45 grams for meals and 15 grams for snacks.  4. Follow up with educator on 3/23/21      Subjective and Objective:      Eileen Donald is referred by Tito Salazar for Diabetes Education.     Lab Results   Component Value Date    HGBA1C 7.5 (H) 02/01/2021             Education:     Monitoring   Meter (per above goals): currently does not have a meter  Monitoring: Assessed, Discussed and Literature provided  BG goals: Assessed, Discussed and Literature provided    Nutrition Management  Nutrition Management: Assessed, Discussed and Literature provided  Weight: Assessed and Discussed  Portions/Balance: Assessed, Discussed and Literature provided  Carb ID/Count: Assessed, Discussed  and Literature provided  Label Reading: Assessed, Discussed and Literature provided  Heart Healthy Fats: Assessed and Discussed  Menu Planning: Assessed and Discussed  Dining Out: Not addressed  Physical Activity: Assessed and Discussed      Diabetes Disease Process: Assessed, Discussed and Literature provided    Acute Complications: Prevent, Detect, Treat:  Hypoglycemia: Assessed, Discussed and Literature provided  Hyperglycemia: Assessed, Discussed and Literature provided  Sick Days: Assessed, Discussed and Literature provided  Driving: does not drive anymore per patient    Chronic Complications  Foot Care:Not addressed  Skin Care: Not addressed  Eye: Not addressed  ABC: Assessed  Teeth:Not addressed  Goal Setting and Problem Solving: Assessed and Discussed  Barriers: Assessed  Psychosocial Adjustments: Assessed      Time spent with the patient: 60 minutes for diabetes education and counseling.   Previous Education: no  Visit Type:DSMT  Hours Remainin, DSMT 9 and MNT 3      Radha Cota  2021

## 2021-06-15 NOTE — PROGRESS NOTES
ASSESSMENT/PLAN  1. Hoarseness  Etiology of hoarseness unknown - a referral to ENT for evaluation of vocal cords is needed  Possible secondary effect due to MS  She plans on following up with her MS neurologist in regards to this  She is seen pulmonary does not seem to be a lung issue with the hoarseness and cough  We will have her continue with current inhaler and Singulair use  Lungs are clear today  We will have ENT evaluate for hoarseness  - Ambulatory referral to ENT    2. Cough  Has improved since the addition of Singulair to her Symbicort  Continue these medications at this time  Patient does cough periodically with some production but typically is dry when she does have some production hoarseness tends to clear leading me to believe there could be some mucus accumulation or phlegm accumulation around the vocal cord area  - Ambulatory referral to ENT    3. MS (multiple sclerosis)  Patient will follow up with neurology to consider if some of the ear or throat symptoms and hoarseness could be secondary to MS advances    4. Stress at home  Discussed with the patient today over some stress that she is dealing with her daughter  She is in work on him the name of her prior therapist and will work on getting a referral for her daughter to see psychology and psychiatry        SUBJECTIVE:   Chief Complaint   Patient presents with     URI     Cough and shortness of breath, at times gagging and then coughing up phlegm, wheezing, no fevers, no headaches       Eileen Donald 62 y.o. female    Current Outpatient Prescriptions   Medication Sig Dispense Refill     acetaminophen (TYLENOL) 500 MG tablet Take 325-650 mg by mouth every 6 (six) hours as needed for pain (1-2 tab PRN).        acyclovir (ZOVIRAX) 400 MG tablet Take 400 mg by mouth 2 (two) times a day.        albuterol (PROVENTIL) 2.5 mg /3 mL (0.083 %) nebulizer solution Take 3 mL (2.5 mg total) by nebulization every 6 (six) hours as needed for wheezing. 75 vial  2     amoxicillin (AMOXIL) 500 MG capsule Take 2,000 mg by mouth. 1 hour prior to dental work       aspirin 81 mg chewable tablet Chew 81 mg.       AVONEX 30 mcg/0.5 mL PnKt once a week.       buPROPion (WELLBUTRIN XL) 150 MG 24 hr tablet Take 1 tablet (150 mg total) by mouth every morning. 90 tablet 1     calcium, as carbonate, (TUMS) 200 mg calcium (500 mg) chewable tablet Chew 1 tablet daily.       ferrous sulfate 325 (65 FE) MG tablet Take 1 tablet (325 mg total) by mouth daily with breakfast. 90 tablet 1     furosemide (LASIX) 40 MG tablet TK 1 T PO QAM  6     gabapentin (NEURONTIN) 300 MG capsule Take 300 mg by mouth daily.  2     ipratropium (ATROVENT) 0.02 % nebulizer solution Take 2.5 mL (0.5 mg total) by nebulization 3 (three) times a day. 250 mL 0     lisinopril (PRINIVIL,ZESTRIL) 5 MG tablet Take 1 tablet (5 mg total) by mouth daily. 30 tablet 0     LORazepam (ATIVAN) 0.5 MG tablet Take 0.5 mg by mouth daily as needed for anxiety.       metoprolol tartrate (LOPRESSOR) 25 MG tablet Take 1 tablet (25 mg total) by mouth 2 (two) times a day. 60 tablet 0     mometasone-formoterol (DULERA) 200-5 mcg/actuation HFAA inhaler Inhale 2 puffs 2 (two) times a day. 13 g 0     montelukast (SINGULAIR) 10 mg tablet Take 1 tablet (10 mg total) by mouth at bedtime. 30 tablet 5     omeprazole (PRILOSEC) 20 MG capsule TAKE 1 CAPSULE TWICE DAILY, 6 A.M. AND 4 P.M. 180 capsule 2     potassium chloride (MICRO-K) 10 mEq CR capsule TK 2 CS PO ONCE DAILY WITH A MEAL  6     simvastatin (ZOCOR) 20 MG tablet TAKE 1 TABLET AT BEDTIME 90 tablet 3     SYMBICORT 160-4.5 mcg/actuation inhaler USE 2 INHALATIONS TWICE A DAY 10.2 Inhaler 4     venlafaxine (EFFEXOR-XR) 75 MG 24 hr capsule TAKE 3 CAPSULES DAILY 270 capsule 0     VENTOLIN HFA 90 mcg/actuation inhaler USE 2 INHALATIONS EVERY 6 HOURS AS NEEDED FOR WHEEZING 18 g 2     warfarin (COUMADIN) 2.5 MG tablet TAKE 1 TABLET (2.5 MG) ON TUESDAY AND THURSDAY, AND 2 TABLETS (5 MG) ALL OTHER  DAYS AS DIRECTED. (DOSE ADJUSTED BASED ON INR RESULTS) 160 tablet 1     No current facility-administered medications for this visit.      Allergies: Morphine and Sulfa (sulfonamide antibiotics)   Patient's last menstrual period was 11/28/2013.    HPI:   Patient is here for follow-up regards to cough and hoarseness she has been following with pulmonary was recently added Singulair to her regimen.  She has noted improvement in her cough but continues to have hoarseness.  Her cough was typically dry but periodically she does with increasing hoarseness has a productive episode of some coughing and than hoarseness usually clears for a little bit of time-with this and in review of pulmonary testing and discussed with the patient would like her to be evaluated by ENT specialty for her vocal cords and the hoarseness.  Cannot rule out the possible etiology of advancement of her MS with an acute flare leading to some throat abnormalities and vocal cords been affected but she is going to follow-up with her neurologist in regards to this  With the improvement of the cough I do want her to continue with Singulair in addition to her Symbicort  I think that periodically she did have some improvement of her hoarseness with a cough that is productive speaks may be less to the MS etiology and possible to something with the vocal cords or mucus production in the trachea and I would like her to see ENT which is in agreement with    We spent some time discussing some of her home stressors with her daughter and we are work on getting her daughter to see a psychiatrist and psychologist.    ROS: negative except as per HPI    OBJECTIVE:   The patient appears well, alert, oriented x 3, in no distress.  /70 (Patient Site: Right Arm, Patient Position: Sitting, Cuff Size: Adult Large)  Pulse 86  Temp 98.4  F (36.9  C) (Oral)   Resp 24  Wt (!) 326 lb (147.9 kg)  LMP 11/28/2013  SpO2 94%  BMI 46.78 kg/m2    Lungs: clear, good air  entry, no wheezes, rhonchi or rales.   Cardiac: S1 and S2 normal, no murmurs, regular rate and rhythm.   Skin: clear, dry, no rashes/lesions  Psych- normal mood and affect      Pt states an understanding and agrees to the above plan.  Greater than 25 minutes was spent today in interview and examination with Eileen Donald with more than 50% of that time in counseling and coordination of care.

## 2021-06-15 NOTE — TELEPHONE ENCOUNTER
FYI - Status Update  Who is Calling: Patient  Update: Patient would like a call back to discuss their questions they have on the holding of their warfarin for the colonoscopy.  Okay to leave a detailed message?:  No

## 2021-06-15 NOTE — TELEPHONE ENCOUNTER
Confirmed w/ pt she received one touch ultra test strips.   We do not have any of those meters at [facility]. Will check other locations.   
I'm not sure what the issue w/ Medicare is.   Do you know what brand of test strips [employer] [facility] gave her? I will just mail her a meter.   If you don't know I will call.     Thanks!  Yenifer   
Incoming call from pt stating [facility] sent her the test strips and lancets but not the meter. I called [employer] [facility] to see why the meter wasn't sent and the only answer they could give me was that its not formulary and OTC. They were not able to find a covered option for me. Could one of you help with this? Do you know what the preferred brand for Medicare is right now? Or it looks like pt also has BCBS  
Jodi - please mail the OT ultra meter.       I called pt to let her know it will be mailed out on Monday. There was no answer, LM informing of this and to call back w/ any questions/concerns.   
OT Ultra mini was mailed out from [place] [facility] on 3/8/21  
Alert and oriented to person, place and time

## 2021-06-15 NOTE — TELEPHONE ENCOUNTER
----- Message from Renee Wilks, PharmD sent at 3/9/2021 10:21 PM CST -----  Regarding: Upcoming procedure  HAILEY Hunt with upcoming colonoscopy. Has approved hold and bridge planned in 2/24 encounter. She's scheduled for INR 3/11.      Estelita and I can call and instruct her on bridge 3/15 or 3/16 if that is okay with Marilee    Renee Wilks, PharmD

## 2021-06-15 NOTE — TELEPHONE ENCOUNTER
Anticoagulation    Spoke to Marilee and discussed rationale for 20 syringes. Explained that she will likely need Lovenox injections 5-10 days post procedure to get INR back to goal range and will liekly have to continue past her first INR appointment 3/26.  Marilee verablized understanding.     Reviewed bridging instructions from 2/24 and 3/11 encounters with Marilee.     Will send mychart plan to her as well for written copy of plan    Renee Wilks, PharmD

## 2021-06-15 NOTE — TELEPHONE ENCOUNTER
JOHNNIE-PROCEDURAL ANTICOAGULATION MANAGEMENT    Returned call to [facility]. Left message and relayed pre-procedure orders:      Okay to hold warfarin 4 days prior to procedure     Bridge with Lovenox; ACM to instruct    Estelita Galvan, Pharmacy Student  [facility] Anticoagulation      Spoke to Marilee. Notified her she is due for INR. Scheduled for 3/11.  Renee Wilks, PharmD, preceptor

## 2021-06-15 NOTE — TELEPHONE ENCOUNTER
Reason contacted:  Results  Information relayed:  Informed patient of message below. Patient states understanding  Additional questions:  No  Further follow-up needed:  No  Okay to leave a detailed message:  No

## 2021-06-15 NOTE — TELEPHONE ENCOUNTER
Refill Approved    Rx renewed per Medication Renewal Policy. Medication was last renewed on 10/19/20.    Antoine Fuentes, [facility] Connection Triage/Med Refill 2/28/2021     Requested Prescriptions   Pending Prescriptions Disp Refills     venlafaxine (EFFEXOR-XR) 75 MG 24 hr capsule [Pharmacy Med Name: VENLAFAXINE ER 75MG CAPSULES] 270 capsule 1     Sig: TAKE 3 CAPSULES BY MOUTH EVERY DAY       Venlafaxine/Desvenlafaxine Refill Protocol Passed - 2/28/2021  3:32 AM        Passed - LFT or AST or ALT in last year     Albumin   Date Value Ref Range Status   02/01/2021 4.0 3.5 - 5.0 g/dL Final     Bilirubin, Total   Date Value Ref Range Status   02/01/2021 0.5 0.0 - 1.0 mg/dL Final     Alkaline Phosphatase   Date Value Ref Range Status   02/01/2021 107 45 - 120 U/L Final     AST   Date Value Ref Range Status   02/01/2021 36 0 - 40 U/L Final     ALT   Date Value Ref Range Status   02/01/2021 24 0 - 45 U/L Final     Protein, Total   Date Value Ref Range Status   02/01/2021 7.1 6.0 - 8.0 g/dL Final                Passed - Fasting lipid cascade in last year     Cholesterol   Date Value Ref Range Status   02/01/2021 245 (H) <=199 mg/dL Final     Triglycerides   Date Value Ref Range Status   02/01/2021 256 (H) <=149 mg/dL Final     HDL Cholesterol   Date Value Ref Range Status   02/01/2021 72 >=50 mg/dL Final     LDL Calculated   Date Value Ref Range Status   02/01/2021 122 <=129 mg/dL Final     Patient Fasting > 8hrs?   Date Value Ref Range Status   02/01/2021 No  Final             Passed - PCP or prescribing provider visit in last year     Last office visit with prescriber/PCP: 10/3/2019 Tito Salazar MD OR same dept: Visit date not found OR same specialty: 10/10/2019 Antoine Webb MD  Last physical: 9/17/2019 Last MTM visit: Visit date not found   Next visit within 3 mo: Visit date not found  Next physical within 3 mo: Visit date not found  Prescriber OR PCP: Tito Salazar MD  Last diagnosis associated  with med order: 1. Depression  - venlafaxine (EFFEXOR-XR) 75 MG 24 hr capsule [Pharmacy Med Name: VENLAFAXINE ER 75MG CAPSULES]; TAKE 3 CAPSULES BY MOUTH EVERY DAY  Dispense: 270 capsule; Refill: 1    If protocol passes may refill for 12 months if within 3 months of last provider visit (or a total of 15 months).             Passed - Blood Pressure in last year     BP Readings from Last 1 Encounters:   02/01/21 130/67

## 2021-06-15 NOTE — PROGRESS NOTES
Pulmonary follow-up note  3/3/2021    Reason for follow-up: Asthma, hypoxemic respiratory failure, exertional dyspnea    History:     Mrs. Eileen Donald is a 64 yo female with PMH significant for bicuspid aortic valve s/p AVR, obesity, history of EIB when she was younger in her 20s, HTN, multiple sclerosis that is well controlled, weakness and debility generally wheelchair bound, and PE who presented to pulmonary clinic for evaluation of asthma.      She has since been treated with symbicort, incruse and prn albuterol, she feels these have helped. She has several sisters with asthma.     Today she notes that she has shortness of breath both at rest at times and exertion. She has recorded SpO2s in 70s at home by report. At rest today she is 94%. She declines to ambulate, is presently in a wheelchair and feels she would be too unsteady, and notes her baseline is moving from her bed, or chair to bathroom at home.     HCO3 30, 36 2015 AHI 5 - prior CASA dx, then was told on re-test she does not need her CPAP anymore - she previously found it very helpful.    Was seen in sleep virtual visit, subsequently did not hear from anyone to schedule recommended sleep study. Still interested in this.    Interval Event:  6 month follow-up  Levalbuterol - uses prior to activity, otherwise does not need  Sleep visit 9/28 - never heard back re sleep study, tried to call  No exacerbations, ER visits  Uses  Has prednisone at home in case    Past Medical History:   Diagnosis Date     Anxiety      Aortic valve replaced 2/15/2017     Aortic valve stenosis 01/24/2017     Asthma     Created by Conversion      Chronic shortness of breath      Coronary artery disease      Diabetes mellitus (H)     borderline     History of blood clots      Hypertension     Created by Conversion  Replacement Utility updated for latest IMO load     Multiple sclerosis (H)     Created by Conversion      Obesity 10/29/2015     Panic attacks      Pre-diabetes       Pulmonary embolism (H)      Sleep apnea      Review of Systems - 10 point review of systems is negative except what is mentioned in the HPI.      Medications:     Reviewed    Exam/Data:   Vitals  Vitals:     EXAM:  GEN: Alert and oriented x3, NAD, in wheelchair  HEENT: Nares patent, posterior oropharynx clear.  RESPIRATORY: CTAB. Decreased. No wheezing  CARDIOVASCULAR: RRR, no m/r/g  GASTROINTESTINAL: Round, soft, NT/ND  HEM/ONC: no adenopathy, no ecchymosis  MSK: no clubbing, no edema in LE  NEURO: cranial nerves appear grossly intact, muscle strength equal  SKIN: no rash or ulcerations    PFT DATA:  FEV1/FVC is 0.90 and is normal.  FEV1 is 58% predicted and is reduced.  FVC is 49% predicted and reduced.  There was no improvement in spirometry after a single inhaled dose of bronchodilator.  TLC is 69% predicted and is reduced.  RV is 79% predicted and is reduced.  DLCO is 82% predicted and is normal when it   is corrected for hemoglobin.     Impression:  Full Pulmonary Function Test is abnormal.  PFTs are consistent with moderate-severe restrictive disease.  Spirometry is not consistent with reversibility.  There is no hyperinflation.  There is no air-trapping.  Diffusion capacity when corrected for hemoglobin is normal.    NIOX: 20 ppb and is normal.    Modified Walk Test 11/25/19  O2 at rest is < 89%.  This improves to >88% with 2LPM O2.  With ambulation her oxygen decreases to <88%, but improves to >88% with 3 LPM    IMAGING:  CTA CHEST PE RUN  8/7/2017 3:29 PM     INDICATION: dyspnea  TECHNIQUE: Helical acquisition through the chest was performed during the arterial phase of contrast enhancement using IV contrast. 2D and 3D reconstructions were performed by the CT technologist. Dose reduction techniques were used.   IV CONTRAST: Iohexol (Omni) 100 mL  COMPARISON: March 10, 2016     FINDINGS:  ANGIOGRAM CHEST: Negative for pulmonary emboli. Negative for thoracic aortic dissection and aneurysms.     LUNGS AND  PLEURA: No pleural effusions. Linear atelectasis right lower lung zone unchanged from the previous study.     MEDIASTINUM: Postop median sternotomy and aortic valve.. No pericardial effusion.     LIMITED UPPER ABDOMEN: Hepatic steatosis.     MUSCULOSKELETAL: Negative.     IMPRESSION:   CONCLUSION:  1.  Negative for pulmonary emboli  2.  Postop aortic valve replacement  3.  Hepatic steatosis    Assessment/Plan:     Eileen Donald is a 64 yo female with PMH significant for bicuspid aortic valve s/p AVR, history of EIB when she was younger in her 20s, HTN, multiple sclerosis and PE who is followed in pulmonary clinic for asthma, and restrictive lung disease.     1. Likely asthma, moderate persistent:    This is not borne out on PFT, but per her prior pulmonologist:  She has had good improvement in symptoms while on symbicort.  Her NIOX previously was normal, but on ICS therapy.  She has mosaic attenuation, chest tightness, wheezing, improves with beta agonists, and family history of asthma, making this likely asthma.  Her PFTs do not show obstruction, but rather restriction.  She continues to use Singulair.  Her symptoms continue to respond to ICS and bronchodilator therapy.  Some of her issues are likely due MS and debility.    2. Restrictive Ventilatory Defect:   Related to morbid obesity, with neuromuscular disease/weakness likely contributing. Her chemistries suggest chronic CO2 retention with baseline HCO3 30-36, no prior ABG. This could be neuromuscular, or CASA/OHS. I am surprised her PSG in 2015 had an AHI of 5.     3. Dyspnea on Exertion, chronic hypoxemic and likely hypercarbic respiratory failure:  Multifactorial.  She is improved significantly with maximal inhaled therapy and increased diuretics.    Recommend:  - continue with symbicort, incruse and prn JACOB  - singulair 10 mg daily  - asthma controlled, no exacerbations  - Will reach out on her behalf to assist in getting sleep study scheduled (with end  tidal CO2 monitoring, concern for CASA, and hypoventilation due to neuromuscular disease/restrictive lung disease) given CO2 retention on ABG.  - continue supplemental oxygen with minimal exertion at home      1 year follow up, evangelina me if needed sooner.       Shameka David MD  Pulmonary and Critical Care Medicine  [facility]  Office: [phone]

## 2021-06-15 NOTE — TELEPHONE ENCOUNTER
Spoke to pt and she is confused on lovenox dosing. She said she got 20 syringes and thinks that is too many for what she is supposed to be taking. It looks like she spoke with Pharmacist on 3/11. Can you guys please follow up with her to clarify what she is supposed to be doing with her briding?

## 2021-06-16 PROBLEM — R73.9 STRESS HYPERGLYCEMIA: Status: ACTIVE | Noted: 2017-01-24

## 2021-06-16 PROBLEM — F33.41 RECURRENT MAJOR DEPRESSIVE DISORDER, IN PARTIAL REMISSION (H): Status: ACTIVE | Noted: 2021-02-01

## 2021-06-16 PROBLEM — Z95.2 HEART VALVE REPLACED: Status: ACTIVE | Noted: 2018-06-26

## 2021-06-16 PROBLEM — Z79.899 CONTROLLED SUBSTANCE AGREEMENT SIGNED: Status: ACTIVE | Noted: 2018-05-29

## 2021-06-16 PROBLEM — S42.292G CLOSED 3-PART FRACTURE OF PROXIMAL HUMERUS WITH DELAYED HEALING, LEFT: Status: ACTIVE | Noted: 2019-04-10

## 2021-06-16 PROBLEM — D62 POSTOPERATIVE ANEMIA DUE TO ACUTE BLOOD LOSS: Status: ACTIVE | Noted: 2017-01-24

## 2021-06-16 PROBLEM — E78.5 DYSLIPIDEMIA: Status: ACTIVE | Noted: 2021-02-01

## 2021-06-16 PROBLEM — I10 HYPERTENSION: Status: ACTIVE | Noted: 2017-01-24

## 2021-06-16 PROBLEM — I26.99 OTHER PULMONARY EMBOLISM WITHOUT ACUTE COR PULMONALE, UNSPECIFIED CHRONICITY (H): Status: ACTIVE | Noted: 2021-02-01

## 2021-06-16 PROBLEM — I48.0 PAROXYSMAL ATRIAL FIBRILLATION (H): Status: ACTIVE | Noted: 2019-09-17

## 2021-06-16 PROBLEM — R73.03 PRE-DIABETES: Status: ACTIVE | Noted: 2021-02-01

## 2021-06-16 PROBLEM — R06.09 DYSPNEA ON EXERTION: Status: ACTIVE | Noted: 2021-02-01

## 2021-06-16 NOTE — PROGRESS NOTES
HISTORY OF PRESENT ILLNESS  Patient reports that for 1-2 years she gets hoarse off and on.  It happens quite a bit.  It comes and goes.  There are times where she loses her voice.  She has MS and wonders if the problem is caused by the disease.  She also gets dry mouth from all of her medicines.  She also has problems with swallowing.  She chokes easily on the smallest pieces of food.  She has not had a swallowing study.      REVIEW OF SYSTEMS  Review of Systems: a 10-system review was performed. Pertinent positives are noted in the HPI and on a separate scanned document in the chart.    PMH, PSH, FH and SH has documented in the EHR.      EXAM    CONSTITUTIONAL  General Appearance:  Normal, well developed, well nourished, no obvious distress  Ability to Communicate:  communicates appropriately.    HEAD AND FACE  Appearance and Symmetry:  Normal, no scalp or facial scarring or suspicious lesions.  Paranasal sinuses tenderness:  Normal, Paranasal sinuses non tender    EARS  Clinical speech reception threshold:  Normal, able to hear normal speech.  Auricle:  Normal, Auricles without scars, lesions, masses.  External auditory canal:  Normal, External auditory canal normal.  Tympanic membrane:  Normal, Tympanic membranes normal without swelling or erythema.  Tympanic membrane mobility:  Normal, Normal tympanic membrane mobility.    NOSE (speculum or scope)  Architecture:  Normal, Grossly normal external nasal architecture with no masses or lesions.  Mucosa:  Normal mucosa, No polyps or masses.  Septum:  Normal, Septum non-obstructing.  Turbinates:  Normal, No turbinate abnormalities    ORAL CAVITY AND OROPHARYNX  Lips:  Normal.  Dental and gingiva:  Normal, No obvious dental or gingival disease.  Mucosa:  Normal, Moist mucous membranes.  Tongue:  Normal, Tongue mobile with no mucosal abnormalities  Hard and soft palate:  Normal, Hard and soft palate without cleft or mucosal lesions.  Oral pharynx:  Normal, Posterior  pharynx without lesions or remarkable asymmetry.  Saliva:  Normal, Clear saliva.  Masses:  Normal, No palpable masses or pathologically enlarged lymph nodes.    LARYNGOSCOPY  Risks and benefit of Flexible laryngeal endoscopy were discussed with the patient and they wished to proceed.  The nose was sprayed with 4% lidocaine / 1% phenylephrine.  After allowing adequate time for anesthesia the procedure was started.  Patient tolerated the procedure well.  See exam note for findings.    LARYNX AND HYPOPHARYNX (mirror or scope)  Voice Quality:  Normal voice quality.  Supra glottis:  Normal, No edema or erythema.  Epiglottis:  Normal, No epiglottic mass or mucosal lesions.  Pyriform sinuses:  Normal, Pyriform sinuses clear.  Vocal cords appearance:  Normal, Vocal cord motion symmetric.  Vocal cord motion:  Normal, Vocal cord motion symmetric  Endolarynx:  Normal, No Endolaryngeal mass or mucosal lesions.    NECK  Masses/lymph nodes:  Normal, No worrisome neck masses or lymph nodes.  Salivary glands:  Normal, Parotid and submandibular glands.  Trachea and larynx position:  Normal, Trachea and larynx midline.  Thyroid:  Normal, No thyroid abnormality.  Tenderness:  Normal, No cervical tenderness.  Suppleness:  Normal, Neck supple    NEUROLOGICAL  Speech pattern:  Normal, Proasaic    RESPIRATORY  Symmetry and Respiratory effort:  Normal, Symmetric chest movement and expansion with no increased intercostal retractions or use of accessory muscles.     IMPRESSION  Hoarseness of the voice.  Mild dysphagia.  No evidence of vocal cord paralysis.  It is also possible that her Dulera may be contributing to hoarseness, which is a common side effect of the drug.    RECOMMENDATION  I explained that her global symptoms of hoarseness and dysphagia are likely related to her MS.  There was no evidence of serious pathology on exam.  She expressed relief.  Follow up as needed.    Abdulaziz Castro MD

## 2021-06-16 NOTE — PROGRESS NOTES
Preoperative Exam    Scheduled Procedure: Cataract surgery   Surgery Date:   Right eye 3/21/2018, Left eye 4/4/2018  Surgery Location: [facility], [place], fax [phone]    Surgeon:   Dr Mckinney     Assessment/Plan:     1. Special screening for malignant neoplasms, colon  Discussed options with colon cancer screening  - Cologuard    2. Preop examination  Patient is cleared for surgery at this time with no anticipated complication.     Surgical Procedure Risk: Low (reported cardiac risk generally < 1%)  Have you had prior anesthesia?: Yes  Have you or any family members had a previous anesthesia reaction:  Yes: she has tolerated previous surgery without problems  Do you or any family members have a history of a clotting or bleeding disorder?: Yes: pt has hx of PE  Cardiac Risk Assessment: no increased risk for major cardiac complications    Patient approved for surgery with general or local anesthesia.      Functional Status: Partially Dependent: Pt will recover at home  Patient plans to recover at home with family.     Subjective:      Eileen Donald is a 62 y.o. female who presents for a preoperative consultation.  Patient has tolerated previous surgeries without complication.  Patient has noted worsening visual changes in the past and is elected for surgical correction of bilateral cataracts.  Patient has no new symptoms of chest pain or shortness of breath but does have shortness of breath at baseline.    All other systems reviewed and are negative, other than those listed in the HPI.    Pertinent History  Do you have difficulty breathing or chest pain after walking up a flight of stairs: Yes: shortness of breath unchanged  History of obstructive sleep apnea: Yes: uses CPAP  Steroid use in the last 6 months: yes  Frequent Aspirin/NSAID use: Yes: takes aspirin daily  Prior Blood Transfusion: No  Prior Blood Transfusion Reaction: No  If for some reason prior to, during or after the procedure, if  it is medically indicated, would you be willing to have a blood transfusion?:  There is no transfusion refusal.    Current Outpatient Prescriptions   Medication Sig Dispense Refill     acetaminophen (TYLENOL) 500 MG tablet Take 325-650 mg by mouth every 6 (six) hours as needed for pain (1-2 tab PRN).        acyclovir (ZOVIRAX) 400 MG tablet Take 400 mg by mouth 2 (two) times a day.        albuterol (PROVENTIL) 2.5 mg /3 mL (0.083 %) nebulizer solution Take 3 mL (2.5 mg total) by nebulization every 6 (six) hours as needed for wheezing. 75 vial 2     amoxicillin (AMOXIL) 500 MG capsule Take 2,000 mg by mouth. 1 hour prior to dental work       aspirin 81 mg chewable tablet Chew 81 mg.       AVONEX 30 mcg/0.5 mL PnKt once a week.       buPROPion (WELLBUTRIN XL) 150 MG 24 hr tablet Take 1 tablet (150 mg total) by mouth every morning. 90 tablet 1     calcium, as carbonate, (TUMS) 200 mg calcium (500 mg) chewable tablet Chew 1 tablet daily.       furosemide (LASIX) 40 MG tablet TK 1 T PO QAM  6     gabapentin (NEURONTIN) 300 MG capsule Take 300 mg by mouth daily.  2     ipratropium (ATROVENT) 0.02 % nebulizer solution Take 2.5 mL (0.5 mg total) by nebulization 3 (three) times a day. 250 mL 0     lisinopril (PRINIVIL,ZESTRIL) 5 MG tablet Take 1 tablet (5 mg total) by mouth daily. 30 tablet 0     LORazepam (ATIVAN) 0.5 MG tablet Take 0.5 mg by mouth daily as needed for anxiety.       metoprolol tartrate (LOPRESSOR) 25 MG tablet Take 1 tablet (25 mg total) by mouth 2 (two) times a day. 60 tablet 0     montelukast (SINGULAIR) 10 mg tablet Take 1 tablet (10 mg total) by mouth at bedtime. 30 tablet 5     omeprazole (PRILOSEC) 20 MG capsule TAKE 1 CAPSULE TWICE DAILY, 6 A.M. AND 4 P.M. 180 capsule 2     potassium chloride (MICRO-K) 10 mEq CR capsule TK 2 CS PO ONCE DAILY WITH A MEAL  6     simvastatin (ZOCOR) 20 MG tablet TAKE 1 TABLET AT BEDTIME 90 tablet 2     SYMBICORT 160-4.5 mcg/actuation inhaler USE 2 INHALATIONS TWICE A  DAY 10.2 Inhaler 4     venlafaxine (EFFEXOR-XR) 75 MG 24 hr capsule Take 3 capsules (225 mg total) by mouth daily. 90 capsule 0     VENTOLIN HFA 90 mcg/actuation inhaler USE 2 INHALATIONS EVERY 6 HOURS AS NEEDED FOR WHEEZING 18 g 2     warfarin (COUMADIN) 2.5 MG tablet TAKE 1 TABLET (2.5 MG) ON TUESDAY AND THURSDAY, AND 2 TABLETS (5 MG) ALL OTHER DAYS AS DIRECTED. (DOSE ADJUSTED BASED ON INR RESULTS) 160 tablet 1     mometasone-formoterol (DULERA) 200-5 mcg/actuation HFAA inhaler Inhale 2 puffs 2 (two) times a day. 13 g 0     No current facility-administered medications for this visit.         Allergies   Allergen Reactions     Morphine      Sulfa (Sulfonamide Antibiotics)        Patient Active Problem List   Diagnosis     Depression     Asthma     Hypertension     Multiple Sclerosis     Hyperlipidemia     Impaired Glucose Tolerance Test     Dysphagia     Benign Adenomatous Polyp Of The Large Intestine     History of pulmonary embolism     Obesity     Generalized anxiety disorder     Primary osteoarthritis of both hands     Polyarthralgia     Aortic valve replaced       Past Medical History:   Diagnosis Date     Aortic valve stenosis 01/24/2017     Asthma      HLD (hyperlipidemia)      HTN (hypertension)      MS (multiple sclerosis)      Osteoarthritis      Pre-diabetes      Pulmonary embolism        Past Surgical History:   Procedure Laterality Date     mechanical aortic prosthesis  01/24/2017     REDUCTION MAMMAPLASTY  1994       Social History     Social History     Marital status:      Spouse name: N/A     Number of children: N/A     Years of education: N/A     Occupational History     Not on file.     Social History Main Topics     Smoking status: Never Smoker     Smokeless tobacco: Never Used     Alcohol use No     Drug use: No     Sexual activity: Not on file     Other Topics Concern     Not on file     Social History Narrative       Patient Care Team:  Tito Salazar MD as PCP - General (Family  Medicine)          Objective:     Vitals:    03/14/18 1550   BP: 124/68   Pulse: 84   Resp: 24   Temp: 98.1  F (36.7  C)   TempSrc: Oral   Weight: (!) 330 lb (149.7 kg)   LMP: 11/28/2013         Physical Exam:  Physical Examination: General appearance - alert, well appearing, and in no distress  Mental status - alert, oriented to person, place, and time  Eyes - pupils equal and reactive, extraocular eye movements intact  Ears - bilateral TM's and external ear canals normal  Nose - normal and patent, no erythema, discharge or polyps  Mouth - mucous membranes moist, pharynx normal without lesions  Lymphatics - no palpable lymphadenopathy  Chest - clear to auscultation, no wheezes, rales or rhonchi, symmetric air entry  Heart - normal rate, regular rhythm, normal S1, S2, no murmurs, rubs, clicks or gallops  Abdomen - soft, nontender, nondistended, no masses or organomegaly  Back exam - full range of motion, no tenderness, palpable spasm or pain on motion  Neurological - alert, oriented, normal speech, no focal findings or movement disorder noted  Musculoskeletal - no joint tenderness, deformity or swelling  Extremities - peripheral pulses normal, no pedal edema, no clubbing or cyanosis  Skin - normal coloration and turgor, no rashes, no suspicious skin lesions noted      There are no Patient Instructions on file for this visit.    Immunization History   Administered Date(s) Administered     Influenza B8y1-10, 01/13/2010     Influenza, inj, historic,unspecified 10/17/2008     Influenza, seasonal,quad inj 36+ mos 09/11/2015, 09/13/2016, 09/29/2017     Influenza, seasonal,quad inj 6-35 mos 09/16/2010, 09/20/2011, 09/21/2012, 09/23/2014     Influenza,seasonal quad, PF 10/07/2013     Pneumo Conj 13-V (2010&after) 10/28/2015     Pneumo Polysac 23-V 10/31/2003     Td,adult,historic,unspecified 06/20/2006     Tdap 06/20/2006, 09/13/2016     ZOSTER, LIVE 03/16/2017           Electronically signed by Tito Salazar MD  03/14/18 3:53 PM

## 2021-06-16 NOTE — TELEPHONE ENCOUNTER
FYI - Status Update  Who is Calling: Patient  Update: Returnbed call, ACN not available for transfer at this time. Patient requests you call her again.  Okay to leave a detailed message?:  Yes

## 2021-06-16 NOTE — TELEPHONE ENCOUNTER
FYI - Status Update  Who is Calling: Patient  Update: patient had a lot of polyps removed today, 3/22/21, during a colonoscopy and was told to hold their warfarin today also. Please call the patient back, thank you.  Okay to leave a detailed message?:  Yes

## 2021-06-16 NOTE — TELEPHONE ENCOUNTER
Consulted pharmacist, Renee Wilks, PharmD.     - re:  Giving a one time booster with 5mg warfarin dose tomorrow on 3/23.

## 2021-06-16 NOTE — ANESTHESIA PREPROCEDURE EVALUATION
Anesthesia Evaluation      Patient summary reviewed   History of anesthetic complications     Airway   Mallampati: II  Neck ROM: full   Pulmonary - normal exam   (+) COPD moderate, asthma  moderate, shortness of breath, sleep apnea on CPAP, ,                          Cardiovascular - normal exam  (+) hypertension, CAD, ,        ROS comment: History of paroxysmal atrial fibrillation.  On coumadin.  Bridged with lovenox in anticipation of this procedure.  History of PE     Neuro/Psych - negative ROS     Endo/Other    (+) diabetes mellitus, obesity,      GI/Hepatic/Renal - negative ROS           Dental - normal exam                        Anesthesia Plan  Planned anesthetic: MAC    ASA 3   Induction: intravenous   Anesthetic plan and risks discussed with: patient    Post-op plan: routine recovery

## 2021-06-16 NOTE — TELEPHONE ENCOUNTER
Telephone Encounter by Gisel Royal RN at 3/11/2020  3:57 PM     Author: Gisel Royal RN Service: -- Author Type: Registered Nurse    Filed: 3/11/2020  4:07 PM Encounter Date: 3/11/2020 Status: Attested    : Gisel Royal RN (Registered Nurse) Cosigner: Cheyenne Smith MD at 3/11/2020  6:14 PM    Attestation signed by Cheyenne Smith MD at 3/11/2020  6:14 PM    .                ANTICOAGULATION  MANAGEMENT    Assessment     Today's INR result of 4.6 is Supratherapeutic (goal INR of 2.5-3.5)        Warfarin taken as previously instructed    No new diet changes affecting INR    Concurrent use of Tylenol and warfarin may increase risk of bleeding, but not expected to affect INR.  States she has been using more Tylenol than previously due to hip pain.  Tylenol to increase the risk of bleeding with concurrent use of warfarin, but not expected to increase the INR (micromedex).      Continues to tolerate warfarn with no reported s/s of bleeding or thromboembolism     Previous INR was Therapeutic, prior to last INR, INR's supra therapeutic.    Plan:     Spoke with Marilee regarding INR result and instructed:     Warfarin Dosing Instructions:  Hold warfarin dose today  then change warfarin dose to 5 mg daily on Sun, Tues; and 2.5 mg daily rest of week  (10 % change)    Instructed patient to follow up no later than: 7-10 days     Education provided: importance of therapeutic range, target INR goal and significance of current INR result and monitoring for bleeding signs and symptoms    Marilee verbalizes understanding and agrees to warfarin dosing plan.    Instructed to call the [facility] for any changes, questions or concerns. (#[phone])   ?   Gisel Royal RN    Subjective/Objective:      Eileen Donald, a 64 y.o. female is on warfarin.     Eileen reports:     Home warfarin dose: verbally confirmed home dose with Marilee and updated on anticoagulation calendar     Missed doses: No     Medication  changes:  Increased tylenol use     S/S of bleeding or thromboembolism:  No     New Injury or illness:  No     Changes in diet or alcohol consumption:  No     Upcoming surgery, procedure or cardioversion:  No    Anticoagulation Episode Summary     Current INR goal:   2.5-3.5   TTR:   61.3 % (1 y)   Next INR check:   3/20/2020   INR from last check:   4.60! (3/11/2020)   Weekly max warfarin dose:      Target end date:      INR check location:      Preferred lab:      Send INR reminders to:   [facility]    Indications    Heart valve replaced [Z95.2]           Comments:            Anticoagulation Care Providers     Provider Role Specialty Phone number    Tito Salazar MD Referring Family Medicine [phone]

## 2021-06-16 NOTE — TELEPHONE ENCOUNTER
Who is calling:  patient  Reason for Call:  Patient returning missed ACN call from today, 03/26/21  AT time of patient's return call ACN was unavailable.   Date of last appointment with primary care:   Okay to leave a detailed message: Yes

## 2021-06-16 NOTE — TELEPHONE ENCOUNTER
Called and spoke with Marilee,     - per pharmacist, OK to give one time booster with 5mg warfarin dose, when she resumes warfarin on Tues. 3/23.        [2:24 PM] Renee Wilks         Looks like Clinic already called Sumi.  She only takes 5 mg twice a week.    Okay to let her resume and get a 5 mg tomorrow. (But won't be wrong to boost her either)  ?  [2:25 PM] Renee Wilks         We could take this time to get her on to more balanced days instead of      5 mg M/W close together.  Like 5 mg on Wed/Sat; 2.5 mg rest of week       - adjust her warfarin dose to take 5mg on Wed/Sat and 2.5mg all other days.     - INR scheduled on Fri. 3/26/21 MPW.   - will continue Lovenox injections till INR is =>2.5   - currently denied any s/sx of any bleeding.   - she verbalized back understanding.

## 2021-06-16 NOTE — PROGRESS NOTES
ANTICOAGULATION  MANAGEMENT: Discharge Review    Eileen Donald chart reviewed for anticoagulation continuity of care    Outpatient surgery/procedure on  3/22/2021 for s/p Colonoscopy with biopsy and polypectomy.    Discharge disposition: Home    INR Results:       Recent labs: (last 7 days)     03/22/21  0726   INR 1.28*       Warfarin inpatient management: home regimen continued    Warfarin discharge instructions: home regimen continued     Medication Changes Affecting Anticoagulation: No    Additional Factors Affecting Anticoagulation: No    Plan     No adjustment to anticoagulation plan needed      Patient not contacted - INR scheduled on 3/26/21.    Anticoagulation calendar updated    Alondra Lora RN

## 2021-06-16 NOTE — PROGRESS NOTES
Type of Service: Telephone Visit    Patient would like to receive their AVS by AVS Preference: Liz.     Assessment:   --follow up visit for Marilee  --patient has had many recent medical appointments/ procedures, she did  her glucose meter, but has not started using it as of yet.  --she has been cutting back on her sweet intake, and overall has been working on smaller portions at meals.  --she is drinking 40+ oz water daily, and has three regular sodas/week  --she did not look at the sit and be fit program as of yet, mobility is limited, she uses a cane or walker and is on oxygen 24/7  --she had an eye exam within the last six months and needs to schedule a dental cleaning.  --she has 2-3 meals/day, sporadic intake of fruit, she only likes a few vegetables, and has not been having them on a regular basis    Blood glucose record:  Not currently checking BG    Medications prescribed:  NA for Diabetes    Education:  --discussed educational resources for learning how to use her glucose meter  --reviewed nutritional intake: stressed importance of avoiding sugary beverages and limiting sweet intake, adding in at least one serving of fruits or vegetables daily and hydration  --discussed the sit and be fit program for an exercise option  --discussed preventative care;: eyes, dental, teeth, stress/depression/support      Plan:   1. Continue to limit sweets to only 1-2x/week and limit or avoid sugary beverages.  2. Add in activity as able.  3. Add in one serving of fruits or vegetables daily.      Subjective and Objective:      Eileen Donald is referred by Tito Salazar for Diabetes Education.     Lab Results   Component Value Date    HGBA1C 7.5 (H) 02/01/2021           Education:     Monitoring   Meter (per above goals): has a meter, not currently checking  Monitoring: Not addressed  BG goals: Not addressed    Nutrition Management  Nutrition Management: Assessed and Discussed  Weight: Not  addressed  Portions/Balance: Assessed and Discussed  Carb ID/Count: Not addressed  Label Reading: Not addressed  Heart Healthy Fats: Assessed  Menu Planning: Assessed and Discussed  Dining Out: Not addressed  Physical Activity: Assessed and Discussed      Diabetes Disease Process: Assessed    Acute Complications: Prevent, Detect, Treat:  Hypoglycemia: Literature provided  Hyperglycemia: Literature provided  Sick Days: Literature provided  Driving: Not addressed    Chronic Complications  Foot Care:Assessed and Discussed  Skin Care: Assessed and Discussed  Eye: Assessed, Discussed and had recent exam within last six months  ABC: Assessed  Teeth:Assessed, Discussed and needs to schedule cleaning for   Goal Setting and Problem Solving: Assessed and Discussed  Barriers: Assessed  Psychosocial Adjustments: Assessed      Time spent with the patient: 30 minutes for diabetes education and counseling.   Previous Education: yes  Visit Type:DSMT  Hours Remainin, DSMT 8.5 and MNT 3      Radha Cota  2021

## 2021-06-16 NOTE — TELEPHONE ENCOUNTER
Telephone Encounter by Sera Toussaint CMA at 4/26/2019  1:25 PM     Author: Sera Toussaint CMA Service: -- Author Type: Certified Medical Assistant    Filed: 4/26/2019  1:27 PM Encounter Date: 4/26/2019 Status: Signed    : Sera Toussaint CMA (Certified Medical Assistant)       Judy [phone]  Ivonne Jefferson 1 hour ago (11:45 AM)      Judy guajardo [facility]    Incoming call       Ivonne Jefferson 1 hour ago (11:45 AM)         Who is calling:  Judy with [facility]  Reason for Call:  Caller needs clarification on a lab order for a Hem 2 panel.  Date of last appointment with primary care: 4/17/19  Okay to leave a detailed message: Yes

## 2021-06-16 NOTE — TELEPHONE ENCOUNTER
ANTICOAGULATION  MANAGEMENT    Assessment     Today's INR result of 2.6 is Therapeutic (goal INR of 2.5-3.5)        Warfarin taken as previously instructed. Confirmed correct warfarin dose, written incorrectly on the sheet.    No new diet changes affecting INR    No new medication/supplements affecting INR. 1st covid 19 vaccine on 4/5    Continues to tolerate warfarin with no reported s/s of bleeding or thromboembolism     Previous INR was Therapeutic    Plan:     Spoke on phone with Marilee regarding INR result and instructed:      Warfarin Dosing Instructions:  Continue current warfarin dose 5 mg daily on Mon, Wed, Sat; and 2.5 mg daily rest of week  (0 % change)    Instructed patient to follow up no later than: 2 weeks     Education provided: importance of therapeutic range, target INR goal and significance of current INR result and importance of following up for INR monitoring at instructed interval    Marilee verbalizes understanding and agrees to warfarin dosing plan.    Instructed to call the [facility] for any changes, questions or concerns. (#[phone])   ?   Gisel Royal RN    Subjective/Objective:      Eileen SHIRLEY Donald, a 65 y.o. female is on warfarin. Marilee Hunt reports:     Home warfarin dose: verbally confirmed home dose with Marilee and updated on anticoagulation calendar     Missed doses: No     Medication changes:  No     S/S of bleeding or thromboembolism:  No     New Injury or illness:  No     Changes in diet or alcohol consumption:  No     Upcoming surgery, procedure or cardioversion:  No    Anticoagulation Episode Summary     Current INR goal:  2.5-3.5   TTR:  62.6 % (1 y)   Next INR check:  4/28/2021   INR from last check:  2.60 (4/14/2021)   Weekly max warfarin dose:     Target end date:     INR check location:     Preferred lab:     Send INR reminders to:  [facility]    Indications    Heart valve replaced [Z95.2]           Comments:           Anticoagulation Care Providers      Provider Role Specialty Phone number    Tito Salazar MD Referring Family Medicine [phone]

## 2021-06-16 NOTE — PROGRESS NOTES
Pulmonary follow-up note  3/16/2018    Referring Physician: Dr. Salazar  Reason for follow-up: Wheezing and asthma with shortness of breath      Problem List:     Patient Active Problem List   Diagnosis     Depression     Asthma     Hypertension     Multiple Sclerosis     Hyperlipidemia     Impaired Glucose Tolerance Test     Dysphagia     Benign Adenomatous Polyp Of The Large Intestine     History of pulmonary embolism     Obesity     Generalized anxiety disorder     Primary osteoarthritis of both hands     Polyarthralgia     Aortic valve replaced       History:     Mrs. Eileen Donald is a 61 yo female with PMH significant for bicuspid aortic valve s/p AVR, history of EIB when she was younger in her 20s, HTN, MS that is well controlled, and PE who presented to pulmonary clinic for evaluation of asthma.  She stated she had always done well up until her AVR in 1/17.  She noticed that she was more short of breath, and that she had wheezing breath sounds.  She didn't really cough much, and it is nonproductive.  She did note that she had had increased phlegm and her throat felt congested.  She denied any orthopnea.  She was started on symbicort about 3 months ago and her shortness of breath improved.  But she still has some increased phlegm.  She is not sure that it is asthma though.  She does have several sisters that have asthma.    On follow-up today she states that her breathing is still poor.  She still has shortness of breath especially with exertion.  Does not necessarily feel the wheezing anymore.  She does continue to use Symbicort as well as nebulized albuterol and feels that this does improve her symptoms.  She is starting to question whether or not her MS could be causing some of her symptoms.  Again she denies any orthopnea.  She has not had any fevers or chills.  No significant cough or sputum production.  Her ACT today is 10 (2+1+2+2+3).    Past Medical History:   Diagnosis Date     Aortic valve  stenosis 01/24/2017     Asthma      HLD (hyperlipidemia)      HTN (hypertension)      MS (multiple sclerosis)      Osteoarthritis      Pre-diabetes      Pulmonary embolism      Past Surgical History:   Procedure Laterality Date     mechanical aortic prosthesis  01/24/2017     REDUCTION MAMMAPLASTY  1994     Social History     Social History     Marital status:      Spouse name: N/A     Number of children: N/A     Years of education: N/A     Occupational History     Not on file.     Social History Main Topics     Smoking status: Never Smoker     Smokeless tobacco: Never Used     Alcohol use No     Drug use: No     Sexual activity: Not on file     Other Topics Concern     Not on file     Social History Narrative     Family History   Problem Relation Age of Onset     Heart attack Father      Heart attack Sister      Heart attack Brother      Allergies   Allergen Reactions     Morphine      Sulfa (Sulfonamide Antibiotics)        Review of Systems - 10 point review of systems is negative except what is mentioned in the HPI.        Medications:     Current Outpatient Prescriptions on File Prior to Visit   Medication Sig Dispense Refill     acetaminophen (TYLENOL) 500 MG tablet Take 325-650 mg by mouth every 6 (six) hours as needed for pain (1-2 tab PRN).        acyclovir (ZOVIRAX) 400 MG tablet Take 400 mg by mouth 2 (two) times a day.        albuterol (PROVENTIL) 2.5 mg /3 mL (0.083 %) nebulizer solution Take 3 mL (2.5 mg total) by nebulization every 6 (six) hours as needed for wheezing. 75 vial 2     amoxicillin (AMOXIL) 500 MG capsule Take 2,000 mg by mouth. 1 hour prior to dental work       aspirin 81 mg chewable tablet Chew 81 mg.       AVONEX 30 mcg/0.5 mL PnKt once a week.       buPROPion (WELLBUTRIN XL) 150 MG 24 hr tablet Take 1 tablet (150 mg total) by mouth every morning. 90 tablet 1     calcium, as carbonate, (TUMS) 200 mg calcium (500 mg) chewable tablet Chew 1 tablet daily.       chlorhexidine  (PERIDEX) 0.12 % solution Swish and spit 15mL  mL 0     furosemide (LASIX) 40 MG tablet TK 1 T PO QAM  6     gabapentin (NEURONTIN) 300 MG capsule Take 300 mg by mouth daily.  2     ipratropium (ATROVENT) 0.02 % nebulizer solution Take 2.5 mL (0.5 mg total) by nebulization 3 (three) times a day. 250 mL 0     lisinopril (PRINIVIL,ZESTRIL) 5 MG tablet Take 1 tablet (5 mg total) by mouth daily. 30 tablet 0     LORazepam (ATIVAN) 0.5 MG tablet Take 0.5 mg by mouth daily as needed for anxiety.       metoprolol tartrate (LOPRESSOR) 25 MG tablet Take 1 tablet (25 mg total) by mouth 2 (two) times a day. 60 tablet 0     montelukast (SINGULAIR) 10 mg tablet Take 1 tablet (10 mg total) by mouth at bedtime. 30 tablet 5     omeprazole (PRILOSEC) 20 MG capsule TAKE 1 CAPSULE TWICE DAILY, 6 A.M. AND 4 P.M. 180 capsule 2     potassium chloride (MICRO-K) 10 mEq CR capsule TK 2 CS PO ONCE DAILY WITH A MEAL  6     simvastatin (ZOCOR) 20 MG tablet TAKE 1 TABLET AT BEDTIME 90 tablet 2     SYMBICORT 160-4.5 mcg/actuation inhaler USE 2 INHALATIONS TWICE A DAY 10.2 Inhaler 4     venlafaxine (EFFEXOR-XR) 75 MG 24 hr capsule Take 3 capsules (225 mg total) by mouth daily. 90 capsule 0     VENTOLIN HFA 90 mcg/actuation inhaler USE 2 INHALATIONS EVERY 6 HOURS AS NEEDED FOR WHEEZING 18 g 2     warfarin (COUMADIN) 2.5 MG tablet TAKE 1 TABLET (2.5 MG) ON TUESDAY AND THURSDAY, AND 2 TABLETS (5 MG) ALL OTHER DAYS AS DIRECTED. (DOSE ADJUSTED BASED ON INR RESULTS) 160 tablet 1     [DISCONTINUED] mometasone-formoterol (DULERA) 200-5 mcg/actuation HFAA inhaler Inhale 2 puffs 2 (two) times a day. 13 g 0     No current facility-administered medications on file prior to visit.            Exam/Data:   Vitals  Vitals:    03/16/18 1613   BP: 134/89   Pulse: 90   Resp: 20   SpO2: 94%       EXAM:  GEN: Alert and oriented x3, NAD  HEENT: Nares patent, posterior oropharynx clear.  RESPIRATORY: CTAB.  No wheeze or rales  CARDIOVASCULAR: RRR, no  m/r/g  GASTROINTESTINAL: Round, soft, NT/ND  HEM/ONC: no adenopathy, no ecchymosis  MSK: no clubbing.  Ambulates normally, increased kyphosis  NEURO: cranial nerves appear grossly intact, muscle strength equal  SKIN: no rash or ulcerations      DATA    PFT DATA:  FEV1/FVC is 0.90 and is normal.  FEV1 is 58% predicted and is reduced.  FVC is 49% predicted and reduced.  There was no improvement in spirometry after a single inhaled dose of bronchodilator.  TLC is 69% predicted and is reduced.  RV is 79% predicted and is reduced.  DLCO is 82% predicted and is normal when it   is corrected for hemoglobin.     Impression:  Full Pulmonary Function Test is abnormal.  PFTs are consistent with moderate-severe restrictive disease.  Spirometry is not consistent with reversibility.  There is no hyperinflation.  There is no air-trapping.  Diffusion capacity when corrected for hemoglobin is normal.    NIOX: 20 ppb and is normal.    IMAGING:  CTA CHEST PE RUN  8/7/2017 3:29 PM     INDICATION: dyspnea  TECHNIQUE: Helical acquisition through the chest was performed during the arterial phase of contrast enhancement using IV contrast. 2D and 3D reconstructions were performed by the CT technologist. Dose reduction techniques were used.   IV CONTRAST: Iohexol (Omni) 100 mL  COMPARISON: March 10, 2016     FINDINGS:  ANGIOGRAM CHEST: Negative for pulmonary emboli. Negative for thoracic aortic dissection and aneurysms.     LUNGS AND PLEURA: No pleural effusions. Linear atelectasis right lower lung zone unchanged from the previous study.     MEDIASTINUM: Postop median sternotomy and aortic valve.. No pericardial effusion.     LIMITED UPPER ABDOMEN: Hepatic steatosis.     MUSCULOSKELETAL: Negative.     IMPRESSION:   CONCLUSION:  1.  Negative for pulmonary emboli  2.  Postop aortic valve replacement  3.  Hepatic steatosis    Personally reviewed the CT with patient.  Appears to have scattered areas of mosaic attenuation, otherwise  normal.    Assessment/Plan:   Eileen Donald is a 62 y.o. female PMH significant for bicuspid aortic valve s/p AVR, history of EIB when she was younger in her 20s, HTN, MS that is well controlled, and PE who presents to pulmonary clinic for evaluation of asthma.    1. Likely asthma, moderate persistent:  She has had good improvement in symptoms while on symbicort.  She does have some voice hoarseness and this could be related to budesonide.  Her NIOX is normal but she has been on budesonide for several months.  Her PFTs do not show obstruction.  Her CTA previously has demonstrated some mosaic attenuation.  She continues to use Singulair.  Her symptoms continue to respond to ICS and bronchodilator therapy.    2. Restrictive Ventilatory Defect:  I think this is related to her body habitus, but could also be related to her MS.  Her diffusing capacity is normal.  I will see how her breathing does with the addition of singulair but since it has not significantly improved and more concerned that this is due to some neuromuscular weakness.  She has been followed by Dr. Bear in neurology and has not had major issues with her MS. did asked that she try and get an appointment with Dr. Bear as well to reevaluate her MS.  Her CTA does not show any ILD.  I will try and get supine and upright spirometry specifically for the forced vital capacity.    Recommend:  - continue with symbicort 2 puffs BID  - albuterol as needed  - singulair 10 mg daily  - Supine and upright FVC before next visit  - RTC in 3 months    I spent > 15 minutes in consultation with > 50% time spent face-to-face.      Ryan Grajeda, DO

## 2021-06-16 NOTE — PROGRESS NOTES
[facility]  [address] [facility] [address]  Dept: [phone]  Dept Fax: [phone]  Primary Provider: Tito Salazar MD  Pre-op Performing Provider: TITO SALAZAR      PREOPERATIVE EVALUATION:  Today's date: 3/17/2021    Eileen Donald is a 65 y.o. female who presents for a preoperative evaluation.    Surgical Information:  Surgery/Procedure: Colonoscopy   Surgery Location: [facility]   Surgeon: Dr Weems  Surgery Date: 3/22/21   Time of Surgery:   Where patient plans to recover: At home with family  Fax number for surgical facility: Note does not need to be faxed, will be available electronically in Epic.    Type of Anesthesia Anticipated: to be determined    Assessment & Plan      The proposed surgical procedure is considered LOW risk.    Preop general physical exam  Ok to proceed with colonoscopy  - Electrocardiogram Perform and Read  - HM2(CBC w/o Differential)  - Comprehensive Metabolic Panel      Possible Sleep Apnea: Pt has Sleep apnea and is on continuous O2 at home {Provider consider     Risks and Recommendations:  The patient has the following additional risks and recommendations for perioperative complications:   - Morbid obesity (BMI >40)  Cardiovascular:   - His of valve replacement  Diabetes:  - Patient is not on insulin therapy: regular NPO guidelines can be followed.   Obstructive Sleep Apnea:   Uses CPAP and supplemental O2    Medication Instructions:  pt is aware of what medications to hold prior to procedure    RECOMMENDATION:  APPROVAL GIVEN to proceed with proposed procedure, without further diagnostic evaluation.          Subjective     HPI related to upcoming procedure: Due to medical conditions patient is required to go under anesthesia and proceed with colonoscopy at a hospital setting.  Patient is overdue for colonoscopy.  Patient expresses no new cardiovascular symptoms of concern but is on daily supplemental oxygen has  a history of aortic valve replacement, sleep apnea, COPD and has morbid obesity.  Patient has remained very isolated during the Covid pandemic.  Patient's A1c recently was greater than 7% patient has not started to monitor her glucose levels as of yet.  Patient is try to make lifestyle modifications to help with glucose control.    Preop Questions 3/17/2021   Have you ever had a heart attack or stroke? UNKNOWN    Have you ever had surgery on your heart or blood vessels, such as a stent placement, a coronary artery bypass, or surgery on an artery in your head, neck, heart, or legs? YES -valve replacement   Do you have chest pain with activity? No   Do you have a history of  heart failure? No   Do you currently have a cold, bronchitis or symptoms of other infection? No no I covered she is leaving after labs maybe I will go see   Do you have a cough, shortness of breath, or wheezing? YES -on chronic oxygen with COPD   Do you or anyone in your family have previous history of blood clots? YES -history of PE   Do you or does anyone in your family have a serious bleeding problem such as prolonged bleeding following surgeries or cuts? No   Have you ever had problems with anemia or been told to take iron pills? YES -patient has had iron deficiency anemia in the past recent hemoglobin normal   Have you had any abnormal blood loss such as black, tarry or bloody stools, or abnormal vaginal bleeding? No   Have you ever had a blood transfusion? No   Are you willing to have a blood transfusion if it is medically needed before, during, or after your surgery? Yes   Have you or any of your relatives ever had problems with anesthesia? YES      Do you have sleep apnea, excessive snoring or daytime drowsiness? UNKNOWN -101 while   Do you have any artifical heart valves or other implanted medical devices like a pacemaker, defibrillator, or continuous glucose monitor? YES -aortic valve replacement   What type of device do you have? Aorta  valve replaced   Name of the clinic that manages your device:  [facility]   Do you have artificial joints? No   Are you allergic to latex? Yes      Health Care Directive:  Patient does not have a Health Care Directive or Living Will: Discussed advance care planning with patient; information given to patient to review.      Review of Systems  Constitutional, neuro, ENT, endocrine, pulmonary, cardiac, gastrointestinal, genitourinary, musculoskeletal, integument and psychiatric systems are negative, except as otherwise noted.      Patient Active Problem List    Diagnosis Date Noted     Dyslipidemia 02/01/2021     Dyspnea on exertion 02/01/2021     Pre-diabetes 02/01/2021     Other pulmonary embolism without acute cor pulmonale, unspecified chronicity (H) 02/01/2021     Chronic obstructive pulmonary disease, unspecified COPD type (H) 02/01/2021     Recurrent major depressive disorder, in partial remission (H) 02/01/2021     Paroxysmal atrial fibrillation (H) 09/17/2019     Acute on chronic respiratory failure with hypoxia (H)      Pulmonary hypertension (H)      Closed 3-part fracture of proximal humerus with delayed healing, left 04/10/2019     Heart valve replaced 06/26/2018     Controlled substance agreement signed 05/29/2018     Aortic valve replaced 02/15/2017     Hypertension 01/24/2017     Postoperative anemia due to acute blood loss 01/24/2017     Stress hyperglycemia 01/24/2017     Polyarthralgia 11/30/2016     Primary osteoarthritis of both hands 08/31/2016     Generalized anxiety disorder 06/15/2016     Morbid obesity (H) 10/29/2015     History of pulmonary embolism 10/30/2014     Depression      Asthma      Benign essential hypertension      Multiple Sclerosis      Hyperlipidemia      Impaired Glucose Tolerance Test      Dysphagia      Benign Adenomatous Polyp Of The Large Intestine      Past Medical History:   Diagnosis Date     Anxiety      Aortic valve replaced 2/15/2017     Aortic valve stenosis 01/24/2017      Asthma     Created by Conversion      Chronic shortness of breath      Coronary artery disease      Diabetes mellitus (H)     borderline     History of blood clots      Hypertension     Created by Conversion  Replacement Utility updated for latest IMO load     Multiple sclerosis (H)     Created by Conversion      Obesity 10/29/2015     Panic attacks      Pre-diabetes      Pulmonary embolism (H)      Sleep apnea      Past Surgical History:   Procedure Laterality Date     CARPAL TUNNEL RELEASE       mechanical aortic prosthesis  01/24/2017     PA RECONSTR TOTAL SHOULDER IMPLANT Left 4/10/2019    Procedure: LEFT REVERSE TOTAL SHOULDER ARTHROPLASTY;  Surgeon: Luis Antonio Telles MD;  Location: [facility];  Service: Orthopedics     REDUCTION MAMMAPLASTY  1994     Current Outpatient Medications   Medication Sig Dispense Refill     acyclovir (ZOVIRAX) 400 MG tablet Take 400 mg by mouth 2 (two) times a day.        amoxicillin (AMOXIL) 500 MG capsule Take 500 mg by mouth 3 (three) times a day. Prior to dental appts       aspirin 81 mg chewable tablet Chew 81 mg daily.              blood glucose meter (ONETOUCH ULTRAMINI) Dispense glucometer brand per patient's insurance at pharmacy discretion. 1 each 0     blood glucose test strips Use new strip for each glucose test, once daily. 100 strip 6     budesonide-formoteroL (SYMBICORT) 160-4.5 mcg/actuation inhaler 1USE 2 INHALATIONS TWICE A DAY 1 Inhaler 11     buPROPion (WELLBUTRIN XL) 150 MG 24 hr tablet TAKE 1 TABLET EVERY MORNING 90 tablet 2     calcium, as carbonate, (TUMS) 200 mg calcium (500 mg) chewable tablet Chew 1 tablet daily as needed.              chlorhexidine (PERIDEX) 0.12 % solution Swish and spit 5mL three times daily 473 mL 0     cholecalciferol, vitamin D3, (VITAMIN D3) 5,000 unit Tab Take 1 tablet (5,000 Units total) by mouth daily. 90 tablet 3     enoxaparin ANTICOAGULANT (LOVENOX) 100 mg/mL Syrg Inject 1 mL (100 mg total) under the skin every 12  (twelve) hours. 20 Syringe 0     esomeprazole (NEXIUM) 20 MG capsule Take 1 capsule (20 mg total) by mouth daily. 90 capsule 2     furosemide (LASIX) 40 MG tablet Take 1.5 tablets (60 mg total) by mouth daily. Take additional afternoon dose for weight gain > 2 lbs in one day. 60 tablet 0     lancets (ONETOUCH DELICA LANCETS) 33 gauge Misc Use one lancet per each finger poke. 100 each 3     levalbuterol (XOPENEX HFA) 45 mcg/actuation inhaler Inhale 1-2 puffs every 4 (four) hours as needed for wheezing or shortness of breath (cough). 3 Inhaler 3     lisinopriL (PRINIVIL,ZESTRIL) 5 MG tablet TAKE 1 TABLET DAILY 90 tablet 0     LORazepam (ATIVAN) 0.5 MG tablet Take 1 tablet (0.5 mg total) by mouth daily as needed for anxiety. 30 tablet 0     metoprolol tartrate (LOPRESSOR) 25 MG tablet TAKE 1 TABLET TWICE A  tablet 0     montelukast (SINGULAIR) 10 mg tablet Take 1 tablet (10 mg total) by mouth at bedtime. 30 tablet 11     ocrelizumab (OCREVUS IV) Infuse into a venous catheter.       OXYGEN-AIR DELIVERY SYSTEMS [facility] Use As Directed. DME: [facility] [place]       potassium chloride (MICRO-K) 10 mEq CR capsule Take 20 mEq by mouth daily.       senna-docusate (PERICOLACE) 8.6-50 mg tablet Take 1 tablet by mouth 2 (two) times a day as needed for constipation.  0     simvastatin (ZOCOR) 20 MG tablet Take 1 tablet (20 mg total) by mouth at bedtime. 90 tablet 2     umeclidinium (INCRUSE ELLIPTA) 62.5 mcg/actuation DsDv inhaler Inhale 1 puff daily. 3 Inhaler 11     venlafaxine (EFFEXOR-XR) 75 MG 24 hr capsule TAKE 3 CAPSULES BY MOUTH EVERY  capsule 3     warfarin ANTICOAGULANT (COUMADIN/JANTOVEN) 2.5 MG tablet TAKE 1 TABLET (2.5 MG) TO 2 TABLETS (5 MG) BY MOUTH DAILY, AS DIRECTED.   ADJUST DOSE BASED ON INR RESULTS 120 tablet 1     No current facility-administered medications for this visit.        Allergies   Allergen Reactions     Adhesive Tape-Silicones      Morphine Other (See Comments)     hallicinations     Sulfa  (Sulfonamide Antibiotics) Hives     Azithromycin Nausea And Vomiting and Rash       Social History     Tobacco Use     Smoking status: Never Smoker     Smokeless tobacco: Never Used   Substance Use Topics     Alcohol use: No      Family History   Problem Relation Age of Onset     Heart attack Father      Heart attack Sister      Heart attack Brother      Breast cancer Neg Hx      Social History     Substance and Sexual Activity   Drug Use No        Objective     /74 (Patient Site: Left Arm, Patient Position: Sitting, Cuff Size: Adult Large)   Pulse 93   Resp 28   Wt (!) 297 lb (134.7 kg)   LMP 11/28/2013   BMI 45.16 kg/m    Physical Exam    GENERAL APPEARANCE: healthy, alert and no distress     EYES: EOMI, PERRL     HENT: ear canals and TM's normal and nose and mouth without ulcers or lesions     NECK: no adenopathy, no asymmetry, masses, or scars and thyroid normal to palpation     RESP: few scattered rhonich bl bases     CV: regular rates and rhythm, normal S1 S2, no S3 or S4 and no murmur, click or rub     ABDOMEN:  soft, nontender, obese      MS: extremities normal- no gross deformities noted, no evidence of inflammation in joints     SKIN: small dog scratches     NEURO: Normal strength and tone, sensory exam grossly normal, mentation intact and speech normal     PSYCH: mentation appears normal. and affect normal/bright      Recent Labs   Lab Test 03/11/21  1335 02/17/21  1157 02/01/21  1537 01/15/20  1256 01/15/20  1256 09/17/19  1329 09/17/19  1329   HGB  --   --  13.5  --   --   --  11.7*   PLT  --   --  272  --   --   --  284   INR 3.00* 2.60* 2.10*   < >  --    < > 3.10*   NA  --   --  142  --  141  --   --    K  --   --  4.0  --  4.2  --   --    CREATININE  --   --  0.83  --  0.91  --   --     < > = values in this interval not displayed.        PRE-OP Diagnostics:   Labs pending at this time. Results will be reviewed when available.  EKG: appears normal, NSR, similar to previous EKG    REVISED  CARDIAC RISK INDEX (RCRI)   The patient has the following serious cardiovascular risks for perioperative complications:   - Diabetes Mellitus (on Insulin) = 1 point    RCRI INTERPRETATION: 0 points: Class I (very low risk - 0.4% complication rate)         Signed Electronically by: Tito Salazar MD    Copy of this evaluation report is provided to requesting physician.

## 2021-06-16 NOTE — ANESTHESIA POSTPROCEDURE EVALUATION
Patient: Eileen Donald  Procedure(s):  COLONOSCOPY WITH BIOPSY AND POLYPECTOMY  Anesthesia type: MAC    Patient location: Phase II Recovery  Last vitals:   Vitals Value Taken Time   /65 03/22/21 0850   Temp 36.6  C (97.9  F) 03/22/21 0837   Pulse 86 03/22/21 0854   Resp 18 03/22/21 0850   SpO2 99 % 03/22/21 0854   Vitals shown include unvalidated device data.  Post vital signs: stable  Level of consciousness: awake and responds to simple questions  Post-anesthesia pain: pain controlled  Post-anesthesia nausea and vomiting: no  Pulmonary: unassisted, return to baseline  Cardiovascular: stable and blood pressure at baseline  Hydration: adequate  Anesthetic events: no    QCDR Measures:  ASA# 11 - Gloria-op Cardiac Arrest: ASA11B - Patient did NOT experience unanticipated cardiac arrest  ASA# 12 - Gloria-op Mortality Rate: ASA12B - Patient did NOT die  ASA# 13 - PACU Re-Intubation Rate: NA - No ETT / LMA used for case  ASA# 10 - Composite Anes Safety: ASA10A - No serious adverse event    Additional Notes:

## 2021-06-16 NOTE — TELEPHONE ENCOUNTER
Incoming call from pt to clinic wanting to let us know she had 9 polyps removed during colonoscopy this morning and they told her to hold her warfarin for atleast a day or 2. Pt is currently doing lovenox injections. Please advise on how pt should proceed.

## 2021-06-16 NOTE — TELEPHONE ENCOUNTER
Please have patient start warfarin tomorrow.  She should continue with lovenox until the warfarin brings INR up- she should check INR Friday.

## 2021-06-16 NOTE — ANESTHESIA CARE TRANSFER NOTE
Last vitals:   Vitals:    03/22/21 0837   BP: 137/83   Pulse: 87   Resp: 18   Temp: 36.6  C (97.9  F)   SpO2: 99%     Patient's level of consciousness is awake  Spontaneous respirations: yes  Maintains airway independently: yes  Dentition unchanged: yes  Oropharynx: oropharynx clear of all foreign objects    QCDR Measures:  ASA# 20 - Surgical Safety Checklist: WHO surgical safety checklist completed prior to induction    PQRS# 430 - Adult PONV Prevention: 4558F - Pt received => 2 anti-emetic agents (different classes) preop & intraop  ASA# 8 - Peds PONV Prevention: NA - Not pediatric patient, not GA or 2 or more risk factors NOT present  PQRS# 424 - Gloria-op Temp Management: 4559F - At least one body temp DOCUMENTED => 35.5C or 95.9F within required timeframe  PQRS# 426 - PACU Transfer Protocol: - Transfer of care checklist used  ASA# 14 - Acute Post-op Pain: ASA14B - Patient did NOT experience pain >= 7 out of 10

## 2021-06-16 NOTE — TELEPHONE ENCOUNTER
ANTICOAGULATION  MANAGEMENT    Assessment     Today's INR result of 2.6 is Therapeutic (goal INR of 2.5-3.5)        Warfarin taken as previously instructed    No new diet changes affecting INR    No new medication/supplements affecting INR    Continues to tolerate warfarin with no reported s/s of bleeding or thromboembolism     Previous INR was Subtherapeutic     lovenox bridge, will stop today    Plan:     Left detailed message for Marilee regarding INR result and instructed:      Warfarin Dosing Instructions:  Continue current warfarin dose 5 mg daily on mon/wed/sat; and 2.5 mg daily rest of week      Instructed patient to follow up no later than: one week, scheduled  :     Instructed to call the [facility] for any changes, questions or concerns. (#[phone])   ?   Dany Cramer RN    Subjective/Objective:      Eileen Donald, a 65 y.o. female is on warfarin. Marilee Hunt reports:     Home warfarin dose: as updated on anticoagulation calendar per template     Missed doses: No     Medication changes:  No     S/S of bleeding or thromboembolism:  No     New Injury or illness:  No     Changes in diet or alcohol consumption:  No     Upcoming surgery, procedure or cardioversion:  No    Anticoagulation Episode Summary     Current INR goal:  2.5-3.5   TTR:  60.9 % (1 y)   Next INR check:  4/13/2021   INR from last check:  2.60 (3/30/2021)   Weekly max warfarin dose:     Target end date:     INR check location:     Preferred lab:     Send INR reminders to:  [facility]    Indications    Heart valve replaced [Z95.2]           Comments:           Anticoagulation Care Providers     Provider Role Specialty Phone number    Tito Salazar MD Referring Family Medicine [phone]

## 2021-06-16 NOTE — TELEPHONE ENCOUNTER
Telephone Encounter by Cori Leos at 10/1/2019  8:00 AM     Author: Cori Leos Service: -- Author Type: --    Filed: 10/1/2019  8:01 AM Encounter Date: 9/26/2019 Status: Signed    : Cori Leos APPROVED:    Approval start date:08/28/2019  Approval end date:09/26/2020    Pharmacy has been notified of approval and will contact patient when medication is ready for pickup.

## 2021-06-17 NOTE — TELEPHONE ENCOUNTER
Telephone Encounter by Suzette Song MA at 4/20/2020  1:52 PM     Author: Suzette Song MA Service: -- Author Type: Certified Medical Assistant    Filed: 4/20/2020  1:52 PM Encounter Date: 4/14/2020 Status: Signed    : Suzette Song MA (Certified Medical Assistant)       Dr Salazar sent staff message. Called pt and relayed information.    Tito Salazar MD P Dahl[facility], [facility] [place]               Please contact patient- insurance will not cover hospital bed- peer to peer was not able to get approved- she will need to contact them for an appeal and explain why she needs the bed-   Phone: [phone]   Dr. Salazar

## 2021-06-17 NOTE — TELEPHONE ENCOUNTER
Refill Approved    Rx renewed per Medication Renewal Policy. Medication was last renewed on 8/1/20.    Antoine Fuentes, [facility] [place] Connection Triage/Med Refill 4/29/2021     Requested Prescriptions   Pending Prescriptions Disp Refills     simvastatin (ZOCOR) 20 MG tablet [Pharmacy Med Name: SIMVASTATIN TABS 20MG] 90 tablet 3     Sig: TAKE 1 TABLET AT BEDTIME       Statins Refill Protocol (Hmg CoA Reductase Inhibitors) Passed - 4/27/2021 11:16 PM        Passed - PCP or prescribing provider visit in past 12 months      Last office visit with prescriber/PCP: 10/3/2019 Tito Salazar MD OR same dept: Visit date not found OR same specialty: 10/10/2019 Antoine Webb MD  Last physical: 3/17/2021 Last MTM visit: Visit date not found   Next visit within 3 mo: Visit date not found  Next physical within 3 mo: Visit date not found  Prescriber OR PCP: Tito Salazar MD  Last diagnosis associated with med order: 1. Hyperlipidemia  - simvastatin (ZOCOR) 20 MG tablet [Pharmacy Med Name: SIMVASTATIN TABS 20MG]; TAKE 1 TABLET AT BEDTIME  Dispense: 90 tablet; Refill: 3    2. Dysphagia  - esomeprazole (NEXIUM) 20 MG capsule [Pharmacy Med Name: ESOMEPRAZOLE MAGNESIUM DR CAPS 20MG]; TAKE 1 CAPSULE DAILY  Dispense: 90 capsule; Refill: 3    If protocol passes may refill for 12 months if within 3 months of last provider visit (or a total of 15 months).                esomeprazole (NEXIUM) 20 MG capsule [Pharmacy Med Name: ESOMEPRAZOLE MAGNESIUM DR CAPS 20MG] 90 capsule 3     Sig: TAKE 1 CAPSULE DAILY       GI Medications Refill Protocol Passed - 4/27/2021 11:16 PM        Passed - PCP or prescribing provider visit in last 12 or next 3 months.     Last office visit with prescriber/PCP: 10/3/2019 Tito Salazar MD OR same dept: Visit date not found OR same specialty: 10/10/2019 Antoine Webb MD  Last physical: 3/17/2021 Last MTM visit: Visit date not found   Next visit within 3 mo: Visit date not found  Next  physical within 3 mo: Visit date not found  Prescriber OR PCP: Tito Salazar MD  Last diagnosis associated with med order: 1. Hyperlipidemia  - simvastatin (ZOCOR) 20 MG tablet [Pharmacy Med Name: SIMVASTATIN TABS 20MG]; TAKE 1 TABLET AT BEDTIME  Dispense: 90 tablet; Refill: 3    2. Dysphagia  - esomeprazole (NEXIUM) 20 MG capsule [Pharmacy Med Name: ESOMEPRAZOLE MAGNESIUM DR CAPS 20MG]; TAKE 1 CAPSULE DAILY  Dispense: 90 capsule; Refill: 3    If protocol passes may refill for 12 months if within 3 months of last provider visit (or a total of 15 months).

## 2021-06-17 NOTE — TELEPHONE ENCOUNTER
[facility] calling to say that Levalbuterol is not covered by patient's insurance. Patient insurance does cover the Albuterol inhaler (proair). Pharmacy wondering if its ok to switch inhalers.    Please call pharmacy with verbal ok or send a different prescription in.     Please call [phone]   Referrence # [id number]

## 2021-06-17 NOTE — TELEPHONE ENCOUNTER
albuterol (PROAIR HFA;PROVENTIL HFA;VENTOLIN HFA) 90 mcg/actuation inhaler [[id number]]    Electronically signed by: Ryan Grajeda DO on 09/28/18 1512 Status: Discontinued   Ordering user: Ryan Grajeda DO 09/28/18 1512 Authorized by: Ryan Grajeda DO   PRN reasons: wheezing   Frequency: Q6H PRN 09/28/18 - 10/10/19  Discontinued by: Antoine Webb MD 10/10/19 1938 [Alternate therapy]   Diagnoses   Mild intermittent asthma with exacerbation [J45.21]   Medication comments: May substitute the equivalent medication per insurance preference.

## 2021-06-17 NOTE — PROGRESS NOTES
Assessment:     1. Diabetes mellitus, new onset (H)  Glycosylated Hemoglobin A1c    metFORMIN (GLUCOPHAGE XR) 500 MG 24 hr tablet   2. Severe persistent asthma, unspecified whether complicated  levalbuterol (XOPENEX HFA) 45 mcg/actuation inhaler   3. History of pulmonary embolism  INR   4. Aortic valve replaced  INR   5. Paroxysmal atrial fibrillation (H)  INR   6. Seasonal allergic rhinitis, unspecified trigger  loratadine (CLARITIN) 10 mg tablet   7. Multiple sclerosis (H)  metFORMIN (GLUCOPHAGE XR) 500 MG 24 hr tablet   8. Adenomatous polyp of colon, unspecified part of colon       Patient with multiple sclerosis, type 2 diabetes presents today for follow-up.  Noted some fluid behind the ear membrane.  Start on Claritin.  At this time will defer nasal steroid due to previous history of nasal bleed.  Patient follows with pulmonology.  Her asthma is supposed to be multifactorial and likely from her MS and poor effort, obesity.  The ACT score is at 15 today, this is somewhat improved from previous scores of 13.  Patient needs to start checking blood sugar.  Has follow-up with diabetes educator  At time of documentation, hemoglobin A1c is only minimally improved.  Patient will benefit from Metformin.  This has been sent to the pharmacy.  Patient can talk to her neurologist if she has any concerns.  Although, recent data shows benefit.     Plan:      The diagnosis was discussed with the patient and evaluation and treatment plans outlined.  See orders for lab and imaging studies.  Further follow-up plans will be based on outcome of lab/imaging studies; see orders.  Follow up: Return in about 3 months (around 8/3/2021) for Diabetes follow up.     Subjective:      Eileen Donald is a  female who presents for evaluation of   Chief Complaint   Patient presents with     Follow-up     Problems with left ear- feels like water in it.      INR Check     INR done today     Patient with MS, COPD that is thought to be due to  "other comorbid issues and poor lung expansion, sleep apnea and obesity presents today for follow-up of her diabetes that was diagnosed with previous preop visit.  Her hemoglobin was at 7.5.  She has had a visit with diabetes educator.  She has not started checking her blood sugars yet.  She has made dietary modification and has lost weight.  She is worried about interactions with other medications  She feels there is some fluid in her left ear.  She does have allergies and takes Singulair.  She has had some nasal bleeds in the past from her CPAP  She does not take any antihistamine.    Patient recently had a colonoscopy done in March 2021 and was noted to have multiple polyps.  She is due to have a repeat in 6 months time.    The following portions of the patient's history were reviewed and updated as appropriate: allergies, current medications, past family history, past medical history, past social history, past surgical history and problem list.    Review of Systems  Constitutional: negative  Cardiovascular: negative  Gastrointestinal: negative       Objective:   /71 (Patient Site: Left Arm, Patient Position: Sitting, Cuff Size: Adult Large)   Pulse 87   Ht 5' 8\" (1.727 m)   Wt (!) 291 lb (132 kg)   LMP 11/28/2013   SpO2 90%   BMI 44.25 kg/m      General:Healthy, alert and in no acute distress  Head:  NCAT w/o lesions or tenderness  Eyes: conjunctivae/corneas clear. PERRL, EOM's intact. Fundi benign  Ears: normal TM's with mild fluid bilaterally and external ear canals bilateral  Nose: Mildly erythematous turbinates  Mouth: lips, mucosa, and tongue normal. Teeth and gums normal. No tonsillar endangerment or  erythema of pharynx  Neck: supple, symmetrical, trachea midline.  Lungs: clear to auscultation bilaterally  Heart: RRR, No murmurs        "

## 2021-06-17 NOTE — TELEPHONE ENCOUNTER
Patient states that it will be ok try.    Called [facility] giving veral ok.  They will send the new inhaler out to patient.

## 2021-06-17 NOTE — TELEPHONE ENCOUNTER
Telephone Encounter by Alondra Lora RN at 5/4/2021  9:27 AM     Author: Alondra Lora RN Service: -- Author Type: Registered Nurse    Filed: 5/4/2021 10:14 AM Encounter Date: 5/3/2021 Status: Signed    : Alondra Lora RN (Registered Nurse)       ANTICOAGULATION  MANAGEMENT    Assessment     Today's INR result of 1.70 is Subtherapeutic (goal INR of 2.5-3.5)        Warfarin taken as previously instructed    No new diet changes affecting INR     No new medications.    - on 4/26/21 completed Pfizer Covid-19 vaccinations.    Continues to tolerate warfarin with no reported s/s of bleeding or thromboembolism     Previous INR was Therapeutic at 2.60 on 4/14/21.    Plan:     Spoke on phone with Marilee regarding INR result and instructed:      Warfarin Dosing Instructions:  (evenings. 2.5mg tabs)   - reported taking one time booster with 10mg warfarin dose last night.   - Continue current warfarin dose 5 mg daily on Mon/Wed/Sat; and 2.5 mg daily rest of week.    Instructed patient to follow up no later than:  5-7 days.   - does not drive and will check with daughter.   - INR scheduled on Tues. 5/11/21 @ [facility].    Education provided: importance of consistent vitamin K intake, target INR goal and significance of current INR result and importance of notifying clinic for changes in medications    Marilee verbalizes understanding and agrees to warfarin dosing plan.    Instructed to call the [facility] for any changes, questions or concerns. (#[phone])   ?   Alondra Lora RN    Subjective/Objective:      Eileen Donald, a 65 y.o. female is on warfarin. Marilee Hunt reports:     Home warfarin dose: as updated on anticoagulation calendar per template     Missed doses: No     Medication changes:  No     S/S of bleeding or thromboembolism:  No     New Injury or illness:  No     Changes in diet or alcohol consumption:  No     Upcoming surgery, procedure or cardioversion:  No    Anticoagulation  Episode Summary     Current INR goal:  2.5-3.5   TTR:  61.5 % (1 y)   Next INR check:  5/10/2021   INR from last check:  1.70 (5/3/2021)   Weekly max warfarin dose:     Target end date:     INR check location:     Preferred lab:     Send INR reminders to:  [email or web address]    Indications    Heart valve replaced [Z95.2]           Comments:           Anticoagulation Care Providers     Provider Role Specialty Phone number    Tito Salazar MD Referring Family Medicine [phone]

## 2021-06-17 NOTE — TELEPHONE ENCOUNTER
Refill Approved    Rx renewed per Medication Renewal Policy. Medication was last renewed on 2/1/21, last OV 3/17/21.    Yaima Rodrigues, [facility] Triage/Med Refill 4/30/2021     Requested Prescriptions   Pending Prescriptions Disp Refills     buPROPion (WELLBUTRIN XL) 150 MG 24 hr tablet [Pharmacy Med Name: BUPROPION HCL XL TABS 150MG] 90 tablet 3     Sig: TAKE 1 TABLET EVERY MORNING       Tricyclics/Misc Antidepressant/Antianxiety Meds Refill Protocol Passed - 4/29/2021 11:28 PM        Passed - PCP or prescribing provider visit in last year     Last office visit with prescriber/PCP: 10/3/2019 Tito Salazar MD OR same dept: Visit date not found OR same specialty: 10/10/2019 Antoine Webb MD  Last physical: 3/17/2021 Last MTM visit: Visit date not found   Next visit within 3 mo: Visit date not found  Next physical within 3 mo: Visit date not found  Prescriber OR PCP: Tito Salazar MD  Last diagnosis associated with med order: 1. Recurrent major depressive disorder, in partial remission (H)  - buPROPion (WELLBUTRIN XL) 150 MG 24 hr tablet [Pharmacy Med Name: BUPROPION HCL XL TABS 150MG]; TAKE 1 TABLET EVERY MORNING  Dispense: 90 tablet; Refill: 3    2. Essential hypertension  - metoprolol tartrate (LOPRESSOR) 25 MG tablet [Pharmacy Med Name: METOPROLOL TARTRATE TABS 25MG]; TAKE 1 TABLET TWICE A DAY  Dispense: 180 tablet; Refill: 3  - lisinopriL (PRINIVIL,ZESTRIL) 5 MG tablet [Pharmacy Med Name: LISINOPRIL TABS 5MG]; TAKE 1 TABLET DAILY  Dispense: 90 tablet; Refill: 3    If protocol passes may refill for 12 months if within 3 months of last provider visit (or a total of 15 months).                metoprolol tartrate (LOPRESSOR) 25 MG tablet [Pharmacy Med Name: METOPROLOL TARTRATE TABS 25MG] 180 tablet 3     Sig: TAKE 1 TABLET TWICE A DAY       Beta-Blockers Refill Protocol Passed - 4/29/2021 11:28 PM        Passed - PCP or prescribing provider visit in past 12 months or next 3 months      Last office visit with prescriber/PCP: 10/3/2019 Tito Salazar MD OR same dept: Visit date not found OR same specialty: 10/10/2019 Antoine Webb MD  Last physical: 3/17/2021 Last MTM visit: Visit date not found   Next visit within 3 mo: Visit date not found  Next physical within 3 mo: Visit date not found  Prescriber OR PCP: Tito Salazar MD  Last diagnosis associated with med order: 1. Recurrent major depressive disorder, in partial remission (H)  - buPROPion (WELLBUTRIN XL) 150 MG 24 hr tablet [Pharmacy Med Name: BUPROPION HCL XL TABS 150MG]; TAKE 1 TABLET EVERY MORNING  Dispense: 90 tablet; Refill: 3    2. Essential hypertension  - metoprolol tartrate (LOPRESSOR) 25 MG tablet [Pharmacy Med Name: METOPROLOL TARTRATE TABS 25MG]; TAKE 1 TABLET TWICE A DAY  Dispense: 180 tablet; Refill: 3  - lisinopriL (PRINIVIL,ZESTRIL) 5 MG tablet [Pharmacy Med Name: LISINOPRIL TABS 5MG]; TAKE 1 TABLET DAILY  Dispense: 90 tablet; Refill: 3    If protocol passes may refill for 12 months if within 3 months of last provider visit (or a total of 15 months).             Passed - Blood pressure filed in past 12 months     BP Readings from Last 1 Encounters:   03/22/21 139/65                lisinopriL (PRINIVIL,ZESTRIL) 5 MG tablet [Pharmacy Med Name: LISINOPRIL TABS 5MG] 90 tablet 3     Sig: TAKE 1 TABLET DAILY       Ace Inhibitors Refill Protocol Passed - 4/29/2021 11:28 PM        Passed - PCP or prescribing provider visit in past 12 months       Last office visit with prescriber/PCP: 10/3/2019 Tito Salazar MD OR same dept: Visit date not found OR same specialty: 10/10/2019 Antoine Webb MD  Last physical: 3/17/2021 Last MTM visit: Visit date not found   Next visit within 3 mo: Visit date not found  Next physical within 3 mo: Visit date not found  Prescriber OR PCP: Tito Salazar MD  Last diagnosis associated with med order: 1. Recurrent major depressive disorder, in partial  remission (H)  - buPROPion (WELLBUTRIN XL) 150 MG 24 hr tablet [Pharmacy Med Name: BUPROPION HCL XL TABS 150MG]; TAKE 1 TABLET EVERY MORNING  Dispense: 90 tablet; Refill: 3    2. Essential hypertension  - metoprolol tartrate (LOPRESSOR) 25 MG tablet [Pharmacy Med Name: METOPROLOL TARTRATE TABS 25MG]; TAKE 1 TABLET TWICE A DAY  Dispense: 180 tablet; Refill: 3  - lisinopriL (PRINIVIL,ZESTRIL) 5 MG tablet [Pharmacy Med Name: LISINOPRIL TABS 5MG]; TAKE 1 TABLET DAILY  Dispense: 90 tablet; Refill: 3    If protocol passes may refill for 12 months if within 3 months of last provider visit (or a total of 15 months).             Passed - Serum Potassium in past 12 months     Lab Results   Component Value Date    Potassium 4.1 03/17/2021             Passed - Blood pressure filed in past 12 months     BP Readings from Last 1 Encounters:   03/22/21 139/65             Passed - Serum Creatinine in past 12 months     Creatinine   Date Value Ref Range Status   03/17/2021 0.92 0.60 - 1.10 mg/dL Final

## 2021-06-17 NOTE — TELEPHONE ENCOUNTER
Telephone Encounter by Alondra Lora RN at 3/26/2021  2:32 PM     Author: Alondra Lora RN Service: -- Author Type: Registered Nurse    Filed: 3/26/2021  4:18 PM Encounter Date: 3/26/2021 Status: Signed    : Alondra Lora RN (Registered Nurse)       ANTICOAGULATION  MANAGEMENT    Assessment     Today's INR result of 1.50 is Subtherapeutic (goal INR of 2.5-3.5)        Warfarin recently held as instructed which may be affecting INR    - held warfarin for 5 days, from 3/18-22.    No new diet changes affecting INR    No new medication/supplements affecting INR    - continue Lovenox injections every 12hrs till INR is =>2.5    Continues to tolerate warfarin with no reported s/s of bleeding or thromboembolism     Previous INR was Subtherapeutic at 1.28 on 3/22/21.    Consult pharmacist - Renee Wilks, LaurieD with warfarin dose.    S/p Colonoscopy with biopsy and 9-polypectomies.    Plan:     Spoke on phone with Marilee regarding INR result and instructed:      Warfarin Dosing Instructions:   (evenings. 2.5mg tabs)   - continue Lovenox injections till INR is =>2.5   - today advised one time booster with 7.5mg warfarin dose,   - then continue current warfarin dose 5 mg daily on Wed/Sat; and 2.5 mg daily rest of week.    Instructed patient to follow up no later than: INR scheduled on 3/30/21 @ [facility].    Education provided: importance of consistent vitamin K intake and target INR goal and significance of current INR result    Marilee verbalizes understanding and agrees to warfarin dosing plan.    Instructed to call the [facility] for any changes, questions or concerns. (#[phone])   ?   Alondra Lora RN    Subjective/Objective:      Eileen Donald, a 65 y.o. female is on warfarin. Marilee Hunt reports:     Home warfarin dose: verbally confirmed home dose with Marliee and updated on anticoagulation calendar     Missed doses: No     Medication changes:  No     S/S of bleeding or  thromboembolism:  No     New Injury or illness:  No     Changes in diet or alcohol consumption:  No     Upcoming surgery, procedure or cardioversion:  No    Anticoagulation Episode Summary     Current INR goal:  2.5-3.5   TTR:  61.9 % (1 y)   Next INR check:  4/2/2021   INR from last check:  1.50 (3/26/2021)   Weekly max warfarin dose:     Target end date:     INR check location:     Preferred lab:     Send INR reminders to:  [facility]    Indications    Heart valve replaced [Z95.2]           Comments:           Anticoagulation Care Providers     Provider Role Specialty Phone number    Tito Salazar MD Referring Family Medicine [phone]

## 2021-06-17 NOTE — TELEPHONE ENCOUNTER
Left message to call back for: Md message  Information to relay to patient:  LMTCB, Please see message below from

## 2021-06-17 NOTE — TELEPHONE ENCOUNTER
RN cannot approve Refill Request    RN can NOT refill this medication med is not covered by policy/route to provider. Last office visit: 10/3/2019 Tito Salazar MD Last Physical: 3/17/2021 Last MTM visit: Visit date not found Last visit same specialty: 5/3/2021 Drew Hahn MD.  Next visit within 3 mo: Visit date not found  Next physical within 3 mo: Visit date not found      Yaima Rodrigues, [facility] Triage/Med Refill 5/18/2021    Requested Prescriptions   Pending Prescriptions Disp Refills     warfarin ANTICOAGULANT (COUMADIN/JANTOVEN) 2.5 MG tablet [Pharmacy Med Name: WARFARIN TABS 2.5MG] 120 tablet 5     Sig: TAKE ONE TO TWO TABLETS DAILY AS DIRECTED, ADJUST DOSE BASED ON INR RESULTS       Warfarin Refill Protocol  Failed - 5/17/2021 11:33 PM        Failed -  Route to appropriate pool/provider     Last Anticoagulation Summary:   Anticoagulation Episode Summary     Current INR goal:  2.5-3.5   TTR:  63.4 % (11.9 mo)   Next INR check:  5/25/2021   INR from last check:  3.00 (5/11/2021)   Weekly max warfarin dose:     Target end date:     INR check location:     Preferred lab:     Send INR reminders to:  [facility]    Indications    Heart valve replaced [Z95.2]           Comments:           Anticoagulation Care Providers     Provider Role Specialty Phone number    Tito Salazar MD Referring Family Medicine [phone]                Passed - Provider visit in last year     Last office visit with prescriber/PCP: 10/3/2019 Tito Salazar MD OR same dept: 5/3/2021 Drew Hahn MD OR same specialty: 5/3/2021 Drew Hahn MD  Last physical: 3/17/2021 Last MTM visit: Visit date not found    Next appt within 3 mo: Visit date not found Next physical within 3 mo: Visit date not found  Prescriber OR PCP: Tito Salazar MD  Last diagnosis associated with med order: 1. Aortic valve replaced  - warfarin ANTICOAGULANT (COUMADIN/JANTOVEN) 2.5 MG tablet  [Pharmacy Med Name: WARFARIN TABS 2.5MG]; TAKE ONE TO TWO TABLETS DAILY AS DIRECTED, ADJUST DOSE BASED ON INR RESULTS  Dispense: 120 tablet; Refill: 5    2. Long term current use of anticoagulant therapy  - warfarin ANTICOAGULANT (COUMADIN/JANTOVEN) 2.5 MG tablet [Pharmacy Med Name: WARFARIN TABS 2.5MG]; TAKE ONE TO TWO TABLETS DAILY AS DIRECTED, ADJUST DOSE BASED ON INR RESULTS  Dispense: 120 tablet; Refill: 5    If protocol passes may refill for 6 months if within 3 months of last provider visit (or a total of 9 months).

## 2021-06-17 NOTE — TELEPHONE ENCOUNTER
Telephone Encounter by Alondra Lora RN at 1/21/2021  2:59 PM     Author: Alondra Lora RN Service: -- Author Type: Registered Nurse    Filed: 1/21/2021  3:14 PM Encounter Date: 1/21/2021 Status: Signed    : Alondra Lora RN (Registered Nurse)       ANTICOAGULATION  MANAGEMENT    Assessment     Today's INR result of 2.10 is Subtherapeutic (goal INR of 2.5-3.5)        Warfarin taken as previously instructed    reported not drinking much water may be affecting diet and INR    No new medication/supplements affecting INR    - reported eating bit more TUMS.  (no known interaction)    Continues to tolerate warfarin with no reported s/s of bleeding or thromboembolism     Previous INR was Therapeutic at 2.90 on 12/10/20.   (last 3 INR's therapeutic).    Plan:     Spoke on phone with Marilee regarding INR result and instructed:     Warfarin Dosing Instructions:  (bedtime. 2.5mg tabs)    - today, advised one time booster with 5mg warfarin dose,   - then continue current warfarin dose 5 mg daily on Mon/Wed; and 2.5 mg daily rest of week.    Instructed patient to follow up no later than:  1-2 wks.   - INR scheduled on 2/1/21 during Physical with Dr. Hahn.    Education provided: importance of consistent vitamin K intake, target INR goal and significance of current INR result and importance of notifying clinic for changes in medications    Marilee verbalizes understanding and agrees to warfarin dosing plan.    Instructed to call the [facility] for any changes, questions or concerns. (#[phone])   ?   Alondra Lora RN    Subjective/Objective:      Eileen Donald, a 65 y.o. female is on warfarin.     Eileen reports:     Home warfarin dose: as updated on anticoagulation calendar per template     Missed doses: No     Medication changes:  No     S/S of bleeding or thromboembolism:  No     New Injury or illness:  No     Changes in diet or alcohol consumption:  No     Upcoming surgery,  procedure or cardioversion:  No    Anticoagulation Episode Summary     Current INR goal:  2.5-3.5   TTR:  55.0 % (1 y)   Next INR check:  2/4/2021   INR from last check:  2.10 (1/21/2021)   Weekly max warfarin dose:     Target end date:     INR check location:     Preferred lab:     [facility]    Indications    Heart valve replaced [Z95.2]           Comments:           Anticoagulation Care Providers     Provider Role Specialty Phone number    Tito Salazar MD Referring Family Medicine [phone]

## 2021-06-17 NOTE — TELEPHONE ENCOUNTER
Telephone Encounter by Radha Slater RN at 2/1/2021  5:23 PM     Author: Radha Sltaer RN Service: -- Author Type: Registered Nurse    Filed: 2/1/2021  5:34 PM Encounter Date: 2/1/2021 Status: Signed    : Radha Slater RN (Registered Nurse)       ANTICOAGULATION  MANAGEMENT    Assessment     Today's INR result of 2.1 is Subtherapeutic (goal INR of 2.5-3.5)        Warfarin taken as previously instructed    No new diet changes affecting INR    No new medication/supplements affecting INR    Continues to tolerate warfarin with no reported s/s of bleeding or thromboembolism     Previous INR was Subtherapeutic     2/17  Eye surgery to remove scar tissue built up from previous cataract surgery- patient will confirm if she needs to interruption warfarin prior to procedure    Plan:     Spoke on phone with Marilee regarding INR result and instructed:      Warfarin Dosing Instructions:  7.5 mg booster dose today then change warfarin dose to    5 mg every Mon, Wed, Fri; 2.5 mg all other days      (0 % change)    Instructed patient to follow up no later than: 1-2 weeks appointment made.    Education provided: importance of therapeutic range and target INR goal and significance of current INR result    Marilee verbalizes understanding and agrees to warfarin dosing plan.    Instructed to call the [facility] for any changes, questions or concerns. (#[phone])   ?   Radha Slater RN    Subjective/Objective:      Eileen Donald, a 65 y.o. female is on warfarin. Marilee Hunt reports:     Home warfarin dose: as updated on anticoagulation calendar per template     Missed doses: No     Medication changes:  No     S/S of bleeding or thromboembolism:  No     New Injury or illness:  No     Changes in diet or alcohol consumption:  No     Upcoming surgery, procedure or cardioversion:  Yes: see above    Anticoagulation Episode Summary     Current INR goal:  2.5-3.5   TTR:  55.0 % (1 y)   Next INR check:   2/15/2021   INR from last check:  2.10 (2/1/2021)   Weekly max warfarin dose:     Target end date:     INR check location:     Preferred lab:     Send INR reminders to:  [facility]    Indications    Heart valve replaced [Z95.2]           Comments:           Anticoagulation Care Providers     Provider Role Specialty Phone number    Tito Salazar MD Referring Family Medicine [phone]

## 2021-06-17 NOTE — TELEPHONE ENCOUNTER
ANTICOAGULATION  MANAGEMENT    Assessment     Today's INR result of 3.00 is Therapeutic (goal INR of 2.5-3.5)        Warfarin taken as previously instructed    No new diet changes affecting INR    No interaction expected between Claritin and warfarin    - started on Claritin d/t seasonal allergies.    Continues to tolerate warfarin with no reported s/s of bleeding or thromboembolism     Previous INR was Subtherapeutic at 1.70 on 5/3/21.    Plan:     Spoke on phone with Marilee regarding INR result and instructed:      Warfarin Dosing Instructions:   (evenings. 2.5mg tabs)   - Continue current warfarin dose 5 mg daily on Mon/Wed/Sat; and 2.5 mg daily rest of week.    Instructed patient to follow up no later than:  1-2 wks.   - she will call to set INR appt after she checks with her schedule.    Education provided: importance of consistent vitamin K intake, target INR goal and significance of current INR result, no interaction anticipated between warfarin and Claritin and importance of notifying clinic for changes in medications    Marilee verbalizes understanding and agrees to warfarin dosing plan.    Instructed to call the [facility] for any changes, questions or concerns. (#[phone])   ?   Alondra Lora RN    Subjective/Objective:      Eileen Donald, a 65 y.o. female is on warfarin. Marilee Hunt reports:     Home warfarin dose: as updated on anticoagulation calendar per template     Missed doses: No     Medication changes:  Yes:  Started on Claritin.     S/S of bleeding or thromboembolism:  No     New Injury or illness:  No     Changes in diet or alcohol consumption:  No     Upcoming surgery, procedure or cardioversion:  No    Anticoagulation Episode Summary     Current INR goal:  2.5-3.5   TTR:  62.2 % (1 y)   Next INR check:  5/25/2021   INR from last check:  3.00 (5/11/2021)   Weekly max warfarin dose:     Target end date:     INR check location:     Preferred lab:     [facility]    Indications    Heart valve replaced [Z95.2]           Comments:           Anticoagulation Care Providers     Provider Role Specialty Phone number    Tito Salazar MD Referring Family Medicine [phone]

## 2021-06-17 NOTE — PROGRESS NOTES
ASSESSMENT & PLAN:  1. Rectal bleeding secondary to hemorrhoids  In ER yesterday, hemoglobin was stable, and bleeding has stopped. Encouraged patient to follow up with colon and rectal surgery even though her bleeding has stopped.     2. Colon cancer screening  [facility] records obtained (we did not have path report from last colonoscopy) and she did have an adenomatous polyp, so unfortunately not a candidate for cologuard. She is seeing colon and rectal surgery soon, and was advised to notify them that she is due for colonoscopy, in the event they are doing a procedure to see if it could be done in conjunction with colonoscopy due to chronic anticoagulation and need to bridge with LMWH.     All medications were reviewed and reconciled.    There are no Patient Instructions on file for this visit.    No orders of the defined types were placed in this encounter.    There are no discontinued medications.    No Follow-up on file.    CHIEF COMPLAINT:  Chief Complaint   Patient presents with     Rectal Bleeding     Started bleeding 5/1/18 but it has stopped thins morning, she did go to ER 05/03/18 at [facility]       HISTORY OF PRESENT ILLNESS:  Eileen is a 62 y.o. female presenting to the clinic today for ER follow up for rectal bleeding for external hemmorhoids. Eileen was seen at [facility] on 5/3/18. She presents with her daughter. An external hemorrhoid was identified as the source of the bleeding and she was referred to colorectal surgery for treatment. The bleeding stopped this morning. She is on chronic anticoagulation after aortic valve replacement. Her INR prior to hospitalization was 3.2, and hemoglobin was normal.     Health Maintenance: She notes that polyps were found during her last colonoscopy so she has not yet done Cologuard. We do not have a pathology report for the polyps found during her last colonoscopy.    REVIEW OF SYSTEMS:   She follows with cardiology in [place] in [facility]. She notes  that she was seen in the ER the week before her last hospital visit for a bad nose bleed. All other systems are negative.    PFSH:  Surgical: Cataract surgery a few weeks ago.    TOBACCO USE:  History   Smoking Status     Never Smoker   Smokeless Tobacco     Never Used       VITALS:  Vitals:    05/04/18 1316   BP: 134/76   Patient Site: Right Arm   Patient Position: Sitting   Cuff Size: Adult Regular   Pulse: 80   Resp: 18   Temp: 97.4  F (36.3  C)   TempSrc: Oral     Wt Readings from Last 3 Encounters:   03/16/18 (!) 327 lb (148.3 kg)   03/14/18 (!) 330 lb (149.7 kg)   01/24/18 (!) 326 lb (147.9 kg)     There is no height or weight on file to calculate BMI.    PHYSICAL EXAM:  GENERAL: Pleasant, well-appearing patient in no acute distress.  HEENT:  Sclerae and conjunctivae clear.  Oropharynx is clear with moist mucous membranes.  CARDIOVASCULAR: Heart regular rate and rhythm without murmur, mechanical valve sound  ABDOMEN: Obese  LUNGS: Clear to auscultation bilaterally, good air movement throughout  EXTREMITIES Warm and well-perfused.   NEURO: Alert and oriented. Grossly nonfocal.  PSYCHIATRIC: Presents on time and well groomed. Normal speech and thought content. Full affect. No abnormal movements or behaviors noted.      ADDITIONAL HISTORY SUMMARIZED (2): Reviewed 5/3/18 ED note regarding rectal bleeding.  DECISION TO OBTAIN EXTRA INFORMATION (1): None.   RADIOLOGY TESTS (1): None.  LABS (1): None.  MEDICINE TESTS (1): None.  INDEPENDENT REVIEW (2 each): None.      The visit lasted a total of 15 minutes face to face with the patient. Over 50% of the time was spent counseling and educating the patient about rectal bleeding.    IJade, am scribing for and in the presence of, Dr. Paul.    I, Dr. Paul, personally performed the services described in this documentation, as scribed by Jade Ramachandran in my presence, and it is both accurate and complete.    MEDICATIONS:  Current Outpatient Prescriptions   Medication Sig  Dispense Refill     acetaminophen (TYLENOL) 500 MG tablet Take 325-650 mg by mouth every 6 (six) hours as needed for pain (1-2 tab PRN).        acyclovir (ZOVIRAX) 400 MG tablet Take 400 mg by mouth 2 (two) times a day.        albuterol (PROVENTIL) 2.5 mg /3 mL (0.083 %) nebulizer solution Take 3 mL (2.5 mg total) by nebulization every 6 (six) hours as needed for wheezing. 75 vial 2     amoxicillin (AMOXIL) 500 MG capsule Take 2,000 mg by mouth. 1 hour prior to dental work       aspirin 81 mg chewable tablet Chew 81 mg.       AVONEX 30 mcg/0.5 mL PnKt once a week.       budesonide-formoterol (SYMBICORT) 160-4.5 mcg/actuation inhaler Inhale 2 puffs 2 (two) times a day. 1 Inhaler 3     buPROPion (WELLBUTRIN XL) 150 MG 24 hr tablet Take 1 tablet (150 mg total) by mouth every morning. 90 tablet 1     calcium, as carbonate, (TUMS) 200 mg calcium (500 mg) chewable tablet Chew 1 tablet daily.       chlorhexidine (PERIDEX) 0.12 % solution Swish and spit 15mL  mL 0     furosemide (LASIX) 40 MG tablet TK 1 T PO QAM  6     gabapentin (NEURONTIN) 300 MG capsule Take 300 mg by mouth daily.  2     ipratropium (ATROVENT) 0.02 % nebulizer solution Take 2.5 mL (0.5 mg total) by nebulization 3 (three) times a day. 250 mL 0     lisinopril (PRINIVIL,ZESTRIL) 5 MG tablet Take 1 tablet (5 mg total) by mouth daily. 30 tablet 0     LORazepam (ATIVAN) 0.5 MG tablet Take 0.5 mg by mouth daily as needed for anxiety.       metoprolol tartrate (LOPRESSOR) 25 MG tablet Take 1 tablet (25 mg total) by mouth 2 (two) times a day. 60 tablet 0     montelukast (SINGULAIR) 10 mg tablet Take 1 tablet (10 mg total) by mouth at bedtime. 30 tablet 5     omeprazole (PRILOSEC) 20 MG capsule TAKE 1 CAPSULE TWICE DAILY, 6 A.M. AND 4 P.M. 180 capsule 2     potassium chloride (MICRO-K) 10 mEq CR capsule TK 2 CS PO ONCE DAILY WITH A MEAL  6     simvastatin (ZOCOR) 20 MG tablet TAKE 1 TABLET AT BEDTIME 90 tablet 2     venlafaxine (EFFEXOR-XR) 75 MG 24 hr  capsule Take 3 capsules (225 mg total) by mouth daily. 90 capsule 0     venlafaxine (EFFEXOR-XR) 75 MG 24 hr capsule TAKE 3 CAPSULES(225 MG) BY MOUTH DAILY 90 capsule 0     VENTOLIN HFA 90 mcg/actuation inhaler USE 2 INHALATIONS EVERY 6 HOURS AS NEEDED FOR WHEEZING 18 g 2     warfarin (COUMADIN) 2.5 MG tablet TAKE 1 TABLET (2.5 MG) ON TUESDAY AND THURSDAY, AND 2 TABLETS (5 MG) ALL OTHER DAYS AS DIRECTED. (DOSE ADJUSTED BASED ON INR RESULTS) 160 tablet 1     No current facility-administered medications for this visit.        Total data points: 2

## 2021-06-17 NOTE — TELEPHONE ENCOUNTER
Anticoagulation Management    Unable to reach Eileen today.    Today's INR result of 1.70 is Subtherapeutic (goal INR of 2.5-3.5).   - on 4/26/21 completed Pfizer Covid-19 vaccinations.    Follow up required to discuss out of range INR .    Left message to take a booster dose of warfarin,  10 mg tonight.       ACN to follow up - Tues. 5/4.    Alondra Lora RN

## 2021-06-17 NOTE — TELEPHONE ENCOUNTER
Telephone Encounter by Daysi Mohan, RN at 1/22/2021  8:59 AM     Author: Daysi Mohan RN Service: -- Author Type: Registered Nurse    Filed: 1/22/2021  9:00 AM Encounter Date: 1/21/2021 Status: Signed    : Daysi Mohan RN (Registered Nurse)       RN cannot approve Refill Request    RN can NOT refill this medication Protocol failed and NO refill given. Last office visit: 10/3/2019 Tito Salazar MD Last Physical: 9/17/2019 Last MTM visit: Visit date not found Last visit same specialty: 10/10/2019 Antoine Webb MD.  Next visit within 3 mo: Visit date not found  Next physical within 3 mo: Visit date not found      [facility] Triage/Med Refill 1/22/2021    Requested Prescriptions   Pending Prescriptions Disp Refills   ? warfarin ANTICOAGULANT (COUMADIN/JANTOVEN) 2.5 MG tablet [Pharmacy Med Name: WARFARIN TABS 2.5MG] 120 tablet 5     Sig: TAKE 1 TABLET (2.5 MG) TO 2 TABLETS (5 MG) DAILY, AS DIRECTED.   ADJUST DOSE BASED ON INR RESULTS       Warfarin Refill Protocol  Failed - 1/21/2021 11:12 PM        Failed -  Route to appropriate pool/provider     Last Anticoagulation Summary:   Anticoagulation Episode Summary     Current INR goal:  2.5-3.5   TTR:  55.1 % (1 y)   Next INR check:  2/4/2021   INR from last check:  2.10 (1/21/2021)   Weekly max warfarin dose:     Target end date:     INR check location:     Preferred lab:     Send IN[facility] reminders to:  [facility]    Indications    Heart valve replaced [Z95.2]           Comments:           Anticoagulation Care Providers     Provider Role Specialty Phone number    Tito Salazar MD Referring Family Medicine [phone]                Passed - Provider visit in last year     Last office visit with prescriber/PCP: 10/3/2019 Tito Salazar MD OR same dept: Visit date not found OR same specialty: 10/10/2019 Antoine Webb MD  Last physical: 9/17/2019 Last MTM visit: Visit date not found     Next appt within 3 mo: Visit date not found Next physical within 3 mo: Visit date not found  Prescriber OR PCP: Tito Salazar MD  Last diagnosis associated with med order: 1. Aortic valve replaced  - warfarin ANTICOAGULANT (COUMADIN/JANTOVEN) 2.5 MG tablet [Pharmacy Med Name: WARFARIN TABS 2.5MG]; TAKE 1 TABLET (2.5 MG) TO 2 TABLETS (5 MG) DAILY, AS DIRECTED.   ADJUST DOSE BASED ON INR RESULTS  Dispense: 120 tablet; Refill: 5    2. Long term current use of anticoagulant therapy  - warfarin ANTICOAGULANT (COUMADIN/JANTOVEN) 2.5 MG tablet [Pharmacy Med Name: WARFARIN TABS 2.5MG]; TAKE 1 TABLET (2.5 MG) TO 2 TABLETS (5 MG) DAILY, AS DIRECTED.   ADJUST DOSE BASED ON INR RESULTS  Dispense: 120 tablet; Refill: 5    If protocol passes may refill for 6 months if within 3 months of last provider visit (or a total of 9 months).

## 2021-06-17 NOTE — TELEPHONE ENCOUNTER
Please asked patient if she has used albuterol in the past and if she has had any side effect.  If not, please let pharmacy know that it is okay to switch.

## 2021-06-18 NOTE — PATIENT INSTRUCTIONS - HE
Patient Instructions by Drew Hahn MD at 5/3/2021 12:30 PM     Author: Drew Hahn MD Service: -- Author Type: Physician    Filed: 5/3/2021 12:57 PM Encounter Date: 5/3/2021 Status: Signed    : Drew Hahn MD (Physician)       Patient Education     Allergic Rhinitis  Allergic rhinitis is an allergic reaction that affects the nose, and often the eyes. Its often known as nasal allergies. Nasal allergies are often due to things in the environment that are breathed in. Depending what you are sensitive to, nasal allergies may occur only during certain seasons. Or they may occur year round. Common indoor allergens include house dust mites, mold, cockroaches, and pet dander. Outdoor allergens include pollen from trees, grasses, and weeds.   Symptoms include a drippy, stuffy, and itchy nose. They also include sneezing and red and itchy eyes. You may feel tired more often. Severe allergies may also affect your breathing and trigger a condition called asthma.   Tests can be done to see what allergens are affecting you. You may be referred to an allergy specialist for testing and further evaluation.  Home care  Your healthcare provider may prescribe medicines to help relieve allergy symptoms. These may include oral medicines, nasal sprays, or eye drops.  Ask your provider for advice on how to avoid substances that you are allergic to. Below are a few tips for each type of allergen.  Pet dander:    Do not have pets with fur and feathers.    If you can't avoid having a pet, keep it out of your bedroom and off upholstered furniture.  Pollen:    When pollen counts are high, keep windows of your car and home closed. If possible, use an air conditioner instead.    Wear a filter mask when mowing or doing yard work.  House dust mites:    Wash bedding every week in warm water and detergent and dry on a hot setting.    Cover the mattress, box spring, and pillows with allergy covers.     If possible, sleep in  a room with no carpet, curtains, or upholstered furniture.  Cockroaches:    Store food in sealed containers.    Remove garbage from the home promptly.    Fix water leaks  Mold:    Keep humidity low by using a dehumidifier or air conditioner. Keep the dehumidifier and air conditioner clean and free of mold.    Clean moldy areas with bleach and water.  In general:    Vacuum once or twice a week. If possible, use a vacuum with a high-efficiency particulate air (HEPA) filter.    Do not smoke. Avoid cigarette smoke. Cigarette smoke is an irritant that can make symptoms worse.  Follow-up care  Follow up as advised by the healthcare provider or our staff. If you were referred to an allergy specialist, make this appointment promptly.  When to seek medical advice  Call your healthcare provider right away if the following occur:    Coughing or wheezing    Fever of 100.4 F (38 C) or higher, or as directed by your healthcare provider    Raised red bumps (hives)    Continuing symptoms, new symptoms, or worsening symptoms  Call 911 if you have:    Trouble breathing    Severe swelling of the face or severe itching of the eyes or mouth  Date Last Reviewed: 3/1/2017    4090-6193 [employer]. [address] [place] [address], [place] [address]. All rights reserved. This information is not intended as a substitute for professional medical care. Always follow your healthcare professional's instructions.

## 2021-06-18 NOTE — PATIENT INSTRUCTIONS - HE
Patient Instructions by Drew Hahn MD at 2/1/2021  2:10 PM     Author: Drew Hahn MD Service: -- Author Type: Physician    Filed: 2/1/2021  3:07 PM Encounter Date: 2/1/2021 Status: Signed    : Drew Hahn MD (Physician)       Patient Education     Prevention Guidelines, Women Ages 50 to 64  Screening tests and vaccines are an important part of managing your health. A screening test is done to find possible disorders or diseases in people who don't have any symptoms. The goal is to find a disease early so lifestyle changes can be made and you can be watched more closely to reduce the risk of disease, or to detect it early enough to treat it most effectively. Screening tests are not considered diagnostic, but are used to determine if more testing is needed. Health counseling is essential, too. Below are guidelines for these, for women ages 50 to 64. Talk with your healthcare provider to make sure youre up to date on what you need.  Screening Who needs it How often   Type 2 diabetes or prediabetes All women beginning at age 45 and women without symptoms at any age who are overweight or obese and have 1 or more additional risk factors for diabetes. At  least every 3 years   Type 2 diabetes or prediabetes All women diagnosed with gestational diabetes Lifelong testing every 3 years   Type 2 diabetes All women with prediabetes Every year   Alcohol misuse All women in this age group At routine exams   Blood pressure All women in this age group Yearly checkup if your blood pressure is normal  Normal blood pressure is less than 120/80 mm Hg  If your blood pressure reading is higher than normal, follow the advice of your healthcare provider   Breast cancer All women at average risk in this age group Yearly mammogram should be done until age 54. At age 55, you can switch to every other year or choose to continue yearly.  All women should know the possible benefits and risks of breast cancer  screening with mammograms.   Cervical cancer All women in this age group, except women who have had a complete hysterectomy Pap test every 3 years or Pap test with human papillomavirus (HPV) test every 5 years   Chlamydia Women at increased risk for infection At routine exams   Colorectal cancer All women at average risk in this age group Multiple tests are available and are used at different times. Possible tests include:    Flexible sigmoidoscopy every 5 years, or    Colonoscopy every 10 years, or    CT colonography (virtual colonoscopy) every 5 years, or    Yearly fecal occult blood test, or    Yearly fecal immunochemical test every year, or    Stool DNA test, every 3 years  If you choose a test other than a colonoscopy and have an abnormal test result, you will need to follow up with a colonoscopy. Screening advice varies among expert groups. Talk with your healthcare provider about which tests are best for you.  Some people should be screened using a different schedule because of their personal or family health history. Talk with your healthcare provider about your health history.   Depression All women in this age group At routine exams   Gonorrhea Sexually active women at increased risk for infection At routine exams   Hepatitis C Anyone at increased risk; 1 time for those born between 1945 and 1965 At routine exams   High cholesterol or triglycerides All women in this age group who are at risk for coronary artery disease At least every 5 years   HIV All women At routine exams   Lung cancer Adults age 55 to 80 who have smoked Yearly screening in smokers with 30 pack-year history of smoking or who quit within 15 years   Obesity All women in this age group At routine exams   Osteoporosis Women who are postmenopausal Ask your healthcare provider   Syphilis Women at increased risk for infection - talk with your healthcare provider At routine exams   Tuberculosis Women at increased risk for infection - talk with  your healthcare provider Ask your healthcare provider   Vision All women in this age group Ask your healthcare provider   Vaccine Who needs it How often   Chickenpox (varicella) All women in this age group who have no record of this infection or vaccine 2 doses; the second dose should be given at least 4 weeks after the first dose   Hepatitis A Women at increased risk for infection - talk with your healthcare provider 2 doses given at least 6 months apart   Hepatitis B Women at increased risk for infection - talk with your healthcare provider 3 doses over 6 months; second dose should be given 1 month after the first dose; the third dose should be given at least 2 months after the second dose and at least 4 months after the first dose   Haemophilus influenzae Type B (HIB) Women at increased risk for infection - talk with your healthcare provider 1 to 3 doses   Influenza (flu) All women in this age group Once a year   Measles, mumps, rubella (MMR) Women in this age group through their late 50s who have no record of these infections or vaccines 1 dose   Meningococcal Women at increased risk for infection - talk with your healthcare provider 1 or more doses   Pneumococcal conjugate vaccine (PCV13) and pneumococcal polysaccharide vaccine (PPSV23) Women at increased risk for infection - talk with your healthcare provider PCV13: 1 dose ages 19 to 65 (protects against 13 types of pneumococcal bacteria)  PPSV23: 1 to 2 doses through age 64, or 1 dose at 65 or older (protects against 23 types of pneumococcal bacteria)   Tetanus/diphtheria/pertussis (Td/Tdap) booster All women in this age group Td every 10 years, or a 1-time dose of Tdap instead of a Td booster after age 18, then Td every 10 years   Zoster All women ages 60 and older 1 dose   Counseling Who needs it How often   BRCA gene mutation testing for breast and ovarian cancer susceptibility Women with increased risk for having gene mutation When your risk is known    Breast cancer and chemoprevention Women at high risk for breast cancer When your risk is known   Diet and exercise Women who are overweight or obese When diagnosed, and then at routine exams   Sexually transmitted infection prevention Women at increased risk for infection - talk with your healthcare provider At routine exams   Use of daily aspirin Women ages 55 and up in this age group who are at risk for cardiovascular health problems such as stroke When your risk is known   Use of tobacco and the health effects it can cause All women in this age group Every exam   1 [employer]  Date Last Reviewed: 1/26/2016 2000-2019 [employer]. [address] [place] [address]. All rights reserved. This information is not intended as a substitute for professional medical care. Always follow your healthcare professional's instructions.

## 2021-06-18 NOTE — PROGRESS NOTES
HPI: This patient is a 61yo F with several chronic medical conditions who presents to clinic for removal of nasal packing. She was seen back in March, by Dr. Castro for throat issues. She is on warfarin and has been staying with her mother in [place]. She had a rather major nose bleed from the left and went to [facility] where she received bilateral packs. Her INR at the time was reportedly in the treatment range between 2.5-3.5. Is on antibiotics, but was not told to keep the packing moist with saline.     Past medical history, surgical history, social history, family history, medications, and allergies have been reviewed with the patient and are documented above.    Review of Systems: a 10-system review was performed. Pertinent positives are noted in the HPI and on a separate scanned document in the chart.    PHYSICAL EXAMINATION:  GEN: no acute distress, normocephalic  EYES: extraocular movements are intact, pupils are equal and round. Sclera clear.   EARS: auricles are normally formed. Hearing grossly normal to conversational speech.  NOSE: Both nares with balloon packs; these were deflated, moistened, and removed. There are no masses or lesions. The septum is non-obstructing with no visible bleeding source identified today. The right pack was completely clean; the left pack had only a spot of old blood adjacent to the very anterior septum.  OC/OP: clear, dentition is in good repair. The tongue and palate are fully mobile and symmetric.  NEURO: CN VII symmetric and strong. alert and oriented. No spontaneous nystagmus. Gait is normal.  PULM: breathing comfortably on room air, normal chest expansion with respiration    MEDICAL DECISION-MAKING: This patient is a 61yo F with epistaxis from the anterior septum. Bilateral packs were removed and no offending vessel was identified today. Recommended copious nasal saline and close monitoring of her INR.

## 2021-06-18 NOTE — PROGRESS NOTES
ASSESSMENT/PLAN  1. Edema  Patient has not had her kidney function potassium level checked for some time will check these today and make sure that she is tolerating her current dosing of Lasix  - Comprehensive Metabolic Panel    2. Heart valve replaced  Patient is due for an INR check  She was discontinued from anticoagulation management due to infrequency of checks  We will have nursing contact her to make sure that she follows up on a frequent basis for INR monitoring  Goal is between 2.5 and 3.5 with valve replacement  - INR  - Ambulatory referral to Anticoagulation Monitoring    3. Recurrent major depressive disorder, in partial remission (H)  Large discussion made today over patient's depression and anxiety and her breakthrough symptoms of panic attacks  These have been leading to symptoms of feeling that she have difficulty with breathing-these episodes have responded to Lorazepam  We discussed that the addition of the medication Wellbutrin to her venlafaxine for depressive symptom control could be exacerbating anxiety symptoms and certainly leading to the anxiety attacks  We will discontinue Wellbutrin but continue with venlafaxine  She will monitor symptoms over the next 10 days to see if this leads to some improvement  If continue to have higher anxiety levels consideration for addition of BuSpar to her venlafaxine  She will contact me in 10 days with an update sooner if needed  If symptoms of anxiety improved but depressive symptoms increase consideration for changing venlafaxine to an alternative SSRI    4. Generalized anxiety disorder  Patient has been having increasing panic attacks  We discussed them correlated the timing possibly with the addition back of Wellbutrin she has been on the medication for some time but when off medication and as she went back on his been noticing more panic attacks  We will discontinue Wellbutrin at this time and follow-up based on symptom response        SUBJECTIVE:    Chief Complaint   Patient presents with     Follow-up     Pt here today to follow up from 5/29/18(see note)Medication questions, Breathing is about the same, states has been having more panic attacks     Eileen Donald 62 y.o. female    Current Outpatient Prescriptions   Medication Sig Dispense Refill     acetaminophen (TYLENOL) 500 MG tablet Take 325-650 mg by mouth every 6 (six) hours as needed for pain (1-2 tab PRN).        acyclovir (ZOVIRAX) 400 MG tablet Take 400 mg by mouth 2 (two) times a day.        albuterol (PROVENTIL) 2.5 mg /3 mL (0.083 %) nebulizer solution Take 3 mL (2.5 mg total) by nebulization every 6 (six) hours as needed for wheezing. 75 vial 2     amoxicillin (AMOXIL) 500 MG capsule Take 2,000 mg by mouth. 1 hour prior to dental work       aspirin 81 mg chewable tablet Chew 81 mg.       AVONEX 30 mcg/0.5 mL PnKt once a week.       budesonide-formoterol (SYMBICORT) 160-4.5 mcg/actuation inhaler Inhale 2 puffs 2 (two) times a day. 1 Inhaler 3     calcium, as carbonate, (TUMS) 200 mg calcium (500 mg) chewable tablet Chew 1 tablet daily.       chlorhexidine (PERIDEX) 0.12 % solution Swish and spit 15mL  mL 0     esomeprazole (NEXIUM) 20 MG capsule Take 1 capsule (20 mg total) by mouth daily. 30 capsule 1     furosemide (LASIX) 40 MG tablet TK 1 T PO QAM  6     gabapentin (NEURONTIN) 300 MG capsule Take 300 mg by mouth daily.  2     glycerin/witch hazel leaf (WITCH HAZEL-GLYCERIN, HAMAMEL, TOP) Apply 1 each topically.       ipratropium (ATROVENT) 0.02 % nebulizer solution Take 2.5 mL (0.5 mg total) by nebulization 3 (three) times a day. 250 mL 0     lisinopril (PRINIVIL,ZESTRIL) 5 MG tablet TAKE 1 TABLET DAILY 90 tablet 3     LORazepam (ATIVAN) 0.5 MG tablet Take 1 tablet (0.5 mg total) by mouth daily as needed for anxiety. 30 tablet 0     metoprolol tartrate (LOPRESSOR) 25 MG tablet TAKE 1 TABLET TWICE A  tablet 3     montelukast (SINGULAIR) 10 mg tablet Take 1 tablet (10 mg  total) by mouth at bedtime. 30 tablet 5     omeprazole (PRILOSEC) 20 MG capsule Take 1 capsule (20 mg total) by mouth 2 (two) times a day before meals. 180 capsule 2     potassium chloride (MICRO-K) 10 mEq CR capsule TK 2 CS PO ONCE DAILY WITH A MEAL  6     simvastatin (ZOCOR) 20 MG tablet TAKE 1 TABLET AT BEDTIME 90 tablet 2     venlafaxine (EFFEXOR-XR) 75 MG 24 hr capsule TAKE 3 CAPSULES(225 MG) BY MOUTH DAILY 90 capsule 0     venlafaxine (EFFEXOR-XR) 75 MG 24 hr capsule Take 3 capsules (225 mg total) by mouth daily. 90 capsule 0     venlafaxine (EFFEXOR-XR) 75 MG 24 hr capsule TAKE 3 CAPSULES(225 MG) BY MOUTH DAILY 90 capsule 0     VENTOLIN HFA 90 mcg/actuation inhaler USE 2 INHALATIONS EVERY 6 HOURS AS NEEDED FOR WHEEZING 18 g 2     warfarin (COUMADIN) 2.5 MG tablet TAKE 1 TABLET (2.5 MG) ON TUESDAY AND THURSDAY, AND 2 TABLETS (5 MG) ALL OTHER DAYS AS DIRECTED. (DOSE ADJUSTED BASED ON INR RESULTS) 160 tablet 1     No current facility-administered medications for this visit.      Allergies: Morphine; Sulfa (sulfonamide antibiotics); and Azithromycin   Patient's last menstrual period was 11/28/2013.    HPI:   Patient is due for an INR check today  She is discontinued from anticoagulant management due to infrequency of INR monitoring  We will check an INR today and follow-up based on results  Patient is mainly here today to discuss her ongoing problems with depression and anxiety she has been having worsening problems with anxiety with increased anxiety attacks  She was seen by one my partners a few weeks ago we reviewed these notes today  After discussion with the patient we have correlated some worsening anxiety symptoms with the addition back in bupropion to her medication regimen  Discussed with her that bupropion does have a side effect of possible hastening anxiety-she does not feel she is having a problem with worsening depressive symptoms  She feels when she has her panic attacks and increased anxiety that  "she has more difficulty with breathing  She has noted trying to do nebulizers during this time without much improvement  She feels that the heat and increased humidity is making things feel worse although she does not feel tight in her chest  Discussed with her today and discussion we took her oxygen while sitting in which she was at 95-96% on room air  Her lungs are clear and open today with no wheezing or rhonchi  She is in agreement that anxiety is likely not being well controlled, leading to panic attacks and sensation of breathing abnormalities  We discussed a plan of discontinuing bupropion but continuing with venlafaxine, contact me in 10 days with change of symptoms if improvement we will continue to monitor if no improvement in anxiety continues consideration for adding BuSpar  Patient is on diuretics for lower extremity edema this is been stable but we have not checked her kidney function potassium level for some time-we will check these labs today and follow-up based on results      ROS: negative except as per HPI    OBJECTIVE:   The patient appears well, alert, oriented x 3, in no distress.  /80 (Patient Site: Right Arm, Patient Position: Sitting, Cuff Size: Adult Large)  Pulse 88  Temp 97.8  F (36.6  C) (Oral)   Resp 24  Ht 5' 8.5\" (1.74 m)  Wt (!) 328 lb 12.8 oz (149.1 kg)  LMP 11/28/2013  BMI 49.26 kg/m2    HEENT:  Nose/mouth moist, no erythema/lesions. Neck supple. No adenopathy or thyromegaly. TM's normal BL  Lungs: clear, good air entry, no wheezes, rhonchi or rales.   Cardiac: S1 and S2 normal, known murmur, regular rate and rhythm  Abdomen: normal bowel sounds, soft, obese with BMI 49  Extremities: show no edema, normal peripheral pulses.   Skin: clear, dry, no rashes/lesions  Psych- anxious, otherwise normal mood      Pt states an understanding and agrees to the above plan.  Greater than 25 minutes was spent today in interview and examination with Eileen Donald with more than " 50% of that time in counseling and coordination of care.

## 2021-06-18 NOTE — PROGRESS NOTES
Assessment:     1. Depression  venlafaxine (EFFEXOR-XR) 75 MG 24 hr capsule    LORazepam (ATIVAN) 0.5 MG tablet    Ambulatory referral to Medication Management    CANCELED: Drug Screen Prescription/OTC, Urine   2. Hypertension  Ambulatory referral to Medication Management   3. Aortic valve replaced  Ambulatory referral to Medication Management    INR    Drug Screen Prescription/OTC, Urine   4. Multiple sclerosis  Ambulatory referral to Neurology    Ambulatory referral to Medication Management   5. Impaired glucose tolerance test  Glycosylated Hemoglobin A1c    Ambulatory referral to Medication Management   6. History of pulmonary embolism  Ambulatory referral to Medication Management   7. Controlled substance agreement signed  Drug Screen Prescription/OTC, Urine   8. Asthma     9. Generalized anxiety disorder         Effexor and lorazepam refill for depression and anxiety.  Hemoglobin A1c elevated now to prediabetes  At 6.1.  Discussed weight and generalized  Deconditioning.  Patient is willing for PT/OT after she is done with her upper arm rehab.  Refer to neurology to restart interferon therapy for MS.       Plan:      The diagnosis was discussed with the patient and evaluation and treatment plans outlined.   Patient Instructions   Stop at the  to make appointment with the Pharm.D. bring all your medications at that visit including inhalers.    I have referred you to neurology to follow-up of MS. this is for ongoing therapy with Avenox  versus other treatment as necessary.  We will call you to discuss possible follow-up options.    Your hemoglobin A1c is at 6.1 today and this is at prediabetes level.  Dietary discretion and exercise is recommended.    Consider seeing dermatology for skin growth on the lower extremity and this can be addressed at a future visit.    Let me know how you do with your anxiety and if you need to use her Lorazepam more often then may need to consider cognitive behavioral  therapy and follow-up with a psychologist.    Drew Hahn MD  5/29/2018         Subjective:      Eileen Donald is a  female who presents for evaluation of   Chief Complaint   Patient presents with     Medication Management     Medication Refill     INR Check     Patient arrives today for medication refill and has the following issues that needs to be addressed.    1-Depression and anxiety.  She informs me that her anxiety is severe at times requiring lorazepam as needed.  This may happen suddenly when getting in the car anticipating going outside.  She does have medical comorbidities including MS, recent surgeries that makes her very anxious.  She takes Effexor and bupropion and may need to take Lorazepam a couple of times every week.  She denies daily use as she is aware of dependence.  We discussed doing a drug urine screen and controlled substance agreement today.  However, she was unable to give a urine sample and we will do this at another visit.    2- MS: She has not seen her neurologist or sometime.  She was on Avenox, and [facility].  She informs me that she has been out of Avenox (interferon) injections for few months.  Her gait is unstable and she usually uses a walker at home for small distances and a wheelchair outside.  We discussed about reestablishing cares with neurology and I will try to see how soon we can get her in.    3-Upper extremity fracture: following with orthopedics, now undergoing physical therapy and unable to use a walker as she is not supposed to put her body weight on the left upperarm    4- shortness of breath: This has been attributed to her moderate persistent asthma.  She saw pulmonology a few months ago and follows the plan-  Recommend:  - continue with symbicort 2 puffs BID  - albuterol as needed  - start singulair 10 mg daily  - RTC in 3 months    5- HTN: Takes the medication regularly and denies any chest pain  or palpitations.    6- Skin lesions: Has wartlike growth in the lower extremity.  She informs me that she is able to scrape them off and they are symbiotic keratosis. She is to follow-up with dermatology in future.    7- Epistaxis: She has had recent multiple episodes of epistaxis complicated by anticoagulation use.  Has seen ENT and has been using Afrin nasal spray and nasal saline with fair success.    8-status post aortic valve replacement: Denies any worsening swelling of the lower extremity, though does attribute shortness of breath that started after having the surgery.    The following portions of the patient's history were reviewed and updated as appropriate: allergies, current medications, past family history, past medical history, past social history, past surgical history and problem list.  Allergies   Allergen Reactions     Morphine      Sulfa (Sulfonamide Antibiotics)      Azithromycin Nausea And Vomiting and Rash       Current Outpatient Prescriptions on File Prior to Visit   Medication Sig Dispense Refill     acetaminophen (TYLENOL) 500 MG tablet Take 325-650 mg by mouth every 6 (six) hours as needed for pain (1-2 tab PRN).        acyclovir (ZOVIRAX) 400 MG tablet Take 400 mg by mouth 2 (two) times a day.        albuterol (PROVENTIL) 2.5 mg /3 mL (0.083 %) nebulizer solution Take 3 mL (2.5 mg total) by nebulization every 6 (six) hours as needed for wheezing. 75 vial 2     aspirin 81 mg chewable tablet Chew 81 mg.       budesonide-formoterol (SYMBICORT) 160-4.5 mcg/actuation inhaler Inhale 2 puffs 2 (two) times a day. 1 Inhaler 3     buPROPion (WELLBUTRIN XL) 150 MG 24 hr tablet Take 1 tablet (150 mg total) by mouth every morning. 90 tablet 1     calcium, as carbonate, (TUMS) 200 mg calcium (500 mg) chewable tablet Chew 1 tablet daily.       furosemide (LASIX) 40 MG tablet TK 1 T PO QAM  6     gabapentin (NEURONTIN) 300 MG capsule Take 300 mg by mouth daily.  2     glycerin/witch hazel leaf (WITCH  NORTH-GLYCERIN, HAMAMEL, TOP) Apply 1 each topically.       ipratropium (ATROVENT) 0.02 % nebulizer solution Take 2.5 mL (0.5 mg total) by nebulization 3 (three) times a day. 250 mL 0     lisinopril (PRINIVIL,ZESTRIL) 5 MG tablet TAKE 1 TABLET DAILY 90 tablet 3     metoprolol tartrate (LOPRESSOR) 25 MG tablet TAKE 1 TABLET TWICE A  tablet 3     montelukast (SINGULAIR) 10 mg tablet Take 1 tablet (10 mg total) by mouth at bedtime. 30 tablet 5     omeprazole (PRILOSEC) 20 MG capsule Take 1 capsule (20 mg total) by mouth 2 (two) times a day before meals. 180 capsule 2     potassium chloride (MICRO-K) 10 mEq CR capsule TK 2 CS PO ONCE DAILY WITH A MEAL  6     simvastatin (ZOCOR) 20 MG tablet TAKE 1 TABLET AT BEDTIME 90 tablet 2     VENTOLIN HFA 90 mcg/actuation inhaler USE 2 INHALATIONS EVERY 6 HOURS AS NEEDED FOR WHEEZING 18 g 2     warfarin (COUMADIN) 2.5 MG tablet TAKE 1 TABLET (2.5 MG) ON TUESDAY AND THURSDAY, AND 2 TABLETS (5 MG) ALL OTHER DAYS AS DIRECTED. (DOSE ADJUSTED BASED ON INR RESULTS) 160 tablet 1     amoxicillin (AMOXIL) 500 MG capsule Take 2,000 mg by mouth. 1 hour prior to dental work       AVONEX 30 mcg/0.5 mL PnKt once a week.       chlorhexidine (PERIDEX) 0.12 % solution Swish and spit 15mL  mL 0     venlafaxine (EFFEXOR-XR) 75 MG 24 hr capsule TAKE 3 CAPSULES(225 MG) BY MOUTH DAILY 90 capsule 0     No current facility-administered medications on file prior to visit.        Patient Active Problem List   Diagnosis     Depression     Asthma     Hypertension     Multiple Sclerosis     Hyperlipidemia     Impaired Glucose Tolerance Test     Dysphagia     Benign Adenomatous Polyp Of The Large Intestine     History of pulmonary embolism     Obesity     Generalized anxiety disorder     Primary osteoarthritis of both hands     Polyarthralgia     Aortic valve replaced     Controlled substance agreement signed       Past Medical History:   Diagnosis Date     Aortic valve stenosis 01/24/2017      Pre-diabetes      Pulmonary embolism        Past Surgical History:   Procedure Laterality Date     mechanical aortic prosthesis  01/24/2017     REDUCTION MAMMAPLASTY  1994       Family History   Problem Relation Age of Onset     Heart attack Father      Heart attack Sister      Heart attack Brother        Social History     Social History     Marital status:      Spouse name: N/A     Number of children: N/A     Years of education: N/A     Social History Main Topics     Smoking status: Never Smoker     Smokeless tobacco: Never Used     Alcohol use No     Drug use: No     Sexual activity: Not Asked     Other Topics Concern     None     Social History Narrative    Lives with sister in [place] due to help with medical issues.     and daughter live in [place].    Plans to return to be with family hopefully soon.    For MS- last used medications about 3 months. In remission. Needs to be back on medications as Neurologist has left.        Drew Hahn MD  5/29/2018               Review of Systems  A 12 point comprehensive review of systems was negative except as noted.       Objective:   /80  Pulse 72  Temp 98.1  F (36.7  C) (Oral)   Resp 24  Wt (!) 327 lb 4.8 oz (148.5 kg)  LMP 11/28/2013  SpO2 95%  BMI 46.96 kg/m2    General Appearance: Patient is a very pleasant  female sitting in a wheelchair and in no acute distress.  HEENT Exam: Oropharynx clear without lesion or exudate  Neck:  supple, no adenopathy, thyroid normal  Heart: Normal heart sounds, noted aortic valve click  Lungs: Normal breath sounds, Clear to auscultation bilateral  Skin exam: Noted extensive skin growth/s seborrhic keratosis on the lower extremity and dryness of the skin.  Neurological exam:  alert and oriented X 3  Hem/Lymph/Immuno exam: negative findings:  no cervical nodes, no supraclavicular nodes,     Little interest or pleasure in doing things: Not at all  Feeling down, depressed, or hopeless:  Several days  Trouble falling or staying asleep, or sleeping too much: Nearly every day  Feeling tired or having little energy: Nearly every day  Poor appetite or overeating: Several days  Feeling bad about yourself - or that you are a failure or have let yourself or your family down: Nearly every day  Trouble concentrating on things, such as reading the newspaper or watching television: Several days  Moving or speaking so slowly that other people could have noticed. Or the opposite - being so fidgety or restless that you have been moving around a lot more than usual: Not at all  Thoughts that you would be better off dead, or of hurting yourself in some way: Not at all  PHQ-9 Total Score: 12    Feeling nervous, anxious or on edge: 2  Not being able to stop or control worry: 3  Worrying too much about different things: 3  Trouble relaxin  Being so restless that is is hard to sit still: 0  Becoming easily annnoyed or irritable: 3  Feeling afraid as if something awful might happen: 2  EDNA-7 Total: 15

## 2021-06-19 NOTE — LETTER
Letter by Tito Salazar MD at      Author: Tito Salazar MD Service: -- Author Type: --    Filed:  Encounter Date: 4/19/2019 Status: (Other)         Eileen Donald  [address] [place] [id number]             April 19, 2019         Dear Ms. Donald,    Below are the results from your recent visit:    Resulted Orders   HM2(CBC w/o Differential)   Result Value Ref Range    WBC 8.9 4.0 - 11.0 thou/uL    RBC 3.09 (L) 3.80 - 5.40 mill/uL    Hemoglobin 8.8 (L) 12.0 - 16.0 g/dL    Hematocrit 27.5 (L) 35.0 - 47.0 %    MCV 89 80 - 100 fL    MCH 28.5 27.0 - 34.0 pg    MCHC 32.0 32.0 - 36.0 g/dL    RDW 14.8 (H) 11.0 - 14.5 %    Platelets 315 140 - 440 thou/uL    MPV 7.9 7.0 - 10.0 fL   Magnesium   Result Value Ref Range    Magnesium 1.7 (L) 1.8 - 2.6 mg/dL     Blood levels have dropped since hospitalization from 10.5-8.8.  We will need to recheck this again in 1 week.  Please make a lab visit in 1 week.  Your magnesium levels remain low- I sent a magnesium oral supplement to your pharmacy.    Please call with questions or contact us using [employer].    Sincerely,        Electronically signed by Tito Salazar MD

## 2021-06-19 NOTE — LETTER
Letter by Antoine Webb MD at      Author: Antoine Webb MD Service: -- Author Type: --    Filed:  Encounter Date: 10/10/2019 Status: Signed         Eileen Donald  [address]  [place] [address]             October 10, 2019         Dear Ms. Donald,    Below are the results from your recent visit:    Resulted Orders   XR Wrist Left 3 or More VWS    Narrative    EXAM: XR WRIST LEFT 3 OR MORE VWS  LOCATION: [facility]  DATE/TIME: 10/10/2019 1:22 PM    INDICATION: Pain in left wrist  COMPARISON: None.      Impression    Negative wrist. No fracture or dislocation.     Hi Marilee:  The left wrist is NEGATIVE for fracture.    Please call with questions or contact us using [employer].    Sincerely,        Electronically signed by Antoine Webb MD

## 2021-06-19 NOTE — LETTER
Letter by Antoine Webb MD at      Author: Antoine Webb MD Service: -- Author Type: --    Filed:  Encounter Date: 10/10/2019 Status: Signed         Eileen Donald  [address] [place] [address]             October 10, 2019         Dear Ms. Donald,    Below are the results from your recent visit:    Resulted Orders   XR Finger Right 2 or More VWS    Narrative    EXAM: XR FINGER RIGHT 2 OR MORE VWS  LOCATION: [facility]  DATE/TIME: 10/10/2019 1:22 PM    INDICATION: Pain in right finger(s)  COMPARISON: 6/15/2017      Impression    Advanced degenerative osteoarthritis of the DIP joint. A large dorsal osteophyte has fractured when compared to the prior study. No dislocation.       Hi Marilee:  There is a fracture of one of the osteoarthritis lumps (OSTEOPHYTE) of the right middle finger.  There is no treatment required other than splinting which is recommended until pain subsides (4-6 weeks).      Please call with questions or contact us using [email or web address].    Sincerely,        Electronically signed by Antoine Webb MD

## 2021-06-19 NOTE — LETTER
Letter by Antoine Webb MD at      Author: Antoine Webb MD Service: -- Author Type: --    Filed:  Encounter Date: 10/10/2019 Status: Signed         Eileen Donald  [address]  [place] [address]             October 10, 2019         Dear Ms. Donald,    Below are the results from your recent visit:    Resulted Orders   XR Hand Left 3 or More VWS    Narrative    EXAM: XR HAND LEFT 3 OR MORE VWS  LOCATION: [facility]  DATE/TIME: 10/10/2019 1:22 PM    INDICATION: Pain in left hand  COMPARISON: None.      Impression    Severe degenerative osteoarthritis involving the DIP joints as well as the PIP joints of the fourth and fifth digits. No fracture or dislocation.       Hi Marilee:  The left hand is NEGATIVE for fracture.    Please call with questions or contact us using [email or web address].    Sincerely,        Electronically signed by Antoine Webb MD

## 2021-06-20 NOTE — PROGRESS NOTES
ASSESSMENT/PLAN  1. Depression  We will continue with current dosing of her venlafaxine at this time  Discussed with patient that a multitude of her symptoms will hopefully be showing improvement now that she is taking her MS medication again  Hoping that she will start see some improvement in her breathing-reviewed pulmonary notes with her today and they are also suspicious that there is a neuromuscular component to her breathing problem and not an obstructive pattern  Her increased depression and anxiety have been from her deconditioning the last year-this is been exacerbated by not being on her MS medications  She just recently started back on them and we will monitor at this time  - venlafaxine (EFFEXOR-XR) 75 MG 24 hr capsule; TAKE 3 CAPSULES(225 MG) BY MOUTH DAILY  Dispense: 270 capsule; Refill: 1  - venlafaxine (EFFEXOR-XR) 75 MG 24 hr capsule; Take 3 capsules (225 mg total) by mouth daily.  Dispense: 90 capsule; Refill: 0    2. Heart valve replaced  Patient is due for an INR today  - INR  - INR    3. Multiple Sclerosis  Patient is been having increasing anxiety and depression in the last year feeling that things are deconditioning on her deteriorating  She has not been using her MS medications and was not making me aware of that  We have had her meet now with another neurologist who is gotten her back on her medications  She is hoping to start seeing some improvements has been think some of her breathing abnormalities are secondary to advancement of her MS  Patient wanted to further discuss that she thought that her heart was functioning at 60%-explained to her and discussed with her recent echo which it does not show that but she had this confused as she had an ejection fraction around 60%  Reassurance given to her that her valve replacement and heart are doing well at this time I do not think are the cause of her shortness of breath  She needs to continue work on exercise at this time and being compliant  with her medications        SUBJECTIVE:   Chief Complaint   Patient presents with     Follow-up     Edema     INR Check     Eileen Donald 62 y.o. female    Current Outpatient Prescriptions   Medication Sig Dispense Refill     acetaminophen (TYLENOL) 500 MG tablet Take 325-650 mg by mouth every 6 (six) hours as needed for pain (1-2 tab PRN).        acyclovir (ZOVIRAX) 400 MG tablet Take 400 mg by mouth 2 (two) times a day.        albuterol (PROVENTIL) 2.5 mg /3 mL (0.083 %) nebulizer solution Take 3 mL (2.5 mg total) by nebulization every 6 (six) hours as needed for wheezing. 75 vial 2     aspirin 81 mg chewable tablet Chew 81 mg.       AVONEX 30 mcg/0.5 mL PnKt once a week.       calcium, as carbonate, (TUMS) 200 mg calcium (500 mg) chewable tablet Chew 1 tablet daily.       esomeprazole (NEXIUM) 20 MG capsule TAKE 1 CAPSULE DAILY 90 capsule 3     furosemide (LASIX) 40 MG tablet TK 1 T PO QAM  6     gabapentin (NEURONTIN) 300 MG capsule Take 1 capsule (300 mg total) by mouth daily. 30 capsule 0     lisinopril (PRINIVIL,ZESTRIL) 5 MG tablet TAKE 1 TABLET DAILY 90 tablet 3     LORazepam (ATIVAN) 0.5 MG tablet Take 1 tablet (0.5 mg total) by mouth daily as needed for anxiety. 30 tablet 0     metoprolol tartrate (LOPRESSOR) 25 MG tablet TAKE 1 TABLET TWICE A  tablet 3     montelukast (SINGULAIR) 10 mg tablet Take 1 tablet (10 mg total) by mouth at bedtime. 30 tablet 5     omeprazole (PRILOSEC) 20 MG capsule Take 1 capsule (20 mg total) by mouth 2 (two) times a day before meals. 180 capsule 2     potassium chloride (MICRO-K) 10 mEq CR capsule TK 2 CS PO ONCE DAILY WITH A MEAL  6     simvastatin (ZOCOR) 20 MG tablet TAKE 1 TABLET AT BEDTIME 90 tablet 2     SYMBICORT 160-4.5 mcg/actuation inhaler USE 2 INHALATIONS TWICE A DAY 1 Inhaler 3     venlafaxine (EFFEXOR-XR) 75 MG 24 hr capsule TAKE 3 CAPSULES(225 MG) BY MOUTH DAILY 270 capsule 1     venlafaxine (EFFEXOR-XR) 75 MG 24 hr capsule Take 3 capsules (225 mg  total) by mouth daily. 90 capsule 0     VENTOLIN HFA 90 mcg/actuation inhaler USE 2 INHALATIONS EVERY 6 HOURS AS NEEDED FOR WHEEZING 18 g 2     warfarin (COUMADIN) 2.5 MG tablet TAKE 1 TABLET (2.5 MG) ON TUESDAY AND THURSDAY, AND 2 TABLETS (5 MG) ALL OTHER DAYS AS DIRECTED. (DOSE ADJUSTED BASED ON INR RESULTS) 160 tablet 1     No current facility-administered medications for this visit.      Allergies: Morphine; Sulfa (sulfonamide antibiotics); and Azithromycin   Patient's last menstrual period was 11/28/2013.    HPI:   Patient is here to follow-up regards to depression  Patient has been having increasing problems with anxiety and depression the last months  Since having her valve replaced she ran out of her MS medications, her neurologist moved to another clinic and patient stopped using her MS medications.  She did not make me aware of that  We have sent her to a new neurologist recently and she has been started back on her medications  Neurology and pulmonary and review of their notes feel that the breathing abnormalities that she has been experiencing could be secondary to uncontrolled MS symptoms with some neuromuscular involvement of the diaphragm  Patient is concerned today as she states she heard that her heart is not functioning correctly  Reassurance given to her today as she misinterpreted information given to her by her recent echo which showed an ejection fraction of 60%-her valve looks to be functioning well and she does not have reduction in her efficiency of her heart this gave her much relief today in clinic after discussing this thoroughly  We discussed the importance of maintaining compliance with her MS medication  She plans on following up closely with her new neurologist and will monitor see improvement of her breathing status over the next few weeks as she is back on her medication  Patient is agreement not to be adjusting depression and anxiety medications at this time    Patient continues to  live with her sister and not at her home-she states she is waiting for things chronic it picked up slightly-discussed with her if her home is a problem-she states is very cluttered and in clarifying with her she does not feel that they are hoarders but she states that they just do not get rid of anything.    ROS: negative except as per HPI    OBJECTIVE:   The patient appears well, alert, oriented x 3, in no distress.  /66 (Patient Site: Right Arm, Patient Position: Sitting, Cuff Size: Adult Regular) Comment (Cuff Size): Long  Pulse 80  Temp 97.5  F (36.4  C) (Oral)   LMP 11/28/2013  SpO2 93%    Lungs: clear, good air entry, no wheezes, rhonchi or rales.   Cardiac: S1 and S2 normal, no murmurs, regular rate and rhythm.   Abdomen: normal bowel sounds, soft, morbid obesity with BMI greater than 40  Extremities: show no edema, normal peripheral pulses.   Skin: clear, dry, no rashes/lesions  Psych- anxious      Pt states an understanding and agrees to the above plan.  Greater than 40 minutes was spent today in interview and examination with Eileen Donald with more than 50% of that time in counseling and coordination of care.

## 2021-06-20 NOTE — PROGRESS NOTES
Pulmonary follow-up note  9/28/2018    Referring Physician: Dr. Salazar  Reason for follow-up: Wheezing and asthma with shortness of breath      Problem List:     Patient Active Problem List   Diagnosis     Depression     Asthma     Hypertension     Multiple Sclerosis     Hyperlipidemia     Impaired Glucose Tolerance Test     Dysphagia     Benign Adenomatous Polyp Of The Large Intestine     History of pulmonary embolism     Obesity     Generalized anxiety disorder     Primary osteoarthritis of both hands     Polyarthralgia     Aortic valve replaced     Controlled substance agreement signed     Heart valve replaced       History:     Mrs. Eileen Donald is a 63 yo female with PMH significant for bicuspid aortic valve s/p AVR, history of EIB when she was younger in her 20s, HTN, MS that is well controlled, and PE who presented to pulmonary clinic for evaluation of asthma.  She stated she had always done well up until her AVR in 1/17.  She noticed that she was more short of breath, and that she had wheezing breath sounds.  She didn't really cough much, and it is nonproductive.  She did note that she had had increased phlegm and her throat felt congested.  She denied any orthopnea.  She was started on symbicort about 3 months prior to her initial visit and her shortness of breath improved.  But she still had some increased phlegm.  She is not sure that it is asthma though.  She does have several sisters that have asthma.    At follow-up today she felt that her breathing was still poor.  She still had shortness of breath especially with exertion.  The wheezing was mostly resolved.  She does continue to use Symbicort as well as nebulized albuterol and feels that this does improve her symptoms.  She is starting to question whether or not her MS could be causing some of her symptoms. Her ACT was 10.    Today at follow up, she states her breathing is fine if she doesn't move.  Every time she starts to move, she gets tight  in the chest, starts wheezing, and sits back down.  She is using her symbicort, but does not use her albuterol as she feels that somebody told her not to use it unless her shortness of breath lasted for a while.  She felt that since her shortness of breath improves with rest, then this was not long enough to be using the albuterol.  She has some cough, but no sputum production.  She is still getting rest.  She has some LE swelling, but not much, and no orthopnea.  No fevers or chills.  This has been going on for a few months.  Her ACT today (2+1+4+3+3)=13.    Past Medical History:   Diagnosis Date     Aortic valve replaced 2/15/2017     Aortic valve stenosis 01/24/2017     Asthma     Created by Conversion      Hypertension     Created by Conversion  Replacement Utility updated for latest IMO load     Multiple sclerosis (H)     Created by Conversion      Obesity 10/29/2015     Pre-diabetes      Pulmonary embolism (H)      Past Surgical History:   Procedure Laterality Date     mechanical aortic prosthesis  01/24/2017     REDUCTION MAMMAPLASTY  1994     Social History     Social History     Marital status:      Spouse name: N/A     Number of children: N/A     Years of education: N/A     Occupational History     Not on file.     Social History Main Topics     Smoking status: Never Smoker     Smokeless tobacco: Never Used     Alcohol use No     Drug use: No     Sexual activity: Not on file     Other Topics Concern     Not on file     Social History Narrative    Lives with sister in [place] due to help with medical issues.     and daughter live in [place].    Plans to return to be with family hopefully soon.    For MS- last used medications about 3 months. In remission. Needs to be back on medications as Neurologist has left.        Drew Hahn MD  5/29/2018             Family History   Problem Relation Age of Onset     Heart attack Father      Heart attack Sister      Heart attack Brother       Allergies   Allergen Reactions     Morphine      Sulfa (Sulfonamide Antibiotics)      Azithromycin Nausea And Vomiting and Rash       Review of Systems - 10 point review of systems is negative except what is mentioned in the HPI.  Noted for easing bruising and decreased wound healing.        Medications:     Current Outpatient Prescriptions on File Prior to Visit   Medication Sig Dispense Refill     acetaminophen (TYLENOL) 500 MG tablet Take 325-650 mg by mouth every 6 (six) hours as needed for pain (1-2 tab PRN).        acyclovir (ZOVIRAX) 400 MG tablet Take 400 mg by mouth 2 (two) times a day.        aspirin 81 mg chewable tablet Chew 81 mg.       AVONEX 30 mcg/0.5 mL PnKt once a week.       calcium, as carbonate, (TUMS) 200 mg calcium (500 mg) chewable tablet Chew 1 tablet daily.       esomeprazole (NEXIUM) 20 MG capsule TAKE 1 CAPSULE DAILY 90 capsule 3     furosemide (LASIX) 40 MG tablet TK 1 T PO QAM  6     gabapentin (NEURONTIN) 300 MG capsule Take 1 capsule (300 mg total) by mouth daily. 30 capsule 0     lisinopril (PRINIVIL,ZESTRIL) 5 MG tablet TAKE 1 TABLET DAILY 90 tablet 3     LORazepam (ATIVAN) 0.5 MG tablet Take 1 tablet (0.5 mg total) by mouth daily as needed for anxiety. 30 tablet 0     metoprolol tartrate (LOPRESSOR) 25 MG tablet TAKE 1 TABLET TWICE A  tablet 3     montelukast (SINGULAIR) 10 mg tablet Take 1 tablet (10 mg total) by mouth at bedtime. 30 tablet 5     potassium chloride (MICRO-K) 10 mEq CR capsule TK 2 CS PO ONCE DAILY WITH A MEAL  6     simvastatin (ZOCOR) 20 MG tablet TAKE 1 TABLET AT BEDTIME 90 tablet 2     SYMBICORT 160-4.5 mcg/actuation inhaler USE 2 INHALATIONS TWICE A DAY 1 Inhaler 3     venlafaxine (EFFEXOR-XR) 75 MG 24 hr capsule TAKE 3 CAPSULES(225 MG) BY MOUTH DAILY 270 capsule 1     venlafaxine (EFFEXOR-XR) 75 MG 24 hr capsule Take 3 capsules (225 mg total) by mouth daily. 90 capsule 0     VENTOLIN HFA 90 mcg/actuation inhaler USE 2 INHALATIONS EVERY 6 HOURS AS  NEEDED FOR WHEEZING 18 g 2     warfarin (COUMADIN) 2.5 MG tablet TAKE 1 TABLET (2.5 MG) ON TUESDAY AND THURSDAY, AND 2 TABLETS (5 MG) ALL OTHER DAYS AS DIRECTED. (DOSE ADJUSTED BASED ON INR RESULTS) 160 tablet 1     albuterol (PROVENTIL) 2.5 mg /3 mL (0.083 %) nebulizer solution Take 3 mL (2.5 mg total) by nebulization every 6 (six) hours as needed for wheezing. 75 vial 2     [DISCONTINUED] omeprazole (PRILOSEC) 20 MG capsule Take 1 capsule (20 mg total) by mouth 2 (two) times a day before meals. 180 capsule 2     No current facility-administered medications on file prior to visit.        Exam/Data:   Vitals  Vitals:    09/28/18 1433   BP: 120/82   Pulse: 89   Resp: 12   SpO2: 96%       EXAM:  GEN: Alert and oriented x3, NAD  HEENT: Nares patent, posterior oropharynx clear.  RESPIRATORY: CTAB.  No wheeze or rales  CARDIOVASCULAR: RRR, no m/r/g  GASTROINTESTINAL: Round, soft, NT/ND  HEM/ONC: no adenopathy, no ecchymosis  MSK: no clubbing.  Ambulates normally, increased kyphosis  NEURO: cranial nerves appear grossly intact, muscle strength equal  SKIN: no rash or ulcerations      DATA    PFT DATA:  FEV1/FVC is 0.90 and is normal.  FEV1 is 58% predicted and is reduced.  FVC is 49% predicted and reduced.  There was no improvement in spirometry after a single inhaled dose of bronchodilator.  TLC is 69% predicted and is reduced.  RV is 79% predicted and is reduced.  DLCO is 82% predicted and is normal when it   is corrected for hemoglobin.     Impression:  Full Pulmonary Function Test is abnormal.  PFTs are consistent with moderate-severe restrictive disease.  Spirometry is not consistent with reversibility.  There is no hyperinflation.  There is no air-trapping.  Diffusion capacity when corrected for hemoglobin is normal.    NIOX: 20 ppb and is normal.    IMAGING:  CTA CHEST PE RUN  8/7/2017 3:29 PM     INDICATION: dyspnea  TECHNIQUE: Helical acquisition through the chest was performed during the arterial phase of  contrast enhancement using IV contrast. 2D and 3D reconstructions were performed by the CT technologist. Dose reduction techniques were used.   IV CONTRAST: Iohexol (Omni) 100 mL  COMPARISON: March 10, 2016     FINDINGS:  ANGIOGRAM CHEST: Negative for pulmonary emboli. Negative for thoracic aortic dissection and aneurysms.     LUNGS AND PLEURA: No pleural effusions. Linear atelectasis right lower lung zone unchanged from the previous study.     MEDIASTINUM: Postop median sternotomy and aortic valve.. No pericardial effusion.     LIMITED UPPER ABDOMEN: Hepatic steatosis.     MUSCULOSKELETAL: Negative.     IMPRESSION:   CONCLUSION:  1.  Negative for pulmonary emboli  2.  Postop aortic valve replacement  3.  Hepatic steatosis    Personally reviewed the CT with patient.  Appears to have scattered areas of mosaic attenuation, otherwise normal.    Assessment/Plan:   Eileen Donald is a 62 y.o. female PMH significant for bicuspid aortic valve s/p AVR, history of EIB when she was younger in her 20s, HTN, MS that is well controlled, and PE who presents to pulmonary clinic for evaluation of asthma.    1. Likely asthma, moderate persistent:  She has had good improvement in symptoms while on symbicort.  Her NIOX previously was normal, but on ICS therapy.  She has mosaic attenuation, chest tightness, wheezing, improves with beta agonists, and family history of asthma, making this likely asthma.  Her PFTs do not show obstruction, but rather restriction.  She continues to use Singulair.  Her symptoms continue to respond to ICS and bronchodilator therapy.  Some of her issues are likely due MS and debility, but I want her to use her albuterol more as needed, so we can get a sense of how much she needs.  I did discuss with her using an antimuscarinic as well, likely spiriva or incruse, if she is having to use the albuterol all the time.  She will let us know if she wants to try this.  Otherwise her lungs are clear and I don't  think she needs prednisone at this time.  She has never done any pulmonary rehab so this is also a possible consideration to improve her dyspnea, but travel is currently an issue for her.  Once she moves home again to [place], this would be easier to achieve.    2. Restrictive Ventilatory Defect:  I think this is related to her body habitus, but could also be related to her MS.  Her diffusing capacity is normal.  I will see how her breathing does with the addition of singulair but since it has not significantly improved and more concerned that this is due to some neuromuscular weakness.  She has been followed by Dr. Bear in neurology and has not had major issues with her MS. Her CTA does not show any ILD.      3. Dyspnea on Exertion:  Multifactorial.  Likely due to asthma not well controlled, compounded by obesity and MS, and furthered by debility.  We will try as mentioned above to improve her asthma.  I would also consider pulmonary rehab at follow up if we have not made much headway.    Recommend:  - continue with symbicort 2 puffs BID  - albuterol as needed, either 2 puffs by inhaler or nebulizer  - singulair 10 mg daily  - consider starting antimuscarinic inhaler with spiriva or incruse  - RTC in 3 months    I spent > 25 minutes in consultation with > 50% time spent face-to-face.      Ryan Grajeda, DO

## 2021-06-21 NOTE — LETTER
"Letter by Tito Salazar MD at      Author: Tito Salazar MD Service: -- Author Type: --    Filed:  Encounter Date: 3/15/2021 Status: (Other)       Eileen Donald  [address] [place] [address]      Procedure Instructions for Anticoagulation     Your healthcare provider wants you to stop warfarin prior to your upcoming Colonoscopy on 3/22/21. To protect you from a clot while your INR is low, your provider wants you to inject a short-acting medication called enoxaparin (Lovenox) at home, this is called \"bridging\".  Below is a copy of our plan for your warfarin (Coumadin) and enoxaparin (Lovenox).     BEFORE your procedure:       5 days before    Wednesday  3/17   4 days before    Thursday  3/18   3 days before    Friday  3/19   2 days before    Saturday  3/20   1 day   before    Damian  3/21   Day of Procedure    Monday  3/22     Warfarin  (Coumadin)     Take your regular dose of warfarin     No warfarin     No warfarin   No warfarin     No warfarin     NO warfarin   in AM     Enoxaparin  (Lovenox)      Your dose:  100 mg   (one full syringe)   No enoxaparin       No enoxaparin     No enoxaparin         Enoxaparin in AM      Enoxaparin in PM   Enoxaparin ONLY in AM      NO enoxaparin in PM   NO  Enoxaparin     Day of procedure:     Bring these instructions with you to your procedure to show the provider doing the procedure. Please discuss with the provider doing the procedure if it is okay to restart warfarin and enoxaparin (Lovenox) as we have planned. See page 2 for after procedure instructions.      AFTER your procedure:    After the procedure you will go back to taking warfarin and may need to take enoxaparin (Lovenox) until your INR is at or above 2.5.         Day of Procedure    Monday  3/22   1 day   after    Tuesday  3/23   2 days   after    Wednesday  3/24   3 days   after    Thursday  3/25   4 days   after    Friday  3/26     Warfarin  (Coumadin)    5 mg of warfarin     Please " ask the provider doing your procedure if it is okay to restart your warfarin as planned   -    2.5 mg of warfarin   -    5 mg of warfarin   -    2.5 mg of warfarin   Check INR    warfarin as directed by INR clinic         Enoxaparin  (Lovenox)      Your dose:  100 mg   (one full syringe)   NO  Enoxaparin    **Please ask the provider doing your procedure if it is okay to restart your enoxaparin as planned. Please also call INR Clinic prior to restarting if polyps removed.   Enoxaparin in AM**      Enoxaparin in PM**   Enoxaparin in AM      Enoxaparin in PM   Enoxaparin in AM      Enoxaparin in PM   Enoxaparin in AM    Enoxaparin as directed by INR clinic     Please watch for symptoms of both bleeding and clotting.      Bleeding:    Call 911 or go to the emergency room if you: are coughing or throwing up blood, can't control bleeding from a cut or injury after putting pressure on it, have a sudden severe headache, have red or black stools, or have red or orange urine.    Call your care team if you have: bleeding gums, large bruises, pale skin.     Clotting:    Call 911 or go to the emergency room if you have:     Chest pain or shortness of breath    Any symptoms of a stroke including: sudden numbness or weakness in your face, arm or leg; sudden confusion or trouble speaking, reading or understanding; sudden blurred or decreased vision; sudden trouble walking or moving a part of the body; sudden severe headache for no reason    Call your care team if you have: sudden pain, tenderness or swelling in your leg or arm    Please contact the Anticoagulation Clinic at [phone] if you have any questions or concerns.     Renee Wilks, PharmD

## 2021-06-21 NOTE — PROGRESS NOTES
Assessment:     1. Abdominal wall lump  US Abdomen Limited   2. SOB (shortness of breath)       Ultrasound noted abdominal lump is not a candidate for excision biopsy.  Her shortness of breath is multifactorial.    I reviewed pulmonology note.  She should continue on her current medication.  Her oxygen level was at 92% on room air.     Plan:      The diagnosis was discussed with the patient and evaluation and treatment plans outlined.  See orders for lab and imaging studies.     Subjective:      Eileen Donald is a  female who presents for evaluation of   Chief Complaint   Patient presents with     Mass     Lump in abdominal on the left side. patient states that it feels like it has grown in size. Patient has a hard time breathing.      Patient noticed small lump on her abdominal wall about few weeks ago.  He is slightly growing in size.  It does not hurt.  She does take subcutaneous injections but informs me that mostly she takes it in her upper thighs.  This is for MS.  She continues to feel short of breath.  She is being followed with pulmonology.    The following portions of the patient's history were reviewed and updated as appropriate: allergies, current medications, past family history, past medical history, past social history, past surgical history and problem list.  Allergies   Allergen Reactions     Morphine      Sulfa (Sulfonamide Antibiotics)      Azithromycin Nausea And Vomiting and Rash       Current Outpatient Prescriptions on File Prior to Visit   Medication Sig Dispense Refill     acetaminophen (TYLENOL) 500 MG tablet Take 325-650 mg by mouth every 6 (six) hours as needed for pain (1-2 tab PRN).        acyclovir (ZOVIRAX) 400 MG tablet Take 400 mg by mouth 2 (two) times a day.        albuterol (PROAIR HFA;PROVENTIL HFA;VENTOLIN HFA) 90 mcg/actuation inhaler Inhale 2 puffs every 6 (six) hours as needed for wheezing. 1 Inhaler 3     aspirin 81 mg chewable tablet Chew 81 mg.       AVONEX 30  mcg/0.5 mL PnKt once a week.       calcium, as carbonate, (TUMS) 200 mg calcium (500 mg) chewable tablet Chew 1 tablet daily.       esomeprazole (NEXIUM) 20 MG capsule TAKE 1 CAPSULE DAILY 90 capsule 3     furosemide (LASIX) 40 MG tablet TK 1 T PO QAM  6     gabapentin (NEURONTIN) 300 MG capsule Take 1 capsule (300 mg total) by mouth daily. 30 capsule 0     lisinopril (PRINIVIL,ZESTRIL) 5 MG tablet TAKE 1 TABLET DAILY 90 tablet 3     LORazepam (ATIVAN) 0.5 MG tablet Take 1 tablet (0.5 mg total) by mouth daily as needed for anxiety. 30 tablet 0     metoprolol tartrate (LOPRESSOR) 25 MG tablet TAKE 1 TABLET TWICE A  tablet 3     montelukast (SINGULAIR) 10 mg tablet Take 1 tablet (10 mg total) by mouth at bedtime. 30 tablet 5     potassium chloride (MICRO-K) 10 mEq CR capsule TK 2 CS PO ONCE DAILY WITH A MEAL  6     simvastatin (ZOCOR) 20 MG tablet TAKE 1 TABLET AT BEDTIME 90 tablet 2     SYMBICORT 160-4.5 mcg/actuation inhaler USE 2 INHALATIONS TWICE A DAY 10.2 Inhaler 3     venlafaxine (EFFEXOR-XR) 75 MG 24 hr capsule TAKE 3 CAPSULES(225 MG) BY MOUTH DAILY 270 capsule 1     venlafaxine (EFFEXOR-XR) 75 MG 24 hr capsule Take 3 capsules (225 mg total) by mouth daily. 90 capsule 0     warfarin (COUMADIN) 2.5 MG tablet TAKE 1 TABLET (2.5 MG) ON TUESDAY AND THURSDAY, AND 2 TABLETS (5 MG) ALL OTHER DAYS AS DIRECTED. (DOSE ADJUSTED BASED ON INR RESULTS) 160 tablet 1     albuterol (PROVENTIL) 2.5 mg /3 mL (0.083 %) nebulizer solution Take 3 mL (2.5 mg total) by nebulization every 6 (six) hours as needed for wheezing. 75 vial 2     albuterol (VENTOLIN HFA) 90 mcg/actuation inhaler USE 2 INHALATIONS EVERY 6 HOURS AS NEEDED FOR WHEEZING 18 g 2     No current facility-administered medications on file prior to visit.        Patient Active Problem List   Diagnosis     Depression     Asthma     Hypertension     Multiple Sclerosis     Hyperlipidemia     Impaired Glucose Tolerance Test     Dysphagia     Benign Adenomatous Polyp  Of The Large Intestine     History of pulmonary embolism     Obesity     Generalized anxiety disorder     Primary osteoarthritis of both hands     Polyarthralgia     Aortic valve replaced     Controlled substance agreement signed     Heart valve replaced       Past Medical History:   Diagnosis Date     Aortic valve replaced 2/15/2017     Aortic valve stenosis 01/24/2017     Asthma     Created by Conversion      Hypertension     Created by Conversion  Replacement Utility updated for latest IMO load     Multiple sclerosis (H)     Created by Conversion      Obesity 10/29/2015     Pre-diabetes      Pulmonary embolism (H)        Past Surgical History:   Procedure Laterality Date     mechanical aortic prosthesis  01/24/2017     REDUCTION MAMMAPLASTY  1994       Family History   Problem Relation Age of Onset     Heart attack Father      Heart attack Sister      Heart attack Brother        Social History     Social History     Marital status:      Spouse name: N/A     Number of children: N/A     Years of education: N/A     Social History Main Topics     Smoking status: Never Smoker     Smokeless tobacco: Never Used     Alcohol use No     Drug use: No     Sexual activity: Not Asked     Other Topics Concern     None     Social History Narrative    Lives with sister in [place] due to help with medical issues.     and daughter live in [place].    Plans to return to be with family hopefully soon.    For MS- last used medications about 3 months. In remission. Needs to be back on medications as Neurologist has left.        Drew Hahn MD  5/29/2018               Review of Systems  Constitutional: negative  Gastrointestinal: negative       Objective:     /64 (Patient Site: Left Arm, Patient Position: Sitting, Cuff Size: Adult Large)  Pulse 69  Temp 98.2  F (36.8  C) (Oral)   Resp 20  Wt (!) 324 lb (147 kg)  LMP 11/28/2013  SpO2 93%  BMI 48.55 kg/m2    General Appearance: healthy, alert,  oriented and no distress  HEENT Exam: Oropharynx clear without lesion or exudate  Neck:  supple, no adenopathy, thyroid normal  Heart: Normal heart sounds, S1 and S2, No murmurs.  Lungs: Normal breath sounds, Clear to auscultation bilateral  Skin exam: Noted1.5 cm hard lump in the anterior abdominal wall on the left and upper half umbilicus.  This is more mobile.  This is nontender.  Palpated the skin on the chest abdomen and back and upper extremity and neck and no other such lumps are palpated.  Neurological exam: gait normal, alert and oriented X 3  Hem/Lymph/Immuno exam: negative findings:  no cervical nodes, no supraclavicular nodes,

## 2021-06-22 ENCOUNTER — COMMUNICATION - HEALTHEAST (OUTPATIENT)
Dept: ANTICOAGULATION | Facility: CLINIC | Age: 66
End: 2021-06-22

## 2021-06-22 NOTE — TELEPHONE ENCOUNTER
ANTICOAGULATION  MANAGEMENT    Assessment     Today's INR result of 3.5 is Therapeutic (goal INR of 2.5-3.5)        More warfarin taken than instructed which may be affecting INR     Patient took total of 25 mg this past week versus 22.5 mg this past week.    No new diet changes affecting INR    No new medication/supplements affecting INR    Continues to tolerate warfarin with no reported s/s of bleeding or thromboembolism     Previous INR was Subtherapeutic    Plan:     Spoke with Eileen regarding INR result and instructed:     Warfarin Dosing Instructions:  Change warfarin dose to    5 mg on Mon, Thu; 2.5 mg all other days     The same weekly dose as instructed but changed the days of taking 5 mg dose to Mon/Thurs versus Mon/Wed.    Instructed patient to follow up no later than: 1-2 weeks- appointment made.    Education provided: importance of therapeutic range, target INR goal and significance of current INR result and importance of taking warfarin as instructed    Eileen verbalizes understanding and agrees to warfarin dosing plan.    Instructed to call the [facility] for any changes, questions or concerns. (#[phone])   ?   Radha Slater RN    Subjective/Objective:      Eileen CASPER Donald, a 63 y.o. female is on warfarin.     Eileen reports:     Home warfarin dose: Patient confirmed that she took more warfarin this past week, anticoagulation calendar updated.     Missed doses: No     Medication changes:  No     S/S of bleeding or thromboembolism:  No     New Injury or illness:  No     Changes in diet or alcohol consumption:  No     Upcoming surgery, procedure or cardioversion:  No    Anticoagulation Episode Summary     Current INR goal:   2.5-3.5   TTR:   74.5 % (6 mo)   Next INR check:   1/11/2019   INR from last check:   3.50 (1/2/2019)   Weekly max warfarin dose:      Target end date:      INR check location:      Preferred lab:      Send INR reminders to:   ANTICOAGULATION POOL C  (DTN,VAD,CGR,GAV)    Indications    Heart valve replaced [Z95.2]           Comments:            Anticoagulation Care Providers     Provider Role Specialty Phone number    Tito Salazar MD Referring [facility] [phone]

## 2021-06-22 NOTE — TELEPHONE ENCOUNTER
Anticoagulation Annual Referral Renewal Review    Eileen Donald's chart reviewed for annual renewal of referral to anticoagulation monitoring.        Criteria for anticoagulation nurse and/or pharmacist renewal met   Warfarin indication: PE and Mechanical AVR Yes, per indication   Current with INR monitoring/compliant Yes Yes   Date of last office visit 8/15/18 Yes, had office visit within last year   Time in Therapeutic Range (TTR) 76.26 % Yes, TTR > 60%       Eileen Donald met all criteria for anticoagulation management program initiated renewal.  New INR standing orders and anticoagulation referral renewal placed.      Kristie Yanez RN  1:30 PM

## 2021-06-23 NOTE — TELEPHONE ENCOUNTER
RN cannot approve Refill Request    RN can NOT refill this medication med is not covered by policy/route to provider. Last office visit: 8/15/2018 Tito Salazar MD Last Physical: 3/14/2018 Last MTM visit: Visit date not found Last visit same specialty: 11/1/2018 Drew Hahn MD.  Next visit within 3 mo: Visit date not found  Next physical within 3 mo: Visit date not found      Trish Hurley, [facility] Triage/Med Refill 1/13/2019    Requested Prescriptions   Pending Prescriptions Disp Refills     SYMBICORT 160-4.5 mcg/actuation inhaler [Pharmacy Med Name: SYMBICORT INH AEROSOL 160/4.5]  3     Sig: USE 2 INHALATIONS TWICE A DAY    There is no refill protocol information for this order

## 2021-06-23 NOTE — TELEPHONE ENCOUNTER
ANTICOAGULATION  MANAGEMENT    Assessment     Today's INR result of 3.3 is Therapeutic (goal INR of 2.5-3.5)        Warfarin taken as previously instructed    No new diet changes affecting INR    No new medication/supplements affecting INR    Continues to tolerate warfarin with no reported s/s of bleeding or thromboembolism     Previous INR was Therapeutic    Plan:     Left a detailed message for Eileen regarding INR result and instructed:     Warfarin Dosing Instructions:  Continue current warfarin dose    5 mg on Mon, Thu; 2.5 mg all other days      (0 % change)    Instructed patient to follow up no later than: 4 weeks    Education provided: importance of therapeutic range and target INR goal and significance of current INR result        Instructed to call the [facility] for any changes, questions or concerns. (#[phone])   ?   Radha Slater RN    Subjective/Objective:      Eileen Donald, a 63 y.o. female is on warfarin.     Eileen reports:     Home warfarin dose: as updated on anticoagulation calendar per template     Missed doses: No     Medication changes:  No     S/S of bleeding or thromboembolism:  No     New Injury or illness:  Yes: yes fell off steps going to the house and broke left arm on 1/16/19      Changes in diet or alcohol consumption:  No     Upcoming surgery, procedure or cardioversion:  No    Anticoagulation Episode Summary     Current INR goal:   2.5-3.5   TTR:   76.9 % (6.6 mo)   Next INR check:   2/18/2019   INR from last check:   3.30 (1/21/2019)   Weekly max warfarin dose:      Target end date:      INR check location:      Preferred lab:      Send INR reminders to:   ANTICOAGULATION POOL C (DTN,VAD,CGR,GAV)    Indications    Heart valve replaced [Z95.2]           Comments:            Anticoagulation Care Providers     Provider Role Specialty Phone number    Tito Salazar MD Referring Family Medicine [phone]

## 2021-06-23 NOTE — TELEPHONE ENCOUNTER
Called and discussed with patient- she fell 2 days ago- was seen and found to have fracture in arm / shoulder- she is following with ortho.  She has been having increasing bruising and pain since the fall which we discussed is likely muscle related- she will be seen if symptoms worsen or new ones develop.  Dr. Salazar

## 2021-06-23 NOTE — TELEPHONE ENCOUNTER
RN cannot approve Refill Request    RN can NOT refill this medication med is not covered by policy/route to provider.     Daysi Mohan, [facility] Triage/Med Refill 1/12/2019    Requested Prescriptions   Pending Prescriptions Disp Refills     warfarin (COUMADIN/JANTOVEN) 2.5 MG tablet [Pharmacy Med Name: WARFARIN TABS 2.5MG] 160 tablet 1     Sig: TAKE 1 TABLET (2.5 MG) ON TUESDAY AND THURSDAY, AND 2 TABLETS (5 MG) ALL OTHER DAYS AS DIRECTED. (DOSE ADJUSTED BASED ON INR RESULTS)    Warfarin Refill Protocol  Failed - 1/11/2019 11:28 PM       Failed -  Route to appropriate pool/provider    Last Anticoagulation Summary:   Anticoagulation Episode Summary     Current INR goal:   2.5-3.5   TTR:   75.7 % (6.3 mo)   Next INR check:   1/25/2019   INR from last check:   2.90 (1/11/2019)   Weekly max warfarin dose:      Target end date:      INR check location:      Preferred lab:      Send INR reminders to:   ANTICOAGULATION POOL C (DTN,VAD,CGR,GAV)    Indications    Heart valve replaced [Z95.2]           Comments:            Anticoagulation Care Providers     Provider Role Specialty Phone number    Tito Salazar MD Referring Family Medicine [phone]               Passed - Provider visit in last year    Last office visit with prescriber/PCP: 8/15/2018 Tito Salazar MD OR same dept: 11/1/2018 Drew Hahn MD OR same specialty: 11/1/2018 Drew Hahn MD  Last physical: 3/14/2018 Last MTM visit: Visit date not found    Next appt within 3 mo: Visit date not found Next physical within 3 mo: Visit date not found  Prescriber OR PCP: Tito Salazar MD  Last diagnosis associated with med order: 1. Aortic valve replaced  - warfarin (COUMADIN/JANTOVEN) 2.5 MG tablet [Pharmacy Med Name: WARFARIN TABS 2.5MG]; TAKE 1 TABLET (2.5 MG) ON TUESDAY AND THURSDAY, AND 2 TABLETS (5 MG) ALL OTHER DAYS AS DIRECTED. (DOSE ADJUSTED BASED ON INR RESULTS)  Dispense: 160 tablet; Refill: 1    2. Long term  current use of anticoagulant therapy  - warfarin (COUMADIN/JANTOVEN) 2.5 MG tablet [Pharmacy Med Name: WARFARIN TABS 2.5MG]; TAKE 1 TABLET (2.5 MG) ON TUESDAY AND THURSDAY, AND 2 TABLETS (5 MG) ALL OTHER DAYS AS DIRECTED. (DOSE ADJUSTED BASED ON INR RESULTS)  Dispense: 160 tablet; Refill: 1    If protocol passes may refill for 6 months if within 3 months of last provider visit (or a total of 9 months).

## 2021-06-23 NOTE — TELEPHONE ENCOUNTER
Lab Results   Component Value Date    INR 2.90 (H) 01/11/2019    INR 3.50 (H) 01/02/2019    INR 1.80 (H) 12/17/2018       Patient's current Warfarin doses:    5 mg on Mon, Thu; 2.5 mg all other days        Next INR check is on 1/25/19      Patient's last OV with PCP was on 11/1/18    Warfarin prescription 3 month supply and one refill sent to patient's pharmacy today.    Radha Slater RN

## 2021-06-23 NOTE — TELEPHONE ENCOUNTER
"Patient reports she fell off some steps, and she has many bruises on her body.  She was trying to make an appointment today with Dr. Salazar, and he is full, with no openings until Feb. 7th.    Patient is asking Dr. Salazar to PLEASE call her. She reports, \"I have fallen again, and I need to talk to him\".    Rupal Hugo RN  [facility] Triage/refill nurse      "

## 2021-06-23 NOTE — TELEPHONE ENCOUNTER
ANTICOAGULATION  MANAGEMENT    Assessment     Today's INR result of 2.9 is Therapeutic (goal INR of 2.5-3.5)        Warfarin taken as previously instructed    No new diet changes affecting INR    No new medication/supplements affecting INR    Continues to tolerate warfarin with no reported s/s of bleeding or thromboembolism     Previous INR was Therapeutic    Plan:     Left a detailed message for Eileen regarding INR result and instructed:     Warfarin Dosing Instructions:  Continue current warfarin dose    5 mg on Mon, Thu; 2.5 mg all other days     (0 % change)    Instructed patient to follow up no later than: 2 weeks.  Made aware that INR scheduled on 1/21 prior to her nurse visit at [facility]    Education provided: importance of therapeutic range        Instructed to call the [facility] for any changes, questions or concerns. (#[phone])   ?   Radha Slater RN    Subjective/Objective:      Eileen Donald, a 63 y.o. female is on warfarin.     Eileen reports:     Home warfarin dose: as updated on anticoagulation calendar per template     Missed doses: No     Medication changes:  No     S/S of bleeding or thromboembolism:  No     New Injury or illness:  No     Changes in diet or alcohol consumption:  No     Upcoming surgery, procedure or cardioversion:  No    Anticoagulation Episode Summary     Current INR goal:   2.5-3.5   TTR:   75.7 % (6.3 mo)   Next INR check:   1/25/2019   INR from last check:   2.90 (1/11/2019)   Weekly max warfarin dose:      Target end date:      INR check location:      Preferred lab:      Send INR reminders to:   ANTICOAGULATION POOL C (DTN,VAD,CGR,GAV)    Indications    Heart valve replaced [Z95.2]           Comments:            Anticoagulation Care Providers     Provider Role Specialty Phone number    Tito Salazar MD Referring Family Medicine [phone]

## 2021-06-24 NOTE — PATIENT INSTRUCTIONS - HE
Let us know if you are going to have surgery, and if it's done at [place] of course my partners and I go there.    Keep on the symbicort and singulair.      Come back to see me in 3 months.

## 2021-06-24 NOTE — TELEPHONE ENCOUNTER
ANTICOAGULATION  MANAGEMENT    Assessment     Today's INR result of 3.1 is Therapeutic (goal INR of 2.5-3.5)        Warfarin taken as previously instructed    No new diet changes affecting INR     Will complete 10 days of cephalexin on 3/5/19 for vaginal infection    Interaction between cephalexin and warfarin may be affecting INR    Continues to tolerate warfarin with no reported s/s of bleeding or thromboembolism     Previous INR was Therapeutic       4/10 Scheduled for left shoulder replacement- pre op with PCP is on 4/2/19    Per chart review: [facility]:    Telephone Encounter - Jo Ann Díaz PA - 03/04/2019 12:59 PM CST  Given high thrombmotic risk (mechanical valve, AFib, h/o PE) and intermediate bleeding risk (joint surgery),   Recommend stopping Warfarin 5 days pre-procedurally. She is to begin therapeutic dose LMWH thee days prior to procedure and give last dose of LMWH 24 hrs prior to procedure. Check INR day of procedure and resume AC as soon as poss        Plan:     Spoke with Eileen regarding INR result and instructed:     Warfarin Dosing Instructions:  Continue current warfarin dose    5 mg on Mon, Thu; 2.5 mg all other days     (0 % change)    Instructed patient to follow up no later than: 2 weeks, appointment made.    Education provided: importance of therapeutic range, target INR goal and significance of current INR result, potential interaction between warfarin and cephalexin and monitoring for bleeding signs and symptoms    Eileen verbalizes understanding and agrees to warfarin dosing plan.    Instructed to call the [facility] for any changes, questions or concerns. (#[phone])   ?   Radha Slater RN    Subjective/Objective:      Eileen Donald, a 63 y.o. female is on warfarin.     Eileen reports:     Home warfarin dose: as updated on anticoagulation calendar per template     Missed doses: No     Medication changes:  Yes: cephalexin will  complete 10 days course on 3/5/19.     S/S of bleeding or thromboembolism:  No     New Injury or illness:  Yes: broke left shoulder again 2/14/19     Changes in diet or alcohol consumption:  No     Upcoming surgery, procedure or cardioversion:  Yes: 4/10 left shoulder shoulder.    Anticoagulation Episode Summary     Current INR goal:   2.5-3.5   TTR:   80.9 % (8 mo)   Next INR check:   3/20/2019   INR from last check:   3.10 (3/4/2019)   Weekly max warfarin dose:      Target end date:      INR check location:      Preferred lab:      Send INR reminders to:   ANTICOAGULATION POOL C (DTN,VAD,CGR,GAV)    Indications    Heart valve replaced [Z95.2]           Comments:            Anticoagulation Care Providers     Provider Role Specialty Phone number    Tito Salazar MD Referring Family Medicine [phone]

## 2021-06-24 NOTE — PROGRESS NOTES
Pulmonary follow-up note  2/19/2019    Referring Physician: Dr. Salazar  Reason for follow-up: Wheezing and asthma with shortness of breath, moderate persistent asthma      Problem List:     Patient Active Problem List   Diagnosis     Depression     Asthma     Hypertension     Multiple Sclerosis     Hyperlipidemia     Impaired Glucose Tolerance Test     Dysphagia     Benign Adenomatous Polyp Of The Large Intestine     History of pulmonary embolism     Obesity     Generalized anxiety disorder     Primary osteoarthritis of both hands     Polyarthralgia     Aortic valve replaced     Controlled substance agreement signed     Heart valve replaced       History:     Mrs. Eileen Donald is a 61 yo female with PMH significant for bicuspid aortic valve s/p AVR, history of EIB when she was younger in her 20s, HTN, MS that is well controlled, and PE who presented to pulmonary clinic for evaluation of asthma.  She stated she had always done well up until her AVR in 1/17.  She noticed that she was more short of breath, and that she had wheezing breath sounds.  She didn't really cough much, and it is nonproductive.  She did note that she had had increased phlegm and her throat felt congested.  She denied any orthopnea.  She was started on symbicort about 3 months prior to her initial visit and her shortness of breath improved.  But she still had some increased phlegm.  She is not sure that it is asthma though.  She does have several sisters that have asthma.    At follow-up today she felt that her breathing was still poor.  She still had shortness of breath especially with exertion.  The wheezing was mostly resolved.  She does continue to use Symbicort as well as nebulized albuterol and feels that this does improve her symptoms.  She is starting to question whether or not her MS could be causing some of her symptoms. Her ACT was 10.    Today at follow up, she states her breathing is doing better.  It was doing much better but  then she fractured her left humeral head/greater tuberosity.  She feels that the pain is doing much better.  She is very limited in how much she can move it, but overall feels it is okay.  She was seen by Dr. Hall in cardiology who increased her lasix.  She feels that this improved her breathing as well.  Finally, she has started back on medications for her MS.    Past Medical History:   Diagnosis Date     Aortic valve replaced 2/15/2017     Aortic valve stenosis 01/24/2017     Asthma     Created by Conversion      Hypertension     Created by Conversion  Replacement Utility updated for latest IMO load     Multiple sclerosis (H)     Created by Conversion      Obesity 10/29/2015     Pre-diabetes      Pulmonary embolism (H)      Past Surgical History:   Procedure Laterality Date     mechanical aortic prosthesis  01/24/2017     REDUCTION MAMMAPLASTY  1994     Social History     Socioeconomic History     Marital status:      Spouse name: Not on file     Number of children: Not on file     Years of education: Not on file     Highest education level: Not on file   Social Needs     Financial resource strain: Not on file     Food insecurity - worry: Not on file     Food insecurity - inability: Not on file     Transportation needs - medical: Not on file     Transportation needs - non-medical: Not on file   Occupational History     Not on file   Tobacco Use     Smoking status: Never Smoker     Smokeless tobacco: Never Used   Substance and Sexual Activity     Alcohol use: No     Drug use: No     Sexual activity: Not on file   Other Topics Concern     Not on file   Social History Narrative    Lives with sister in [place] due to help with medical issues.     and daughter live in [place].    Plans to return to be with family hopefully soon.    For MS- last used medications about 3 months. In remission. Needs to be back on medications as Neurologist has left.        Drew Hahn MD  5/29/2018         Family  History   Problem Relation Age of Onset     Heart attack Father      Heart attack Sister      Heart attack Brother      Allergies   Allergen Reactions     Morphine      Sulfa (Sulfonamide Antibiotics)      Azithromycin Nausea And Vomiting and Rash       Review of Systems - 10 point review of systems is negative except what is mentioned in the HPI.  Noted for easing bruising and decreased wound healing.        Medications:     Current Outpatient Medications on File Prior to Visit   Medication Sig Dispense Refill     acetaminophen (TYLENOL) 500 MG tablet Take 325-650 mg by mouth every 6 (six) hours as needed for pain (1-2 tab PRN).        acyclovir (ZOVIRAX) 400 MG tablet Take 400 mg by mouth 2 (two) times a day.        albuterol (PROAIR HFA;PROVENTIL HFA;VENTOLIN HFA) 90 mcg/actuation inhaler Inhale 2 puffs every 6 (six) hours as needed for wheezing. 1 Inhaler 3     aspirin 81 mg chewable tablet Chew 81 mg.       AVONEX 30 mcg/0.5 mL PnKt once a week.       calcium, as carbonate, (TUMS) 200 mg calcium (500 mg) chewable tablet Chew 1 tablet daily.       esomeprazole (NEXIUM) 20 MG capsule TAKE 1 CAPSULE DAILY 90 capsule 3     furosemide (LASIX) 40 MG tablet TK 1 T PO QAM  6     gabapentin (NEURONTIN) 300 MG capsule Take 1 capsule (300 mg total) by mouth daily. 30 capsule 0     lisinopril (PRINIVIL,ZESTRIL) 5 MG tablet TAKE 1 TABLET DAILY 90 tablet 3     LORazepam (ATIVAN) 0.5 MG tablet Take 1 tablet (0.5 mg total) by mouth daily as needed for anxiety. 30 tablet 0     metoprolol tartrate (LOPRESSOR) 25 MG tablet TAKE 1 TABLET TWICE A  tablet 3     potassium chloride (MICRO-K) 10 mEq CR capsule TK 2 CS PO ONCE DAILY WITH A MEAL  6     simvastatin (ZOCOR) 20 MG tablet Take 1 tablet (20 mg total) by mouth at bedtime. 90 tablet 2     SYMBICORT 160-4.5 mcg/actuation inhaler USE 2 INHALATIONS TWICE A DAY 1 Inhaler 3     venlafaxine (EFFEXOR-XR) 75 MG 24 hr capsule Take 3 capsules (225 mg total) by mouth daily. 90  capsule 0     warfarin (COUMADIN/JANTOVEN) 2.5 MG tablet Take 1-2 tablets (2.5-5 mg total) by mouth daily. Adjust dose per INR results as instructed. 130 tablet 1     albuterol (PROVENTIL) 2.5 mg /3 mL (0.083 %) nebulizer solution Take 3 mL (2.5 mg total) by nebulization every 6 (six) hours as needed for wheezing. 75 vial 2     montelukast (SINGULAIR) 10 mg tablet Take 1 tablet (10 mg total) by mouth at bedtime. 30 tablet 6     [DISCONTINUED] venlafaxine (EFFEXOR-XR) 75 MG 24 hr capsule TAKE 3 CAPSULES(225 MG) BY MOUTH DAILY 270 capsule 1     No current facility-administered medications on file prior to visit.        Exam/Data:   Vitals  Vitals:    02/19/19 1054   BP: 116/66   Pulse: 80   Resp: 24   SpO2: 95%       EXAM:  GEN: Alert and oriented x3, NAD  HEENT: Nares patent, posterior oropharynx clear.  RESPIRATORY: CTAB.  No wheeze or rales  CARDIOVASCULAR: RRR, no m/r/g  GASTROINTESTINAL: Round, soft, NT/ND  HEM/ONC: no adenopathy, no ecchymosis  MSK: no clubbing.  Sitting in a wheel chair.  Minimal ecchymosis at left shoulder.  No obvious deformity.  NEURO: cranial nerves appear grossly intact, muscle strength equal  SKIN: no rash or ulcerations      DATA    PFT DATA:  FEV1/FVC is 0.90 and is normal.  FEV1 is 58% predicted and is reduced.  FVC is 49% predicted and reduced.  There was no improvement in spirometry after a single inhaled dose of bronchodilator.  TLC is 69% predicted and is reduced.  RV is 79% predicted and is reduced.  DLCO is 82% predicted and is normal when it   is corrected for hemoglobin.     Impression:  Full Pulmonary Function Test is abnormal.  PFTs are consistent with moderate-severe restrictive disease.  Spirometry is not consistent with reversibility.  There is no hyperinflation.  There is no air-trapping.  Diffusion capacity when corrected for hemoglobin is normal.    NIOX: 20 ppb and is normal.    IMAGING:  CTA CHEST PE RUN  8/7/2017 3:29 PM     INDICATION: dyspnea  TECHNIQUE: Helical  acquisition through the chest was performed during the arterial phase of contrast enhancement using IV contrast. 2D and 3D reconstructions were performed by the CT technologist. Dose reduction techniques were used.   IV CONTRAST: Iohexol (Omni) 100 mL  COMPARISON: March 10, 2016     FINDINGS:  ANGIOGRAM CHEST: Negative for pulmonary emboli. Negative for thoracic aortic dissection and aneurysms.     LUNGS AND PLEURA: No pleural effusions. Linear atelectasis right lower lung zone unchanged from the previous study.     MEDIASTINUM: Postop median sternotomy and aortic valve.. No pericardial effusion.     LIMITED UPPER ABDOMEN: Hepatic steatosis.     MUSCULOSKELETAL: Negative.     IMPRESSION:   CONCLUSION:  1.  Negative for pulmonary emboli  2.  Postop aortic valve replacement  3.  Hepatic steatosis    Personally reviewed the CT with patient.  Appears to have scattered areas of mosaic attenuation, otherwise normal.    Assessment/Plan:   Eileen Donald is a 63 y.o. female PMH significant for bicuspid aortic valve s/p AVR, history of EIB when she was younger in her 20s, HTN, MS that is well controlled, and PE who presents to pulmonary clinic for evaluation of asthma.    1. Likely asthma, moderate persistent:  She has had good improvement in symptoms while on symbicort.  Her NIOX previously was normal, but on ICS therapy.  She has mosaic attenuation, chest tightness, wheezing, improves with beta agonists, and family history of asthma, making this likely asthma.  Her PFTs do not show obstruction, but rather restriction.  She continues to use Singulair.  Her symptoms continue to respond to ICS and bronchodilator therapy.  Some of her issues are likely due MS and debility, but I want her to use her albuterol more as needed, so we can get a sense of how much she needs.  I did discuss with her using an antimuscarinic as well, likely spiriva or incruse, if she is having to use the albuterol all the time.  She will let us know  if she wants to try this.  She is doing okay otherwise so we will continue with Symbicort and Singulair.    2. Restrictive Ventilatory Defect:  I think this is related to her body habitus, but could also be related to her MS.  Her diffusing capacity is normal.  I will see how her breathing does with the addition of singulair but since it has not significantly improved and more concerned that this is due to some neuromuscular weakness.  She has been followed by Dr. Bear in neurology and has not had major issues with her MS. Her CTA does not show any ILD.      3. Dyspnea on Exertion:  Multifactorial.  Likely due to asthma not well controlled, compounded by obesity and MS, and furthered by debility.  She has also responded well to increased diuretic.  I would also consider pulmonary rehab at follow up if we have not made much headway.    Recommend:  - continue with symbicort 2 puffs BID  - albuterol as needed, either 2 puffs by inhaler or nebulizer  - singulair 10 mg daily  - consider starting antimuscarinic inhaler with spiriva or incruse  - RTC in 3 months    I spent > 15 minutes in consultation with > 50% time spent face-to-face.      Ryan Grajeda, DO

## 2021-06-24 NOTE — TELEPHONE ENCOUNTER
ANTICOAGULATION  MANAGEMENT PROGRAM    Eileen Donald is overdue for INR check.  Reminder call made.    Left message for Eileen reminding them to have INR checked at upcoming office visit on 2/27/19    Radha Slater RN

## 2021-06-24 NOTE — TELEPHONE ENCOUNTER
Patient Returning Call  Reason for call:  Calling ACN back  Information relayed to patient:  Not available   Patient has additional questions:  Yes  If YES, what are your questions/concerns:  results  Okay to leave a detailed message?: Yes

## 2021-06-24 NOTE — PROGRESS NOTES
ASSESSMENT/PLAN  1. Infected cyst of skin  Patient has an infected cyst on the mid labia majora roughly 2 cm in diameter  Discussed with the patient oral antibiotics to try to calm down the area but if not improving the need for incision and drainage  She will contact me if symptoms are not improving and we would refer to gynecology for further treatment  - cephalexin (KEFLEX) 500 MG capsule; Take 1 capsule (500 mg total) by mouth 3 (three) times a day for 10 days.  Dispense: 30 capsule; Refill: 0    2. Moderate persistent asthma, unspecified whether complicated  Patient needs a refill of her Singulair which we will send to her pharmacy  She is recently met with pulmonary and will review the records  She is maintaining on current medications and inhalers  - montelukast (SINGULAIR) 10 mg tablet; Take 1 tablet (10 mg total) by mouth at bedtime.  Dispense: 30 tablet; Refill: 6    3. Other closed displaced fracture of proximal end of left humerus, initial encounter  Patient was seen by orthopedic office and after a fall sustained a fracture to the left humerus proximal to the head  It is displaced and closed  They discussed with her the possibility of just monitoring or surgical correction  She is worried about the possibility of just monitoring at this time and long-term complications which I am in agreement  She like a referral to [facility] for second opinion  - Ambulatory referral to Orthopedics        SUBJECTIVE:   Chief Complaint   Patient presents with     Cyst     Cyst in the genital area, hard and hurts. 2 days     Nail Problem     left foot nal fungus x 1 month, patient has had some tightness in lower legs x 2 months     Shoulder Injury     Patient fell and broke and shoulder x 1 week      Eileen Donald 63 y.o. female    Current Outpatient Medications   Medication Sig Dispense Refill     acetaminophen (TYLENOL) 500 MG tablet Take 325-650 mg by mouth every 6 (six) hours as needed for pain (1-2 tab  PRN).        acyclovir (ZOVIRAX) 400 MG tablet Take 400 mg by mouth 2 (two) times a day.        albuterol (PROAIR HFA;PROVENTIL HFA;VENTOLIN HFA) 90 mcg/actuation inhaler Inhale 2 puffs every 6 (six) hours as needed for wheezing. 1 Inhaler 3     aspirin 81 mg chewable tablet Chew 81 mg.       AVONEX 30 mcg/0.5 mL PnKt once a week.       calcium, as carbonate, (TUMS) 200 mg calcium (500 mg) chewable tablet Chew 1 tablet daily.       esomeprazole (NEXIUM) 20 MG capsule TAKE 1 CAPSULE DAILY 90 capsule 3     furosemide (LASIX) 40 MG tablet TK 1 T PO QAM  6     gabapentin (NEURONTIN) 300 MG capsule Take 1 capsule (300 mg total) by mouth daily. 30 capsule 0     lisinopril (PRINIVIL,ZESTRIL) 5 MG tablet TAKE 1 TABLET DAILY 90 tablet 3     LORazepam (ATIVAN) 0.5 MG tablet Take 1 tablet (0.5 mg total) by mouth daily as needed for anxiety. 30 tablet 0     metoprolol tartrate (LOPRESSOR) 25 MG tablet TAKE 1 TABLET TWICE A  tablet 3     potassium chloride (MICRO-K) 10 mEq CR capsule TK 2 CS PO ONCE DAILY WITH A MEAL  6     simvastatin (ZOCOR) 20 MG tablet Take 1 tablet (20 mg total) by mouth at bedtime. 90 tablet 2     SYMBICORT 160-4.5 mcg/actuation inhaler USE 2 INHALATIONS TWICE A DAY 1 Inhaler 3     venlafaxine (EFFEXOR-XR) 75 MG 24 hr capsule Take 3 capsules (225 mg total) by mouth daily. 90 capsule 0     warfarin (COUMADIN/JANTOVEN) 2.5 MG tablet Take 1-2 tablets (2.5-5 mg total) by mouth daily. Adjust dose per INR results as instructed. 130 tablet 1     albuterol (PROVENTIL) 2.5 mg /3 mL (0.083 %) nebulizer solution Take 3 mL (2.5 mg total) by nebulization every 6 (six) hours as needed for wheezing. 75 vial 2     cephalexin (KEFLEX) 500 MG capsule Take 1 capsule (500 mg total) by mouth 3 (three) times a day for 10 days. 30 capsule 0     montelukast (SINGULAIR) 10 mg tablet Take 1 tablet (10 mg total) by mouth at bedtime. 30 tablet 6     No current facility-administered medications for this visit.      Allergies:  Morphine; Sulfa (sulfonamide antibiotics); and Azithromycin   Patient's last menstrual period was 11/28/2013.    HPI:   Patient is here for follow-up regards to her humerus fracture, refill of asthma medications, and discussion over possible cyst in her genital area.  In the last couple days she has noticed some painful swelling down in her vaginal area-she has had no fevers or chills but the area has been painful upon touch.  She has had a history of a gluteal sebaceous cyst many years ago.  Patient has had some problems with genital warts.  She is unable to visualize the area but on inspection she has a infected cyst of the labia majora.  Discussed with her oral antibiotics to calm down the area but if not improving surgical incision and drainage with gynecology.    Patient is holding protecting her arm and abduction across her chest with her humerus fracture.  She is states that she is not in pain currently.  She has photocopies of her x-ray showing a displaced humeral head fracture-she is concerned about just monitoring and not having surgery for the arm and she like a second opinion which I am in agreement.  We will place referral to [facility].  Patient is recently seen pulmonary and they have not changed any of her inhalers they feel current treatment is adequate and patient feels comfortable with this they have follow-up with pulmonary in 3 months.  Patient would like a refill of her singular sent to the pharmacy.    ROS: negative except as per HPI    OBJECTIVE:   The patient appears well, alert, oriented x 3, in no distress.  BP 96/63 (Patient Site: Left Arm, Patient Position: Sitting, Cuff Size: Adult Large)   Pulse 80   Temp 97  F (36.1  C) (Oral)   Resp 20   Wt (!) 324 lb 2 oz (147 kg)   LMP 11/28/2013   BMI 48.57 kg/m      Lungs: clear, good air entry, no wheezes, rhonchi or rales.   Cardiac: S1 and S2 normal, no murmurs, regular rate and rhythm.   Abdomen: normal bowel sounds, soft  without tenderness, guarding, mass or organomegaly.   Extremities: Palpable abnormality in the bicipital groove which is hard and firm patient is protecting her arm with internal rotation abduction across her stomach there is no heat warmth or erythema in the shoulder area  Neurological: normal, no focal findings.  Skin: 2 cm infected cyst right labia majora, multiple warts noted in the vaginal area  Psych- normal mood and affect      Pt states an understanding and agrees to the above plan.  Greater than 25 minutes was spent today in interview and examination with Eileen Donald with more than 50% of that time in counseling and coordination of care.

## 2021-06-25 NOTE — TELEPHONE ENCOUNTER
ANTICOAGULATION  MANAGEMENT PROGRAM    Eileen Donald is overdue for INR check.     Spoke with Marilee and scheduled INR appointment on 6/7/21 @ [facility].      Alondra Lora RN

## 2021-06-25 NOTE — TELEPHONE ENCOUNTER
Who is calling:  Patient Marilee  Reason for Call:  Returning call. Patient will check her messages and if she has any questions, patient will call back.  Date of last appointment with primary care: [id number]  Okay to leave a detailed message: No call back requested.

## 2021-06-25 NOTE — TELEPHONE ENCOUNTER
ANTICOAGULATION  MANAGEMENT    Assessment     Today's INR result of 3.3 is Therapeutic (goal INR of 2.5-3.5)        Warfarin taken as previously instructed    No new diet changes affecting INR     Completed 10 days of cephalexin on 3/5/19 for vaginal infection    Interaction between cephalexin and warfarin may be affecting INR    Continues to tolerate warfarin with no reported s/s of bleeding or thromboembolism     Previous INR was Therapeutic       4/10 Scheduled for left shoulder replacement- pre op with PCP is on 4/2/19    Per chart review: [facility]:  Telephone Encounter - Jo Ann Díaz PA - 03/04/2019 12:59 PM CST  Given high thrombmotic risk (mechanical valve, AFib, h/o PE) and intermediate bleeding risk (joint surgery),   Recommend stopping Warfarin 5 days pre-procedurally. She is to begin therapeutic dose LMWH thee days prior to procedure and give last dose of LMWH 24 hrs prior to procedure. Check INR day of procedure and resume AC as soon as poss    Plan:     Left a detailed message for Eileen regarding INR result and instructed:     Warfarin Dosing Instructions:  Continue current warfarin dose    5 mg every Mon, Thu; 2.5 mg all other days        (0 % change)    Instructed patient to follow up no later than: 4/2 with pre op visit with PCP.    Education provided: importance of therapeutic range and target INR goal and significance of current INR result        Instructed to call the [facility] for any changes, questions or concerns. (#[phone])   ?   Radha Slater RN    Subjective/Objective:      Eileen Donald, a 63 y.o. female is on warfarin.     Eileen reports:     Home warfarin dose: as updated on anticoagulation calendar per template     Missed doses: No     Medication changes:  Yes: done with cephalexin     S/S of bleeding or thromboembolism:  No     New Injury or illness:  No     Changes in diet or alcohol consumption:  No     Upcoming surgery,  procedure or cardioversion:  Yes: 4/10 left should surgery.    Anticoagulation Episode Summary     Current INR goal:   2.5-3.5   TTR:   82.1 % (8.6 mo)   Next INR check:   4/2/2019   INR from last check:   3.30 (3/20/2019)   Weekly max warfarin dose:      Target end date:      INR check location:      Preferred lab:      Send INR reminders to:   ANTICOAGULATION POOL C (DTN,VAD,CGR,GAV)    Indications    Heart valve replaced [Z95.2]           Comments:            Anticoagulation Care Providers     Provider Role Specialty Phone number    Tito Salazar MD Referring Family Medicine [phone]

## 2021-06-25 NOTE — TELEPHONE ENCOUNTER
Refill Approved    Rx renewed per Medication Renewal Policy. Medication was last renewed on 2/28/21, last OV .    Yaima Rodrigues, [facility] Triage/Med Refill 5/31/2021 5/3/21    Requested Prescriptions   Pending Prescriptions Disp Refills     venlafaxine (EFFEXOR-XR) 75 MG 24 hr capsule [Pharmacy Med Name: VENLAFAXINE HCL ER CAPS 75MG] 270 capsule 3     Sig: TAKE 3 CAPSULES DAILY       Venlafaxine/Desvenlafaxine Refill Protocol Passed - 5/30/2021 11:58 PM        Passed - LFT or AST or ALT in last year     Albumin   Date Value Ref Range Status   03/17/2021 3.9 3.5 - 5.0 g/dL Final     Bilirubin, Total   Date Value Ref Range Status   03/17/2021 0.4 0.0 - 1.0 mg/dL Final     Alkaline Phosphatase   Date Value Ref Range Status   03/17/2021 108 45 - 120 U/L Final     AST   Date Value Ref Range Status   03/17/2021 49 (H) 0 - 40 U/L Final     ALT   Date Value Ref Range Status   03/17/2021 33 0 - 45 U/L Final     Protein, Total   Date Value Ref Range Status   03/17/2021 7.0 6.0 - 8.0 g/dL Final                Passed - Fasting lipid cascade in last year     Cholesterol   Date Value Ref Range Status   02/01/2021 245 (H) <=199 mg/dL Final     Triglycerides   Date Value Ref Range Status   02/01/2021 256 (H) <=149 mg/dL Final     HDL Cholesterol   Date Value Ref Range Status   02/01/2021 72 >=50 mg/dL Final     LDL Calculated   Date Value Ref Range Status   02/01/2021 122 <=129 mg/dL Final     Patient Fasting > 8hrs?   Date Value Ref Range Status   02/01/2021 No  Final             Passed - PCP or prescribing provider visit in last year     Last office visit with prescriber/PCP: 10/3/2019 Tito Salazar MD OR same dept: 5/3/2021 Drew Hahn MD OR same specialty: 5/3/2021 Drew Hahn MD  Last physical: 3/17/2021 Last MTM visit: Visit date not found   Next visit within 3 mo: Visit date not found  Next physical within 3 mo: Visit date not found  Prescriber OR PCP: Tito Salazar MD  Last diagnosis  associated with med order: 1. Depression  - venlafaxine (EFFEXOR-XR) 75 MG 24 hr capsule [Pharmacy Med Name: VENLAFAXINE HCL ER CAPS 75MG]; TAKE 3 CAPSULES DAILY  Dispense: 270 capsule; Refill: 3    If protocol passes may refill for 12 months if within 3 months of last provider visit (or a total of 15 months).             Passed - Blood Pressure in last year     BP Readings from Last 1 Encounters:   05/03/21 134/71

## 2021-06-25 NOTE — PROGRESS NOTES
Progress Notes by Jaki Clancy MD at 10/3/2017  2:30 PM     Author: Jaki Clancy MD Service: -- Author Type: Physician    Filed: 10/3/2017  6:15 PM Encounter Date: 10/3/2017 Status: Signed    : Jaki Clancy MD (Physician)         Subjective:   Eileen Donald is a 61 y.o. female  No question data found.  Chief Complaint   Patient presents with   ? blood in uerine     today blood in urine , freq urine , pressure   Blood in urine just noticed today. Urinary frequency started yesterday. Says today not feeling like she is fully emptying her bladder. Having some urgency. Admits some sweats, but no chills. Denies any upper mid back pain. Admits some urinary incontinence with coughing.   Review of Systems  Const -  - see HPI  Allergies   Allergen Reactions   ? Morphine    ? Sulfa (Sulfonamide Antibiotics)        Current Outpatient Prescriptions:   ?  acetaminophen (TYLENOL) 500 MG tablet, Take 325-650 mg by mouth every 6 (six) hours as needed for pain (1-2 tab PRN). , Disp: , Rfl:   ?  acyclovir (ZOVIRAX) 400 MG tablet, Take 400 mg by mouth 2 (two) times a day. , Disp: , Rfl:   ?  albuterol (PROVENTIL) 2.5 mg /3 mL (0.083 %) nebulizer solution, Take 3 mL (2.5 mg total) by nebulization every 6 (six) hours as needed for wheezing., Disp: 75 vial, Rfl: 2  ?  amoxicillin (AMOXIL) 500 MG capsule, Take 2,000 mg by mouth. 1 hour prior to dental work, Disp: , Rfl:   ?  aspirin 81 mg chewable tablet, Chew 81 mg., Disp: , Rfl:   ?  AVONEX 30 mcg/0.5 mL PnKt, once a week., Disp: , Rfl:   ?  budesonide-formoterol (SYMBICORT) 160-4.5 mcg/actuation inhaler, Inhale 2 puffs 2 (two) times a day., Disp: 1 Inhaler, Rfl: 1  ?  buPROPion (WELLBUTRIN XL) 150 MG 24 hr tablet, Take 1 tablet (150 mg total) by mouth every morning., Disp: 90 tablet, Rfl: 1  ?  calcium, as carbonate, (TUMS) 200 mg calcium (500 mg) chewable tablet, Chew 1 tablet daily., Disp: , Rfl:   ?  ferrous sulfate 325 (65 FE) MG tablet, Take 1 tablet (325  mg total) by mouth daily with breakfast., Disp: 90 tablet, Rfl: 1  ?  gabapentin (NEURONTIN) 300 MG capsule, Take 300 mg by mouth daily., Disp: , Rfl: 2  ?  lisinopril (PRINIVIL,ZESTRIL) 5 MG tablet, Take 1 tablet (5 mg total) by mouth daily., Disp: 30 tablet, Rfl: 0  ?  LORazepam (ATIVAN) 0.5 MG tablet, Take 0.5 mg by mouth daily as needed for anxiety., Disp: , Rfl:   ?  metoprolol tartrate (LOPRESSOR) 25 MG tablet, Take 1 tablet (25 mg total) by mouth 2 (two) times a day., Disp: 60 tablet, Rfl: 0  ?  nystatin (MYCOSTATIN) powder, Apply to affected area twice daily, Disp: 60 g, Rfl: 0  ?  omeprazole (PRILOSEC) 20 MG capsule, TAKE 1 CAPSULE TWICE DAILY, 6 A.M. AND 4 P.M., Disp: 180 capsule, Rfl: 2  ?  predniSONE (DELTASONE) 20 MG tablet, Take 1 tablet (20 mg total) by mouth daily., Disp: 7 tablet, Rfl: 0  ?  simvastatin (ZOCOR) 20 MG tablet, TAKE 1 TABLET AT BEDTIME, Disp: 90 tablet, Rfl: 3  ?  venlafaxine (EFFEXOR-XR) 75 MG 24 hr capsule, TAKE 3 CAPSULES DAILY, Disp: 270 capsule, Rfl: 0  ?  VENTOLIN HFA 90 mcg/actuation inhaler, USE 2 INHALATIONS EVERY 6 HOURS AS NEEDED FOR WHEEZING, Disp: 18 g, Rfl: 2  ?  warfarin (COUMADIN) 2.5 MG tablet, TAKE 1 TO 2 TABLETS (2.5 MG TO 5 MG) DAILY, ADJUST DOSE BASE ON INR RESULTS AS DIRECTED, Disp: 110 tablet, Rfl: 0  ?  warfarin (COUMADIN) 2.5 MG tablet, Takes 2.5mg on Tues/Thurs and 5mg all other days, by mouth as directed.  Dose adjusted based on INR results., Disp: 170 tablet, Rfl: 0  ?  warfarin (JANTOVEN) 2.5 MG tablet, Take 2.5mg (1 tab) on Tue and Thur, and 5mg (2 tabs) on all other days.  OR AS DIRECTED.  Adjust dose based on INR., Disp: 150 tablet, Rfl: 1  Patient Active Problem List   Diagnosis   ? Depression   ? Asthma   ? Hypertension   ? Multiple Sclerosis   ? Hyperlipidemia   ? Impaired Glucose Tolerance Test   ? Dysphagia   ? Benign Adenomatous Polyp Of The Large Intestine   ? History of pulmonary embolism   ? Obesity   ? Generalized anxiety disorder   ? Primary  osteoarthritis of both hands   ? Polyarthralgia   ? Aortic valve replaced     Medical History Reviewed  Objective:     Vitals:    10/03/17 1451   BP: 120/70   Pulse: 86   Resp: 20   Temp: 97.7  F (36.5  C)   TempSrc: Oral   SpO2: 96%   Weight: (!) 319 lb 3.2 oz (144.8 kg)   Gen - Pt in NAD  Flanks - Non TTP  Results for orders placed or performed in visit on 10/03/17   Urine,Microscopic   Result Value Ref Range    Bacteria, UA None Seen None Seen hpf    RBC, UA >100 (!) None Seen, 0-2 hpf    WBC, UA >100 (!) None Seen, 0-5 hpf    Squam Epithel, UA 0-5 None Seen, 0-5 lpf   Lab result discussed on day of visit.      Assessment - Plan     1. Acute cystitis  - cephalexin (KEFLEX) 500 MG capsule; Take 1 capsule (500 mg total) by mouth 2 (two) times a day for 3 days.  Dispense: 6 capsule; Refill: 0    2. Urine frequency  - Urine,Microscopic    At the conclusion of the encounter, assessment and plan were discussed.   All questions were answered.   The patient or guardian acknowledged understanding and was involved in the decision making regarding the overall care plan.    Patient Instructions   1. Drink plenty of liquids  2. Urinate frequently - try not to hold your urine if you have to go  3. If symptoms are not improved after antibiotics, follow up with your primary provider  4. If you have any questions, call the clinic number      Urinary Tract Infections in Women    Urinary tract infections (UTIs) are most often caused by bacteria (germs). These bacteria enter the urinary tract. The bacteria may come from outside the body. Or they may travel from the skin outside the rectum or vagina into the urethra. Female anatomy makes it easier for bacteria from the bowel to enter a womans urinary tract, which is the most common source of UTI. This means women develop UTIs more often than men. Pain in or around the urinary tract is a common UTI symptom. But the only way to know for sure if you have a UTI for the health care  provider to test your urine. The two tests that may be done are the urinalysis and urine culture.  Types of UTIs    Cystitis: A bladder infection (cystitis) is the most common UTI in women. You may have urgent or frequent urination. You may also have pain, burning when you urinate, and bloody urine.    Urethritis: This is an inflamed urethra, which is the tube that carries urine from the bladder to outside the body. You may have lower stomach or back pain. You may also have urgent or frequent urination.    Pyelonephritis: This is a kidney infection. If not treated, it can be serious and damage your kidneys. In severe cases, you may be hospitalized. You may have a fever and lower back pain.  Medications to treat a UTI  Most UTIs are treated with antibiotics. These kill the bacteria. The length of time you need to take them depends on the type of infection. It may be as short as 3 days. If you have repeated UTIs, a low-dose antibiotic may be needed for several months. Take antibiotics exactly as directed. Dont stop taking them until all of the medication is gone. If you stop taking the antibiotic too soon, the infection may not go away, and you may develop a resistance to the antibiotic. This can make it much harder to treat.  Lifestyle changes to treat and prevent UTIs  The lifestyle changes below will help get rid of your UTI. They may also help prevent future UTIs.    Drink plenty of fluids. This includes water, juice, or other caffeine-free drinks. Fluids help flush bacteria out of your body.    Empty your bladder. Always empty your bladder when you feel the urge to urinate. And always urinate before going to sleep. Urine that stays in your bladder can lead to infection. Try to urinate before and after sex as well.    Practice good personal hygiene. Wipe yourself from front to back after using the toilet. This helps keep bacteria from getting into the urethra.    Use condoms during sex. These help prevent UTIs  caused by sexually transmitted bacteria. Also, avoid using spermicides during sex. These can increase the risk of UTIs. Choose other forms of birth control instead. For women who tend to get UTIs after sex, a low-dose of a preventive antibiotic may be used. Be sure to discuss this option with your health care provider.    Follow up with your health care provider as directed. He or she may test to make sure the infection has cleared. If necessary, additional treatment may be started.  Date Last Reviewed: 9/8/2014 2000-2016 [employer]. [address] [place] [address]. All rights reserved. This information is not intended as a substitute for professional medical care. Always follow your healthcare professional's instructions.

## 2021-06-26 NOTE — TELEPHONE ENCOUNTER
ANTICOAGULATION  MANAGEMENT PROGRAM    Eileen Donald is overdue for INR check.     Spoke with Sumi and scheduled INR appointment on 6/28.      Alondra Lora RN

## 2021-06-28 ENCOUNTER — COMMUNICATION - HEALTHEAST (OUTPATIENT)
Dept: ANTICOAGULATION | Facility: CLINIC | Age: 66
End: 2021-06-28

## 2021-06-28 ENCOUNTER — AMBULATORY - HEALTHEAST (OUTPATIENT)
Dept: LAB | Facility: CLINIC | Age: 66
End: 2021-06-28

## 2021-06-28 DIAGNOSIS — Z95.2 HEART VALVE REPLACED: ICD-10-CM

## 2021-06-28 DIAGNOSIS — Z86.711 HISTORY OF PULMONARY EMBOLISM: ICD-10-CM

## 2021-06-28 DIAGNOSIS — I48.0 PAROXYSMAL ATRIAL FIBRILLATION (H): ICD-10-CM

## 2021-06-28 DIAGNOSIS — Z95.2 AORTIC VALVE REPLACED: ICD-10-CM

## 2021-06-28 LAB — INR PPP: 3.8 (ref 0.9–1.1)

## 2021-07-04 NOTE — ADDENDUM NOTE
Addendum Note by Tevin Fuentes CMA at 2/1/2021  2:10 PM     Author: Tevin Fuentes CMA Service: -- Author Type: Certified Medical Assistant    Filed: 2/4/2021  8:14 AM Encounter Date: 2/1/2021 Status: Signed    : Tevin Fuentes CMA (Certified Medical Assistant)    Addended by: TEVIN FUENTES on: 2/4/2021 08:14 AM        Modules accepted: Orders

## 2021-07-04 NOTE — TELEPHONE ENCOUNTER
Telephone Encounter by Alondra Lora RN at 6/7/2021  2:12 PM     Author: Alondra Lora RN Service: -- Author Type: Registered Nurse    Filed: 6/7/2021  4:25 PM Encounter Date: 6/7/2021 Status: Signed    : Alondra Lora RN (Registered Nurse)       ANTICOAGULATION  MANAGEMENT    Assessment     Today's INR result of 2.30 is Subtherapeutic (goal INR of 2.5-3.5)        Warfarin taken as previously instructed    No new diet changes affecting INR    No interaction expected between Proair / Metformin / and Loratadine and warfarin    - switched inhaler to Proair / new medications:  Metformin / Loratadine.    Continues to tolerate warfarin with no reported s/s of bleeding or thromboembolism     Previous INR was Therapeutic at 3.00 on 5/11/21.    Plan:     Spoke on phone with Marilee regarding INR result and instructed:      Warfarin Dosing Instructions:   (evenings. 2.5mg tabs)    - Change warfarin dose to 2.5 mg daily on Mon/Wed/Fri; and 5 mg daily rest of week.   - (10 % change)    Instructed patient to follow up no later than:  1-2 wks.   - INR scheduled on 6/14/21 @ [facility].    Education provided: importance of consistent vitamin K intake, target INR goal and significance of current INR result, no interaction anticipated between warfarin and Proair, Metformin, and Loratadine and importance of notifying clinic for changes in medications    Marilee verbalizes understanding and agrees to warfarin dosing plan.    Instructed to call the [facility] for any changes, questions or concerns. (#[phone])   ?   Alondra Lora RN    Subjective/Objective:      Eileen Donald, a 65 y.o. female is on warfarin. Marilee Hunt reports:     Home warfarin dose: as updated on anticoagulation calendar per template     Missed doses: No     Medication changes:  Yes:  as mentioned above.     S/S of bleeding or thromboembolism:  No     New Injury or illness:  No     Changes in diet or alcohol consumption:  No      Upcoming surgery, procedure or cardioversion:  No    Anticoagulation Episode Summary     Current INR goal:  2.5-3.5   TTR:  63.4 % (1 y)   Next INR check:  6/21/2021   INR from last check:  2.30 (6/7/2021)   Weekly max warfarin dose:     Target end date:     INR check location:     Preferred lab:     Send INR reminders to:  [facility]    Indications    Heart valve replaced [Z95.2]           Comments:           Anticoagulation Care Providers     Provider Role Specialty Phone number    Tito Salazar MD Referring Family Medicine [phone]

## 2021-07-04 NOTE — ADDENDUM NOTE
Addendum Note by Mag Gagnon PharmD at 3/11/2021  3:12 PM     Author: Mag Gagnon PharmD Service: -- Author Type: Pharmacist    Filed: 3/11/2021  3:12 PM Encounter Date: 3/11/2021 Status: Signed    : Mag Gagnon PharmD (Pharmacist)    Addended by: MAG GAGNON on: 3/11/2021 03:12 PM        Modules accepted: Orders

## 2021-07-07 NOTE — TELEPHONE ENCOUNTER
Telephone Encounter by Veronica Donaldson RN at 6/28/2021  4:14 PM     Author: Veronica Donaldson RN Service: -- Author Type: Registered Nurse    Filed: 6/28/2021  4:42 PM Encounter Date: 6/28/2021 Status: Signed    : Veronica Donaldson RN (Registered Nurse)       ANTICOAGULATION MANAGEMENT     Eileen Donald 65 y.o., female is on warfarin with Supratherapeutic INR result (goal range 2.5-3.5)    Recent labs: (last 7 days)     06/28/21  1313   INR 3.80*       ASSESSMENT     Source: Patient/Caregiver Call and Template      Warfarin dosing taken: Less warfarin taken than planned which may be affecting INR    Diet: No new diet changes affecting INR    Illness, Injury or hospitalization: No    Medication changes: None    Signs or symptoms of bleeding or clotting: No    Previous INR: subtherapeutic    Additional findings: None     PLAN     Recommended plan for no diet, medication or health factor changes affecting INR:     Dosing instructions: Hold today then continue your current warfarin dose as you have been taking it 5 mg daily on Monday, Wednesdays and Saturday; and 2.5 mg daily rest of week (0% change from how patient was taking)    Follow up no later than: 2 weeks     Telephone call with Marilee who agrees to plan and repeated back plan correctly    Patient offered & declined to schedule next visit    Education provided: importance of consistent vitamin K intake, target INR goal and significance of current INR result and importance of following up for INR monitoring at instructed interval    Plan made per [facility] anticoagulation protocol    Veronica Donaldson  Anticoagulation Clinic   [phone]    Anticoagulation Episode Summary     Current INR goal:  2.5-3.5   TTR:  61.4 % (1 y)   Next INR check:  7/12/2021   INR from last check:  3.80 (6/28/2021)   Weekly max warfarin dose:     Target end date:     INR check location:     Preferred lab:     Send INR reminders to:  [facility]    Heart  valve replaced [Z95.2]           Comments:           Anticoagulation Care Providers     Provider Role Specialty Phone number    Tito Salazar MD [facility] [phone]

## 2021-07-13 ENCOUNTER — RECORDS - HEALTHEAST (OUTPATIENT)
Dept: ADMINISTRATIVE | Facility: CLINIC | Age: 66
End: 2021-07-13

## 2021-07-14 ENCOUNTER — TELEPHONE (OUTPATIENT)
Dept: FAMILY MEDICINE | Facility: CLINIC | Age: 66
End: 2021-07-14

## 2021-07-14 ENCOUNTER — DOCUMENTATION ONLY (OUTPATIENT)
Dept: FAMILY MEDICINE | Facility: CLINIC | Age: 66
End: 2021-07-14

## 2021-07-14 NOTE — PROGRESS NOTES
Patient is overdue for INR.   Updating check date to today so patient shows on our reminder list.      Patient was due for INR on 7/12/21    Overdue INR reminder updated

## 2021-07-14 NOTE — TELEPHONE ENCOUNTER
ANTICOAGULATION     Eileen Donald is overdue for INR check.      Left message for patient to call and schedule lab appointment as soon as possible. If returning call, please schedule.     Anthony Boothe

## 2021-07-16 ENCOUNTER — TELEPHONE (OUTPATIENT)
Dept: FAMILY MEDICINE | Facility: CLINIC | Age: 66
End: 2021-07-16

## 2021-07-16 NOTE — TELEPHONE ENCOUNTER
Incoming call from patient.   Patient wondering how much vitamin D she should be taking.   Patient had a rx for loratadine and was told it was a long term medication but script says no refills. Patient is wondering if she should still be taking it. Pt not currently having allergy symptoms.   Please advise.

## 2021-07-16 NOTE — TELEPHONE ENCOUNTER
Recommend vit D supplement of 1000 or 2000 units daily  If she is no having allergy symptoms she doesn't need to take loratidine- if her symptoms return and she wants to start the medication again we can send refills.  Dr. Salazar

## 2021-07-16 NOTE — TELEPHONE ENCOUNTER
Called and spoke to patient. Informed patient of note from provider. Patient states she got both of thoes meds from neurology.  Patient states she will call neurology.

## 2021-07-21 ENCOUNTER — RECORDS - HEALTHEAST (OUTPATIENT)
Dept: ADMINISTRATIVE | Facility: CLINIC | Age: 66
End: 2021-07-21

## 2021-07-21 PROBLEM — E11.9 DIABETES MELLITUS, TYPE 2 (H): Status: ACTIVE | Noted: 2021-07-21

## 2021-07-26 DIAGNOSIS — I26.99 PULMONARY EMBOLISM (H): Primary | ICD-10-CM

## 2021-07-26 DIAGNOSIS — Z95.2 H/O AORTIC VALVE REPLACEMENT: ICD-10-CM

## 2021-07-29 ENCOUNTER — LAB (OUTPATIENT)
Dept: LAB | Facility: CLINIC | Age: 66
End: 2021-07-29
Payer: COMMERCIAL

## 2021-07-29 ENCOUNTER — ANTICOAGULATION THERAPY VISIT (OUTPATIENT)
Dept: ANTICOAGULATION | Facility: CLINIC | Age: 66
End: 2021-07-29

## 2021-07-29 DIAGNOSIS — Z95.2 HEART VALVE REPLACED: Primary | ICD-10-CM

## 2021-07-29 DIAGNOSIS — I26.99 PULMONARY EMBOLISM (H): ICD-10-CM

## 2021-07-29 DIAGNOSIS — Z95.2 H/O AORTIC VALVE REPLACEMENT: ICD-10-CM

## 2021-07-29 LAB — INR BLD: 3.8 (ref 0.9–1.1)

## 2021-07-29 PROCEDURE — 36415 COLL VENOUS BLD VENIPUNCTURE: CPT

## 2021-07-29 PROCEDURE — 85610 PROTHROMBIN TIME: CPT

## 2021-07-29 NOTE — PROGRESS NOTES
ANTICOAGULATION MANAGEMENT     Eileen Donald 65 year old female is on warfarin with supratherapeutic INR result. (Goal INR 2.5-3.5)    Recent labs: (last 7 days)     07/29/21  1409   INR 3.8*       ASSESSMENT     Source(s): Patient/Caregiver Call and Template       Warfarin doses taken: Warfarin taken as instructed    Diet: No new diet changes identified    New illness, injury, or hospitalization: No    Medication/supplement changes: Yes.    - Vitamin-D dose increased and Metformin was started 2 wks.    Signs or symptoms of bleeding or clotting: No    Previous INR: Supratherapeutic at 3.80 on 6/28/21.    Additional findings: None     PLAN     Recommended plan for ongoing change(s) affecting INR     Dosing Instructions:    - Decrease your warfarin dose 5mg on Wed/Sat and 2.5mg all other days.   - (10% change)    - next INR in 1-2 weeks       Summary  As of 7/29/2021    Full warfarin instructions:  5 mg every Wed, Sat; 2.5 mg all other days   Next INR check:  8/12/2021             Telephone call with Eileen who verbalizes understanding and agrees to plan    Lab visit scheduled - INR On 8/17/21 @ [facility]    Education provided: Target INR goal and significance of current INR result and No interaction anticipated between warfarin and Metformin or Vitamin-D    Plan made per [facility] anticoagulation protocol    Alondra Lora RN  Anticoagulation Clinic  7/29/2021    _______________________________________________________________________     Anticoagulation Episode Summary     Current INR goal:  2.5-3.5   TTR:  53.0 % (1 y)   Target end date:     Send INR reminders to:  [facility]    Indications    Heart valve replaced [Z95.2]           Comments:           Anticoagulation Care Providers     Provider Role Specialty Phone number    Tito Salazar MD Referring Family Medicine [phone]

## 2021-08-06 ENCOUNTER — TELEPHONE (OUTPATIENT)
Dept: FAMILY MEDICINE | Facility: CLINIC | Age: 66
End: 2021-08-06

## 2021-08-06 DIAGNOSIS — E11.9 TYPE 2 DIABETES MELLITUS WITHOUT COMPLICATION, WITHOUT LONG-TERM CURRENT USE OF INSULIN (H): Primary | ICD-10-CM

## 2021-08-06 DIAGNOSIS — Z98.818 OTHER DENTAL PROCEDURE STATUS: Primary | ICD-10-CM

## 2021-08-06 RX ORDER — AMOXICILLIN 500 MG/1
CAPSULE ORAL
Qty: 5 CAPSULE | Refills: 0 | Status: SHIPPED | OUTPATIENT
Start: 2021-08-06 | End: 2021-09-14

## 2021-08-06 NOTE — TELEPHONE ENCOUNTER
Reason for Call:  Other appointment    Detailed comments: pt has appt for INR on 8/17/21, requesting to have A1C at that appt.  Please advise & enter orders if appropriate    Phone Number Patient can be reached at: Cell number on file:    Telephone Information:   Mobile [phone]       Best Time: na    Can we leave a detailed message on this number? YES    Call taken on 8/6/2021 at 2:32 PM by Angelica Chaudhry

## 2021-08-06 NOTE — PATIENT INSTRUCTIONS - HE
Patient Instructions by Tito Salazar MD at 9/17/2019 12:30 PM     Author: Tito Salazar MD Service: -- Author Type: Physician    Filed: 9/19/2019  7:35 AM Encounter Date: 9/17/2019 Status: Signed    : Tito Salazar MD (Physician)         Patient Education     Your Health Risk Assessment indicates you feel you are not in good physical health.    A healthy lifestyle helps keep the body fit and the mind alert. It helps protect you from disease, helps you fight disease, and helps prevent chronic disease (disease that doesn't go away) from getting worse. This is important as you get older and begin to notice twinges in muscles and joints and a decline in the strength and stamina you once took for granted. A healthy lifestyle includes good healthcare, good nutrition, weight control, recreation, and regular exercise. Avoid harmful substances and do what you can to keep safe. Another part of a healthy lifestyle is stay mentally active and socially involved.    Good healthcare     Have a wellness visit every year.     If you have new symptoms, let us know right away. Don't wait until the next checkup.     Take medicines exactly as prescribed and keep your medicines in a safe place. Tell us if your medicine causes problems.   Healthy diet and weight control     Eat 3 or 4 small, nutritious, low-fat, high-fiber meals a day. Include a variety of fruits, vegetables, and whole-grain foods.     Make sure you get enough calcium in your diet. Calcium, vitamin D, and exercise help prevent osteoporosis (bone thinning).     If you live alone, try eating with others when you can. That way you get a good meal and have company while you eat it.     Try to keep a healthy weight. If you eat more calories than your body uses for energy, it will be stored as fat and you will gain weight.     Recreation   Recreation is not limited to sports and team events. It includes any activity that provides relaxation,  interest, enjoyment, and exercise. Recreation provides an outlet for physical, mental, and social energy. It can give a sense of worth and achievement. It can help you stay healthy.       Patient Education     Exercise for a Healthier Heart  You may wonder how you can improve the health of your heart. If youre thinking about exercise, youre on the right track. You dont need to become an athlete, but you do need a certain amount of brisk exercise to help strengthen your heart. If you have been diagnosed with a heart condition, your doctor may recommend exercise to help stabilize your condition. To help make exercise a habit, choose safe, fun activities.       Be sure to check with your health care provider before starting an exercise program.    Why exercise?  Exercising regularly offers many healthy rewards. It can help you do all of the following:    Improve your blood cholesterol levels to help prevent further heart trouble    Lower your blood pressure to help prevent a stroke or heart attack    Control diabetes, or reduce your risk of getting this disease    Improve your heart and lung function    Reach and maintain a healthy weight    Make your muscles stronger and more limber so you can stay active    Prevent falls and fractures by slowing the loss of bone mass (osteoporosis)    Manage stress better  Exercise tips  Ease into your routine. Set small goals. Then build on them.  Exercise on most days. Aim for a total of 150 or more minutes of moderate to  vigorous intensity activity each week. Consider 40 minutes, 3 to 4 times a week. For best results, activity should last for 40 minutes on average. It is OK to work up to the 40 minute period over time. Examples of moderate-intensity activity is walking one mile in 15 minutes or 30 to 45 minutes of yard work.  Step up your daily activity level. Along with your exercise program, try being more active throughout the day. Walk instead of drive. Do more household  tasks or yard work.  Choose one or more activities you enjoy. Walking is one of the easiest things you can do. You can also try swimming, riding a bike, or taking an exercise class.  Stop exercising and call your doctor if you:    Have chest pain or feel dizzy or lightheaded    Feel burning, tightness, pressure, or heaviness in your chest, neck, shoulders, back, or arms    Have unusual shortness of breath    Have increased joint or muscle pain    Have palpitations or an irregular heartbeat      3393-7838 [employer]. [address] [place] [address]. All rights reserved. This information is not intended as a substitute for professional medical care. Always follow your healthcare professional's instructions.         Patient Education   Understanding [place] MyPlate  The USDA (US Department of Agriculture) has guidelines to help you make healthy food choices. These are called MyPlate. MyPlate shows the food groups that make up healthy meals using the image of a place setting. Before you eat, think about the healthiest choices for what to put onto your plate or into your cup or bowl. To learn more about building a healthy plate, visit www.choosemyplate.gov.       The Food Groups    Fruits: Any fruit or 100% fruit juice counts as part of the Fruit Group. Fruits may be fresh, canned, frozen, or dried, and may be whole, cut-up, or pureed. Make half your plate fruits and vegetables.    Vegetables: Any vegetable or 100% vegetable juice counts as a member of the Vegetable Group. Vegetables may be fresh, frozen, canned, or dried. They can be served raw or cooked and may be whole, cut-up, or mashed. Make half your plate fruits and vegetables.     Grains: All foods made from grains are part of the Grains Group. These include wheat, rice, oats, cornmeal, and barley such as bread, pasta, oatmeal, cereal, tortillas, and grits. Grains should be no more than a quarter of your plate. At least half of your grains  should be whole grains.    Protein: This group includes meat, poultry, seafood, beans and peas, eggs, processed soy products (like tofu), nuts (including nut butters), and seeds. Make protein choices no more than a quarter of your plate. Meat and poultry choices should be lean or low fat.    Dairy: All fluid milk products and foods made from milk that contain calcium, like yogurt and cheese are part of the Dairy Group. (Foods that have little calcium, such as cream, butter, and cream cheese, are not part of the group.) Most dairy choices should be low-fat or fat-free.    Oils: These are fats that are liquid at room temperature. They include canola, corn, olive, soybean, and sunflower oil. Foods that are mainly oil include mayonnaise, certain salad dressings, and soft margarines. You should have only 5 to 7 teaspoons of oils a day. You probably already get this much from the food you eat.  Use [employer] to Help Build Your Meals  The SuperTracker can help you plan and track your meals and activity. You can look up individual foods to see or compare their nutritional value. You can get guidelines for what and how much you should eat. You can compare your food choices. And you can assess personal physical activities and see ways you can improve. Go to www.FoneSense.gov/supertracker/.    5817-6464 [employer]. [address] [place] [address], [place] [address]. All rights reserved. This information is not intended as a substitute for professional medical care. Always follow your healthcare professional's instructions.           Patient Education   Activities of Daily Living  Your Health Risk Assessment indicates you have difficulties with activities of daily living such as eating, getting dressed, grooming, bathing, walking, or using the toilet. Please make a follow up appointment for us to address this issue in more detail.     Patient Education   Instrumental Activities of Daily Living  Your Health Risk  Assessment indicates you have difficulties with instrumental activities of daily living which include laundry, housekeeping, banking, shopping, using the telephone, food preparation, transportation, or taking your own medications. Please make a follow up appointment for us to address this issue in more detail.    [employer] has resources available on the following website: [email or web address] [email or web address]/[email or web address]     Also, here is a local agency that provides help with meals and other assistance:   [facility] Line: [phone]     Patient Education   Signs of Hearing Loss  Hearing loss is a problem shared by many people. In fact, it is one of the most common health conditions, particularly as people age. Most people over age 65 have some hearing loss, and by age 80, almost everyone does. Because hearing loss usually occurs slowly over the years, you may not realize your hearing ability has gotten worse.       Have your hearing checked  Contact your [facility] care provider if you:    Have to strain to hear normal conversation.    Have to watch other peoples faces very carefully to follow what theyre saying.    Need to ask people to repeat what theyve said.    Often misunderstand what people are saying.    Turn the volume of the television or radio up so high that others complain.    Feel that people are mumbling when theyre talking to you.    Find that the effort to hear leaves you feeling tired and irritated.    Notice, when using the phone, that you hear better with 1 ear than the other.    7294-9500 [employer]. [address] [place] [address], [place] [address]. All rights reserved. This information is not intended as a substitute for professional medical care. Always follow your healthcare professional's instructions.         Patient Education   Urinary Incontinence, Female (Adult)  Urinary incontinence means loss of control of the bladder. This problem affects many women, especially as they get older.  If you have incontinence, you may be embarrassed to ask for help. But know that this problem can be treated.  Types of Incontinence  There are different types of incontinence. Two of the main types are described here. You can have more than one type.    Stress incontinence. With this type, urine leaks when pressure (stress) is put on the bladder. This may happen when you cough, sneeze, or laugh. Stress incontinence most often occurs because the pelvic floor muscles that support the bladder and urethra are weak. This can happen after pregnancy and vaginal childbirth or a hysterectomy. It can also be due to excess body weight or hormone changes.    Urge incontinence (also called overactive bladder). With this type, a sudden urge to urinate is felt often. This may happen even though there may not be much urine in the bladder. The need to urinate often during the night is common. Urge incontinence most often occurs because of bladder spasms. This may be due to bladder irritation or infection. Damage to bladder nerves or pelvic muscles, constipation, and certain medicines can also lead to urge incontinence.  Treatment of urinary incontinence depends on the cause. Further evaluation is needed to find the type you have. This will likely include an exam and certain tests. Based on the results, you and your healthcare provider can then plan treatment. Until a diagnosis is made, the home care tips below can help relieve symptoms.  Home care    Do pelvic floor muscle exercises, if they are prescribed. The pelvic floor muscles help support the bladder and urethra. Many women find that their symptoms improve when doing special exercises that strengthen these muscles. To do the exercises contract the muscles you would use to stop your stream of urine, but do this when youre not urinating. Hold for 10 seconds, then relax. Repeat 10 to 20 times in a row, at least 3 times a day. Your provider may give you other instructions for how  to do the exercises and how often.    Keep a bladder diary. This helps track how often and how much you urinate over a set period of time. Bring this diary with you to your next visit with the provider. The information can help your provider learn more about your bladder problem.    Lose weight, if advised to by your provider. Excess weight puts pressure on the bladder. Your provider can help you create a weight-loss plan thats right for you. This may include exercising more and making certain diet changes.    Don't consume foods and drinks that may irritate the bladder. These can include alcohol and caffeinated drinks.    Quit smoking. Smoking and other tobacco use can lead to chronic cough that strains the pelvic floor muscles. Smoking may also damage the bladder and urethra. Talk with your provider about treatments or methods you can use to quit smoking.    If drinking large amounts of fluid causes you to have symptoms, you may be advised to limit your fluid intake. You may also be advised to drink most of your fluids during the day and to limit fluids at night.    If youre worried about urine leakage or accidents, you may wear absorbent pads to catch urine. Change the pads often. This helps reduce discomfort. It may also reduce the risk of skin or bladder infections.  Follow-up care  Follow up with your healthcare provider, or as directed. It may take some to find the right treatment for your problem. Your treatment plan may include special therapies or medicines. Certain procedures or surgery may also be options. Be sure to discuss any questions you have with your provider.  When to seek medical advice  Call the healthcare provider right away if any of these occur:    Fever of 100.4 F (38 C) or higher, or as directed by your provider    Bladder pain or fullness    Abdominal swelling    Nausea or vomiting    Back pain    Weakness, dizziness or fainting  Date Last Reviewed: 10/1/2017    0400-6701 [employer]. [address] [place] [address], [place] [address]. All rights reserved. This information is not intended as a substitute for professional medical care. Always follow your healthcare professional's instructions.     Patient Education   Your Health Risk Assessment indicates you feel you are not in good emotional health.    Recreation   Recreation is not limited to sports and team events. It includes any activity that provides relaxation, interest, enjoyment, and exercise. Recreation provides an outlet for physical, mental, and social energy. It can give a sense of worth and achievement. It can help you stay healthy.    Mental Exercise and Social Involvement  Mental and emotional health is as important as physical health. Keep in touch with friends and family. Stay as active as possible. Continue to learn and challenge yourself.   Things you can do to stay mentally active are:    Learn something new, like a foreign language or musical instrument.     Play SCRABBLE or do crossword puzzles. If you cannot find people to play these games with you at home, you can play them with others on your computer through the Internet.     Join a games club--anything from card games to chess or checkers or lawn bowling.     Start a new hobby.     Go back to school.     Volunteer.     Read.     Keep up with world events.       Patient Education   Depression and Suicide in Older Adults  Nearly 2 million older Americans have some type of depression. Sadly, some of them even take their own lives. Yet depression among older adults is often ignored. Learn the warning signs. You may help spare a loved one needless pain. You may also save a life.       What Is Depression?  Depression is a mood disorder that affects the way you think and feel. The most common symptom is a feeling of deep sadness. People who are depressed also may seem tired and listless. And nothing seems to give them pleasure. Its normal to grieve or be sad sometimes.  But sadness lessens or passes with time. Depression rarely goes away or improves on its own. Other symptoms of depression are:    Sleeping more or less than normal    Eating more or less than normal    Having headaches, stomachaches, or other pains that dont go away    Feeling nervous, empty, or worthless    Crying a great deal    Thinking or talking about suicide or death    Feeling confused or forgetful  What Causes It?  The causes of depression arent fully known. Certain chemicals in the brain play a role. Depression does run in families. And life stresses can also trigger depression in some people. That may be the case with older adults. They often face great burdens, such as the death of friends or a spouse. They may have failing health. And they are more likely to be alone, lonely, or poor.  How You Can Help  Often, depressed people may not want to ask for help. When they do, they may be ignored. Or, they may receive the wrong treatment. You can help by showing parents and older friends love and support. If they seem depressed, help them find the right treatment. Talk to your doctor. Or contact a local mental health center, social service agency, or hospital. With modern treatment, no one has to suffer from depression.  Resources:    National [place] of Mental Health  239.554.8460  www.nimh.nih.gov    National [place] on Mental Illness  191.824.3360  www.franchesca.org    Mental Health [facility]  160.581.3623  www.nmha.org    National Suicide Hotline  194.830.7545 (800-SUICIDE)      9516-0584 The [employer]. [address] [place] [address], [place] [address]. All rights reserved. This information is not intended as a substitute for professional medical care. Always follow your healthcare professional's instructions.         Patient Education   Preventing Falls in the Home  As you get older, falls are more likely. Thats because your reaction time slows. Your muscles and joints may also get stiffer, making them  less flexible. Illness, medications, and vision changes can also affect your balance. A fall could leave you unable to live on your own. To make your home safer, follow these tips:    Floors    Put nonskid pads under area rugs.    Remove throw rugs.    Replace worn floor coverings.    Tack carpets firmly to each step on carpeted stairs. Put nonskid strips on the edges of uncarpeted stairs.    Keep floors and stairs free of clutter and cords.    Arrange furniture so there are clear pathways.    Clean up any spills right away.    Bathrooms    Install grab bars in the tub or shower.    Apply nonskid strips or put a nonskid rubber mat in the tub or shower.    Sit on a bath chair to bathe.    Use bathmats with nonskid backing.    Lighting    Keep a flashlight in each room.    Put a nightlight along the pathway between the bedroom and the bathroom.    7983-4113 [employer]. [address] [place] [address]. All rights reserved. This information is not intended as a substitute for professional medical care. Always follow your healthcare professional's instructions.         Patient Education   Understanding Advance Care Planning  Advance care planning is the process of deciding ones own future medical care. It helps ensure that if you cant speak for yourself, your wishes can still be carried out. The plan is a series of legal documents that note a persons wishes. The documents vary by state. Advance care planning may be done when a person has a serious illness that is expected to get worse. It may be done before major surgery. And it can help you and your family be prepared in case of a major illness or injury. Advance care planning helps with making decisions at these times.       A health care proxy is a person who acts as the voice of a patient when the patient cant speak for himself or herself. The name of this role varies by state. It may be called a Durable Medical Power of  or Durable  Power of  for Healthcare. It may be called an agent, surrogate, or advocate. Or it may be called a representative or decision maker. It is an official duty that is identified by a legal document. The document also varies by state.    Why Is Advance Care Planning Important?  If a person communicates their healthcare wishes:    They will be given medical care that matches their values and goals.    Their family members will not be forced to make decisions in a crisis with no guidance.  Creating a Plan  Making an advance care plan is often done in 3 steps:    Thinking about ones wishes. To create an advance care plan, you should think about what kind of medical treatment you would want if you lose the ability to communicate. Are there any situations in which you would refuse or stop treatment? Are there therapies you would want or not want? And whom do you want to make decisions for you? There are many places to learn more about how to plan for your care. Ask your doctor or  for resources.    Picking a health care proxy. This means choosing a trusted person to speak for you only when you cant speak for yourself. When you cannot make medical decisions, your proxy makes sure the instructions in your advance care plan are followed. A proxy does not make decisions based on his or her own opinions. They must put aside those opinions and values if needed, and carry out your wishes.    Filling out the legal documents. There are several kinds of legal documents for advance care planning. Each one tells health care providers your wishes. The documents may vary by state. They must be signed and may need to be witnessed or notarized. You can cancel or change them whenever you wish. Depending on your state, the documents may include a Healthcare Proxy form, Living Will, Durable Medical Power of , Advance Directive, or others.  The Familys Role  The best help a family can give is to support their loved  ones wishes. Open and honest communication is vital. Family should express any concerns they have about the patients choices while the patient can still make decisions.    2824-9269 [employer]. [address] [place] [address], [place] [address]. All rights reserved. This information is not intended as a substitute for professional medical care. Always follow your healthcare professional's instructions.         Also, [facility] offers a free, downloadable health care directive that allows you to share your treatment choices and personal preferences if you cannot communicate your wishes. It also allows you to appoint another person (called a health care agent) to make health care decisions if you are unable to do so. You can download an advance directive by going here: [email or web address]     Patient Education   Personalized Prevention Plan  You are due for the preventive services outlined below.  Your care team is available to assist you in scheduling these services.  If you have already completed any of these items, please share that information with your care team to update in your medical record.  Health Maintenance   Topic Date Due   ? HEPATITIS C SCREENING  1955   ? HIV SCREENING  10/22/1971   ? MEDICARE ANNUAL WELLNESS VISIT  11/09/2017   ? COLONOSCOPY  09/08/2018   ? DEPRESSION FOLLOW UP  09/14/2018   ? MAMMOGRAM  12/12/2018   ? ADVANCE CARE PLANNING  05/08/2022 (Originally 6/1/2015)   ? ASTHMA CONTROL TEST  07/02/2020   ? PAP SMEAR  11/09/2021   ? TD 18+ HE  09/13/2026   ? INFLUENZA VACCINE RULE BASED  Completed   ? TDAP ADULT ONE TIME DOSE  Completed   ? ZOSTER VACCINES  Completed

## 2021-08-06 NOTE — TELEPHONE ENCOUNTER
Reason for Call:  Medication or medication refill:    Do you use a [facility]?  Name of the pharmacy and phone number for the current request:    [facility] on [address] in [place]    Name of the medication requested: antibiotic     Other request: pt having a dental appt on Monday & needs antibiotic prior    Can we leave a detailed message on this number? YES    Phone number patient can be reached at: Cell number on file:    Telephone Information:   Mobile [phone]       Best Time: na    Call taken on 8/6/2021 at 2:30 PM by Angelica Chaudhry

## 2021-08-11 DIAGNOSIS — J30.2 SEASONAL ALLERGIES: Primary | ICD-10-CM

## 2021-08-11 RX ORDER — LORATADINE 10 MG/1
10 TABLET ORAL DAILY
Qty: 90 TABLET | Refills: 3 | Status: SHIPPED | OUTPATIENT
Start: 2021-08-11 | End: 2022-03-28

## 2021-08-11 RX ORDER — LORATADINE 10 MG/1
10 TABLET ORAL DAILY
COMMUNITY
Start: 2021-05-03 | End: 2021-08-11

## 2021-08-11 NOTE — TELEPHONE ENCOUNTER
Fax from pharmacy   Pending Prescriptions:                       Disp   Refills    loratadine (CLARITIN) 10 MG tablet        90 tab*3            Sig: Take 1 tablet (10 mg) by mouth daily    Signed Prescriptions:                        Disp   Refills    loratadine (CLARITIN) 10 MG tablet                         Sig: Take 10 mg by mouth daily  Authorizing Provider: PATIENT REPORTED  Ordering User: PATRICK MORRIS

## 2021-08-16 ENCOUNTER — ANTICOAGULATION THERAPY VISIT (OUTPATIENT)
Dept: ANTICOAGULATION | Facility: CLINIC | Age: 66
End: 2021-08-16

## 2021-08-16 ENCOUNTER — LAB (OUTPATIENT)
Dept: LAB | Facility: CLINIC | Age: 66
End: 2021-08-16
Payer: COMMERCIAL

## 2021-08-16 DIAGNOSIS — E11.9 TYPE 2 DIABETES MELLITUS WITHOUT COMPLICATION, WITHOUT LONG-TERM CURRENT USE OF INSULIN (H): ICD-10-CM

## 2021-08-16 DIAGNOSIS — I26.99 PULMONARY EMBOLISM (H): ICD-10-CM

## 2021-08-16 DIAGNOSIS — Z95.2 HEART VALVE REPLACED: Primary | ICD-10-CM

## 2021-08-16 DIAGNOSIS — Z95.2 H/O AORTIC VALVE REPLACEMENT: ICD-10-CM

## 2021-08-16 LAB
HBA1C MFR BLD: 6.9 % (ref 0–5.6)
INR BLD: 4.8 (ref 0.9–1.1)

## 2021-08-16 PROCEDURE — 83036 HEMOGLOBIN GLYCOSYLATED A1C: CPT

## 2021-08-16 PROCEDURE — 36415 COLL VENOUS BLD VENIPUNCTURE: CPT

## 2021-08-16 PROCEDURE — 85610 PROTHROMBIN TIME: CPT

## 2021-08-16 NOTE — PROGRESS NOTES
ANTICOAGULATION MANAGEMENT     Eileen Donald 65 year old female is on warfarin with supratherapeutic INR result. (Goal INR 2.5-3.5)    Recent labs: (last 7 days)     08/16/21  1318   INR 4.8*       ASSESSMENT     Source(s): Chart Review, Patient/Caregiver Call and Template       Warfarin doses taken: Warfarin taken as instructed    Diet: No new diet changes identified    New illness, injury, or hospitalization: No   - scheduled for root canal tomorrow 8/17/21 at 2-4 pm.    Medication/supplement changes: Yes.    - on Amoxicillin 500mg TID for infected tooth.    Signs or symptoms of bleeding or clotting: No    Previous INR: Supratherapeutic at 3.80 on 7/29/21.    Additional findings: Upcoming surgery/procedure  root canal on 8/17/21.     PLAN     Recommended plan for temporary change(s) affecting INR     Dosing Instructions: Hold dose then Decrease your warfarin dose (11.1% change) with next INR in 7-10 days       Summary  As of 8/16/2021    Full warfarin instructions:  8/16: Hold; Otherwise 5 mg every Sat; 2.5 mg all other days   Next INR check:  8/26/2021             Telephone call with Eileen who verbalizes understanding and agrees to plan   - advised Marilee to call her dentist and inform of her supra INR.   - verbalized back and agreed with plan    Lab visit scheduled - INR on 8/26/21 @ [facility].    Education provided: Importance of consistent vitamin K intake, Impact of vitamin K foods on INR, Target INR goal and significance of current INR result, Potential interaction between warfarin and Amoxicillin and Monitoring for bleeding signs and symptoms    Plan made per [facility] anticoagulation protocol    Alondra Lora RN  Anticoagulation Clinic  8/16/2021    _______________________________________________________________________     Anticoagulation Episode Summary     Current INR goal:  2.5-3.5   TTR:  50.3 % (1 y)   Target end date:     Send INR reminders to:  [facility]    Heart  valve replaced [Z95.2]           Comments:           Anticoagulation Care Providers     Provider Role Specialty Phone number    Tito Salazar MD Referring Family Medicine [phone]

## 2021-09-01 ENCOUNTER — ANTICOAGULATION THERAPY VISIT (OUTPATIENT)
Dept: ANTICOAGULATION | Facility: CLINIC | Age: 66
End: 2021-09-01

## 2021-09-01 ENCOUNTER — OFFICE VISIT (OUTPATIENT)
Dept: FAMILY MEDICINE | Facility: CLINIC | Age: 66
End: 2021-09-01
Payer: COMMERCIAL

## 2021-09-01 VITALS
BODY MASS INDEX: 42.12 KG/M2 | RESPIRATION RATE: 28 BRPM | HEART RATE: 81 BPM | DIASTOLIC BLOOD PRESSURE: 57 MMHG | SYSTOLIC BLOOD PRESSURE: 117 MMHG | WEIGHT: 277 LBS | OXYGEN SATURATION: 94 %

## 2021-09-01 DIAGNOSIS — E66.813 CLASS 3 SEVERE OBESITY WITHOUT SERIOUS COMORBIDITY IN ADULT, UNSPECIFIED BMI, UNSPECIFIED OBESITY TYPE (H): ICD-10-CM

## 2021-09-01 DIAGNOSIS — I26.99 PULMONARY EMBOLISM, OTHER, UNSPECIFIED CHRONICITY, UNSPECIFIED WHETHER ACUTE COR PULMONALE PRESENT (H): ICD-10-CM

## 2021-09-01 DIAGNOSIS — F41.9 ANXIETY: Primary | ICD-10-CM

## 2021-09-01 DIAGNOSIS — E11.9 TYPE 2 DIABETES MELLITUS WITHOUT COMPLICATION, WITHOUT LONG-TERM CURRENT USE OF INSULIN (H): ICD-10-CM

## 2021-09-01 DIAGNOSIS — J44.9 CHRONIC OBSTRUCTIVE PULMONARY DISEASE, UNSPECIFIED COPD TYPE (H): ICD-10-CM

## 2021-09-01 DIAGNOSIS — Z95.2 HEART VALVE REPLACED: Primary | ICD-10-CM

## 2021-09-01 DIAGNOSIS — Z95.2 H/O AORTIC VALVE REPLACEMENT: ICD-10-CM

## 2021-09-01 DIAGNOSIS — E66.01 CLASS 3 SEVERE OBESITY WITHOUT SERIOUS COMORBIDITY IN ADULT, UNSPECIFIED BMI, UNSPECIFIED OBESITY TYPE (H): ICD-10-CM

## 2021-09-01 LAB — INR BLD: 2.2 (ref 0.9–1.1)

## 2021-09-01 PROCEDURE — 99214 OFFICE O/P EST MOD 30 MIN: CPT | Performed by: FAMILY MEDICINE

## 2021-09-01 PROCEDURE — 36416 COLLJ CAPILLARY BLOOD SPEC: CPT | Performed by: FAMILY MEDICINE

## 2021-09-01 PROCEDURE — 85610 PROTHROMBIN TIME: CPT | Performed by: FAMILY MEDICINE

## 2021-09-01 RX ORDER — METFORMIN HCL 500 MG
1 TABLET, EXTENDED RELEASE 24 HR ORAL DAILY
COMMUNITY
Start: 2021-08-13 | End: 2022-03-28

## 2021-09-01 RX ORDER — LORAZEPAM 0.5 MG/1
0.5 TABLET ORAL EVERY 8 HOURS PRN
Qty: 20 TABLET | Refills: 0 | Status: SHIPPED | OUTPATIENT
Start: 2021-09-01 | End: 2023-12-28

## 2021-09-01 RX ORDER — CALCIUM CARBONATE 500(1250)
1 TABLET,CHEWABLE ORAL DAILY
COMMUNITY
End: 2023-05-18

## 2021-09-01 RX ORDER — BUPROPION HYDROCHLORIDE 150 MG/1
1 TABLET ORAL DAILY
COMMUNITY
Start: 2021-07-29 | End: 2022-03-28

## 2021-09-01 NOTE — PROGRESS NOTES
ANTICOAGULATION MANAGEMENT     Eileen Donald 65 year old female is on warfarin with subtherapeutic INR result. (Goal INR 2.5-3.5)    Recent labs: (last 7 days)     09/01/21  1534   INR 2.2*       ASSESSMENT     Source(s): Chart Review and Patient/Caregiver Call       Warfarin doses taken: Warfarin taken as instructed    Diet: No new diet changes identified    New illness, injury, or hospitalization: No    Medication/supplement changes: None noted    Signs or symptoms of bleeding or clotting: No    Previous INR: Supratherapeutic    Additional findings: None     PLAN     Recommended plan for no diet, medication or health factor changes affecting INR     Dosing Instructions:  Increase your warfarin dose (12.5% change) with next INR in 2 weeks       Summary  As of 9/1/2021    Full warfarin instructions:  5 mg every Wed, Sat; 2.5 mg all other days   Next INR check:  9/15/2021             Telephone call with Eileen who verbalizes understanding and agrees to plan    Patient offered & declined to schedule next visit    Education provided: None required    Plan made per [facility] anticoagulation protocol    Dany Cramer RN  Anticoagulation Clinic  9/1/2021    _______________________________________________________________________     Anticoagulation Episode Summary     Current INR goal:  2.5-3.5   TTR:  49.2 % (1 y)   Target end date:     Send INR reminders to:  [facility]    Indications    Heart valve replaced [Z95.2]           Comments:           Anticoagulation Care Providers     Provider Role Specialty Phone number    Tito Salazar MD Referring Family Medicine [phone]

## 2021-09-07 NOTE — PROGRESS NOTES
"    Assessment & Plan     Anxiety  Patient requesting refill of Ativan  Patient uses medication infrequently  Refill sent to the pharmacy continue with current dosing on venlafaxine  - LORazepam (ATIVAN) 0.5 MG tablet; Take 1 tablet (0.5 mg) by mouth every 8 hours as needed for anxiety    Class 3 severe obesity without serious comorbidity in adult, unspecified BMI, unspecified obesity type (H)  Patient is aware of her weight  She is actually been changing her diet and is down 20 pounds since this winter  Congratulated her on her improvement and recommend continued diet    Type 2 diabetes mellitus without complication, without long-term current use of insulin (H)  A1c returns at goal range patient is surprised of the improvement  Discussed dietary changes and weight loss are helping  Continue with current medications    Chronic obstructive pulmonary disease, unspecified COPD type (H)  Patient is on chronic supplemental oxygen and inhalers  No medication changes made today    Pulmonary embolism (H)  Due for INR  - INR point of care    H/O aortic valve replacement  Due to patient's history of pulmonary believes him and valve replacement would recommend bridging for her upcoming colonoscopy  - INR point of care         BMI:   Estimated body mass index is 42.12 kg/m  as calculated from the following:    Height as of 5/3/21: 1.727 m (5' 8\").    Weight as of this encounter: 125.6 kg (277 lb).   Weight management plan: Discussed healthy diet and exercise guidelines        No follow-ups on file.    Tito Salazar MD  [facility]    William Hunt is a 65 year old who presents for the following health issues     HPI   65-year-old female here for follow-up regards to diabetes.  Patient A1c returns at goal range.  Patient is had success with 20 pounds of weight loss in the last 6 months with dietary changes.  She states her  is changing the quality of food that they are consuming and " she has had some success with weight loss.  Encouraged her to continue.  We discussed exercise.  She is requesting refill of Ativan which she uses infrequently-she uses venlafaxine on a regular basis for her anxiety.  She is due for an INR she has a history of pulmonary embolism and aortic valve replacement.  She has an upcoming colonoscopy next month and will require bridging.  She is on chronic supplemental oxygen and using daily inhalers for her history of COPD-there maintaining and controlling her symptoms at this time.      Review of Systems   Constitutional, HEENT, cardiovascular, pulmonary, gi and gu systems are negative, except as otherwise noted.      Objective    /57 (BP Location: Left arm, Patient Position: Sitting, Cuff Size: Adult Large)   Pulse 81   Resp 28   Wt 125.6 kg (277 lb)   SpO2 94%   BMI 42.12 kg/m    Body mass index is 42.12 kg/m .  Physical Exam   GENERAL: healthy, alert and no distress  EYES: Eyes grossly normal to inspection, PERRL and conjunctivae and sclerae normal  RESP: lungs clear to auscultation - no rales, rhonchi or wheezes  CV: regular rate and rhythm, valve murmur noted  ABDOMEN: bowel sounds normal  MS: no gross musculoskeletal defects noted, no edema  NEURO: Normal strength and tone, mentation intact and speech normal  PSYCH: mentation appears normal, affect normal/bright

## 2021-09-13 DIAGNOSIS — Z98.818 OTHER DENTAL PROCEDURE STATUS: ICD-10-CM

## 2021-09-14 RX ORDER — AMOXICILLIN 500 MG/1
CAPSULE ORAL
Qty: 4 CAPSULE | Refills: 1 | Status: SHIPPED | OUTPATIENT
Start: 2021-09-14 | End: 2021-11-09

## 2021-09-14 NOTE — TELEPHONE ENCOUNTER
"Routing refill request to provider for review/approval because:  Diagnosis     Last Written Prescription Date:  8/6/21  Last Fill Quantity: 5,  # refills: 0   Last office visit provider:  9/1/21     Requested Prescriptions   Pending Prescriptions Disp Refills     amoxicillin (AMOXIL) 500 MG capsule [Pharmacy Med Name: AMOXICILLIN 500MG CAPSULES] 4 capsule      Sig: TAKE 4 CAPSULES BY MOUTH 1 HOUR BEFORE PROCEDURE       Oral Acne/Rosacea Medications Protocol Failed - 9/13/2021  1:25 PM        Failed - Confirmation of diagnosis is required     Please confirm diagnosis is acne or rosacea.     If NOT acne or rosacea; refer request to provider for further evaluation.    If diagnosis IS acne or rosacea, OK to refill BASED ON PREVIOUS REFILL CLINICAL NOTE RECOMMENDATION.          Passed - Patient is 12 years of age or older        Passed - Recent (12 mo) or future (30 days) visit within the authorizing provider's specialty     Patient has had an office visit with the authorizing provider or a provider within the authorizing providers department within the previous 12 mos or has a future within next 30 days. See \"Patient Info\" tab in inbasket, or \"Choose Columns\" in Meds & Orders section of the refill encounter.              Passed - Medication is active on med list        Passed - No active pregnancy on record        Passed - No positive prenancy test is past 12 months             Antoine Fuentes RN 09/14/21 10:05 AM  "

## 2021-09-15 ENCOUNTER — TELEPHONE (OUTPATIENT)
Dept: FAMILY MEDICINE | Facility: CLINIC | Age: 66
End: 2021-09-15

## 2021-09-15 NOTE — TELEPHONE ENCOUNTER
Procedure:    Colonoscopy on 10/14    [facility] [phone]    Please advise on bridge/hold plan.     Rita Elena RN, BSN, PHN  Anticoagulation Nurse  [phone]

## 2021-09-22 ENCOUNTER — DOCUMENTATION ONLY (OUTPATIENT)
Dept: FAMILY MEDICINE | Facility: CLINIC | Age: 66
End: 2021-09-22

## 2021-09-22 DIAGNOSIS — Z95.2 HEART VALVE REPLACED: Primary | ICD-10-CM

## 2021-09-22 NOTE — PROGRESS NOTES
Fax received from [facility]:    Colonscopy on 10/14/21, requesting a 4 day hold and any bridging required.     Odalis Rodarte RN

## 2021-09-26 ENCOUNTER — HEALTH MAINTENANCE LETTER (OUTPATIENT)
Age: 66
End: 2021-09-26

## 2021-09-27 ENCOUNTER — OFFICE VISIT (OUTPATIENT)
Dept: FAMILY MEDICINE | Facility: CLINIC | Age: 66
End: 2021-09-27
Payer: COMMERCIAL

## 2021-09-27 ENCOUNTER — NURSE TRIAGE (OUTPATIENT)
Dept: NURSING | Facility: CLINIC | Age: 66
End: 2021-09-27

## 2021-09-27 VITALS
WEIGHT: 277 LBS | SYSTOLIC BLOOD PRESSURE: 118 MMHG | RESPIRATION RATE: 26 BRPM | DIASTOLIC BLOOD PRESSURE: 75 MMHG | BODY MASS INDEX: 42.12 KG/M2 | OXYGEN SATURATION: 96 % | TEMPERATURE: 98.7 F | HEART RATE: 110 BPM

## 2021-09-27 DIAGNOSIS — I27.20 PULMONARY HYPERTENSION (H): ICD-10-CM

## 2021-09-27 DIAGNOSIS — R05.9 COUGH: Primary | ICD-10-CM

## 2021-09-27 DIAGNOSIS — J44.9 CHRONIC OBSTRUCTIVE PULMONARY DISEASE, UNSPECIFIED COPD TYPE (H): ICD-10-CM

## 2021-09-27 PROCEDURE — U0003 INFECTIOUS AGENT DETECTION BY NUCLEIC ACID (DNA OR RNA); SEVERE ACUTE RESPIRATORY SYNDROME CORONAVIRUS 2 (SARS-COV-2) (CORONAVIRUS DISEASE [COVID-19]), AMPLIFIED PROBE TECHNIQUE, MAKING USE OF HIGH THROUGHPUT TECHNOLOGIES AS DESCRIBED BY CMS-2020-01-R: HCPCS | Performed by: FAMILY MEDICINE

## 2021-09-27 PROCEDURE — U0005 INFEC AGEN DETEC AMPLI PROBE: HCPCS | Performed by: FAMILY MEDICINE

## 2021-09-27 PROCEDURE — 99214 OFFICE O/P EST MOD 30 MIN: CPT | Performed by: FAMILY MEDICINE

## 2021-09-27 NOTE — PROGRESS NOTES
Assessment:       Cough      - XR Chest 2 Views  - XR Chest 2 Views    Chronic obstructive pulmonary disease, unspecified COPD type (H)  Pulmonary hypertension (H)           Plan:     Patient with a 2-day history of cough that I suspect represents a viral upper respiratory infection.  Will rule out COVID-19.  She does have a history of COPD as well as pulmonary hypertension and this is in the differential as well.  Continue to monitor symptoms and treat symptomatically.  Chest x-ray ordered and reviewed by myself as well as radiology showing no evidence of infiltrate.  Take Mucinex as needed for symptoms.  She will continue to monitor symptoms closely and follow-up if getting worse or not improving.      Subjective:       65 year old female with pulmonary hypertension and COPD as well as history of pulmonary embolism, chronically on 2 L of oxygen per nasal cannula with typical oxygen saturations running 94 to 95% presents for evaluation 2-day history of productive cough.  She has not had any fevers, chills, significant nasal congestion.  She has not been significantly more short of breath.  She wants to make sure she does not have pneumonia.  She denies sore throat.  She is fully Vaccinated against COVID-19.  She denies any chest pain or wheezing.  He has not needed to use more oxygen.  He has noticed that occasionally her oxygen saturations would go down to the low 90s with ambulation over the past couple of days.    Patient Active Problem List   Diagnosis     Depression     Asthma     Benign essential hypertension     Multiple Sclerosis     Hyperlipidemia     Impaired Glucose Tolerance Test     Dysphagia     Benign Adenomatous Polyp Of The Large Intestine     History of pulmonary embolism     Morbid obesity (H)     Generalized anxiety disorder     Primary osteoarthritis of both hands     Polyarthralgia     Aortic valve replaced     Controlled substance agreement signed     Heart valve replaced     Closed 3-part  fracture of proximal humerus with delayed healing, left     Acute on chronic respiratory failure with hypoxia (H)     Pulmonary hypertension (H)     Paroxysmal atrial fibrillation (H)     Dyslipidemia     Dyspnea on exertion     Hypertension     Postoperative anemia due to acute blood loss     Pre-diabetes     Stress hyperglycemia     Other pulmonary embolism without acute cor pulmonale, unspecified chronicity (H)     Chronic obstructive pulmonary disease, unspecified COPD type (H)     Recurrent major depressive disorder, in partial remission (H)     Diabetes mellitus, type 2 (H)       Past Medical History:   Diagnosis Date     Anxiety      Anxiety      Aortic valve replaced 2/15/2017     Aortic valve stenosis 01/24/2017     Asthma      Asthma     Created by Conversion      Chronic shortness of breath      Coronary artery disease      Depression      Diabetes mellitus (H)     borderline     Essential hypertension     Created by Conversion  Replacement Utility updated for latest IMO load     History of blood clots     PE     History of pulmonary embolism      HTN (hypertension)      Multiple sclerosis (H)      Multiple sclerosis (H)     Created by Conversion      Obesity      Obesity 10/29/2015     Osteoarthritis      Panic attacks      PONV (postoperative nausea and vomiting)      Pre-diabetes      Pulmonary embolism (H)      Sleep apnea        Past Surgical History:   Procedure Laterality Date     aortic valve surgery       C RECONSTR TOTAL SHOULDER IMPLANT Left 4/10/2019    Procedure: LEFT REVERSE TOTAL SHOULDER ARTHROPLASTY;  Surgeon: Luis Antonio Telles MD;  Location: [facility];  Service: Orthopedics     COLONOSCOPY W/ BIOPSIES N/A 3/22/2021    Procedure: COLONOSCOPY WITH BIOPSY AND POLYPECTOMY;  Surgeon: Sam Weems MD;  Location: [facility] OR;  Service: Gastroenterology     IR LUMBAR EPIDURAL STEROID INJECTION  1/16/2007     MAMMOPLASTY REDUCTION  1994     OTHER SURGICAL HISTORY  01/24/2017     mechanical aortic prosthesis     RELEASE CARPAL TUNNEL         Current Outpatient Medications   Medication     acetaminophen (TYLENOL) 325 MG tablet     acyclovir (ZOVIRAX) 400 MG tablet     albuterol (PROVENTIL HFA) 108 (90 Base) MCG/ACT inhaler     aspirin (ASA) 81 MG chewable tablet     budesonide-formoterol (SYMBICORT) 160-4.5 MCG/ACT Inhaler     buPROPion (WELLBUTRIN XL) 150 MG 24 hr tablet     calcium carbonate 1250 (500 Ca) MG CHEW     esomeprazole (NEXIUM) 20 MG DR capsule     furosemide (LASIX) 40 MG tablet     lisinopril (ZESTRIL) 5 MG tablet     loratadine (CLARITIN) 10 MG tablet     magnesium oxide (MAG-OX) 400 MG tablet     metFORMIN (GLUCOPHAGE-XR) 500 MG 24 hr tablet     metoprolol tartrate (LOPRESSOR) 25 MG tablet     montelukast (SINGULAIR) 10 MG tablet     potassium chloride ER (MICRO-K) 10 MEQ CR capsule     simvastatin (ZOCOR) 20 MG tablet     venlafaxine (EFFEXOR-XR) 75 MG 24 hr capsule     warfarin ANTICOAGULANT (COUMADIN) 2.5 MG tablet     amoxicillin (AMOXIL) 500 MG capsule     INCRUSE ELLIPTA 62.5 MCG/INH Inhaler     LORazepam (ATIVAN) 0.5 MG tablet     nystatin (NYSTOP) 274725 UNIT/GM external powder     senna-docusate (SENOKOT-S/PERICOLACE) 8.6-50 MG tablet     witch hazel-glycerin (MEDI PADS) pad     No current facility-administered medications for this visit.       Allergies   Allergen Reactions     Morphine Visual Disturbance and Other (See Comments)     hallicinations       Sulfa Drugs Hives and Nausea and Vomiting     Azithromycin Nausea and Vomiting and Rash       Family History   Problem Relation Age of Onset     Snoring Mother      Snoring Father      Sleep Apnea Sister      Coronary Artery Disease Father      Coronary Artery Disease Sister      Coronary Artery Disease Brother      Breast Cancer No family hx of        Social History     Socioeconomic History     Marital status:      Spouse name: None     Number of children: None     Years of education: None     Highest  education level: None   Occupational History     None   Tobacco Use     Smoking status: Never Smoker     Smokeless tobacco: Never Used   Substance and Sexual Activity     Alcohol use: No     Drug use: No     Sexual activity: None   Other Topics Concern     None   Social History Narrative    Lives with sister in [place] due to help with medical issues.   and daughter live in [place].  Plans to return to be with family hopefully soon.  For MS- last used medications about 3 months. In remission. Needs to be back on medications as  Neurologist has left.    Drew Hahn MD  5/29/2018    Daughters Cecille and Maci Molina are my patients.     Social Determinants of Health     Financial Resource Strain:      Difficulty of Paying Living Expenses:    Food Insecurity:      Worried About Running Out of Food in the Last Year:      Ran Out of Food in the Last Year:    Transportation Needs:      Lack of Transportation (Medical):      Lack of Transportation (Non-Medical):    Physical Activity:      Days of Exercise per Week:      Minutes of Exercise per Session:    Stress:      Feeling of Stress :    Social Connections:      Frequency of Communication with Friends and Family:      Frequency of Social Gatherings with Friends and Family:      Attends Faith Services:      Active Member of Clubs or Organizations:      Attends Club or Organization Meetings:      Marital Status:    Intimate Partner Violence:      Fear of Current or Ex-Partner:      Emotionally Abused:      Physically Abused:      Sexually Abused:          Review of Systems  A comprehensive review of systems was negative.      Objective:                   General Appearance:    /75   Pulse 110   Temp 98.7  F (37.1  C) (Oral)   Resp 26   Wt 125.6 kg (277 lb)   SpO2 96%   BMI 42.12 kg/m          Alert, morbidly obese, patient on liters oxygen per nasal cannula.  No respiratory distress.   Head:    Normocephalic, without obvious  abnormality, atraumatic   Eyes:    Conjunctiva/corneas clear   Ears:    Normal TM's without erythema or bulging. Normal external ear canals, both ears   Nose:   Nares normal, septum midline, mucosa normal, no drainage    or sinus tenderness   Throat:   Lips, mucosa, and tongue normal; teeth and gums normal.  No tonsilar hypertrophy or exudate.   Neck:   Supple, symmetrical, trachea midline, no adenopathy    Lungs:     Clear to auscultation bilaterally without wheezes, rales, or rhonchi, respirations unlabored    Heart:    Regular rate and rhythm, S1 and S2 normal, no murmur, rub or gallop       Extremities:   Extremities normal, atraumatic, no cyanosis or edema   Skin:   Skin color, texture, turgor normal, no rashes or lesions           Results for orders placed or performed in visit on 09/27/21 (from the past 24 hour(s))   XR Chest 2 Views    Narrative    EXAM DATE:         09/27/2021    EXAM: X-RAY CHEST, 2 VIEWS, FRONTAL AND LATERAL  LOCATION: [facility]  DATE/TIME: 9/27/2021 1:30 PM    INDICATION: Shortness of breath  COMPARISON: 03/25/2018    IMPRESSION: Poststernotomy changes with prosthetic aortic valve. Chronic elevation the right hemidiaphragm. Lungs are clear. Heart and pulmonary vascularity are normal. No signs of acute disease.    Reverse left shoulder arthroplasty.                 This note has been dictated using voice recognition software. Any grammatical or context distortions are unintentional and inherent to the software

## 2021-09-27 NOTE — TELEPHONE ENCOUNTER
"Reason for Call:  Patient calling reporting symptoms starting 2 days ago with a cough.   Frequent coughing waking from sleep.  Patient has asthma stating she has not taken her inhaler treatment.   Reporting wheezing \"I always have wheezing.\" Wheezing audible during triage.   \"I don't think I a fever.\"   Taking fluids. O2 sats 91% baseline 93% COPD. Patient stating she is laying down and has difficulty with a lot phlegm when laying flat.    Patient agrees to try inhaler treatment now, stating she has not used her inhaler all night. Frequent coughing with wheezing.    Advised RN would contact patient with in 30 minutes for follow up patient agrees.    Advised to call 911 for any difficulty breathing, or worsening symptoms.  Caller verbalized understanding. Denies further questions.    Placed call to patient at 750 a.m. call rolls to voice mail.    802 a.m. placed call to patient no answer.    932 a.m. attempted to reach patient call rolls to voice mail.    Requested 911 to provide medical check on patient due to audible wheezing and shortness of breath during triage call and no answer at home phone after several attempts.    Ivonne King RN  [facility]      Additional Information    Negative: Severe difficulty breathing (e.g., struggling for each breath, speak in single words, pulse > 120)    Negative: Bluish (or gray) lips or face now    Negative: Difficult to awaken or acting confused (e.g., disoriented, slurred speech)    Negative: Passed out (i.e., lost consciousness, collapsed and was not responding)    Negative: Wheezing started suddenly after medicine, an allergic food, or bee sting    Negative: Sounds like a life-threatening emergency to the triager    Negative: [1] SEVERE asthma attack (e.g., very SOB at rest, speaks in single words, loud wheezes) AND [2] not resolved after 2 nebulizer or inhaler treatments    Negative: Peak flow rate less than 50% of baseline level (RED zone)    Negative: [1] " Severe wheezing or coughing AND [2] doesn't have neb or inhaler available    Negative: Chest pain    Negative: [1] Hospitalized before with asthma AND [2] feels the same now    Negative: [1] MODERATE asthma attack (e.g., SOB at rest, speaks in phrases, audible wheezes) AND [2] not resolved after 2 nebulizer or inhaler treatments given 20 minutes apart    Negative: [1] Peak flow rate 50-80% of baseline level (YELLOW zone) AND [2] not resolved after 2 nebulizer or inhaler treatments given 20 minutes apart    Negative: Asthma medicine (nebulizer or inhaler) is needed more frequently than q 4 hours to keep you comfortable    Negative: Fever > 103 F (39.4 C)    Negative: [1] Fever > 101 F (38.3 C) AND [2] age > 60    Negative: Patient sounds very sick or weak to the triager    Negative: [1] Continuous (nonstop) coughing AND [2] keeps from working or sleeping AND [3] not improved after 2 nebulizer or inhaler treatments given 20 minutes apart    [1] Wheezing or coughing AND [2] hasn't used neb or inhaler twice AND [3] it's available    Protocols used: ASTHMA ATTACK-A-AH

## 2021-09-27 NOTE — PATIENT INSTRUCTIONS
You likely have a viral upper respiratory infection.  You may take Mucinex as needed for your symptoms.  Your COVID-19 test results should be back tomorrow and you should be able to see those results on [facility].  I will contact you if the radiologist sees anything on your chest x-ray that I missed.  Please follow-up if your symptoms are getting worse or not improving over the next week.

## 2021-09-28 ENCOUNTER — APPOINTMENT (OUTPATIENT)
Dept: RADIOLOGY | Facility: HOSPITAL | Age: 66
End: 2021-09-28
Attending: EMERGENCY MEDICINE
Payer: COMMERCIAL

## 2021-09-28 ENCOUNTER — HOSPITAL ENCOUNTER (INPATIENT)
Facility: HOSPITAL | Age: 66
LOS: 5 days | Discharge: HOME | End: 2021-10-03
Attending: EMERGENCY MEDICINE | Admitting: HOSPITALIST
Payer: COMMERCIAL

## 2021-09-28 DIAGNOSIS — J18.9 COMMUNITY ACQUIRED PNEUMONIA, UNSPECIFIED LATERALITY: ICD-10-CM

## 2021-09-28 DIAGNOSIS — H10.33 ACUTE BACTERIAL CONJUNCTIVITIS OF BOTH EYES: ICD-10-CM

## 2021-09-28 DIAGNOSIS — J44.9 CHRONIC OBSTRUCTIVE PULMONARY DISEASE, UNSPECIFIED COPD TYPE (H): Primary | ICD-10-CM

## 2021-09-28 LAB
ALBUMIN SERPL-MCNC: 3.5 G/DL (ref 3.5–5)
ALP SERPL-CCNC: 159 U/L (ref 45–120)
ALT SERPL W P-5'-P-CCNC: 79 U/L (ref 0–45)
ANION GAP SERPL CALCULATED.3IONS-SCNC: 11 MMOL/L (ref 5–18)
AST SERPL W P-5'-P-CCNC: 93 U/L (ref 0–40)
BASOPHILS # BLD AUTO: 0.1 10E3/UL (ref 0–0.2)
BASOPHILS NFR BLD AUTO: 1 %
BILIRUB SERPL-MCNC: 1.2 MG/DL (ref 0–1)
BNP SERPL-MCNC: 78 PG/ML (ref 0–103)
BUN SERPL-MCNC: 10 MG/DL (ref 8–22)
CALCIUM SERPL-MCNC: 10.4 MG/DL (ref 8.5–10.5)
CHLORIDE BLD-SCNC: 98 MMOL/L (ref 98–107)
CO2 SERPL-SCNC: 35 MMOL/L (ref 22–31)
CREAT SERPL-MCNC: 0.99 MG/DL (ref 0.6–1.1)
EOSINOPHIL # BLD AUTO: 0.1 10E3/UL (ref 0–0.7)
EOSINOPHIL NFR BLD AUTO: 1 %
ERYTHROCYTE [DISTWIDTH] IN BLOOD BY AUTOMATED COUNT: 13.9 % (ref 10–15)
GFR SERPL CREATININE-BSD FRML MDRD: 60 ML/MIN/1.73M2
GLUCOSE BLD-MCNC: 183 MG/DL (ref 70–125)
HCT VFR BLD AUTO: 44 % (ref 35–47)
HGB BLD-MCNC: 13.6 G/DL (ref 11.7–15.7)
HOLD SPECIMEN: NORMAL
IMM GRANULOCYTES # BLD: 0 10E3/UL
IMM GRANULOCYTES NFR BLD: 0 %
INR PPP: 2.61 (ref 0.9–1.15)
LACTATE SERPL-SCNC: 1.3 MMOL/L (ref 0.7–2)
LYMPHOCYTES # BLD AUTO: 1.1 10E3/UL (ref 0.8–5.3)
LYMPHOCYTES NFR BLD AUTO: 16 %
MCH RBC QN AUTO: 31 PG (ref 26.5–33)
MCHC RBC AUTO-ENTMCNC: 30.9 G/DL (ref 31.5–36.5)
MCV RBC AUTO: 100 FL (ref 78–100)
MONOCYTES # BLD AUTO: 1.1 10E3/UL (ref 0–1.3)
MONOCYTES NFR BLD AUTO: 16 %
NEUTROPHILS # BLD AUTO: 4.6 10E3/UL (ref 1.6–8.3)
NEUTROPHILS NFR BLD AUTO: 66 %
NRBC # BLD AUTO: 0 10E3/UL
NRBC BLD AUTO-RTO: 0 /100
PLATELET # BLD AUTO: 329 10E3/UL (ref 150–450)
POTASSIUM BLD-SCNC: 3.3 MMOL/L (ref 3.5–5)
PROCALCITONIN SERPL-MCNC: 0.19 NG/ML (ref 0–0.49)
PROT SERPL-MCNC: 8.4 G/DL (ref 6–8)
RBC # BLD AUTO: 4.39 10E6/UL (ref 3.8–5.2)
SARS-COV-2 RNA RESP QL NAA+PROBE: NEGATIVE
SARS-COV-2 RNA RESP QL NAA+PROBE: NEGATIVE
SODIUM SERPL-SCNC: 144 MMOL/L (ref 136–145)
TROPONIN I SERPL-MCNC: 0.01 NG/ML (ref 0–0.29)
WBC # BLD AUTO: 7 10E3/UL (ref 4–11)

## 2021-09-28 PROCEDURE — 96375 TX/PRO/DX INJ NEW DRUG ADDON: CPT

## 2021-09-28 PROCEDURE — 71045 X-RAY EXAM CHEST 1 VIEW: CPT

## 2021-09-28 PROCEDURE — 85610 PROTHROMBIN TIME: CPT | Performed by: EMERGENCY MEDICINE

## 2021-09-28 PROCEDURE — 250N000009 HC RX 250: Performed by: EMERGENCY MEDICINE

## 2021-09-28 PROCEDURE — 87635 SARS-COV-2 COVID-19 AMP PRB: CPT | Performed by: EMERGENCY MEDICINE

## 2021-09-28 PROCEDURE — 84145 PROCALCITONIN (PCT): CPT | Performed by: EMERGENCY MEDICINE

## 2021-09-28 PROCEDURE — 87040 BLOOD CULTURE FOR BACTERIA: CPT | Performed by: EMERGENCY MEDICINE

## 2021-09-28 PROCEDURE — 84484 ASSAY OF TROPONIN QUANT: CPT | Performed by: EMERGENCY MEDICINE

## 2021-09-28 PROCEDURE — 99285 EMERGENCY DEPT VISIT HI MDM: CPT | Mod: 25

## 2021-09-28 PROCEDURE — 85025 COMPLETE CBC W/AUTO DIFF WBC: CPT | Performed by: EMERGENCY MEDICINE

## 2021-09-28 PROCEDURE — 250N000011 HC RX IP 250 OP 636: Performed by: EMERGENCY MEDICINE

## 2021-09-28 PROCEDURE — 96367 TX/PROPH/DG ADDL SEQ IV INF: CPT

## 2021-09-28 PROCEDURE — 80053 COMPREHEN METABOLIC PANEL: CPT | Performed by: EMERGENCY MEDICINE

## 2021-09-28 PROCEDURE — 93005 ELECTROCARDIOGRAM TRACING: CPT | Performed by: EMERGENCY MEDICINE

## 2021-09-28 PROCEDURE — 36415 COLL VENOUS BLD VENIPUNCTURE: CPT | Performed by: EMERGENCY MEDICINE

## 2021-09-28 PROCEDURE — 250N000013 HC RX MED GY IP 250 OP 250 PS 637: Performed by: EMERGENCY MEDICINE

## 2021-09-28 PROCEDURE — 120N000001 HC R&B MED SURG/OB

## 2021-09-28 PROCEDURE — C9803 HOPD COVID-19 SPEC COLLECT: HCPCS

## 2021-09-28 PROCEDURE — 83880 ASSAY OF NATRIURETIC PEPTIDE: CPT | Performed by: EMERGENCY MEDICINE

## 2021-09-28 PROCEDURE — 96366 THER/PROPH/DIAG IV INF ADDON: CPT

## 2021-09-28 PROCEDURE — 83605 ASSAY OF LACTIC ACID: CPT | Performed by: EMERGENCY MEDICINE

## 2021-09-28 PROCEDURE — 258N000003 HC RX IP 258 OP 636: Performed by: EMERGENCY MEDICINE

## 2021-09-28 PROCEDURE — 96365 THER/PROPH/DIAG IV INF INIT: CPT

## 2021-09-28 RX ORDER — TOBRAMYCIN 3 MG/ML
2 SOLUTION/ DROPS OPHTHALMIC EVERY 4 HOURS
Status: DISCONTINUED | OUTPATIENT
Start: 2021-09-28 | End: 2021-10-03 | Stop reason: HOSPADM

## 2021-09-28 RX ORDER — METHYLPREDNISOLONE SODIUM SUCCINATE 125 MG/2ML
125 INJECTION, POWDER, LYOPHILIZED, FOR SOLUTION INTRAMUSCULAR; INTRAVENOUS ONCE
Status: COMPLETED | OUTPATIENT
Start: 2021-09-28 | End: 2021-09-28

## 2021-09-28 RX ORDER — TETRACAINE HYDROCHLORIDE 5 MG/ML
1-2 SOLUTION OPHTHALMIC ONCE
Status: COMPLETED | OUTPATIENT
Start: 2021-09-28 | End: 2021-09-28

## 2021-09-28 RX ORDER — CEFTRIAXONE 2 G/1
2 INJECTION, POWDER, FOR SOLUTION INTRAMUSCULAR; INTRAVENOUS ONCE
Status: COMPLETED | OUTPATIENT
Start: 2021-09-28 | End: 2021-09-28

## 2021-09-28 RX ORDER — DOXYCYCLINE 100 MG/10ML
100 INJECTION, POWDER, LYOPHILIZED, FOR SOLUTION INTRAVENOUS ONCE
Status: COMPLETED | OUTPATIENT
Start: 2021-09-28 | End: 2021-09-28

## 2021-09-28 RX ORDER — ACETAMINOPHEN 325 MG/1
650 TABLET ORAL ONCE
Status: COMPLETED | OUTPATIENT
Start: 2021-09-28 | End: 2021-09-28

## 2021-09-28 RX ADMIN — TOBRAMYCIN 2 DROP: 3 SOLUTION/ DROPS OPHTHALMIC at 22:56

## 2021-09-28 RX ADMIN — SODIUM CHLORIDE 1000 ML: 9 INJECTION, SOLUTION INTRAVENOUS at 20:45

## 2021-09-28 RX ADMIN — DOXYCYCLINE 100 MG: 100 INJECTION, POWDER, LYOPHILIZED, FOR SOLUTION INTRAVENOUS at 20:53

## 2021-09-28 RX ADMIN — FLUORESCEIN SODIUM 1200 MCG: 0.6 STRIP OPHTHALMIC at 19:31

## 2021-09-28 RX ADMIN — TETRACAINE HYDROCHLORIDE 2 DROP: 5 SOLUTION OPHTHALMIC at 19:31

## 2021-09-28 RX ADMIN — CEFTRIAXONE SODIUM 2 G: 2 INJECTION, POWDER, FOR SOLUTION INTRAMUSCULAR; INTRAVENOUS at 20:06

## 2021-09-28 RX ADMIN — METHYLPREDNISOLONE SODIUM SUCCINATE 125 MG: 125 INJECTION, POWDER, FOR SOLUTION INTRAMUSCULAR; INTRAVENOUS at 23:51

## 2021-09-28 RX ADMIN — ACETAMINOPHEN 650 MG: 325 TABLET ORAL at 20:47

## 2021-09-28 ASSESSMENT — ENCOUNTER SYMPTOMS
FEVER: 0
SINUS PRESSURE: 1
ADENOPATHY: 0
CONFUSION: 0
NAUSEA: 1
WOUND: 0
COUGH: 1
PHOTOPHOBIA: 1
DIFFICULTY URINATING: 0
MYALGIAS: 1
EYE DISCHARGE: 1

## 2021-09-28 NOTE — ED TRIAGE NOTES
Pt has increasing sob and cough x 3 days. Hx MS on home o2. Was seen in urgent care yesterday Covid -.   now has redness swelling and drainage to eder eyes.

## 2021-09-28 NOTE — ED PROVIDER NOTES
EMERGENCY DEPARTMENT ENCOUNTER      NAME: Eileen Donald  AGE: 65 year old female  YOB: 1955  MRN: [id number]  EVALUATION DATE & TIME: 9/28/2021  5:51 PM    PCP: Tito Salazar        Chief Complaint   Patient presents with     Cough     Shortness of Breath         FINAL IMPRESSION:  1. Community acquired pneumonia, unspecified laterality    2. Acute bacterial conjunctivitis of both eyes          ED COURSE & MEDICAL DECISION MAKING:    Pertinent Labs & Imaging studies reviewed. (See chart for details)  65 year old female presents to the Emergency Department for evaluation of bilateral goopy eyes, productive cough, shortness of breath, malaise    Patient has history of COPD, A. fib, anticoagulated, heart valve replacement, multiple sclerosing, diabetes, pulmonary emboli, chronic hypoxemia.    Patient with history of HSV keratitis but is currently on her acyclovir prophylaxis. Patient's eyes were examined with fluorescein under Woods lamp and there is no dendritic ulcerations. Exam is consistent with bacterial conjunctivitis. Tobramycin eyedrops ordered. Productive cough. Covid negative. She is tachycardic. Lactic acid normal. X-ray shows developing infiltrates bilaterally for plan to treat as community-acquired pneumonia with ceftriaxone, and doxycycline based on her history of azithromycin. With increased work of breathing history of COPD will give Solu-Medrol.            ED Course as of Sep 28 2326   Tue Sep 28, 2021   2320 SARS CoV2 PCR: Negative   2320 Lactic Acid: 1.3   2322 Productive cough and therapeutic INR makes PE unlikely   INR(!): 2.61   2323 Hemoglobin: 13.6   2323 Alkaline Phosphatase(!): 159   2323 AST(!): 93   2323 Negative for right upper quadrant tenderness to suggest cholecystitis   ALT(!): 79   2323 Based on productive cough will give antibiotics and plan for admission since she is tachycardic and has increased work of breathing      2323 CHF exacerbation less likely   BNP:  78   5783 ACS unlikely   Troponin I: 0.01       6:11 PM Met with patient for initial interview and exam. Discussed initial plan for care for their stay in the emergency department. I saw the patient in PPE including a face shield, N95 mask and gloves.   7:27 PM Performed an eye woods lamp exam on patient.   9:59 PM Spoke with , hospitalist, over patient's admittance.   10:09 PM Rechecked and updated patient.   11:08 PM Rechecked patient.     At the conclusion of the encounter I discussed the results of all of the tests and the disposition. The questions were answered. The patient or family acknowledged understanding and was agreeable with the care plan.       MEDICATIONS GIVEN IN THE EMERGENCY:  Medications   tobramycin (TOBREX) 0.3 % ophthalmic solution 2 drop (2 drops Both Eyes Given 9/28/21 2256)   methylPREDNISolone sodium succinate (solu-MEDROL) injection 125 mg (has no administration in time range)   fluorescein (FUL-EVERARDO) ophthalmic strip STRP 1,200 mcg (1,200 mcg Both Eyes Given 9/28/21 1931)   tetracaine (PONTOCAINE) 0.5 % ophthalmic solution 1-2 drop (2 drops Both Eyes Given 9/28/21 1931)   cefTRIAXone (ROCEPHIN) 2 g vial to attach to  ml bag for ADULTS or NS 50 ml bag for PEDS (0 g Intravenous Stopped 9/28/21 2047)   doxycycline (VIBRAMYCIN) 100 mg vial to attach to  mL bag (0 mg Intravenous Stopped 9/28/21 2228)   0.9% sodium chloride BOLUS (0 mLs Intravenous Stopped 9/28/21 2228)   acetaminophen (TYLENOL) tablet 650 mg (650 mg Oral Given 9/28/21 2047)       NEW PRESCRIPTIONS STARTED AT TODAY'S ER VISIT  New Prescriptions    No medications on file            =================================================================    HPI    Patient information was obtained from: Patient    Use of Intrepreter: N/A         Eileen Donald is a 65 year old female with a pertinent history of multiple sclerosis, atrial fibrillation, acute chronic respiratory failure with hypoxia, DM II,  depression, EDNA, obesity, COPD, aortic valve replacement, hypertension, pulmonary embolism, asthma, and sleep apnea who presents to this ED via walk-in for evaluation of shortness of breath and coughing.     Per chart review patient was last seen, 9/27/2021 at [facility] for the evaluation of a productive cough persistent for the last two days. Patient denied any fevers, chills, or significant nasal congestion and noted she was not significantly more short of breath. Patient was tested for COVID-19 which came back negative and a chest X-ray was conducted. Patient's X-ray showed poststernotomy changes with prosthetic aortic valve. Chronic elevation the right hemidiaphragm. Lungs are clear. Heart and pulmonary vascularity are normal. No signs of acute disease. Patient was diagnosed with a viral upper respiratory infection.    Patient is here with three days productive cough with green mucous and bilateral eye drainage with green discharge. She is experiencing some light sensitivity today. Patient does have a history of HSV keratitis and is on daily Acyclovir 400 mg to prevent it. He states this feels different than previous eye infection. she endorses having some sinus pressure and ear pain. She endorses general myalgias and is nauseated. Patients daughter is also sick but COVID-19 swab came back negative. She is not on any steroids but is on oxygen for MS. Patient is on Warfarin for history of prosthetic aortic valve replacement and blood clots. Patient denies any other complaints at the time.       REVIEW OF SYSTEMS   Review of Systems   Constitutional: Negative for fever.   HENT: Positive for ear pain and sinus pressure.    Eyes: Positive for photophobia and discharge (bilateral green drainage).   Respiratory: Positive for cough (productive with green mucous).    Cardiovascular: Negative for chest pain.   Gastrointestinal: Positive for nausea.   Genitourinary: Negative for difficulty  urinating.   Musculoskeletal: Positive for myalgias.   Skin: Negative for wound.   Hematological: Negative for adenopathy.   Psychiatric/Behavioral: Negative for confusion.           PAST MEDICAL HISTORY:  Past Medical History:   Diagnosis Date     Anxiety      Anxiety      Aortic valve replaced 2/15/2017     Aortic valve stenosis 01/24/2017     Asthma      Asthma     Created by Conversion      Chronic shortness of breath      Coronary artery disease      Depression      Diabetes mellitus (H)     borderline     Essential hypertension     Created by Conversion  Replacement Utility updated for latest IMO load     History of blood clots     PE     History of pulmonary embolism      HTN (hypertension)      Multiple sclerosis (H)      Multiple sclerosis (H)     Created by Conversion      Obesity      Obesity 10/29/2015     Osteoarthritis      Panic attacks      PONV (postoperative nausea and vomiting)      Pre-diabetes      Pulmonary embolism (H)      Sleep apnea        PAST SURGICAL HISTORY:  Past Surgical History:   Procedure Laterality Date     aortic valve surgery       C RECONSTR TOTAL SHOULDER IMPLANT Left 4/10/2019    Procedure: LEFT REVERSE TOTAL SHOULDER ARTHROPLASTY;  Surgeon: Luis Antonio Telles MD;  Location: [facility];  Service: Orthopedics     COLONOSCOPY W/ BIOPSIES N/A 3/22/2021    Procedure: COLONOSCOPY WITH BIOPSY AND POLYPECTOMY;  Surgeon: Sam Weems MD;  Location: [facility];  Service: Gastroenterology     IR LUMBAR EPIDURAL STEROID INJECTION  1/16/2007     MAMMOPLASTY REDUCTION  1994     OTHER SURGICAL HISTORY  01/24/2017    mechanical aortic prosthesis     RELEASE CARPAL TUNNEL             CURRENT MEDICATIONS:    Patient's Medications   New Prescriptions    No medications on file   Previous Medications    ACETAMINOPHEN (TYLENOL) 325 MG TABLET    Take 325-650 mg by mouth every 6 hours as needed     ACYCLOVIR (ZOVIRAX) 400 MG TABLET    Take 400 mg by mouth 2 times daily     ALBUTEROL  (PROVENTIL HFA) 108 (90 BASE) MCG/ACT INHALER    Inhale 2 puffs into the lungs every 6 hours as needed     AMOXICILLIN (AMOXIL) 500 MG CAPSULE    TAKE 4 CAPSULES BY MOUTH 1 HOUR BEFORE PROCEDURE    ASPIRIN (ASA) 81 MG CHEWABLE TABLET    Take 81 mg by mouth daily     BUDESONIDE-FORMOTEROL (SYMBICORT) 160-4.5 MCG/ACT INHALER    Inhale 2 puffs into the lungs 2 times daily     BUPROPION (WELLBUTRIN XL) 150 MG 24 HR TABLET    Take 1 tablet by mouth daily    CALCIUM CARBONATE 1250 (500 CA) MG CHEW    Take 1 tablet by mouth daily     ESOMEPRAZOLE (NEXIUM) 20 MG DR CAPSULE    Take 20 mg by mouth every morning (before breakfast)     FUROSEMIDE (LASIX) 40 MG TABLET    Take 60 mg by mouth daily     INCRUSE ELLIPTA 62.5 MCG/INH INHALER    Inhale 1 puff into the lungs daily     LISINOPRIL (ZESTRIL) 5 MG TABLET    Take 5 mg by mouth daily     LORATADINE (CLARITIN) 10 MG TABLET    Take 1 tablet (10 mg) by mouth daily    LORAZEPAM (ATIVAN) 0.5 MG TABLET    Take 1 tablet (0.5 mg) by mouth every 8 hours as needed for anxiety    MAGNESIUM OXIDE (MAG-OX) 400 MG TABLET    Take 400 mg by mouth daily     METFORMIN (GLUCOPHAGE-XR) 500 MG 24 HR TABLET    Take 1 tablet by mouth daily    METOPROLOL TARTRATE (LOPRESSOR) 25 MG TABLET    Take 25 mg by mouth 2 times daily     MONTELUKAST (SINGULAIR) 10 MG TABLET    Take 10 mg by mouth At Bedtime     POTASSIUM CHLORIDE ER (MICRO-K) 10 MEQ CR CAPSULE    Take 10 mEq by mouth daily     SIMVASTATIN (ZOCOR) 20 MG TABLET    Take 20 mg by mouth At Bedtime     VENLAFAXINE (EFFEXOR-XR) 75 MG 24 HR CAPSULE    TAKE 3 CAPSULES BY MOUTH EVERY DAY    WARFARIN ANTICOAGULANT (COUMADIN) 2.5 MG TABLET    TAKE 1 TO 2 TABLETS DAILY AS DIRECTED, ADJUST DOSE BASED ON INR RESULTS   Modified Medications    No medications on file   Discontinued Medications    ENOXAPARIN ANTICOAGULANT (LOVENOX) 100 MG/ML SYRINGE    Inject 0.9 mLs (90 mg) Subcutaneous every 12 hours Before and after procedure as directed    NYSTATIN  (NYSTOP) 603329 UNIT/GM EXTERNAL POWDER        SENNA-DOCUSATE (SENOKOT-S/PERICOLACE) 8.6-50 MG TABLET    Take 1 tablet by mouth    WITCH HAZEL-GLYCERIN (MEDI PADS) PAD    Apply 1 each topically        ALLERGIES:  Allergies   Allergen Reactions     Morphine Visual Disturbance and Other (See Comments)     hallicinations       Sulfa Drugs Hives and Nausea and Vomiting     Azithromycin Nausea and Vomiting and Rash       FAMILY HISTORY:  Family History   Problem Relation Age of Onset     Snoring Mother      Snoring Father      Sleep Apnea Sister      Coronary Artery Disease Father      Coronary Artery Disease Sister      Coronary Artery Disease Brother      Breast Cancer No family hx of        SOCIAL HISTORY:   Social History     Socioeconomic History     Marital status:      Spouse name: Not on file     Number of children: Not on file     Years of education: Not on file     Highest education level: Not on file   Occupational History     Not on file   Tobacco Use     Smoking status: Never Smoker     Smokeless tobacco: Never Used   Substance and Sexual Activity     Alcohol use: No     Drug use: No     Sexual activity: Not on file   Other Topics Concern     Not on file   Social History Narrative    Lives with sister in [place] due to help with medical issues.   and daughter live in [place].  Plans to return to be with family hopefully soon.  For MS- last used medications about 3 months. In remission. Needs to be back on medications as  Neurologist has left.    Drew Hahn MD  5/29/2018    Daughters Cecille and Maci Molina are my patients.     Social Determinants of Health     Financial Resource Strain:      Difficulty of Paying Living Expenses:    Food Insecurity:      Worried About Running Out of Food in the Last Year:      Ran Out of Food in the Last Year:    Transportation Needs:      Lack of Transportation (Medical):      Lack of Transportation (Non-Medical):    Physical Activity:      Days  of Exercise per Week:      Minutes of Exercise per Session:    Stress:      Feeling of Stress :    Social Connections:      Frequency of Communication with Friends and Family:      Frequency of Social Gatherings with Friends and Family:      Attends [employer] Services:      Active Member of Clubs or Organizations:      Attends Club or Organization Meetings:      Marital Status:    Intimate Partner Violence:      Fear of Current or Ex-Partner:      Emotionally Abused:      Physically Abused:      Sexually Abused:        VITALS:  Patient Vitals for the past 24 hrs:   BP Temp Temp src Pulse Resp SpO2 Weight   09/28/21 2230 134/60 -- -- 117 28 94 % --   09/28/21 2200 130/60 -- -- 118 26 93 % --   09/28/21 2045 133/74 -- -- (!) 126 (!) 36 95 % --   09/28/21 2030 131/66 -- -- (!) 126 (!) 35 94 % --   09/28/21 2015 136/64 -- -- -- -- -- --   09/28/21 2003 -- -- -- (!) 126 (!) 32 95 % --   09/28/21 2001 127/58 -- -- (!) 126 -- -- --   09/28/21 2000 -- 99.8  F (37.7  C) Oral -- -- -- --   09/28/21 1931 -- -- -- -- (!) 34 -- --   09/28/21 1930 128/59 -- -- 117 -- 94 % --   09/28/21 1925 -- -- -- -- -- 93 % --   09/28/21 1915 128/59 -- -- (!) 122 -- 93 % --   09/28/21 1911 -- -- -- 120 (!) 33 -- --   09/28/21 1910 -- -- -- 119 -- -- --   09/28/21 1815 135/68 -- -- 119 -- 96 % --   09/28/21 1800 (!) 157/75 -- -- 120 -- 94 % --   09/28/21 1539 118/56 96.9  F (36.1  C) Tympanic 116 22 95 % 125.6 kg (277 lb)       PHYSICAL EXAM      Vitals: /60   Pulse 117   Temp 99.8  F (37.7  C) (Oral)   Resp 28   Wt 125.6 kg (277 lb)   SpO2 94%   BMI 42.12 kg/m    General: Appears in no acute distress, awake, alert, interactive.  Eyes:  Sclera anicteric.Bilateral green goopy eyes with conjunctival injection. No evidence of keratitis or ciliary injection on exam. No steamy cornea on exam. Woods lamp no fluorescein stain uptake on cornea. No dendritic lesions.   HENT: Atraumatic.  Neck: Supple.  Respiratory/Chest: Coarse breath  sounds. No wheezing or crackles. On 2L of oxygen which is baseline. Productive sounding cough and spits up some green material.   Heart: RRR. Prominent second heart sound  Abdomen: non distended  Musculoskeletal: Normal extremities. No edema or erythema.  Skin: Normal color. No rash or diaphoresis.  Neurologic: Face symmetric, moves all extremities spontaneously. Speech clear.  Psychiatric: Oriented to person, place, and time. Affect appropriate.    LAB:  All pertinent labs reviewed and interpreted.  Results for orders placed or performed during the hospital encounter of 09/28/21   XR Chest Port 1 View    Impression    IMPRESSION: Mild bibasilar interstitial opacities may represent atelectasis or infection. No pleural effusion. Stable elevated right hemidiaphragm. Stable enlarged cardiac silhouette. Replaced heart valve. Median sternotomy. Left shoulder arthroplasty.   B-Type Natriuretic Peptide ([facility] Only)   Result Value Ref Range    BNP 78 0 - 103 pg/mL   Troponin I (now)   Result Value Ref Range    Troponin I 0.01 0.00 - 0.29 ng/mL   Result Value Ref Range    INR 2.61 (H) 0.90 - 1.15   Symptomatic COVID-19 Virus (Coronavirus) by PCR Nasopharyngeal    Specimen: Nasopharyngeal; Swab   Result Value Ref Range    SARS CoV2 PCR Negative Negative   CBC with platelets and differential   Result Value Ref Range    WBC Count 7.0 4.0 - 11.0 10e3/uL    RBC Count 4.39 3.80 - 5.20 10e6/uL    Hemoglobin 13.6 11.7 - 15.7 g/dL    Hematocrit 44.0 35.0 - 47.0 %     78 - 100 fL    MCH 31.0 26.5 - 33.0 pg    MCHC 30.9 (L) 31.5 - 36.5 g/dL    RDW 13.9 10.0 - 15.0 %    Platelet Count 329 150 - 450 10e3/uL    % Neutrophils 66 %    % Lymphocytes 16 %    % Monocytes 16 %    % Eosinophils 1 %    % Basophils 1 %    % Immature Granulocytes 0 %    NRBCs per 100 WBC 0 <1 /100    Absolute Neutrophils 4.6 1.6 - 8.3 10e3/uL    Absolute Lymphocytes 1.1 0.8 - 5.3 10e3/uL    Absolute Monocytes 1.1 0.0 - 1.3 10e3/uL    Absolute Eosinophils  0.1 0.0 - 0.7 10e3/uL    Absolute Basophils 0.1 0.0 - 0.2 10e3/uL    Absolute Immature Granulocytes 0.0 <=0.0 10e3/uL    Absolute NRBCs 0.0 10e3/uL   Extra Red Top Tube   Result Value Ref Range    Hold Specimen JIC    Comprehensive metabolic panel   Result Value Ref Range    Sodium 144 136 - 145 mmol/L    Potassium 3.3 (L) 3.5 - 5.0 mmol/L    Chloride 98 98 - 107 mmol/L    Carbon Dioxide (CO2) 35 (H) 22 - 31 mmol/L    Anion Gap 11 5 - 18 mmol/L    Urea Nitrogen 10 8 - 22 mg/dL    Creatinine 0.99 0.60 - 1.10 mg/dL    Calcium 10.4 8.5 - 10.5 mg/dL    Glucose 183 (H) 70 - 125 mg/dL    Alkaline Phosphatase 159 (H) 45 - 120 U/L    AST 93 (H) 0 - 40 U/L    ALT 79 (H) 0 - 45 U/L    Protein Total 8.4 (H) 6.0 - 8.0 g/dL    Albumin 3.5 3.5 - 5.0 g/dL    Bilirubin Total 1.2 (H) 0.0 - 1.0 mg/dL    GFR Estimate 60 (L) >60 mL/min/1.73m2   Result Value Ref Range    Procalcitonin 0.19 0.00 - 0.49 ng/mL   Lactic acid whole blood   Result Value Ref Range    Lactic Acid 1.3 0.7 - 2.0 mmol/L       RADIOLOGY:  Reviewed all pertinent imaging. Please see official radiology report.  XR Chest Port 1 View   Final Result   IMPRESSION: Mild bibasilar interstitial opacities may represent atelectasis or infection. No pleural effusion. Stable elevated right hemidiaphragm. Stable enlarged cardiac silhouette. Replaced heart valve. Median sternotomy. Left shoulder arthroplasty.          EKG:    Performed at: 18:46    Impression: Low voltage QRS. Borderline ECG.     Rate: 114 bpm  Rhythm: sinus tachycardia  Axis: 64  VA Interval: 164 ms  QRS Interval: 76  QTc Interval: 427  ST Changes: no ST changes      I have independently reviewed and interpreted the EKG(s) documented above.    PROCEDURES:   [facility]    Procedure: Woods Lamp    Date/Time: 9/28/2021 8:14 PM  Performed by: Kimani Sanders MD  Authorized by: Kimani Sanders MD     PROCEDURE   Patient Tolerance:  Patient tolerated the procedure well with no immediate  complications     No dendritic lesions seen on fluorescence of woods lamp exam. No ulcerations seen      I, Bobbi Persaud, am serving as a scribe to document services personally performed by Dr. Sanders based on my observation and the provider's statements to me. I, Chaim Sanders MD attest that Bobbi Persaud is acting in a scribe capacity, has observed my performance of the services and has documented them in accordance with my direction.    Chaim Sanders M.D.  Emergency Medicine  [facility] EMERGENCY DEPARTMENT  [address] [place] [id number]  [phone]  Dept: [phone]       Kimani Sanders MD  09/28/21 3190

## 2021-09-28 NOTE — PROGRESS NOTES
[facility]-PROCEDURAL ANTICOAGULATION  MANAGEMENT    ASSESSMENT     Warfarin interruption plan for Colonoscopy on 10/14/21 with [facility].    Indication for Anticoagulation: Mechanical AVR ([facility] 1/2017) and PE    AVR: 2020 AHA/ACC Management of Valvular Heart disease guidelines suggests bridging is reasonable on individual basis with risk of bleeding weighed against risks of clotting for mechanical AVR patients with risk factors for thrombosis (Afib, previous thromboembolism, hypercoagulable condition, LV systolic dysfunction, older generation valves, or > 1 valve)       Held and bridged for 3/2021 colonoscopy      Bridging advised by Dr. Salazar 9/1/21 encounter     RECOMMENDATION       Pre-Procedure:  o INR, Creatinine at 9/29 at pre-op  - Notify pharmacist if INR outside goal range    o Hold warfarin for 4 days, until after procedure starting: Damian 10/10   o Enoxaparin (Lovenox) 90 mg subq Q 12 hrs (0.75 mg/kg Q 12 hrs for BMI >= 40 kg/m2 per [facility] P&T approved dose adjustment protocol)   - Start enoxaparin: Tue 10/12 AM  - Last dose of enoxaparin prior to procedure: Wed 10/13 AM  (~24 hours prior to procedure)      Post-Procedure:  o Resume warfarin dose if okay with provider doing procedure on night of procedure, 10/14 PM:  5 mg x  1 then resume current dose  o Resume  enoxaparin (Lovenox) when okay with provider doing procedure. We recommend ~ 24 hrs post procedure for normal exam; ~ 48-72 hours post procedure if polyps. Continue until INR >= 2.5 x 2  o Recheck INR 4-6 days after resuming warfarin   ?     Plan routed to referring provider for approval  ?   Renee Wilks, [facility]    SUBJECTIVE/OBJECTIVE     Eileen Donald, a 65 year old female    Goal INR Range: 2.5-3.5     Patient bridged in past: Yes    Wt Readings from Last 3 Encounters:   09/27/21 125.6 kg (277 lb)   09/01/21 125.6 kg (277 lb)   05/03/21 132 kg (291 lb)      Ideal body weight: 63.9 kg (140 lb 14 oz)  Adjusted ideal body weight: 88.6  "kg (195 lb 5.2 oz)     Estimated body mass index is 42.12 kg/m  as calculated from the following:    Height as of 5/3/21: 1.727 m (5' 8\").    Weight as of 9/27/21: 125.6 kg (277 lb).    Lab Results   Component Value Date    INR 2.2 (H) 09/01/2021    INR 4.8 (H) 08/16/2021    INR 3.8 (H) 07/29/2021     Lab Results   Component Value Date    HGB 13.6 03/17/2021    HCT 43.4 03/17/2021     03/17/2021     Lab Results   Component Value Date    CR 0.92 03/17/2021    CR 0.83 02/01/2021    CR 0.91 01/15/2020     CrCl: 85 ml/min (using adjusted weight 88.6 kg, creatinine 0.92)  "

## 2021-09-28 NOTE — PROGRESS NOTES
[facility]-PROCEDURAL ANTICOAGULATION MANAGEMENT    Returned call to [facility]; left detailed message on confidential hold line and relayed pre-procedure orders:      Okay to hold warfarin 4 days prior to procedure     Bridge with Lovenox; ACM to instruct    Renee Wilks [facility] Anticoagulation

## 2021-09-28 NOTE — PROGRESS NOTES
[facility] left  requesting hold/bridge orders. Please call back at [phone]. Thank you.

## 2021-09-29 ENCOUNTER — APPOINTMENT (OUTPATIENT)
Dept: ULTRASOUND IMAGING | Facility: HOSPITAL | Age: 66
End: 2021-09-29
Attending: HOSPITALIST
Payer: COMMERCIAL

## 2021-09-29 LAB
ALBUMIN SERPL-MCNC: 2.9 G/DL (ref 3.5–5)
ALP SERPL-CCNC: 133 U/L (ref 45–120)
ALT SERPL W P-5'-P-CCNC: 63 U/L (ref 0–45)
ANION GAP SERPL CALCULATED.3IONS-SCNC: 8 MMOL/L (ref 5–18)
AST SERPL W P-5'-P-CCNC: 73 U/L (ref 0–40)
ATRIAL RATE - MUSE: 114 BPM
BASOPHILS # BLD AUTO: 0 10E3/UL (ref 0–0.2)
BASOPHILS NFR BLD AUTO: 1 %
BILIRUB SERPL-MCNC: 0.6 MG/DL (ref 0–1)
BUN SERPL-MCNC: 10 MG/DL (ref 8–22)
CALCIUM SERPL-MCNC: 9.5 MG/DL (ref 8.5–10.5)
CHLORIDE BLD-SCNC: 104 MMOL/L (ref 98–107)
CO2 SERPL-SCNC: 32 MMOL/L (ref 22–31)
CREAT SERPL-MCNC: 0.85 MG/DL (ref 0.6–1.1)
DIASTOLIC BLOOD PRESSURE - MUSE: NORMAL MMHG
EOSINOPHIL # BLD AUTO: 0 10E3/UL (ref 0–0.7)
EOSINOPHIL NFR BLD AUTO: 0 %
ERYTHROCYTE [DISTWIDTH] IN BLOOD BY AUTOMATED COUNT: 14.1 % (ref 10–15)
GFR SERPL CREATININE-BSD FRML MDRD: 73 ML/MIN/1.73M2
GLUCOSE BLD-MCNC: 230 MG/DL (ref 70–125)
GLUCOSE BLDC GLUCOMTR-MCNC: 182 MG/DL (ref 70–99)
GLUCOSE BLDC GLUCOMTR-MCNC: 202 MG/DL (ref 70–99)
GLUCOSE BLDC GLUCOMTR-MCNC: 208 MG/DL (ref 70–99)
GLUCOSE BLDC GLUCOMTR-MCNC: 228 MG/DL (ref 70–99)
GLUCOSE BLDC GLUCOMTR-MCNC: 303 MG/DL (ref 70–99)
HCT VFR BLD AUTO: 39.4 % (ref 35–47)
HGB BLD-MCNC: 11.9 G/DL (ref 11.7–15.7)
IMM GRANULOCYTES # BLD: 0 10E3/UL
IMM GRANULOCYTES NFR BLD: 1 %
INR PPP: 2.8 (ref 0.9–1.15)
INTERPRETATION ECG - MUSE: NORMAL
LYMPHOCYTES # BLD AUTO: 0.6 10E3/UL (ref 0.8–5.3)
LYMPHOCYTES NFR BLD AUTO: 11 %
MCH RBC QN AUTO: 30.6 PG (ref 26.5–33)
MCHC RBC AUTO-ENTMCNC: 30.2 G/DL (ref 31.5–36.5)
MCV RBC AUTO: 101 FL (ref 78–100)
MONOCYTES # BLD AUTO: 0.3 10E3/UL (ref 0–1.3)
MONOCYTES NFR BLD AUTO: 5 %
NEUTROPHILS # BLD AUTO: 4.7 10E3/UL (ref 1.6–8.3)
NEUTROPHILS NFR BLD AUTO: 82 %
NRBC # BLD AUTO: 0 10E3/UL
NRBC BLD AUTO-RTO: 0 /100
P AXIS - MUSE: -2 DEGREES
PLATELET # BLD AUTO: 290 10E3/UL (ref 150–450)
POTASSIUM BLD-SCNC: 3.6 MMOL/L (ref 3.5–5)
PR INTERVAL - MUSE: 164 MS
PROT SERPL-MCNC: 7.2 G/DL (ref 6–8)
QRS DURATION - MUSE: 76 MS
QT - MUSE: 310 MS
QTC - MUSE: 427 MS
R AXIS - MUSE: 64 DEGREES
RBC # BLD AUTO: 3.89 10E6/UL (ref 3.8–5.2)
SODIUM SERPL-SCNC: 144 MMOL/L (ref 136–145)
SYSTOLIC BLOOD PRESSURE - MUSE: NORMAL MMHG
T AXIS - MUSE: 60 DEGREES
VENTRICULAR RATE- MUSE: 114 BPM
WBC # BLD AUTO: 5.6 10E3/UL (ref 4–11)

## 2021-09-29 PROCEDURE — 94640 AIRWAY INHALATION TREATMENT: CPT | Mod: 76

## 2021-09-29 PROCEDURE — 250N000013 HC RX MED GY IP 250 OP 250 PS 637: Performed by: STUDENT IN AN ORGANIZED HEALTH CARE EDUCATION/TRAINING PROGRAM

## 2021-09-29 PROCEDURE — 87205 SMEAR GRAM STAIN: CPT | Performed by: HOSPITALIST

## 2021-09-29 PROCEDURE — 250N000011 HC RX IP 250 OP 636: Performed by: INTERNAL MEDICINE

## 2021-09-29 PROCEDURE — 250N000012 HC RX MED GY IP 250 OP 636 PS 637: Performed by: INTERNAL MEDICINE

## 2021-09-29 PROCEDURE — 99223 1ST HOSP IP/OBS HIGH 75: CPT | Performed by: HOSPITALIST

## 2021-09-29 PROCEDURE — 250N000013 HC RX MED GY IP 250 OP 250 PS 637: Performed by: HOSPITALIST

## 2021-09-29 PROCEDURE — 76705 ECHO EXAM OF ABDOMEN: CPT

## 2021-09-29 PROCEDURE — 258N000003 HC RX IP 258 OP 636: Performed by: HOSPITALIST

## 2021-09-29 PROCEDURE — 999N000032 HC STATISTIC CHRONIC DISEASE SPECIALIST RT CONSULT

## 2021-09-29 PROCEDURE — 120N000001 HC R&B MED SURG/OB

## 2021-09-29 PROCEDURE — 85004 AUTOMATED DIFF WBC COUNT: CPT | Performed by: HOSPITALIST

## 2021-09-29 PROCEDURE — 250N000009 HC RX 250: Performed by: HOSPITALIST

## 2021-09-29 PROCEDURE — 250N000013 HC RX MED GY IP 250 OP 250 PS 637: Performed by: INTERNAL MEDICINE

## 2021-09-29 PROCEDURE — 999N000157 HC STATISTIC RCP TIME EA 10 MIN

## 2021-09-29 PROCEDURE — 36415 COLL VENOUS BLD VENIPUNCTURE: CPT | Performed by: INTERNAL MEDICINE

## 2021-09-29 PROCEDURE — 82040 ASSAY OF SERUM ALBUMIN: CPT | Performed by: HOSPITALIST

## 2021-09-29 PROCEDURE — 99207 PR NO CHARGE LOS: CPT | Performed by: INTERNAL MEDICINE

## 2021-09-29 PROCEDURE — 85610 PROTHROMBIN TIME: CPT | Performed by: INTERNAL MEDICINE

## 2021-09-29 PROCEDURE — 94664 DEMO&/EVAL PT USE INHALER: CPT

## 2021-09-29 PROCEDURE — 36415 COLL VENOUS BLD VENIPUNCTURE: CPT | Performed by: HOSPITALIST

## 2021-09-29 PROCEDURE — 250N000012 HC RX MED GY IP 250 OP 636 PS 637: Performed by: HOSPITALIST

## 2021-09-29 PROCEDURE — 250N000011 HC RX IP 250 OP 636: Performed by: HOSPITALIST

## 2021-09-29 RX ORDER — CALCIUM CARBONATE 500(1250)
1 TABLET,CHEWABLE ORAL DAILY
Status: DISCONTINUED | OUTPATIENT
Start: 2021-09-29 | End: 2021-09-29 | Stop reason: CLARIF

## 2021-09-29 RX ORDER — METHYLPREDNISOLONE SODIUM SUCCINATE 40 MG/ML
40 INJECTION, POWDER, LYOPHILIZED, FOR SOLUTION INTRAMUSCULAR; INTRAVENOUS DAILY
Status: DISCONTINUED | OUTPATIENT
Start: 2021-09-29 | End: 2021-09-29

## 2021-09-29 RX ORDER — WARFARIN SODIUM 2.5 MG/1
2.5 TABLET ORAL ONCE
Status: COMPLETED | OUTPATIENT
Start: 2021-09-29 | End: 2021-09-29

## 2021-09-29 RX ORDER — ASPIRIN 81 MG/1
81 TABLET, CHEWABLE ORAL DAILY
Status: DISCONTINUED | OUTPATIENT
Start: 2021-09-29 | End: 2021-10-03 | Stop reason: HOSPADM

## 2021-09-29 RX ORDER — GUAIFENESIN 600 MG/1
1200 TABLET, EXTENDED RELEASE ORAL 2 TIMES DAILY
Status: DISCONTINUED | OUTPATIENT
Start: 2021-09-29 | End: 2021-10-01

## 2021-09-29 RX ORDER — PANTOPRAZOLE SODIUM 20 MG/1
40 TABLET, DELAYED RELEASE ORAL
Status: DISCONTINUED | OUTPATIENT
Start: 2021-09-29 | End: 2021-10-03 | Stop reason: HOSPADM

## 2021-09-29 RX ORDER — DOXYCYCLINE 100 MG/10ML
100 INJECTION, POWDER, LYOPHILIZED, FOR SOLUTION INTRAVENOUS EVERY 12 HOURS
Status: DISCONTINUED | OUTPATIENT
Start: 2021-09-29 | End: 2021-09-29

## 2021-09-29 RX ORDER — LORAZEPAM 0.5 MG/1
0.5 TABLET ORAL EVERY 8 HOURS PRN
Status: DISCONTINUED | OUTPATIENT
Start: 2021-09-29 | End: 2021-10-03 | Stop reason: HOSPADM

## 2021-09-29 RX ORDER — ACETAMINOPHEN 650 MG/1
650 SUPPOSITORY RECTAL EVERY 6 HOURS PRN
Status: DISCONTINUED | OUTPATIENT
Start: 2021-09-29 | End: 2021-10-03 | Stop reason: HOSPADM

## 2021-09-29 RX ORDER — MONTELUKAST SODIUM 10 MG/1
10 TABLET ORAL AT BEDTIME
Status: DISCONTINUED | OUTPATIENT
Start: 2021-09-29 | End: 2021-10-03 | Stop reason: HOSPADM

## 2021-09-29 RX ORDER — DEXTROSE MONOHYDRATE 25 G/50ML
25-50 INJECTION, SOLUTION INTRAVENOUS
Status: DISCONTINUED | OUTPATIENT
Start: 2021-09-29 | End: 2021-10-03 | Stop reason: HOSPADM

## 2021-09-29 RX ORDER — ONDANSETRON 2 MG/ML
4 INJECTION INTRAMUSCULAR; INTRAVENOUS EVERY 6 HOURS PRN
Status: DISCONTINUED | OUTPATIENT
Start: 2021-09-29 | End: 2021-10-03 | Stop reason: HOSPADM

## 2021-09-29 RX ORDER — METOPROLOL TARTRATE 1 MG/ML
5 INJECTION, SOLUTION INTRAVENOUS EVERY 4 HOURS PRN
Status: DISCONTINUED | OUTPATIENT
Start: 2021-09-29 | End: 2021-10-03 | Stop reason: HOSPADM

## 2021-09-29 RX ORDER — ALBUTEROL SULFATE 0.83 MG/ML
2.5 SOLUTION RESPIRATORY (INHALATION)
Status: DISCONTINUED | OUTPATIENT
Start: 2021-09-29 | End: 2021-10-03 | Stop reason: HOSPADM

## 2021-09-29 RX ORDER — PROCHLORPERAZINE MALEATE 10 MG
10 TABLET ORAL EVERY 6 HOURS PRN
Status: DISCONTINUED | OUTPATIENT
Start: 2021-09-29 | End: 2021-10-03 | Stop reason: HOSPADM

## 2021-09-29 RX ORDER — LISINOPRIL 5 MG/1
5 TABLET ORAL DAILY
Status: DISCONTINUED | OUTPATIENT
Start: 2021-09-29 | End: 2021-10-03 | Stop reason: HOSPADM

## 2021-09-29 RX ORDER — SODIUM CHLORIDE 9 MG/ML
INJECTION, SOLUTION INTRAVENOUS CONTINUOUS
Status: DISCONTINUED | OUTPATIENT
Start: 2021-09-29 | End: 2021-09-29

## 2021-09-29 RX ORDER — CALCIUM CARBONATE 500 MG/1
500 TABLET, CHEWABLE ORAL DAILY
Status: DISCONTINUED | OUTPATIENT
Start: 2021-09-29 | End: 2021-10-03 | Stop reason: HOSPADM

## 2021-09-29 RX ORDER — FUROSEMIDE 20 MG
60 TABLET ORAL DAILY
Status: DISCONTINUED | OUTPATIENT
Start: 2021-09-30 | End: 2021-10-03 | Stop reason: HOSPADM

## 2021-09-29 RX ORDER — ALBUTEROL SULFATE 90 UG/1
2 AEROSOL, METERED RESPIRATORY (INHALATION) 4 TIMES DAILY
Status: DISCONTINUED | OUTPATIENT
Start: 2021-09-29 | End: 2021-10-03 | Stop reason: HOSPADM

## 2021-09-29 RX ORDER — MAGNESIUM OXIDE 400 MG/1
400 TABLET ORAL DAILY
Status: DISCONTINUED | OUTPATIENT
Start: 2021-09-29 | End: 2021-10-03 | Stop reason: HOSPADM

## 2021-09-29 RX ORDER — IPRATROPIUM BROMIDE AND ALBUTEROL SULFATE 2.5; .5 MG/3ML; MG/3ML
3 SOLUTION RESPIRATORY (INHALATION) EVERY 4 HOURS PRN
Status: DISCONTINUED | OUTPATIENT
Start: 2021-09-29 | End: 2021-09-29

## 2021-09-29 RX ORDER — LIDOCAINE 40 MG/G
CREAM TOPICAL
Status: DISCONTINUED | OUTPATIENT
Start: 2021-09-29 | End: 2021-10-03 | Stop reason: HOSPADM

## 2021-09-29 RX ORDER — CEFTRIAXONE 2 G/1
2 INJECTION, POWDER, FOR SOLUTION INTRAMUSCULAR; INTRAVENOUS EVERY 24 HOURS
Status: DISCONTINUED | OUTPATIENT
Start: 2021-09-29 | End: 2021-09-29

## 2021-09-29 RX ORDER — POTASSIUM CHLORIDE 1500 MG/1
40 TABLET, EXTENDED RELEASE ORAL ONCE
Status: COMPLETED | OUTPATIENT
Start: 2021-09-29 | End: 2021-09-29

## 2021-09-29 RX ORDER — METOPROLOL TARTRATE 25 MG/1
25 TABLET, FILM COATED ORAL 2 TIMES DAILY
Status: DISCONTINUED | OUTPATIENT
Start: 2021-09-29 | End: 2021-10-03 | Stop reason: HOSPADM

## 2021-09-29 RX ORDER — METHYLPREDNISOLONE SODIUM SUCCINATE 40 MG/ML
40 INJECTION, POWDER, LYOPHILIZED, FOR SOLUTION INTRAMUSCULAR; INTRAVENOUS EVERY 8 HOURS
Status: DISCONTINUED | OUTPATIENT
Start: 2021-09-29 | End: 2021-10-01

## 2021-09-29 RX ORDER — NYSTATIN 100000 U/G
CREAM TOPICAL 3 TIMES DAILY
Status: COMPLETED | OUTPATIENT
Start: 2021-09-29 | End: 2021-10-02

## 2021-09-29 RX ORDER — ALBUTEROL SULFATE 90 UG/1
2 AEROSOL, METERED RESPIRATORY (INHALATION) EVERY 6 HOURS PRN
Status: DISCONTINUED | OUTPATIENT
Start: 2021-09-29 | End: 2021-10-03 | Stop reason: HOSPADM

## 2021-09-29 RX ORDER — ONDANSETRON 4 MG/1
4 TABLET, ORALLY DISINTEGRATING ORAL EVERY 6 HOURS PRN
Status: DISCONTINUED | OUTPATIENT
Start: 2021-09-29 | End: 2021-10-03 | Stop reason: HOSPADM

## 2021-09-29 RX ORDER — WARFARIN SODIUM 2.5 MG/1
2.5 TABLET ORAL
Status: COMPLETED | OUTPATIENT
Start: 2021-09-29 | End: 2021-09-29

## 2021-09-29 RX ORDER — METHYLPREDNISOLONE SODIUM SUCCINATE 40 MG/ML
40 INJECTION, POWDER, LYOPHILIZED, FOR SOLUTION INTRAMUSCULAR; INTRAVENOUS EVERY 6 HOURS
Status: DISCONTINUED | OUTPATIENT
Start: 2021-09-29 | End: 2021-09-29

## 2021-09-29 RX ORDER — LORATADINE 10 MG/1
10 TABLET ORAL DAILY
Status: DISCONTINUED | OUTPATIENT
Start: 2021-09-29 | End: 2021-10-03 | Stop reason: HOSPADM

## 2021-09-29 RX ORDER — IPRATROPIUM BROMIDE AND ALBUTEROL SULFATE 2.5; .5 MG/3ML; MG/3ML
3 SOLUTION RESPIRATORY (INHALATION) EVERY 4 HOURS
Status: DISCONTINUED | OUTPATIENT
Start: 2021-09-29 | End: 2021-09-29

## 2021-09-29 RX ORDER — PROCHLORPERAZINE 25 MG
25 SUPPOSITORY, RECTAL RECTAL EVERY 12 HOURS PRN
Status: DISCONTINUED | OUTPATIENT
Start: 2021-09-29 | End: 2021-10-03 | Stop reason: HOSPADM

## 2021-09-29 RX ORDER — ACETAMINOPHEN 325 MG/1
650 TABLET ORAL EVERY 6 HOURS PRN
Status: DISCONTINUED | OUTPATIENT
Start: 2021-09-29 | End: 2021-10-03 | Stop reason: HOSPADM

## 2021-09-29 RX ORDER — BUDESONIDE AND FORMOTEROL FUMARATE DIHYDRATE 160; 4.5 UG/1; UG/1
2 AEROSOL RESPIRATORY (INHALATION) 2 TIMES DAILY
Status: DISCONTINUED | OUTPATIENT
Start: 2021-09-29 | End: 2021-10-03 | Stop reason: HOSPADM

## 2021-09-29 RX ORDER — NICOTINE POLACRILEX 4 MG
15-30 LOZENGE BUCCAL
Status: DISCONTINUED | OUTPATIENT
Start: 2021-09-29 | End: 2021-10-03 | Stop reason: HOSPADM

## 2021-09-29 RX ORDER — ALBUTEROL SULFATE 0.83 MG/ML
2.5 SOLUTION RESPIRATORY (INHALATION) 4 TIMES DAILY
Status: DISCONTINUED | OUTPATIENT
Start: 2021-09-29 | End: 2021-09-29 | Stop reason: CLARIF

## 2021-09-29 RX ORDER — LEVOFLOXACIN 5 MG/ML
750 INJECTION, SOLUTION INTRAVENOUS EVERY 24 HOURS
Status: DISCONTINUED | OUTPATIENT
Start: 2021-09-29 | End: 2021-10-03 | Stop reason: HOSPADM

## 2021-09-29 RX ADMIN — TOBRAMYCIN 2 DROP: 3 SOLUTION/ DROPS OPHTHALMIC at 11:07

## 2021-09-29 RX ADMIN — GUAIFENESIN 1200 MG: 600 TABLET ORAL at 03:14

## 2021-09-29 RX ADMIN — WARFARIN SODIUM 2.5 MG: 2.5 TABLET ORAL at 03:14

## 2021-09-29 RX ADMIN — IPRATROPIUM BROMIDE AND ALBUTEROL SULFATE 3 ML: .5; 3 SOLUTION RESPIRATORY (INHALATION) at 03:24

## 2021-09-29 RX ADMIN — INSULIN ASPART 5 UNITS: 100 INJECTION, SOLUTION INTRAVENOUS; SUBCUTANEOUS at 22:17

## 2021-09-29 RX ADMIN — METHYLPREDNISOLONE SODIUM SUCCINATE 40 MG: 40 INJECTION, POWDER, FOR SOLUTION INTRAMUSCULAR; INTRAVENOUS at 08:39

## 2021-09-29 RX ADMIN — TOBRAMYCIN 2 DROP: 3 SOLUTION/ DROPS OPHTHALMIC at 06:45

## 2021-09-29 RX ADMIN — TOBRAMYCIN 2 DROP: 3 SOLUTION/ DROPS OPHTHALMIC at 22:18

## 2021-09-29 RX ADMIN — METOPROLOL TARTRATE 25 MG: 25 TABLET, FILM COATED ORAL at 03:14

## 2021-09-29 RX ADMIN — ASPIRIN 81 MG CHEWABLE TABLET 81 MG: 81 TABLET CHEWABLE at 08:37

## 2021-09-29 RX ADMIN — GUAIFENESIN 1200 MG: 600 TABLET ORAL at 08:37

## 2021-09-29 RX ADMIN — LORATADINE 10 MG: 10 TABLET ORAL at 08:36

## 2021-09-29 RX ADMIN — POTASSIUM CHLORIDE 40 MEQ: 20 TABLET, EXTENDED RELEASE ORAL at 06:43

## 2021-09-29 RX ADMIN — IPRATROPIUM BROMIDE AND ALBUTEROL SULFATE 3 ML: .5; 3 SOLUTION RESPIRATORY (INHALATION) at 12:59

## 2021-09-29 RX ADMIN — MAGNESIUM OXIDE TAB 400 MG (241.3 MG ELEMENTAL MG) 400 MG: 400 (241.3 MG) TAB at 08:37

## 2021-09-29 RX ADMIN — METOPROLOL TARTRATE 25 MG: 25 TABLET, FILM COATED ORAL at 11:06

## 2021-09-29 RX ADMIN — MONTELUKAST 10 MG: 10 TABLET, FILM COATED ORAL at 20:05

## 2021-09-29 RX ADMIN — INSULIN ASPART 2 UNITS: 100 INJECTION, SOLUTION INTRAVENOUS; SUBCUTANEOUS at 08:37

## 2021-09-29 RX ADMIN — ALBUTEROL SULFATE 2 PUFF: 90 AEROSOL, METERED RESPIRATORY (INHALATION) at 20:06

## 2021-09-29 RX ADMIN — GUAIFENESIN 1200 MG: 600 TABLET ORAL at 20:05

## 2021-09-29 RX ADMIN — INSULIN ASPART 2 UNITS: 100 INJECTION, SOLUTION INTRAVENOUS; SUBCUTANEOUS at 11:33

## 2021-09-29 RX ADMIN — LEVOFLOXACIN 750 MG: 5 INJECTION, SOLUTION INTRAVENOUS at 11:13

## 2021-09-29 RX ADMIN — HUMAN INSULIN 10 UNITS: 100 INJECTION, SUSPENSION SUBCUTANEOUS at 18:52

## 2021-09-29 RX ADMIN — TOBRAMYCIN 2 DROP: 3 SOLUTION/ DROPS OPHTHALMIC at 17:43

## 2021-09-29 RX ADMIN — LISINOPRIL 5 MG: 5 TABLET ORAL at 11:04

## 2021-09-29 RX ADMIN — UMECLIDINIUM 1 PUFF: 62.5 AEROSOL, POWDER ORAL at 08:38

## 2021-09-29 RX ADMIN — NYSTATIN: 100000 CREAM TOPICAL at 12:45

## 2021-09-29 RX ADMIN — SODIUM CHLORIDE: 9 INJECTION, SOLUTION INTRAVENOUS at 03:18

## 2021-09-29 RX ADMIN — METOPROLOL TARTRATE 25 MG: 25 TABLET, FILM COATED ORAL at 20:05

## 2021-09-29 RX ADMIN — ALBUTEROL SULFATE 2 PUFF: 90 AEROSOL, METERED RESPIRATORY (INHALATION) at 17:42

## 2021-09-29 RX ADMIN — PANTOPRAZOLE SODIUM 40 MG: 20 TABLET, DELAYED RELEASE ORAL at 06:42

## 2021-09-29 RX ADMIN — INSULIN ASPART 1 UNITS: 100 INJECTION, SOLUTION INTRAVENOUS; SUBCUTANEOUS at 03:22

## 2021-09-29 RX ADMIN — METHYLPREDNISOLONE SODIUM SUCCINATE 40 MG: 40 INJECTION, POWDER, FOR SOLUTION INTRAMUSCULAR; INTRAVENOUS at 17:21

## 2021-09-29 RX ADMIN — TOBRAMYCIN 2 DROP: 3 SOLUTION/ DROPS OPHTHALMIC at 03:31

## 2021-09-29 RX ADMIN — CALCIUM CARBONATE (ANTACID) CHEW TAB 500 MG 500 MG: 500 CHEW TAB at 11:06

## 2021-09-29 RX ADMIN — BUDESONIDE AND FORMOTEROL FUMARATE DIHYDRATE 2 PUFF: 160; 4.5 AEROSOL RESPIRATORY (INHALATION) at 20:05

## 2021-09-29 RX ADMIN — NYSTATIN: 100000 CREAM TOPICAL at 20:06

## 2021-09-29 RX ADMIN — TOBRAMYCIN 2 DROP: 3 SOLUTION/ DROPS OPHTHALMIC at 14:17

## 2021-09-29 RX ADMIN — WARFARIN SODIUM 2.5 MG: 2.5 TABLET ORAL at 17:21

## 2021-09-29 RX ADMIN — IPRATROPIUM BROMIDE AND ALBUTEROL SULFATE 3 ML: .5; 3 SOLUTION RESPIRATORY (INHALATION) at 07:20

## 2021-09-29 RX ADMIN — MONTELUKAST 10 MG: 10 TABLET, FILM COATED ORAL at 03:14

## 2021-09-29 RX ADMIN — BUDESONIDE AND FORMOTEROL FUMARATE DIHYDRATE 2 PUFF: 160; 4.5 AEROSOL RESPIRATORY (INHALATION) at 08:38

## 2021-09-29 ASSESSMENT — MIFFLIN-ST. JEOR: SCORE: 1869.48

## 2021-09-29 NOTE — PROGRESS NOTES
Progress Note        Assessment/Plan  Eileen Donald is a 65 year old female admitted on 9/28/2021. She was sent to the ED from the urgent care for evaluation of cough concerning for pneumonia and COPD.  Patient has had productive cough and shortness of breath progressing over the last 3 days.     1.  Acute COPD exacerbation  IV Solu-Medrol-increase the dose due to ongoing wheeze  DuoNebs  Mucinex  02 as tolerated     Probable PNA   Chest x-ray showed mild bibasilar interstitial opacities may represent atelectasis or infection  Check sputum culture  Follow-up blood cultures  Reports allergy to doxycycline-  Change abx to levofloxacin, pharm D to add doxy to allergy list       2.  Chronic respiratory failure with hypoxia  Continue oxygen via nasal cannula to keep saturation above 90%     3.  Abnormal LFTs  No right upper quadrant tenderness  Elevated total bilirubin and alkaline phosphatase - improved   Will hold potential hepatotoxins (simvastatin, venlafaxine, bupropion, acyclovir) for now and trend LFTs  right upper quadrant ultrasound with no obstruction- steatosis      4.  Atrial fibrillation with rate control   Likely related to acute illness  Resume metoprolol  Telemetry monitoring     5.  Acute bilateral conjunctivitis  Examined by ED physician   bacterial with bilateral exudates  Continue tobramycin eyedrops started in ed      6.  Type 2 diabetes mellitus without complications without long-term insulin  Recently started Metformin, will hold for now  Monitor with insulin  - nph due to steroid use      7.  History of PE, aortic valve replacement  Pharmacy to dose warfarin     8.  Multiple sclerosis  Patient states she is treated with an injection twice per year and is due in November 2021     9.  History of HSV keratitis  We will hold acyclovir while monitoring LFTs     10.  Depression, anxiety  Hold bupropion, Wellbutrin while monitoring LFTs  Continue lorazepam as needed     11.  Essential  hypertension  Continue lisinopril and metoprolol     12.  Hypokalemia  Replace per protocol        Diet: Combination Diet Consistent Carb 60 Grams CHO per Meal Diet; Low Saturated Fat Na <2400mg Diet    DVT Prophylaxis: Warfarin  Paredes Catheter: Not present  Central Lines: None  Code Status: Full Code      Subjective  Feels breathing is improved with no chest pain  Minimal cough no fever    Objective  Vital signs in last 24 hours  Temp:  [96.9  F (36.1  C)-99.8  F (37.7  C)] 97.9  F (36.6  C)  Pulse:  [] 81  Resp:  [22-36] 30  BP: (103-157)/(53-75) 109/64  FiO2 (%):  [28 %] 28 %  SpO2:  [92 %-96 %] 93 %    Input and Output in 24 hrs     Intake/Output Summary (Last 24 hours) at 9/29/2021 1255  Last data filed at 9/29/2021 0646  Gross per 24 hour   Intake 771 ml   Output 100 ml   Net 671 ml       GEN: Alert and oriented. Not in acute distress  HEENT: Atraumatic    Pupils- round and reactive to light bilaterally   Neck- supple, no JVP elevation, no lymphadenopathy or thyromegaly   Sclera- anicteric   Mucous membrane- moist and pink  CHEST: Widespread bilateral wheeze  HEART: S1S2 regular. No murmurs, rubs or gallops  ABDOMEN: Soft. Non-tender, non-distended. No organomegaly. No guarding or rigidity. Bowel sounds- active  Extremities: No pedal oedema  CNS: No focal neurological deficit. No involuntary movements  SKIN: No skin rash, no cyanosis or clubbing      Pertinent Labs     Reviewed    EKG Results reviewed       Advanced Care Planning      Jorge Momin MD MGerberD

## 2021-09-29 NOTE — CONSULTS
Care Management Initial Consult    General Information  Assessment completed with: Patient,  (Eileen Donald)  Type of CM/SW Visit: Initial Assessment    Primary Care Provider verified and updated as needed: Yes   Readmission within the last 30 days: no previous admission in last 30 days   Reason for Consult: discharge planning  Advance Care Planning: Advance Care Planning Reviewed: no concerns identified     General Information Comments:  (Pt resides from home with spouse & adult daughter  & uses cane &rolling  walker at baseline)    Communication Assessment  Patient's communication style: spoken language (English or Bilingual)    Hearing Difficulty or Deaf: yes   Wear Glasses or Blind: yes    Cognitive  Cognitive/Neuro/Behavioral: WDL                      Living Environment:   People in home: child(yesica), adult, spouse   (Joaquin Donald daughter & Mitch Molina spouse )  Current living Arrangements: house      Able to return to prior arrangements: yes  Living Arrangement Comments:  (Pt came from home with spouse & adult daughter  & uses cane, rolling walker at baseline)    Family/Social Support:  Care provided by: self  Provides care for: no one  Marital Status:     Mitch Molina       Description of Support System: Supportive, Involved    Support Assessment: Adequate family and caregiver support, Adequate social supports    Current Resources:   Patient receiving home care services: None     Community Resources: None  Equipment currently used at home: cane, straight, walker, rolling  Supplies currently used at home: unknown      Employment/Financial:  Employment Status: retired     Employment/ Comments:  (unknown)  Financial Concerns:     Referral to Financial Counselor: No  Finance Comments:  (none)    Lifestyle & Psychosocial Needs:  Social Determinants of Health     Tobacco Use: Low Risk      Smoking Tobacco Use: Never Smoker     Smokeless Tobacco Use: Never Used   Alcohol Use:      Frequency of  Alcohol Consumption:      Average Number of Drinks:      Frequency of Binge Drinking:    Financial Resource Strain:      Difficulty of Paying Living Expenses:    Food Insecurity:      Worried About Running Out of Food in the Last Year:      Ran Out of Food in the Last Year:    Transportation Needs:      Lack of Transportation (Medical):      Lack of Transportation (Non-Medical):    Physical Activity:      Days of Exercise per Week:      Minutes of Exercise per Session:    Stress:      Feeling of Stress :    Social Connections:      Frequency of Communication with Friends and Family:      Frequency of Social Gatherings with Friends and Family:      Attends Caodaism Services:      Active Member of Clubs or Organizations:      Attends Club or Organization Meetings:      Marital Status:    Intimate Partner Violence:      Fear of Current or Ex-Partner:      Emotionally Abused:      Physically Abused:      Sexually Abused:    Depression: At risk     PHQ-2 Score: 3   Housing Stability:      Unable to Pay for Housing in the Last Year:      Number of Places Lived in the Last Year:      Unstable Housing in the Last Year:        Functional Status:  Prior to admission patient needed assistance:   Dependent ADLs:: Independent, Bathing, Dressing, Eating, Grooming  Dependent IADLs:: Independent ,Meal Preparation, Medication Management, Money Management       Mental Health Status:  none          Chemical Dependency Status:  Chemical Dependency Status: No Current Concerns             Values/Beliefs:  Spiritual, Cultural Beliefs, Caodaism Practices, Values that affect care: yes  Description of Beliefs that Will Affect Care:  (Jew )            Additional Information:      BOOGIE CM met with Pt in her room and introduce self, CM role & complete Pt initial assessment and discuss her discharge goal. Pt reports she resides from home with spouse  Mitch & adult daughter Joaquin & uses  straight cane and rolling walker at baseline. Pt  reports she does not drive anymore, she does not have home health care, she is uses straight  cane & walker.  Pt reports her spouse and daughter are very helpful and they both are helping her when she need anything else. Pt reports her discharge goal is return to home when she is medically to stable. Pt reports her family to transport her at discharge. Likely no need CM anticipate at discharge. CM to continue following.    Dwain Lai LGSW

## 2021-09-29 NOTE — CONSULTS
Instructed patient on how to use her albuterol MDI with spacer, she was able to demonstrate able to verbalize use, able too achieve 80 LPM on the in-check device in free- flow.   Patient given handouts on how to use and clean her inhalers, how to clean and use her spacer.  Bobbi Haywood, RT, Chronic Pulmonary Disease Specialist

## 2021-09-29 NOTE — CONSULTS
COPD Consult Note    9/29/2021, 8:46 AM     Reason for Consult: COPD education  Admission diagnosis: Acute bacterial conjunctivitis of both eyes [H10.33]  Community acquired pneumonia, unspecified laterality [J18.9]     Assessment:  Patient will not be seen at this time due to not meeting GOLD Guidelines for COPD at this time according to their PFT results on 12/5/2017.    PFT Results:  Post-Spirometry  FVC = 2.01  52%  FEV1= 1.75  58%  FEV1/FVC ratio= 87%  GOLD Guidelines states for COPD, ratio of <70% on post bronchodilator.    We ask that you please update the patient's chart to reflect an alternative diagnosis.  If you have questions please call COPD Education at [phone], thank you.    Bobbi Haywood, RT, Chronic Pulmonary Disease Specialist

## 2021-09-29 NOTE — ED NOTES
[facility] ED Handoff Report    ED Chief Complaint:     ED Diagnosis:  (J18.9) Community acquired pneumonia, unspecified laterality  Comment:   Plan:     (H10.33) Acute bacterial conjunctivitis of both eyes  Comment:   Plan:        PMH:    Past Medical History:   Diagnosis Date     Anxiety      Anxiety      Aortic valve replaced 2/15/2017     Aortic valve stenosis 01/24/2017     Asthma      Asthma     Created by Conversion      Chronic shortness of breath      Coronary artery disease      Depression      Diabetes mellitus (H)     borderline     Essential hypertension     Created by Conversion  Replacement Utility updated for latest IMO load     History of blood clots     PE     History of pulmonary embolism      HTN (hypertension)      Multiple sclerosis (H)      Multiple sclerosis (H)     Created by Conversion      Obesity      Obesity 10/29/2015     Osteoarthritis      Panic attacks      PONV (postoperative nausea and vomiting)      Pre-diabetes      Pulmonary embolism (H)      Sleep apnea         Code Status:  No Order     Falls Risk: Yes Band: Applied    Current Living Situation/Residence: lives with a significant other     Elimination Status: Continent: Yes     Activity Level: 2 assist    Patients Preferred Language:  English     Needed: No    Vital Signs:  /60   Pulse 117   Temp 99.8  F (37.7  C) (Oral)   Resp 28   Wt 125.6 kg (277 lb)   SpO2 94%   BMI 42.12 kg/m       Cardiac Rhythm: Sinus tach    Pain Score: none    Is the Patient Confused:  No    Last Food or Drink: 09/28/21 at     Focused Assessment:  Pt having respiratory distress, tachypnea, tachycardia, wheezing, green/yellow flem coughing up and eyes weeping yellow thick drainage. Pt on hovermat but is weak does not walk uses wheelchair even at home per report. Pt says she gets symptoms similar with her angina. See charting    Tests Performed: Done: Labs and Imaging    Treatments Provided:  Oxygen and see MAR    Family  Dynamics/Concerns: No    Family Updated On Visitor Policy: Yes    Plan of Care Communicated to Family: Yes    Who Was Updated about Plan of Care: Maci Cabral Checklist Done and Signed by Patient: Yes    Covid: symptomatic, negative    Additional Information: called maci daughter at [phone]    RN: Arlene Talbot RN 9/28/2021 10:46 PM

## 2021-09-29 NOTE — PHARMACY-ADMISSION MEDICATION HISTORY
Pharmacy Note - Admission Medication History    Pertinent Provider Information:      ______________________________________________________________________    Prior To Admission (PTA) med list completed and updated in EMR.       PTA Med List   Medication Sig Last Dose     acetaminophen (TYLENOL) 325 MG tablet Take 325-650 mg by mouth every 6 hours as needed  Past Month at Unknown time     acyclovir (ZOVIRAX) 400 MG tablet Take 400 mg by mouth 2 times daily  9/27/2021 at Unknown time     albuterol (PROVENTIL HFA) 108 (90 Base) MCG/ACT inhaler Inhale 2 puffs into the lungs every 6 hours as needed  Past Month at Unknown time     aspirin (ASA) 81 MG chewable tablet Take 81 mg by mouth daily  9/27/2021 at Unknown time     budesonide-formoterol (SYMBICORT) 160-4.5 MCG/ACT Inhaler Inhale 2 puffs into the lungs 2 times daily  9/27/2021 at Unknown time     buPROPion (WELLBUTRIN XL) 150 MG 24 hr tablet Take 1 tablet by mouth daily 9/27/2021 at Unknown time     calcium carbonate 1250 (500 Ca) MG CHEW Take 1 tablet by mouth daily  9/27/2021 at Unknown time     esomeprazole (NEXIUM) 20 MG DR capsule Take 20 mg by mouth every morning (before breakfast)  9/27/2021 at Unknown time     furosemide (LASIX) 40 MG tablet Take 60 mg by mouth daily  9/27/2021 at Unknown time     INCRUSE ELLIPTA 62.5 MCG/INH Inhaler Inhale 1 puff into the lungs daily  9/27/2021 at Unknown time     lisinopril (ZESTRIL) 5 MG tablet Take 5 mg by mouth daily  9/27/2021 at Unknown time     loratadine (CLARITIN) 10 MG tablet Take 1 tablet (10 mg) by mouth daily 9/27/2021 at Unknown time     LORazepam (ATIVAN) 0.5 MG tablet Take 1 tablet (0.5 mg) by mouth every 8 hours as needed for anxiety Past Month at Unknown time     magnesium oxide (MAG-OX) 400 MG tablet Take 400 mg by mouth daily  Past Week at Unknown time     metFORMIN (GLUCOPHAGE-XR) 500 MG 24 hr tablet Take 1 tablet by mouth daily 9/27/2021 at Unknown time     metoprolol tartrate (LOPRESSOR) 25 MG tablet  Take 25 mg by mouth 2 times daily  9/27/2021 at Unknown time     montelukast (SINGULAIR) 10 MG tablet Take 10 mg by mouth At Bedtime  9/27/2021 at Unknown time     potassium chloride ER (MICRO-K) 10 MEQ CR capsule Take 10 mEq by mouth daily  9/27/2021 at Unknown time     simvastatin (ZOCOR) 20 MG tablet Take 20 mg by mouth At Bedtime  9/27/2021 at Unknown time     venlafaxine (EFFEXOR-XR) 75 MG 24 hr capsule TAKE 3 CAPSULES BY MOUTH EVERY DAY 9/27/2021 at AM     warfarin ANTICOAGULANT (COUMADIN) 2.5 MG tablet TAKE 1 TO 2 TABLETS DAILY AS DIRECTED, ADJUST DOSE BASED ON INR RESULTS (Patient taking differently: Take 5mg on Saturday and Wednesday and take 2.5mg all other days of week) 9/27/2021 at 2.5mg       Information source(s): Patient and CareEverywhere/SureScripts  Method of interview communication: iPad    Summary of Changes to PTA Med List  New: none  Discontinued: enoxaparin, nystatin, witch hazel  Changed: none    Patient was asked about OTC/herbal products specifically.  PTA med list reflects this.    In the past week, patient estimated taking medication this percent of the time:  greater than 90%.    Allergies were reviewed, assessed, and updated with the patient.      Patient did not bring any medications to the hospital and can't retrieve from home. No multi-dose medications are available for use during hospital stay.     The information provided in this note is only as accurate as the sources available at the time of the update(s).    Thank you for the opportunity to participate in the care of this patient.    Karis Capps [facility]  9/28/2021 9:40 PM

## 2021-09-29 NOTE — PHARMACY-ANTICOAGULATION SERVICE
Clinical Pharmacy - Warfarin Dosing Consult     Pharmacy has been consulted to manage this patient s warfarin therapy.  Indication: Mechanical Aortic Valve Replacement  Therapy Goal: INR 2-3  Provider/Team: Hospitalist  Warfarin Prior to Admission: Yes  Warfarin PTA Regimen: 5 mg Sat and Wed, 2.5 mg all other days  Significant drug interactions: Mod increase in bleeding risk from the following new medications: doxycycline, ceftriaxone, methylprednisolone, pantoprazole  Recent documented change in oral intake/nutrition: Unknown    INR   Date Value Ref Range Status   09/28/2021 2.61 (H) 0.90 - 1.15 Final     Comment:     Effective 7/11/2021, the reference range for this assay has changed.   09/01/2021 2.2 (H) 0.9 - 1.1 Final       Recommend warfarin 2.5 mg now for missed dose on 9/28/21.  Pharmacy will monitor Eileen Donald daily and order warfarin doses to achieve specified goal.      Please contact pharmacy as soon as possible if the warfarin needs to be held for a procedure or if the warfarin goals change.

## 2021-09-29 NOTE — TREATMENT PLAN
RCAT Treatment Plan    Patient Score: 7  Patient Acuity: 4    Clinical Indication for Therapy: bronchospasm and history of bronchospasm    Therapy Ordered: bronchodilator    Assessment Summary: Patient has wheezing with better aeration post treatment.  She has been instructed on how to use a MDI with a spacer.  She is able to demonstrate and return demonstration.  Will convert her to Albuterol MDI QID per protocol.  Will re-evaluate for any decrease in respiratory status.    Bobbi Haywood, RT  9/29/2021

## 2021-09-29 NOTE — H&P
[facility]    History and Physical - Hospitalist Service       Date of Admission:  9/28/2021    Assessment & Plan      Eileen Donald is a 65 year old female admitted on 9/28/2021. She was sent to the ED from the urgent care for evaluation of cough concerning for pneumonia and COPD.  Patient has had productive cough and shortness of breath progressing over the last 3 days.    1.  Acute COPD exacerbation, acute bronchitis  Chest x-ray showed mild bibasilar interstitial opacities may represent atelectasis or infection  Procalcitonin 0.19, WBC 7 so less likely pneumonia  Check sputum culture  Follow-up blood cultures  Continue ceftriaxone and doxycycline  IV Solu-Medrol  DuoNebs  Mucinex  PTA inhalers    2.  Chronic respiratory failure with hypoxia  Continue oxygen via nasal cannula to keep saturation above 90%    3.  Abnormal LFTs  No right upper quadrant tenderness  Elevated total bilirubin and alkaline phosphatase concerning for obstruction  Recently started on Metformin  Will hold potential hepatotoxins (simvastatin, venlafaxine, bupropion, acyclovir) for now and trend LFTs  Check right upper quadrant ultrasound    4.  Atrial fibrillation with rapid ventricular response  Likely related to acute illness  Resume metoprolol  Telemetry monitoring    5.  Acute bilateral conjunctivitis  Probable bacterial with bilateral exudates  Continue tobramycin eyedrops    6.  Type 2 diabetes mellitus without complications without long-term insulin  Recently started Metformin, will hold for now  Monitor with insulin sliding scale    7.  History of PE, aortic valve replacement  Pharmacy to dose warfarin    8.  Multiple sclerosis  Patient states she is treated with an injection twice per year and is due in November 2021    9.  History of HSV keratitis  We will hold acyclovir while monitoring LFTs    10.  Depression, anxiety  Hold bupropion, Wellbutrin while monitoring LFTs  Continue lorazepam as  needed    11.  Essential hypertension  Continue lisinopril and metoprolol    12.  Hypokalemia  Replace per protocol     Diet: Combination Diet Consistent Carb 60 Grams CHO per Meal Diet; Low Saturated Fat Na <2400mg Diet    DVT Prophylaxis: Warfarin  Paredes Catheter: Not present  Central Lines: None  Code Status: Full Code      Clinically Significant Risk Factors Present on Admission             # Coagulation Defect: home medication list includes an anticoagulant medication  # Platelet Defect: home medication list includes an antiplatelet medication      Disposition Plan   Expected discharge:  multiple days recommended to prior living arrangement once antibiotic plan established.     The patient's care was discussed with the Patient.    Ruy Howe MD  [employer]  Securely message with the Vocera Web Console (learn more here)  Text page via [facility] Paging/Directory      ______________________________________________________________________    Chief Complaint   Cough, shortness of breath    History is obtained from the patient, electronic health record and emergency department physician    History of Present Illness   Eileen Donald is a 65 year old female who presented to the urgent care clinic for evaluation of cough and shortness of breath progressive over the last 3 days.  She states initially had a dry cough but the next day had a productive cough of yellowish phlegm.  Over the last 1 day, she has developed goopy secretions from both of her eyes and has felt unwell with decreased appetite.  She had an episode nausea and vomiting in the morning but she thinks it was related to the cereal that she ate.  She denies abdominal pain or diarrhea.  She has been short of breath with exertion but denies chest pain or palpitations.  She has been vaccinated against Covid, lives at home with her  and denies recent traveling or sick contacts.    Review of Systems    The 10 point Review of  Systems is negative other than noted in the HPI or here.     Past Medical History    I have reviewed this patient's medical history and updated it with pertinent information if needed.   Past Medical History:   Diagnosis Date     Anxiety      Anxiety      Aortic valve replaced 2/15/2017     Aortic valve stenosis 01/24/2017     Asthma      Asthma     Created by Conversion      Chronic shortness of breath      Coronary artery disease      Depression      Diabetes mellitus (H)     borderline     Essential hypertension     Created by Conversion  Replacement Utility updated for latest IMO load     History of blood clots     PE     History of pulmonary embolism      HTN (hypertension)      Multiple sclerosis (H)      Multiple sclerosis (H)     Created by Conversion      Obesity      Obesity 10/29/2015     Osteoarthritis      Panic attacks      PONV (postoperative nausea and vomiting)      Pre-diabetes      Pulmonary embolism (H)      Sleep apnea        Past Surgical History   I have reviewed this patient's surgical history and updated it with pertinent information if needed.  Past Surgical History:   Procedure Laterality Date     aortic valve surgery       C RECONSTR TOTAL SHOULDER IMPLANT Left 4/10/2019    Procedure: LEFT REVERSE TOTAL SHOULDER ARTHROPLASTY;  Surgeon: Luis Antonio Telles MD;  Location: [facility];  Service: Orthopedics     COLONOSCOPY W/ BIOPSIES N/A 3/22/2021    Procedure: COLONOSCOPY WITH BIOPSY AND POLYPECTOMY;  Surgeon: Sam Weems MD;  Location: [facility];  Service: Gastroenterology     IR LUMBAR EPIDURAL STEROID INJECTION  1/16/2007     MAMMOPLASTY REDUCTION  1994     OTHER SURGICAL HISTORY  01/24/2017    mechanical aortic prosthesis     RELEASE CARPAL TUNNEL         Social History   I have reviewed this patient's social history and updated it with pertinent information if needed.  Social History     Tobacco Use     Smoking status: Never Smoker     Smokeless tobacco: Never Used    Substance Use Topics     Alcohol use: No     Drug use: No       Family History   I have reviewed this patient's family history and updated it with pertinent information if needed.  Family History   Problem Relation Age of Onset     Snoring Mother      Snoring Father      Sleep Apnea Sister      Coronary Artery Disease Father      Coronary Artery Disease Sister      Coronary Artery Disease Brother      Breast Cancer No family hx of        Prior to Admission Medications   Prior to Admission Medications   Prescriptions Last Dose Informant Patient Reported? Taking?   INCRUSE ELLIPTA 62.5 MCG/INH Inhaler 9/27/2021 at Unknown time  Yes Yes   Sig: Inhale 1 puff into the lungs daily    LORazepam (ATIVAN) 0.5 MG tablet Past Month at Unknown time  No Yes   Sig: Take 1 tablet (0.5 mg) by mouth every 8 hours as needed for anxiety   acetaminophen (TYLENOL) 325 MG tablet Past Month at Unknown time  Yes Yes   Sig: Take 325-650 mg by mouth every 6 hours as needed    acyclovir (ZOVIRAX) 400 MG tablet 9/27/2021 at Unknown time  Yes Yes   Sig: Take 400 mg by mouth 2 times daily    albuterol (PROVENTIL HFA) 108 (90 Base) MCG/ACT inhaler Past Month at Unknown time  Yes Yes   Sig: Inhale 2 puffs into the lungs every 6 hours as needed    amoxicillin (AMOXIL) 500 MG capsule   No No   Sig: TAKE 4 CAPSULES BY MOUTH 1 HOUR BEFORE PROCEDURE   Patient not taking: Reported on 9/27/2021   aspirin (ASA) 81 MG chewable tablet 9/27/2021 at Unknown time  Yes Yes   Sig: Take 81 mg by mouth daily    buPROPion (WELLBUTRIN XL) 150 MG 24 hr tablet 9/27/2021 at Unknown time  Yes Yes   Sig: Take 1 tablet by mouth daily   budesonide-formoterol (SYMBICORT) 160-4.5 MCG/ACT Inhaler 9/27/2021 at Unknown time  Yes Yes   Sig: Inhale 2 puffs into the lungs 2 times daily    calcium carbonate 1250 (500 Ca) MG CHEW 9/27/2021 at Unknown time  Yes Yes   Sig: Take 1 tablet by mouth daily    esomeprazole (NEXIUM) 20 MG DR capsule 9/27/2021 at Unknown time  Yes Yes    Sig: Take 20 mg by mouth every morning (before breakfast)    furosemide (LASIX) 40 MG tablet 9/27/2021 at Unknown time  Yes Yes   Sig: Take 60 mg by mouth daily    lisinopril (ZESTRIL) 5 MG tablet 9/27/2021 at Unknown time  Yes Yes   Sig: Take 5 mg by mouth daily    loratadine (CLARITIN) 10 MG tablet 9/27/2021 at Unknown time  No Yes   Sig: Take 1 tablet (10 mg) by mouth daily   magnesium oxide (MAG-OX) 400 MG tablet Past Week at Unknown time  Yes Yes   Sig: Take 400 mg by mouth daily    metFORMIN (GLUCOPHAGE-XR) 500 MG 24 hr tablet 9/27/2021 at Unknown time  Yes Yes   Sig: Take 1 tablet by mouth daily   metoprolol tartrate (LOPRESSOR) 25 MG tablet 9/27/2021 at Unknown time  Yes Yes   Sig: Take 25 mg by mouth 2 times daily    montelukast (SINGULAIR) 10 MG tablet 9/27/2021 at Unknown time  Yes Yes   Sig: Take 10 mg by mouth At Bedtime    potassium chloride ER (MICRO-K) 10 MEQ CR capsule 9/27/2021 at Unknown time  Yes Yes   Sig: Take 10 mEq by mouth daily    simvastatin (ZOCOR) 20 MG tablet 9/27/2021 at Unknown time  Yes Yes   Sig: Take 20 mg by mouth At Bedtime    venlafaxine (EFFEXOR-XR) 75 MG 24 hr capsule 9/27/2021 at AM  Yes Yes   Sig: TAKE 3 CAPSULES BY MOUTH EVERY DAY   warfarin ANTICOAGULANT (COUMADIN) 2.5 MG tablet 9/27/2021 at 2.5mg  No Yes   Sig: TAKE 1 TO 2 TABLETS DAILY AS DIRECTED, ADJUST DOSE BASED ON INR RESULTS   Patient taking differently: Take 5mg on Saturday and Wednesday and take 2.5mg all other days of week      Facility-Administered Medications: None     Allergies   Allergies   Allergen Reactions     Morphine Visual Disturbance and Other (See Comments)     hallicinations       Sulfa Drugs Hives and Nausea and Vomiting     Azithromycin Nausea and Vomiting and Rash       Physical Exam   Vital Signs: Temp: 98.1  F (36.7  C) Temp src: Oral BP: (!) 145/58 Pulse: 120   Resp: 28 SpO2: 95 % O2 Device: Nasal cannula Oxygen Delivery: 2 LPM  Weight: 273 lbs 3.2 oz    Constitutional: fatigued, alert and  mild distress  Eyes: extra-ocular muscles intact and scleral erythema and exudates bilaterally  ENT: normocepalic, without obvious abnormality, atramatic  Hematologic / Lymphatic: no cervical lymphadenopathy and no supraclavicular lymphadenopathy  Respiratory: tachypneic, moderate air exchange and rhonchi right anterior and left anterior  Cardiovascular: regular rate and rhythm and normal S1 and S2  GI: normal bowel sounds, soft, non-distended and non-tender  Skin: Left shin scabs and no redness, warmth, or swelling  Musculoskeletal: no lower extremity pitting edema present  there is no redness, warmth, or swelling of the joints  Neurologic: Mental Status Exam:  Level of Alertness:   awake  Orientation:   person, place, time  Motor Exam:  moves all extremities well and symmetrically  Neuropsychiatric: General: normal, calm and normal eye contact  Affect: normal    Data   Data reviewed today: I reviewed all medications, new labs and imaging results over the last 24 hours. I personally reviewed the EKG tracing showing Sinus tachycardia at 114 bpm, nonspecific ST waves.    Recent Labs   Lab 09/28/21  1838   WBC 7.0   HGB 13.6         INR 2.61*      POTASSIUM 3.3*   CHLORIDE 98   CO2 35*   BUN 10   CR 0.99   ANIONGAP 11   HAN 10.4   *   ALBUMIN 3.5   PROTTOTAL 8.4*   BILITOTAL 1.2*   ALKPHOS 159*   ALT 79*   AST 93*     Recent Results (from the past 24 hour(s))   XR Chest Port 1 View    Narrative    EXAM: XR CHEST PORT 1 VIEW  LOCATION: [facility]  DATE/TIME: 9/28/2021 6:53 PM    INDICATION: cough  COMPARISON: 09/27/2021      Impression    IMPRESSION: Mild bibasilar interstitial opacities may represent atelectasis or infection. No pleural effusion. Stable elevated right hemidiaphragm. Stable enlarged cardiac silhouette. Replaced heart valve. Median sternotomy. Left shoulder arthroplasty.

## 2021-09-29 NOTE — PLAN OF CARE
-lung sounds coarse with expiratory wheezes  -Congested/productive cough. Thick, yellow sputum  -Maintaining O2 sats > 90% on baseline 2L via NC  -Up with assist of 1 and a walker  - before breakfast and 208 before lunch

## 2021-09-29 NOTE — PLAN OF CARE
Pt arrived to floor just after midnight. Hover mat used for transfer. Pt w/c bound at baseline per notes but pt reports she ambulates around house with a walker or cane at times. Purewick in place. Alert,oriented. Bilateral eyes reddened, with moderate amount of yellow, thick, greenish drainage. Lung sounds coarse, expiratory wheezes present- mucinex given and scheduled alb neb with some effect. Need to obtain sputum samples- pt unable to cough up much but does have a frequent congested sounding cough. Did eat an icecream and drank water and lemon lime soda between 2 and 3a- Abd US ordered-  needs to be NPO x 6 hrs ( this will be after 0800). Normal saline infusion started. Denies pain. Tele- NSR to sinus tachy with HR up to 120. On 2L oxygen- baseline for pt- has her own O2 in room (is not using). K+ protocol- will receive replacement and re-check later this am. Groin reddened, and multiple scratches on bilateral shins which pt reports is from dog scratches. Sleeping comfortably.

## 2021-09-30 LAB
ALBUMIN SERPL-MCNC: 2.8 G/DL (ref 3.5–5)
ALP SERPL-CCNC: 127 U/L (ref 45–120)
ALT SERPL W P-5'-P-CCNC: 64 U/L (ref 0–45)
ANION GAP SERPL CALCULATED.3IONS-SCNC: 8 MMOL/L (ref 5–18)
AST SERPL W P-5'-P-CCNC: 72 U/L (ref 0–40)
BASOPHILS # BLD AUTO: 0 10E3/UL (ref 0–0.2)
BASOPHILS NFR BLD AUTO: 0 %
BILIRUB DIRECT SERPL-MCNC: 0.2 MG/DL
BILIRUB SERPL-MCNC: 0.3 MG/DL (ref 0–1)
BUN SERPL-MCNC: 17 MG/DL (ref 8–22)
CALCIUM SERPL-MCNC: 9.6 MG/DL (ref 8.5–10.5)
CHLORIDE BLD-SCNC: 107 MMOL/L (ref 98–107)
CO2 SERPL-SCNC: 32 MMOL/L (ref 22–31)
CREAT SERPL-MCNC: 0.88 MG/DL (ref 0.6–1.1)
EOSINOPHIL # BLD AUTO: 0 10E3/UL (ref 0–0.7)
EOSINOPHIL NFR BLD AUTO: 0 %
ERYTHROCYTE [DISTWIDTH] IN BLOOD BY AUTOMATED COUNT: 14.3 % (ref 10–15)
GFR SERPL CREATININE-BSD FRML MDRD: 70 ML/MIN/1.73M2
GLUCOSE BLD-MCNC: 207 MG/DL (ref 70–125)
GLUCOSE BLDC GLUCOMTR-MCNC: 199 MG/DL (ref 70–99)
GLUCOSE BLDC GLUCOMTR-MCNC: 218 MG/DL (ref 70–99)
GLUCOSE BLDC GLUCOMTR-MCNC: 245 MG/DL (ref 70–99)
GLUCOSE BLDC GLUCOMTR-MCNC: 260 MG/DL (ref 70–99)
HCT VFR BLD AUTO: 38.4 % (ref 35–47)
HGB BLD-MCNC: 11.6 G/DL (ref 11.7–15.7)
IMM GRANULOCYTES # BLD: 0.1 10E3/UL
IMM GRANULOCYTES NFR BLD: 2 %
INR PPP: 3.54 (ref 0.85–1.15)
LYMPHOCYTES # BLD AUTO: 0.8 10E3/UL (ref 0.8–5.3)
LYMPHOCYTES NFR BLD AUTO: 8 %
MAGNESIUM SERPL-MCNC: 2.1 MG/DL (ref 1.8–2.6)
MCH RBC QN AUTO: 31.1 PG (ref 26.5–33)
MCHC RBC AUTO-ENTMCNC: 30.2 G/DL (ref 31.5–36.5)
MCV RBC AUTO: 103 FL (ref 78–100)
MONOCYTES # BLD AUTO: 0.7 10E3/UL (ref 0–1.3)
MONOCYTES NFR BLD AUTO: 7 %
NEUTROPHILS # BLD AUTO: 7.9 10E3/UL (ref 1.6–8.3)
NEUTROPHILS NFR BLD AUTO: 83 %
NRBC # BLD AUTO: 0 10E3/UL
NRBC BLD AUTO-RTO: 0 /100
PLATELET # BLD AUTO: 332 10E3/UL (ref 150–450)
POTASSIUM BLD-SCNC: 4.2 MMOL/L (ref 3.5–5)
PROT SERPL-MCNC: 6.8 G/DL (ref 6–8)
RBC # BLD AUTO: 3.73 10E6/UL (ref 3.8–5.2)
SODIUM SERPL-SCNC: 147 MMOL/L (ref 136–145)
WBC # BLD AUTO: 9.5 10E3/UL (ref 4–11)

## 2021-09-30 PROCEDURE — 80053 COMPREHEN METABOLIC PANEL: CPT | Performed by: INTERNAL MEDICINE

## 2021-09-30 PROCEDURE — 120N000001 HC R&B MED SURG/OB

## 2021-09-30 PROCEDURE — 85610 PROTHROMBIN TIME: CPT | Performed by: HOSPITALIST

## 2021-09-30 PROCEDURE — 250N000013 HC RX MED GY IP 250 OP 250 PS 637: Performed by: INTERNAL MEDICINE

## 2021-09-30 PROCEDURE — 250N000012 HC RX MED GY IP 250 OP 636 PS 637: Performed by: HOSPITALIST

## 2021-09-30 PROCEDURE — 250N000013 HC RX MED GY IP 250 OP 250 PS 637: Performed by: HOSPITALIST

## 2021-09-30 PROCEDURE — 85025 COMPLETE CBC W/AUTO DIFF WBC: CPT | Performed by: INTERNAL MEDICINE

## 2021-09-30 PROCEDURE — 99232 SBSQ HOSP IP/OBS MODERATE 35: CPT | Performed by: INTERNAL MEDICINE

## 2021-09-30 PROCEDURE — 82310 ASSAY OF CALCIUM: CPT | Performed by: INTERNAL MEDICINE

## 2021-09-30 PROCEDURE — 250N000011 HC RX IP 250 OP 636: Performed by: INTERNAL MEDICINE

## 2021-09-30 PROCEDURE — 82248 BILIRUBIN DIRECT: CPT | Performed by: INTERNAL MEDICINE

## 2021-09-30 PROCEDURE — 83735 ASSAY OF MAGNESIUM: CPT | Performed by: HOSPITALIST

## 2021-09-30 PROCEDURE — 36415 COLL VENOUS BLD VENIPUNCTURE: CPT | Performed by: HOSPITALIST

## 2021-09-30 RX ADMIN — GUAIFENESIN 1200 MG: 600 TABLET ORAL at 20:33

## 2021-09-30 RX ADMIN — METHYLPREDNISOLONE SODIUM SUCCINATE 40 MG: 40 INJECTION, POWDER, FOR SOLUTION INTRAMUSCULAR; INTRAVENOUS at 08:40

## 2021-09-30 RX ADMIN — METOPROLOL TARTRATE 25 MG: 25 TABLET, FILM COATED ORAL at 20:34

## 2021-09-30 RX ADMIN — TOBRAMYCIN 2 DROP: 3 SOLUTION/ DROPS OPHTHALMIC at 18:07

## 2021-09-30 RX ADMIN — INSULIN ASPART 3 UNITS: 100 INJECTION, SOLUTION INTRAVENOUS; SUBCUTANEOUS at 21:05

## 2021-09-30 RX ADMIN — TOBRAMYCIN 2 DROP: 3 SOLUTION/ DROPS OPHTHALMIC at 06:36

## 2021-09-30 RX ADMIN — HUMAN INSULIN 10 UNITS: 100 INJECTION, SUSPENSION SUBCUTANEOUS at 08:46

## 2021-09-30 RX ADMIN — NYSTATIN: 100000 CREAM TOPICAL at 20:34

## 2021-09-30 RX ADMIN — ALBUTEROL SULFATE 2 PUFF: 90 AEROSOL, METERED RESPIRATORY (INHALATION) at 12:02

## 2021-09-30 RX ADMIN — NYSTATIN: 100000 CREAM TOPICAL at 08:42

## 2021-09-30 RX ADMIN — MAGNESIUM OXIDE TAB 400 MG (241.3 MG ELEMENTAL MG) 400 MG: 400 (241.3 MG) TAB at 08:41

## 2021-09-30 RX ADMIN — INSULIN ASPART 4 UNITS: 100 INJECTION, SOLUTION INTRAVENOUS; SUBCUTANEOUS at 17:57

## 2021-09-30 RX ADMIN — METHYLPREDNISOLONE SODIUM SUCCINATE 40 MG: 40 INJECTION, POWDER, FOR SOLUTION INTRAMUSCULAR; INTRAVENOUS at 16:25

## 2021-09-30 RX ADMIN — TOBRAMYCIN 2 DROP: 3 SOLUTION/ DROPS OPHTHALMIC at 10:30

## 2021-09-30 RX ADMIN — ALBUTEROL SULFATE 2 PUFF: 90 AEROSOL, METERED RESPIRATORY (INHALATION) at 20:33

## 2021-09-30 RX ADMIN — LEVOFLOXACIN 750 MG: 5 INJECTION, SOLUTION INTRAVENOUS at 10:30

## 2021-09-30 RX ADMIN — METOPROLOL TARTRATE 25 MG: 25 TABLET, FILM COATED ORAL at 08:41

## 2021-09-30 RX ADMIN — METHYLPREDNISOLONE SODIUM SUCCINATE 40 MG: 40 INJECTION, POWDER, FOR SOLUTION INTRAMUSCULAR; INTRAVENOUS at 01:02

## 2021-09-30 RX ADMIN — TOBRAMYCIN 2 DROP: 3 SOLUTION/ DROPS OPHTHALMIC at 03:17

## 2021-09-30 RX ADMIN — ASPIRIN 81 MG CHEWABLE TABLET 81 MG: 81 TABLET CHEWABLE at 08:41

## 2021-09-30 RX ADMIN — UMECLIDINIUM 1 PUFF: 62.5 AEROSOL, POWDER ORAL at 08:40

## 2021-09-30 RX ADMIN — DEXTROSE MONOHYDRATE 500 ML: 50 INJECTION, SOLUTION INTRAVENOUS at 14:06

## 2021-09-30 RX ADMIN — FUROSEMIDE 60 MG: 20 TABLET ORAL at 08:40

## 2021-09-30 RX ADMIN — ALBUTEROL SULFATE 2 PUFF: 90 AEROSOL, METERED RESPIRATORY (INHALATION) at 08:39

## 2021-09-30 RX ADMIN — TOBRAMYCIN 2 DROP: 3 SOLUTION/ DROPS OPHTHALMIC at 14:06

## 2021-09-30 RX ADMIN — INSULIN ASPART 3 UNITS: 100 INJECTION, SOLUTION INTRAVENOUS; SUBCUTANEOUS at 08:40

## 2021-09-30 RX ADMIN — LORATADINE 10 MG: 10 TABLET ORAL at 08:42

## 2021-09-30 RX ADMIN — TOBRAMYCIN 2 DROP: 3 SOLUTION/ DROPS OPHTHALMIC at 22:22

## 2021-09-30 RX ADMIN — LISINOPRIL 5 MG: 5 TABLET ORAL at 08:41

## 2021-09-30 RX ADMIN — NYSTATIN: 100000 CREAM TOPICAL at 14:06

## 2021-09-30 RX ADMIN — ALBUTEROL SULFATE 2 PUFF: 90 AEROSOL, METERED RESPIRATORY (INHALATION) at 17:13

## 2021-09-30 RX ADMIN — GUAIFENESIN 1200 MG: 600 TABLET ORAL at 08:41

## 2021-09-30 RX ADMIN — BUDESONIDE AND FORMOTEROL FUMARATE DIHYDRATE 2 PUFF: 160; 4.5 AEROSOL RESPIRATORY (INHALATION) at 08:39

## 2021-09-30 RX ADMIN — MONTELUKAST 10 MG: 10 TABLET, FILM COATED ORAL at 20:34

## 2021-09-30 RX ADMIN — INSULIN ASPART 5 UNITS: 100 INJECTION, SOLUTION INTRAVENOUS; SUBCUTANEOUS at 12:02

## 2021-09-30 RX ADMIN — BUDESONIDE AND FORMOTEROL FUMARATE DIHYDRATE 2 PUFF: 160; 4.5 AEROSOL RESPIRATORY (INHALATION) at 20:32

## 2021-09-30 RX ADMIN — PANTOPRAZOLE SODIUM 40 MG: 20 TABLET, DELAYED RELEASE ORAL at 06:36

## 2021-09-30 ASSESSMENT — MIFFLIN-ST. JEOR: SCORE: 1867.21

## 2021-09-30 NOTE — PLAN OF CARE
Problem: Respiratory Compromise COPD (Chronic Obstructive Pulmonary Disease)  Goal: Effective Oxygenation and Ventilation  Outcome: Improving  Intervention: Promote Airway Secretion Clearance  Recent Flowsheet Documentation  Taken 9/30/2021 0830 by Shameka Shaw RN  Cough And Deep Breathing: done independently per patient  Activity Management:    activity adjusted per tolerance    activity encouraged    up in chair  -Pt maintaining sats > 92% on baseline 2L O2  -Lungs clear   -Productive cough with scant amounts of thick/thin yellow sputum per Pt report  -Dyspnea with exertion   -Pt given IS and using up to 500          Problem: Diabetes Comorbidity  Goal: Blood Glucose Level Within Targeted Range  Outcome: Improving  -BG before breakfast 199 and 245 before lunch            Problem: Hypertension Acute  Goal: Blood Pressure Within Desired Range  Outcome: Improving  -/62 this am and 93/53 this afternoon      Problem: Adult Inpatient Plan of Care  Goal: Plan of Care Review  Outcome: Improving  -Pt encouraged to increase activity as able  -Up in chair 7 hours today

## 2021-09-30 NOTE — PLAN OF CARE
"  Problem: Adult Inpatient Plan of Care  Goal: Absence of Hospital-Acquired Illness or Injury  Outcome: Improving  Intervention: Identify and Manage Fall Risk  Recent Flowsheet Documentation  Taken 9/30/2021 0100 by Ingrid Álvarez, RN  Safety Promotion/Fall Prevention:    activity supervised    bed alarm on    nonskid shoes/slippers when out of bed    safety round/check completed  Intervention: Prevent Skin Injury  Recent Flowsheet Documentation  Taken 9/30/2021 0100 by Ingrid Álvarez, RN  Body Position: position changed independently     /57 (BP Location: Left arm)   Pulse 82   Temp 98.1  F (36.7  C) (Oral)   Resp 22   Ht 1.778 m (5' 10\")   Wt 123.9 kg (273 lb 3.2 oz)   SpO2 94%   BMI 39.20 kg/m       Pt continues on 2L oxygen. Lungs with expiratory wheezes and coarse. Intermittent congested cough.   Tele NSR  "

## 2021-09-30 NOTE — PROGRESS NOTES
Progress Note        Assessment/Plan  Eileen Donald is a 65 year old female admitted on 9/28/2021. She was sent to the ED from the urgent care for evaluation of cough concerning for pneumonia and COPD.  Patient has had productive cough and shortness of breath progressing over the last 3 days.     1.  Acute COPD exacerbation  Improving today with higher dose IV steroids, taper tomorrow  DuoNebs  Mucinex  02 as tolerated     Probable PNA   Chest x-ray showed mild bibasilar interstitial opacities may represent atelectasis or infection  Mixed darrel on sputum culture  NGTD on blood cultures  Reports allergy to doxycycline-  Changed abx to levofloxacin, pharm D to add doxy to allergy list       2.  Chronic respiratory failure with hypoxia  Continue oxygen via nasal cannula to keep saturation above 90%     3.  Abnormal LFTs  No right upper quadrant tenderness  Elevated total bilirubin and alkaline phosphatase - improved   Will hold potential hepatotoxins (simvastatin, venlafaxine, bupropion, acyclovir) for now and trend LFTs  right upper quadrant ultrasound with no obstruction-noted steatosis      4.  Atrial fibrillation with rate control   Likely related to acute illness  Resumed metoprolol  Telemetry monitoring     5.  Acute bilateral conjunctivitis  Examined by ED physician   bacterial with bilateral exudates  Continue tobramycin eyedrops started in ed      6.  Type 2 diabetes mellitus without complications without long-term insulin  Recently started Metformin, will hold for now  Monitor with insulin  - nph due to steroid use      7.  History of PE, aortic valve replacement  Pharmacy to dose warfarin     8.  Multiple sclerosis  Patient states she is treated with an injection twice per year and is due in November 2021     9.  History of HSV keratitis  We will hold acyclovir while monitoring LFTs     10.  Depression, anxiety  Hold bupropion, Wellbutrin while monitoring LFTs  Continue lorazepam as needed     11.   Essential hypertension  Continue lisinopril and metoprolol     12.  Hypokalemia  Replace per protocol        Diet: Combination Diet Consistent Carb 60 Grams CHO per Meal Diet; Low Saturated Fat Na <2400mg Diet    DVT Prophylaxis: Warfarin  Paredes Catheter: Not present  Central Lines: None  Code Status: Full Code      Subjective  Not sob and no chest pain today   Still with cough but on meds for this   Sitting in chair his am    Objective  Vital signs in last 24 hours  Temp:  [97.6  F (36.4  C)-98.6  F (37  C)] 97.6  F (36.4  C)  Pulse:  [81-97] 85  Resp:  [20-24] 22  BP: ()/(51-64) 138/62  FiO2 (%):  [28 %] 28 %  SpO2:  [92 %-99 %] 92 %    Input and Output in 24 hrs     Intake/Output Summary (Last 24 hours) at 9/29/2021 1255  Last data filed at 9/29/2021 0646  Gross per 24 hour   Intake 771 ml   Output 100 ml   Net 671 ml       GEN: Alert and oriented. Not in acute distress  HEENT: Atraumatic   Pupils- round and reactive to light bilaterally   Neck- supple, no JVP elevation, no lymphadenopathy or thyromegaly   Sclera- anicteric   Mucous membrane- moist and pink  CHEST: Widespread wheeze  HEART: S1S2 regular. Murmur  [place]   ABDOMEN: Soft. Non-tender, non-distended. No organomegaly. No guarding or rigidity. Bowel sounds- active  Extremities: No pedal oedema  CNS: No focal neurological deficit. No involuntary movements  SKIN: No skin rash, no cyanosis or clubbing      Pertinent Labs     Reviewed    EKG Results reviewed       Advanced Care Planning      Jorge Momin MD [facility]

## 2021-10-01 DIAGNOSIS — J45.41 MODERATE PERSISTENT ASTHMA WITH EXACERBATION: Primary | ICD-10-CM

## 2021-10-01 LAB
ALBUMIN SERPL-MCNC: 3 G/DL (ref 3.5–5)
ALP SERPL-CCNC: 127 U/L (ref 45–120)
ALT SERPL W P-5'-P-CCNC: 70 U/L (ref 0–45)
AST SERPL W P-5'-P-CCNC: 67 U/L (ref 0–40)
BACTERIA SPT CULT: NORMAL
BILIRUB DIRECT SERPL-MCNC: 0.1 MG/DL
BILIRUB SERPL-MCNC: 0.3 MG/DL (ref 0–1)
GLUCOSE BLDC GLUCOMTR-MCNC: 186 MG/DL (ref 70–99)
GLUCOSE BLDC GLUCOMTR-MCNC: 211 MG/DL (ref 70–99)
GLUCOSE BLDC GLUCOMTR-MCNC: 249 MG/DL (ref 70–99)
GLUCOSE BLDC GLUCOMTR-MCNC: 257 MG/DL (ref 70–99)
GRAM STAIN RESULT: NORMAL
HOLD SPECIMEN: NORMAL
INR PPP: 3.23 (ref 0.85–1.15)
PROT SERPL-MCNC: 6.8 G/DL (ref 6–8)
SODIUM SERPL-SCNC: 144 MMOL/L (ref 136–145)

## 2021-10-01 PROCEDURE — 250N000011 HC RX IP 250 OP 636: Performed by: INTERNAL MEDICINE

## 2021-10-01 PROCEDURE — 250N000013 HC RX MED GY IP 250 OP 250 PS 637: Performed by: INTERNAL MEDICINE

## 2021-10-01 PROCEDURE — 84295 ASSAY OF SERUM SODIUM: CPT | Performed by: INTERNAL MEDICINE

## 2021-10-01 PROCEDURE — 36415 COLL VENOUS BLD VENIPUNCTURE: CPT | Performed by: HOSPITALIST

## 2021-10-01 PROCEDURE — 82040 ASSAY OF SERUM ALBUMIN: CPT | Performed by: INTERNAL MEDICINE

## 2021-10-01 PROCEDURE — 120N000001 HC R&B MED SURG/OB

## 2021-10-01 PROCEDURE — 250N000013 HC RX MED GY IP 250 OP 250 PS 637: Performed by: HOSPITALIST

## 2021-10-01 PROCEDURE — 250N000012 HC RX MED GY IP 250 OP 636 PS 637: Performed by: INTERNAL MEDICINE

## 2021-10-01 PROCEDURE — 85610 PROTHROMBIN TIME: CPT | Performed by: HOSPITALIST

## 2021-10-01 PROCEDURE — 99232 SBSQ HOSP IP/OBS MODERATE 35: CPT | Performed by: INTERNAL MEDICINE

## 2021-10-01 RX ORDER — METHYLPREDNISOLONE SODIUM SUCCINATE 40 MG/ML
40 INJECTION, POWDER, LYOPHILIZED, FOR SOLUTION INTRAMUSCULAR; INTRAVENOUS EVERY 12 HOURS
Status: DISCONTINUED | OUTPATIENT
Start: 2021-10-01 | End: 2021-10-03

## 2021-10-01 RX ORDER — GUAIFENESIN/DEXTROMETHORPHAN 100-10MG/5
10 SYRUP ORAL EVERY 6 HOURS
Status: COMPLETED | OUTPATIENT
Start: 2021-10-01 | End: 2021-10-03

## 2021-10-01 RX ADMIN — TOBRAMYCIN 2 DROP: 3 SOLUTION/ DROPS OPHTHALMIC at 02:14

## 2021-10-01 RX ADMIN — METHYLPREDNISOLONE SODIUM SUCCINATE 40 MG: 40 INJECTION, POWDER, FOR SOLUTION INTRAMUSCULAR; INTRAVENOUS at 12:24

## 2021-10-01 RX ADMIN — FUROSEMIDE 60 MG: 20 TABLET ORAL at 08:09

## 2021-10-01 RX ADMIN — NYSTATIN: 100000 CREAM TOPICAL at 20:48

## 2021-10-01 RX ADMIN — BUDESONIDE AND FORMOTEROL FUMARATE DIHYDRATE 2 PUFF: 160; 4.5 AEROSOL RESPIRATORY (INHALATION) at 08:06

## 2021-10-01 RX ADMIN — CALCIUM CARBONATE (ANTACID) CHEW TAB 500 MG 500 MG: 500 CHEW TAB at 08:09

## 2021-10-01 RX ADMIN — NYSTATIN: 100000 CREAM TOPICAL at 13:15

## 2021-10-01 RX ADMIN — LORATADINE 10 MG: 10 TABLET ORAL at 08:09

## 2021-10-01 RX ADMIN — ALBUTEROL SULFATE 2 PUFF: 90 AEROSOL, METERED RESPIRATORY (INHALATION) at 08:05

## 2021-10-01 RX ADMIN — INSULIN ASPART 2 UNITS: 100 INJECTION, SOLUTION INTRAVENOUS; SUBCUTANEOUS at 20:44

## 2021-10-01 RX ADMIN — LISINOPRIL 5 MG: 5 TABLET ORAL at 08:09

## 2021-10-01 RX ADMIN — GUAIFENESIN AND DEXTROMETHORPHAN 10 ML: 100; 10 SYRUP ORAL at 10:56

## 2021-10-01 RX ADMIN — ALBUTEROL SULFATE 2 PUFF: 90 AEROSOL, METERED RESPIRATORY (INHALATION) at 17:25

## 2021-10-01 RX ADMIN — METOPROLOL TARTRATE 25 MG: 25 TABLET, FILM COATED ORAL at 08:10

## 2021-10-01 RX ADMIN — GUAIFENESIN 1200 MG: 600 TABLET ORAL at 08:10

## 2021-10-01 RX ADMIN — UMECLIDINIUM 1 PUFF: 62.5 AEROSOL, POWDER ORAL at 08:13

## 2021-10-01 RX ADMIN — NYSTATIN: 100000 CREAM TOPICAL at 08:13

## 2021-10-01 RX ADMIN — TOBRAMYCIN 2 DROP: 3 SOLUTION/ DROPS OPHTHALMIC at 06:33

## 2021-10-01 RX ADMIN — METOPROLOL TARTRATE 25 MG: 25 TABLET, FILM COATED ORAL at 20:40

## 2021-10-01 RX ADMIN — ASPIRIN 81 MG CHEWABLE TABLET 81 MG: 81 TABLET CHEWABLE at 08:09

## 2021-10-01 RX ADMIN — MAGNESIUM OXIDE TAB 400 MG (241.3 MG ELEMENTAL MG) 400 MG: 400 (241.3 MG) TAB at 08:10

## 2021-10-01 RX ADMIN — ALBUTEROL SULFATE 2 PUFF: 90 AEROSOL, METERED RESPIRATORY (INHALATION) at 12:26

## 2021-10-01 RX ADMIN — TOBRAMYCIN 2 DROP: 3 SOLUTION/ DROPS OPHTHALMIC at 13:15

## 2021-10-01 RX ADMIN — GUAIFENESIN AND DEXTROMETHORPHAN 10 ML: 100; 10 SYRUP ORAL at 20:38

## 2021-10-01 RX ADMIN — MONTELUKAST 10 MG: 10 TABLET, FILM COATED ORAL at 20:39

## 2021-10-01 RX ADMIN — INSULIN ASPART 3 UNITS: 100 INJECTION, SOLUTION INTRAVENOUS; SUBCUTANEOUS at 13:13

## 2021-10-01 RX ADMIN — INSULIN ASPART 5 UNITS: 100 INJECTION, SOLUTION INTRAVENOUS; SUBCUTANEOUS at 17:33

## 2021-10-01 RX ADMIN — TOBRAMYCIN 2 DROP: 3 SOLUTION/ DROPS OPHTHALMIC at 17:28

## 2021-10-01 RX ADMIN — PANTOPRAZOLE SODIUM 40 MG: 20 TABLET, DELAYED RELEASE ORAL at 06:32

## 2021-10-01 RX ADMIN — BUDESONIDE AND FORMOTEROL FUMARATE DIHYDRATE 2 PUFF: 160; 4.5 AEROSOL RESPIRATORY (INHALATION) at 20:41

## 2021-10-01 RX ADMIN — HUMAN INSULIN 10 UNITS: 100 INJECTION, SUSPENSION SUBCUTANEOUS at 09:35

## 2021-10-01 RX ADMIN — HUMAN INSULIN 10 UNITS: 100 INJECTION, SUSPENSION SUBCUTANEOUS at 17:37

## 2021-10-01 RX ADMIN — TOBRAMYCIN 2 DROP: 3 SOLUTION/ DROPS OPHTHALMIC at 21:58

## 2021-10-01 RX ADMIN — INSULIN ASPART 2 UNITS: 100 INJECTION, SOLUTION INTRAVENOUS; SUBCUTANEOUS at 09:09

## 2021-10-01 RX ADMIN — LEVOFLOXACIN 750 MG: 5 INJECTION, SOLUTION INTRAVENOUS at 10:56

## 2021-10-01 RX ADMIN — METHYLPREDNISOLONE SODIUM SUCCINATE 40 MG: 40 INJECTION, POWDER, FOR SOLUTION INTRAMUSCULAR; INTRAVENOUS at 00:36

## 2021-10-01 RX ADMIN — ALBUTEROL SULFATE 2 PUFF: 90 AEROSOL, METERED RESPIRATORY (INHALATION) at 20:40

## 2021-10-01 RX ADMIN — GUAIFENESIN AND DEXTROMETHORPHAN 10 ML: 100; 10 SYRUP ORAL at 17:24

## 2021-10-01 RX ADMIN — TOBRAMYCIN 2 DROP: 3 SOLUTION/ DROPS OPHTHALMIC at 11:08

## 2021-10-01 NOTE — PLAN OF CARE
Problem: Adjustment to Illness COPD (Chronic Obstructive Pulmonary Disease)  Goal: Optimal Chronic Illness Coping  Outcome: Improving     Problem: Infection COPD (Chronic Obstructive Pulmonary Disease)  Goal: Absence of Infection Signs and Symptoms  Outcome: Improving     Problem: Diabetes Comorbidity  Goal: Blood Glucose Level Within Targeted Range  Outcome: Improving   Pt had some strong productive cough. Pt denies any pain. Pt's eyes were much better with less secretion. Pt's BG were 218 and 260, coverage given.

## 2021-10-01 NOTE — PLAN OF CARE
"  Problem: Adult Inpatient Plan of Care  Goal: Absence of Hospital-Acquired Illness or Injury  Outcome: Improving  Intervention: Identify and Manage Fall Risk  R activity supervised   nonskid shoes/slippers when out of bed   room door open   room near nurse's station  Intervention: Prevent Skin Injury  Recent Flowsheet Documentation  Taken 10/1/2021 0036 by Ingrid Álvarez RN  Body Position: position changed independently     Problem: Hypertension Acute  Goal: Blood Pressure Within Desired Range  Outcome: Improving     /62 (BP Location: Left arm)   Pulse 83   Temp 98  F (36.7  C) (Oral)   Resp 20   Ht 1.778 m (5' 10\")   Wt 123.7 kg (272 lb 11.2 oz)   SpO2 91%   BMI 39.13 kg/m       Problem: COPD Comorbidity  Goal: Maintenance of COPD Symptom Control  Outcome: Improving     Pt continues on 2L oxygen, congested cough, lungs sounds coarse with expiratory wheezes    Pt denies pain  NSR on tele  "

## 2021-10-01 NOTE — PROGRESS NOTES
Progress Note        Assessment/Plan  Eileen Donald is a 65 year old female admitted on 9/28/2021. She was sent to the ED from the urgent care for evaluation of cough concerning for pneumonia and COPD.  Patient has had productive cough and shortness of breath progressing over the last 3 days.     1.  Acute COPD exacerbation  Improving today   Taper  IV steroids  DuoNetracee leeitussin -dm for cough per her request  02 as tolerated     Probable PNA   Chest x-ray showed mild bibasilar interstitial opacities may represent atelectasis or infection  Mixed darrel on sputum culture  NGTD on blood cultures  Reports allergy to doxycycline-  Changed abx to levofloxacin, pharm D to add doxy to allergy list       2.  Chronic respiratory failure with hypoxia  Continue oxygen via nasal cannula to keep saturation above 90%     3.  Abnormal LFTs  No right upper quadrant tenderness  Elevated total bilirubin and alkaline phosphatase - improved   Will hold potential hepatotoxins (simvastatin, venlafaxine, bupropion, acyclovir) for now and trend LFTs  right upper quadrant ultrasound with no obstruction-noted steatosis      4.  Atrial fibrillation with rate control   Likely related to acute illness  Resumed metoprolol  Telemetry monitoring     5.  Acute bilateral conjunctivitis  Examined by ED physician   bacterial with bilateral exudates  Continue tobramycin eyedrops started in ed      6.  Type 2 diabetes mellitus without complications without long-term insulin  Recently started Metformin, will hold for now  Monitor with insulin  - nph due to steroid use      7.  History of PE, aortic valve replacement  Pharmacy to dose warfarin     8.  Multiple sclerosis  Patient states she is treated with an injection twice per year and is due in November 2021     9.  History of HSV keratitis  We will hold acyclovir while monitoring LFTs     10.  Depression, anxiety  Hold bupropion, Wellbutrin while monitoring LFTs  Continue lorazepam as  needed     11.  Essential hypertension  Continue lisinopril and metoprolol     12.  Hypokalemia  Replace per protocol        Diet: Combination Diet Consistent Carb 60 Grams CHO per Meal Diet; Low Saturated Fat Na <2400mg Diet    DVT Prophylaxis: Warfarin  Paredes Catheter: Not present  Central Lines: None  Code Status: Full Code      Subjective  Still with productive cough, less less sob , no fever or chest pain    Objective  Vital signs in last 24 hours  Temp:  [97.5  F (36.4  C)-98.3  F (36.8  C)] 97.5  F (36.4  C)  Pulse:  [79-98] 79  Resp:  [18-24] 18  BP: ()/(53-78) 148/78  SpO2:  [91 %-97 %] 96 %    Input and Output in 24 hrs     Intake/Output Summary (Last 24 hours) at 9/29/2021 1255  Last data filed at 9/29/2021 0646  Gross per 24 hour   Intake 771 ml   Output 100 ml   Net 671 ml       GEN: Alert and oriented. Not in acute distress  HEENT: Atraumatic   Pupils- round and reactive to light bilaterally   Neck- supple, no JVP elevation, no lymphadenopathy or thyromegaly   Sclera- anicteric   Mucous membrane- moist and pink  CHEST: Widespread wheeze  HEART: S1S2 regular. Murmur  [place]   ABDOMEN: Soft. Non-tender, non-distended. No organomegaly. No guarding or rigidity. Bowel sounds- active  Extremities: No pedal oedema  CNS: No focal neurological deficit. No involuntary movements  SKIN: No skin rash, no cyanosis or clubbing      Pertinent Labs     Reviewed    EKG Results reviewed       Advanced Care Planning      Jorge Momin MD [facility]

## 2021-10-01 NOTE — PLAN OF CARE
Pt. With course lung sounds bilat. IV antiobitcs administered as ordered. IV solumedrol administered as ordered. Cough suppressant administered. Pt. With coughing spells and feeling panicy as a result. Deep breathing instructed. Will continue to monitor.

## 2021-10-01 NOTE — PROGRESS NOTES
Care Management Follow Up    Length of Stay (days): 3    Expected Discharge Date: 10/01/2021     Concerns to be Addressed:         Patient plan of care discussed at interdisciplinary rounds: Yes    Anticipated Discharge Disposition:Home     Anticipated Discharge Services: None     Anticipated Discharge DME:N/A     Patient/family educated on Medicare website which has current facility and service quality ratings:        Education Provided on the Discharge Plan: Yes    Patient/Family in Agreement with the Plan:  Yes    Referrals Placed by CM/SW: Not applicable      Private pay costs discussed:     Additional Information:    SW CM met with Pt in her room and introduce self & discuss her discharge goal. Pt reports her discharge goal is to return to home when she is medically to ready. Pt reports her daughter to transport her at discharge. Likely no needs CM anticipate at discharge. CM continue following.        YI Cardoza

## 2021-10-02 LAB
GLUCOSE BLDC GLUCOMTR-MCNC: 180 MG/DL (ref 70–99)
GLUCOSE BLDC GLUCOMTR-MCNC: 197 MG/DL (ref 70–99)
GLUCOSE BLDC GLUCOMTR-MCNC: 213 MG/DL (ref 70–99)
GLUCOSE BLDC GLUCOMTR-MCNC: 230 MG/DL (ref 70–99)
HOLD SPECIMEN: NORMAL
HOLD SPECIMEN: NORMAL
INR PPP: 2.72 (ref 0.85–1.15)

## 2021-10-02 PROCEDURE — 250N000011 HC RX IP 250 OP 636: Performed by: INTERNAL MEDICINE

## 2021-10-02 PROCEDURE — 85610 PROTHROMBIN TIME: CPT | Performed by: HOSPITALIST

## 2021-10-02 PROCEDURE — 99232 SBSQ HOSP IP/OBS MODERATE 35: CPT | Performed by: INTERNAL MEDICINE

## 2021-10-02 PROCEDURE — 250N000013 HC RX MED GY IP 250 OP 250 PS 637: Performed by: HOSPITALIST

## 2021-10-02 PROCEDURE — 250N000013 HC RX MED GY IP 250 OP 250 PS 637: Performed by: INTERNAL MEDICINE

## 2021-10-02 PROCEDURE — 36415 COLL VENOUS BLD VENIPUNCTURE: CPT | Performed by: HOSPITALIST

## 2021-10-02 PROCEDURE — 120N000001 HC R&B MED SURG/OB

## 2021-10-02 RX ORDER — WARFARIN SODIUM 2.5 MG/1
2.5 TABLET ORAL
Status: COMPLETED | OUTPATIENT
Start: 2021-10-02 | End: 2021-10-02

## 2021-10-02 RX ADMIN — INSULIN ASPART 2 UNITS: 100 INJECTION, SOLUTION INTRAVENOUS; SUBCUTANEOUS at 17:27

## 2021-10-02 RX ADMIN — GUAIFENESIN AND DEXTROMETHORPHAN 10 ML: 100; 10 SYRUP ORAL at 02:02

## 2021-10-02 RX ADMIN — GUAIFENESIN AND DEXTROMETHORPHAN 10 ML: 100; 10 SYRUP ORAL at 14:00

## 2021-10-02 RX ADMIN — LISINOPRIL 5 MG: 5 TABLET ORAL at 08:39

## 2021-10-02 RX ADMIN — BUDESONIDE AND FORMOTEROL FUMARATE DIHYDRATE 2 PUFF: 160; 4.5 AEROSOL RESPIRATORY (INHALATION) at 08:41

## 2021-10-02 RX ADMIN — ALBUTEROL SULFATE 2 PUFF: 90 AEROSOL, METERED RESPIRATORY (INHALATION) at 12:02

## 2021-10-02 RX ADMIN — HUMAN INSULIN 10 UNITS: 100 INJECTION, SUSPENSION SUBCUTANEOUS at 17:27

## 2021-10-02 RX ADMIN — ALBUTEROL SULFATE 2 PUFF: 90 AEROSOL, METERED RESPIRATORY (INHALATION) at 04:08

## 2021-10-02 RX ADMIN — TOBRAMYCIN 2 DROP: 3 SOLUTION/ DROPS OPHTHALMIC at 09:54

## 2021-10-02 RX ADMIN — ALBUTEROL SULFATE 2 PUFF: 90 AEROSOL, METERED RESPIRATORY (INHALATION) at 17:27

## 2021-10-02 RX ADMIN — TOBRAMYCIN 2 DROP: 3 SOLUTION/ DROPS OPHTHALMIC at 17:30

## 2021-10-02 RX ADMIN — INSULIN ASPART 3 UNITS: 100 INJECTION, SOLUTION INTRAVENOUS; SUBCUTANEOUS at 08:41

## 2021-10-02 RX ADMIN — INSULIN ASPART 3 UNITS: 100 INJECTION, SOLUTION INTRAVENOUS; SUBCUTANEOUS at 11:49

## 2021-10-02 RX ADMIN — MAGNESIUM OXIDE TAB 400 MG (241.3 MG ELEMENTAL MG) 400 MG: 400 (241.3 MG) TAB at 08:39

## 2021-10-02 RX ADMIN — TOBRAMYCIN 2 DROP: 3 SOLUTION/ DROPS OPHTHALMIC at 21:33

## 2021-10-02 RX ADMIN — FUROSEMIDE 60 MG: 20 TABLET ORAL at 08:38

## 2021-10-02 RX ADMIN — BUDESONIDE AND FORMOTEROL FUMARATE DIHYDRATE 2 PUFF: 160; 4.5 AEROSOL RESPIRATORY (INHALATION) at 20:06

## 2021-10-02 RX ADMIN — PANTOPRAZOLE SODIUM 40 MG: 20 TABLET, DELAYED RELEASE ORAL at 06:35

## 2021-10-02 RX ADMIN — MONTELUKAST 10 MG: 10 TABLET, FILM COATED ORAL at 20:13

## 2021-10-02 RX ADMIN — TOBRAMYCIN 2 DROP: 3 SOLUTION/ DROPS OPHTHALMIC at 13:59

## 2021-10-02 RX ADMIN — METHYLPREDNISOLONE SODIUM SUCCINATE 40 MG: 40 INJECTION, POWDER, FOR SOLUTION INTRAMUSCULAR; INTRAVENOUS at 12:01

## 2021-10-02 RX ADMIN — ALBUTEROL SULFATE 2 PUFF: 90 AEROSOL, METERED RESPIRATORY (INHALATION) at 08:40

## 2021-10-02 RX ADMIN — NYSTATIN: 100000 CREAM TOPICAL at 08:41

## 2021-10-02 RX ADMIN — ALBUTEROL SULFATE 2 PUFF: 90 AEROSOL, METERED RESPIRATORY (INHALATION) at 20:07

## 2021-10-02 RX ADMIN — ASPIRIN 81 MG CHEWABLE TABLET 81 MG: 81 TABLET CHEWABLE at 08:39

## 2021-10-02 RX ADMIN — WARFARIN SODIUM 2.5 MG: 2.5 TABLET ORAL at 17:36

## 2021-10-02 RX ADMIN — CALCIUM CARBONATE (ANTACID) CHEW TAB 500 MG 500 MG: 500 CHEW TAB at 08:39

## 2021-10-02 RX ADMIN — GUAIFENESIN AND DEXTROMETHORPHAN 10 ML: 100; 10 SYRUP ORAL at 20:06

## 2021-10-02 RX ADMIN — LEVOFLOXACIN 750 MG: 5 INJECTION, SOLUTION INTRAVENOUS at 10:38

## 2021-10-02 RX ADMIN — METOPROLOL TARTRATE 25 MG: 25 TABLET, FILM COATED ORAL at 20:06

## 2021-10-02 RX ADMIN — HUMAN INSULIN 10 UNITS: 100 INJECTION, SUSPENSION SUBCUTANEOUS at 08:42

## 2021-10-02 RX ADMIN — UMECLIDINIUM 1 PUFF: 62.5 AEROSOL, POWDER ORAL at 08:40

## 2021-10-02 RX ADMIN — TOBRAMYCIN 2 DROP: 3 SOLUTION/ DROPS OPHTHALMIC at 06:35

## 2021-10-02 RX ADMIN — TOBRAMYCIN 2 DROP: 3 SOLUTION/ DROPS OPHTHALMIC at 02:02

## 2021-10-02 RX ADMIN — METOPROLOL TARTRATE 25 MG: 25 TABLET, FILM COATED ORAL at 08:39

## 2021-10-02 RX ADMIN — LORATADINE 10 MG: 10 TABLET ORAL at 08:39

## 2021-10-02 RX ADMIN — INSULIN ASPART 2 UNITS: 100 INJECTION, SOLUTION INTRAVENOUS; SUBCUTANEOUS at 21:11

## 2021-10-02 RX ADMIN — METHYLPREDNISOLONE SODIUM SUCCINATE 40 MG: 40 INJECTION, POWDER, FOR SOLUTION INTRAMUSCULAR; INTRAVENOUS at 00:01

## 2021-10-02 RX ADMIN — GUAIFENESIN AND DEXTROMETHORPHAN 10 ML: 100; 10 SYRUP ORAL at 08:38

## 2021-10-02 ASSESSMENT — MIFFLIN-ST. JEOR: SCORE: 1885.81

## 2021-10-02 NOTE — PLAN OF CARE
Problem: Adult Inpatient Plan of Care  Goal: Plan of Care Review  Outcome: Improving  Flowsheets (Taken 10/2/2021 1101)  Plan of Care Reviewed With: patient  Goal: Patient-Specific Goal (Individualized)  Outcome: Improving  Goal: Optimal Comfort and Wellbeing  Outcome: Improving     Problem: Adjustment to Illness COPD (Chronic Obstructive Pulmonary Disease)  Goal: Optimal Chronic Illness Coping  Outcome: Improving     Problem: Hyperglycemia  Goal: Blood Glucose Level Within Targeted Range  Outcome: Improving   Pt is alert and oriented x4. Pt is med complaint. Pt denies pain. Pt was up in the chair for few hours this morning. Continue to monitor pt.

## 2021-10-02 NOTE — PLAN OF CARE
Problem: Infection COPD (Chronic Obstructive Pulmonary Disease)  Goal: Absence of Infection Signs and Symptoms  Outcome: Improving   LS are coarse anteriorly and posteriorly; cough is productive of yellow/thick sputum.  Pt receiving scheduled cough syrup. The pt also receiving nebs; had to use PRN dose x 1 on NOC.   She is on her baseline chronic oxygen at 2 lpm- saturations in the low 90's.      Cardiac rhythm: NSR.    Problem: Hyperglycemia  Goal: Blood Glucose Level Within Targeted Range  Outcome: Improving   HSB; received sliding scale insulin.

## 2021-10-02 NOTE — PROGRESS NOTES
Progress Note        Assessment/Plan  Eileen Donald is a 65 year old female admitted on 9/28/2021. She was sent to the ED from the urgent care for evaluation of cough concerning for pneumonia and COPD.  Patient has had productive cough and shortness of breath progressing over the last 3 days.     1.  Acute COPD exacerbation  Improving today   Taper  IV steroids  DuoNetracee leeitussin -dm for cough per her request  02 as tolerated     Probable PNA   Chest x-ray showed mild bibasilar interstitial opacities may represent atelectasis or infection  Mixed darrel on sputum culture  NGTD on blood cultures  Reports allergy to doxycycline-  Changed abx to levofloxacin, pharm D to add doxy to allergy list       2.  Chronic respiratory failure with hypoxia  Continue oxygen via nasal cannula to keep saturation above 90%     3.  Abnormal LFTs  No right upper quadrant tenderness  Elevated total bilirubin and alkaline phosphatase - improved   Will hold potential hepatotoxins (simvastatin, venlafaxine, bupropion, acyclovir) for now and trend LFTs  right upper quadrant ultrasound with no obstruction-noted steatosis      4.  Atrial fibrillation with rate control   Likely related to acute illness  Resumed metoprolol  Telemetry monitoring     5.  Acute bilateral conjunctivitis  Examined by ED physician   bacterial with bilateral exudates  Continue tobramycin eyedrops started in ed      6.  Type 2 diabetes mellitus without complications without long-term insulin  Recently started Metformin, will hold for now  Monitor with insulin  - nph due to steroid use      7.  History of PE, aortic valve replacement  Pharmacy to dose warfarin     8.  Multiple sclerosis  Patient states she is treated with an injection twice per year and is due in November 2021     9.  History of HSV keratitis  We will hold acyclovir while monitoring LFTs     10.  Depression, anxiety  Hold bupropion, Wellbutrin while monitoring LFTs  Continue lorazepam as  needed     11.  Essential hypertension  Continue lisinopril and metoprolol     12.  Hypokalemia  Replace per protocol     dvt ppx- counadin     discharge plan- likely am , barrier is iv steroid use     Subjective  Less cough , feels better, no chest pain     Objective  Vital signs in last 24 hours  Temp:  [97.5  F (36.4  C)-98.9  F (37.2  C)] 97.5  F (36.4  C)  Pulse:  [79-90] 80  Resp:  [18-22] 20  BP: (111-141)/(59-76) 140/59  SpO2:  [93 %-96 %] 93 %    Input and Output in 24 hrs     Intake/Output Summary (Last 24 hours) at 9/29/2021 1255  Last data filed at 9/29/2021 0646  Gross per 24 hour   Intake 771 ml   Output 100 ml   Net 671 ml       GEN: Alert and oriented. Not in acute distress  HEENT: Atraumatic   Pupils- round and reactive to light bilaterally   Neck- supple, no JVP elevation, no lymphadenopathy or thyromegaly   Sclera- anicteric   Mucous membrane- moist and pink  CHEST: less wheeze  HEART: S1S2 regular. Murmur  [place]   ABDOMEN: Soft. Non-tender, non-distended. No organomegaly. No guarding or rigidity. Bowel sounds- active  Extremities: No pedal oedema  CNS: No focal neurological deficit. No involuntary movements  SKIN: No skin rash, no cyanosis or clubbing      Pertinent Labs     Reviewed    EKG Results reviewed       Advanced Care Planning      Jorge Momin MD M.D

## 2021-10-03 VITALS
SYSTOLIC BLOOD PRESSURE: 140 MMHG | HEIGHT: 70 IN | OXYGEN SATURATION: 95 % | BODY MASS INDEX: 39.63 KG/M2 | HEART RATE: 85 BPM | RESPIRATION RATE: 20 BRPM | TEMPERATURE: 98.3 F | WEIGHT: 276.8 LBS | DIASTOLIC BLOOD PRESSURE: 88 MMHG

## 2021-10-03 LAB
GLUCOSE BLDC GLUCOMTR-MCNC: 187 MG/DL (ref 70–99)
GLUCOSE BLDC GLUCOMTR-MCNC: 202 MG/DL (ref 70–99)
GLUCOSE BLDC GLUCOMTR-MCNC: 256 MG/DL (ref 70–99)
INR PPP: 2.35 (ref 0.85–1.15)

## 2021-10-03 PROCEDURE — 99239 HOSP IP/OBS DSCHRG MGMT >30: CPT | Performed by: INTERNAL MEDICINE

## 2021-10-03 PROCEDURE — 250N000013 HC RX MED GY IP 250 OP 250 PS 637: Performed by: INTERNAL MEDICINE

## 2021-10-03 PROCEDURE — 36415 COLL VENOUS BLD VENIPUNCTURE: CPT | Performed by: HOSPITALIST

## 2021-10-03 PROCEDURE — 250N000011 HC RX IP 250 OP 636: Performed by: INTERNAL MEDICINE

## 2021-10-03 PROCEDURE — 85610 PROTHROMBIN TIME: CPT | Performed by: HOSPITALIST

## 2021-10-03 PROCEDURE — 250N000013 HC RX MED GY IP 250 OP 250 PS 637: Performed by: HOSPITALIST

## 2021-10-03 PROCEDURE — 250N000012 HC RX MED GY IP 250 OP 636 PS 637: Performed by: INTERNAL MEDICINE

## 2021-10-03 RX ORDER — PREDNISONE 20 MG/1
TABLET ORAL
Qty: 15 TABLET | Refills: 0 | Status: SHIPPED | OUTPATIENT
Start: 2021-10-04 | End: 2021-10-13

## 2021-10-03 RX ORDER — PREDNISONE 20 MG/1
40 TABLET ORAL DAILY
Status: DISCONTINUED | OUTPATIENT
Start: 2021-10-03 | End: 2021-10-03 | Stop reason: HOSPADM

## 2021-10-03 RX ORDER — LEVOFLOXACIN 750 MG/1
750 TABLET, FILM COATED ORAL DAILY
Qty: 2 TABLET | Refills: 0 | Status: SHIPPED | OUTPATIENT
Start: 2021-10-03 | End: 2021-10-05

## 2021-10-03 RX ORDER — PREDNISOLONE 5 MG/1
40 TABLET ORAL DAILY
Status: DISCONTINUED | OUTPATIENT
Start: 2021-10-03 | End: 2021-10-03

## 2021-10-03 RX ADMIN — HUMAN INSULIN 10 UNITS: 100 INJECTION, SUSPENSION SUBCUTANEOUS at 08:30

## 2021-10-03 RX ADMIN — INSULIN ASPART 2 UNITS: 100 INJECTION, SOLUTION INTRAVENOUS; SUBCUTANEOUS at 08:33

## 2021-10-03 RX ADMIN — BUDESONIDE AND FORMOTEROL FUMARATE DIHYDRATE 2 PUFF: 160; 4.5 AEROSOL RESPIRATORY (INHALATION) at 08:31

## 2021-10-03 RX ADMIN — METOPROLOL TARTRATE 25 MG: 25 TABLET, FILM COATED ORAL at 08:30

## 2021-10-03 RX ADMIN — TOBRAMYCIN 2 DROP: 3 SOLUTION/ DROPS OPHTHALMIC at 18:24

## 2021-10-03 RX ADMIN — LORATADINE 10 MG: 10 TABLET ORAL at 08:29

## 2021-10-03 RX ADMIN — PREDNISONE 40 MG: 20 TABLET ORAL at 08:36

## 2021-10-03 RX ADMIN — UMECLIDINIUM 1 PUFF: 62.5 AEROSOL, POWDER ORAL at 08:31

## 2021-10-03 RX ADMIN — LISINOPRIL 5 MG: 5 TABLET ORAL at 08:29

## 2021-10-03 RX ADMIN — TOBRAMYCIN 2 DROP: 3 SOLUTION/ DROPS OPHTHALMIC at 02:24

## 2021-10-03 RX ADMIN — TOBRAMYCIN 2 DROP: 3 SOLUTION/ DROPS OPHTHALMIC at 06:54

## 2021-10-03 RX ADMIN — GUAIFENESIN AND DEXTROMETHORPHAN 10 ML: 100; 10 SYRUP ORAL at 02:23

## 2021-10-03 RX ADMIN — CALCIUM CARBONATE (ANTACID) CHEW TAB 500 MG 500 MG: 500 CHEW TAB at 08:29

## 2021-10-03 RX ADMIN — INSULIN ASPART 5 UNITS: 100 INJECTION, SOLUTION INTRAVENOUS; SUBCUTANEOUS at 12:01

## 2021-10-03 RX ADMIN — FUROSEMIDE 60 MG: 20 TABLET ORAL at 08:29

## 2021-10-03 RX ADMIN — ASPIRIN 81 MG CHEWABLE TABLET 81 MG: 81 TABLET CHEWABLE at 08:29

## 2021-10-03 RX ADMIN — MAGNESIUM OXIDE TAB 400 MG (241.3 MG ELEMENTAL MG) 400 MG: 400 (241.3 MG) TAB at 08:29

## 2021-10-03 RX ADMIN — ALBUTEROL SULFATE 2 PUFF: 90 AEROSOL, METERED RESPIRATORY (INHALATION) at 16:55

## 2021-10-03 RX ADMIN — TOBRAMYCIN 2 DROP: 3 SOLUTION/ DROPS OPHTHALMIC at 12:00

## 2021-10-03 RX ADMIN — PANTOPRAZOLE SODIUM 40 MG: 20 TABLET, DELAYED RELEASE ORAL at 06:54

## 2021-10-03 RX ADMIN — INSULIN ASPART 3 UNITS: 100 INJECTION, SOLUTION INTRAVENOUS; SUBCUTANEOUS at 16:51

## 2021-10-03 RX ADMIN — METHYLPREDNISOLONE SODIUM SUCCINATE 40 MG: 40 INJECTION, POWDER, FOR SOLUTION INTRAMUSCULAR; INTRAVENOUS at 00:54

## 2021-10-03 RX ADMIN — HUMAN INSULIN 10 UNITS: 100 INJECTION, SUSPENSION SUBCUTANEOUS at 16:51

## 2021-10-03 RX ADMIN — ALBUTEROL SULFATE 2 PUFF: 90 AEROSOL, METERED RESPIRATORY (INHALATION) at 08:31

## 2021-10-03 RX ADMIN — ALBUTEROL SULFATE 2 PUFF: 90 AEROSOL, METERED RESPIRATORY (INHALATION) at 12:04

## 2021-10-03 NOTE — PLAN OF CARE
Problem: Adult Inpatient Plan of Care  Goal: Plan of Care Review  Outcome: Improving  Goal: Patient-Specific Goal (Individualized)  Outcome: Improving  Goal: Absence of Hospital-Acquired Illness or Injury  Outcome: Improving  Intervention: Identify and Manage Fall Risk  Recent Flowsheet Documentation  Taken 10/3/2021 0900 by Sejal Allen RN  Safety Promotion/Fall Prevention:   activity supervised   assistive device/personal items within reach  Goal: Optimal Comfort and Wellbeing  Outcome: Improving  Goal: Readiness for Transition of Care  Outcome: Improving     Problem: Adjustment to Illness COPD (Chronic Obstructive Pulmonary Disease)  Goal: Optimal Chronic Illness Coping  Outcome: Improving     Problem: Functional Ability Impaired COPD (Chronic Obstructive Pulmonary Disease)  Goal: Optimal Level of Functional [place]  Outcome: Improving     Problem: Infection COPD (Chronic Obstructive Pulmonary Disease)  Goal: Absence of Infection Signs and Symptoms  Outcome: Improving     Problem: Respiratory Compromise COPD (Chronic Obstructive Pulmonary Disease)  Goal: Effective Oxygenation and Ventilation  Outcome: Improving     Problem: COPD Comorbidity  Goal: Maintenance of COPD Symptom Control  Outcome: Improving     Problem: Diabetes Comorbidity  Goal: Blood Glucose Level Within Targeted Range  Outcome: Improving     Problem: Hypertension Acute  Goal: Blood Pressure Within Desired Range  Outcome: Improving     Problem: Hyperglycemia  Goal: Blood Glucose Level Within Targeted Range  Outcome: Improving   Pt is alert and oriented x4. Pt is med complaint. Pt denies pain. Pt's v/s stable. Continue to monitor pt.

## 2021-10-03 NOTE — PLAN OF CARE
[id number] Shift Summary     A&Ox4. Denies pain. Lung sounds diminished with fine crackles in bases. Dry congested cough. Thin white/yellow secretions. 2L 02 nasal cannula at baseline. Sclera pink/reddened with no drainage. Voiding adequate amounts of romel urine.     Adalgisa Gallardo RN    Problem: Adult Inpatient Plan of Care  Goal: Plan of Care Review  Outcome: Improving     Problem: Adjustment to Illness COPD (Chronic Obstructive Pulmonary Disease)  Goal: Optimal Chronic Illness Coping  Outcome: Improving     Problem: Infection COPD (Chronic Obstructive Pulmonary Disease)  Goal: Absence of Infection Signs and Symptoms  Outcome: Improving     Problem: Adult Inpatient Plan of Care  Goal: Absence of Hospital-Acquired Illness or Injury  Intervention: Identify and Manage Fall Risk  Recent Flowsheet Documentation  Taken 10/2/2021 2006 by Adalgisa Gallardo RN  Safety Promotion/Fall Prevention: activity supervised  Intervention: Prevent Skin Injury  Recent Flowsheet Documentation  Taken 10/2/2021 2006 by Adalgisa Gallardo RN  Body Position:    legs elevated    heels elevated     Problem: Functional Ability Impaired COPD (Chronic Obstructive Pulmonary Disease)  Goal: Optimal Level of Functional [place]  Intervention: Optimize Functional Ability  Recent Flowsheet Documentation  Taken 10/2/2021 2006 by Adalgisa Gallardo RN  Activity Management: up in chair     Problem: Respiratory Compromise COPD (Chronic Obstructive Pulmonary Disease)  Goal: Effective Oxygenation and Ventilation  Intervention: Promote Airway Secretion Clearance  Recent Flowsheet Documentation  Taken 10/2/2021 2006 by Adalgisa Gallardo RN  Cough And Deep Breathing: done independently per patient  Activity Management: up in chair

## 2021-10-03 NOTE — DISCHARGE SUMMARY
[facility] MEDICINE  DISCHARGE SUMMARY     Primary Care Physician: Tito Salazar  Admission Date: 9/28/2021   Discharge Provider: Jorge Momin MD Discharge Date: 10/3/2021   Diet: Cardiac/diabetic   Code Status: Full Code   Activity: As tolerated        Condition at Discharge: Stable     REASON FOR PRESENTATION(See Admission Note for Details)   Breathing difficulty    PRINCIPAL & ACTIVE DISCHARGE DIAGNOSES     Active Problems:    Asthma    Benign essential hypertension    Multiple Sclerosis    Chronic obstructive pulmonary disease, unspecified COPD type (H)    Diabetes mellitus, type 2 (H)    Acute bacterial conjunctivitis of both eyes    Community acquired pneumonia, unspecified laterality      SIGNIFICANT FINDINGS (Imaging, labs):     Results for orders placed or performed during the hospital encounter of 09/28/21   XR Chest Port 1 View    Narrative    EXAM: XR CHEST PORT 1 VIEW  LOCATION: [facility]  DATE/TIME: 9/28/2021 6:53 PM    INDICATION: cough  COMPARISON: 09/27/2021      Impression    IMPRESSION: Mild bibasilar interstitial opacities may represent atelectasis or infection. No pleural effusion. Stable elevated right hemidiaphragm. Stable enlarged cardiac silhouette. Replaced heart valve. Median sternotomy. Left shoulder arthroplasty.   US Abdomen Limited    Narrative    EXAM: US ABDOMEN LIMITED  LOCATION: [facility]  DATE/TIME: 9/29/2021 9:29 AM    INDICATION: Elevated liver function tests  COMPARISON: Ultrasound 11/01/2018  TECHNIQUE: Limited abdominal ultrasound.    FINDINGS: Limited due to the patient's body habitus and overlying bowel gas.    GALLBLADDER: Normal. No gallstones, wall thickening, or pericholecystic fluid. Negative sonographic Palafox's sign.    BILE DUCTS: No biliary dilatation. The common duct measures 4-5 mm.    LIVER: Diffusely coarsened echotexture. Normal size and surface contour. No focal  mass.    RIGHT KIDNEY: No hydronephrosis.    PANCREAS: The visualized portions are normal.    No ascites.      Impression    IMPRESSION:  1.  Diffusely coarsened hepatic echotexture likely reflecting hepatic steatosis.  2.  Normal gallbladder and bile ducts. No cholelithiasis or biliary ductal dilatation.             PENDING LABS     Pending Labs     Order Current Status    Blood Culture Peripheral Blood Preliminary result    Blood Culture Peripheral Blood Preliminary result         PROCEDURES ( this hospitalization only)          RECOMMENDATIONS TO OUTPATIENT PROVIDER FOR F/U VISIT     PCP follow-up 1 to 3 days with labs, INR checks per PCP    DISPOSITION     Home    SUMMARY OF HOSPITAL COURSE:      Assessment/Plan  Eileen Donald is a 65 year old female admitted on 9/28/2021. She was sent to the ED from the urgent care for evaluation of cough concerning for pneumonia and COPD.  Patient has had productive cough and shortness of breath progressing over the last 3 days.     1.  Acute COPD exacerbation  Improving #2 baseline with no symptoms  Taper  IV steroids to oral  DuoNebs  robitussin -dm for cough per her request  02 as tolerated continue regular home O2     Probable PNA   Chest x-ray showed mild bibasilar interstitial opacities may represent atelectasis or infection  Mixed darrel on sputum culture  NGTD on blood cultures  Reports allergy to doxycycline-  Changed abx to levofloxacin, pharm D to add doxy to allergy list   complete antibiotic course at home p.o.     2.  Chronic respiratory failure with hypoxia  Continue oxygen via nasal cannula to keep saturation above 90%     3.  Abnormal LFTs  No right upper quadrant tenderness  Elevated total bilirubin and alkaline phosphatase - improved   right upper quadrant ultrasound with no obstruction-noted steatosis  Resume home meds, PCP follow-up for steatosis, patient aware of this     4.  Atrial fibrillation with rate control   Likely related to acute  illness  Resumed metoprolol  Telemetry monitoring unremarkable     5.  Acute bilateral conjunctivitis  Examined by ED physician no evidence for keratitis  bacterial infection with bilateral exudates  Completed course of tobramycin eyedrops started in ed      6.  Type 2 diabetes mellitus without complications without long-term insulin  Resume home meds     7.  History of PE, aortic valve replacement  PCP to monitor INR     8.  Multiple sclerosis  Patient states she is treated with an injection twice per year and is due in November 2021     9.  History of HSV keratitis  Resume acyclovir, eye clinic follow-up     10.  Depression, anxiety  Resume home meds  Continue lorazepam as needed     11.  Essential hypertension  Continue lisinopril and metoprolol         Discharge Medications with Med changes:        Review of your medicines      START taking      Dose / Directions   levofloxacin 750 MG tablet  Commonly known as: LEVAQUIN  Used for: Chronic obstructive pulmonary disease, unspecified COPD type (H)      Dose: 750 mg  Take 1 tablet (750 mg) by mouth daily for 2 days  Quantity: 2 tablet  Refills: 0     predniSONE 20 MG tablet  Commonly known as: DELTASONE  Used for: Chronic obstructive pulmonary disease, unspecified COPD type (H)      Start taking on: October 4, 2021  Take 2 tablets (40 mg) by mouth daily for 3 days, THEN 1.5 tablets (30 mg) daily for 3 days, THEN 1 tablet (20 mg) daily for 3 days, THEN 0.5 tablets (10 mg) daily for 3 days.  Quantity: 15 tablet  Refills: 0        CONTINUE these medicines which may have CHANGED, or have new prescriptions. If we are uncertain of the size of tablets/capsules you have at home, strength may be listed as something that might have changed.      Dose / Directions   * Incruse Ellipta 62.5 MCG/INH inhaler  This may have changed: Another medication with the same name was added. Make sure you understand how and when to take each.  Generic drug: umeclidinium      Dose: 1  puff  Inhale 1 puff into the lungs daily  Refills: 0     * umeclidinium 62.5 MCG/INH inhaler  Commonly known as: INCRUSE ELLIPTA  This may have changed: You were already taking a medication with the same name, and this prescription was added. Make sure you understand how and when to take each.  Used for: Moderate persistent asthma with exacerbation      Dose: 1 puff  Inhale 1 puff into the lungs daily  Quantity: 3 each  Refills: 3     warfarin ANTICOAGULANT 2.5 MG tablet  Commonly known as: COUMADIN  This may have changed: See the new instructions.  Used for: Pulmonary embolism, other, unspecified chronicity, unspecified whether acute cor pulmonale present (H), H/O aortic valve replacement      Take as directed. If you are unsure how to take this medication, talk to your nurse or doctor.  Original instructions: TAKE 1 TO 2 TABLETS DAILY AS DIRECTED, ADJUST DOSE BASED ON INR RESULTS  Quantity: 135 tablet  Refills: 1         * This list has 2 medication(s) that are the same as other medications prescribed for you. Read the directions carefully, and ask your doctor or other care provider to review them with you.            CONTINUE these medicines which have NOT CHANGED      Dose / Directions   acetaminophen 325 MG tablet  Commonly known as: TYLENOL      Dose: 325-650 mg  Take 325-650 mg by mouth every 6 hours as needed  Refills: 0     acyclovir 400 MG tablet  Commonly known as: ZOVIRAX      Dose: 400 mg  Take 400 mg by mouth 2 times daily  Refills: 0     amoxicillin 500 MG capsule  Commonly known as: AMOXIL  Used for: Other dental procedure status      TAKE 4 CAPSULES BY MOUTH 1 HOUR BEFORE PROCEDURE  Quantity: 4 capsule  Refills: 1     aspirin 81 MG chewable tablet  Commonly known as: ASA      Dose: 81 mg  Take 81 mg by mouth daily  Refills: 0     buPROPion 150 MG 24 hr tablet  Commonly known as: WELLBUTRIN XL      Dose: 1 tablet  Take 1 tablet by mouth daily  Refills: 0     calcium carbonate 1250 (500 Ca) MG Chew       Dose: 1 tablet  Take 1 tablet by mouth daily  Refills: 0     esomeprazole 20 MG DR capsule  Commonly known as: nexIUM      Dose: 20 mg  Take 20 mg by mouth every morning (before breakfast)  Refills: 0     furosemide 40 MG tablet  Commonly known as: LASIX      Dose: 60 mg  Take 60 mg by mouth daily  Refills: 0     lisinopril 5 MG tablet  Commonly known as: ZESTRIL      Dose: 5 mg  Take 5 mg by mouth daily  Refills: 0     loratadine 10 MG tablet  Commonly known as: CLARITIN  Used for: Seasonal allergies      Dose: 10 mg  Take 1 tablet (10 mg) by mouth daily  Quantity: 90 tablet  Refills: 3     LORazepam 0.5 MG tablet  Commonly known as: ATIVAN  Used for: Anxiety      Dose: 0.5 mg  Take 1 tablet (0.5 mg) by mouth every 8 hours as needed for anxiety  Quantity: 20 tablet  Refills: 0     magnesium oxide 400 MG tablet  Commonly known as: MAG-OX      Dose: 400 mg  Take 400 mg by mouth daily  Refills: 0     metFORMIN 500 MG 24 hr tablet  Commonly known as: GLUCOPHAGE-XR      Dose: 1 tablet  Take 1 tablet by mouth daily  Refills: 0     metoprolol tartrate 25 MG tablet  Commonly known as: LOPRESSOR      Dose: 25 mg  Take 25 mg by mouth 2 times daily  Refills: 0     montelukast 10 MG tablet  Commonly known as: SINGULAIR      Dose: 10 mg  Take 10 mg by mouth At Bedtime  Refills: 0     potassium chloride ER 10 MEQ CR capsule  Commonly known as: MICRO-K      Dose: 10 mEq  Take 10 mEq by mouth daily  Refills: 0     Proventil  (90 Base) MCG/ACT inhaler  Generic drug: albuterol      Dose: 2 puff  Inhale 2 puffs into the lungs every 6 hours as needed  Refills: 0     simvastatin 20 MG tablet  Commonly known as: ZOCOR      Dose: 20 mg  Take 20 mg by mouth At Bedtime  Refills: 0     Symbicort 160-4.5 MCG/ACT Inhaler  Generic drug: budesonide-formoterol      Dose: 2 puff  Inhale 2 puffs into the lungs 2 times daily  Refills: 0     venlafaxine 75 MG 24 hr capsule  Commonly known as: EFFEXOR-XR      TAKE 3 CAPSULES BY MOUTH EVERY  DAY  Refills: 0           Where to get your medicines      These medications were sent to [facility] [place] [id number] [facility]  [address], [place] [facility] [address]    Phone: [phone]     levofloxacin 750 MG tablet    predniSONE 20 MG tablet     These medications will be mailed to you     From: [facility] - [place], [place] - [address] [place] [address] Phone: [phone]     umeclidinium 62.5 MCG/INH inhaler             Rationale for medication changes:      Above        Consults         Immunizations given this encounter     Most Recent Immunizations   Administered Date(s) Administered     COVID-19,PF,Pfizer 04/26/2021     FLU 6-35 months 09/16/2010     Flu, Unspecified 10/17/2008     HepB-Adult 09/17/2020     Influenza (H1N1) 01/13/2010     Influenza (IIV3) PF 09/23/2014     Influenza Quad, Recombinant, pf(RIV4) (Flublok) 09/17/2019     Influenza Vaccine IM > 6 months Valent IIV4 (Alfuria,Fluzone) 09/17/2020     Influenza Vaccine, 6+MO IM (QUADRIVALENT W/PRESERVATIVES) 09/29/2017     Pneumo Conj 13-V (2010&after) 10/28/2015     Pneumococcal 23 valent 02/01/2021     Td,adult,historic,unspecified 06/20/2006     Tdap (Adacel,Boostrix) 09/13/2016     Zoster vaccine recombinant adjuvanted (SHINGRIX) 01/21/2019     Zoster vaccine, live 03/16/2017           Anticoagulation Information      Recent INR results:   Recent Labs   Lab 10/03/21  0740 10/02/21  0621 10/01/21  0745 09/30/21  0653 09/29/21  0848 09/28/21  1838   INR 2.35* 2.72* 3.23* 3.54* 2.80* 2.61*     Warfarin doses (if applicable) or name of other anticoagulant: Coumadin      Discharge Orders     Discharge Procedure Orders   Reason for your hospital stay   Order Comments: Pna     Activity   Order Comments: Your activity upon discharge: activity as tolerated     Order Specific Question Answer Comments   Is discharge order? Yes      Discharge Instructions   Order Comments: RESUME HOME O2  "    Follow-up and recommended labs and tests    Order Comments: Follow up with primary care provider, Tito Salazar, within 7 days for hospital follow- up.  The following labs/tests are recommended: cbc/bmp/INR 1-2 days     Diet   Order Comments: Follow this diet upon discharge: Orders Placed This Encounter      Combination Diet Consistent Carb 60 Grams CHO per Meal Diet; Low Saturated Fat Na <2400mg Diet     Order Specific Question Answer Comments   Is discharge order? Yes      Examination     Vital Signs in last 24 hours:   Temp:  [97.2  F (36.2  C)-98.1  F (36.7  C)] 97.9  F (36.6  C)  Pulse:  [72-95] 72  Resp:  [18-20] 20  BP: (134-170)/(66-88) 140/88  SpO2:  [94 %-98 %] 95 %   BP (!) 140/88 (BP Location: Left arm)   Pulse 72   Temp 97.9  F (36.6  C) (Oral)   Resp 20   Ht 1.778 m (5' 10\")   Wt 125.6 kg (276 lb 12.8 oz)   SpO2 95%   BMI 39.72 kg/m    General appearance: alert, appears stated age and cooperative  Lungs: clear to auscultation bilaterally  Heart: S1-S2  Abdomen: soft, non-tender; bowel sounds normal; no masses,  no organomegaly  Extremities: No edema      Please see EMR for more detailed significant labs, imaging, consultant notes etc.  Total time spent on discharge: 35 minutes    Jorge Momin MD   [facility] Service: Ph:[phone]    CC:Tito Salazar  "

## 2021-10-04 ENCOUNTER — ANTICOAGULATION THERAPY VISIT (OUTPATIENT)
Dept: ANTICOAGULATION | Facility: CLINIC | Age: 66
End: 2021-10-04

## 2021-10-04 DIAGNOSIS — Z95.2 HEART VALVE REPLACED: Primary | ICD-10-CM

## 2021-10-04 DIAGNOSIS — I48.0 PAROXYSMAL ATRIAL FIBRILLATION (H): ICD-10-CM

## 2021-10-04 LAB
BACTERIA BLD CULT: NO GROWTH
BACTERIA BLD CULT: NO GROWTH

## 2021-10-04 NOTE — PROGRESS NOTES
ANTICOAGULATION  MANAGEMENT: Discharge Review    Eileen Donald chart reviewed for anticoagulation continuity of care    Hospital Admission on 09/28 - 10/3/21 for difficulty breathing / cough progressing in the last 3 days.   - d/t Asthma   - Acute bacterial conjunctivitis of both eyes   - Community acquired pneumonia, unspecified laterality / acute COPD exacerbation.    Discharge disposition: Home    Results:    Recent labs: (last 7 days)     09/28/21  1838 09/29/21  0848 09/30/21  0653 10/01/21  0745 10/02/21  0621 10/03/21  0740   INR 2.61* 2.80* 3.54* 3.23* 2.72* 2.35*     Anticoagulation inpatient management:     - Dosed by pharmacist    Anticoagulation discharge instructions:     Warfarin dosing: home regimen continued   Bridging: No   INR goal change: No      Medication changes affecting anticoagulation: Yes:    - Levofloxacin 750mg for 2 days   - Prednisone 20mg (to start on 10/4)    Take 2 tablets (40 mg) by mouth daily for 3 days,     THEN 1.5 tablets (30 mg) daily for 3 days,      THEN 1 tablet (20 mg) daily for 3 days,     THEN 0.5 tablets (10 mg) daily for 3 days.    Additional factors affecting anticoagulation: Yes.     Interacting Medication Review     Interacting medication(s): Levofloxacin (Levaquin) and Prednisone with warfarin.     Duration: 10/4 to 5/21     Indication: Pneumonia     New medication?: Yes, interaction may increase INR and risk of bleeding       PLAN     Continue current warfarin dose. Recommend to check INR on 10/6 or 10/7    Spoke with Eileen - reported being discharged last night and will call to [facility] f/u today with Dr. Pacheco.  I have asked her to include an INR check with visit.    Anticoagulation Calendar updated    Alondra Lora RN

## 2021-10-04 NOTE — PROGRESS NOTES
ANTICOAGULATION MANAGEMENT      Eileen Donald due for annual renewal of referral to anticoagulation monitoring. Order pended for your review and signature.      ANTICOAGULATION SUMMARY      Warfarin indication(s)     Atrial fibrillation, paroxysmal  PE  Heart Valve Replacement    Heart valve present?  Mechanical  AVR-Bileaflet ([place])       Current goal range   INR: 2.0-3.0     Goal appropriate for indication? Yes, INR 2-3 appropriate for hx of DVT, PE, hypercoagulable state, Afib, LVAD, or bileaflet AVR without risk factors     Current duration of therapy Indefinite/long term therapy   Time in Therapeutic Range (TTR)  (Goal > 60%) 49.2%       Office visit with referring provider's group within last year yes on 9/1/21       Alondra Lora RN

## 2021-10-07 ENCOUNTER — TELEPHONE (OUTPATIENT)
Dept: ANTICOAGULATION | Facility: CLINIC | Age: 66
End: 2021-10-07

## 2021-10-07 NOTE — TELEPHONE ENCOUNTER
ANTICOAGULATION     Eileen Donald is overdue for INR check.      Spoke with Marilee and scheduled lab appointment on 10/8     (reported, she made hospital f/u and INR)    Alondra Lora RN

## 2021-10-08 ENCOUNTER — TELEPHONE (OUTPATIENT)
Dept: FAMILY MEDICINE | Facility: CLINIC | Age: 66
End: 2021-10-08

## 2021-10-08 DIAGNOSIS — I48.0 PAROXYSMAL ATRIAL FIBRILLATION (H): ICD-10-CM

## 2021-10-08 DIAGNOSIS — Z95.2 HEART VALVE REPLACED: Primary | ICD-10-CM

## 2021-10-08 NOTE — TELEPHONE ENCOUNTER
Marilee called back: ([phone])     - reported cancelling her colonoscopy for 10/14/21.    Reported just recently discharged from the hospital on 10/3/21.  She is awaiting for some lab results and is in the process of getting better, before she reschedules colonoscopy.    - was scheduled for colonoscopy on 10/14/21.      - Last colonoscopy on 3/22/21 for biopsy and polypectomy. and recommended 3 mo f/u colonoscopy.     - pre-op scheduled was changed to Hospital f/u on 10/13/21 with Dr. Gutierrez.@ 1pm.     - advised to continue her current warfarin dose and is scheduled for recheck INR on 10/13.

## 2021-10-08 NOTE — TELEPHONE ENCOUNTER
If patient is able to schedule with another provider for preop I would schedule that- I am not going to be in clinic prior to that to fit her in.  If she can not get preop prior to procedure it will need to be rescheduled.

## 2021-10-08 NOTE — TELEPHONE ENCOUNTER
Pt is already schedule with Dr Gutierrez on 10/13 so I changed it to a pre-op.     Renee were you going to follow up with pt about starting her warfarin hold?

## 2021-10-08 NOTE — PROGRESS NOTES
[facility]-PROCED[facility] ANTICOAGULATION  MANAGEMENT    Warfarin interruption plan for Colonoscopy on 10/14.    Updated creatinine results reviewed    Lab Results   Component Value Date    CR 0.88 09/30/2021     Estimated Creatinine Clearance: 93.1 mL/min (based on SCr of 0.88 mg/dL).    Assessment/plan:    Creatinine clearance remains > 60 ml/min. No change to bridge plan from 9/28/21.    Renee Wilks, [facility]

## 2021-10-08 NOTE — TELEPHONE ENCOUNTER
Covering provider,    Marilee with 10/14 colonoscopy at [facility] ( 3 month follow up after polyps on last procedure this summer).    She missed her pre-op 9/29 due to being in hospital for COPD and probable pneumonia. Thought she had a hospital follow up with Dr. Salazar 10/8, but mychart confirmation never received to schedule. Separate encounter already routed to registration to help reschedule hospital follow up. Marilee does indicate she's feeling better and thinks she would be be able to do colonoscopy prep but still wasn't certain if she's keep the appointment. She does have concern it would be a long wait to get another hospital appointment.     Note: patient with PE and AVR on warfarin. Warfarin hold and bridge plan approved by Dr. Salazar prior to hospitalize ation would start Damian 10/10    Please advise if hospital follow should be scheduled as a pre-op instead? Should colonoscopy be rescheduled?     Thank you,  Renee Wilks, [facility]

## 2021-10-13 ENCOUNTER — ANTICOAGULATION THERAPY VISIT (OUTPATIENT)
Dept: ANTICOAGULATION | Facility: CLINIC | Age: 66
End: 2021-10-13

## 2021-10-13 ENCOUNTER — OFFICE VISIT (OUTPATIENT)
Dept: FAMILY MEDICINE | Facility: CLINIC | Age: 66
End: 2021-10-13
Payer: COMMERCIAL

## 2021-10-13 VITALS
WEIGHT: 263.4 LBS | BODY MASS INDEX: 37.79 KG/M2 | DIASTOLIC BLOOD PRESSURE: 60 MMHG | TEMPERATURE: 97.2 F | RESPIRATION RATE: 24 BRPM | OXYGEN SATURATION: 95 % | HEART RATE: 83 BPM | SYSTOLIC BLOOD PRESSURE: 100 MMHG

## 2021-10-13 DIAGNOSIS — J18.9 COMMUNITY ACQUIRED PNEUMONIA, UNSPECIFIED LATERALITY: ICD-10-CM

## 2021-10-13 DIAGNOSIS — J44.1 COPD EXACERBATION (H): Primary | ICD-10-CM

## 2021-10-13 DIAGNOSIS — I48.0 PAROXYSMAL ATRIAL FIBRILLATION (H): ICD-10-CM

## 2021-10-13 DIAGNOSIS — Z95.2 HEART VALVE REPLACED: Primary | ICD-10-CM

## 2021-10-13 DIAGNOSIS — I26.99 PULMONARY EMBOLISM, OTHER, UNSPECIFIED CHRONICITY, UNSPECIFIED WHETHER ACUTE COR PULMONALE PRESENT (H): ICD-10-CM

## 2021-10-13 DIAGNOSIS — G35 MULTIPLE SCLEROSIS (H): ICD-10-CM

## 2021-10-13 DIAGNOSIS — E11.9 TYPE 2 DIABETES MELLITUS WITHOUT COMPLICATION, WITHOUT LONG-TERM CURRENT USE OF INSULIN (H): ICD-10-CM

## 2021-10-13 DIAGNOSIS — Z95.2 H/O AORTIC VALVE REPLACEMENT: ICD-10-CM

## 2021-10-13 LAB
ANION GAP SERPL CALCULATED.3IONS-SCNC: 17 MMOL/L (ref 5–18)
BUN SERPL-MCNC: 16 MG/DL (ref 8–22)
CALCIUM SERPL-MCNC: 9.3 MG/DL (ref 8.5–10.5)
CHLORIDE BLD-SCNC: 95 MMOL/L (ref 98–107)
CO2 SERPL-SCNC: 29 MMOL/L (ref 22–31)
CREAT SERPL-MCNC: 1.08 MG/DL (ref 0.6–1.1)
GFR SERPL CREATININE-BSD FRML MDRD: 54 ML/MIN/1.73M2
GLUCOSE BLD-MCNC: 149 MG/DL (ref 70–125)
INR BLD: 3.5 (ref 0.9–1.1)
POTASSIUM BLD-SCNC: 4 MMOL/L (ref 3.5–5)
SODIUM SERPL-SCNC: 141 MMOL/L (ref 136–145)

## 2021-10-13 PROCEDURE — 36416 COLLJ CAPILLARY BLOOD SPEC: CPT | Performed by: FAMILY MEDICINE

## 2021-10-13 PROCEDURE — 99213 OFFICE O/P EST LOW 20 MIN: CPT | Performed by: FAMILY MEDICINE

## 2021-10-13 PROCEDURE — 85610 PROTHROMBIN TIME: CPT | Performed by: FAMILY MEDICINE

## 2021-10-13 PROCEDURE — 80048 BASIC METABOLIC PNL TOTAL CA: CPT | Performed by: FAMILY MEDICINE

## 2021-10-13 PROCEDURE — 36415 COLL VENOUS BLD VENIPUNCTURE: CPT | Performed by: FAMILY MEDICINE

## 2021-10-13 RX ORDER — PREDNISONE 10 MG/1
TABLET ORAL
Qty: 4 TABLET | Refills: 0 | Status: SHIPPED | OUTPATIENT
Start: 2021-10-13 | End: 2021-11-09

## 2021-10-13 NOTE — PROGRESS NOTES
Anticoagulation Management    Unable to reach Marilee today.    Today's INR result of 3.5 is supratherapeutic (goal INR of 2.0-3.0).  Result received from: Clinic Lab    Follow up required to discuss out of range INR     Left message to hold warfarin tonight.    Anticoagulation clinic to follow up - 10/14.    Alondra Lora RN

## 2021-10-13 NOTE — PATIENT INSTRUCTIONS
Marilee,    We will check your INR and metabolic panel today  I sent a refill for prednisone  Continue to use oxygen as needed  For the COVID vaccine you can call [phone]    Boaz Gutierrez MD

## 2021-10-13 NOTE — PROGRESS NOTES
ANTICOAGULATION MANAGEMENT     Eileen Donald 65 year old female is on warfarin with supratherapeutic INR result. (Goal INR 2.0-3.0)    Recent labs: (last 7 days)     10/13/21  1339   INR 3.5*       ASSESSMENT     Source(s): Chart Review, Patient/Caregiver Call and Template       Warfarin doses taken: Warfarin taken differently, but did not change total weekly dose    Diet: No new diet changes identified    New illness, injury, or hospitalization: Yes:   Reported starting to feel a little better.   Recent hospitalization from 9/29 - 10/3/21 asthma / COPD / community acquired pneumonia, and acute bacterial conjunctivitis of both eyes.    Medication/supplement changes: Yes.    10/13/21 - prescribed 4 days of Prednisone 10mg daily, r/t COPD exacerbation.   Levofloxacin and taper Prednisone dose instruction from recent hospital discharge.    Prednisone 20mg (to start on 10/4)                           Take 2 tablets (40 mg) by mouth daily for 3 days,                            THEN 1.5 tablets (30 mg) daily for 3 days,                               Now on - THEN 1 tablet (20 mg) daily for 3 days,                           THEN 0.5 tablets (10 mg) daily for 3 days.     Signs or symptoms of bleeding or clotting: No    Previous INR: Therapeutic last 2 INR visits    Additional findings: Upcoming surgery/procedure Yes.  scheduled Colonoscopy on 12/23/21     PLAN     Recommended plan for temporary change(s) affecting INR     Dosing Instructions:   (evenings. 2.5mg tabs)   - Reported, she HELD warfarin dose last night.    - Continue your current warfarin dose with next INR in 1-2 weeks       Summary  As of 10/13/2021    Full warfarin instructions:  10/13: Hold; Otherwise 5 mg every Wed, Sat; 2.5 mg all other days   Next INR check:  10/27/2021             Telephone call with Eileen who verbalizes understanding and agrees to plan    Patient elected to schedule next visit in 7-10 days    Education provided: Importance of  consistent vitamin K intake, Goal range and significance of current result, When to seek medical attention/emergency care and Importance of notifying clinic for diarrhea, nausea/vomiting, reduced intake, and/or illness; a sooner lab recheck maybe needed.    Plan made per [facility] anticoagulation protocol    Alondra Lora RN  Anticoagulation Clinic  10/13/2021    _______________________________________________________________________     Anticoagulation Episode Summary     Current INR goal:  2.0-3.0   TTR:  49.8 % (11.6 mo)   Target end date:  10/6/2022   Send INR reminders to:  [facility]    Indications    Heart valve replaced [Z95.2]  Paroxysmal atrial fibrillation (H) [I48.0]           Comments:           Anticoagulation Care Providers     Provider Role Specialty Phone number    Tito Salazar MD Referring Family Medicine [phone]

## 2021-10-14 ENCOUNTER — MEDICAL CORRESPONDENCE (OUTPATIENT)
Dept: HEALTH INFORMATION MANAGEMENT | Facility: CLINIC | Age: 66
End: 2021-10-14

## 2021-10-15 ENCOUNTER — TRANSFERRED RECORDS (OUTPATIENT)
Dept: HEALTH INFORMATION MANAGEMENT | Facility: CLINIC | Age: 66
End: 2021-10-15

## 2021-10-18 ENCOUNTER — TRANSFERRED RECORDS (OUTPATIENT)
Dept: HEALTH INFORMATION MANAGEMENT | Facility: CLINIC | Age: 66
End: 2021-10-18
Payer: MEDICARE

## 2021-10-25 NOTE — PROGRESS NOTES
Assessment/ Plan     1. COPD exacerbation (H)    She was treated with IV steroids as well as levofloxacin initially  She was discharged on oral prednisone and levofloxacin  Will extend prednisone as noted  - predniSONE (DELTASONE) 10 MG tablet; Take one po Qday x 4 days  Dispense: 4 tablet; Refill: 0  She will continue current inhalers including Incruse and Symbicort  Continue albuterol inhaler as needed  She may continue oxygen as needed    2. Community acquired pneumonia, unspecified laterality    Treated with IV levofloxacin and converted to oral levofloxacin  Continue to monitor for recurrent symptoms    3. Multiple Sclerosis    Plan per neurology    4. Type 2 diabetes mellitus without complication, without long-term current use of insulin (H)    Most recent hemoglobin A1c was 6.9%  Continue Metformin  Continue to monitor blood sugars which may be more elevated secondary to recent steroids  - Basic metabolic panel; Future  - Basic metabolic panel    5. Pulmonary embolism, other, unspecified chronicity, unspecified whether acute cor pulmonale present (H)    Continue warfarin  - INR point of care    6. H/O aortic valve replacement    Continue warfarin  - INR point of care    Post Discharge Medication Reconciliation Status: discharge medications reconciled, continue medications without change.    Reviewed recent hospitalization including discharge summary, imaging, laboratory testing, and hospitalist recommendations          Subjective:      Eileen Donald is a 66 year old female who presents presents to the clinic following a recent hospitalization for a COPD/asthma exacerbation and probable pneumonia.  Her primary physician is Dr. Salazar.  She was admitted September 28 through October 3.  She presented with shortness of breath, cough, and chest congestion.  She was assessed to have an acute COPD exacerbation.  She was treated with IV steroids and Duo nebs and given Robitussin for her cough.  She was given  oxygen as well.  A chest x-ray showed mild bibasilar interstitial opacities and there was mixed darrel on the sputum culture.  She was treated with levofloxacin initially.  As noted, oxygen was titrated to keep saturation above 90%.    During the course of the hospitalization she had abnormal liver function tests but denied right upper quadrant tenderness.  She had an elevated total bilirubin and alkaline phosphatase which improved.  An ultrasound was negative for obstruction but there was evidence of hepatic steatosis.    She has a history of atrial fibrillation which is rate controlled.  Given a history of possible bacterial conjunctivitis she was treated with tobramycin drops for her eyes.    She does have history of type 2 diabetes mellitus without complication.  She has continued her home medication.  Her history is otherwise notable for previous aortic valve replacement as well as multiple sclerosis.    Overall, she feels like she is improving.  She has continued on her inhalers including Incruse, Symbicort, and we use albuterol inhaler as needed.  Oxygen levels have been maintained at a good level.    She denies recent fever, chest pain contrast breath, or palpitations.          The following portions of the patient's history were reviewed and updated as appropriate: allergies, current medications, past family history, past medical history, past social history, past surgical history and problem list. Medications have been reconciled    Review of Systems   A 12 point comprehensive review of systems was negative except as noted.      Current Outpatient Medications   Medication Sig Dispense Refill     acetaminophen (TYLENOL) 325 MG tablet Take 325-650 mg by mouth every 6 hours as needed        acyclovir (ZOVIRAX) 400 MG tablet Take 400 mg by mouth 2 times daily        albuterol (PROVENTIL HFA) 108 (90 Base) MCG/ACT inhaler Inhale 2 puffs into the lungs every 6 hours as needed        amoxicillin (AMOXIL) 500 MG  capsule TAKE 4 CAPSULES BY MOUTH 1 HOUR BEFORE PROCEDURE 4 capsule 1     aspirin (ASA) 81 MG chewable tablet Take 81 mg by mouth daily        budesonide-formoterol (SYMBICORT) 160-4.5 MCG/ACT Inhaler Inhale 2 puffs into the lungs 2 times daily        buPROPion (WELLBUTRIN XL) 150 MG 24 hr tablet Take 1 tablet by mouth daily       calcium carbonate 1250 (500 Ca) MG CHEW Take 1 tablet by mouth daily        esomeprazole (NEXIUM) 20 MG DR capsule Take 20 mg by mouth every morning (before breakfast)        furosemide (LASIX) 40 MG tablet Take 60 mg by mouth daily        INCRUSE ELLIPTA 62.5 MCG/INH Inhaler Inhale 1 puff into the lungs daily        lisinopril (ZESTRIL) 5 MG tablet Take 5 mg by mouth daily        loratadine (CLARITIN) 10 MG tablet Take 1 tablet (10 mg) by mouth daily 90 tablet 3     LORazepam (ATIVAN) 0.5 MG tablet Take 1 tablet (0.5 mg) by mouth every 8 hours as needed for anxiety 20 tablet 0     magnesium oxide (MAG-OX) 400 MG tablet Take 400 mg by mouth daily        metFORMIN (GLUCOPHAGE-XR) 500 MG 24 hr tablet Take 1 tablet by mouth daily       metoprolol tartrate (LOPRESSOR) 25 MG tablet Take 25 mg by mouth 2 times daily        montelukast (SINGULAIR) 10 MG tablet Take 10 mg by mouth At Bedtime        potassium chloride ER (MICRO-K) 10 MEQ CR capsule Take 10 mEq by mouth daily        predniSONE (DELTASONE) 10 MG tablet Take one po Qday x 4 days 4 tablet 0     simvastatin (ZOCOR) 20 MG tablet Take 20 mg by mouth At Bedtime        umeclidinium (INCRUSE ELLIPTA) 62.5 MCG/INH inhaler Inhale 1 puff into the lungs daily 3 each 3     venlafaxine (EFFEXOR-XR) 75 MG 24 hr capsule TAKE 3 CAPSULES BY MOUTH EVERY DAY       warfarin ANTICOAGULANT (COUMADIN) 2.5 MG tablet TAKE 1 TO 2 TABLETS DAILY AS DIRECTED, ADJUST DOSE BASED ON INR RESULTS (Patient taking differently: Take 5mg on Saturday and Wednesday and take 2.5mg all other days of week) 135 tablet 1       Objective:     /60 (BP Location: Left arm,  Patient Position: Sitting, Cuff Size: Adult Large)   Pulse 83   Temp 97.2  F (36.2  C) (Oral)   Resp 24   Wt 119.5 kg (263 lb 6.4 oz)   SpO2 95%   BMI 37.79 kg/m      General appearance: alert, appears stated age   Head: normocephalic, without obvious abnormality, atraumatic  Eyes: conjunctivae/corneas clear. PERRL, EOM's intact.   Ears: normal TM's and external ear canals both ears  Nose: nares normal. Septum midline. Mucosa normal.  Throat: lips, mucosa, and tongue normal; teeth and gums normal  Neck: no adenopathy, supple, symmetrical, trachea midline and thyroid not enlarged, symmetric   Lungs: Mildly diminished breath sounds  Heart: regular rate and rhythm, S1, S2 normal, no murmur, click, rub or gallop  Abdomen: soft, non-tender; no masses,  no organomegaly  Extremities: extremities normal, atraumatic, no cyanosis   Trace lower extremity edema  Skin: skin color, texture, turgor normal  Lymph nodes: Cervical nodes normal.  Neurologic: Alert and oriented X 3           No results found for this or any previous visit (from the past 168 hour(s)).       This note has been dictated using voice recognition software. Any grammatical or context distortions are unintentional and inherent to the software    Boaz Gutierrez MD

## 2021-10-27 ENCOUNTER — TRANSFERRED RECORDS (OUTPATIENT)
Dept: HEALTH INFORMATION MANAGEMENT | Facility: CLINIC | Age: 66
End: 2021-10-27
Payer: MEDICARE

## 2021-10-27 ENCOUNTER — TELEPHONE (OUTPATIENT)
Dept: ANTICOAGULATION | Facility: CLINIC | Age: 66
End: 2021-10-27

## 2021-10-27 NOTE — TELEPHONE ENCOUNTER
ANTICOAGULATION     Eileen Donald is overdue for INR check.      spoke with daughter, Cecille who declined to schedule a lab appointment at this time. If calling back please schedule as soon as possible.    - she will relay message to her mom.    Alondra Lora RN

## 2021-10-30 ENCOUNTER — APPOINTMENT (OUTPATIENT)
Dept: URGENT CARE | Facility: CLINIC | Age: 66
End: 2021-10-30
Payer: MEDICARE

## 2021-11-01 ENCOUNTER — TRANSFERRED RECORDS (OUTPATIENT)
Dept: HEALTH INFORMATION MANAGEMENT | Facility: CLINIC | Age: 66
End: 2021-11-01
Payer: MEDICARE

## 2021-11-03 ENCOUNTER — HOSPITAL ENCOUNTER (EMERGENCY)
Facility: HOSPITAL | Age: 66
Discharge: HOME | End: 2021-11-03
Attending: FAMILY MEDICINE | Admitting: FAMILY MEDICINE
Payer: COMMERCIAL

## 2021-11-03 ENCOUNTER — NURSE TRIAGE (OUTPATIENT)
Dept: NURSING | Facility: CLINIC | Age: 66
End: 2021-11-03

## 2021-11-03 VITALS
WEIGHT: 265 LBS | DIASTOLIC BLOOD PRESSURE: 80 MMHG | OXYGEN SATURATION: 97 % | TEMPERATURE: 97.5 F | HEART RATE: 121 BPM | BODY MASS INDEX: 38.02 KG/M2 | RESPIRATION RATE: 22 BRPM | SYSTOLIC BLOOD PRESSURE: 116 MMHG

## 2021-11-03 DIAGNOSIS — N30.91 HEMORRHAGIC CYSTITIS: ICD-10-CM

## 2021-11-03 LAB
ALBUMIN UR-MCNC: 300 MG/DL
ANION GAP SERPL CALCULATED.3IONS-SCNC: 14 MMOL/L (ref 5–18)
APPEARANCE UR: ABNORMAL
BASOPHILS # BLD AUTO: 0.1 10E3/UL (ref 0–0.2)
BASOPHILS NFR BLD AUTO: 1 %
BILIRUB UR QL STRIP: NEGATIVE
BUN SERPL-MCNC: 9 MG/DL (ref 8–22)
CALCIUM SERPL-MCNC: 10.9 MG/DL (ref 8.5–10.5)
CHLORIDE BLD-SCNC: 99 MMOL/L (ref 98–107)
CO2 SERPL-SCNC: 32 MMOL/L (ref 22–31)
COLOR UR AUTO: YELLOW
CREAT SERPL-MCNC: 0.98 MG/DL (ref 0.6–1.1)
EOSINOPHIL # BLD AUTO: 0.1 10E3/UL (ref 0–0.7)
EOSINOPHIL NFR BLD AUTO: 1 %
ERYTHROCYTE [DISTWIDTH] IN BLOOD BY AUTOMATED COUNT: 13.8 % (ref 10–15)
GFR SERPL CREATININE-BSD FRML MDRD: 61 ML/MIN/1.73M2
GLUCOSE BLD-MCNC: 140 MG/DL (ref 70–125)
GLUCOSE UR STRIP-MCNC: 30 MG/DL
HCT VFR BLD AUTO: 42.4 % (ref 35–47)
HGB BLD-MCNC: 13.1 G/DL (ref 11.7–15.7)
HGB UR QL STRIP: ABNORMAL
IMM GRANULOCYTES # BLD: 0.1 10E3/UL
IMM GRANULOCYTES NFR BLD: 1 %
INR PPP: 2.79 (ref 0.85–1.15)
KETONES UR STRIP-MCNC: ABNORMAL MG/DL
LEUKOCYTE ESTERASE UR QL STRIP: ABNORMAL
LYMPHOCYTES # BLD AUTO: 1.3 10E3/UL (ref 0.8–5.3)
LYMPHOCYTES NFR BLD AUTO: 13 %
MCH RBC QN AUTO: 31 PG (ref 26.5–33)
MCHC RBC AUTO-ENTMCNC: 30.9 G/DL (ref 31.5–36.5)
MCV RBC AUTO: 101 FL (ref 78–100)
MONOCYTES # BLD AUTO: 0.9 10E3/UL (ref 0–1.3)
MONOCYTES NFR BLD AUTO: 9 %
MUCOUS THREADS #/AREA URNS LPF: PRESENT /LPF
NEUTROPHILS # BLD AUTO: 7.4 10E3/UL (ref 1.6–8.3)
NEUTROPHILS NFR BLD AUTO: 75 %
NITRATE UR QL: NEGATIVE
NRBC # BLD AUTO: 0 10E3/UL
NRBC BLD AUTO-RTO: 0 /100
PH UR STRIP: 5.5 [PH] (ref 5–7)
PLATELET # BLD AUTO: 303 10E3/UL (ref 150–450)
POTASSIUM BLD-SCNC: 3.9 MMOL/L (ref 3.5–5)
RBC # BLD AUTO: 4.22 10E6/UL (ref 3.8–5.2)
RBC URINE: >182 /HPF
SODIUM SERPL-SCNC: 145 MMOL/L (ref 136–145)
SP GR UR STRIP: 1.03 (ref 1–1.03)
SQUAMOUS EPITHELIAL: 21 /HPF
UROBILINOGEN UR STRIP-MCNC: 2 MG/DL
WBC # BLD AUTO: 9.8 10E3/UL (ref 4–11)
WBC URINE: >182 /HPF

## 2021-11-03 PROCEDURE — 250N000013 HC RX MED GY IP 250 OP 250 PS 637: Performed by: FAMILY MEDICINE

## 2021-11-03 PROCEDURE — 85610 PROTHROMBIN TIME: CPT | Performed by: EMERGENCY MEDICINE

## 2021-11-03 PROCEDURE — 36415 COLL VENOUS BLD VENIPUNCTURE: CPT | Performed by: EMERGENCY MEDICINE

## 2021-11-03 PROCEDURE — 85048 AUTOMATED LEUKOCYTE COUNT: CPT | Performed by: FAMILY MEDICINE

## 2021-11-03 PROCEDURE — 82374 ASSAY BLOOD CARBON DIOXIDE: CPT | Performed by: FAMILY MEDICINE

## 2021-11-03 PROCEDURE — 87086 URINE CULTURE/COLONY COUNT: CPT | Performed by: FAMILY MEDICINE

## 2021-11-03 PROCEDURE — 81001 URINALYSIS AUTO W/SCOPE: CPT | Performed by: FAMILY MEDICINE

## 2021-11-03 PROCEDURE — 99283 EMERGENCY DEPT VISIT LOW MDM: CPT

## 2021-11-03 RX ORDER — CEFDINIR 300 MG/1
300 CAPSULE ORAL 2 TIMES DAILY
Qty: 20 CAPSULE | Refills: 0 | Status: SHIPPED | OUTPATIENT
Start: 2021-11-03 | End: 2021-11-13

## 2021-11-03 RX ORDER — PHENAZOPYRIDINE HYDROCHLORIDE 200 MG/1
200 TABLET, FILM COATED ORAL 3 TIMES DAILY
Qty: 9 TABLET | Refills: 0 | Status: SHIPPED | OUTPATIENT
Start: 2021-11-03 | End: 2021-11-06

## 2021-11-03 RX ORDER — CEFDINIR 300 MG/1
300 CAPSULE ORAL ONCE
Status: COMPLETED | OUTPATIENT
Start: 2021-11-03 | End: 2021-11-03

## 2021-11-03 RX ORDER — PHENAZOPYRIDINE HYDROCHLORIDE 100 MG/1
100 TABLET, FILM COATED ORAL ONCE
Status: COMPLETED | OUTPATIENT
Start: 2021-11-03 | End: 2021-11-03

## 2021-11-03 RX ADMIN — PHENAZOPYRIDINE HYDROCHLORIDE 100 MG: 100 TABLET ORAL at 13:16

## 2021-11-03 RX ADMIN — CEFDINIR 300 MG: 300 CAPSULE ORAL at 13:17

## 2021-11-03 ASSESSMENT — ENCOUNTER SYMPTOMS
HEMATURIA: 1
ABDOMINAL PAIN: 0
CHILLS: 0
FEVER: 0
DYSURIA: 1

## 2021-11-03 NOTE — ED PROVIDER NOTES
EMERGENCY DEPARTMENT ENCOUNTER      NAME: Eileen Donald  AGE: 66 year old female  YOB: 1955  MRN: [id number]  EVALUATION DATE & TIME: No admission date for patient encounter.    PCP: Tito Salazar    ED PROVIDER: Saurabh Pulliam M.D.    Chief Complaint   Patient presents with     Hematuria       FINAL IMPRESSION:  1. Hemorrhagic cystitis        ED COURSE & MEDICAL DECISION MAKING:    Pertinent Labs & Imaging studies personally reviewed and interpreted by me. (See chart for details)  12:00 PM  Patient seen and examined, prior records reviewed.  Differential diagnosis includes but not limited to UTI, pyelonephritis, vaginitis, pelvic mass, pelvis abscess.  Patient with urinary frequency and dysuria.  Tachycardic on initial exam without chest pain, dyspnea, fever.  Labs and UA ordered.  1:16 PM I rechecked and updated the patient on the laboratory results. UA consistent with UTI, WBC normal, remaining labs are reassuring.  No evidence for sepsis.  Her heart rate is now 90 bpm. We discussed the plan for discharge and the patient is agreeable. Reviewed supportive cares, symptomatic treatment, outpatient follow up, and reasons to return to the Emergency Department. Patient to be discharged by ED RN.      At the conclusion of the encounter I discussed the results of all of the tests and the disposition. The questions were answered. The patient or family acknowledged understanding and was agreeable with the care plan.     PROCEDURES:   Procedures    MEDICATIONS GIVEN IN THE EMERGENCY:  Medications   cefdinir (OMNICEF) capsule 300 mg (300 mg Oral Given 11/3/21 1317)   phenazopyridine (PYRIDIUM) tablet 100 mg (100 mg Oral Given 11/3/21 1316)       NEW PRESCRIPTIONS STARTED AT TODAY'S ER VISIT  New Prescriptions    CEFDINIR (OMNICEF) 300 MG CAPSULE    Take 1 capsule (300 mg) by mouth 2 times daily for 10 days    PHENAZOPYRIDINE (PYRIDIUM) 200 MG TABLET    Take 1 tablet (200 mg) by mouth 3 times  daily for 3 days       =================================================================    HPI    Patient information was obtained from: Patient      Eileen Donald is a 66 year old female with a pertinent history of HTN, s/p aortic valve replacement, diabetes mellitus type II, and COPD who presents to this ED by walk in for evaluation of hematuria.     The patient reports she has been noticing blood in the urine since yesterday. She always has the urge to urinate but when she urinates she only passes a little bit of urine. She does report some mild dysuria. She is currently on thinners, but has not had her INR checked recently.       REVIEW OF SYSTEMS   Review of Systems   Constitutional: Negative for chills and fever.   Gastrointestinal: Negative for abdominal pain.   Genitourinary: Positive for dysuria, hematuria and urgency.      All other systems reviewed and negative    PAST MEDICAL HISTORY:  Past Medical History:   Diagnosis Date     Anxiety      Anxiety      Aortic valve replaced 2/15/2017     Aortic valve stenosis 01/24/2017     Asthma      Asthma     Created by Conversion      Chronic shortness of breath      Coronary artery disease      Depression      Diabetes mellitus (H)     borderline     Essential hypertension     Created by Conversion  Replacement Utility updated for latest IMO load     History of blood clots     PE     History of pulmonary embolism      HTN (hypertension)      Multiple sclerosis (H)      Multiple sclerosis (H)     Created by Conversion      Obesity      Obesity 10/29/2015     Osteoarthritis      Panic attacks      PONV (postoperative nausea and vomiting)      Pre-diabetes      Pulmonary embolism (H)      Sleep apnea        PAST SURGICAL HISTORY:  Past Surgical History:   Procedure Laterality Date     aortic valve surgery       C RECONSTR TOTAL SHOULDER IMPLANT Left 4/10/2019    Procedure: LEFT REVERSE TOTAL SHOULDER ARTHROPLASTY;  Surgeon: Luis Antonio Telles MD;  Location:  [facility] Main OR;  Service: Orthopedics     COLONOSCOPY W/ BIOPSIES N/A 3/22/2021    Procedure: COLONOSCOPY WITH BIOPSY AND POLYPECTOMY;  Surgeon: Sam Weems MD;  Location: [facility] Main OR;  Service: Gastroenterology     IR LUMBAR EPIDURAL STEROID INJECTION  1/16/2007     MAMMOPLASTY REDUCTION  1994     OTHER SURGICAL HISTORY  01/24/2017    mechanical aortic prosthesis     RELEASE CARPAL TUNNEL         CURRENT MEDICATIONS:    No current facility-administered medications for this encounter.     Current Outpatient Medications   Medication     cefdinir (OMNICEF) 300 MG capsule     phenazopyridine (PYRIDIUM) 200 MG tablet     acetaminophen (TYLENOL) 325 MG tablet     acyclovir (ZOVIRAX) 400 MG tablet     albuterol (PROVENTIL HFA) 108 (90 Base) MCG/ACT inhaler     amoxicillin (AMOXIL) 500 MG capsule     aspirin (ASA) 81 MG chewable tablet     budesonide-formoterol (SYMBICORT) 160-4.5 MCG/ACT Inhaler     buPROPion (WELLBUTRIN XL) 150 MG 24 hr tablet     calcium carbonate 1250 (500 Ca) MG CHEW     esomeprazole (NEXIUM) 20 MG DR capsule     furosemide (LASIX) 40 MG tablet     INCRUSE ELLIPTA 62.5 MCG/INH Inhaler     lisinopril (ZESTRIL) 5 MG tablet     loratadine (CLARITIN) 10 MG tablet     LORazepam (ATIVAN) 0.5 MG tablet     magnesium oxide (MAG-OX) 400 MG tablet     metFORMIN (GLUCOPHAGE-XR) 500 MG 24 hr tablet     metoprolol tartrate (LOPRESSOR) 25 MG tablet     montelukast (SINGULAIR) 10 MG tablet     potassium chloride ER (MICRO-K) 10 MEQ CR capsule     predniSONE (DELTASONE) 10 MG tablet     simvastatin (ZOCOR) 20 MG tablet     umeclidinium (INCRUSE ELLIPTA) 62.5 MCG/INH inhaler     venlafaxine (EFFEXOR-XR) 75 MG 24 hr capsule     warfarin ANTICOAGULANT (COUMADIN) 2.5 MG tablet       ALLERGIES:  Allergies   Allergen Reactions     Morphine Visual Disturbance and Other (See Comments)     hallicinations       Sulfa Drugs Hives and Nausea and Vomiting     Azithromycin Nausea and Vomiting and Rash     Doxycycline  Rash       FAMILY HISTORY:  Family History   Problem Relation Age of Onset     Snoring Mother      Snoring Father      Sleep Apnea Sister      Coronary Artery Disease Father      Coronary Artery Disease Sister      Coronary Artery Disease Brother      Breast Cancer No family hx of        SOCIAL HISTORY:   Social History     Socioeconomic History     Marital status:      Spouse name: Not on file     Number of children: Not on file     Years of education: Not on file     Highest education level: Not on file   Occupational History     Not on file   Tobacco Use     Smoking status: Never Smoker     Smokeless tobacco: Never Used   Substance and Sexual Activity     Alcohol use: No     Drug use: No     Sexual activity: Not on file   Other Topics Concern     Not on file   Social History Narrative    Lives with sister in [place] due to help with medical issues.   and daughter live in [place].  Plans to return to be with family hopefully soon.  For MS- last used medications about 3 months. In remission. Needs to be back on medications as  Neurologist has left.    Drew Hahn MD  5/29/2018    Daughters Cecille and Maci Molina are my patients.     Social Determinants of Health     Financial Resource Strain:      Difficulty of Paying Living Expenses:    Food Insecurity:      Worried About Running Out of Food in the Last Year:      Ran Out of Food in the Last Year:    Transportation Needs:      Lack of Transportation (Medical):      Lack of Transportation (Non-Medical):    Physical Activity:      Days of Exercise per Week:      Minutes of Exercise per Session:    Stress:      Feeling of Stress :    Social Connections:      Frequency of Communication with Friends and Family:      Frequency of Social Gatherings with Friends and Family:      Attends Buddhism Services:      Active Member of Clubs or Organizations:      Attends Club or Organization Meetings:      Marital Status:    Intimate Partner  Violence:      Fear of Current or Ex-Partner:      Emotionally Abused:      Physically Abused:      Sexually Abused:        VITALS:  /80   Pulse (!) 121   Temp 97.5  F (36.4  C)   Resp 22   Wt 120.2 kg (265 lb)   SpO2 97%   BMI 38.02 kg/m      PHYSICAL EXAM:  Physical Exam  Vitals and nursing note reviewed.   Constitutional:       Appearance: Normal appearance.      Interventions: Nasal cannula in place.   HENT:      Head: Normocephalic and atraumatic.      Right Ear: External ear normal.      Left Ear: External ear normal.      Nose: Nose normal.      Mouth/Throat:      Mouth: Mucous membranes are moist.   Eyes:      Extraocular Movements: Extraocular movements intact.      Conjunctiva/sclera: Conjunctivae normal.      Pupils: Pupils are equal, round, and reactive to light.   Cardiovascular:      Rate and Rhythm: Normal rate and regular rhythm.      Comments: Artificial heart valve present.  Pulmonary:      Effort: Pulmonary effort is normal.      Breath sounds: Normal breath sounds. No wheezing or rales.   Abdominal:      General: Abdomen is flat. There is no distension.      Palpations: Abdomen is soft.      Tenderness: There is no abdominal tenderness. There is no guarding.   Musculoskeletal:         General: Normal range of motion.      Cervical back: Normal range of motion and neck supple.      Right lower leg: No edema.      Left lower leg: No edema.   Lymphadenopathy:      Cervical: No cervical adenopathy.   Skin:     General: Skin is warm and dry.   Neurological:      General: No focal deficit present.      Mental Status: She is alert and oriented to person, place, and time. Mental status is at baseline.      Comments: No gross focal neurologic deficits   Psychiatric:         Mood and Affect: Mood normal.         Behavior: Behavior normal.         Thought Content: Thought content normal.          LAB:  All pertinent labs reviewed and interpreted.  Results for orders placed or performed during  the hospital encounter of 11/03/21   Basic metabolic panel   Result Value Ref Range    Sodium 145 136 - 145 mmol/L    Potassium 3.9 3.5 - 5.0 mmol/L    Chloride 99 98 - 107 mmol/L    Carbon Dioxide (CO2) 32 (H) 22 - 31 mmol/L    Anion Gap 14 5 - 18 mmol/L    Urea Nitrogen 9 8 - 22 mg/dL    Creatinine 0.98 0.60 - 1.10 mg/dL    Calcium 10.9 (H) 8.5 - 10.5 mg/dL    Glucose 140 (H) 70 - 125 mg/dL    GFR Estimate 61 >60 mL/min/1.73m2   Result Value Ref Range    INR 2.79 (H) 0.85 - 1.15   CBC with platelets and differential   Result Value Ref Range    WBC Count 9.8 4.0 - 11.0 10e3/uL    RBC Count 4.22 3.80 - 5.20 10e6/uL    Hemoglobin 13.1 11.7 - 15.7 g/dL    Hematocrit 42.4 35.0 - 47.0 %     (H) 78 - 100 fL    MCH 31.0 26.5 - 33.0 pg    MCHC 30.9 (L) 31.5 - 36.5 g/dL    RDW 13.8 10.0 - 15.0 %    Platelet Count 303 150 - 450 10e3/uL    % Neutrophils 75 %    % Lymphocytes 13 %    % Monocytes 9 %    % Eosinophils 1 %    % Basophils 1 %    % Immature Granulocytes 1 %    NRBCs per 100 WBC 0 <1 /100    Absolute Neutrophils 7.4 1.6 - 8.3 10e3/uL    Absolute Lymphocytes 1.3 0.8 - 5.3 10e3/uL    Absolute Monocytes 0.9 0.0 - 1.3 10e3/uL    Absolute Eosinophils 0.1 0.0 - 0.7 10e3/uL    Absolute Basophils 0.1 0.0 - 0.2 10e3/uL    Absolute Immature Granulocytes 0.1 (H) <=0.0 10e3/uL    Absolute NRBCs 0.0 10e3/uL   UA with Microscopic reflex to Culture    Specimen: Urine, Clean Catch   Result Value Ref Range    Color Urine Yellow Colorless, Straw, Light Yellow, Yellow    Appearance Urine Cloudy (A) Clear    Glucose Urine 30  (A) Negative mg/dL    Bilirubin Urine Negative Negative    Ketones Urine Trace (A) Negative mg/dL    Specific Gravity Urine 1.032 (H) 1.001 - 1.030    Blood Urine >1.0 mg/dL (A) Negative    pH Urine 5.5 5.0 - 7.0    Protein Albumin Urine 300  (A) Negative mg/dL    Urobilinogen Urine 2.0 (A) <2.0 mg/dL    Nitrite Urine Negative Negative    Leukocyte Esterase Urine 500 Bonifacio/uL (A) Negative    Mucus Urine  Present (A) None Seen /LPF    RBC Urine >182 (H) <=2 /HPF    WBC Urine >182 (H) <=5 /HPF    Squamous Epithelials Urine 21 (H) <=1 /HPF       RADIOLOGY:  Reviewed all pertinent imaging. Please see official radiology report.  No orders to display       I, Jose Miguel Tyson, am serving as a scribe to document services personally performed by Dr. Pulliam based on my observation and the provider's statements to me. I, Saurabh Pulliam MD attest that Jose Miguel Tyson is acting in a scribe capacity, has observed my performance of the services and has documented them in accordance with my direction.    Saurabh Pulliam M.D.  Emergency Medicine  [facility] EMERGENCY DEPARTMENT  [facility] [facility] [id number] [phone]  Dept: [phone]       Saurabh Pulliam MD  11/03/21 9394

## 2021-11-03 NOTE — TELEPHONE ENCOUNTER
UTI symptoms started two days ago. Blood in urine. She feels like her bladder is full. She only goes a little bit then sits there for a long time and doesn't empty out. She is on blood thinners as well.  She understands she has to be seen in the ER now.  Daisy Bhatia RN  [facility]      Reason for Disposition    Unable to urinate (or only a few drops) > 4 hours and bladder feels very full (e.g., palpable bladder or strong urge to urinate)    Additional Information    Negative: Shock suspected (e.g., cold/pale/clammy skin, too weak to stand, low BP, rapid pulse)    Negative: Sounds like a life-threatening emergency to the triager    Negative: Urinary catheter, questions about    Negative: Recent back or abdominal injury    Negative: Recent genital injury    Protocols used: URINE - BLOOD IN-A-OH

## 2021-11-04 ENCOUNTER — TELEPHONE (OUTPATIENT)
Dept: ANTICOAGULATION | Facility: CLINIC | Age: 66
End: 2021-11-04

## 2021-11-04 DIAGNOSIS — Z95.2 HEART VALVE REPLACED: Primary | ICD-10-CM

## 2021-11-04 DIAGNOSIS — I48.0 PAROXYSMAL ATRIAL FIBRILLATION (H): ICD-10-CM

## 2021-11-04 LAB — BACTERIA UR CULT: NORMAL

## 2021-11-04 NOTE — TELEPHONE ENCOUNTER
ANTICOAGULATION  MANAGEMENT: Discharge Review    Eileen Donald chart reviewed for anticoagulation continuity of care    Emergency room visit on 11/3/21 for dysuria, urinary frequency, and hematuria.   - d/t hemorrhagic cystitis    Discharge disposition: Home    Results:    Recent labs: (last 7 days)     11/03/21  1244   INR 2.79*     Anticoagulation inpatient management:     not applicable     Anticoagulation discharge instructions:     Warfarin dosing: home regimen continued   Bridging: No   INR goal change: No      Medication changes affecting anticoagulation: Yes:    - Cefdinir (Omnicef) 300mg BID for 10 days, from 11/3-13.  (Known interaction w/ warfarin).   - Phenazopyridine (Pyridium) 200mg 3x per day for 3 days, from 11/3-6.    Additional factors affecting anticoagulation: No    Plan     [facility]    Spoke with Marilee ([phone]) - her ride can bring her on Monday 11/8.    Anticoagulation Calendar updated    Alondra Lora RN

## 2021-11-09 ENCOUNTER — ANTICOAGULATION THERAPY VISIT (OUTPATIENT)
Dept: ANTICOAGULATION | Facility: CLINIC | Age: 66
End: 2021-11-09

## 2021-11-09 ENCOUNTER — OFFICE VISIT (OUTPATIENT)
Dept: FAMILY MEDICINE | Facility: CLINIC | Age: 66
End: 2021-11-09
Payer: COMMERCIAL

## 2021-11-09 VITALS
DIASTOLIC BLOOD PRESSURE: 69 MMHG | BODY MASS INDEX: 37.94 KG/M2 | WEIGHT: 265 LBS | HEART RATE: 88 BPM | HEIGHT: 70 IN | OXYGEN SATURATION: 91 % | SYSTOLIC BLOOD PRESSURE: 102 MMHG

## 2021-11-09 DIAGNOSIS — Z95.2 H/O AORTIC VALVE REPLACEMENT: ICD-10-CM

## 2021-11-09 DIAGNOSIS — J44.1 COPD EXACERBATION (H): ICD-10-CM

## 2021-11-09 DIAGNOSIS — R06.89 HYPERCARBIA: ICD-10-CM

## 2021-11-09 DIAGNOSIS — G35 MULTIPLE SCLEROSIS (H): Primary | ICD-10-CM

## 2021-11-09 DIAGNOSIS — Z95.2 HEART VALVE REPLACED: Primary | ICD-10-CM

## 2021-11-09 DIAGNOSIS — Z23 NEED FOR PROPHYLACTIC VACCINATION AND INOCULATION AGAINST INFLUENZA: ICD-10-CM

## 2021-11-09 DIAGNOSIS — E11.9 TYPE 2 DIABETES MELLITUS WITHOUT COMPLICATION, WITHOUT LONG-TERM CURRENT USE OF INSULIN (H): ICD-10-CM

## 2021-11-09 DIAGNOSIS — I48.0 PAROXYSMAL ATRIAL FIBRILLATION (H): ICD-10-CM

## 2021-11-09 DIAGNOSIS — I26.99 PULMONARY EMBOLISM, OTHER, UNSPECIFIED CHRONICITY, UNSPECIFIED WHETHER ACUTE COR PULMONALE PRESENT (H): ICD-10-CM

## 2021-11-09 DIAGNOSIS — Z23 HIGH PRIORITY FOR 2019-NCOV VACCINE: ICD-10-CM

## 2021-11-09 PROBLEM — M19.90 OSTEOARTHROSIS: Status: ACTIVE | Noted: 2021-11-09

## 2021-11-09 PROBLEM — G47.30 SLEEP APNEA: Status: ACTIVE | Noted: 2021-11-09

## 2021-11-09 LAB
HBA1C MFR BLD: 6.8 % (ref 0–5.6)
INR BLD: 3.5 (ref 0.9–1.1)

## 2021-11-09 PROCEDURE — 90471 IMMUNIZATION ADMIN: CPT | Performed by: FAMILY MEDICINE

## 2021-11-09 PROCEDURE — 85610 PROTHROMBIN TIME: CPT | Performed by: FAMILY MEDICINE

## 2021-11-09 PROCEDURE — 83036 HEMOGLOBIN GLYCOSYLATED A1C: CPT | Performed by: FAMILY MEDICINE

## 2021-11-09 PROCEDURE — 99214 OFFICE O/P EST MOD 30 MIN: CPT | Mod: 25 | Performed by: FAMILY MEDICINE

## 2021-11-09 PROCEDURE — 0004A COVID-19,PF,PFIZER (12+ YRS): CPT | Performed by: FAMILY MEDICINE

## 2021-11-09 PROCEDURE — 91300 COVID-19,PF,PFIZER (12+ YRS): CPT | Performed by: FAMILY MEDICINE

## 2021-11-09 PROCEDURE — 90662 IIV NO PRSV INCREASED AG IM: CPT | Performed by: FAMILY MEDICINE

## 2021-11-09 PROCEDURE — 36416 COLLJ CAPILLARY BLOOD SPEC: CPT | Performed by: FAMILY MEDICINE

## 2021-11-09 PROCEDURE — 36415 COLL VENOUS BLD VENIPUNCTURE: CPT | Performed by: FAMILY MEDICINE

## 2021-11-09 ASSESSMENT — ASTHMA QUESTIONNAIRES
QUESTION_4 LAST FOUR WEEKS HOW OFTEN HAVE YOU USED YOUR RESCUE INHALER OR NEBULIZER MEDICATION (SUCH AS ALBUTEROL): TWO OR THREE TIMES PER WEEK
QUESTION_2 LAST FOUR WEEKS HOW OFTEN HAVE YOU HAD SHORTNESS OF BREATH: THREE TO SIX TIMES A WEEK
QUESTION_3 LAST FOUR WEEKS HOW OFTEN DID YOUR ASTHMA SYMPTOMS (WHEEZING, COUGHING, SHORTNESS OF BREATH, CHEST TIGHTNESS OR PAIN) WAKE YOU UP AT NIGHT OR EARLIER THAN USUAL IN THE MORNING: NOT AT ALL
ACT_TOTALSCORE: 16
QUESTION_1 LAST FOUR WEEKS HOW MUCH OF THE TIME DID YOUR ASTHMA KEEP YOU FROM GETTING AS MUCH DONE AT WORK, SCHOOL OR AT HOME: MOST OF THE TIME
QUESTION_5 LAST FOUR WEEKS HOW WOULD YOU RATE YOUR ASTHMA CONTROL: SOMEWHAT CONTROLLED

## 2021-11-09 ASSESSMENT — MIFFLIN-ST. JEOR: SCORE: 1827.28

## 2021-11-09 NOTE — PROGRESS NOTES
Assessment & Plan     ICD-10-CM    1. Multiple sclerosis (H)  G35 Physiatry Referral   2. COPD exacerbation (H)  J44.1    3. Pulmonary embolism, other, unspecified chronicity, unspecified whether acute cor pulmonale present (H)  I26.99    4. H/O aortic valve replacement  Z95.2 Physiatry Referral   5. Hypercarbia  R06.89 SLEEP EVALUATION & MANAGEMENT REFERRAL - ADULT -   6. Type 2 diabetes mellitus without complication, without long-term current use of insulin (H)  E11.9 Hemoglobin A1c     Albumin Random Urine Quantitative with Creat Ratio   7. High priority for 2019-nCoV vaccine  Z23    8. Need for prophylactic vaccination and inoculation against influenza  Z23      Medication making: Patient is 66-year-old with multiple medical issues who was seen in emergency room for hematuria.  She was treated for UTI.  Review of culture does not show a angel UTI.  Patient does not have persistent symptoms.  Patient is on Coumadin, I wonder if she had bleeding from anticoagulation.  She takes blood thinners for history of pulmonary embolism along with aortic valve replacement.  She follows with Dr. Hall and may be a candidate for DOAC to avoid future events.  For her MS, she follows with [facility] and gets Ocrevus infusion twice a year.  She needs to get a COVID-19 booster and reviewed guidelines and she should be able to get that.  We will check her diabetes numbers today.  She does use a wheelchair and does not walk much because of fear of fall.  This is very cumbersome and we will go ahead and order health physiatry referral to evaluate for an electric scooter.    Reviewed that she was last seen by pulmonology in March and there was a supposed to follow-up with sleep study.  Reviewed/sleep study where a repeat was recommended with Ambien due to poor quality study.  Patient has not heard back from the specialist yet.  I will go ahead and send a message to Dr. Erwin and also make a referral.  [phone]}     MEDICATIONS:   Continue current medications without change  Patient Instructions   1: I have seen you today for follow-up of urinary tract infection from emergency room.  I have reviewed the records and the urine culture DID NOT show a clear UTI.    2: You are currently on Coumadin that requires regular INR check.  If your INR is high, it can cause some bleeding.  Noting that you have had hematuria this is something that needs further monitoring.  I would recommend to talk to your cardiologits, Dr. Hall  if he can switch you to DOAC.  This is a blood thinner where we do not need to check your INR periodically.    3: Regarding a sleep study, I have sent a message to Dr. Matthew Erwin regarding repeat sleep study and I have made a referral    4: I have also made a referral to physical and medical rehab to see if you are a candidate for an electric scooter.    5: I have reviewed COVID-19 vaccine and Ocrevus infusion and it mentions that this can be given 2 weeks before.  You may get your COVID-19 vaccine along with flu shot.    6:, We will check a diabetes number today    7: Continue to use oxygen most of the times.  You should follow with lung doctor (pulmonologist)  in March.        Return in about 3 months (around 2/9/2022) for Routine preventive.    Drew Hahn MD  [facility]    Subjective    Chief Complaint   Patient presents with     Hospital F/U     Pt was at [facility] and urgency room. Today she had a dizzy spell in the AM. Wants to discuss getting a flu shot- has an infusion coming up.      Imm/Inj     COVID-19 VACCINE     Imm/Inj     Flu Shot       Marilee is a 66 year old who presents for the following health issues     HPI: Patient describes dizzy spell as vertigo that she has had in the past but this time it was severe.  She was seen in the emergency room for UTI with hematuria.  She was treated with antibiotics.  She denies any ongoing symptoms.  Would like to get Covid vaccine if  "okay with upcoming infusion of Ocrevus in December    Patient Active Problem List   Diagnosis     Depression     Asthma     Benign essential hypertension     Multiple Sclerosis     Hyperlipidemia     Impaired Glucose Tolerance Test     Dysphagia     Benign Adenomatous Polyp Of The Large Intestine     History of pulmonary embolism     Morbid obesity (H)     Generalized anxiety disorder     Primary osteoarthritis of both hands     Polyarthralgia     Aortic valve replaced     Controlled substance agreement signed     Heart valve replaced     Closed 3-part fracture of proximal humerus with delayed healing, left     Acute on chronic respiratory failure with hypoxia (H)     Pulmonary hypertension (H)     Paroxysmal atrial fibrillation (H)     Dyslipidemia     Dyspnea on exertion     Hypertension     Postoperative anemia due to acute blood loss     Pre-diabetes     Stress hyperglycemia     Other pulmonary embolism without acute cor pulmonale, unspecified chronicity (H)     Chronic obstructive pulmonary disease, unspecified COPD type (H)     Recurrent major depressive disorder, in partial remission (H)     Type 2 diabetes mellitus without complication, without long-term current use of insulin (H)     Acute bacterial conjunctivitis of both eyes     Community acquired pneumonia, unspecified laterality     Osteoarthrosis     S/P AVR (aortic valve replacement)     Sleep apnea       Review of Systems   Constitutional, HEENT, cardiovascular, pulmonary, gi and gu systems are negative, except as otherwise noted.      Objective    /69 (BP Location: Left arm, Patient Position: Sitting, Cuff Size: Adult Large)   Pulse 88   Ht 1.778 m (5' 10\")   Wt 120.2 kg (265 lb)   SpO2 91%   BMI 38.02 kg/m    Body mass index is 38.02 kg/m .  Physical Exam   GENERAL: alert and no distress  NECK: no adenopathy, no asymmetry, masses, or scars and thyroid normal to palpation  RESP: lungs clear to auscultation - no rales, rhonchi or " wheezes  CV: regular rate and rhythm, normal S1 S2, no S3 or S4, no murmur, click or rub, no peripheral edema and peripheral pulses strong  ABDOMEN: soft, nontender, no hepatosplenomegaly, no masses and bowel sounds normal  NEURO: Sitting comfortably in wheelchair and appears to be at her baseline.    No results found for this or any previous visit (from the past 24 hour(s)).

## 2021-11-09 NOTE — PATIENT INSTRUCTIONS
1: I have seen you today for follow-up of urinary tract infection from emergency room.  I have reviewed the records and the urine culture DID NOT show a clear UTI.    2: You are currently on Coumadin that requires regular INR check.  If your INR is high, it can cause some bleeding.  Noting that you have had hematuria this is something that needs further monitoring.  I would recommend to talk to your cardiologits, Dr. Hall  if he can switch you to DOAC.  This is a blood thinner where we do not need to check your INR periodically.    3: Regarding a sleep study, I have sent a message to Dr. Matthew Erwin regarding repeat sleep study and I have made a referral    4: I have also made a referral to physical and medical rehab to see if you are a candidate for an electric scooter.    5: I have reviewed COVID-19 vaccine and Ocrevus infusion and it mentions that this can be given 2 weeks before.  You may get your COVID-19 vaccine along with flu shot.    6:, We will check a diabetes number today    7: Continue to use oxygen most of the times.  You should follow with lung doctor (pulmonologist)  in March.

## 2021-11-09 NOTE — PROGRESS NOTES
Anticoagulation Management    Unable to reach Marilee today.    Today's INR result of 3.5 is supratherapeutic (goal INR of 2.0-3.0).  Result received from: Clinic Lab    Follow up required to discuss out of range INR     Left message to hold warfarin tonight.      Anticoagulation clinic to follow up    Veronica Donaldson RN

## 2021-11-10 ENCOUNTER — TELEPHONE (OUTPATIENT)
Dept: PHYSICAL MEDICINE AND REHAB | Facility: CLINIC | Age: 66
End: 2021-11-10
Payer: MEDICARE

## 2021-11-10 ENCOUNTER — TELEPHONE (OUTPATIENT)
Dept: FAMILY MEDICINE | Facility: CLINIC | Age: 66
End: 2021-11-10
Payer: MEDICARE

## 2021-11-10 DIAGNOSIS — I26.99 OTHER PULMONARY EMBOLISM WITHOUT ACUTE COR PULMONALE, UNSPECIFIED CHRONICITY (H): ICD-10-CM

## 2021-11-10 DIAGNOSIS — Z95.2 S/P AVR (AORTIC VALVE REPLACEMENT): Primary | ICD-10-CM

## 2021-11-10 ASSESSMENT — ASTHMA QUESTIONNAIRES: ACT_TOTALSCORE: 16

## 2021-11-10 NOTE — PROGRESS NOTES
Called patient again next day, still no answer. LVM to return call. Patient was left a message last night to hold her 2.5 mg dose of warfarin. Since she is on cefdinir for suspected UTI (hematuria, ED 11/3/21) and will be finishing around 11/13/21. One-time dose correction is probably enough, though patient was supratherapeutic Oct 13 as well and may need to eventually decrease maintenance dosing. If no other changes or concerns after RN assessment, will recommend next INR in 1-2 weeks.  Cecille FLANNERY RN  Anticoagulation Team

## 2021-11-10 NOTE — TELEPHONE ENCOUNTER
Patient scheduled for colonoscopy 12/23/21. Routing to [email or web address] Pharm D for hold/bridge assessment.     See TE 3/8/21: Warfarin interruption plan for colonoscopy on 03/22/2021.     Aortic Valve Replacement and history of PE, history of post-op afib     In a patient with AVR with additional thrombotic risk factors the 2020 AHA/ACC guideline suggests bridging is reasonable in an individual with the risks of bleeding weighed against the benefits of thromboembolism prevention    Cecille FLANNERY RN  Anticoagulation Team

## 2021-11-10 NOTE — TELEPHONE ENCOUNTER
[facility] Call Center    Phone Message    May a detailed message be left on voicemail: yes     Reason for Call: Appointment Intake    Referring Provider Name: Drew Hahn  Diagnosis and/or Symptoms: Multiple sclerosis (H) [G35]  H/O aortic valve replacement [Z95.2]    Evaluation for an electric scooter    Please review and contact patient for appointment per protocol instructions    Action Taken: Other: PMR    Travel Screening: Not Applicable

## 2021-11-11 NOTE — PROGRESS NOTES
ANTICOAGULATION MANAGEMENT     Eileen Donald 66 year old female is on warfarin with supratherapeutic INR result. (Goal INR 2.0-3.0)    Recent labs: (last 7 days)     11/09/21  1526   INR 3.5*       ASSESSMENT     Source(s): Chart Review, Patient/Caregiver Call and Template       Warfarin doses taken: Warfarin taken as instructed, and patient did not hear VMs so she did not hold her warfarin dose on Tuesday as instructed.     Diet: No new diet changes identified    New illness, injury, or hospitalization: Yes: UTI, symptoms are much improved on abx and almost totally resolved    Medication/supplement changes: finishes a 10 day course of cefdinir in 2 days    Signs or symptoms of bleeding or clotting: No    Previous INR: Therapeutic last 2(+) visits    Additional findings: patient plans to talk with her cardiologist at upcoming visit about switching to a DOAC, she is aware to update [facility]     PLAN     Recommended plan for temporary change(s) affecting INR     Dosing Instructions: Hold dose then continue your current warfarin dose with next INR in 1 week       Summary  As of 11/9/2021    Full warfarin instructions:  11/11: Hold; Otherwise 5 mg every Wed, Sat; 2.5 mg all other days   Next INR check:  11/19/2021             Telephone call with Eileen who verbalizes understanding and agrees to plan    Patient offered & declined to schedule next visit    Education provided: Goal range and significance of current result, Potential interaction between warfarin and abx and Monitoring for bleeding signs and symptoms    Plan made per [facility] anticoagulation protocol    Cierra Escobar RN  Anticoagulation Clinic  11/11/2021    _______________________________________________________________________     Anticoagulation Episode Summary     Current INR goal:  2.0-3.0   TTR:  44.2 % (11.6 mo)   Target end date:  10/6/2022   Send INR reminders to:  [facility]    Indications    Heart valve replaced [Z95.2]  Paroxysmal  atrial fibrillation (H) [I48.0]           Comments:           Anticoagulation Care Providers     Provider Role Specialty Phone number    Tito Salazar MD Referring Family Medicine [phone]

## 2021-11-12 NOTE — TELEPHONE ENCOUNTER
Patient was called and did not want to make a PMR appointment at this time. She was given scheduling number to call when she is ready.

## 2021-11-22 ENCOUNTER — TELEPHONE (OUTPATIENT)
Dept: ANTICOAGULATION | Facility: CLINIC | Age: 66
End: 2021-11-22
Payer: COMMERCIAL

## 2021-11-22 NOTE — TELEPHONE ENCOUNTER
ANTICOAGULATION     Eileen Donald is overdue for INR check.      Spoke with Marilee and scheduled lab appointment on 12/1/21 @ [facility].   - has hectic week due to holiday - thanksgiving.   - has infustion with Cornel Neurologist on 12/1/ @ [facility] from 8am - 12noon.    Alondra Lora RN

## 2021-11-30 DIAGNOSIS — Z11.59 ENCOUNTER FOR SCREENING FOR OTHER VIRAL DISEASES: ICD-10-CM

## 2021-12-01 ENCOUNTER — ANTICOAGULATION THERAPY VISIT (OUTPATIENT)
Dept: ANTICOAGULATION | Facility: CLINIC | Age: 66
End: 2021-12-01

## 2021-12-01 ENCOUNTER — LAB (OUTPATIENT)
Dept: LAB | Facility: CLINIC | Age: 66
End: 2021-12-01
Payer: COMMERCIAL

## 2021-12-01 DIAGNOSIS — I48.0 PAROXYSMAL ATRIAL FIBRILLATION (H): ICD-10-CM

## 2021-12-01 DIAGNOSIS — Z95.2 H/O AORTIC VALVE REPLACEMENT: ICD-10-CM

## 2021-12-01 DIAGNOSIS — Z95.2 HEART VALVE REPLACED: Primary | ICD-10-CM

## 2021-12-01 DIAGNOSIS — I26.99 PULMONARY EMBOLISM, OTHER, UNSPECIFIED CHRONICITY, UNSPECIFIED WHETHER ACUTE COR PULMONALE PRESENT (H): ICD-10-CM

## 2021-12-01 LAB — INR BLD: 2.7 (ref 0.9–1.1)

## 2021-12-01 PROCEDURE — 85610 PROTHROMBIN TIME: CPT

## 2021-12-01 PROCEDURE — 36415 COLL VENOUS BLD VENIPUNCTURE: CPT

## 2021-12-01 NOTE — PROGRESS NOTES
ANTICOAGULATION MANAGEMENT     Eileen Donald 66 year old female is on warfarin with therapeutic INR result. (Goal INR 2.0-3.0)    Recent labs: (last 7 days)     12/01/21  1105   INR 2.7*       ASSESSMENT     Source(s): Chart Review, Patient/Caregiver Call and Template       Warfarin doses taken: Warfarin taken as instructed    Diet: No new diet changes identified    New illness, injury, or hospitalization: No    Medication/supplement changes: None noted    Signs or symptoms of bleeding or clotting: No    Previous INR: Supratherapeutic    Additional findings: Upcoming surgery/procedure colonoscopy 12/23. warfarin hold/bridge plan started in TE 11/10/21     PLAN     Recommended plan for no diet, medication or health factor changes affecting INR     Dosing Instructions: Continue your current warfarin dose with next INR in 2 weeks       Summary  As of 12/1/2021    Full warfarin instructions:  5 mg every Wed, Sat; 2.5 mg all other days   Next INR check:  12/15/2021             Telephone call with Eileen who verbalizes understanding and agrees to plan    Patient elected to schedule next visit 12/14/21.. ~ 1 week prior to colonoscopy    Education provided: Goal range and significance of current result and Importance of following up at instructed interval    Plan made per [facility] anticoagulation protocol    Vernoica Donaldson RN  Anticoagulation Clinic  12/1/2021    _______________________________________________________________________     Anticoagulation Episode Summary     Current INR goal:  2.0-3.0   TTR:  40.6 % (11.7 mo)   Target end date:  10/6/2022   Send INR reminders to:  [facility]    Indications    Heart valve replaced [Z95.2]  Paroxysmal atrial fibrillation (H) [I48.0]           Comments:           Anticoagulation Care Providers     Provider Role Specialty Phone number    Tito Salazar MD Referring Family Medicine [phone]

## 2021-12-09 NOTE — TELEPHONE ENCOUNTER
JOHNN[facility]-PROCEDURAL ANTICOAGULATION  MANAGEMENT    ASSESSMENT     Warfarin interruption plan for Colonoscopy on 12/23/21.    Indication for Anticoagulation: Mechanical AVR ([place] Jed 1/2017) and PE      Held and bridged 3/2021 colonoscopy; advised to bridge for 10/2021 colonoscopy which was postponed due to recent hospitalization    AVR: 2020 AHA/ACC Management of Valvular Heart disease guidelines suggests bridging is reasonable on individual basis with risk of bleeding weighed against risks of clotting for mechanical AVR patients with risk factors for thrombosis (Afib, previous thromboembolism, hypercoagulable condition, LV systolic dysfunction, older generation valves, or > 1 valve)     RECOMMENDATION       Clarify goal range- reduced to 2-3 in anticoagulation episode on 10/8/21; no documented reason. Historically managed 2.5-3.5. Recommended goal for patient with AVR plus thrombotic risk factors 2.5-3.5 per ACC/AHA valvular heart guideline      Pre-Procedure:  o Hold warfarin for 4 days, until after procedure starting: Sunday 12/19  o Enoxaparin (Lovenox) 120 mg subq Q 12 hrs (1 mg/kg Q 12 hrs for CrCl >= 60 ml/min and BMI <= 40 kg/m2)   - Start enoxaparin: Tue 12/21 AM  - Last dose of enoxaparin prior to procedure: Wed 12/22 AM  (~24 hours prior to procedure)      Post-Procedure:  o Resume warfarin dose if okay with provider doing procedure on night of procedure, 12/23 PM: 5 mg x 1 then resume home dose  o Resume  enoxaparin (Lovenox) ~ 24 hrs post procedure when okay with provider doing procedure. Call clinic prior to restarting if polyps removed. Continue until INR therapeutic per protocol  o Recheck INR 4-6 days after resuming warfarin   ?    Plan routed to referring provider for approval  ?   Renee Wilks, [facility]    SUBJECTIVE/OBJECTIVE     Eileen Donald, a 66 year old female    Goal INR Range: 2.0-3.0     Patient bridged in past: Yes    Wt Readings from Last 3 Encounters:   11/09/21 120.2 kg (265 lb)   11/03/21  "120.2 kg (265 lb)   10/13/21 119.5 kg (263 lb 6.4 oz)      Ideal body weight: 68.5 kg (151 lb 0.2 oz)  Adjusted ideal body weight: 89.2 kg (196 lb 9.7 oz)     Estimated body mass index is 38.02 kg/m  as calculated from the following:    Height as of 11/9/21: 1.778 m (5' 10\").    Weight as of 11/9/21: 120.2 kg (265 lb).    Lab Results   Component Value Date    INR 2.7 (H) 12/01/2021    INR 3.5 (H) 11/09/2021    INR 2.79 (H) 11/03/2021     Lab Results   Component Value Date    HGB 13.1 11/03/2021    HCT 42.4 11/03/2021     11/03/2021     Lab Results   Component Value Date    CR 0.98 11/03/2021    CR 1.08 10/13/2021    CR 0.88 09/30/2021     CrCl:  80 ml/min using adjusted weight 89 kg, creatinine 0.98  "

## 2021-12-14 ENCOUNTER — DOCUMENTATION ONLY (OUTPATIENT)
Dept: ANTICOAGULATION | Facility: CLINIC | Age: 66
End: 2021-12-14

## 2021-12-14 ENCOUNTER — TELEPHONE (OUTPATIENT)
Dept: FAMILY MEDICINE | Facility: CLINIC | Age: 66
End: 2021-12-14

## 2021-12-14 ENCOUNTER — LAB (OUTPATIENT)
Dept: LAB | Facility: CLINIC | Age: 66
End: 2021-12-14
Payer: COMMERCIAL

## 2021-12-14 DIAGNOSIS — Z95.2 HEART VALVE REPLACED: ICD-10-CM

## 2021-12-14 DIAGNOSIS — Z95.2 H/O AORTIC VALVE REPLACEMENT: ICD-10-CM

## 2021-12-14 DIAGNOSIS — I26.99 OTHER PULMONARY EMBOLISM WITHOUT ACUTE COR PULMONALE, UNSPECIFIED CHRONICITY (H): ICD-10-CM

## 2021-12-14 DIAGNOSIS — I26.99 PULMONARY EMBOLISM, OTHER, UNSPECIFIED CHRONICITY, UNSPECIFIED WHETHER ACUTE COR PULMONALE PRESENT (H): ICD-10-CM

## 2021-12-14 DIAGNOSIS — Z95.2 S/P AVR (AORTIC VALVE REPLACEMENT): ICD-10-CM

## 2021-12-14 DIAGNOSIS — I48.0 PAROXYSMAL ATRIAL FIBRILLATION (H): ICD-10-CM

## 2021-12-14 DIAGNOSIS — I48.0 PAROXYSMAL ATRIAL FIBRILLATION (H): Primary | ICD-10-CM

## 2021-12-14 DIAGNOSIS — Z95.2 HEART VALVE REPLACED: Primary | ICD-10-CM

## 2021-12-14 LAB — INR BLD: 2.9 (ref 0.9–1.1)

## 2021-12-14 PROCEDURE — 36416 COLLJ CAPILLARY BLOOD SPEC: CPT

## 2021-12-14 PROCEDURE — 85610 PROTHROMBIN TIME: CPT

## 2021-12-14 NOTE — PROGRESS NOTES
ANTICOAGULATION MANAGEMENT      Eileen Donald due for annual renewal of referral to anticoagulation monitoring. Order pended for your review and signature.      ANTICOAGULATION SUMMARY      Warfarin indication(s)     Atrial fibrillation    Heart valve present?  bicuspid avr [place] 2017       Current goal range   INR: 2.5-3.5     Goal appropriate for indication? Yes, INR 2.5-3.5 appropriate for hx  Mechanical MVR, Mechanical AVR with risk factors or older generation (Ping-Shiley (Tilting disc), Cerrato-Gallardo (Caged Ball) or Monoleaflet valve)     Current duration of therapy Indefinite/long term therapy   Time in Therapeutic Range (TTR)  (Goal > 60%) 40.6%       Office visit with referring provider's group within last year yes on 11/9/21       Dany Cramer RN

## 2021-12-14 NOTE — TELEPHONE ENCOUNTER
Renee,    I agree to INR goal 2-2.5-3.5.  Is patient a candidate for oral anticoagulation?  Perhaps it can be discussed.    Drew Hahn MD

## 2021-12-14 NOTE — TELEPHONE ENCOUNTER
Thank you Dr. Hahn,    Unfortunately she's not a candidate for DOAC at this time due to the mechanical AVR    Updated Anticoagulation referral order sent for cosign with goal range 2.5-3.5    Renee Wilks, [facility]

## 2021-12-14 NOTE — TELEPHONE ENCOUNTER
"ANTICOAGULATION MANAGEMENT     Eileen Donald 66 year old female is on warfarin with therapeutic INR result. (Goal INR  2.5 - 3.5).    Recent labs: (last 7 days)     12/14/21  1242   INR 2.9*       ASSESSMENT     Source(s): Chart Review and Patient/Caregiver Call       Warfarin doses taken: Warfarin taken as instructed    Diet: No new diet changes identified    New illness, injury, or hospitalization: Yes:    Reported a \"FALL and bruised her left hip and butt.\"    Medication/supplement changes: None noted    Signs or symptoms of bleeding or clotting: No    Previous INR: Therapeutic last 2(+) visits    Additional findings: Upcoming surgery/procedure  Yes.  scheduled Colonoscopy on 12/23/21.    - see outlined warfarin / bridge plan as outlined by Renee Wilks, [facility] on 12/9/21 and gave all information to Marilee.  - Future order in place for Covid-19 testing prior to procedure.  - Preop on 12/20/21 with Dr. Hahn.       PLAN     Recommended plan for temporary change(s) affecting INR     Dosing Instructions:    Continue your current warfarin dose with next INR in 1 week after resuming warfarin.      Summary  As of 12/14/2021    Full warfarin instructions:  12/19: Hold; 12/20: Hold; 12/21: Hold; 12/22: Hold; Otherwise 5 mg every Wed, Sat; 2.5 mg all other days   Next INR check:  12/28/2021             Telephone call with  Marilee ([phone]) who verbalizes understanding and agrees to plan.   - she is familiar and comfortable in administering Lovenox injections.   - advised to call back with any questions.    Patient elected to schedule next visit 4-6 days after resuming warfarin therapy.    - her  has [facility] Appt on 1/3-4.   - she will call and schedule INR 4-6 days after resuming warfarin.  (she will need to find a ride).    Education provided: Importance of consistent vitamin K intake and Goal range and significance of current result    Plan made per [facility] anticoagulation protocol    Alondra Lora, " RN  Anticoagulation Clinic  12/14/2021    _______________________________________________________________________     Anticoagulation Episode Summary     Current INR goal:  2.0-3.0   TTR:  40.6 % (11.7 mo)   Target end date:  10/6/2022   Send INR reminders to:  [facility]    Indications    Heart valve replaced [Z95.2]  Paroxysmal atrial fibrillation (H) [I48.0]           Comments:           Anticoagulation Care Providers     Provider Role Specialty Phone number    Tito Salazar MD Referring Family Medicine [phone]

## 2021-12-14 NOTE — TELEPHONE ENCOUNTER
Central Scheduling left a message regarding this patient. Pateint would like a call back regarding Lovenox/Bridging questions.  Please give her a call when able.  Thank you.  Cornelia Greco, RN  Anticoagulation Nurse - [facility], [place]

## 2021-12-15 ENCOUNTER — ANTICOAGULATION THERAPY VISIT (OUTPATIENT)
Dept: ANTICOAGULATION | Facility: CLINIC | Age: 66
End: 2021-12-15
Payer: COMMERCIAL

## 2021-12-15 PROBLEM — Z95.2 H/O AORTIC VALVE REPLACEMENT: Status: ACTIVE | Noted: 2021-12-15

## 2021-12-20 ENCOUNTER — LAB (OUTPATIENT)
Dept: LAB | Facility: CLINIC | Age: 66
End: 2021-12-20
Attending: INTERNAL MEDICINE

## 2021-12-20 ENCOUNTER — OFFICE VISIT (OUTPATIENT)
Dept: FAMILY MEDICINE | Facility: CLINIC | Age: 66
End: 2021-12-20
Payer: COMMERCIAL

## 2021-12-20 VITALS
WEIGHT: 261 LBS | HEIGHT: 70 IN | SYSTOLIC BLOOD PRESSURE: 125 MMHG | HEART RATE: 85 BPM | OXYGEN SATURATION: 96 % | DIASTOLIC BLOOD PRESSURE: 97 MMHG | BODY MASS INDEX: 37.37 KG/M2

## 2021-12-20 DIAGNOSIS — Z11.59 ENCOUNTER FOR SCREENING FOR OTHER VIRAL DISEASES: ICD-10-CM

## 2021-12-20 DIAGNOSIS — E66.01 MORBID OBESITY (H): ICD-10-CM

## 2021-12-20 DIAGNOSIS — I26.99 PULMONARY EMBOLISM, OTHER, UNSPECIFIED CHRONICITY, UNSPECIFIED WHETHER ACUTE COR PULMONALE PRESENT (H): ICD-10-CM

## 2021-12-20 DIAGNOSIS — Z95.2 AORTIC VALVE REPLACED: ICD-10-CM

## 2021-12-20 DIAGNOSIS — I48.0 PAROXYSMAL ATRIAL FIBRILLATION (H): ICD-10-CM

## 2021-12-20 DIAGNOSIS — G35 MULTIPLE SCLEROSIS (H): ICD-10-CM

## 2021-12-20 DIAGNOSIS — E11.9 TYPE 2 DIABETES MELLITUS WITHOUT COMPLICATION, WITHOUT LONG-TERM CURRENT USE OF INSULIN (H): ICD-10-CM

## 2021-12-20 DIAGNOSIS — Z01.818 PREOP GENERAL PHYSICAL EXAM: Primary | ICD-10-CM

## 2021-12-20 PROCEDURE — U0003 INFECTIOUS AGENT DETECTION BY NUCLEIC ACID (DNA OR RNA); SEVERE ACUTE RESPIRATORY SYNDROME CORONAVIRUS 2 (SARS-COV-2) (CORONAVIRUS DISEASE [COVID-19]), AMPLIFIED PROBE TECHNIQUE, MAKING USE OF HIGH THROUGHPUT TECHNOLOGIES AS DESCRIBED BY CMS-2020-01-R: HCPCS

## 2021-12-20 PROCEDURE — U0005 INFEC AGEN DETEC AMPLI PROBE: HCPCS

## 2021-12-20 PROCEDURE — 99214 OFFICE O/P EST MOD 30 MIN: CPT | Performed by: FAMILY MEDICINE

## 2021-12-20 ASSESSMENT — MIFFLIN-ST. JEOR: SCORE: 1809.14

## 2021-12-20 NOTE — PATIENT INSTRUCTIONS
Preparing for Your Surgery  Getting started  A nurse will call you to review your health history and instructions. They will give you an arrival time based on your scheduled surgery time.  Please be ready to share the following:    Your doctor's clinic name and phone number    Your medical, surgical and anesthesia history    A list of allergies and sensitivities    A list of medicines, including herbal treatments and over-the-counter drugs    Whether the patient has a legal guardian (ask how to send us the papers in advance)  If you have a child who's having surgery, please ask for a copy of Preparing for Your Child's Surgery.    Preparing for surgery    Within 30 days of surgery: Have a pre-op exam (sometimes called an H&P, or History and Physical). This can be done at a clinic or pre-operative center.  ? If you're having a , you may not need this exam. Talk to your care team    At your pre-op exam, talk to your care team about all medicines you take. If you need to stop any medicines before surgery, ask when to start taking them again.  ? We do this for your safety. Many medicines can make you bleed too much during surgery. Some change how well surgery (anesthesia) drugs work.    Call your insurance company to let them know you're having surgery. (If you don't have insurance, call [phone].)    Call your clinic if there's any change in your health. This includes signs of a cold or flu (sore throat, runny nose, cough, rash, fever). It also includes a scrape or scratch near the surgery site.    If you have questions on the day of surgery, call your hospital or surgery center.  Eating and drinking guidelines  For your safety: Unless your surgeon tells you otherwise, follow the guidelines below.    Eat and drink as usual until 8 hours before surgery. After that, no food or milk.    Drink clear liquids until 2 hours before surgery. These are liquids you can see through, like water, Gatorade and Propel  Water. You may also have black coffee and tea (no cream or milk).    Nothing by mouth within 2 hours of surgery. This includes gum, candy and breath mints.    If you drink, stop drinking alcohol the night before surgery.    If your care team tells you to take medicine on the morning of surgery, it's okay to take it with a sip of water.  Preventing infection    Shower or bathe the night before and morning of your surgery. Follow the instructions your clinic gave you. (If no instructions, use regular soap.)    Don't shave or clip hair near your surgery site. We'll remove the hair if needed.    Don't smoke or vape the morning of surgery. You may chew nicotine gum up to 2 hours before surgery. A nicotine patch is okay.  ? Note: Some surgeries require you to completely quit smoking and nicotine. Check with your surgeon.    Your care team will make every effort to keep you safe from infection. We will:  ? Clean our hands often with soap and water (or an alcohol-based hand rub).  ? Clean the skin at your surgery site with a special soap that kills germs.  ? Give you a special gown to keep you warm. (Cold raises the risk of infection.)  ? Wear special hair covers, masks, gowns and gloves during surgery.  ? Give antibiotic medicine, if prescribed. Not all surgeries need antibiotics.  What to bring on the day of surgery    Photo ID and insurance card    Copy of your health care directive, if you have one    Glasses and hearing aides (bring cases)  ? You can't wear contacts during surgery    Inhaler and eye drops, if you use them (tell us about these when you arrive)    CPAP machine or breathing device, if you use them    A few personal items, if spending the night    If you have . . .  ? A pacemaker or ICD (cardiac defibrillator): Bring the ID card.  ? An implanted stimulator: Bring the remote control.  ? A legal guardian: Bring a copy of the certified (court-stamped) guardianship papers.  Please remove any jewelry, including  body piercings. Leave jewelry and other valuables at home.  If you're going home the day of surgery  Important: If you don't follow the rules below, we must cancel your surgery.     Arrange for someone to drive you home after surgery. You may not drive, take a taxi or take public transportation by yourself (unless you'll have local anesthesia only).    Arrange for a responsible adult to stay with you overnight. If you don't, we may keep you in the hospital overnight, and you may need to pay the costs yourself.  Questions?   If you have any questions for your care team, list them here: _________________________________________________________________________________________________________________________________________________________________________________________________________________________________________________________________________________________________________________________  For informational purposes only. Not to replace the advice of your health care provider. Copyright   2003, 2019 [facility]. All rights reserved. Clinically reviewed by Josie Helton MD. [employer] 497471 - REV 4/20.    How to Take Your Medication Before Surgery  - Take all of your medications before surgery except as noted below  - HOLD (do not take) your LASIX (FUROSEMIDE) on the morning of surgery.   - HOLD (do not take) your METFORMIN on the morning of surgery.  - STOP taking all vitamins and herbal supplements 14 days before surgery.       Recommended goal for patient with AVR plus thrombotic risk factors 2.5-3.5 per ACC/AHA valvular heart guideline       Pre-Procedure:  ? Hold warfarin for 4 days, until after procedure starting: Sunday 12/19  ? Enoxaparin (Lovenox) 120 mg subq Q 12 hrs (1 mg/kg Q 12 hrs for CrCl >= 60 ml/min and BMI <= 40 kg/m2)     Start enoxaparin: Tue 12/21 AM    Last dose of enoxaparin prior to procedure: Wed 12/22 AM  (~24 hours prior to procedure)       Post-Procedure:  ? Resume warfarin  dose if okay with provider doing procedure on night of procedure, 12/23 PM: 5 mg x 1 then resume home dose  ? Resume  enoxaparin (Lovenox) ~ 24 hrs post procedure when okay with provider doing procedure. Call clinic prior to restarting if polyps removed. Continue until INR therapeutic per protocol  ? Recheck INR 4-6 days after resuming warfarin   ?

## 2021-12-20 NOTE — H&P (VIEW-ONLY)
[facility]  [address] [facility] [address]  Phone: [phone]  Fax: [phone]  Primary Provider: Magnolia Hahn  Pre-op Performing Provider: MAGNOLIA HAHN      PREOPERATIVE EVALUATION:  Today's date: 12/20/2021    Eileen Donald is a 66 year old female who presents for a preoperative evaluation.    Surgical Information:  Surgery/Procedure: Colonoscopy  Surgery Location: [facility] OR   Surgeon: Dr. Suarez   Surgery Date: 12/23/2021  Time of Surgery: N/A  Where patient plans to recover: At home with family  Fax number for surgical facility: Note does not need to be faxed, will be available electronically in Epic.    Type of Anesthesia Anticipated: Choice    Assessment & Plan     ICD-10-CM    1. Preop general physical exam  Z01.818    2. Pulmonary embolism, other, unspecified chronicity, unspecified whether acute cor pulmonale present (H)  I26.99    3. Multiple Sclerosis  G35    4. Type 2 diabetes mellitus without complication, without long-term current use of insulin (H)  E11.9    5. Aortic valve replaced  Z95.2    6. Morbid obesity (H)  E66.01    7. Paroxysmal atrial fibrillation (H)  I48.0      Medical decision making: Patient is here for colonoscopy.  She is on coagulation for aortic valve replacement and atrial fibrillation.  She has  AVR with a 23 mm [employer] mechanical prosthesis in 01/2017.  Her other main medical issues are MS for which she is on Ocrevus infusion 6 monthly and follows with neurology.  No acute concerns.  She did have a fall in the tub last week without any other deficit.  She is not sure if she was tired or fatigued.  No further episodes since then.      The proposed surgical procedure is considered LOW risk.    The following guidelines were recommended by anticoagulation team and I agree and these are also shared with patient    Recommended goal for patient with AVR plus thrombotic risk factors 2.5-3.5 per ACC/AHA valvular  heart guideline       Pre-Procedure:  ? Hold warfarin for 4 days, until after procedure starting: Sunday 12/19  ? Enoxaparin (Lovenox) 120 mg subq Q 12 hrs (1 mg/kg Q 12 hrs for CrCl >= 60 ml/min and BMI <= 40 kg/m2)     Start enoxaparin: Tue 12/21 AM    Last dose of enoxaparin prior to procedure: Wed 12/22 AM  (~24 hours prior to procedure)       Post-Procedure:  ? Resume warfarin dose if okay with provider doing procedure on night of procedure, 12/23 PM: 5 mg x 1 then resume home dose  ? Resume  enoxaparin (Lovenox) ~ 24 hrs post procedure when okay with provider doing procedure. Call clinic prior to restarting if polyps removed. Continue until INR therapeutic per protocol  ? Recheck INR 4-6 days after resuming warfarin   ?      Possible Sleep Apnea: Patient has sleep apnea and is on continuous oxygen at home. {     Implanted Device:   - Type of device: [employer] Patient advised to bring device information on day of surgery.      Risks and Recommendations:  The patient has the following additional risks and recommendations for perioperative complications:   - No identified additional risk factors other than previously addressed    Medication Instructions:  Patient is to take all scheduled medications on the day of surgery EXCEPT for modifications listed below:   - warfarin: Thromboembolic risk is moderate (4-10%/year). HOLD warfarin 5 days. Thromboembolic risk is moderate (4-10%/year). HOLD warfarin 5 days.   - Bridging therapy ordered      - Diuretics: HOLD on the day of surgery.   - metformin: HOLD day of surgery.      RECOMMENDATION:  APPROVAL GIVEN to proceed with proposed procedure, without further diagnostic evaluation.    Subjective   Chief Complaint   Patient presents with     Pre-Op Exam     DOS 12/23/2021, Colonoscopy, with Dr. Suarez at         HPI related to upcoming procedure: Patient is deemed high risk and needs preop clearance for colonoscopy.    Preop Questions 12/20/2021   1. Have you ever had a  heart attack or stroke? UNKNOWN - None   2. Have you ever had surgery on your heart or blood vessels, such as a stent placement, a coronary artery bypass, or surgery on an artery in your head, neck, heart, or legs? YES - HEART VALVE     3. Do you have chest pain with activity? No   4. Do you have a history of  heart failure? No   5. Do you currently have a cold, bronchitis or symptoms of other infection? No   6. Do you have a cough, shortness of breath, or wheezing? YES -  Chronic SOB   7. Do you or anyone in your family have previous history of blood clots? YES - Personal h/o PE and DVT- 1999 ( was on OCP)   8. Do you or does anyone in your family have a serious bleeding problem such as prolonged bleeding following surgeries or cuts? No   9. Have you ever had problems with anemia or been told to take iron pills? No   10. Have you had any abnormal blood loss such as black, tarry or bloody stools, or abnormal vaginal bleeding? No   11. Have you ever had a blood transfusion? No   12. Are you willing to have a blood transfusion if it is medically needed before, during, or after your surgery? Yes   13. Have you or any of your relatives ever had problems with anesthesia? YES - PONV, CONFUSION   14. Do you have sleep apnea, excessive snoring or daytime drowsiness? UNKNOWN - ON oxygen , was on CPAP in past but subsequently was told she did not have sleep apnea.   15. Do you have any artifical heart valves or other implanted medical devices like a pacemaker, defibrillator, or continuous glucose monitor? YES - post AVR with a 23 mm [employer] mechanical prosthesis in 01/2017.     15a. What type of device do you have? Aorta valve replaced [facility]   15b. Name of the clinic that manages your device:  [facility]   16. Do you have artificial joints? No   17. Are you allergic to latex? YES:        Health Care Directive:  Patient does not have a Health Care Directive or Living Will: Discussed advance care planning with  patient; however, patient declined at this time.    Preoperative Review of :   reviewed - controlled substances reflected in medication list.      Status of Chronic Conditions:  DEPRESSION - Patient has a long history of Depression of moderate severity requiring medication for control with recent symptoms being stable..Current symptoms of depression include depressed mood.     DIABETES - Patient has a longstanding history of DiabetesType Type II . Patient is being treated with oral agents and denies significant side effects. Control has been good. Complicating factors include but are not limited to: hypertension and hyperlipidemia.     SLEEP PROBLEM - Patient has a longstanding history of sleep issues.  She informs me that she is on nighttime oxygen...      Review of Systems  Constitutional, neuro, ENT, endocrine, pulmonary, cardiac, gastrointestinal, genitourinary, musculoskeletal, integument and psychiatric systems are negative, except as otherwise noted.    Patient Active Problem List    Diagnosis Date Noted     H/O aortic valve replacement 12/15/2021     Priority: Medium     Osteoarthrosis 11/09/2021     Priority: Medium     Sleep apnea 11/09/2021     Priority: Medium     Acute bacterial conjunctivitis of both eyes 09/28/2021     Priority: Medium     Community acquired pneumonia, unspecified laterality 09/28/2021     Priority: Medium     Type 2 diabetes mellitus without complication, without long-term current use of insulin (H) 07/21/2021     Priority: Medium     Dyslipidemia 02/01/2021     Priority: Medium     Dyspnea on exertion 02/01/2021     Priority: Medium     Pre-diabetes 02/01/2021     Priority: Medium     Other pulmonary embolism without acute cor pulmonale, unspecified chronicity (H) 02/01/2021     Priority: Medium     Chronic obstructive pulmonary disease, unspecified COPD type (H) 02/01/2021     Priority: Medium     Recurrent major depressive disorder, in partial remission (H) 02/01/2021      Priority: Medium     Multiple Sclerosis      Priority: Medium     Created by Conversion         Paroxysmal atrial fibrillation (H) 09/17/2019     Priority: Medium     Benign essential hypertension      Priority: Medium     Created by Conversion  Replacement Utility updated for latest IMO load         Acute on chronic respiratory failure with hypoxia (H)      Priority: Medium     Pulmonary hypertension (H)      Priority: Medium     Closed 3-part fracture of proximal humerus with delayed healing, left 04/10/2019     Priority: Medium     Heart valve replaced 06/26/2018     Priority: Medium     Controlled substance agreement signed 05/29/2018     Priority: Medium     Aortic valve replaced 02/15/2017     Priority: Medium     Hypertension 01/24/2017     Priority: Medium     Postoperative anemia due to acute blood loss 01/24/2017     Priority: Medium     Stress hyperglycemia 01/24/2017     Priority: Medium     S/P AVR (aortic valve replacement) 01/24/2017     Priority: Medium     Polyarthralgia 11/30/2016     Priority: Medium     Primary osteoarthritis of both hands 08/31/2016     Priority: Medium     Generalized anxiety disorder 06/15/2016     Priority: Medium     Asthma      Priority: Medium     Created by Conversion         Dysphagia      Priority: Medium     Created by Conversion         Morbid obesity (H) 10/29/2015     Priority: Medium     History of pulmonary embolism 10/30/2014     Priority: Medium     Depression      Priority: Medium     Created by Conversion         Hyperlipidemia      Priority: Medium     Created by Conversion         Impaired Glucose Tolerance Test      Priority: Medium     Created by Conversion         Benign Adenomatous Polyp Of The Large Intestine      Priority: Medium     Created by Conversion          Past Medical History:   Diagnosis Date     Anxiety      Anxiety      Aortic valve replaced 2/15/2017     Aortic valve stenosis 01/24/2017     Asthma      Asthma     Created by Conversion       Chronic shortness of breath      Coronary artery disease      Depression      Diabetes mellitus (H)     borderline     Essential hypertension     Created by Conversion  Replacement Utility updated for latest IMO load     History of blood clots     PE     History of pulmonary embolism      HTN (hypertension)      Multiple sclerosis (H)      Multiple sclerosis (H)     Created by Conversion      Obesity      Obesity 10/29/2015     Osteoarthritis      Panic attacks      PONV (postoperative nausea and vomiting)      Pre-diabetes      Pulmonary embolism (H)      Sleep apnea      Past Surgical History:   Procedure Laterality Date     aortic valve surgery       C RECONSTR TOTAL SHOULDER IMPLANT Left 4/10/2019    Procedure: LEFT REVERSE TOTAL SHOULDER ARTHROPLASTY;  Surgeon: Luis Antonio Telles MD;  Location: [facility];  Service: Orthopedics     COLONOSCOPY W/ BIOPSIES N/A 3/22/2021    Procedure: COLONOSCOPY WITH BIOPSY AND POLYPECTOMY;  Surgeon: Sam Weems MD;  Location: [facility] OR;  Service: Gastroenterology     IR LUMBAR EPIDURAL STEROID INJECTION  1/16/2007     MAMMOPLASTY REDUCTION  1994     OTHER SURGICAL HISTORY  01/24/2017    mechanical aortic prosthesis     RELEASE CARPAL TUNNEL       Current Outpatient Medications   Medication Sig Dispense Refill     acetaminophen (TYLENOL) 325 MG tablet Take 325-650 mg by mouth every 6 hours as needed        acyclovir (ZOVIRAX) 400 MG tablet Take 400 mg by mouth 2 times daily        albuterol (PROVENTIL HFA) 108 (90 Base) MCG/ACT inhaler Inhale 2 puffs into the lungs every 6 hours as needed        aspirin (ASA) 81 MG chewable tablet Take 81 mg by mouth daily        budesonide-formoterol (SYMBICORT) 160-4.5 MCG/ACT Inhaler Inhale 2 puffs into the lungs 2 times daily        buPROPion (WELLBUTRIN XL) 150 MG 24 hr tablet Take 1 tablet by mouth daily       calcium carbonate 1250 (500 Ca) MG CHEW Take 1 tablet by mouth daily        enoxaparin ANTICOAGULANT (LOVENOX)  120 MG/0.8ML syringe Inject 0.8 mLs (120 mg) Subcutaneous every 12 hours Before and after procedure as directed 12.8 mL 0     esomeprazole (NEXIUM) 20 MG DR capsule Take 20 mg by mouth every morning (before breakfast)        furosemide (LASIX) 40 MG tablet Take 60 mg by mouth daily        INCRUSE ELLIPTA 62.5 MCG/INH Inhaler Inhale 1 puff into the lungs daily        lisinopril (ZESTRIL) 5 MG tablet Take 5 mg by mouth daily        loratadine (CLARITIN) 10 MG tablet Take 1 tablet (10 mg) by mouth daily 90 tablet 3     LORazepam (ATIVAN) 0.5 MG tablet Take 1 tablet (0.5 mg) by mouth every 8 hours as needed for anxiety 20 tablet 0     magnesium oxide (MAG-OX) 400 MG tablet Take 400 mg by mouth daily        metFORMIN (GLUCOPHAGE-XR) 500 MG 24 hr tablet Take 1 tablet by mouth daily       metoprolol tartrate (LOPRESSOR) 25 MG tablet Take 25 mg by mouth 2 times daily        montelukast (SINGULAIR) 10 MG tablet Take 10 mg by mouth At Bedtime        potassium chloride ER (MICRO-K) 10 MEQ CR capsule Take 10 mEq by mouth daily        simvastatin (ZOCOR) 20 MG tablet Take 20 mg by mouth At Bedtime        umeclidinium (INCRUSE ELLIPTA) 62.5 MCG/INH inhaler Inhale 1 puff into the lungs daily 3 each 3     venlafaxine (EFFEXOR-XR) 75 MG 24 hr capsule TAKE 3 CAPSULES BY MOUTH EVERY DAY       warfarin ANTICOAGULANT (COUMADIN) 2.5 MG tablet TAKE 1 TO 2 TABLETS DAILY AS DIRECTED, ADJUST DOSE BASED ON INR RESULTS (Patient taking differently: Take 5mg on Saturday and Wednesday and take 2.5mg all other days of week) 135 tablet 1       Allergies   Allergen Reactions     Morphine Visual Disturbance and Other (See Comments)     hallicinations       Sulfa Drugs Hives and Nausea and Vomiting     Azithromycin Nausea and Vomiting and Rash     Doxycycline Rash        Social History     Tobacco Use     Smoking status: Never Smoker     Smokeless tobacco: Never Used   Substance Use Topics     Alcohol use: No     Family History   Problem Relation Age  "of Onset     Snoring Mother      Snoring Father      Sleep Apnea Sister      Coronary Artery Disease Father      Coronary Artery Disease Sister      Coronary Artery Disease Brother      Breast Cancer No family hx of      History   Drug Use No         Objective     BP (!) 125/97 (BP Location: Left arm, Patient Position: Sitting, Cuff Size: Adult Large)   Pulse 85   Ht 1.778 m (5' 10\")   Wt 118.4 kg (261 lb)   SpO2 96%   BMI 37.45 kg/m      Physical Exam  GENERAL APPEARANCE: healthy, alert and no distress  HENT: ear canals and TM's normal and nose and mouth without ulcers or lesions  RESP: lungs clear to auscultation - no rales, rhonchi or wheezes  CV: regular rate and rhythm, normal S1 S2, no S3 or S4 and no murmur, click or rub   ABDOMEN: soft, nontender, no HSM or masses and bowel sounds normal  NEURO: Normal strength and tone, sensory exam grossly normal, mentation intact and speech normal    Recent Labs   Lab Test 12/14/21  1242 12/01/21  1105 11/09/21  1526 11/03/21  1244 10/13/21  1339 10/01/21  0745 09/30/21  0653 09/01/21  1534 08/16/21  1318   HGB  --   --   --  13.1  --   --  11.6*   < >  --    PLT  --   --   --  303  --   --  332   < >  --    INR 2.9* 2.7* 3.5* 2.79* 3.5*   < > 3.54*   < > 4.8*   NA  --   --   --  145 141   < > 147*   < >  --    POTASSIUM  --   --   --  3.9 4.0  --  4.2   < >  --    CR  --   --   --  0.98 1.08  --  0.88   < >  --    A1C  --   --  6.8*  --   --   --   --   --  6.9*    < > = values in this interval not displayed.        Diagnostics:  No labs were ordered during this visit.  Recent Results (from the past 720 hour(s))   INR point of care    Collection Time: 12/01/21 11:05 AM   Result Value Ref Range    INR 2.7 (H) 0.9 - 1.1   INR point of care    Collection Time: 12/14/21 12:42 PM   Result Value Ref Range    INR 2.9 (H) 0.9 - 1.1   Asymptomatic COVID-19 Virus (Coronavirus) by PCR Nose    Collection Time: 12/20/21  3:14 PM    Specimen: Nose; Swab   Result Value Ref Range "    SARS CoV2 PCR Negative Negative, Testing sent to reference lab. Results will be returned via unsolicited result      No EKG required for low risk surgery (cataract, skin procedure, breast biopsy, etc).    Revised Cardiac Risk Index (RCRI):  The patient has the following serious cardiovascular risks for perioperative complications:   - No serious cardiac risks = 0 points     RCRI Interpretation: 0 points: Class I (very low risk - 0.4% complication rate)         Signed Electronically by: Drew Hahn MD  Copy of this evaluation report is provided to requesting physician.

## 2021-12-20 NOTE — PROGRESS NOTES
[facility]  [address] [facility] [address]  Phone: [phone]  Fax: [phone]  Primary Provider: Magnolia Hahn  Pre-op Performing Provider: MAGNOLIA HAHN      PREOPERATIVE EVALUATION:  Today's date: 12/20/2021    Eileen Donald is a 66 year old female who presents for a preoperative evaluation.    Surgical Information:  Surgery/Procedure: Colonoscopy  Surgery Location: [facility] OR   Surgeon: Dr. Suarez   Surgery Date: 12/23/2021  Time of Surgery: N/A  Where patient plans to recover: At home with family  Fax number for surgical facility: Note does not need to be faxed, will be available electronically in Epic.    Type of Anesthesia Anticipated: Choice    Assessment & Plan     ICD-10-CM    1. Preop general physical exam  Z01.818    2. Pulmonary embolism, other, unspecified chronicity, unspecified whether acute cor pulmonale present (H)  I26.99    3. Multiple Sclerosis  G35    4. Type 2 diabetes mellitus without complication, without long-term current use of insulin (H)  E11.9    5. Aortic valve replaced  Z95.2    6. Morbid obesity (H)  E66.01    7. Paroxysmal atrial fibrillation (H)  I48.0      Medical decision making: Patient is here for colonoscopy.  She is on coagulation for aortic valve replacement and atrial fibrillation.  She has  AVR with a 23 mm [employer] mechanical prosthesis in 01/2017.  Her other main medical issues are MS for which she is on Ocrevus infusion 6 monthly and follows with neurology.  No acute concerns.  She did have a fall in the tub last week without any other deficit.  She is not sure if she was tired or fatigued.  No further episodes since then.      The proposed surgical procedure is considered LOW risk.    The following guidelines were recommended by anticoagulation team and I agree and these are also shared with patient    Recommended goal for patient with AVR plus thrombotic risk factors 2.5-3.5 per ACC/AHA valvular  heart guideline       Pre-Procedure:  ? Hold warfarin for 4 days, until after procedure starting: Sunday 12/19  ? Enoxaparin (Lovenox) 120 mg subq Q 12 hrs (1 mg/kg Q 12 hrs for CrCl >= 60 ml/min and BMI <= 40 kg/m2)     Start enoxaparin: Tue 12/21 AM    Last dose of enoxaparin prior to procedure: Wed 12/22 AM  (~24 hours prior to procedure)       Post-Procedure:  ? Resume warfarin dose if okay with provider doing procedure on night of procedure, 12/23 PM: 5 mg x 1 then resume home dose  ? Resume  enoxaparin (Lovenox) ~ 24 hrs post procedure when okay with provider doing procedure. Call clinic prior to restarting if polyps removed. Continue until INR therapeutic per protocol  ? Recheck INR 4-6 days after resuming warfarin   ?      Possible Sleep Apnea: Patient has sleep apnea and is on continuous oxygen at home. {     Implanted Device:   - Type of device: [employer] Patient advised to bring device information on day of surgery.      Risks and Recommendations:  The patient has the following additional risks and recommendations for perioperative complications:   - No identified additional risk factors other than previously addressed    Medication Instructions:  Patient is to take all scheduled medications on the day of surgery EXCEPT for modifications listed below:   - warfarin: Thromboembolic risk is moderate (4-10%/year). HOLD warfarin 5 days. Thromboembolic risk is moderate (4-10%/year). HOLD warfarin 5 days.   - Bridging therapy ordered      - Diuretics: HOLD on the day of surgery.   - metformin: HOLD day of surgery.      RECOMMENDATION:  APPROVAL GIVEN to proceed with proposed procedure, without further diagnostic evaluation.    Subjective   Chief Complaint   Patient presents with     Pre-Op Exam     DOS 12/23/2021, Colonoscopy, with Dr. Suarez at         HPI related to upcoming procedure: Patient is deemed high risk and needs preop clearance for colonoscopy.    Preop Questions 12/20/2021   1. Have you ever had a  heart attack or stroke? UNKNOWN - None   2. Have you ever had surgery on your heart or blood vessels, such as a stent placement, a coronary artery bypass, or surgery on an artery in your head, neck, heart, or legs? YES - HEART VALVE     3. Do you have chest pain with activity? No   4. Do you have a history of  heart failure? No   5. Do you currently have a cold, bronchitis or symptoms of other infection? No   6. Do you have a cough, shortness of breath, or wheezing? YES -  Chronic SOB   7. Do you or anyone in your family have previous history of blood clots? YES - Personal h/o PE and DVT- 1999 ( was on OCP)   8. Do you or does anyone in your family have a serious bleeding problem such as prolonged bleeding following surgeries or cuts? No   9. Have you ever had problems with anemia or been told to take iron pills? No   10. Have you had any abnormal blood loss such as black, tarry or bloody stools, or abnormal vaginal bleeding? No   11. Have you ever had a blood transfusion? No   12. Are you willing to have a blood transfusion if it is medically needed before, during, or after your surgery? Yes   13. Have you or any of your relatives ever had problems with anesthesia? YES - PONV, CONFUSION   14. Do you have sleep apnea, excessive snoring or daytime drowsiness? UNKNOWN - ON oxygen , was on CPAP in past but subsequently was told she did not have sleep apnea.   15. Do you have any artifical heart valves or other implanted medical devices like a pacemaker, defibrillator, or continuous glucose monitor? YES - post AVR with a 23 mm [employer] mechanical prosthesis in 01/2017.     15a. What type of device do you have? Aorta valve replaced [facility]   15b. Name of the clinic that manages your device:  [facility]   16. Do you have artificial joints? No   17. Are you allergic to latex? YES:        Health Care Directive:  Patient does not have a Health Care Directive or Living Will: Discussed advance care planning with  patient; however, patient declined at this time.    Preoperative Review of :   reviewed - controlled substances reflected in medication list.      Status of Chronic Conditions:  DEPRESSION - Patient has a long history of Depression of moderate severity requiring medication for control with recent symptoms being stable..Current symptoms of depression include depressed mood.     DIABETES - Patient has a longstanding history of DiabetesType Type II . Patient is being treated with oral agents and denies significant side effects. Control has been good. Complicating factors include but are not limited to: hypertension and hyperlipidemia.     SLEEP PROBLEM - Patient has a longstanding history of sleep issues.  She informs me that she is on nighttime oxygen...      Review of Systems  Constitutional, neuro, ENT, endocrine, pulmonary, cardiac, gastrointestinal, genitourinary, musculoskeletal, integument and psychiatric systems are negative, except as otherwise noted.    Patient Active Problem List    Diagnosis Date Noted     H/O aortic valve replacement 12/15/2021     Priority: Medium     Osteoarthrosis 11/09/2021     Priority: Medium     Sleep apnea 11/09/2021     Priority: Medium     Acute bacterial conjunctivitis of both eyes 09/28/2021     Priority: Medium     Community acquired pneumonia, unspecified laterality 09/28/2021     Priority: Medium     Type 2 diabetes mellitus without complication, without long-term current use of insulin (H) 07/21/2021     Priority: Medium     Dyslipidemia 02/01/2021     Priority: Medium     Dyspnea on exertion 02/01/2021     Priority: Medium     Pre-diabetes 02/01/2021     Priority: Medium     Other pulmonary embolism without acute cor pulmonale, unspecified chronicity (H) 02/01/2021     Priority: Medium     Chronic obstructive pulmonary disease, unspecified COPD type (H) 02/01/2021     Priority: Medium     Recurrent major depressive disorder, in partial remission (H) 02/01/2021      Priority: Medium     Multiple Sclerosis      Priority: Medium     Created by Conversion         Paroxysmal atrial fibrillation (H) 09/17/2019     Priority: Medium     Benign essential hypertension      Priority: Medium     Created by Conversion  Replacement Utility updated for latest IMO load         Acute on chronic respiratory failure with hypoxia (H)      Priority: Medium     Pulmonary hypertension (H)      Priority: Medium     Closed 3-part fracture of proximal humerus with delayed healing, left 04/10/2019     Priority: Medium     Heart valve replaced 06/26/2018     Priority: Medium     Controlled substance agreement signed 05/29/2018     Priority: Medium     Aortic valve replaced 02/15/2017     Priority: Medium     Hypertension 01/24/2017     Priority: Medium     Postoperative anemia due to acute blood loss 01/24/2017     Priority: Medium     Stress hyperglycemia 01/24/2017     Priority: Medium     S/P AVR (aortic valve replacement) 01/24/2017     Priority: Medium     Polyarthralgia 11/30/2016     Priority: Medium     Primary osteoarthritis of both hands 08/31/2016     Priority: Medium     Generalized anxiety disorder 06/15/2016     Priority: Medium     Asthma      Priority: Medium     Created by Conversion         Dysphagia      Priority: Medium     Created by Conversion         Morbid obesity (H) 10/29/2015     Priority: Medium     History of pulmonary embolism 10/30/2014     Priority: Medium     Depression      Priority: Medium     Created by Conversion         Hyperlipidemia      Priority: Medium     Created by Conversion         Impaired Glucose Tolerance Test      Priority: Medium     Created by Conversion         Benign Adenomatous Polyp Of The Large Intestine      Priority: Medium     Created by Conversion          Past Medical History:   Diagnosis Date     Anxiety      Anxiety      Aortic valve replaced 2/15/2017     Aortic valve stenosis 01/24/2017     Asthma      Asthma     Created by Conversion       Chronic shortness of breath      Coronary artery disease      Depression      Diabetes mellitus (H)     borderline     Essential hypertension     Created by Conversion  Replacement Utility updated for latest IMO load     History of blood clots     PE     History of pulmonary embolism      HTN (hypertension)      Multiple sclerosis (H)      Multiple sclerosis (H)     Created by Conversion      Obesity      Obesity 10/29/2015     Osteoarthritis      Panic attacks      PONV (postoperative nausea and vomiting)      Pre-diabetes      Pulmonary embolism (H)      Sleep apnea      Past Surgical History:   Procedure Laterality Date     aortic valve surgery       C RECONSTR TOTAL SHOULDER IMPLANT Left 4/10/2019    Procedure: LEFT REVERSE TOTAL SHOULDER ARTHROPLASTY;  Surgeon: Luis Antonio Telles MD;  Location: [facility];  Service: Orthopedics     COLONOSCOPY W/ BIOPSIES N/A 3/22/2021    Procedure: COLONOSCOPY WITH BIOPSY AND POLYPECTOMY;  Surgeon: Sam Weems MD;  Location: [facility] OR;  Service: Gastroenterology     IR LUMBAR EPIDURAL STEROID INJECTION  1/16/2007     MAMMOPLASTY REDUCTION  1994     OTHER SURGICAL HISTORY  01/24/2017    mechanical aortic prosthesis     RELEASE CARPAL TUNNEL       Current Outpatient Medications   Medication Sig Dispense Refill     acetaminophen (TYLENOL) 325 MG tablet Take 325-650 mg by mouth every 6 hours as needed        acyclovir (ZOVIRAX) 400 MG tablet Take 400 mg by mouth 2 times daily        albuterol (PROVENTIL HFA) 108 (90 Base) MCG/ACT inhaler Inhale 2 puffs into the lungs every 6 hours as needed        aspirin (ASA) 81 MG chewable tablet Take 81 mg by mouth daily        budesonide-formoterol (SYMBICORT) 160-4.5 MCG/ACT Inhaler Inhale 2 puffs into the lungs 2 times daily        buPROPion (WELLBUTRIN XL) 150 MG 24 hr tablet Take 1 tablet by mouth daily       calcium carbonate 1250 (500 Ca) MG CHEW Take 1 tablet by mouth daily        enoxaparin ANTICOAGULANT (LOVENOX)  120 MG/0.8ML syringe Inject 0.8 mLs (120 mg) Subcutaneous every 12 hours Before and after procedure as directed 12.8 mL 0     esomeprazole (NEXIUM) 20 MG DR capsule Take 20 mg by mouth every morning (before breakfast)        furosemide (LASIX) 40 MG tablet Take 60 mg by mouth daily        INCRUSE ELLIPTA 62.5 MCG/INH Inhaler Inhale 1 puff into the lungs daily        lisinopril (ZESTRIL) 5 MG tablet Take 5 mg by mouth daily        loratadine (CLARITIN) 10 MG tablet Take 1 tablet (10 mg) by mouth daily 90 tablet 3     LORazepam (ATIVAN) 0.5 MG tablet Take 1 tablet (0.5 mg) by mouth every 8 hours as needed for anxiety 20 tablet 0     magnesium oxide (MAG-OX) 400 MG tablet Take 400 mg by mouth daily        metFORMIN (GLUCOPHAGE-XR) 500 MG 24 hr tablet Take 1 tablet by mouth daily       metoprolol tartrate (LOPRESSOR) 25 MG tablet Take 25 mg by mouth 2 times daily        montelukast (SINGULAIR) 10 MG tablet Take 10 mg by mouth At Bedtime        potassium chloride ER (MICRO-K) 10 MEQ CR capsule Take 10 mEq by mouth daily        simvastatin (ZOCOR) 20 MG tablet Take 20 mg by mouth At Bedtime        umeclidinium (INCRUSE ELLIPTA) 62.5 MCG/INH inhaler Inhale 1 puff into the lungs daily 3 each 3     venlafaxine (EFFEXOR-XR) 75 MG 24 hr capsule TAKE 3 CAPSULES BY MOUTH EVERY DAY       warfarin ANTICOAGULANT (COUMADIN) 2.5 MG tablet TAKE 1 TO 2 TABLETS DAILY AS DIRECTED, ADJUST DOSE BASED ON INR RESULTS (Patient taking differently: Take 5mg on Saturday and Wednesday and take 2.5mg all other days of week) 135 tablet 1       Allergies   Allergen Reactions     Morphine Visual Disturbance and Other (See Comments)     hallicinations       Sulfa Drugs Hives and Nausea and Vomiting     Azithromycin Nausea and Vomiting and Rash     Doxycycline Rash        Social History     Tobacco Use     Smoking status: Never Smoker     Smokeless tobacco: Never Used   Substance Use Topics     Alcohol use: No     Family History   Problem Relation Age  "of Onset     Snoring Mother      Snoring Father      Sleep Apnea Sister      Coronary Artery Disease Father      Coronary Artery Disease Sister      Coronary Artery Disease Brother      Breast Cancer No family hx of      History   Drug Use No         Objective     BP (!) 125/97 (BP Location: Left arm, Patient Position: Sitting, Cuff Size: Adult Large)   Pulse 85   Ht 1.778 m (5' 10\")   Wt 118.4 kg (261 lb)   SpO2 96%   BMI 37.45 kg/m      Physical Exam  GENERAL APPEARANCE: healthy, alert and no distress  HENT: ear canals and TM's normal and nose and mouth without ulcers or lesions  RESP: lungs clear to auscultation - no rales, rhonchi or wheezes  CV: regular rate and rhythm, normal S1 S2, no S3 or S4 and no murmur, click or rub   ABDOMEN: soft, nontender, no HSM or masses and bowel sounds normal  NEURO: Normal strength and tone, sensory exam grossly normal, mentation intact and speech normal    Recent Labs   Lab Test 12/14/21  1242 12/01/21  1105 11/09/21  1526 11/03/21  1244 10/13/21  1339 10/01/21  0745 09/30/21  0653 09/01/21  1534 08/16/21  1318   HGB  --   --   --  13.1  --   --  11.6*   < >  --    PLT  --   --   --  303  --   --  332   < >  --    INR 2.9* 2.7* 3.5* 2.79* 3.5*   < > 3.54*   < > 4.8*   NA  --   --   --  145 141   < > 147*   < >  --    POTASSIUM  --   --   --  3.9 4.0  --  4.2   < >  --    CR  --   --   --  0.98 1.08  --  0.88   < >  --    A1C  --   --  6.8*  --   --   --   --   --  6.9*    < > = values in this interval not displayed.        Diagnostics:  No labs were ordered during this visit.  Recent Results (from the past 720 hour(s))   INR point of care    Collection Time: 12/01/21 11:05 AM   Result Value Ref Range    INR 2.7 (H) 0.9 - 1.1   INR point of care    Collection Time: 12/14/21 12:42 PM   Result Value Ref Range    INR 2.9 (H) 0.9 - 1.1   Asymptomatic COVID-19 Virus (Coronavirus) by PCR Nose    Collection Time: 12/20/21  3:14 PM    Specimen: Nose; Swab   Result Value Ref Range "    SARS CoV2 PCR Negative Negative, Testing sent to reference lab. Results will be returned via unsolicited result      No EKG required for low risk surgery (cataract, skin procedure, breast biopsy, etc).    Revised Cardiac Risk Index (RCRI):  The patient has the following serious cardiovascular risks for perioperative complications:   - No serious cardiac risks = 0 points     RCRI Interpretation: 0 points: Class I (very low risk - 0.4% complication rate)         Signed Electronically by: Drew Hahn MD  Copy of this evaluation report is provided to requesting physician.

## 2021-12-21 LAB — SARS-COV-2 RNA RESP QL NAA+PROBE: NEGATIVE

## 2021-12-22 ENCOUNTER — ANESTHESIA EVENT (OUTPATIENT)
Dept: SURGERY | Facility: CLINIC | Age: 66
End: 2021-12-22
Payer: COMMERCIAL

## 2021-12-23 ENCOUNTER — DOCUMENTATION ONLY (OUTPATIENT)
Dept: ANTICOAGULATION | Facility: CLINIC | Age: 66
End: 2021-12-23

## 2021-12-23 ENCOUNTER — ANESTHESIA (OUTPATIENT)
Dept: SURGERY | Facility: CLINIC | Age: 66
End: 2021-12-23
Payer: COMMERCIAL

## 2021-12-23 ENCOUNTER — HOSPITAL ENCOUNTER (OUTPATIENT)
Facility: CLINIC | Age: 66
Discharge: HOME | End: 2021-12-23
Attending: INTERNAL MEDICINE | Admitting: INTERNAL MEDICINE
Payer: COMMERCIAL

## 2021-12-23 VITALS
SYSTOLIC BLOOD PRESSURE: 123 MMHG | TEMPERATURE: 97.4 F | OXYGEN SATURATION: 99 % | RESPIRATION RATE: 18 BRPM | HEART RATE: 77 BPM | WEIGHT: 261 LBS | HEIGHT: 70 IN | BODY MASS INDEX: 37.37 KG/M2 | DIASTOLIC BLOOD PRESSURE: 71 MMHG

## 2021-12-23 DIAGNOSIS — I26.99 OTHER PULMONARY EMBOLISM WITHOUT ACUTE COR PULMONALE, UNSPECIFIED CHRONICITY (H): ICD-10-CM

## 2021-12-23 DIAGNOSIS — I48.0 PAROXYSMAL ATRIAL FIBRILLATION (H): ICD-10-CM

## 2021-12-23 DIAGNOSIS — Z95.2 S/P AVR (AORTIC VALVE REPLACEMENT): ICD-10-CM

## 2021-12-23 DIAGNOSIS — Z95.2 H/O AORTIC VALVE REPLACEMENT: ICD-10-CM

## 2021-12-23 DIAGNOSIS — Z95.2 HEART VALVE REPLACED: Primary | ICD-10-CM

## 2021-12-23 LAB
COLONOSCOPY: NORMAL
GLUCOSE BLDC GLUCOMTR-MCNC: 120 MG/DL (ref 70–99)
INR PPP: 1.76 (ref 0.85–1.15)

## 2021-12-23 PROCEDURE — 258N000003 HC RX IP 258 OP 636: Performed by: ANESTHESIOLOGY

## 2021-12-23 PROCEDURE — 272N000001 HC OR GENERAL SUPPLY STERILE: Performed by: INTERNAL MEDICINE

## 2021-12-23 PROCEDURE — 360N000075 HC SURGERY LEVEL 2, PER MIN: Performed by: INTERNAL MEDICINE

## 2021-12-23 PROCEDURE — 82962 GLUCOSE BLOOD TEST: CPT

## 2021-12-23 PROCEDURE — 36415 COLL VENOUS BLD VENIPUNCTURE: CPT | Performed by: INTERNAL MEDICINE

## 2021-12-23 PROCEDURE — 250N000009 HC RX 250: Performed by: NURSE ANESTHETIST, CERTIFIED REGISTERED

## 2021-12-23 PROCEDURE — 88305 TISSUE EXAM BY PATHOLOGIST: CPT | Mod: TC | Performed by: INTERNAL MEDICINE

## 2021-12-23 PROCEDURE — 370N000017 HC ANESTHESIA TECHNICAL FEE, PER MIN: Performed by: INTERNAL MEDICINE

## 2021-12-23 PROCEDURE — 999N000141 HC STATISTIC PRE-PROCEDURE NURSING ASSESSMENT: Performed by: INTERNAL MEDICINE

## 2021-12-23 PROCEDURE — 85610 PROTHROMBIN TIME: CPT | Performed by: INTERNAL MEDICINE

## 2021-12-23 PROCEDURE — 250N000011 HC RX IP 250 OP 636: Performed by: NURSE ANESTHETIST, CERTIFIED REGISTERED

## 2021-12-23 PROCEDURE — 710N000012 HC RECOVERY PHASE 2, PER MINUTE: Performed by: INTERNAL MEDICINE

## 2021-12-23 RX ORDER — ONDANSETRON 2 MG/ML
4 INJECTION INTRAMUSCULAR; INTRAVENOUS EVERY 30 MIN PRN
Status: DISCONTINUED | OUTPATIENT
Start: 2021-12-23 | End: 2021-12-23 | Stop reason: HOSPADM

## 2021-12-23 RX ORDER — ONDANSETRON 2 MG/ML
4 INJECTION INTRAMUSCULAR; INTRAVENOUS
Status: DISCONTINUED | OUTPATIENT
Start: 2021-12-23 | End: 2021-12-23 | Stop reason: HOSPADM

## 2021-12-23 RX ORDER — SODIUM CHLORIDE, SODIUM LACTATE, POTASSIUM CHLORIDE, CALCIUM CHLORIDE 600; 310; 30; 20 MG/100ML; MG/100ML; MG/100ML; MG/100ML
INJECTION, SOLUTION INTRAVENOUS CONTINUOUS
Status: DISCONTINUED | OUTPATIENT
Start: 2021-12-23 | End: 2021-12-23 | Stop reason: HOSPADM

## 2021-12-23 RX ORDER — MEPERIDINE HYDROCHLORIDE 25 MG/ML
12.5 INJECTION INTRAMUSCULAR; INTRAVENOUS; SUBCUTANEOUS
Status: DISCONTINUED | OUTPATIENT
Start: 2021-12-23 | End: 2021-12-23 | Stop reason: HOSPADM

## 2021-12-23 RX ORDER — LIDOCAINE 40 MG/G
CREAM TOPICAL
Status: DISCONTINUED | OUTPATIENT
Start: 2021-12-23 | End: 2021-12-23 | Stop reason: HOSPADM

## 2021-12-23 RX ORDER — FENTANYL CITRATE 50 UG/ML
25 INJECTION, SOLUTION INTRAMUSCULAR; INTRAVENOUS
Status: CANCELLED | OUTPATIENT
Start: 2021-12-23

## 2021-12-23 RX ORDER — PROPOFOL 10 MG/ML
INJECTION, EMULSION INTRAVENOUS CONTINUOUS PRN
Status: DISCONTINUED | OUTPATIENT
Start: 2021-12-23 | End: 2021-12-23

## 2021-12-23 RX ORDER — ONDANSETRON 2 MG/ML
4 INJECTION INTRAMUSCULAR; INTRAVENOUS EVERY 6 HOURS PRN
Status: CANCELLED | OUTPATIENT
Start: 2021-12-23

## 2021-12-23 RX ORDER — NALOXONE HYDROCHLORIDE 0.4 MG/ML
0.4 INJECTION, SOLUTION INTRAMUSCULAR; INTRAVENOUS; SUBCUTANEOUS
Status: CANCELLED | OUTPATIENT
Start: 2021-12-23

## 2021-12-23 RX ORDER — NALOXONE HYDROCHLORIDE 0.4 MG/ML
0.2 INJECTION, SOLUTION INTRAMUSCULAR; INTRAVENOUS; SUBCUTANEOUS
Status: CANCELLED | OUTPATIENT
Start: 2021-12-23

## 2021-12-23 RX ORDER — KETAMINE HYDROCHLORIDE 50 MG/ML
INJECTION, SOLUTION INTRAMUSCULAR; INTRAVENOUS PRN
Status: DISCONTINUED | OUTPATIENT
Start: 2021-12-23 | End: 2021-12-23

## 2021-12-23 RX ORDER — ONDANSETRON 4 MG/1
4 TABLET, ORALLY DISINTEGRATING ORAL EVERY 30 MIN PRN
Status: DISCONTINUED | OUTPATIENT
Start: 2021-12-23 | End: 2021-12-23 | Stop reason: HOSPADM

## 2021-12-23 RX ORDER — PROCHLORPERAZINE MALEATE 5 MG
5 TABLET ORAL EVERY 6 HOURS PRN
Status: CANCELLED | OUTPATIENT
Start: 2021-12-23

## 2021-12-23 RX ORDER — FLUMAZENIL 0.1 MG/ML
0.2 INJECTION, SOLUTION INTRAVENOUS
Status: CANCELLED | OUTPATIENT
Start: 2021-12-23 | End: 2021-12-23

## 2021-12-23 RX ORDER — ONDANSETRON 4 MG/1
4 TABLET, ORALLY DISINTEGRATING ORAL EVERY 6 HOURS PRN
Status: CANCELLED | OUTPATIENT
Start: 2021-12-23

## 2021-12-23 RX ORDER — FENTANYL CITRATE 50 UG/ML
25 INJECTION, SOLUTION INTRAMUSCULAR; INTRAVENOUS EVERY 5 MIN PRN
Status: DISCONTINUED | OUTPATIENT
Start: 2021-12-23 | End: 2021-12-23 | Stop reason: HOSPADM

## 2021-12-23 RX ADMIN — MIDAZOLAM 2 MG: 1 INJECTION INTRAMUSCULAR; INTRAVENOUS at 07:31

## 2021-12-23 RX ADMIN — PROPOFOL 150 MCG/KG/MIN: 10 INJECTION, EMULSION INTRAVENOUS at 07:33

## 2021-12-23 RX ADMIN — SODIUM CHLORIDE, POTASSIUM CHLORIDE, SODIUM LACTATE AND CALCIUM CHLORIDE: 600; 310; 30; 20 INJECTION, SOLUTION INTRAVENOUS at 06:54

## 2021-12-23 RX ADMIN — KETAMINE HYDROCHLORIDE 25 MG: 50 INJECTION, SOLUTION INTRAMUSCULAR; INTRAVENOUS at 07:35

## 2021-12-23 ASSESSMENT — MIFFLIN-ST. JEOR: SCORE: 1809.14

## 2021-12-23 ASSESSMENT — COPD QUESTIONNAIRES: COPD: 1

## 2021-12-23 NOTE — ANESTHESIA CARE TRANSFER NOTE
Patient: Eileen Donald    Procedure: Procedure(s):  COLONOSCOPY WITH POLYPECTOMIES       Diagnosis: History of colon polyps [Z86.010]  Screening for colon cancer [Z12.11]  Diagnosis Additional Information: No value filed.    Anesthesia Type:   MAC     Note:    Oropharynx: oropharynx clear of all foreign objects  Level of Consciousness: awake  Oxygen Supplementation: nasal cannula  Level of Supplemental Oxygen (L/min / FiO2): 2  Independent Airway: airway patency satisfactory and stable  Dentition: dentition unchanged    Report to RN Given: handoff report given  Patient transferred to: PACU    Handoff Report: Identifed the Patient, Identified the Reponsible Provider, Reviewed the pertinent medical history, Discussed the surgical course, Reviewed Intra-OP anesthesia mangement and issues during anesthesia, Set expectations for post-procedure period and Allowed opportunity for questions and acknowledgement of understanding      Vitals:  Vitals Value Taken Time   /69 12/23/21 0810   Temp     Pulse 77 12/23/21 0813   Resp 18 12/23/21 0810   SpO2 98 % 12/23/21 0813   Vitals shown include unvalidated device data.    Electronically Signed By: LJ WEBB CRNA  December 23, 2021  8:14 AM

## 2021-12-23 NOTE — ANESTHESIA PREPROCEDURE EVALUATION
Anesthesia Pre-Procedure Evaluation    Patient: Eileen Donald   MRN: [id number] : 1955        Preoperative Diagnosis: History of colon polyps [Z86.010]  Screening for colon cancer [Z12.11]    Procedure : Procedure(s):  COLONOSCOPY          Past Medical History:   Diagnosis Date     Anxiety      Anxiety      Aortic valve replaced 2/15/2017     Aortic valve stenosis 2017     Asthma      Asthma     Created by Conversion      Chronic shortness of breath      COPD (chronic obstructive pulmonary disease) (H)      Coronary artery disease      Depression      Diabetes mellitus (H)     borderline     Essential hypertension     Created by Conversion  Replacement Utility updated for latest IMO load     History of blood clots     PE     History of pulmonary embolism      HTN (hypertension)      Multiple sclerosis (H)      Multiple sclerosis (H)     Created by Conversion      Obesity      Obesity 10/29/2015     Osteoarthritis      Oxygen dependent      Panic attacks      PONV (postoperative nausea and vomiting)      Pre-diabetes      Pulmonary embolism (H)      Sleep apnea     borderline      Past Surgical History:   Procedure Laterality Date     aortic valve surgery       C RECONSTR TOTAL SHOULDER IMPLANT Left 4/10/2019    Procedure: LEFT REVERSE TOTAL SHOULDER ARTHROPLASTY;  Surgeon: Luis Antonio Telles MD;  Location: [facility];  Service: Orthopedics     COLONOSCOPY W/ BIOPSIES N/A 3/22/2021    Procedure: COLONOSCOPY WITH BIOPSY AND POLYPECTOMY;  Surgeon: Sam Weems MD;  Location: [facility] OR;  Service: Gastroenterology     IR LUMBAR EPIDURAL STEROID INJECTION  2007     MAMMOPLASTY REDUCTION       OTHER SURGICAL HISTORY  2017    mechanical aortic prosthesis     RELEASE CARPAL TUNNEL        Allergies   Allergen Reactions     Morphine Visual Disturbance and Other (See Comments)     hallicinations       Sulfa Drugs Hives and Nausea and Vomiting     Azithromycin Nausea and  Vomiting and Rash     Doxycycline Rash      Social History     Tobacco Use     Smoking status: Never Smoker     Smokeless tobacco: Never Used   Substance Use Topics     Alcohol use: No      Wt Readings from Last 1 Encounters:   12/23/21 118.4 kg (261 lb)        Anesthesia Evaluation   Pt has had prior anesthetic.     History of anesthetic complications       ROS/MED HX  ENT/Pulmonary:     (+) sleep apnea, asthma COPD, O2 dependent,     Neurologic:     (+) Multiple Sclerosis,     Cardiovascular:     (+) hypertension--CAD ---KHAN. pulmonary hypertension,     METS/Exercise Tolerance:     Hematologic:       Musculoskeletal:       GI/Hepatic:     (+) GERD,     Renal/Genitourinary:       Endo:     (+) type I DM, Obesity,     Psychiatric/Substance Use:       Infectious Disease:       Malignancy:       Other:            Physical Exam    Airway  airway exam normal      Mallampati: II   TM distance: > 3 FB   Neck ROM: full   Mouth opening: > 3 cm    Respiratory Devices and Support         Dental  no notable dental history         Cardiovascular   cardiovascular exam normal       Rhythm and rate: regular and normal     Pulmonary   pulmonary exam normal                OUTSIDE LABS:  CBC:   Lab Results   Component Value Date    WBC 9.8 11/03/2021    WBC 9.5 09/30/2021    HGB 13.1 11/03/2021    HGB 11.6 (L) 09/30/2021    HCT 42.4 11/03/2021    HCT 38.4 09/30/2021     11/03/2021     09/30/2021     BMP:   Lab Results   Component Value Date     11/03/2021     10/13/2021    POTASSIUM 3.9 11/03/2021    POTASSIUM 4.0 10/13/2021    CHLORIDE 99 11/03/2021    CHLORIDE 95 (L) 10/13/2021    CO2 32 (H) 11/03/2021    CO2 29 10/13/2021    BUN 9 11/03/2021    BUN 16 10/13/2021    CR 0.98 11/03/2021    CR 1.08 10/13/2021     (H) 12/23/2021     (H) 11/03/2021     COAGS:   Lab Results   Component Value Date    INR 1.76 (H) 12/23/2021     POC: No results found for: BGM, HCG, HCGS  HEPATIC:   Lab Results    Component Value Date    ALBUMIN 3.0 (L) 10/01/2021    PROTTOTAL 6.8 10/01/2021    ALT 70 (H) 10/01/2021    AST 67 (H) 10/01/2021    ALKPHOS 127 (H) 10/01/2021    BILITOTAL 0.3 10/01/2021     OTHER:   Lab Results   Component Value Date    PH 7.38 09/09/2020    LACT 1.3 09/28/2021    A1C 6.8 (H) 11/09/2021    HAN 10.9 (H) 11/03/2021    MAG 2.1 09/30/2021    TSH 2.54 02/01/2021       Anesthesia Plan    ASA Status:  3      Anesthesia Type: MAC.              Consents            Postoperative Care    Pain management: Oral pain medications.        Comments:                Aristides Costello MD

## 2021-12-23 NOTE — PRE-PROCEDURE
GENERAL PRE-PROCEDURE:   Procedure:  Colonoscopy   Date/Time:  12/23/2021 7:21 AM    Verbal consent obtained?: Yes    Written consent obtained?: Yes    Risks and benefits: Risks, benefits and alternatives were discussed    Consent given by:  Patient  Patient states understanding of procedure being performed: Yes    Patient's understanding of procedure matches consent: Yes    Procedure consent matches procedure scheduled: Yes    Expected level of sedation:  Moderate  Appropriately NPO:  Yes  Mallampati  :  Grade 2- soft palate, base of uvula, tonsillar pillars, and portion of posterior pharyngeal wall visible  Lungs:  Lungs clear with good breath sounds bilaterally  Heart:  Normal heart sounds and rate  History & Physical reviewed:  History and physical reviewed and no updates needed  Statement of review:  I have reviewed the lab findings, diagnostic data, medications, and the plan for sedation

## 2021-12-23 NOTE — ANESTHESIA POSTPROCEDURE EVALUATION
Patient: Eileen Donald    Procedure: Procedure(s):  COLONOSCOPY WITH POLYPECTOMIES       Diagnosis:History of colon polyps [Z86.010]  Screening for colon cancer [Z12.11]  Diagnosis Additional Information: No value filed.    Anesthesia Type:  MAC    Note:  Disposition: Outpatient   Postop Pain Control: Uneventful            Sign Out: Well controlled pain   PONV: No   Neuro/Psych: Uneventful            Sign Out: Acceptable/Baseline neuro status   Airway/Respiratory: Uneventful            Sign Out: Acceptable/Baseline resp. status   CV/Hemodynamics: Uneventful            Sign Out: Acceptable CV status; No obvious hypovolemia; No obvious fluid overload   Other NRE: NONE   DID A NON-ROUTINE EVENT OCCUR? No           Last vitals:  Vitals Value Taken Time   /71 12/23/21 0830   Temp     Pulse 76 12/23/21 0842   Resp 18 12/23/21 0810   SpO2 99 % 12/23/21 0842   Vitals shown include unvalidated device data.    Electronically Signed By: Aristides Costello MD  December 23, 2021  11:02 AM

## 2021-12-24 LAB
PATH REPORT.COMMENTS IMP SPEC: NORMAL
PATH REPORT.COMMENTS IMP SPEC: NORMAL
PATH REPORT.FINAL DX SPEC: NORMAL
PATH REPORT.GROSS SPEC: NORMAL
PATH REPORT.MICROSCOPIC SPEC OTHER STN: NORMAL
PATH REPORT.RELEVANT HX SPEC: NORMAL
PHOTO IMAGE: NORMAL

## 2021-12-24 PROCEDURE — 88305 TISSUE EXAM BY PATHOLOGIST: CPT | Mod: 26 | Performed by: PATHOLOGY

## 2021-12-29 ENCOUNTER — NURSE TRIAGE (OUTPATIENT)
Dept: NURSING | Facility: CLINIC | Age: 66
End: 2021-12-29
Payer: COMMERCIAL

## 2021-12-29 NOTE — TELEPHONE ENCOUNTER
Patient calling - says two days ago she woke up with a [place] on the top of her right foot.  Looks like a rug burn - like the skin was torn.  Says that area looks ok.  She also has a sore area on the inside of her right foot near her big toe.  She says her dog often jumps on her bed and may have landed on her foot.  Today that area looks worse.  It is painful and a little swollen.  Rates pain 4/10 if sitting, if walking on it or hits that sore side 8/10. She is diabetic.    No fever.  No pus or drainage.  No purple or black skin.    Triaged to disposition of See Physician Within 24 Hours.  Transferred to scheduling.  Advised to go to Walk in clinic if no appointments available in 24 hours.  Advised to call back if fever occurs, blood sugar over 400 occurs, drainage occurs or symptoms worsen.    Katt Murcia RN  Triage Nurse Advisor    COVID 19 Nurse Triage Plan/Patient Instructions    Please be aware that novel coronavirus (COVID-19) may be circulating in the community. If you develop symptoms such as fever, cough, or SOB or if you have concerns about the presence of another infection including coronavirus (COVID-19), please contact your health care provider or visit [email or web address].     Disposition/Instructions    In-Person Visit with provider recommended. Reference Visit Selection Guide.    Thank you for taking steps to prevent the spread of this virus.  o Limit your contact with others.  o Wear a simple mask to cover your cough.  o Wash your hands well and often.    Resources    [facility]: About COVID-19: [email or web address]    CDC: What to Do If You're Sick: www.cdc.gov/coronavirus/2019-ncov/about/steps-when-sick.html    CDC: Ending Home Isolation: www.cdc.gov/coronavirus/2019-ncov/hcp/disposition-in-home-patients.html     CDC: Caring for Someone: www.cdc.gov/coronavirus/2019-ncov/if-you-are-sick/care-for-someone.html     [facility]: Interim Guidance for Hospital Discharge to Home:  www.health.[facility].[place].us/diseases/coronavirus/hcp/hospdischarge.pdf    [facility] clinical trials (COVID-19 research studies): [email or web address]     Below are the COVID-19 hotlines at the [place] Department of Health ([facility]). Interpreters are available.   o For health questions: Call 912-480-7621 or 1-291.719.2133 (7 a.m. to 7 p.m.)  o For questions about schools and childcare: Call 556-910-3731 or 1-604.992.9599 (7 a.m. to 7 p.m.)     Reason for Disposition    [1] Ulcer, wound, blister, sore, or red area AND [2] new or increasing    Additional Information    Negative: Sounds like a life-threatening emergency to the triager    Negative: Caused by an animal bite    Negative: Caused by a human bite    Negative: Foreign body in skin (e.g., splinter, sliver)    Negative: Injury is a puncture wound    Negative: Followed a foot or ankle injury    Negative: SEVERE pain    Negative: [1] Looks infected (e.g., spreading redness, red streak, or pus) AND [2] fever    Negative: Foot is cool or blue in comparison to other side    Negative: Patient sounds very sick or weak to the triager    Negative: Fever > 100.4 F (38.0 C)    Negative: [1] Drainage from foot AND [2] new or increased    Negative: [1] Foul-smelling odor from foot AND [2] new or increased    Negative: [1] Spreading redness or red streak AND [2] area > 2 in. (5 cm)    Negative: [1] Purple or black skin on foot or toe AND [2] new or increased    Negative: Looks like a boil or deep ulcer    Protocols used: DIABETES - FOOT PROBLEMS AND [id number]-GX

## 2021-12-30 ENCOUNTER — TELEPHONE (OUTPATIENT)
Dept: ANTICOAGULATION | Facility: CLINIC | Age: 66
End: 2021-12-30
Payer: COMMERCIAL

## 2021-12-30 NOTE — TELEPHONE ENCOUNTER
ANTICOAGULATION     Eileen Donald is overdue for INR check.      spoke with Marilee who declined to schedule a lab appointment at this time. If calling back please schedule as soon as possible.    - resuming warfarin and still doing Lovenox injections.   - not able to get a ride this week.   - but can next week.  She will call to schedule INR for tues 1/4/22.    Alondra Lora RN

## 2022-01-04 ENCOUNTER — ANTICOAGULATION THERAPY VISIT (OUTPATIENT)
Dept: ANTICOAGULATION | Facility: CLINIC | Age: 67
End: 2022-01-04

## 2022-01-04 ENCOUNTER — LAB (OUTPATIENT)
Dept: LAB | Facility: CLINIC | Age: 67
End: 2022-01-04
Payer: COMMERCIAL

## 2022-01-04 DIAGNOSIS — I26.99 OTHER PULMONARY EMBOLISM WITHOUT ACUTE COR PULMONALE, UNSPECIFIED CHRONICITY (H): ICD-10-CM

## 2022-01-04 DIAGNOSIS — I48.0 PAROXYSMAL ATRIAL FIBRILLATION (H): ICD-10-CM

## 2022-01-04 DIAGNOSIS — Z95.2 H/O AORTIC VALVE REPLACEMENT: ICD-10-CM

## 2022-01-04 DIAGNOSIS — Z95.2 HEART VALVE REPLACED: Primary | ICD-10-CM

## 2022-01-04 DIAGNOSIS — Z95.2 S/P AVR (AORTIC VALVE REPLACEMENT): ICD-10-CM

## 2022-01-04 LAB — INR BLD: 2.1 (ref 0.9–1.1)

## 2022-01-04 PROCEDURE — 85610 PROTHROMBIN TIME: CPT

## 2022-01-04 PROCEDURE — 36416 COLLJ CAPILLARY BLOOD SPEC: CPT

## 2022-01-04 NOTE — PROGRESS NOTES
ANTICOAGULATION MANAGEMENT     Eileen Donald 66 year old female is on warfarin with subtherapeutic INR result. (Goal INR 2.5-3.5)    Recent labs: (last 7 days)     01/04/22  1441   INR 2.1*       ASSESSMENT     Source(s): Chart Review and Patient/Caregiver Call       Warfarin doses taken: Warfarin taken as instructed    Diet: No new diet changes identified    New illness, injury, or hospitalization: Yes: taking tylenol for hip pain. Recent colonoscopy.    Medication/supplement changes: None noted    Signs or symptoms of bleeding or clotting: No    Previous INR: Subtherapeutic    Additional findings: Bridging with Enoxaparin until INR >= 2.5 x 2 reports she has enough lovenox until next apt     PLAN     Recommended plan for no diet, medication or health factor changes affecting INR     Dosing Instructions: Booster dose then continue your current warfarin dose with next INR in 2 days       Summary  As of 1/4/2022    Full warfarin instructions:  1/4: 5 mg; Otherwise 5 mg every Wed, Sat; 2.5 mg all other days   Next INR check:  1/6/2022             Telephone call with Eileen who verbalizes understanding and agrees to plan    Lab visit scheduled    Education provided: Please call back if any changes to your diet, medications or how you've been taking warfarin and Contact [phone] with any changes, questions or concerns.     Plan made per [facility] anticoagulation protocol    Rere Talbot RN  Anticoagulation Clinic  1/4/2022    _______________________________________________________________________     Anticoagulation Episode Summary     Current INR goal:  2.5-3.5   TTR:  40.3 % (11.7 mo)   Target end date:  Indefinite   Send INR reminders to:  [facility]    Indications    Heart valve replaced [Z95.2]  Paroxysmal atrial fibrillation (H) [I48.0]  S/P AVR (aortic valve replacement) [Z95.2]  Other pulmonary embolism without acute cor pulmonale  unspecified chronicity (H) [I26.99]  H/O aortic valve  replacement [Z95.2]           Comments:  12/15/21 - amended INR goal to 2.5 - 3.5         Anticoagulation Care Providers     Provider Role Specialty Phone number    Tito Salazar MD Referring Family Medicine [phone]    Drew Hahn MD Referring Family Medicine [phone]

## 2022-01-06 ENCOUNTER — LAB (OUTPATIENT)
Dept: LAB | Facility: CLINIC | Age: 67
End: 2022-01-06
Payer: COMMERCIAL

## 2022-01-06 ENCOUNTER — ANTICOAGULATION THERAPY VISIT (OUTPATIENT)
Dept: ANTICOAGULATION | Facility: CLINIC | Age: 67
End: 2022-01-06

## 2022-01-06 DIAGNOSIS — Z95.2 H/O AORTIC VALVE REPLACEMENT: ICD-10-CM

## 2022-01-06 DIAGNOSIS — I26.99 OTHER PULMONARY EMBOLISM WITHOUT ACUTE COR PULMONALE, UNSPECIFIED CHRONICITY (H): ICD-10-CM

## 2022-01-06 DIAGNOSIS — I48.0 PAROXYSMAL ATRIAL FIBRILLATION (H): ICD-10-CM

## 2022-01-06 DIAGNOSIS — Z95.2 HEART VALVE REPLACED: Primary | ICD-10-CM

## 2022-01-06 DIAGNOSIS — Z95.2 S/P AVR (AORTIC VALVE REPLACEMENT): ICD-10-CM

## 2022-01-06 LAB — INR BLD: 2 (ref 0.9–1.1)

## 2022-01-06 PROCEDURE — 36416 COLLJ CAPILLARY BLOOD SPEC: CPT

## 2022-01-06 PROCEDURE — 85610 PROTHROMBIN TIME: CPT

## 2022-01-06 NOTE — PROGRESS NOTES
ANTICOAGULATION MANAGEMENT     Eileen Donald 66 year old female is on warfarin with subtherapeutic INR result. (Goal INR 2.5-3.5)    Recent labs: (last 7 days)     01/06/22  1342   INR 2.0*       ASSESSMENT     Source(s): Chart Review, Patient/Caregiver Call and Template       Warfarin doses taken: Warfarin taken as instructed and Template incorrect; verbally confirmed home dose with Marilee     Held warfarin from 12/19-22.    Diet: No new diet changes identified    New illness, injury, or hospitalization: Yes:    Recent s/p colonoscopy with polypectomies on 12/23/21.  (colon, sigmoid, polyp x3).    Medication/supplement changes: None noted    Continues to bridge with Lovenox until INR is >= 2.5 x2.  Reported enough syringes to lst till next INR on 1/11/22.    Signs or symptoms of bleeding or clotting: No    Previous INR: Subtherapeutic at 2.1 on 1/4/21.    Additional findings: None     PLAN     Recommended plan for temporary change(s) affecting INR     Dosing Instructions:   (evenings. 2.5mg tabs)    Booster dose then Increase your warfarin dose (11.1% change) with next INR in 4 days.       Summary  As of 1/6/2022    Full warfarin instructions:  1/6: 7.5 mg; Otherwise 5 mg every Mon, Wed, Sat; 2.5 mg all other days   Next INR check:  1/10/2022             Telephone call with  Marilee ([phone]) who verbalizes understanding and agrees to plan    Lab visit scheduled - INR scheduled on 1/11/22 @ [facility].   - not able to come in on 1/10.    Education provided: Importance of consistent vitamin K intake, Goal range and significance of current result and Monitoring for clotting signs and symptoms    Plan made with [facility] Pharmacist Renee Lora, RN  Anticoagulation Clinic  1/6/2022    _______________________________________________________________________     Anticoagulation Episode Summary     Current INR goal:  2.5-3.5   TTR:  40.3 % (11.7 mo)   Target end date:  Indefinite   Send INR reminders to:   [facility]    Indications    Heart valve replaced [Z95.2]  Paroxysmal atrial fibrillation (H) [I48.0]  S/P AVR (aortic valve replacement) [Z95.2]  Other pulmonary embolism without acute cor pulmonale  unspecified chronicity (H) [I26.99]  H/O aortic valve replacement [Z95.2]           Comments:  12/15/21 - amended INR goal to 2.5 - 3.5         Anticoagulation Care Providers     Provider Role Specialty Phone number    Tito Salazar MD Referring Family Medicine [phone]    Drew Hahn MD Referring Family Medicine [phone]

## 2022-01-12 ENCOUNTER — ANTICOAGULATION THERAPY VISIT (OUTPATIENT)
Dept: ANTICOAGULATION | Facility: CLINIC | Age: 67
End: 2022-01-12

## 2022-01-12 ENCOUNTER — LAB (OUTPATIENT)
Dept: LAB | Facility: CLINIC | Age: 67
End: 2022-01-12
Payer: COMMERCIAL

## 2022-01-12 DIAGNOSIS — Z95.2 S/P AVR (AORTIC VALVE REPLACEMENT): ICD-10-CM

## 2022-01-12 DIAGNOSIS — I48.0 PAROXYSMAL ATRIAL FIBRILLATION (H): ICD-10-CM

## 2022-01-12 DIAGNOSIS — Z95.2 H/O AORTIC VALVE REPLACEMENT: ICD-10-CM

## 2022-01-12 DIAGNOSIS — Z95.2 HEART VALVE REPLACED: Primary | ICD-10-CM

## 2022-01-12 DIAGNOSIS — I26.99 OTHER PULMONARY EMBOLISM WITHOUT ACUTE COR PULMONALE, UNSPECIFIED CHRONICITY (H): ICD-10-CM

## 2022-01-12 LAB — INR BLD: 3.1 (ref 0.9–1.1)

## 2022-01-12 PROCEDURE — 85610 PROTHROMBIN TIME: CPT

## 2022-01-12 PROCEDURE — 36416 COLLJ CAPILLARY BLOOD SPEC: CPT

## 2022-01-12 NOTE — PROGRESS NOTES
ANTICOAGULATION MANAGEMENT     Eileen Donald 66 year old female is on warfarin with therapeutic INR result. (Goal INR 2.5-3.5)    Recent labs: (last 7 days)     01/12/22  1548   INR 3.1*       ASSESSMENT     Source(s): Chart Review and Patient/Caregiver Call       Warfarin doses taken: Warfarin taken as instructed    Diet: No new diet changes identified    New illness, injury, or hospitalization: No    Medication/supplement changes: None noted    Signs or symptoms of bleeding or clotting: No    Previous INR: Subtherapeutic    Additional findings: None     PLAN     Recommended plan for no diet, medication or health factor changes affecting INR     Dosing Instructions: Continue your current warfarin dose with next INR in 2 weeks       Summary  As of 1/12/2022    Full warfarin instructions:  5 mg every Mon, Wed, Sat; 2.5 mg all other days   Next INR check:  1/26/2022             Telephone call with Eileen who verbalizes understanding and agrees to plan    Lab visit scheduled    Education provided: None required    Plan made per [facility] anticoagulation protocol    Dany Cramer RN  Anticoagulation Clinic  1/12/2022    _______________________________________________________________________     Anticoagulation Episode Summary     Current INR goal:  2.5-3.5   TTR:  41.2 % (11.7 mo)   Target end date:  Indefinite   Send INR reminders to:  [facility]    Indications    Heart valve replaced [Z95.2]  Paroxysmal atrial fibrillation (H) [I48.0]  S/P AVR (aortic valve replacement) [Z95.2]  Other pulmonary embolism without acute cor pulmonale  unspecified chronicity (H) [I26.99]  H/O aortic valve replacement [Z95.2]           Comments:  12/15/21 - amended INR goal to 2.5 - 3.5         Anticoagulation Care Providers     Provider Role Specialty Phone number    Tito Salazar MD Referring Family Medicine [phone]    Drew Hahn MD Referring Family Medicine [phone]

## 2022-01-27 ENCOUNTER — LAB (OUTPATIENT)
Dept: LAB | Facility: CLINIC | Age: 67
End: 2022-01-27
Payer: COMMERCIAL

## 2022-01-27 ENCOUNTER — ANTICOAGULATION THERAPY VISIT (OUTPATIENT)
Dept: ANTICOAGULATION | Facility: CLINIC | Age: 67
End: 2022-01-27

## 2022-01-27 DIAGNOSIS — E11.9 TYPE 2 DIABETES MELLITUS WITHOUT COMPLICATION, WITHOUT LONG-TERM CURRENT USE OF INSULIN (H): ICD-10-CM

## 2022-01-27 DIAGNOSIS — Z95.2 H/O AORTIC VALVE REPLACEMENT: ICD-10-CM

## 2022-01-27 DIAGNOSIS — Z95.2 HEART VALVE REPLACED: Primary | ICD-10-CM

## 2022-01-27 DIAGNOSIS — I48.0 PAROXYSMAL ATRIAL FIBRILLATION (H): ICD-10-CM

## 2022-01-27 DIAGNOSIS — I26.99 OTHER PULMONARY EMBOLISM WITHOUT ACUTE COR PULMONALE, UNSPECIFIED CHRONICITY (H): ICD-10-CM

## 2022-01-27 DIAGNOSIS — Z95.2 S/P AVR (AORTIC VALVE REPLACEMENT): ICD-10-CM

## 2022-01-27 LAB
CREAT UR-MCNC: 268 MG/DL
INR BLD: 3.4 (ref 0.9–1.1)
MICROALBUMIN UR-MCNC: 3.15 MG/DL (ref 0–1.99)
MICROALBUMIN/CREAT UR: 11.8 MG/G CR

## 2022-01-27 PROCEDURE — 36416 COLLJ CAPILLARY BLOOD SPEC: CPT

## 2022-01-27 PROCEDURE — 82043 UR ALBUMIN QUANTITATIVE: CPT

## 2022-01-27 PROCEDURE — 85610 PROTHROMBIN TIME: CPT

## 2022-01-27 NOTE — PROGRESS NOTES
ANTICOAGULATION MANAGEMENT     Eileen Donald 66 year old female is on warfarin with therapeutic INR result. (Goal INR 2.5-3.5)    Recent labs: (last 7 days)     01/27/22  1421   INR 3.4*       ASSESSMENT     Source(s): Chart Review, Patient/Caregiver Call and Template       Warfarin doses taken: Template incorrect; verbally confirmed home dose with Marilee.   She verbalized back taking less warfarin as instructed.    Diet: No new diet changes identified    New illness, injury, or hospitalization: No    Medication/supplement changes: None noted    Reported, the only change was taking Robitussin-DM 2x last week for dry cough.    Signs or symptoms of bleeding or clotting: No    Previous INR: Therapeutic last visit at 3.1; previously outside of goal range at 2.0    Additional findings: None     PLAN     Recommended plan for no diet, medication or health factor changes affecting INR     Dosing Instructions:   (evenings. 2.5mg tabs)    Continue your current warfarin dose with next INR in 2 weeks       Summary  As of 1/27/2022    Full warfarin instructions:  5 mg every Mon, Wed, Sat; 2.5 mg all other days   Next INR check:  2/10/2022             Telephone call with  Marilee ([phone]) who verbalizes understanding and agrees to plan    Lab visit scheduled - INR on 2/15/22 @ [facility].    Education provided: Importance of consistent vitamin K intake, Goal range and significance of current result and No interaction anticipated between warfarin and Robitussin-DM    Plan made per [facility] anticoagulation protocol    Alondra Lora RN  Anticoagulation Clinic  1/27/2022    _______________________________________________________________________     Anticoagulation Episode Summary     Current INR goal:  2.5-3.5   TTR:  45.5 % (11.7 mo)   Target end date:  Indefinite   Send INR reminders to:  [facility]    Indications    Heart valve replaced [Z95.2]  Paroxysmal atrial fibrillation (H) [I48.0]  S/P AVR (aortic valve  replacement) [Z95.2]  Other pulmonary embolism without acute cor pulmonale  unspecified chronicity (H) [I26.99]  H/O aortic valve replacement [Z95.2]           Comments:  12/15/21 - amended INR goal to 2.5 - 3.5         Anticoagulation Care Providers     Provider Role Specialty Phone number    Tito Salazar MD Referring Family Medicine [phone]    Drew Hahn MD Referring [facility] Medicine [phone]

## 2022-02-15 ENCOUNTER — LAB (OUTPATIENT)
Dept: LAB | Facility: CLINIC | Age: 67
End: 2022-02-15
Payer: COMMERCIAL

## 2022-02-15 ENCOUNTER — ANTICOAGULATION THERAPY VISIT (OUTPATIENT)
Dept: ANTICOAGULATION | Facility: CLINIC | Age: 67
End: 2022-02-15

## 2022-02-15 DIAGNOSIS — I48.0 PAROXYSMAL ATRIAL FIBRILLATION (H): ICD-10-CM

## 2022-02-15 DIAGNOSIS — Z95.2 HEART VALVE REPLACED: Primary | ICD-10-CM

## 2022-02-15 DIAGNOSIS — Z95.2 H/O AORTIC VALVE REPLACEMENT: ICD-10-CM

## 2022-02-15 DIAGNOSIS — I26.99 OTHER PULMONARY EMBOLISM WITHOUT ACUTE COR PULMONALE, UNSPECIFIED CHRONICITY (H): ICD-10-CM

## 2022-02-15 DIAGNOSIS — Z95.2 S/P AVR (AORTIC VALVE REPLACEMENT): ICD-10-CM

## 2022-02-15 LAB — INR BLD: 2.3 (ref 0.9–1.1)

## 2022-02-15 PROCEDURE — 85610 PROTHROMBIN TIME: CPT

## 2022-02-15 PROCEDURE — 36416 COLLJ CAPILLARY BLOOD SPEC: CPT

## 2022-02-15 NOTE — PROGRESS NOTES
ANTICOAGULATION MANAGEMENT     Eileen Donald 66 year old female is on warfarin with subtherapeutic INR result. (Goal INR 2.5-3.5)    Recent labs: (last 7 days)     02/15/22  1256   INR 2.3*       ASSESSMENT     Source(s): Chart Review, Patient/Caregiver Call and Template       Warfarin doses taken: Template incorrect; verbally confirmed home dose with Marilee   Stated wrong warfarin dose and taking less warfarin.     Diet: No new diet changes identified    New illness, injury, or hospitalization: Yes:    Reported dental work    Medication/supplement changes:  Yes.    Amoxicillin for dental prophylaxis.    Signs or symptoms of bleeding or clotting: No    Previous INR: Therapeutic last 2 INR visits.    Additional findings:  Yes.  Reported more dental work will be scheduled.     PLAN     Recommended plan for temporary change(s) affecting INR     Dosing Instructions:   (evenings. 2.5mg tabs)     - ensure to take correct warfarin dose     - 5mg on Tues/Thurs/Sat and 2.5mg all other days.     - next INR in 1-2 weeks       Summary  As of 2/15/2022    Full warfarin instructions:  2/15: 5 mg; Otherwise 5 mg every Tue, Thu, Sat; 2.5 mg all other days   Next INR check:  3/1/2022             Telephone call with  Marilee ([phone]) who verbalizes understanding and agrees to plan    Lab visit scheduled - INR on 3/7/22 @ [facility].    Education provided: Importance of consistent vitamin K intake, Goal range and significance of current result, Importance of therapeutic range and Importance of taking warfarin as instructed    Plan made per [facility] anticoagulation protocol    Alondra Lora RN  Anticoagulation Clinic  2/15/2022    _______________________________________________________________________     Anticoagulation Episode Summary     Current INR goal:  2.5-3.5   TTR:  49.7 % (11.7 mo)   Target end date:  Indefinite   Send INR reminders to:  [facility]    Indications    Heart valve replaced [Z95.2]  Paroxysmal  atrial fibrillation (H) [I48.0]  S/P AVR (aortic valve replacement) [Z95.2]  Other pulmonary embolism without acute cor pulmonale  unspecified chronicity (H) [I26.99]  H/O aortic valve replacement [Z95.2]           Comments:  12/15/21 - amended INR goal to 2.5 - 3.5         Anticoagulation Care Providers     Provider Role Specialty Phone number    Tito Salazar MD Referring [facility] Medicine [phone]    Drew Hahn MD Referring [facility] Medicine [phone]

## 2022-03-07 ENCOUNTER — ANTICOAGULATION THERAPY VISIT (OUTPATIENT)
Dept: ANTICOAGULATION | Facility: CLINIC | Age: 67
End: 2022-03-07

## 2022-03-07 ENCOUNTER — LAB (OUTPATIENT)
Dept: LAB | Facility: CLINIC | Age: 67
End: 2022-03-07
Payer: COMMERCIAL

## 2022-03-07 DIAGNOSIS — I48.0 PAROXYSMAL ATRIAL FIBRILLATION (H): ICD-10-CM

## 2022-03-07 DIAGNOSIS — I26.99 OTHER PULMONARY EMBOLISM WITHOUT ACUTE COR PULMONALE, UNSPECIFIED CHRONICITY (H): ICD-10-CM

## 2022-03-07 DIAGNOSIS — Z95.2 H/O AORTIC VALVE REPLACEMENT: ICD-10-CM

## 2022-03-07 DIAGNOSIS — Z95.2 HEART VALVE REPLACED: Primary | ICD-10-CM

## 2022-03-07 DIAGNOSIS — Z95.2 S/P AVR (AORTIC VALVE REPLACEMENT): ICD-10-CM

## 2022-03-07 DIAGNOSIS — Z53.9 ERRONEOUS ENCOUNTER--DISREGARD: ICD-10-CM

## 2022-03-07 LAB — INR BLD: 2.5 (ref 0.9–1.1)

## 2022-03-07 PROCEDURE — 36416 COLLJ CAPILLARY BLOOD SPEC: CPT

## 2022-03-07 PROCEDURE — 85610 PROTHROMBIN TIME: CPT

## 2022-03-07 NOTE — PROGRESS NOTES
ANTICOAGULATION MANAGEMENT     Eileen Donald 66 year old female is on warfarin with therapeutic INR result. (Goal INR 2.5-3.5)    Recent labs: (last 7 days)     03/07/22  1305   INR 2.5*       ASSESSMENT       Source(s): Chart Review and Patient/Caregiver Call       Warfarin doses taken: Warfarin taken as instructed    Diet: No new diet changes identified    New illness, injury, or hospitalization: No    Medication/supplement changes: None noted    Signs or symptoms of bleeding or clotting: No    Previous INR: Therapeutic last 2(+) visits    Additional findings: None       PLAN     Recommended plan for no diet, medication or health factor changes affecting INR     Dosing Instructions: Continue your current warfarin dose with next INR in 2 weeks       Summary  As of 3/7/2022    Full warfarin instructions:  5 mg every Tue, Thu, Sat; 2.5 mg all other days   Next INR check:  3/21/2022             Telephone call with Eileen who verbalizes understanding and agrees to plan    Lab visit scheduled    Education provided: None required    Plan made per [facility] anticoagulation protocol    Dany Cramer RN  Anticoagulation Clinic  3/7/2022    _______________________________________________________________________     Anticoagulation Episode Summary     Current INR goal:  2.5-3.5   TTR:  44.0 % (11.7 mo)   Target end date:  Indefinite   Send INR reminders to:  [facility]    Indications    Heart valve replaced [Z95.2]  Paroxysmal atrial fibrillation (H) [I48.0]  S/P AVR (aortic valve replacement) [Z95.2]  Other pulmonary embolism without acute cor pulmonale  unspecified chronicity (H) [I26.99]  H/O aortic valve replacement [Z95.2]           Comments:  12/15/21 - amended INR goal to 2.5 - 3.5         Anticoagulation Care Providers     Provider Role Specialty Phone number    Tito Salazar MD Referring Family Medicine [phone]    Drew Hahn MD Referring Family Medicine [phone]

## 2022-03-13 ENCOUNTER — HEALTH MAINTENANCE LETTER (OUTPATIENT)
Age: 67
End: 2022-03-13

## 2022-03-21 ENCOUNTER — LAB (OUTPATIENT)
Dept: LAB | Facility: CLINIC | Age: 67
End: 2022-03-21
Payer: COMMERCIAL

## 2022-03-21 ENCOUNTER — ANTICOAGULATION THERAPY VISIT (OUTPATIENT)
Dept: ANTICOAGULATION | Facility: CLINIC | Age: 67
End: 2022-03-21

## 2022-03-21 DIAGNOSIS — I48.0 PAROXYSMAL ATRIAL FIBRILLATION (H): ICD-10-CM

## 2022-03-21 DIAGNOSIS — Z95.2 S/P AVR (AORTIC VALVE REPLACEMENT): ICD-10-CM

## 2022-03-21 DIAGNOSIS — I26.99 OTHER PULMONARY EMBOLISM WITHOUT ACUTE COR PULMONALE, UNSPECIFIED CHRONICITY (H): ICD-10-CM

## 2022-03-21 DIAGNOSIS — Z95.2 H/O AORTIC VALVE REPLACEMENT: ICD-10-CM

## 2022-03-21 DIAGNOSIS — Z95.2 HEART VALVE REPLACED: Primary | ICD-10-CM

## 2022-03-21 LAB — INR BLD: 3 (ref 0.9–1.1)

## 2022-03-21 PROCEDURE — 85610 PROTHROMBIN TIME: CPT

## 2022-03-21 PROCEDURE — 36416 COLLJ CAPILLARY BLOOD SPEC: CPT

## 2022-03-21 NOTE — PROGRESS NOTES
ANTICOAGULATION MANAGEMENT     Eileen Donald 66 year old female is on warfarin with therapeutic INR result. (Goal INR 2.5-3.5)    Recent labs: (last 7 days)     03/21/22  1338   INR 3.0*       ASSESSMENT       Source(s): Chart Review and Patient/Caregiver Call       Warfarin doses taken: Warfarin taken as instructed    Diet: No new diet changes identified    New illness, injury, or hospitalization: Yes:    joint pains of legs that is ongoing from arthritis.    Medication/supplement changes:  Yes.    Takes ES-Tylenol 2 tabs in a day, PRN for leg pains.  (it helps relieve leg pains)    Signs or symptoms of bleeding or clotting: No    Previous INR: Therapeutic last visit at 2.5; previously outside of goal range at 2.3    Additional findings: None       PLAN     Recommended plan for ongoing change(s) affecting INR     Dosing Instructions:   (evenings. 2.5mg tabs)    Continue your current warfarin dose with next INR in 2-3 weeks       Summary  As of 3/21/2022    Full warfarin instructions:  5 mg every Tue, Thu, Sat; 2.5 mg all other days   Next INR check:  4/11/2022             Telephone call with  Marilee ([phone]) who verbalizes understanding and agrees to plan    Lab visit scheduled - INR on 4/12/22 @ [facility]    Education provided: Importance of consistent vitamin K intake and Goal range and significance of current result    Plan made per [facility] anticoagulation protocol    Alondra Lora RN  Anticoagulation Clinic  3/21/2022    _______________________________________________________________________     Anticoagulation Episode Summary     Current INR goal:  2.5-3.5   TTR:  45.8 % (11.7 mo)   Target end date:  Indefinite   Send INR reminders to:  [facility]    Indications    Heart valve replaced [Z95.2]  Paroxysmal atrial fibrillation (H) [I48.0]  S/P AVR (aortic valve replacement) [Z95.2]  Other pulmonary embolism without acute cor pulmonale  unspecified chronicity (H) [I26.99]  H/O aortic valve  replacement [Z95.2]           Comments:  12/15/21 - amended INR goal to 2.5 - 3.5         Anticoagulation Care Providers     Provider Role Specialty Phone number    Tito Salazar MD Referring Family Medicine [phone]    Drew Hahn MD Referring Family Medicine [phone]

## 2022-03-28 DIAGNOSIS — R13.10 DYSPHAGIA, UNSPECIFIED TYPE: ICD-10-CM

## 2022-03-28 DIAGNOSIS — I10 PRIMARY HYPERTENSION: ICD-10-CM

## 2022-03-28 DIAGNOSIS — F33.41 RECURRENT MAJOR DEPRESSIVE DISORDER, IN PARTIAL REMISSION (H): Primary | ICD-10-CM

## 2022-03-28 DIAGNOSIS — J30.2 SEASONAL ALLERGIES: ICD-10-CM

## 2022-03-28 DIAGNOSIS — E11.9 TYPE 2 DIABETES MELLITUS WITHOUT COMPLICATION, WITHOUT LONG-TERM CURRENT USE OF INSULIN (H): ICD-10-CM

## 2022-03-28 RX ORDER — BUPROPION HYDROCHLORIDE 150 MG/1
150 TABLET ORAL DAILY
Qty: 90 TABLET | Refills: 3 | Status: SHIPPED | OUTPATIENT
Start: 2022-03-28 | End: 2022-05-08

## 2022-03-28 RX ORDER — METFORMIN HCL 500 MG
500 TABLET, EXTENDED RELEASE 24 HR ORAL DAILY
Qty: 90 TABLET | Refills: 3 | Status: SHIPPED | OUTPATIENT
Start: 2022-03-28 | End: 2023-04-19

## 2022-03-28 RX ORDER — LORATADINE 10 MG/1
10 TABLET ORAL DAILY
Qty: 90 TABLET | Refills: 3 | Status: SHIPPED | OUTPATIENT
Start: 2022-03-28 | End: 2024-03-29

## 2022-03-28 RX ORDER — METOPROLOL TARTRATE 25 MG/1
25 TABLET, FILM COATED ORAL 2 TIMES DAILY
Qty: 180 TABLET | Refills: 3 | Status: SHIPPED | OUTPATIENT
Start: 2022-03-28 | End: 2022-05-04

## 2022-03-28 RX ORDER — LISINOPRIL 5 MG/1
5 TABLET ORAL DAILY
Qty: 90 TABLET | Refills: 3 | Status: SHIPPED | OUTPATIENT
Start: 2022-03-28 | End: 2022-05-04

## 2022-03-28 NOTE — TELEPHONE ENCOUNTER
"Incoming call from patient requesting to talk to a nurse for refills. I let pt know I can take down the message and medications of what she needs refills on and send to the provider's team. Pt very hesitant. Pt states \"I have a long list of medications that needs to be filled and the pharmacy is changed. I was told to call my doctor's office and new prescriptions are needed to be sent to the new pharmacy.\" I let pt know I can note down what she needs and we can send to the new pharmacy. Pt kept going back and forth and declined to provided what she is needing refills on. I let her know I will send her message to the provider's team and one of the clinical assistants will reach out to her. Pt states understanding and will wait for a call back.     " Medical Necessity Statement: Based on my medical judgement, Mohs surgery is the most appropriate treatment for this cancer compared to other treatments.

## 2022-04-01 ENCOUNTER — TRANSFERRED RECORDS (OUTPATIENT)
Dept: HEALTH INFORMATION MANAGEMENT | Facility: CLINIC | Age: 67
End: 2022-04-01
Payer: MEDICARE

## 2022-04-01 LAB — RETINOPATHY: NEGATIVE

## 2022-04-07 ENCOUNTER — OFFICE VISIT (OUTPATIENT)
Dept: FAMILY MEDICINE | Facility: CLINIC | Age: 67
End: 2022-04-07
Payer: COMMERCIAL

## 2022-04-07 VITALS
OXYGEN SATURATION: 97 % | DIASTOLIC BLOOD PRESSURE: 71 MMHG | HEART RATE: 85 BPM | HEIGHT: 70 IN | BODY MASS INDEX: 34.93 KG/M2 | SYSTOLIC BLOOD PRESSURE: 124 MMHG | WEIGHT: 244 LBS

## 2022-04-07 DIAGNOSIS — R71.8 ELEVATED MCV: ICD-10-CM

## 2022-04-07 DIAGNOSIS — G35 RELAPSING REMITTING MULTIPLE SCLEROSIS (H): ICD-10-CM

## 2022-04-07 DIAGNOSIS — E11.9 TYPE 2 DIABETES MELLITUS WITHOUT COMPLICATION, WITHOUT LONG-TERM CURRENT USE OF INSULIN (H): ICD-10-CM

## 2022-04-07 DIAGNOSIS — M54.50 RIGHT-SIDED LOW BACK PAIN WITHOUT SCIATICA, UNSPECIFIED CHRONICITY: Primary | ICD-10-CM

## 2022-04-07 DIAGNOSIS — Z78.0 POSTMENOPAUSAL STATUS: ICD-10-CM

## 2022-04-07 DIAGNOSIS — Z13.220 SCREENING FOR HYPERLIPIDEMIA: ICD-10-CM

## 2022-04-07 DIAGNOSIS — M25.551 HIP PAIN, RIGHT: ICD-10-CM

## 2022-04-07 DIAGNOSIS — R26.89 BALANCE PROBLEMS: ICD-10-CM

## 2022-04-07 PROBLEM — M62.838 MUSCLE SPASM: Status: ACTIVE | Noted: 2021-11-01

## 2022-04-07 PROBLEM — R25.1 TREMOR: Status: ACTIVE | Noted: 2021-11-01

## 2022-04-07 PROBLEM — R41.89 OTHER SYMPTOMS AND SIGNS INVOLVING COGNITIVE FUNCTIONS AND AWARENESS: Status: ACTIVE | Noted: 2021-11-01

## 2022-04-07 PROBLEM — Z91.81 HISTORY OF FALL: Status: ACTIVE | Noted: 2021-11-01

## 2022-04-07 PROBLEM — N31.9 NEUROGENIC BLADDER: Status: ACTIVE | Noted: 2021-11-01

## 2022-04-07 PROBLEM — R29.898 LEG WEAKNESS, BILATERAL: Status: ACTIVE | Noted: 2021-11-01

## 2022-04-07 PROBLEM — Z79.891 ENCOUNTER FOR LONG-TERM METHADONE USE: Status: ACTIVE | Noted: 2021-11-01

## 2022-04-07 PROBLEM — R26.81 UNSTEADY GAIT: Status: ACTIVE | Noted: 2021-11-01

## 2022-04-07 LAB
ALBUMIN SERPL-MCNC: 3.9 G/DL (ref 3.5–5)
ALP SERPL-CCNC: 104 U/L (ref 45–120)
ALT SERPL W P-5'-P-CCNC: 20 U/L (ref 0–45)
ANION GAP SERPL CALCULATED.3IONS-SCNC: 12 MMOL/L (ref 5–18)
AST SERPL W P-5'-P-CCNC: 25 U/L (ref 0–40)
BASOPHILS # BLD AUTO: 0.1 10E3/UL (ref 0–0.2)
BASOPHILS NFR BLD AUTO: 1 %
BILIRUB SERPL-MCNC: 0.6 MG/DL (ref 0–1)
BUN SERPL-MCNC: 15 MG/DL (ref 8–22)
CALCIUM SERPL-MCNC: 9.5 MG/DL (ref 8.5–10.5)
CHLORIDE BLD-SCNC: 103 MMOL/L (ref 98–107)
CHOLEST SERPL-MCNC: 219 MG/DL
CO2 SERPL-SCNC: 29 MMOL/L (ref 22–31)
CREAT SERPL-MCNC: 0.91 MG/DL (ref 0.6–1.1)
EOSINOPHIL # BLD AUTO: 0.3 10E3/UL (ref 0–0.7)
EOSINOPHIL NFR BLD AUTO: 4 %
ERYTHROCYTE [DISTWIDTH] IN BLOOD BY AUTOMATED COUNT: 13.7 % (ref 10–15)
FASTING STATUS PATIENT QL REPORTED: NO
GFR SERPL CREATININE-BSD FRML MDRD: 70 ML/MIN/1.73M2
GLUCOSE BLD-MCNC: 119 MG/DL (ref 70–125)
HBA1C MFR BLD: 6.1 % (ref 0–5.6)
HCT VFR BLD AUTO: 41.4 % (ref 35–47)
HDLC SERPL-MCNC: 62 MG/DL
HGB BLD-MCNC: 13.1 G/DL (ref 11.7–15.7)
IMM GRANULOCYTES # BLD: 0 10E3/UL
IMM GRANULOCYTES NFR BLD: 0 %
LDLC SERPL CALC-MCNC: 110 MG/DL
LYMPHOCYTES # BLD AUTO: 1.4 10E3/UL (ref 0.8–5.3)
LYMPHOCYTES NFR BLD AUTO: 18 %
MAGNESIUM SERPL-MCNC: 1.8 MG/DL (ref 1.8–2.6)
MCH RBC QN AUTO: 29.5 PG (ref 26.5–33)
MCHC RBC AUTO-ENTMCNC: 31.6 G/DL (ref 31.5–36.5)
MCV RBC AUTO: 93 FL (ref 78–100)
MONOCYTES # BLD AUTO: 0.6 10E3/UL (ref 0–1.3)
MONOCYTES NFR BLD AUTO: 7 %
NEUTROPHILS # BLD AUTO: 5.6 10E3/UL (ref 1.6–8.3)
NEUTROPHILS NFR BLD AUTO: 70 %
PLATELET # BLD AUTO: 356 10E3/UL (ref 150–450)
POTASSIUM BLD-SCNC: 4.1 MMOL/L (ref 3.5–5)
PROT SERPL-MCNC: 6.6 G/DL (ref 6–8)
RBC # BLD AUTO: 4.44 10E6/UL (ref 3.8–5.2)
SODIUM SERPL-SCNC: 144 MMOL/L (ref 136–145)
TRIGL SERPL-MCNC: 234 MG/DL
WBC # BLD AUTO: 8 10E3/UL (ref 4–11)

## 2022-04-07 PROCEDURE — 99214 OFFICE O/P EST MOD 30 MIN: CPT | Performed by: FAMILY MEDICINE

## 2022-04-07 PROCEDURE — 91305 COVID-19,PF,PFIZER (12+ YRS): CPT | Performed by: FAMILY MEDICINE

## 2022-04-07 PROCEDURE — 0054A COVID-19,PF,PFIZER (12+ YRS): CPT | Performed by: FAMILY MEDICINE

## 2022-04-07 PROCEDURE — 80053 COMPREHEN METABOLIC PANEL: CPT | Performed by: FAMILY MEDICINE

## 2022-04-07 PROCEDURE — 85025 COMPLETE CBC W/AUTO DIFF WBC: CPT | Performed by: FAMILY MEDICINE

## 2022-04-07 PROCEDURE — 83036 HEMOGLOBIN GLYCOSYLATED A1C: CPT | Performed by: FAMILY MEDICINE

## 2022-04-07 PROCEDURE — 36415 COLL VENOUS BLD VENIPUNCTURE: CPT | Performed by: FAMILY MEDICINE

## 2022-04-07 PROCEDURE — 83735 ASSAY OF MAGNESIUM: CPT | Performed by: FAMILY MEDICINE

## 2022-04-07 PROCEDURE — 80061 LIPID PANEL: CPT | Performed by: FAMILY MEDICINE

## 2022-04-07 NOTE — PROGRESS NOTES
Assessment & Plan     ICD-10-CM    1. Right-sided low back pain without sciatica, unspecified chronicity  M54.50 diclofenac (VOLTAREN) 1 % topical gel     CBC with platelets and differential     CBC with platelets and differential     Magnesium     Physical Therapy Referral   2. Type 2 diabetes mellitus without complication, without long-term current use of insulin (H)  E11.9 OPTOMETRY REFERRAL     HEMOGLOBIN A1C     Comprehensive metabolic panel (BMP + Alb, Alk Phos, ALT, AST, Total. Bili, TP)     HEMOGLOBIN A1C     Comprehensive metabolic panel (BMP + Alb, Alk Phos, ALT, AST, Total. Bili, TP)   3. Screening for hyperlipidemia  Z13.220 Lipid panel reflex to direct LDL Non-fasting     Lipid panel reflex to direct LDL Non-fasting   4. Hip pain, right  M25.551 Physical Therapy Referral   5. Postmenopausal status  Z78.0 DX Hip/Pelvis/Spine   6. Elevated MCV  R71.8    7. Balance problems  R26.89 Physical Therapy Referral   8. Relapsing remitting multiple sclerosis (H)  G35 Physical Therapy Referral     Medical decision making: Patient is today of her right low back pain and right hip pain.  She informs me that she has been told in the past she may need a hip replacement but she does not want to pursue any such surgical options.  She would like a topical medication.  She informs me the pain is not all the time.  Sometimes her legs snaps out which she thinks may be from her MS.  She has not talked to the neurologist about it yet.  I reviewed her previous x-rays which show sclerotic changes in the past.  She declines any imaging unless and until symptoms get worse.  X-rays are deferred today.  I discussed that she will benefit from physical therapy.  This is due to the fact that she has poor balance from her MS.  Patient is agreeable and a referral is done.  She does find transportation an issue but if the sessions are scheduled ahead of time, she can manage.  Reviewed about pursuing a DEXA scan noting that patient is  "mostly sedentary.  The last DEXA scan was in 2014.  Patient agreeable to pursue this.  She denies any concerns with her diabetes.  Screening labs as above.  Noted that patient has some elevated MCV.  She declines that she uses any alcohol.  We will keep a check on this number and they will recheck today.    Patient wanted to ask about magnesium supplement.  She has been on this in the past.  I discussed that usually is not needed and sometimes can have some relief with restless legs and muscle pain.  However, I will check her magnesium.  Discussed side effect of loose stools.  Recommend vitamin D and calcium supplement.     BMI:   Estimated body mass index is 35.01 kg/m  as calculated from the following:    Height as of this encounter: 1.778 m (5' 10\").    Weight as of this encounter: 110.7 kg (244 lb).       MEDICATIONS:   Orders Placed This Encounter   Medications     diclofenac (VOLTAREN) 1 % topical gel     Sig: Apply 4 g topically 4 times daily     Dispense:  350 g     Refill:  0          - Continue other medications without change  See Patient Instructions    Return in about 6 months (around 10/7/2022) for Routine preventive.    Drew Hahn MD  [facility]    Subjective    Chief Complaint   Patient presents with     Musculoskeletal Problem     Right hip pain, no known injury, off and on for a couple years, has had some shots in the past for this. Becoming more bothersome.        Marilee is a 66 year old who presents for the following health issues     History of Present Illness       Back Pain:  She presents for follow up of back pain. Patient's back pain is a recurring problem.  Location of back pain:  Right lower back and right hip  Description of back pain: cramping, sharp and shooting  Back pain spreads: right thigh    Since patient first noticed back pain, pain is: gradually worsening  Does back pain interfere with her job:  Not applicable      Reason for visit:  Right " "side pain in hiparea along with stiffness  Symptom onset:  More than a month  Symptoms include:  Stiff and painful in lower back and hip area  have had along time it just is lasting longer and stronger thls is on my rightside  Symptom intensity:  Severe  Symptom progression:  Worsening  Had these symptoms before:  Yes  Has tried/received treatment for these symptoms:  Yes  Previous treatment was successful:  Yes  Prior treatment description:  Shot in the loer back area not sure what called  What makes it worse:  Movement  What makes it better:  Shifting  and redistributing and loosening the muscle    She eats 0-1 servings of fruits and vegetables daily.She consumes 0 sweetened beverage(s) daily.She exercises with enough effort to increase her heart rate 9 or less minutes per day.  She exercises with enough effort to increase her heart rate 3 or less days per week.   She is taking medications regularly.           Review of Systems   Constitutional, HEENT, cardiovascular, pulmonary, gi and gu systems are negative, except as otherwise noted.      Objective    /71 (BP Location: Right arm, Patient Position: Sitting, Cuff Size: Adult Regular)   Pulse 85   Ht 1.778 m (5' 10\")   Wt 110.7 kg (244 lb)   SpO2 97%   BMI 35.01 kg/m    Body mass index is 35.01 kg/m .  Physical Exam   Noted patient in her usual state of health.  She does have some abnormal movement and tremors.  Area of concern is right paraspinal.  There is some tenderness and patient feels stiffness.  No tenderness on the greater trochanter.  Hip exam is equivocal due to patient stiffness and abnormal movements.            "

## 2022-04-07 NOTE — PATIENT INSTRUCTIONS
Patient Education     Understanding Osteoarthritis of the Hip     A joint is a place where 2 bones meet. The hip is a ball-and-socket joint. It is formed where the ball at the top of the thighbone (femur) rests in the socket in the pelvic bone. The hip joint allows the leg to move freely in many directions.  Hip osteoarthritis is a condition where parts of the hip joint wear out. It can lead to pain, stiffness, and limited movement.   What is osteoarthritis?  All joints contain a smooth tissue called cartilage. Cartilage cushions the ends of bones, helping them glide against each other. Hip osteoarthritis occurs when cartilage in the hip joint starts to break down and wear away. The ball and socket bones may then become exposed and rub together. The cartilage may become rough and pitted. It may start to wear away. This prevents smooth movement of the joint and can lead to pain.  Causes of osteoarthritis of the hip  Causes can include:    Wear and tear from normal use of the hip over time    Overuse of the hip during sports or work activities    Being overweight. This increases stress on the hip joint.    Injury to the hip    Infection of the hip joint  Symptoms of osteoarthritis of the hip  The main symptom of hip osteoarthritis is pain. The pain is most often felt in the groin area. It may also travel down the leg to the knee or to the back of the hip. The pain usually gets worse with activity, such as walking or climbing stairs. The pain may get better with rest. The joint may also be stiff first thing in the morning or after periods of sitting or lying down. Stiffness usually gets better with movement.  Treating osteoarthritis of the hip  Osteoarthritis is a long-term (chronic) condition. Treatment usually focuses on managing symptoms. Treatment may include:    Over-the-counter or prescription medicines taken by mouth to help relieve pain and swelling    Shots of medicine into the joint to help ease symptoms for  a time    A weight-loss plan if you are overweight    A plan of physical therapy and exercises to improve strength and flexibility around the joint    Cold or heat packs help ease pain and stiffness    Assistive devices that help with movement, such as a cane or a walker    Assistive devices that make activities of daily life easier, such as raised toilet seats or shower bars  If other treatments don t do enough to ease severe symptoms, you may need surgery to replace the joint. This surgery replaces the hip joint with an artificial joint. It can help relieve pain and stiffness and improve function of the hip.  When to call your healthcare provider  Call your healthcare provider right away if you have any of these:    Fever of 100.4 F (38 C) or higher, or as directed by your healthcare provider    Symptoms that don t get better with prescribed medicines or get worse    New symptoms  Floyd last reviewed this educational content on 7/1/2018 2000-2021 [employer]. All rights reserved. This information is not intended as a substitute for professional medical care. Always follow your healthcare professional's instructions.

## 2022-04-12 ENCOUNTER — LAB (OUTPATIENT)
Dept: LAB | Facility: CLINIC | Age: 67
End: 2022-04-12
Payer: COMMERCIAL

## 2022-04-12 ENCOUNTER — ANTICOAGULATION THERAPY VISIT (OUTPATIENT)
Dept: ANTICOAGULATION | Facility: CLINIC | Age: 67
End: 2022-04-12

## 2022-04-12 DIAGNOSIS — I48.0 PAROXYSMAL ATRIAL FIBRILLATION (H): ICD-10-CM

## 2022-04-12 DIAGNOSIS — I26.99 OTHER PULMONARY EMBOLISM WITHOUT ACUTE COR PULMONALE, UNSPECIFIED CHRONICITY (H): ICD-10-CM

## 2022-04-12 DIAGNOSIS — Z95.2 HEART VALVE REPLACED: Primary | ICD-10-CM

## 2022-04-12 DIAGNOSIS — Z95.2 H/O AORTIC VALVE REPLACEMENT: ICD-10-CM

## 2022-04-12 DIAGNOSIS — Z95.2 S/P AVR (AORTIC VALVE REPLACEMENT): ICD-10-CM

## 2022-04-12 LAB — INR BLD: 3.8 (ref 0.9–1.1)

## 2022-04-12 PROCEDURE — 36416 COLLJ CAPILLARY BLOOD SPEC: CPT

## 2022-04-12 PROCEDURE — 85610 PROTHROMBIN TIME: CPT

## 2022-04-12 NOTE — PROGRESS NOTES
ANTICOAGULATION MANAGEMENT     Eileen Donald 66 year old female is on warfarin with supratherapeutic INR result. (Goal INR 2.5-3.5)    Recent labs: (last 7 days)     04/12/22  1327   INR 3.8*       ASSESSMENT       Source(s): Chart Review and Patient/Caregiver Call       Warfarin doses taken: Warfarin taken as instructed    Diet: Increased greens/vitamin K in diet (patient reported having one more serving); plans to resume previous intake     New illness, injury, or hospitalization: Yes: ongoing arthritis at right hip    Medication/supplement changes: Diclofenac (topical cream) as needed use - which has potential for interaction; increasing INR    Vitamin D3 1000 mg OTC - no interactions anticipated    Vitamin C 1000 mg OTC -  no interactions anticipated    Tylenol prn + Plans to start taking a daily Centrum Multi-vitamin - advised to avoid one with Vit K     Signs or symptoms of bleeding or clotting: No    Previous INR: Therapeutic last 2 visits    Additional findings: Physical therapy starting next week for hip strengthening & balance       PLAN     Recommended plan for temporary change(s) and ongoing change(s) affecting INR     Dosing Instructions: partial hold then continue your current warfarin dose with next INR in 2 weeks       Summary  As of 4/12/2022    Full warfarin instructions:  4/12: 2.5 mg; Otherwise 5 mg every Tue, Thu, Sat; 2.5 mg all other days   Next INR check:  4/26/2022             Telephone call with Marilee who verbalizes understanding and agrees to plan    Lab visit scheduled    Education provided: Please call back if any changes to your diet, medications or how you've been taking warfarin, Monitoring for bleeding signs and symptoms, When to seek medical attention/emergency care and Importance of notifying clinic for changes in medications; a sooner lab recheck maybe needed.    Plan made per [facility] anticoagulation protocol    Raissa Hadley, MJ  Anticoagulation  Clinic  4/12/2022    _______________________________________________________________________     Anticoagulation Episode Summary     Current INR goal:  2.5-3.5   TTR:  46.0 % (11.7 mo)   Target end date:  Indefinite   Send INR reminders to:  [facility]    Indications    Heart valve replaced [Z95.2]  Paroxysmal atrial fibrillation (H) [I48.0]  S/P AVR (aortic valve replacement) [Z95.2]  Other pulmonary embolism without acute cor pulmonale  unspecified chronicity (H) [I26.99]  H/O aortic valve replacement [Z95.2]           Comments:  12/15/21 - amended INR goal to 2.5 - 3.5         Anticoagulation Care Providers     Provider Role Specialty Phone number    Tito Salazar MD Referring Family Medicine [phone]    Drew Hahn MD Referring Family Medicine [phone]

## 2022-04-26 ENCOUNTER — ANTICOAGULATION THERAPY VISIT (OUTPATIENT)
Dept: ANTICOAGULATION | Facility: CLINIC | Age: 67
End: 2022-04-26

## 2022-04-26 ENCOUNTER — LAB (OUTPATIENT)
Dept: LAB | Facility: CLINIC | Age: 67
End: 2022-04-26

## 2022-04-26 ENCOUNTER — ANCILLARY PROCEDURE (OUTPATIENT)
Dept: BONE DENSITY | Facility: CLINIC | Age: 67
End: 2022-04-26
Attending: FAMILY MEDICINE
Payer: COMMERCIAL

## 2022-04-26 DIAGNOSIS — Z95.2 H/O AORTIC VALVE REPLACEMENT: ICD-10-CM

## 2022-04-26 DIAGNOSIS — I26.99 OTHER PULMONARY EMBOLISM WITHOUT ACUTE COR PULMONALE, UNSPECIFIED CHRONICITY (H): ICD-10-CM

## 2022-04-26 DIAGNOSIS — Z95.2 S/P AVR (AORTIC VALVE REPLACEMENT): ICD-10-CM

## 2022-04-26 DIAGNOSIS — I48.0 PAROXYSMAL ATRIAL FIBRILLATION (H): ICD-10-CM

## 2022-04-26 DIAGNOSIS — Z78.0 POSTMENOPAUSAL STATUS: ICD-10-CM

## 2022-04-26 DIAGNOSIS — Z95.2 HEART VALVE REPLACED: Primary | ICD-10-CM

## 2022-04-26 LAB — INR BLD: 5.7 (ref 0.9–1.1)

## 2022-04-26 PROCEDURE — 85610 PROTHROMBIN TIME: CPT

## 2022-04-26 PROCEDURE — 77080 DXA BONE DENSITY AXIAL: CPT | Mod: TC | Performed by: RADIOLOGY

## 2022-04-26 PROCEDURE — 36416 COLLJ CAPILLARY BLOOD SPEC: CPT

## 2022-04-26 NOTE — PROGRESS NOTES
ANTICOAGULATION MANAGEMENT     Eileen Donald 66 year old female is on warfarin with supratherapeutic INR result. (Goal INR 2.5-3.5)    Recent labs: (last 7 days)     04/26/22  1343   INR 5.7*       ASSESSMENT       Source(s): Chart Review, Patient/Caregiver Call and Template       Warfarin doses taken: Warfarin taken differently, but did not change total weekly dose    She reported possibly mixing her warfarin and might have double dose, however, she is not sure.    Diet: No new diet changes identified    New illness, injury, or hospitalization: Yes:    Ongoing right hip and back pains that comes and goes.   Was told in the past she would need right hip replacement, however, she does not want to pursue any surgery.    Medication/supplement changes:  Yes.    Diclofenac topical 1% apply 4x per day.  Concurrent use of ANTICOAGULANTS and NSAIDS may result in increased risk of bleeding.    Signs or symptoms of bleeding or clotting: No    Previous INR: Supratherapeutic at 3.8 on 4/12/22.    Additional findings: None       PLAN     Unable to reach Marilee today.    Left message to hold warfarin tonight. Request call back for assessment.    - advised to follow extra safety precautions, however, if any FALLS incurred that involves hitting on the ground or any hard surfaces, advised to go directly to ED for evaluation.    Follow up required to confirm warfarin dose taken and assess for changes     Consulted [facility] with warfarin dosing.    Alondra Lora RN  Anticoagulation Clinic  4/26/2022

## 2022-04-27 NOTE — PROGRESS NOTES
Called and talked with Marilee,     - she reported getting message last night to HOLD warfarin, which she held.     - she did report - possiblly double dosing on her warfarin, however, she was not sure.     - she uses Voltaren gel every other day for the last 3 days for hip pains.     - she did get pelvis / hip / spine x-ray 4/26/22.    IMPRESSION: Low bone density (OSTEOPENIA).      - instructed new warfarin dose.     - scheduled INR on 4/29/22 @ [facility].

## 2022-04-29 ENCOUNTER — LAB (OUTPATIENT)
Dept: LAB | Facility: HOSPITAL | Age: 67
End: 2022-04-29
Payer: COMMERCIAL

## 2022-04-29 ENCOUNTER — ANTICOAGULATION THERAPY VISIT (OUTPATIENT)
Dept: ANTICOAGULATION | Facility: CLINIC | Age: 67
End: 2022-04-29

## 2022-04-29 DIAGNOSIS — Z95.2 HEART VALVE REPLACED: Primary | ICD-10-CM

## 2022-04-29 DIAGNOSIS — Z95.2 H/O AORTIC VALVE REPLACEMENT: ICD-10-CM

## 2022-04-29 DIAGNOSIS — I48.0 PAROXYSMAL ATRIAL FIBRILLATION (H): Primary | ICD-10-CM

## 2022-04-29 DIAGNOSIS — I48.0 PAROXYSMAL ATRIAL FIBRILLATION (H): ICD-10-CM

## 2022-04-29 DIAGNOSIS — I26.99 OTHER PULMONARY EMBOLISM WITHOUT ACUTE COR PULMONALE, UNSPECIFIED CHRONICITY (H): ICD-10-CM

## 2022-04-29 DIAGNOSIS — Z95.2 S/P AVR (AORTIC VALVE REPLACEMENT): ICD-10-CM

## 2022-04-29 LAB — INR PPP: 1.8 (ref 0.85–1.15)

## 2022-04-29 PROCEDURE — 85610 PROTHROMBIN TIME: CPT

## 2022-04-29 PROCEDURE — 36415 COLL VENOUS BLD VENIPUNCTURE: CPT

## 2022-05-03 ENCOUNTER — TRANSFERRED RECORDS (OUTPATIENT)
Dept: HEALTH INFORMATION MANAGEMENT | Facility: CLINIC | Age: 67
End: 2022-05-03
Payer: COMMERCIAL

## 2022-05-04 DIAGNOSIS — F33.41 RECURRENT MAJOR DEPRESSIVE DISORDER, IN PARTIAL REMISSION (H): ICD-10-CM

## 2022-05-04 DIAGNOSIS — R13.10 DYSPHAGIA, UNSPECIFIED TYPE: ICD-10-CM

## 2022-05-04 DIAGNOSIS — I10 PRIMARY HYPERTENSION: ICD-10-CM

## 2022-05-08 RX ORDER — BUPROPION HYDROCHLORIDE 150 MG/1
150 TABLET ORAL DAILY
Qty: 90 TABLET | Refills: 3 | Status: SHIPPED | OUTPATIENT
Start: 2022-05-08 | End: 2023-05-04

## 2022-05-08 RX ORDER — VENLAFAXINE HYDROCHLORIDE 75 MG/1
CAPSULE, EXTENDED RELEASE ORAL
Qty: 90 CAPSULE | Refills: 3 | Status: SHIPPED | OUTPATIENT
Start: 2022-05-08 | End: 2022-07-20

## 2022-05-08 RX ORDER — METOPROLOL TARTRATE 25 MG/1
25 TABLET, FILM COATED ORAL 2 TIMES DAILY
Qty: 180 TABLET | Refills: 3 | Status: SHIPPED | OUTPATIENT
Start: 2022-05-08 | End: 2023-04-14

## 2022-05-08 RX ORDER — LISINOPRIL 5 MG/1
5 TABLET ORAL DAILY
Qty: 90 TABLET | Refills: 3 | Status: SHIPPED | OUTPATIENT
Start: 2022-05-08 | End: 2022-10-31

## 2022-05-09 ENCOUNTER — LAB (OUTPATIENT)
Dept: LAB | Facility: CLINIC | Age: 67
End: 2022-05-09
Payer: COMMERCIAL

## 2022-05-09 ENCOUNTER — ANTICOAGULATION THERAPY VISIT (OUTPATIENT)
Dept: ANTICOAGULATION | Facility: CLINIC | Age: 67
End: 2022-05-09

## 2022-05-09 DIAGNOSIS — Z95.2 H/O AORTIC VALVE REPLACEMENT: ICD-10-CM

## 2022-05-09 DIAGNOSIS — I48.0 PAROXYSMAL ATRIAL FIBRILLATION (H): ICD-10-CM

## 2022-05-09 DIAGNOSIS — Z95.2 HEART VALVE REPLACED: Primary | ICD-10-CM

## 2022-05-09 DIAGNOSIS — I26.99 OTHER PULMONARY EMBOLISM WITHOUT ACUTE COR PULMONALE, UNSPECIFIED CHRONICITY (H): ICD-10-CM

## 2022-05-09 DIAGNOSIS — Z95.2 S/P AVR (AORTIC VALVE REPLACEMENT): ICD-10-CM

## 2022-05-09 LAB — INR BLD: 3.2 (ref 0.9–1.1)

## 2022-05-09 PROCEDURE — 85610 PROTHROMBIN TIME: CPT

## 2022-05-09 PROCEDURE — 36416 COLLJ CAPILLARY BLOOD SPEC: CPT

## 2022-05-09 NOTE — PROGRESS NOTES
ANTICOAGULATION MANAGEMENT     Eileen Donald 66 year old female is on warfarin with therapeutic INR result. (Goal INR 2.5-3.5)    Recent labs: (last 7 days)     05/09/22  1252   INR 3.2*       ASSESSMENT       Source(s): Chart Review, Patient/Caregiver Call and Template       Warfarin doses taken: Warfarin taken as instructed    Diet: No new diet changes identified    New illness, injury, or hospitalization: Yes:    Osteoarthritis / MS.    Medication/supplement changes:  Yes.    Reported taking Tylenol BID for the last 3 days.   Continues with Voltaren gel BID / PRN for real bad back / hip pains.    Signs or symptoms of bleeding or clotting: No    Previous INR: Subtherapeutic at 1.80 on 4/29/22.    Additional findings: None       PLAN     Recommended plan for ongoing change(s) affecting INR     Dosing Instructions:   (evenings. 2.5mg tabs)    continue your current warfarin dose with next INR in 2 weeks       Summary  As of 5/9/2022    Full warfarin instructions:  5 mg every Tue, Fri; 2.5 mg all other days   Next INR check:  5/23/2022             Telephone call with  Marilee ([phone]) who verbalizes understanding and agrees to plan    Lab visit scheduled - INR on 5/24/22 @ [facility].    Education provided: Importance of consistent vitamin K intake and Goal range and significance of current result    Plan made per [facility] anticoagulation protocol    Alondra Lora RN  Anticoagulation Clinic  5/9/2022    _______________________________________________________________________     Anticoagulation Episode Summary     Current INR goal:  2.5-3.5   TTR:  46.2 % (11.7 mo)   Target end date:  Indefinite   Send IN[facility] reminders to:  [facility]    Indications    Heart valve replaced [Z95.2]  Paroxysmal atrial fibrillation (H) [I48.0]  S/P AVR (aortic valve replacement) [Z95.2]  Other pulmonary embolism without acute cor pulmonale  unspecified chronicity (H) [I26.99]  H/O aortic valve replacement [Z95.2]            Comments:  12/15/21 - amended INR goal to 2.5 - 3.5         Anticoagulation Care Providers     Provider Role Specialty Phone number    Tito Salazar MD Referring Family Medicine [phone]    Drew Hahn MD Referring Family Medicine [phone]

## 2022-05-24 ENCOUNTER — LAB (OUTPATIENT)
Dept: LAB | Facility: CLINIC | Age: 67
End: 2022-05-24

## 2022-05-24 ENCOUNTER — ANTICOAGULATION THERAPY VISIT (OUTPATIENT)
Dept: ANTICOAGULATION | Facility: CLINIC | Age: 67
End: 2022-05-24

## 2022-05-24 DIAGNOSIS — Z95.2 H/O AORTIC VALVE REPLACEMENT: ICD-10-CM

## 2022-05-24 DIAGNOSIS — Z95.2 S/P AVR (AORTIC VALVE REPLACEMENT): ICD-10-CM

## 2022-05-24 DIAGNOSIS — I48.0 PAROXYSMAL ATRIAL FIBRILLATION (H): ICD-10-CM

## 2022-05-24 DIAGNOSIS — I26.99 OTHER PULMONARY EMBOLISM WITHOUT ACUTE COR PULMONALE, UNSPECIFIED CHRONICITY (H): ICD-10-CM

## 2022-05-24 DIAGNOSIS — Z95.2 HEART VALVE REPLACED: Primary | ICD-10-CM

## 2022-05-24 LAB — INR BLD: 2.1 (ref 0.9–1.1)

## 2022-05-24 PROCEDURE — 85610 PROTHROMBIN TIME: CPT

## 2022-05-24 PROCEDURE — 36416 COLLJ CAPILLARY BLOOD SPEC: CPT

## 2022-05-24 NOTE — PROGRESS NOTES
ANTICOAGULATION MANAGEMENT     Eileen Donald 66 year old female is on warfarin with subtherapeutic INR result. (Goal INR 2.5-3.5)    Recent labs: (last 7 days)     05/24/22  1549   INR 2.1*       ASSESSMENT       Source(s): Chart Review and Patient/Caregiver Call       Warfarin doses taken: Warfarin taken as instructed    Diet: No new diet changes identified    New illness, injury, or hospitalization: No    Medication/supplement changes: None noted    Signs or symptoms of bleeding or clotting: No    Previous INR: Therapeutic last visit; previously outside of goal range    Additional findings: None       PLAN     Recommended plan for no diet, medication or health factor changes affecting INR     Dosing Instructions: Increase your warfarin dose (11.1% change) with next INR in 2 weeks       Summary  As of 5/24/2022    Full warfarin instructions:  5 mg every Tue, Thu, Sat; 2.5 mg all other days   Next INR check:  6/7/2022             Telephone call with Marilee who verbalizes understanding and agrees to plan    Lab visit scheduled    Education provided: None required    Plan made per [facility] anticoagulation protocol    Dany Cramer RN  Anticoagulation Clinic  5/24/2022    _______________________________________________________________________     Anticoagulation Episode Summary     Current INR goal:  2.5-3.5   TTR:  44.6 % (11.7 mo)   Target end date:  Indefinite   Send INR reminders to:  [facility]    Indications    Heart valve replaced [Z95.2]  Paroxysmal atrial fibrillation (H) [I48.0]  S/P AVR (aortic valve replacement) [Z95.2]  Other pulmonary embolism without acute cor pulmonale  unspecified chronicity (H) [I26.99]  H/O aortic valve replacement [Z95.2]           Comments:  12/15/21 - amended INR goal to 2.5 - 3.5         Anticoagulation Care Providers     Provider Role Specialty Phone number    Tito Salazar MD Referring Family Medicine [phone]    Drew Hahn MD Referring Family  Medicine [phone]

## 2022-06-14 ENCOUNTER — TELEPHONE (OUTPATIENT)
Dept: ANTICOAGULATION | Facility: CLINIC | Age: 67
End: 2022-06-14
Payer: COMMERCIAL

## 2022-06-14 DIAGNOSIS — E78.5 DYSLIPIDEMIA: Primary | ICD-10-CM

## 2022-06-14 RX ORDER — SIMVASTATIN 20 MG
20 TABLET ORAL AT BEDTIME
Qty: 90 TABLET | Refills: 1 | Status: SHIPPED | OUTPATIENT
Start: 2022-06-14 | End: 2022-10-07

## 2022-06-14 NOTE — TELEPHONE ENCOUNTER
Pending Prescriptions:                       Disp   Refills    simvastatin (ZOCOR) 20 MG tablet          90 tab*1            Sig: Take 1 tablet (20 mg) by mouth At Bedtime

## 2022-06-14 NOTE — TELEPHONE ENCOUNTER
ANTICOAGULATION     Eileen Donald is overdue for INR check.      Spoke with Marilee and scheduled lab appointment on 6/21    Alondra Lora RN

## 2022-06-21 ENCOUNTER — ANTICOAGULATION THERAPY VISIT (OUTPATIENT)
Dept: ANTICOAGULATION | Facility: CLINIC | Age: 67
End: 2022-06-21

## 2022-06-21 ENCOUNTER — HOSPITAL ENCOUNTER (OUTPATIENT)
Dept: PHYSICAL THERAPY | Facility: REHABILITATION | Age: 67
Discharge: HOME | End: 2022-06-21

## 2022-06-21 ENCOUNTER — LAB (OUTPATIENT)
Dept: LAB | Facility: CLINIC | Age: 67
End: 2022-06-21
Payer: COMMERCIAL

## 2022-06-21 DIAGNOSIS — Z95.2 H/O AORTIC VALVE REPLACEMENT: ICD-10-CM

## 2022-06-21 DIAGNOSIS — I48.0 PAROXYSMAL ATRIAL FIBRILLATION (H): ICD-10-CM

## 2022-06-21 DIAGNOSIS — Z95.2 S/P AVR (AORTIC VALVE REPLACEMENT): ICD-10-CM

## 2022-06-21 DIAGNOSIS — M25.551 HIP PAIN, RIGHT: ICD-10-CM

## 2022-06-21 DIAGNOSIS — R29.818 DIFFICULTY BALANCING: ICD-10-CM

## 2022-06-21 DIAGNOSIS — Z95.2 HEART VALVE REPLACED: Primary | ICD-10-CM

## 2022-06-21 DIAGNOSIS — I26.99 OTHER PULMONARY EMBOLISM WITHOUT ACUTE COR PULMONALE, UNSPECIFIED CHRONICITY (H): ICD-10-CM

## 2022-06-21 DIAGNOSIS — M54.50 CHRONIC RIGHT-SIDED LOW BACK PAIN WITHOUT SCIATICA: ICD-10-CM

## 2022-06-21 DIAGNOSIS — G89.29 CHRONIC RIGHT-SIDED LOW BACK PAIN WITHOUT SCIATICA: ICD-10-CM

## 2022-06-21 DIAGNOSIS — R29.898 WEAKNESS OF BOTH LOWER EXTREMITIES: Primary | ICD-10-CM

## 2022-06-21 LAB — INR BLD: 3.5 (ref 0.9–1.1)

## 2022-06-21 PROCEDURE — 97110 THERAPEUTIC EXERCISES: CPT | Mod: GP | Performed by: PHYSICAL THERAPIST

## 2022-06-21 PROCEDURE — 97161 PT EVAL LOW COMPLEX 20 MIN: CPT | Mod: GP | Performed by: PHYSICAL THERAPIST

## 2022-06-21 PROCEDURE — 85610 PROTHROMBIN TIME: CPT

## 2022-06-21 PROCEDURE — 36415 COLL VENOUS BLD VENIPUNCTURE: CPT

## 2022-06-21 NOTE — PROGRESS NOTES
[facility]    OUTPATIENT PHYSICAL THERAPY ORTHOPEDIC EVALUATION  PLAN OF TREATMENT FOR OUTPATIENT REHABILITATION  (COMPLETE FOR INITIAL CLAIMS ONLY)  Patient's Last Name, First Name, M.I.  YOB: 1955  Eileen Donald    Provider s Name:  [facility]   Medical Record No.  [id number]   Start of Care Date:  06/21/22   Onset Date:   (chronic, order date 4/7/22)   Type:     _X__PT   ___OT   ___SLP Medical Diagnosis:  Right-sided low back pain without sciatica, unspecified chronicity  Hip pain, right  Balance problems  Relapsing remitting multiple sclerosis (H)     PT Diagnosis:  LE weakness, difficulty balancing and walking, right hip and back pain   Visits from SOC:  1      _________________________________________________________________________________  Plan of Treatment/Functional Goals:  joint mobilization, manual therapy, neuromuscular re-education, ROM, strengthening, stretching, balance training     TENS     Goals     Goal Description: Patient will improve TUG to <13 seconds for dec in falls risk in 12 weeks:  Target Date: 09/13/22       Goal Description: Patient will be able to do 8 sit to stands for improve LE strength and improved function in 8 weeks:  Target Date: 08/16/22       Goal Description: Patient will report being able to go up all of her steps at home, without taking a break in 8 weeks:          Goal Description: Patient will report exercising > 15 minutes each day for improved overall endurance, balance, strength, and health in 4 weeks:  Target Date: 07/19/22       Therapy Frequency:  1 time/week  Predicted Duration of Therapy Intervention:  12 visits over up to 12 weeks    Wiliam Carpenter, PT                 I CERTIFY THE NEED FOR THESE SERVICES FURNISHED UNDER        THIS PLAN OF TREATMENT AND WHILE UNDER MY CARE     (Physician co-signature  of this document indicates review and certification of the therapy plan).                     Certification Date From:  06/21/22   Certification Date To:  09/13/22    Referring Provider:  Drew Stockton MD    Initial Assessment        See Epic Evaluation Start of Care Date: 06/21/22 06/21/22 1200   General Information   Type of Visit Initial OP Ortho PT Evaluation   Start of Care Date 06/21/22   Referring Physician Drew Stockton MD   Orders Evaluate and Treat   Certification Required? Yes   Medical Diagnosis Right-sided low back pain without sciatica, unspecified chronicity  Hip pain, right  Balance problems  Relapsing remitting multiple sclerosis (H)   Surgical/Medical history reviewed Yes   Precautions/Limitations fall precautions   Body Part(s)   Body Part(s) Lumbar Spine/SI   Presentation and Etiology   Pertinent history of current problem (include personal factors and/or comorbidities that impact the POC) Pain started had aortic valve replacement surgery and was laid up for quite a while. When she got up after that had a lot of trouble moving around and a lot of effort. Was using walker. Felt 2 times in 3 months and ended up breaking her arm a couple of years ago. With poor healing lead to needing a reverse shoulder, did therapy for this. Feels limited with this functionally. During all of this started getting pain in the right hip and back, would get pain and limit her motivation to do as much. This has contributed to her being less stable. She has lost some weight. She feels that she has a fear of walking and falling. Does not use cane or walker at home, but will use when out of the house. Would like to be able to improve her walking. Most of MS effects her right side.Pain Described right sided back and lateral hip pain, can have some right foot soreness, feels like it may burst. Does have trouble identifying where foot is in place on the right. Worse with walking, getting up out of a  chair, better with nothing specific.   Onset date of current episode/exacerbation   (chronic, order date 4/7/22)   Fall Risk Screen   Fall screen completed by PT   Have you fallen 2 or more times in the past year? Yes   Have you fallen and had an injury in the past year? No   Timed Up and Go score (seconds) 16.8   Is patient a fall risk? Yes   Fall screen comments Handout on falls provided, Balance included in order   Abuse Screen (yes response referral indicated)   Feels Unsafe at Home or Work/School no   Feels Threatened by Someone no   Does Anyone Try to Keep You From Having Contact with Others or Doing Things Outside Your Home? no   Physical Signs of Abuse Present no   Patient needs abuse support services and resources No   Vitals Signs   Heart Rate 80   SpO2 98   Vital Signs Comments After 2 minute walk test 119 BPM, Blood 02 81%. 5 minutes to recover > 92%. Patient does have oxygen, but does not use   Lumbar Spine/SI Objective Findings   Gait/Locomotion slowed gait, with wide BALTAZAR, use of 4WW   Balance/Proprioception (Single Leg Stance) TUG 16.8 seconds,   Hip Flexion (L2) Strength 5/5   Knee Flexion Strength 5/5   Knee Extension (L3) Strength 5/5   Ankle Dorsiflexion (L4) Strength 5/5   Great Toe Extension (L5) Strength 5/5   Ankle Plantar Flexion (S1) Strength 5/5   Lumbar/Hip/Knee/Foot Strength Comments APTA sit to stand 30 second 4 repetitions requires UE on 2 reps   Lumbar/SI Special Tests Comments 2 Minute Walk Test 80.7 meters.   Planned Therapy Interventions   Planned Therapy Interventions joint mobilization;manual therapy;neuromuscular re-education;ROM;strengthening;stretching;balance training   Planned Modality Interventions   Planned Modality Interventions TENS   Clinical Impression   Criteria for Skilled Therapeutic Interventions Met yes, treatment indicated   PT Diagnosis LE weakness, difficulty balancing and walking, right hip and back pain   Clinical Presentation Stable/Uncomplicated   Clinical  Decision Making (Complexity) Low complexity   Therapy Frequency 1 time/week   Predicted Duration of Therapy Intervention (days/wks) 12 visits over up to 12 weeks   Risk & Benefits of therapy have been explained Yes   Patient, Family & other staff in agreement with plan of care Yes   Clinical Impression Comments Patient is a 66 year old female seen in PT for right sided low back and hip pain, as well as balance issues with h/o multiple sclerosis. She is having difficulty with walking, balancing, doing work around the house, community ambulation and participation, getting out of a chair/car, using stairs 2/2 pain, weakness, and balance. With examination, the patient demonstrates LE weakness, falls risk with decreased balance/strength, poor cardiovascular condition with decreased endurance on 2 minute walk test. The patient will be further assess for hip/back pain and static balance next session. Patient will likely benefit from skilled PT to improve her mobility, strength, pain, and overall function.   Education Assessment   Preferred Learning Style Listening;Reading;Demonstration;Pictures/video   Barriers to Learning No barriers   ORTHO GOALS   PT Ortho Eval Goals 2;1;3;4   Ortho Goal 1   Goal Description Patient will improve TUG to <13 seconds for dec in falls risk in 12 weeks:   Target Date 09/13/22   Ortho Goal 2   Goal Description Patient will be able to do 8 sit to stands for improve LE strength and improved function in 8 weeks:   Target Date 08/16/22   Ortho Goal 3   Goal Description Patient will report being able to go up all of her steps at home, without taking a break in 8 weeks:   Ortho Goal 4   Goal Description Patient will report exercising > 15 minutes each day for improved overall endurance, balance, strength, and health in 4 weeks:   Target Date 07/19/22   Total Evaluation Time   PT Eval, Low Complexity Minutes (64134) 35   Therapy Certification   Certification date from 06/21/22   Certification date  to 09/13/22   Medical Diagnosis Right-sided low back pain without sciatica, unspecified chronicity  Hip pain, right  Balance problems  Relapsing remitting multiple sclerosis (H)     Wiliam Carpenter, PT

## 2022-06-21 NOTE — PROGRESS NOTES
ANTICOAGULATION MANAGEMENT     Eileen Donald 66 year old female is on warfarin with therapeutic INR result. (Goal INR 2.5-3.5)    Recent labs: (last 7 days)     06/21/22  1408   INR 3.5*       ASSESSMENT       Source(s): Chart Review, Patient/Caregiver Call and Template       Warfarin doses taken: Warfarin taken as instructed    Diet: No new diet changes identified    New illness, injury, or hospitalization: No    Medication/supplement changes: None noted    Signs or symptoms of bleeding or clotting: No    Previous INR: Subtherapeuticat 2.1 on 5/24/22.    Additional findings: None       PLAN     Recommended plan for no diet, medication or health factor changes affecting INR     Dosing Instructions:    continue your current warfarin dose with next INR in 1-2 weeks       Summary  As of 6/21/2022    Full warfarin instructions:  5 mg every Tue, Thu, Sat; 2.5 mg all other days   Next INR check:  7/5/2022             Telephone call with  Marilee ([phone]) who verbalizes understanding and agrees to plan    Lab visit scheduled - INR on 7/5/22 @ [facility].    Education provided: Importance of consistent vitamin K intake, Impact of vitamin K foods on INR and Goal range and significance of current result    Plan made per [facility] anticoagulation protocol    Alondra Lora RN  Anticoagulation Clinic  6/21/2022    _______________________________________________________________________     Anticoagulation Episode Summary     Current INR goal:  2.5-3.5   TTR:  45.4 % (11.7 mo)   Target end date:  Indefinite   Send INR reminders to:  [facility]    Indications    Heart valve replaced [Z95.2]  Paroxysmal atrial fibrillation (H) [I48.0]  S/P AVR (aortic valve replacement) [Z95.2]  Other pulmonary embolism without acute cor pulmonale  unspecified chronicity (H) [I26.99]  H/O aortic valve replacement [Z95.2]           Comments:  12/15/21 - amended INR goal to 2.5 - 3.5         Anticoagulation Care Providers     Provider  Role Specialty Phone number    Tito Salazar MD Referring Family Medicine [phone]    Drew Hahn MD Referring Family Medicine [phone]

## 2022-06-28 ENCOUNTER — HOSPITAL ENCOUNTER (OUTPATIENT)
Dept: PHYSICAL THERAPY | Facility: REHABILITATION | Age: 67
Discharge: HOME | End: 2022-06-28
Payer: COMMERCIAL

## 2022-06-28 DIAGNOSIS — G89.29 CHRONIC RIGHT-SIDED LOW BACK PAIN WITHOUT SCIATICA: ICD-10-CM

## 2022-06-28 DIAGNOSIS — R29.818 DIFFICULTY BALANCING: ICD-10-CM

## 2022-06-28 DIAGNOSIS — M54.50 CHRONIC RIGHT-SIDED LOW BACK PAIN WITHOUT SCIATICA: ICD-10-CM

## 2022-06-28 DIAGNOSIS — M25.551 HIP PAIN, RIGHT: ICD-10-CM

## 2022-06-28 DIAGNOSIS — R29.898 WEAKNESS OF BOTH LOWER EXTREMITIES: Primary | ICD-10-CM

## 2022-06-28 PROCEDURE — 97140 MANUAL THERAPY 1/> REGIONS: CPT | Mod: GP | Performed by: PHYSICAL THERAPIST

## 2022-06-28 PROCEDURE — 97110 THERAPEUTIC EXERCISES: CPT | Mod: GP | Performed by: PHYSICAL THERAPIST

## 2022-07-05 ENCOUNTER — ANTICOAGULATION THERAPY VISIT (OUTPATIENT)
Dept: ANTICOAGULATION | Facility: CLINIC | Age: 67
End: 2022-07-05

## 2022-07-05 ENCOUNTER — HOSPITAL ENCOUNTER (OUTPATIENT)
Dept: PHYSICAL THERAPY | Facility: REHABILITATION | Age: 67
Discharge: HOME | End: 2022-07-05
Payer: COMMERCIAL

## 2022-07-05 ENCOUNTER — LAB (OUTPATIENT)
Dept: LAB | Facility: CLINIC | Age: 67
End: 2022-07-05
Payer: COMMERCIAL

## 2022-07-05 DIAGNOSIS — G89.29 CHRONIC RIGHT-SIDED LOW BACK PAIN WITHOUT SCIATICA: ICD-10-CM

## 2022-07-05 DIAGNOSIS — M25.551 HIP PAIN, RIGHT: ICD-10-CM

## 2022-07-05 DIAGNOSIS — I26.99 OTHER PULMONARY EMBOLISM WITHOUT ACUTE COR PULMONALE, UNSPECIFIED CHRONICITY (H): ICD-10-CM

## 2022-07-05 DIAGNOSIS — Z95.2 H/O AORTIC VALVE REPLACEMENT: ICD-10-CM

## 2022-07-05 DIAGNOSIS — M54.50 CHRONIC RIGHT-SIDED LOW BACK PAIN WITHOUT SCIATICA: ICD-10-CM

## 2022-07-05 DIAGNOSIS — Z95.2 HEART VALVE REPLACED: Primary | ICD-10-CM

## 2022-07-05 DIAGNOSIS — E11.9 TYPE 2 DIABETES MELLITUS WITHOUT COMPLICATION, WITHOUT LONG-TERM CURRENT USE OF INSULIN (H): Primary | ICD-10-CM

## 2022-07-05 DIAGNOSIS — R29.818 DIFFICULTY BALANCING: ICD-10-CM

## 2022-07-05 DIAGNOSIS — R29.898 WEAKNESS OF BOTH LOWER EXTREMITIES: Primary | ICD-10-CM

## 2022-07-05 DIAGNOSIS — I48.0 PAROXYSMAL ATRIAL FIBRILLATION (H): ICD-10-CM

## 2022-07-05 DIAGNOSIS — Z95.2 S/P AVR (AORTIC VALVE REPLACEMENT): ICD-10-CM

## 2022-07-05 LAB — INR BLD: 4.4 (ref 0.9–1.1)

## 2022-07-05 PROCEDURE — 97112 NEUROMUSCULAR REEDUCATION: CPT | Mod: GP | Performed by: PHYSICAL THERAPIST

## 2022-07-05 PROCEDURE — 97140 MANUAL THERAPY 1/> REGIONS: CPT | Mod: GP | Performed by: PHYSICAL THERAPIST

## 2022-07-05 PROCEDURE — 36415 COLL VENOUS BLD VENIPUNCTURE: CPT

## 2022-07-05 PROCEDURE — 97110 THERAPEUTIC EXERCISES: CPT | Mod: GP | Performed by: PHYSICAL THERAPIST

## 2022-07-05 PROCEDURE — 85610 PROTHROMBIN TIME: CPT

## 2022-07-05 NOTE — PROGRESS NOTES
ANTICOAGULATION MANAGEMENT     Eileen Donald 66 year old female is on warfarin with supratherapeutic INR result. (Goal INR 2.5-3.5)    Recent labs: (last 7 days)     07/05/22  1253   INR 4.4*       ASSESSMENT       Source(s): Chart Review, Patient/Caregiver Call and Template       Warfarin doses taken: Warfarin taken differently, but did not change total weekly dose     Diet: No new diet changes identified    Eating lots of fruits.    New illness, injury, or hospitalization: No   Complained of flare up of right leg pains radiates up to her hip and back. Currently undergoing PT for the last 2 wks, though not helping much.   Will await completion of PT, till next plan or steps moving forward.    Medication/supplement changes: reported only take ES-Tylenol PRN for pains.    Signs or symptoms of bleeding or clotting: No    Previous INR: Therapeutic last visit at 3.5.; previously outside of goal range at 2.1    Additional findings: None       PLAN     Recommended plan for ongoing change(s) affecting INR     Dosing Instructions:    partial hold then decrease your warfarin dose (10% change) with next INR in 1-2 weeks       Summary  As of 7/5/2022    Full warfarin instructions:  7/5: 2.5 mg; Otherwise 5 mg every Mon, Thu; 2.5 mg all other days   Next INR check:  7/19/2022             Telephone call with  aMrilee ([phone]) who verbalizes understanding and agrees to plan    Lab visit scheduled - INR on 7/19/22 @ [facility].   - reported not able to come in till 7/19 for INR.   can bring her in for appt.     Education provided: Importance of consistent vitamin K intake, Impact of vitamin K foods on INR, Potential interaction between warfarin and alcohol and Monitoring for bleeding signs and symptoms    Plan made with [facility] Pharmacist Renee Lora, RN  Anticoagulation Clinic  7/5/2022    _______________________________________________________________________     Anticoagulation Episode Summary      Current INR goal:  2.5-3.5   TTR:  44.4 % (11.7 mo)   Target end date:  Indefinite   [facility]    Indications    Heart valve replaced [Z95.2]  Paroxysmal atrial fibrillation (H) [I48.0]  S/P AVR (aortic valve replacement) [Z95.2]  Other pulmonary embolism without acute cor pulmonale  unspecified chronicity (H) [I26.99]  H/O aortic valve replacement [Z95.2]           Comments:  12/15/21 - amended INR goal to 2.5 - 3.5         Anticoagulation Care Providers     Provider Role Specialty Phone number    Tito Salazar MD Referring Family Medicine [phone]    Drew Hahn MD Referring Family Medicine [phone]

## 2022-07-15 ENCOUNTER — TELEPHONE (OUTPATIENT)
Dept: FAMILY MEDICINE | Facility: CLINIC | Age: 67
End: 2022-07-15

## 2022-07-15 DIAGNOSIS — M25.551 HIP PAIN, RIGHT: Primary | ICD-10-CM

## 2022-07-15 NOTE — TELEPHONE ENCOUNTER
Patient calling with right side hip/back pain.  She does go to PT but is wondering if she can get an order for a cortisone injection too.  She says she had one 10 years ago and it really worked.  Please call patient with questions or to advise

## 2022-07-19 DIAGNOSIS — F33.41 RECURRENT MAJOR DEPRESSIVE DISORDER, IN PARTIAL REMISSION (H): ICD-10-CM

## 2022-07-20 RX ORDER — VENLAFAXINE HYDROCHLORIDE 75 MG/1
CAPSULE, EXTENDED RELEASE ORAL
Qty: 90 CAPSULE | Refills: 0 | Status: SHIPPED | OUTPATIENT
Start: 2022-07-20 | End: 2022-10-07

## 2022-07-26 ENCOUNTER — TELEPHONE (OUTPATIENT)
Dept: ANTICOAGULATION | Facility: CLINIC | Age: 67
End: 2022-07-26

## 2022-07-26 NOTE — TELEPHONE ENCOUNTER
ANTICOAGULATION     Eileen Donald is overdue for INR check.      Spoke with Marilee who declined to schedule a lab appointment at this time. If calling back please schedule as soon as possible.    - weather has been so hot / humid and hard to breath for her during this weather.   - she will call back and schedule once she finds transportation.    Alondra Lora RN

## 2022-08-01 ENCOUNTER — TELEPHONE (OUTPATIENT)
Dept: FAMILY MEDICINE | Facility: CLINIC | Age: 67
End: 2022-08-01

## 2022-08-05 ENCOUNTER — TELEPHONE (OUTPATIENT)
Dept: ANTICOAGULATION | Facility: CLINIC | Age: 67
End: 2022-08-05

## 2022-08-05 NOTE — TELEPHONE ENCOUNTER
ANTICOAGULATION     Eileen Donald is overdue for INR check.      Spoke with Marilee and scheduled lab appointment on 8/16    Alondra Lora RN

## 2022-08-08 ENCOUNTER — ANCILLARY PROCEDURE (OUTPATIENT)
Dept: GENERAL RADIOLOGY | Facility: CLINIC | Age: 67
End: 2022-08-08
Attending: PEDIATRICS
Payer: COMMERCIAL

## 2022-08-08 ENCOUNTER — OFFICE VISIT (OUTPATIENT)
Dept: ORTHOPEDICS | Facility: CLINIC | Age: 67
End: 2022-08-08

## 2022-08-08 VITALS — RESPIRATION RATE: 20 BRPM | HEART RATE: 110 BPM | WEIGHT: 240 LBS | HEIGHT: 70 IN | BODY MASS INDEX: 34.36 KG/M2

## 2022-08-08 DIAGNOSIS — M16.11 PRIMARY OSTEOARTHRITIS OF RIGHT HIP: Primary | ICD-10-CM

## 2022-08-08 DIAGNOSIS — M25.551 HIP PAIN, RIGHT: ICD-10-CM

## 2022-08-08 PROCEDURE — 99244 OFF/OP CNSLTJ NEW/EST MOD 40: CPT | Performed by: PEDIATRICS

## 2022-08-08 PROCEDURE — 73502 X-RAY EXAM HIP UNI 2-3 VIEWS: CPT | Mod: TC | Performed by: RADIOLOGY

## 2022-08-08 NOTE — PATIENT INSTRUCTIONS
X-rays of the right hip today demonstrate underlying degenerative change, or osteoarthritis.  I think this is the primary source of pain, though with some lateral hip area tenderness, soft tissue component including with the gluteal tendons there is also a consideration.  Reviewed options with imaging, rehab, injection.  We discussed consideration of additional imaging of the affected area with MRI, but this is not required currently given assessment and plan for other intervention.  Already doing physical therapy.  Plan to continue with this.  Having the x-ray findings may help to provide some context for the ongoing pain, and your physical therapist can adjust exercises accordingly, if appropriate.  Briefly discussed consideration of injection, for pain relief.  Primary target at this point would be imaging guided steroid injection for the right hip.  If interested, contact clinic, and we would get you set up with one of the providers who does these.  Alternatively, right lateral hip steroid injection could be considered for the more focal lateral hip pain and tenderness, but I think targeting the joint at this point is more likely to be helpful.  For now, continue with PT.  Injection is a future consideration, if ongoing issues.  Otherwise, plan to finish out the therapy course, and contact clinic if any questions or concerns.    If you have any further questions for your physician or physician s care team you can call [phone] and use option 3 to leave a voice message. Calls received during business hours will be returned same day.

## 2022-08-08 NOTE — PROGRESS NOTES
ASSESSMENT & PLAN    Eileen was seen today for pain.    Diagnoses and all orders for this visit:    Primary osteoarthritis of right hip    Hip pain, right  -     Orthopedic  Referral  -     XR Pelvis w Hip Right 1 View; Future      Discussed PT, injection, referral to ortho surgeon.  Would offer injection; if interested, contact clinic and would be imaging-guided hip joint steroid injection next.  If ongoing pain despite PT, then likely injection next.  If wanting to discuss with ortho surgeon, can place referral.  Questions answered. Discussed signs and symptoms that may indicate more serious issues; the patient was instructed to seek appropriate care if noted. Marilee indicates understanding of these issues and agrees with the plan.        See Patient Instructions  Patient Instructions   X-rays of the right hip today demonstrate underlying degenerative change, or osteoarthritis.  I think this is the primary source of pain, though with some lateral hip area tenderness, soft tissue component including with the gluteal tendons there is also a consideration.  Reviewed options with imaging, rehab, injection.  We discussed consideration of additional imaging of the affected area with MRI, but this is not required currently given assessment and plan for other intervention.  Already doing physical therapy.  Plan to continue with this.  Having the x-ray findings may help to provide some context for the ongoing pain, and your physical therapist can adjust exercises accordingly, if appropriate.  Briefly discussed consideration of injection, for pain relief.  Primary target at this point would be imaging guided steroid injection for the right hip.  If interested, contact clinic, and we would get you set up with one of the providers who does these.  Alternatively, right lateral hip steroid injection could be considered for the more focal lateral hip pain and tenderness, but I think targeting the joint at this point  is more likely to be helpful.  For now, continue with PT.  Injection is a future consideration, if ongoing issues.  Otherwise, plan to finish out the therapy course, and contact clinic if any questions or concerns.    If you have any further questions for your physician or physician s care team you can call [phone] and use option 3 to leave a voice message. Calls received during business hours will be returned same day.        [facility]      CC: Drew Hahn      -----  Chief Complaint   Patient presents with     Right Hip - Pain       SUBJECTIVE  Eileen Donald is a/an 66 year old female who is seen in consultation at the request of  Drew Hahn M.D. for evaluation of right lateral hip pain.  States pain has been present for years.  HX of injections for her spine.   States that pain has been intermittent. How she feels in the AM determines the rest of her day.   Does have some pain down the lateral aspect of her leg to her knee.     Pain with walking, does use a walker regularly.    Denies any groin pain.    Better with sitting, unless it is on a hard surface.       The patient is seen by themselves.      Onset:  years(s) ago. Reports insidious onset without acute precipitating event.  Location of Pain: right hip more lateral aspect, and sometimes in right low back (notes back pain more with standing for prolonged period)  Worsened by: activity  Better with: rest    Social History/Occupation:     Family history of OA, sister with joint replacement (sister 10 years older)      **  Mild pain at rest currently.    Planning therapy for balance issues, but putting on hold until hip evaluated.  Uses wheeled walker due to this hip pain, also balance issues.        **  Hx of MS.      Patient Active Problem List   Diagnosis     Depression     Asthma     Benign essential hypertension     Multiple Sclerosis     Hyperlipidemia     Impaired  "Glucose Tolerance Test     Dysphagia     Benign Adenomatous Polyp Of The Large Intestine     History of pulmonary embolism     Morbid obesity (H)     Generalized anxiety disorder     Primary osteoarthritis of both hands     Polyarthralgia     Aortic valve replaced     Controlled substance agreement signed     Heart valve replaced     Closed 3-part fracture of proximal humerus with delayed healing, left     Acute on chronic respiratory failure with hypoxia (H)     Pulmonary hypertension (H)     Paroxysmal atrial fibrillation (H)     Dyslipidemia     Dyspnea on exertion     Hypertension     Postoperative anemia due to acute blood loss     Pre-diabetes     Stress hyperglycemia     Other pulmonary embolism without acute cor pulmonale, unspecified chronicity (H)     Chronic obstructive pulmonary disease, unspecified COPD type (H)     Recurrent major depressive disorder, in partial remission (H)     Type 2 diabetes mellitus without complication, without long-term current use of insulin (H)     Acute bacterial conjunctivitis of both eyes     Community acquired pneumonia, unspecified laterality     Osteoarthrosis     S/P AVR (aortic valve replacement)     Sleep apnea     H/O aortic valve replacement     Encounter for long-term methadone use     History of fall     Leg weakness, bilateral     Muscle spasm     Neurogenic bladder     Other symptoms and signs involving cognitive functions and awareness     Tremor     Unsteady gait     Relapsing remitting multiple sclerosis (H)     Elevated MCV     Hip pain, right         REVIEW OF SYSTEMS:  Review of Systems      OBJECTIVE:  Pulse 110   Resp 20   Ht 1.778 m (5' 10\")   Wt 108.9 kg (240 lb)   BMI 34.44 kg/m     General: healthy, alert and in no distress  HEENT: no scleral icterus or conjunctival erythema  Skin: no suspicious lesions or rash. No jaundice.  CV: distal perfusion intact   Resp: normal respiratory effort without conversational dyspnea   Psych: normal mood and " affect  Gait: walker  Neuro: baseline tone LE      Right hip exam    ROM:     Flexion mod limitation actively, grossly intact passively       internal rotation limited      external rotation limited      Range of motion limited by stiffness, joint degenerative change    Strength: reduced with flexion, rotation, abduction, adduction    Tender:      greater trochanter mild      Special Tests:      positive (+) FADIR       Log roll with some pain reproduced         RADIOLOGY:  I independently ordered, visualized and reviewed these images with the patient  Prominent hip arthrosis.      Recent Results (from the past 24 hour(s))   XR Pelvis w Hip Right 1 View    Narrative    XR PELVIS AND HIP RIGHT 1 VIEW 8/8/2022 1:10 PM     HISTORY: Hip pain, right    COMPARISON: None.       Impression    IMPRESSION: Severe arthritic changes of the right hip. No acute  fracture or dislocation.    NEREIDA WORTHY MD         SYSTEM ID:  [id number]

## 2022-08-16 ENCOUNTER — LAB (OUTPATIENT)
Dept: LAB | Facility: CLINIC | Age: 67
End: 2022-08-16
Payer: COMMERCIAL

## 2022-08-16 ENCOUNTER — ANTICOAGULATION THERAPY VISIT (OUTPATIENT)
Dept: ANTICOAGULATION | Facility: CLINIC | Age: 67
End: 2022-08-16

## 2022-08-16 ENCOUNTER — HOSPITAL ENCOUNTER (OUTPATIENT)
Dept: PHYSICAL THERAPY | Facility: REHABILITATION | Age: 67
Discharge: HOME | End: 2022-08-16

## 2022-08-16 DIAGNOSIS — Z95.2 H/O AORTIC VALVE REPLACEMENT: ICD-10-CM

## 2022-08-16 DIAGNOSIS — I48.0 PAROXYSMAL ATRIAL FIBRILLATION (H): ICD-10-CM

## 2022-08-16 DIAGNOSIS — Z53.9 ERRONEOUS ENCOUNTER--DISREGARD: Primary | ICD-10-CM

## 2022-08-16 DIAGNOSIS — Z95.2 HEART VALVE REPLACED: Primary | ICD-10-CM

## 2022-08-16 DIAGNOSIS — G89.29 CHRONIC RIGHT-SIDED LOW BACK PAIN WITHOUT SCIATICA: ICD-10-CM

## 2022-08-16 DIAGNOSIS — E11.9 TYPE 2 DIABETES MELLITUS WITHOUT COMPLICATION, WITHOUT LONG-TERM CURRENT USE OF INSULIN (H): ICD-10-CM

## 2022-08-16 DIAGNOSIS — Z95.2 S/P AVR (AORTIC VALVE REPLACEMENT): ICD-10-CM

## 2022-08-16 DIAGNOSIS — I26.99 OTHER PULMONARY EMBOLISM WITHOUT ACUTE COR PULMONALE, UNSPECIFIED CHRONICITY (H): ICD-10-CM

## 2022-08-16 DIAGNOSIS — M54.50 CHRONIC RIGHT-SIDED LOW BACK PAIN WITHOUT SCIATICA: ICD-10-CM

## 2022-08-16 DIAGNOSIS — R29.818 DIFFICULTY BALANCING: ICD-10-CM

## 2022-08-16 DIAGNOSIS — M25.551 HIP PAIN, RIGHT: ICD-10-CM

## 2022-08-16 DIAGNOSIS — R29.898 WEAKNESS OF BOTH LOWER EXTREMITIES: Primary | ICD-10-CM

## 2022-08-16 LAB
HBA1C MFR BLD: 6 % (ref 0–5.6)
INR BLD: 3.1 (ref 0.9–1.1)

## 2022-08-16 PROCEDURE — 97112 NEUROMUSCULAR REEDUCATION: CPT | Mod: GP | Performed by: PHYSICAL THERAPIST

## 2022-08-16 PROCEDURE — 36415 COLL VENOUS BLD VENIPUNCTURE: CPT

## 2022-08-16 PROCEDURE — 83036 HEMOGLOBIN GLYCOSYLATED A1C: CPT

## 2022-08-16 PROCEDURE — 97110 THERAPEUTIC EXERCISES: CPT | Mod: GP | Performed by: PHYSICAL THERAPIST

## 2022-08-16 PROCEDURE — 85610 PROTHROMBIN TIME: CPT

## 2022-08-16 PROCEDURE — 97140 MANUAL THERAPY 1/> REGIONS: CPT | Mod: GP | Performed by: PHYSICAL THERAPIST

## 2022-08-16 NOTE — PROGRESS NOTES
ANTICOAGULATION MANAGEMENT     Eileen Donald 66 year old female is on warfarin with therapeutic INR result. (Goal INR 2.5-3.5)    Recent labs: (last 7 days)     08/16/22  1257   INR 3.1*       ASSESSMENT       Source(s): Chart Review, Patient/Caregiver Call and Template       Warfarin doses taken: Warfarin taken differently, but did not change total weekly dose    Reported taking her 5mg on Tues/Fri, will update [facility]. Calendar.    Diet: No new diet changes identified    New illness, injury, or hospitalization: No    Medication/supplement changes: None noted    Signs or symptoms of bleeding or clotting: No    Previous INR: Supratherapeutic at 4.4 on 7/5/22.    Additional findings: None       PLAN     Recommended plan for no diet, medication or health factor changes affecting INR     Dosing Instructions:   (evenings. 2.5mg tabs)    Continue your current warfarin dose with next INR in 2 weeks       Summary  As of 8/16/2022    Full warfarin instructions:  5 mg every Tue, Fri; 2.5 mg all other days   Next INR check:  8/30/2022             Telephone call with  Marilee ([phone]) who verbalizes understanding and agrees to plan    Lab visit scheduled - INR on 8/30/22 @ [facility].    Education provided: Importance of consistent vitamin K intake and Goal range and significance of current result    Plan made per [facility] anticoagulation protocol    Alondra Lora RN  Anticoagulation Clinic  8/16/2022    _______________________________________________________________________     Anticoagulation Episode Summary     Current INR goal:  2.5-3.5   TTR:  48.3 % (11.7 mo)   Target end date:  Indefinite   Send INR reminders to:  LARISA GAO    Indications    Heart valve replaced [Z95.2]  Paroxysmal atrial fibrillation (H) [I48.0]  S/P AVR (aortic valve replacement) [Z95.2]  Other pulmonary embolism without acute cor pulmonale  unspecified chronicity (H) [I26.99]  H/O aortic valve replacement [Z95.2]            Comments:  12/15/21 - amended INR goal to 2.5 - 3.5         Anticoagulation Care Providers     Provider Role Specialty Phone number    Tito Salazar MD Referring Family Medicine [phone]    Drew Hahn MD Referring Family Medicine [phone]

## 2022-08-28 ENCOUNTER — HEALTH MAINTENANCE LETTER (OUTPATIENT)
Age: 67
End: 2022-08-28

## 2022-08-30 ENCOUNTER — ANTICOAGULATION THERAPY VISIT (OUTPATIENT)
Dept: ANTICOAGULATION | Facility: CLINIC | Age: 67
End: 2022-08-30

## 2022-08-30 ENCOUNTER — HOSPITAL ENCOUNTER (OUTPATIENT)
Dept: PHYSICAL THERAPY | Facility: REHABILITATION | Age: 67
Discharge: HOME | End: 2022-08-30

## 2022-08-30 ENCOUNTER — LAB (OUTPATIENT)
Dept: LAB | Facility: CLINIC | Age: 67
End: 2022-08-30
Payer: COMMERCIAL

## 2022-08-30 DIAGNOSIS — Z53.9 ERRONEOUS ENCOUNTER--DISREGARD: ICD-10-CM

## 2022-08-30 DIAGNOSIS — Z95.2 HEART VALVE REPLACED: Primary | ICD-10-CM

## 2022-08-30 DIAGNOSIS — I26.99 OTHER PULMONARY EMBOLISM WITHOUT ACUTE COR PULMONALE, UNSPECIFIED CHRONICITY (H): ICD-10-CM

## 2022-08-30 DIAGNOSIS — G89.29 CHRONIC RIGHT-SIDED LOW BACK PAIN WITHOUT SCIATICA: ICD-10-CM

## 2022-08-30 DIAGNOSIS — Z95.2 S/P AVR (AORTIC VALVE REPLACEMENT): ICD-10-CM

## 2022-08-30 DIAGNOSIS — I48.0 PAROXYSMAL ATRIAL FIBRILLATION (H): ICD-10-CM

## 2022-08-30 DIAGNOSIS — R29.818 DIFFICULTY BALANCING: ICD-10-CM

## 2022-08-30 DIAGNOSIS — Z95.2 H/O AORTIC VALVE REPLACEMENT: ICD-10-CM

## 2022-08-30 DIAGNOSIS — M54.50 CHRONIC RIGHT-SIDED LOW BACK PAIN WITHOUT SCIATICA: ICD-10-CM

## 2022-08-30 DIAGNOSIS — M25.551 HIP PAIN, RIGHT: ICD-10-CM

## 2022-08-30 DIAGNOSIS — R29.898 WEAKNESS OF BOTH LOWER EXTREMITIES: Primary | ICD-10-CM

## 2022-08-30 LAB — INR BLD: 3.5 (ref 0.9–1.1)

## 2022-08-30 PROCEDURE — 85610 PROTHROMBIN TIME: CPT

## 2022-08-30 PROCEDURE — 97110 THERAPEUTIC EXERCISES: CPT | Mod: GP | Performed by: PHYSICAL THERAPIST

## 2022-08-30 PROCEDURE — 36416 COLLJ CAPILLARY BLOOD SPEC: CPT

## 2022-08-30 PROCEDURE — 97112 NEUROMUSCULAR REEDUCATION: CPT | Mod: GP | Performed by: PHYSICAL THERAPIST

## 2022-08-30 PROCEDURE — 97140 MANUAL THERAPY 1/> REGIONS: CPT | Mod: GP | Performed by: PHYSICAL THERAPIST

## 2022-08-30 NOTE — PROGRESS NOTES
ANTICOAGULATION MANAGEMENT     Eileen Donald 66 year old female is on warfarin with therapeutic INR result. (Goal INR 2.5-3.5)    Recent labs: (last 7 days)     08/30/22  1350   INR 3.5*       ASSESSMENT       Source(s): Chart Review, Patient/Caregiver Call and Template       Warfarin doses taken: Warfarin taken as instructed    Diet: No new diet changes identified    New illness, injury, or hospitalization: No    Medication/supplement changes: None noted    Signs or symptoms of bleeding or clotting: No    Previous INR: Therapeutic last visit at 3.1; previously outside of goal range at 4.4    Additional findings: None       PLAN     Unable to reach Marilee today.    Left message to continue current dose of warfarin 5 mg tonight. Request call back for assessment.    Follow up required to assess for changes     Alondra Lora RN  Anticoagulation Clinic  8/30/2022

## 2022-08-31 NOTE — PROGRESS NOTES
Called and spoke with Marilee     - INR was therapeutic at 3.5   (INR goal 2.5 - 3.5)     - advised to ensure she stays consistent eating Vitamin-K foods.  She reported decreased intake.     - will continue same dose of warfarin - 5mg on Tues/Fri and 2.5mg all other days.     - [facility]

## 2022-09-01 ENCOUNTER — TELEPHONE (OUTPATIENT)
Dept: ORTHOPEDICS | Facility: CLINIC | Age: 67
End: 2022-09-01

## 2022-09-01 NOTE — TELEPHONE ENCOUNTER
Patient LVM for Dr. Madrigal.  Notes that she saw him on 8/8/22 but is now having severe right hip pain.  She would like suggestions for next steps and is considering a steroid injection.  She is requesting a return call to [phone].    Fanny Chapin, ATC

## 2022-09-01 NOTE — PROGRESS NOTES
Eileen Donald  :  1955  DOS: 2022  MRN: [id number]    Sports Medicine Clinic Procedure    Ultrasound Guided Right Intra-Articular Hip Injection    Clinical History: Patient presents with moderate-severe right hip pain and weakness over the past several years.  INR 2.6    Diagnosis:   1. Primary osteoarthritis of right hip    2. Anticoagulated on warfarin      Referring Physician: Dr. Ravi Madrigal  Large Joint Injection/Arthocentesis: R hip joint    Date/Time: 2022 2:15 PM  Performed by: Ravi Patel DO  Authorized by: Ravi Patel DO     Indications:  Pain, diagnostic evaluation and osteoarthritis  Needle Size:  22 G  Guidance: ultrasound    Approach:  Anterior  Location:  Hip      Site:  R hip joint  Medications:  3 mL ropivacaine 5 MG/ML; 40 mg triamcinolone 40 MG/ML  Outcome:  Tolerated well, no immediate complications  Procedure discussed: discussed risks, benefits, and alternatives    Consent Given by:  Patient  Timeout: timeout called immediately prior to procedure    Prep: patient was prepped and draped in usual sterile fashion     4 ml's of 1% lidocaine was used as local anesthetic prior to injection    Ultrasound images of procedure were permanently stored.           Impression:  Successful Right intra-articular hip injection.    Plan:  Follow up as directed by Dr Madrigal  Expectations and goals of CSI reviewed  Often 2-3 days for steroid effect, and can take up to two weeks for maximum effect  We discussed modified progressive pain-free activity as tolerated  Do not overuse in first two weeks if feeling better due to concern for vulnerability while steroid is working  Supportive care reviewed  All questions were answered today  Contact us with additional questions or concerns  Signs and sx of concern reviewed      Ravi Patel DO, CAQ  Primary Care Sports Medicine  [facility]

## 2022-09-06 ENCOUNTER — HOSPITAL ENCOUNTER (OUTPATIENT)
Dept: PHYSICAL THERAPY | Facility: REHABILITATION | Age: 67
Discharge: HOME | End: 2022-09-06
Payer: COMMERCIAL

## 2022-09-06 DIAGNOSIS — M25.551 HIP PAIN, RIGHT: ICD-10-CM

## 2022-09-06 DIAGNOSIS — R29.818 DIFFICULTY BALANCING: ICD-10-CM

## 2022-09-06 DIAGNOSIS — G89.29 CHRONIC RIGHT-SIDED LOW BACK PAIN WITHOUT SCIATICA: ICD-10-CM

## 2022-09-06 DIAGNOSIS — M54.50 CHRONIC RIGHT-SIDED LOW BACK PAIN WITHOUT SCIATICA: ICD-10-CM

## 2022-09-06 DIAGNOSIS — R29.898 WEAKNESS OF BOTH LOWER EXTREMITIES: Primary | ICD-10-CM

## 2022-09-06 PROCEDURE — 97110 THERAPEUTIC EXERCISES: CPT | Mod: GP | Performed by: PHYSICAL THERAPIST

## 2022-09-06 PROCEDURE — 97140 MANUAL THERAPY 1/> REGIONS: CPT | Mod: GP | Performed by: PHYSICAL THERAPIST

## 2022-09-06 PROCEDURE — 97112 NEUROMUSCULAR REEDUCATION: CPT | Mod: GP | Performed by: PHYSICAL THERAPIST

## 2022-09-07 ENCOUNTER — OFFICE VISIT (OUTPATIENT)
Dept: ORTHOPEDICS | Facility: CLINIC | Age: 67
End: 2022-09-07

## 2022-09-07 ENCOUNTER — ANTICOAGULATION THERAPY VISIT (OUTPATIENT)
Dept: ANTICOAGULATION | Facility: CLINIC | Age: 67
End: 2022-09-07

## 2022-09-07 ENCOUNTER — LAB (OUTPATIENT)
Dept: LAB | Facility: CLINIC | Age: 67
End: 2022-09-07
Payer: COMMERCIAL

## 2022-09-07 VITALS — BODY MASS INDEX: 34.36 KG/M2 | HEIGHT: 70 IN | WEIGHT: 240 LBS

## 2022-09-07 DIAGNOSIS — M16.11 PRIMARY OSTEOARTHRITIS OF RIGHT HIP: Primary | ICD-10-CM

## 2022-09-07 DIAGNOSIS — Z79.01 ANTICOAGULATED ON WARFARIN: ICD-10-CM

## 2022-09-07 DIAGNOSIS — Z95.2 S/P AVR (AORTIC VALVE REPLACEMENT): ICD-10-CM

## 2022-09-07 DIAGNOSIS — Z95.2 H/O AORTIC VALVE REPLACEMENT: ICD-10-CM

## 2022-09-07 DIAGNOSIS — I26.99 OTHER PULMONARY EMBOLISM WITHOUT ACUTE COR PULMONALE, UNSPECIFIED CHRONICITY (H): ICD-10-CM

## 2022-09-07 DIAGNOSIS — I48.0 PAROXYSMAL ATRIAL FIBRILLATION (H): ICD-10-CM

## 2022-09-07 DIAGNOSIS — Z95.2 HEART VALVE REPLACED: Primary | ICD-10-CM

## 2022-09-07 LAB — INR BLD: 2.6 (ref 0.9–1.1)

## 2022-09-07 PROCEDURE — 20611 DRAIN/INJ JOINT/BURSA W/US: CPT | Mod: RT | Performed by: FAMILY MEDICINE

## 2022-09-07 PROCEDURE — 36416 COLLJ CAPILLARY BLOOD SPEC: CPT

## 2022-09-07 PROCEDURE — 85610 PROTHROMBIN TIME: CPT

## 2022-09-07 RX ORDER — ROPIVACAINE HYDROCHLORIDE 5 MG/ML
3 INJECTION, SOLUTION EPIDURAL; INFILTRATION; PERINEURAL
Status: SHIPPED | OUTPATIENT
Start: 2022-09-07

## 2022-09-07 RX ORDER — TRIAMCINOLONE ACETONIDE 40 MG/ML
40 INJECTION, SUSPENSION INTRA-ARTICULAR; INTRAMUSCULAR
Status: SHIPPED | OUTPATIENT
Start: 2022-09-07

## 2022-09-07 RX ADMIN — ROPIVACAINE HYDROCHLORIDE 3 ML: 5 INJECTION, SOLUTION EPIDURAL; INFILTRATION; PERINEURAL at 14:15

## 2022-09-07 RX ADMIN — TRIAMCINOLONE ACETONIDE 40 MG: 40 INJECTION, SUSPENSION INTRA-ARTICULAR; INTRAMUSCULAR at 14:15

## 2022-09-07 NOTE — PROGRESS NOTES
ANTICOAGULATION MANAGEMENT     Eileen Donald 66 year old female is on warfarin with therapeutic INR result. (Goal INR 2.5-3.5)    Recent labs: (last 7 days)     09/07/22  1349   INR 2.6*       ASSESSMENT       Source(s): Chart Review and Patient/Caregiver Call       Warfarin doses taken: Warfarin taken as instructed    Diet: No new diet changes identified    New illness, injury, or hospitalization: Yes:   Reported she is already feeling the effects and hip pain easing up.   Today had ultrasound guided right intra-articular hip injection.  Moderate-severe right hip pain and weakness d/t OA.    Medication/supplement changes:  Yes. Started on 9/7/22 Triamcinolone 40mg injection which may increase or decrease INR.    Signs or symptoms of bleeding or clotting: No    Previous INR: Therapeutic last 2 visits    Additional findings: None       PLAN     Recommended plan for temporary change(s) affecting INR     Dosing Instructions: Continue your current warfarin dose with next INR in 7-10 days       Summary  As of 9/7/2022    Full warfarin instructions:  5 mg every Tue, Fri; 2.5 mg all other days   Next INR check:  9/16/2022             Telephone call with  Marilee ([phone]) who verbalizes understanding and agrees to plan    Lab visit scheduled - INR on 9/13/22 @ [facility].    Education provided: Importance of consistent vitamin K intake, Goal range and significance of current result, Potential interaction between warfarin and Triamcinolone injection, Monitoring for bleeding signs and symptoms and Monitoring for clotting signs and symptoms    Plan made per [facility] anticoagulation protocol    Alondra Lora RN  Anticoagulation Clinic  9/7/2022    _______________________________________________________________________     Anticoagulation Episode Summary     Current INR goal:  2.5-3.5   TTR:  52.6 % (11.7 mo)   Target end date:  Indefinite   Send INR reminders to:  [facility]    Indications    Heart valve  replaced [Z95.2]  Paroxysmal atrial fibrillation (H) [I48.0]  S/P AVR (aortic valve replacement) [Z95.2]  Other pulmonary embolism without acute cor pulmonale  unspecified chronicity (H) [I26.99]  H/O aortic valve replacement [Z95.2]           Comments:  12/15/21 - amended INR goal to 2.5 - 3.5         Anticoagulation Care Providers     Provider Role Specialty Phone number    Tito Salazar MD Referring Family Medicine [phone]    Drew Hahn MD Referring [facility] Medicine [phone]

## 2022-09-07 NOTE — LETTER
2022         RE: Eileen Donald  [address] [place] [address]        Dear Colleague,    Thank you for referring your patient, Eileen Donald, to the [facility] [place]. Please see a copy of my visit note below.    Eileen Donald  :  1955  DOS: 2022  MRN: [id number]    Sports Medicine Clinic Procedure    Ultrasound Guided Right Intra-Articular Hip Injection    Clinical History: Patient presents with moderate-severe right hip pain and weakness over the past several years.  INR 2.6    Diagnosis:   1. Primary osteoarthritis of right hip    2. Anticoagulated on warfarin      Referring Physician: Dr. Ravi Madrigal  Large Joint Injection/Arthocentesis: R hip joint    Date/Time: 2022 2:15 PM  Performed by: Ravi Patel DO  Authorized by: Ravi Patel DO     Indications:  Pain, diagnostic evaluation and osteoarthritis  Needle Size:  22 G  Guidance: ultrasound    Approach:  Anterior  Location:  Hip      Site:  R hip joint  Medications:  3 mL ropivacaine 5 MG/ML; 40 mg triamcinolone 40 MG/ML  Outcome:  Tolerated well, no immediate complications  Procedure discussed: discussed risks, benefits, and alternatives    Consent Given by:  Patient  Timeout: timeout called immediately prior to procedure    Prep: patient was prepped and draped in usual sterile fashion     4 ml's of 1% lidocaine was used as local anesthetic prior to injection    Ultrasound images of procedure were permanently stored.           Impression:  Successful Right intra-articular hip injection.    Plan:  Follow up as directed by Dr Madrigal  Expectations and goals of CSI reviewed  Often 2-3 days for steroid effect, and can take up to two weeks for maximum effect  We discussed modified progressive pain-free activity as tolerated  Do not overuse in first two weeks if feeling better due to concern for vulnerability while steroid is working  Supportive care  reviewed  All questions were answered today  Contact us with additional questions or concerns  Signs and sx of concern reviewed      Ravi Patel DO, CAQ  Primary Care Sports Medicine  [facility]             Again, thank you for allowing me to participate in the care of your patient.        Sincerely,        Ravi Patel DO

## 2022-09-13 ENCOUNTER — ANTICOAGULATION THERAPY VISIT (OUTPATIENT)
Dept: ANTICOAGULATION | Facility: CLINIC | Age: 67
End: 2022-09-13

## 2022-09-13 ENCOUNTER — LAB (OUTPATIENT)
Dept: LAB | Facility: CLINIC | Age: 67
End: 2022-09-13
Payer: COMMERCIAL

## 2022-09-13 ENCOUNTER — HOSPITAL ENCOUNTER (OUTPATIENT)
Dept: PHYSICAL THERAPY | Facility: REHABILITATION | Age: 67
Discharge: HOME | End: 2022-09-13

## 2022-09-13 DIAGNOSIS — Z95.2 H/O AORTIC VALVE REPLACEMENT: ICD-10-CM

## 2022-09-13 DIAGNOSIS — I48.0 PAROXYSMAL ATRIAL FIBRILLATION (H): ICD-10-CM

## 2022-09-13 DIAGNOSIS — Z95.2 S/P AVR (AORTIC VALVE REPLACEMENT): ICD-10-CM

## 2022-09-13 DIAGNOSIS — R29.898 WEAKNESS OF BOTH LOWER EXTREMITIES: Primary | ICD-10-CM

## 2022-09-13 DIAGNOSIS — Z95.2 HEART VALVE REPLACED: Primary | ICD-10-CM

## 2022-09-13 DIAGNOSIS — M54.50 CHRONIC RIGHT-SIDED LOW BACK PAIN WITHOUT SCIATICA: ICD-10-CM

## 2022-09-13 DIAGNOSIS — G89.29 CHRONIC RIGHT-SIDED LOW BACK PAIN WITHOUT SCIATICA: ICD-10-CM

## 2022-09-13 DIAGNOSIS — R29.818 DIFFICULTY BALANCING: ICD-10-CM

## 2022-09-13 DIAGNOSIS — I26.99 OTHER PULMONARY EMBOLISM WITHOUT ACUTE COR PULMONALE, UNSPECIFIED CHRONICITY (H): ICD-10-CM

## 2022-09-13 DIAGNOSIS — M25.551 HIP PAIN, RIGHT: ICD-10-CM

## 2022-09-13 LAB — INR BLD: 2 (ref 0.9–1.1)

## 2022-09-13 PROCEDURE — 85610 PROTHROMBIN TIME: CPT

## 2022-09-13 PROCEDURE — 97110 THERAPEUTIC EXERCISES: CPT | Mod: GP | Performed by: PHYSICAL THERAPIST

## 2022-09-13 PROCEDURE — 36415 COLL VENOUS BLD VENIPUNCTURE: CPT

## 2022-09-13 PROCEDURE — 97112 NEUROMUSCULAR REEDUCATION: CPT | Mod: GP | Performed by: PHYSICAL THERAPIST

## 2022-09-13 NOTE — PROGRESS NOTES
ANTICOAGULATION MANAGEMENT     Eileen Donald 66 year old female is on warfarin with subtherapeutic INR result. (Goal INR 2.5-3.5)    Recent labs: (last 7 days)     09/13/22  1357   INR 2.0*       ASSESSMENT       Source(s): Chart Review, Patient/Caregiver Call and Template       Warfarin doses taken: Warfarin taken as instructed    Diet: No new diet changes identified    New illness, injury, or hospitalization: Yes:   Moderate-severe right hip pain and weakness d/t OA.   9/7/22 - ultrasound guided right intra-articular hip injection.    Medication/supplement changes:  Yes. had Triamcinolone injection on  9/7/22  which may be decreasing INR today    Signs or symptoms of bleeding or clotting: No    Previous INR: Therapeutic last 3 visits    Additional findings: None       PLAN     Recommended plan for temporary change(s) affecting INR     Dosing Instructions: booster dose then continue your current warfarin dose with next INR in 1 week       Summary  As of 9/13/2022    Full warfarin instructions:  9/13: 10 mg; Otherwise 5 mg every Tue, Fri; 2.5 mg all other days   Next INR check:  9/20/2022             Telephone call with  Marilee ([phone]) who verbalizes understanding and agrees to plan    Lab visit scheduled - INR on 9/20/22 @ [facility].    Education provided: Importance of consistent vitamin K intake, Goal range and significance of current result, Potential interaction between warfarin and Triamcinolone injection on 9/7/22 and Monitoring for clotting signs and symptoms    Plan made per [facility] anticoagulation protocol    Alondra Lora RN  Anticoagulation Clinic  9/13/2022    _______________________________________________________________________     Anticoagulation Episode Summary     Current INR goal:  2.5-3.5   TTR:  52.9 % (11.7 mo)   Target end date:  Indefinite   Send INR reminders to:  [facility]    Indications    Heart valve replaced [Z95.2]  Paroxysmal atrial fibrillation (H) [I48.0]  S/P  AVR (aortic valve replacement) [Z95.2]  Other pulmonary embolism without acute cor pulmonale  unspecified chronicity (H) [I26.99]  H/O aortic valve replacement [Z95.2]           Comments:  12/15/21 - amended INR goal to 2.5 - 3.5         Anticoagulation Care Providers     Provider Role Specialty Phone number    Tito Salazar MD Referring Family Medicine [phone]    Drew Hahn MD Referring [facility] [phone]

## 2022-09-13 NOTE — PROGRESS NOTES
[facility]    OUTPATIENT PHYSICAL THERAPY  PLAN OF TREATMENT FOR OUTPATIENT REHABILITATION AND PROGRESS NOTE           Patient's Last Name, First Name, Eileen Mauro Date of Birth  1955   Provider's Name  [facility] Medical Record No.  [id number]    Onset Date  (chronic, order date 4/7/22) Start of Care Date  6/21/22   Type:     _X_PT   ___OT   ___SLP Medical Diagnosis  Right-sided low back pain without sciatica, unspecified chronicity  Hip pain, right  Balance problems  Relapsing remitting multiple sclerosis (H)   PT Diagnosis  LE weakness, difficulty balancing and walking, right hip and back pain Plan of Treatment  Frequency/Duration: 1X/week  Certification date from 9/13/2022   to 11/8/22     Goals:  Goal Identifier     Goal Description Patient will improve TUG to <13 seconds for dec in falls risk in 12 weeks:   Target Date 11/08/22   Date Met      Progress (detail required for progress note): 18.5 seconds without walker today     Goal Identifier     Goal Description Patient will be able to do 8 sit to stands for improve LE strength and improved function in 8 weeks:   Target Date 11/08/22   Date Met      Progress (detail required for progress note): 6 reps     Goal Identifier     Goal Description Patient will report being able to go up all of her steps at home, without taking a break in 8 weeks:   Target Date 09/13/22   Date Met      Progress (detail required for progress note): Met     Goal Identifier     Goal Description Patient will report exercising > 15 minutes each day for improved overall endurance, balance, strength, and health in 4 weeks:   Target Date 07/19/22   Date Met      Progress (detail required for progress note): variable based on day, working on exercising more consistently     Beginning/End Dates of Progress Note Reporting  Period:  6/21/22 to 9/13/2022    Progress Toward Goals:   See above    Client Self (Subjective) Report for Progress Note Reporting Period: Went and got shots in hips which has helped some.    Objective Measurements:   Objective Measure: APTA sit to stand 6 reps (5 without UE )  Details: TUG 18.5 seconds without walker  Objective Measure: 2 minute walk test 175 feet  Details: (P)  92% O2 after 2 minute walk test     Details: Gait- significantly less antalgic          I CERTIFY THE NEED FOR THESE SERVICES FURNISHED UNDER        THIS PLAN OF TREATMENT AND WHILE UNDER MY CARE     (Physician co-signature of this document indicates review and certification of the therapy plan).              Referring Provider: MD Wiliam Munroe, PT

## 2022-09-20 ENCOUNTER — HOSPITAL ENCOUNTER (OUTPATIENT)
Dept: PHYSICAL THERAPY | Facility: REHABILITATION | Age: 67
Discharge: HOME | End: 2022-09-20
Payer: COMMERCIAL

## 2022-09-20 DIAGNOSIS — R29.898 WEAKNESS OF BOTH LOWER EXTREMITIES: Primary | ICD-10-CM

## 2022-09-20 DIAGNOSIS — M25.551 HIP PAIN, RIGHT: ICD-10-CM

## 2022-09-20 DIAGNOSIS — R29.818 DIFFICULTY BALANCING: ICD-10-CM

## 2022-09-20 DIAGNOSIS — M54.50 CHRONIC RIGHT-SIDED LOW BACK PAIN WITHOUT SCIATICA: ICD-10-CM

## 2022-09-20 DIAGNOSIS — G89.29 CHRONIC RIGHT-SIDED LOW BACK PAIN WITHOUT SCIATICA: ICD-10-CM

## 2022-09-20 PROCEDURE — 97110 THERAPEUTIC EXERCISES: CPT | Mod: GP | Performed by: PHYSICAL THERAPIST

## 2022-09-20 PROCEDURE — 97112 NEUROMUSCULAR REEDUCATION: CPT | Mod: GP | Performed by: PHYSICAL THERAPIST

## 2022-09-21 ENCOUNTER — LAB (OUTPATIENT)
Dept: LAB | Facility: CLINIC | Age: 67
End: 2022-09-21
Payer: COMMERCIAL

## 2022-09-21 ENCOUNTER — ANTICOAGULATION THERAPY VISIT (OUTPATIENT)
Dept: ANTICOAGULATION | Facility: CLINIC | Age: 67
End: 2022-09-21

## 2022-09-21 DIAGNOSIS — Z95.2 S/P AVR (AORTIC VALVE REPLACEMENT): ICD-10-CM

## 2022-09-21 DIAGNOSIS — Z95.2 HEART VALVE REPLACED: Primary | ICD-10-CM

## 2022-09-21 DIAGNOSIS — I48.0 PAROXYSMAL ATRIAL FIBRILLATION (H): ICD-10-CM

## 2022-09-21 DIAGNOSIS — Z95.2 H/O AORTIC VALVE REPLACEMENT: ICD-10-CM

## 2022-09-21 DIAGNOSIS — I26.99 OTHER PULMONARY EMBOLISM WITHOUT ACUTE COR PULMONALE, UNSPECIFIED CHRONICITY (H): ICD-10-CM

## 2022-09-21 LAB — INR BLD: 4.2 (ref 0.9–1.1)

## 2022-09-21 PROCEDURE — 85610 PROTHROMBIN TIME: CPT

## 2022-09-21 PROCEDURE — 36416 COLLJ CAPILLARY BLOOD SPEC: CPT

## 2022-09-21 NOTE — PROGRESS NOTES
ANTICOAGULATION MANAGEMENT     Eileen Donald 66 year old female is on warfarin with supratherapeutic INR result. (Goal INR 2.5-3.5)    Recent labs: (last 7 days)     09/21/22  1525   INR 4.2*       ASSESSMENT       Source(s): Chart Review and Template       Warfarin doses taken: Warfarin taken as instructed    Diet: No new diet changes identified    New illness, injury, or hospitalization: No    Medication/supplement changes: None noted    Signs or symptoms of bleeding or clotting: No    Previous INR: Therapeutic last 2(+) visits    Additional findings: None       PLAN     Recommended plan for no diet, medication or health factor changes affecting INR     Dosing Instructions: Continue your current warfarin dose with next INR in 2 weeks       Summary  As of 9/21/2022    Full warfarin instructions:  9/21: Hold; Otherwise 5 mg every Tue, Fri; 2.5 mg all other days   Next INR check:  10/5/2022             Detailed voice message left for Marilee with dosing instructions and follow up date.     Contact [phone] to schedule and with any changes, questions or concerns.     Education provided: None required    Plan made per [facility] anticoagulation protocol    Dany Cramer RN  Anticoagulation Clinic  9/21/2022    _______________________________________________________________________     Anticoagulation Episode Summary     Current INR goal:  2.5-3.5   TTR:  54.0 % (11.7 mo)   Target end date:  Indefinite   Send INR reminders to:  [facility]    Indications    Heart valve replaced [Z95.2]  Paroxysmal atrial fibrillation (H) [I48.0]  S/P AVR (aortic valve replacement) [Z95.2]  Other pulmonary embolism without acute cor pulmonale  unspecified chronicity (H) [I26.99]  H/O aortic valve replacement [Z95.2]           Comments:  12/15/21 - amended INR goal to 2.5 - 3.5         Anticoagulation Care Providers     Provider Role Specialty Phone number    Tito Salazar MD Referring Family Medicine [phone]     Drew Hahn MD [facility] Family Medicine [phone]

## 2022-09-30 ENCOUNTER — APPOINTMENT (OUTPATIENT)
Dept: RADIOLOGY | Facility: HOSPITAL | Age: 67
End: 2022-09-30
Attending: STUDENT IN AN ORGANIZED HEALTH CARE EDUCATION/TRAINING PROGRAM
Payer: COMMERCIAL

## 2022-09-30 ENCOUNTER — HOSPITAL ENCOUNTER (EMERGENCY)
Facility: HOSPITAL | Age: 67
Discharge: HOME | End: 2022-09-30
Admitting: PHYSICIAN ASSISTANT
Payer: COMMERCIAL

## 2022-09-30 VITALS
HEIGHT: 70 IN | DIASTOLIC BLOOD PRESSURE: 61 MMHG | WEIGHT: 246.6 LBS | HEART RATE: 67 BPM | SYSTOLIC BLOOD PRESSURE: 126 MMHG | TEMPERATURE: 98.6 F | BODY MASS INDEX: 35.3 KG/M2 | RESPIRATION RATE: 24 BRPM | OXYGEN SATURATION: 96 %

## 2022-09-30 DIAGNOSIS — J18.9 PNEUMONIA DUE TO INFECTIOUS ORGANISM, UNSPECIFIED LATERALITY, UNSPECIFIED PART OF LUNG: ICD-10-CM

## 2022-09-30 DIAGNOSIS — J45.21 MILD INTERMITTENT ASTHMA WITH EXACERBATION: ICD-10-CM

## 2022-09-30 LAB
ANION GAP SERPL CALCULATED.3IONS-SCNC: 10 MMOL/L (ref 7–15)
BASOPHILS # BLD AUTO: 0 10E3/UL (ref 0–0.2)
BASOPHILS NFR BLD AUTO: 1 %
BUN SERPL-MCNC: 13.3 MG/DL (ref 8–23)
CALCIUM SERPL-MCNC: 8.4 MG/DL (ref 8.8–10.2)
CHLORIDE SERPL-SCNC: 105 MMOL/L (ref 98–107)
CREAT SERPL-MCNC: 0.78 MG/DL (ref 0.51–0.95)
D DIMER PPP FEU-MCNC: 0.28 UG/ML FEU (ref 0–0.5)
DEPRECATED HCO3 PLAS-SCNC: 27 MMOL/L (ref 22–29)
EOSINOPHIL # BLD AUTO: 0.3 10E3/UL (ref 0–0.7)
EOSINOPHIL NFR BLD AUTO: 5 %
ERYTHROCYTE [DISTWIDTH] IN BLOOD BY AUTOMATED COUNT: 13.8 % (ref 10–15)
FLUAV AG SPEC QL IA: NEGATIVE
FLUBV AG SPEC QL IA: NEGATIVE
GFR SERPL CREATININE-BSD FRML MDRD: 84 ML/MIN/1.73M2
GLUCOSE SERPL-MCNC: 117 MG/DL (ref 70–99)
HCT VFR BLD AUTO: 41.6 % (ref 35–47)
HGB BLD-MCNC: 13.1 G/DL (ref 11.7–15.7)
IMM GRANULOCYTES # BLD: 0 10E3/UL
IMM GRANULOCYTES NFR BLD: 0 %
INR PPP: 2.32 (ref 0.85–1.15)
LYMPHOCYTES # BLD AUTO: 1.8 10E3/UL (ref 0.8–5.3)
LYMPHOCYTES NFR BLD AUTO: 31 %
MCH RBC QN AUTO: 29.8 PG (ref 26.5–33)
MCHC RBC AUTO-ENTMCNC: 31.5 G/DL (ref 31.5–36.5)
MCV RBC AUTO: 95 FL (ref 78–100)
MONOCYTES # BLD AUTO: 0.4 10E3/UL (ref 0–1.3)
MONOCYTES NFR BLD AUTO: 7 %
NEUTROPHILS # BLD AUTO: 3.2 10E3/UL (ref 1.6–8.3)
NEUTROPHILS NFR BLD AUTO: 56 %
NRBC # BLD AUTO: 0 10E3/UL
NRBC BLD AUTO-RTO: 0 /100
PLATELET # BLD AUTO: 312 10E3/UL (ref 150–450)
POTASSIUM SERPL-SCNC: 4.3 MMOL/L (ref 3.4–5.3)
RBC # BLD AUTO: 4.4 10E6/UL (ref 3.8–5.2)
SARS-COV-2 RNA RESP QL NAA+PROBE: NEGATIVE
SODIUM SERPL-SCNC: 142 MMOL/L (ref 136–145)
TROPONIN T SERPL HS-MCNC: 15 NG/L
TROPONIN T SERPL HS-MCNC: 17 NG/L
WBC # BLD AUTO: 5.7 10E3/UL (ref 4–11)

## 2022-09-30 PROCEDURE — 84484 ASSAY OF TROPONIN QUANT: CPT | Performed by: PHYSICIAN ASSISTANT

## 2022-09-30 PROCEDURE — 71046 X-RAY EXAM CHEST 2 VIEWS: CPT

## 2022-09-30 PROCEDURE — 36415 COLL VENOUS BLD VENIPUNCTURE: CPT | Performed by: STUDENT IN AN ORGANIZED HEALTH CARE EDUCATION/TRAINING PROGRAM

## 2022-09-30 PROCEDURE — U0005 INFEC AGEN DETEC AMPLI PROBE: HCPCS | Performed by: STUDENT IN AN ORGANIZED HEALTH CARE EDUCATION/TRAINING PROGRAM

## 2022-09-30 PROCEDURE — 85041 AUTOMATED RBC COUNT: CPT | Performed by: STUDENT IN AN ORGANIZED HEALTH CARE EDUCATION/TRAINING PROGRAM

## 2022-09-30 PROCEDURE — 93005 ELECTROCARDIOGRAM TRACING: CPT | Performed by: PHYSICIAN ASSISTANT

## 2022-09-30 PROCEDURE — 87804 INFLUENZA ASSAY W/OPTIC: CPT | Performed by: PHYSICIAN ASSISTANT

## 2022-09-30 PROCEDURE — 85379 FIBRIN DEGRADATION QUANT: CPT | Performed by: PHYSICIAN ASSISTANT

## 2022-09-30 PROCEDURE — 36415 COLL VENOUS BLD VENIPUNCTURE: CPT | Performed by: PHYSICIAN ASSISTANT

## 2022-09-30 PROCEDURE — 93005 ELECTROCARDIOGRAM TRACING: CPT | Performed by: STUDENT IN AN ORGANIZED HEALTH CARE EDUCATION/TRAINING PROGRAM

## 2022-09-30 PROCEDURE — 84484 ASSAY OF TROPONIN QUANT: CPT | Performed by: STUDENT IN AN ORGANIZED HEALTH CARE EDUCATION/TRAINING PROGRAM

## 2022-09-30 PROCEDURE — 99285 EMERGENCY DEPT VISIT HI MDM: CPT | Mod: 25

## 2022-09-30 PROCEDURE — 80048 BASIC METABOLIC PNL TOTAL CA: CPT | Performed by: STUDENT IN AN ORGANIZED HEALTH CARE EDUCATION/TRAINING PROGRAM

## 2022-09-30 PROCEDURE — 85610 PROTHROMBIN TIME: CPT | Performed by: PHYSICIAN ASSISTANT

## 2022-09-30 RX ORDER — CODEINE PHOSPHATE AND GUAIFENESIN 10; 100 MG/5ML; MG/5ML
1-2 SOLUTION ORAL EVERY 6 HOURS PRN
Qty: 80 ML | Refills: 0 | Status: SHIPPED | OUTPATIENT
Start: 2022-09-30 | End: 2022-10-31

## 2022-09-30 RX ORDER — PREDNISONE 20 MG/1
TABLET ORAL
Qty: 10 TABLET | Refills: 0 | Status: SHIPPED | OUTPATIENT
Start: 2022-09-30 | End: 2022-10-31

## 2022-09-30 ASSESSMENT — ACTIVITIES OF DAILY LIVING (ADL): ADLS_ACUITY_SCORE: 35

## 2022-09-30 ASSESSMENT — ENCOUNTER SYMPTOMS
VOMITING: 0
FEVER: 1
COUGH: 1
NAUSEA: 0
FATIGUE: 1
DIZZINESS: 0
WHEEZING: 1
ABDOMINAL PAIN: 0
DIARRHEA: 0
SHORTNESS OF BREATH: 1
BACK PAIN: 0

## 2022-09-30 NOTE — DISCHARGE INSTRUCTIONS
As we discussed, given your symptoms, most concerned for a bacterial infection in your lungs COVID-pneumonia versus a bronchitis.  We will cover you with the antibiotic Augmentin in case of a bacterial infection.  We will also place you on 5 days of steroids to help with your asthma.  Please continue to use your rescue inhaler as needed.  I would like you to have a close recheck in your primary care clinic in 2 to 3 days to make sure symptoms are improving.  Watch yourself very closely, and if at anytime you develop worsening shortness of breath, sustained oxygen readings below 90%, chest pain, dizziness, near loss of consciousness, or any new or concerning symptoms please return to the ER for further evaluation.    I will give you Medicine for codeine to help you sleep at night.  This is a strong medicine and can make you quite drowsy.  Please do not work, drive, or operate heavy machinery while taking this medicine.

## 2022-09-30 NOTE — ED PROVIDER NOTES
Emergency Department Encounter   NAME: Eileen Donald ; AGE: 66 year old female ; YOB: 1955 ; MRN: [id number] ; PCP: Drew Hahn   ED PROVIDER: Juana Lmia PA-C    Evaluation Date & Time:   No admission date for patient encounter.    CHIEF COMPLAINT:  cough/fever/phlegm      Impression and Plan   MDM: Eileen Donald is a 66 year old female with a pertinent history of aortic valve stenosis, COPD, coronary artery disease, diabetes, hypertension, PE, multiple sclerosis, who presents to the ED by private vehicle for evaluation of fever and cough.  The patient presents to the emergency department for several days to a week of fevers, cough, phlegm production, and intermittent wheezing.  Here in the ER, she is afebrile and vitally stable.  She does appear chronically ill though was in no acute distress.  She is breathing comfortably and speaking in full sentences, and is currently 95% on room air which is quite reassuring.  She does have underlying asthma and has a faint inspiratory wheeze throughout lung fields though no tightness, crackles, or asymmetric breath sounds.  Chest x-ray was obtained which showed no evidence of consolidation or infiltrate concerning for a bacterial pneumonia.  No groundglass opacities consistent with COVID pneumonitis.  COVID and influenza testing negative.  No leukocytosis or abnormal vitals today to suggest Sirs.  Patient symptoms feel similar to when she had pneumonia requiring hospitalization last year, given her comorbidities and underlying asthma, will treat patient with a course of antibiotics for occult pneumonia versus bronchitis.  Patient is allergic to macrolides and doxycycline, and will cover her with a course of Augmentin.  EKG obtained in triage shows sinus rhythm, with nonspecific T wave inversions in V6 and aVL.  High-sensitivity troponin came back just slightly elevated at 15.  No concern for a viral myopericarditis.  Patient's symptoms are  not consistent with ACS, however due to her mildly high troponin, did repeat a 2-hour troponin which remained stable.  No further cardiac work-up is indicated at this time.  Patient symptoms are clinically inconsistent with PE, however she has had PEs in the past.  She is anticoagulated on Coumadin and her INR is within normal limits at 2.3 to which is reassuring.  She is not tachycardic, hypoxic, and is not having any pleuritic pain to suggest PE.  Given the likelihood today, did obtain D-dimer which returned within normal limits.  No further work-up for PE is indicated.  At this time, she is breathing comfortably, is vitally stable, and has a reassuring work-up from her respiratory symptoms.  We will discharge her to home with a course of Augmentin as well as prednisone for her wheezing in the setting of asthma, she has an albuterol rescue inhaler at home.  We reviewed the importance of close recheck in clinic as well as concerning signs and symptoms return to the ER.  She verbalized understanding is comfortable to plan.  Discharged home in good condition.    *Patient examination and workup was initiated in triage due to inpatient hospital and Emergency Department bed shortage resulting in long waiting room wait times. Patient and/or guardian's consent was obtained.     ED COURSE:  4:43 PM I met and introduced myself to the patient. I gathered initial history and performed my physical exam. We discussed plan for initial workup.   5:50 PM discussed outpatient community-acquired pneumonia antibiotic selection with the ED pharmacist.  5:56 PM rechecked the patient and discussed results, plans for discharge, follow-up, and reasons to return to the ER.    At the conclusion of the encounter I discussed the results of all the tests and the disposition. The questions were answered. The patient or family acknowledged understanding and was agreeable with the care plan.    FINAL IMPRESSION:    ICD-10-CM    1. Pneumonia due  to infectious organism, unspecified laterality, unspecified part of lung  J18.9    2. Mild intermittent asthma with exacerbation  J45.21          MEDICATIONS GIVEN IN THE EMERGENCY DEPARTMENT:  Medications - No data to display      NEW PRESCRIPTIONS STARTED AT TODAY'S ED VISIT:  New Prescriptions    AMOXICILLIN-CLAVULANATE (AUGMENTIN) 875-125 MG TABLET    Take 1 tablet by mouth 2 times daily for 5 days    GUAIFENESIN-CODEINE (ROBITUSSIN AC) 100-10 MG/5ML SOLUTION    Take 5-10 mLs by mouth every 6 hours as needed for cough    PREDNISONE (DELTASONE) 20 MG TABLET    Take two tablets (= 40mg) each day for 5 (five) days         HPI   Patient information was obtained from: Patient and daughter  Use of Intrepreter: N/A    Eileen Donald is a 66 year old female with a pertinent history of aortic valve stenosis, COPD, coronary artery disease, diabetes, hypertension, PE, multiple sclerosis, who presents to the ED by private vehicle for evaluation of fever and cough.     Per patient, she has been ill for the past few days to 1 week with fever, cough, and phlegm production.  She had a measurable fever 2 days ago which has since broke.  She has been wheezy at times and has had to use her rescue inhaler in the setting of asthma.  No smoking history.  She had pneumonia for which she was hospitalized last year and her symptoms feel similar this week.  She had 2 negative COVID-19 test at home and is fully vaccinated against COVID-19.  She does have a history of PE, however is anticoagulated on Coumadin with her last INR 1 week ago around 4.  No chest pain or leg swelling or cramping.  No associated vomiting, diarrhea, or abdominal pain.      REVIEW OF SYSTEMS:  Review of Systems   Constitutional: Positive for fatigue and fever.   Respiratory: Positive for cough, shortness of breath and wheezing.    Cardiovascular: Negative for chest pain and leg swelling.   Gastrointestinal: Negative for abdominal pain, diarrhea, nausea and  vomiting.   Genitourinary: Negative.    Musculoskeletal: Negative for back pain.   Neurological: Negative for dizziness and syncope.   All other systems reviewed and are negative.        Medical History     Past Medical History:   Diagnosis Date     Anxiety      Anxiety      Aortic valve replaced 2/15/2017     Aortic valve stenosis 01/24/2017     Asthma      Asthma      Chronic shortness of breath      COPD (chronic obstructive pulmonary disease) (H)      Coronary artery disease      Depression      Diabetes mellitus (H)      Essential hypertension      History of blood clots      History of pulmonary embolism      HTN (hypertension)      Multiple sclerosis (H)      Multiple sclerosis (H)      Obesity      Obesity 10/29/2015     Osteoarthritis      Oxygen dependent      Panic attacks      PONV (postoperative nausea and vomiting)      Pre-diabetes      Pulmonary embolism (H)      Sleep apnea        Past Surgical History:   Procedure Laterality Date     aortic valve surgery       COLONOSCOPY N/A 12/23/2021    Procedure: COLONOSCOPY WITH POLYPECTOMIES;  Surgeon: Tito Suarez MD;  Location[facility] Main OR     COLONOSCOPY W/ BIOPSIES N/A 3/22/2021    Procedure: COLONOSCOPY WITH BIOPSY AND POLYPECTOMY;  Surgeon: Sam Weems MD;  Location: [facility] Main OR;  Service: Gastroenterology     IR LUMBAR EPIDURAL STEROID INJECTION  1/16/2007     MAMMOPLASTY REDUCTION  1994     OTHER SURGICAL HISTORY  01/24/2017    mechanical aortic prosthesis     RELEASE CARPAL TUNNEL       ZZC RECONSTR TOTAL SHOULDER IMPLANT Left 4/10/2019    Procedure: LEFT REVERSE TOTAL SHOULDER ARTHROPLASTY;  Surgeon: Luis Antonio Telles MD;  Location: [facility] Main OR;  Service: Orthopedics       Family History   Problem Relation Age of Onset     Snoring Mother      Snoring Father      Sleep Apnea Sister      Coronary Artery Disease Father      Coronary Artery Disease Sister      Coronary Artery Disease Brother      Breast Cancer No family hx of  "       Social History     Tobacco Use     Smoking status: Never Smoker     Smokeless tobacco: Never Used   Substance Use Topics     Alcohol use: No     Drug use: No       amoxicillin-clavulanate (AUGMENTIN) 875-125 MG tablet  guaiFENesin-codeine (ROBITUSSIN AC) 100-10 MG/5ML solution  predniSONE (DELTASONE) 20 MG tablet  acetaminophen (TYLENOL) 325 MG tablet  acyclovir (ZOVIRAX) 400 MG tablet  albuterol (PROVENTIL HFA) 108 (90 Base) MCG/ACT inhaler  aspirin (ASA) 81 MG chewable tablet  budesonide-formoterol (SYMBICORT) 160-4.5 MCG/ACT Inhaler  buPROPion (WELLBUTRIN XL) 150 MG 24 hr tablet  calcium carbonate 1250 (500 Ca) MG CHEW  diclofenac (VOLTAREN) 1 % topical gel  enoxaparin ANTICOAGULANT (LOVENOX) 120 MG/0.8ML syringe  esomeprazole (NEXIUM) 20 MG DR capsule  furosemide (LASIX) 40 MG tablet  INCRUSE ELLIPTA 62.5 MCG/INH Inhaler  lisinopril (ZESTRIL) 5 MG tablet  loratadine (CLARITIN) 10 MG tablet  LORazepam (ATIVAN) 0.5 MG tablet  magnesium oxide (MAG-OX) 400 MG tablet  metFORMIN (GLUCOPHAGE-XR) 500 MG 24 hr tablet  metoprolol tartrate (LOPRESSOR) 25 MG tablet  montelukast (SINGULAIR) 10 MG tablet  potassium chloride ER (MICRO-K) 10 MEQ CR capsule  simvastatin (ZOCOR) 20 MG tablet  umeclidinium (INCRUSE ELLIPTA) 62.5 MCG/INH inhaler  venlafaxine (EFFEXOR XR) 75 MG 24 hr capsule  warfarin ANTICOAGULANT (COUMADIN) 2.5 MG tablet          Physical Exam     First Vitals:  Patient Vitals for the past 24 hrs:   BP Temp Temp src Pulse Resp SpO2 Height Weight   09/30/22 1723 123/50 98.6  F (37  C) Oral 66 -- 95 % -- --   09/30/22 1701 127/58 -- -- 70 16 96 % -- --   09/30/22 1412 123/75 98.1  F (36.7  C) Oral 76 18 96 % 1.778 m (5' 10\") 111.9 kg (246 lb 9.6 oz)         PHYSICAL EXAM:   Physical Exam  Vitals and nursing note reviewed.   Constitutional:       General: She is not in acute distress.     Appearance: She is not toxic-appearing.   HENT:      Head: Normocephalic.      Mouth/Throat:      Mouth: Mucous membranes " are moist.      Pharynx: Oropharynx is clear.   Eyes:      Conjunctiva/sclera: Conjunctivae normal.   Cardiovascular:      Rate and Rhythm: Normal rate and regular rhythm.      Pulses: Normal pulses.           Radial pulses are 2+ on the right side and 2+ on the left side.      Comments: Heart sounds with mechanical click.   Pulmonary:      Effort: Pulmonary effort is normal. No respiratory distress.      Breath sounds: Wheezing (faint inspiratory wheeze throughout) present.      Comments: Speaking in full sentences. No crackles or asymmetric breath sounds.   Chest:      Chest wall: No tenderness.   Musculoskeletal:      Right lower leg: No edema.      Left lower leg: No edema.   Skin:     General: Skin is warm and dry.      Capillary Refill: Capillary refill takes less than 2 seconds.   Neurological:      Mental Status: She is alert.             Results     LAB:  All pertinent labs reviewed and interpreted  Labs Ordered and Resulted from Time of ED Arrival to Time of ED Departure   BASIC METABOLIC PANEL - Abnormal       Result Value    Sodium 142      Potassium 4.3      Chloride 105      Carbon Dioxide (CO2) 27      Anion Gap 10      Urea Nitrogen 13.3      Creatinine 0.78      Calcium 8.4 (*)     Glucose 117 (*)     GFR Estimate 84     TROPONIN T, HIGH SENSITIVITY - Abnormal    Troponin T, High Sensitivity 15 (*)    INR - Abnormal    INR 2.32 (*)    TROPONIN T, HIGH SENSITIVITY - Abnormal    Troponin T, High Sensitivity 17 (*)    COVID-19 VIRUS (CORONAVIRUS) BY PCR - Normal    SARS CoV2 PCR Negative     D DIMER QUANTITATIVE - Normal    D-Dimer Quantitative 0.28     INFLUENZA A/B ANTIGEN - Normal    Influenza A antigen Negative      Influenza B antigen Negative     CBC WITH PLATELETS AND DIFFERENTIAL    WBC Count 5.7      RBC Count 4.40      Hemoglobin 13.1      Hematocrit 41.6      MCV 95      MCH 29.8      MCHC 31.5      RDW 13.8      Platelet Count 312      % Neutrophils 56      % Lymphocytes 31      % Monocytes  7      % Eosinophils 5      % Basophils 1      % Immature Granulocytes 0      NRBCs per 100 WBC 0      Absolute Neutrophils 3.2      Absolute Lymphocytes 1.8      Absolute Monocytes 0.4      Absolute Eosinophils 0.3      Absolute Basophils 0.0      Absolute Immature Granulocytes 0.0      Absolute NRBCs 0.0         RADIOLOGY:  Chest XR,  PA & LAT   Final Result   IMPRESSION: Stable elevated right hemidiaphragm. Minimal basilar atelectasis. Remaining lungs are clear. No pleural effusion. Stable mildly enlarged cardiomediastinal silhouette. Post sternotomy. Cardiac valvular prosthetic. Left liver arthroplasty.              ECG:  #1  Performed at: 14:48    Impression: Sinus rhythm. Anterior infarct, age undetermined.    Rate: 72 bpm  Rhythm: Sinus  Axis: 56, 0, 74  MA Interval: 188  QRS Interval: 76  QTc Interval: 429  ST Changes: None  Comparison: Compared with EKG of 9/28/2021, Vent rate has decreased by 42 bpm. Questionable change in QRS axis.      #2  Performed at: 17:14    Impression: Sinus rhyhtm. Possible anterior infarct (cited on or before 9/30/2022).    Rate: 67 bpm  Rhythm: Sinus  Axis: 65, 2, 76  MA Interval: 188  QRS Interval: 80  QTc Interval: 424  ST Changes: None  Comparison: Compared with EKG from earlier today, 9/30/22, at 14:48, no significant change.    EKG results reviewed and interpreted by Dr. Pulliam, ED MD.    Juana Lima PA-C   Emergency Medicine   [facility] EMERGENCY DEPARTMENT       Juana Lima PA-C  09/30/22 1251

## 2022-09-30 NOTE — ED TRIAGE NOTES
Patient brought to ED for evaluation of cough and fever. Has been coughing up phlegm. Started 2-3 days ago. Has had 2 negative covid tests. History of MS and lung blood clots.     Triage Assessment     Row Name 09/30/22 1416       Triage Assessment (Adult)    Airway WDL WDL       Respiratory WDL    Respiratory WDL WDL       Skin Circulation/Temperature WDL    Skin Circulation/Temperature WDL WDL       Cardiac WDL    Cardiac WDL WDL       Peripheral/Neurovascular WDL    Peripheral Neurovascular WDL WDL       Cognitive/Neuro/Behavioral WDL    Cognitive/Neuro/Behavioral WDL WDL

## 2022-10-01 LAB
ATRIAL RATE - MUSE: 67 BPM
ATRIAL RATE - MUSE: 72 BPM
DIASTOLIC BLOOD PRESSURE - MUSE: NORMAL MMHG
DIASTOLIC BLOOD PRESSURE - MUSE: NORMAL MMHG
INTERPRETATION ECG - MUSE: NORMAL
INTERPRETATION ECG - MUSE: NORMAL
P AXIS - MUSE: 56 DEGREES
P AXIS - MUSE: 65 DEGREES
PR INTERVAL - MUSE: 188 MS
PR INTERVAL - MUSE: 188 MS
QRS DURATION - MUSE: 76 MS
QRS DURATION - MUSE: 80 MS
QT - MUSE: 392 MS
QT - MUSE: 402 MS
QTC - MUSE: 424 MS
QTC - MUSE: 429 MS
R AXIS - MUSE: 0 DEGREES
R AXIS - MUSE: 2 DEGREES
SYSTOLIC BLOOD PRESSURE - MUSE: NORMAL MMHG
SYSTOLIC BLOOD PRESSURE - MUSE: NORMAL MMHG
T AXIS - MUSE: 74 DEGREES
T AXIS - MUSE: 76 DEGREES
VENTRICULAR RATE- MUSE: 67 BPM
VENTRICULAR RATE- MUSE: 72 BPM

## 2022-10-03 ENCOUNTER — DOCUMENTATION ONLY (OUTPATIENT)
Dept: ANTICOAGULATION | Facility: CLINIC | Age: 67
End: 2022-10-03

## 2022-10-03 NOTE — PROGRESS NOTES
ANTICOAGULATION  MANAGEMENT: Discharge Review    Eileen Donald chart reviewed for anticoagulation continuity of care    Emergency room visit on 9/30 for cough, fever.    Discharge disposition: Home    Results:    Recent labs: (last 7 days)     09/30/22  1657   INR 2.32*     Anticoagulation inpatient management:     home regimen continued    Anticoagulation discharge instructions:     Warfarin dosing: home regimen continued   Bridging: No   INR goal change: No      Medication changes affecting anticoagulation: Yes:guaifenesin, prednisone 40 mg daily for 5 days, augmentin bid for 5 days, through 10/5    Additional factors affecting anticoagulation: No     PLAN     No adjustment to anticoagulation plan needed    Patient not contacted inr scheduled 10/4    No adjustment to Anticoagulation Calendar was required    Dany Cramer RN

## 2022-10-05 ENCOUNTER — TELEPHONE (OUTPATIENT)
Dept: FAMILY MEDICINE | Facility: CLINIC | Age: 67
End: 2022-10-05

## 2022-10-05 NOTE — TELEPHONE ENCOUNTER
Reason for call:  Other   Patient called regarding (reason for call): appointment  Additional comments: Patient was seen at the ED on 9/30/22 for cough- tested negative for covid. Patient still has the cough and was advised to be seen by her pcp    Phone number to reach patient:  Cell number on file:    Telephone Information:   Mobile [phone]       Best Time:  anytime    Can we leave a detailed message on this number?  YES    Travel screening: Not Applicable

## 2022-10-07 DIAGNOSIS — E78.5 DYSLIPIDEMIA: ICD-10-CM

## 2022-10-07 DIAGNOSIS — F33.41 RECURRENT MAJOR DEPRESSIVE DISORDER, IN PARTIAL REMISSION (H): ICD-10-CM

## 2022-10-07 RX ORDER — SIMVASTATIN 20 MG
20 TABLET ORAL AT BEDTIME
Qty: 90 TABLET | Refills: 1 | Status: SHIPPED | OUTPATIENT
Start: 2022-10-07 | End: 2023-04-19

## 2022-10-07 RX ORDER — VENLAFAXINE HYDROCHLORIDE 75 MG/1
CAPSULE, EXTENDED RELEASE ORAL
Qty: 90 CAPSULE | Refills: 0 | Status: SHIPPED | OUTPATIENT
Start: 2022-10-07 | End: 2022-12-20

## 2022-10-12 ENCOUNTER — TELEPHONE (OUTPATIENT)
Dept: ANTICOAGULATION | Facility: CLINIC | Age: 67
End: 2022-10-12

## 2022-10-12 NOTE — TELEPHONE ENCOUNTER
ANTICOAGULATION     Eileen Donald is overdue for INR check.      Spoke with Marilee and scheduled lab appointment on 10/17    Alondra Lora RN

## 2022-10-18 ENCOUNTER — TELEPHONE (OUTPATIENT)
Dept: ANTICOAGULATION | Facility: CLINIC | Age: 67
End: 2022-10-18

## 2022-10-18 ENCOUNTER — ANTICOAGULATION THERAPY VISIT (OUTPATIENT)
Dept: ANTICOAGULATION | Facility: CLINIC | Age: 67
End: 2022-10-18

## 2022-10-18 ENCOUNTER — LAB (OUTPATIENT)
Dept: LAB | Facility: CLINIC | Age: 67
End: 2022-10-18
Payer: COMMERCIAL

## 2022-10-18 DIAGNOSIS — Z95.2 S/P AVR (AORTIC VALVE REPLACEMENT): ICD-10-CM

## 2022-10-18 DIAGNOSIS — I48.0 PAROXYSMAL ATRIAL FIBRILLATION (H): ICD-10-CM

## 2022-10-18 DIAGNOSIS — I26.99 OTHER PULMONARY EMBOLISM WITHOUT ACUTE COR PULMONALE, UNSPECIFIED CHRONICITY (H): ICD-10-CM

## 2022-10-18 DIAGNOSIS — Z95.2 H/O AORTIC VALVE REPLACEMENT: ICD-10-CM

## 2022-10-18 DIAGNOSIS — Z95.2 HEART VALVE REPLACED: Primary | ICD-10-CM

## 2022-10-18 LAB — INR BLD: 2.7 (ref 0.9–1.1)

## 2022-10-18 PROCEDURE — 85610 PROTHROMBIN TIME: CPT

## 2022-10-18 PROCEDURE — 36416 COLLJ CAPILLARY BLOOD SPEC: CPT

## 2022-10-18 NOTE — PROGRESS NOTES
ANTICOAGULATION MANAGEMENT     Eileen Donald 66 year old female is on warfarin with therapeutic INR result. (Goal INR 2.5-3.5)    Recent labs: (last 7 days)     10/18/22  1513   INR 2.7*       ASSESSMENT       Source(s): Chart Review, Patient/Caregiver Call and Template       Warfarin doses taken: Warfarin taken as instructed    Diet: No new diet changes identified    New illness, injury, or hospitalization: No    Medication/supplement changes: None noted    Signs or symptoms of bleeding or clotting: No    Previous INR: Subtherapeutic at 2.32 on 9/30/22.    Additional findings: None       PLAN     Recommended plan for no diet, medication or health factor changes affecting INR     Dosing Instructions: Continue your current warfarin dose with next INR in 2 weeks       Summary  As of 10/18/2022    Full warfarin instructions:  5 mg every Tue, Fri; 2.5 mg all other days   Next INR check:  11/1/2022             Telephone call with  Marilee ([phone]) who verbalizes understanding and agrees to plan    Patient offered & declined to schedule next visit    Education provided: Importance of consistent vitamin K intake and Goal range and significance of current result    Plan made per [facility] anticoagulation protocol    Alondra Lora RN  Anticoagulation Clinic  10/18/2022    _______________________________________________________________________     Anticoagulation Episode Summary     Current INR goal:  2.5-3.5   TTR:  53.7 % (1 y)   Target end date:  Indefinite   Send INR reminders to:  [facility]    Indications    Heart valve replaced [Z95.2]  Paroxysmal atrial fibrillation (H) [I48.0]  S/P AVR (aortic valve replacement) [Z95.2]  Other pulmonary embolism without acute cor pulmonale  unspecified chronicity (H) [I26.99]  H/O aortic valve replacement [Z95.2]           Comments:  12/15/21 - amended INR goal to 2.5 - 3.5         Anticoagulation Care Providers     Provider Role Specialty Phone number     Tito Salazar MD Referring Family Medicine [phone]    Drwe Hahn MD Referring Family Medicine [phone]

## 2022-10-18 NOTE — TELEPHONE ENCOUNTER
ANTICOAGULATION     Eileen Donald is overdue for INR check.      Spoke with Marilee and scheduled lab appointment on 10/18    Alondra Lora RN

## 2022-10-27 ENCOUNTER — TRANSFERRED RECORDS (OUTPATIENT)
Dept: HEALTH INFORMATION MANAGEMENT | Facility: CLINIC | Age: 67
End: 2022-10-27

## 2022-10-31 ENCOUNTER — OFFICE VISIT (OUTPATIENT)
Dept: FAMILY MEDICINE | Facility: CLINIC | Age: 67
End: 2022-10-31
Payer: COMMERCIAL

## 2022-10-31 ENCOUNTER — ANCILLARY PROCEDURE (OUTPATIENT)
Dept: GENERAL RADIOLOGY | Facility: CLINIC | Age: 67
End: 2022-10-31
Attending: FAMILY MEDICINE
Payer: COMMERCIAL

## 2022-10-31 VITALS
OXYGEN SATURATION: 98 % | DIASTOLIC BLOOD PRESSURE: 77 MMHG | BODY MASS INDEX: 34.7 KG/M2 | WEIGHT: 242.4 LBS | HEART RATE: 88 BPM | HEIGHT: 70 IN | SYSTOLIC BLOOD PRESSURE: 112 MMHG

## 2022-10-31 DIAGNOSIS — R05.3 PERSISTENT COUGH FOR 3 WEEKS OR LONGER: ICD-10-CM

## 2022-10-31 DIAGNOSIS — I27.20 PULMONARY HYPERTENSION (H): ICD-10-CM

## 2022-10-31 DIAGNOSIS — F33.41 RECURRENT MAJOR DEPRESSIVE DISORDER, IN PARTIAL REMISSION (H): ICD-10-CM

## 2022-10-31 DIAGNOSIS — J96.11 CHRONIC RESPIRATORY FAILURE WITH HYPOXIA (H): ICD-10-CM

## 2022-10-31 DIAGNOSIS — I48.0 PAROXYSMAL ATRIAL FIBRILLATION (H): ICD-10-CM

## 2022-10-31 DIAGNOSIS — E66.01 MORBID OBESITY (H): ICD-10-CM

## 2022-10-31 DIAGNOSIS — R05.3 PERSISTENT COUGH FOR 3 WEEKS OR LONGER: Primary | ICD-10-CM

## 2022-10-31 DIAGNOSIS — E11.69 TYPE 2 DIABETES MELLITUS WITH OTHER SPECIFIED COMPLICATION, UNSPECIFIED WHETHER LONG TERM INSULIN USE (H): ICD-10-CM

## 2022-10-31 PROBLEM — J44.9 CHRONIC OBSTRUCTIVE PULMONARY DISEASE, UNSPECIFIED COPD TYPE (H): Status: RESOLVED | Noted: 2021-02-01 | Resolved: 2022-10-31

## 2022-10-31 PROBLEM — I26.99 OTHER PULMONARY EMBOLISM WITHOUT ACUTE COR PULMONALE, UNSPECIFIED CHRONICITY (H): Status: RESOLVED | Noted: 2021-02-01 | Resolved: 2022-10-31

## 2022-10-31 LAB
ALBUMIN SERPL BCG-MCNC: 4.5 G/DL (ref 3.5–5.2)
ALP SERPL-CCNC: 89 U/L (ref 35–104)
ALT SERPL W P-5'-P-CCNC: 22 U/L (ref 10–35)
ANION GAP SERPL CALCULATED.3IONS-SCNC: 16 MMOL/L (ref 7–15)
AST SERPL W P-5'-P-CCNC: 36 U/L (ref 10–35)
BASOPHILS # BLD AUTO: 0.1 10E3/UL (ref 0–0.2)
BASOPHILS NFR BLD AUTO: 1 %
BILIRUB SERPL-MCNC: 0.4 MG/DL
BUN SERPL-MCNC: 14.7 MG/DL (ref 8–23)
CALCIUM SERPL-MCNC: 9.6 MG/DL (ref 8.8–10.2)
CHLORIDE SERPL-SCNC: 102 MMOL/L (ref 98–107)
CREAT SERPL-MCNC: 0.85 MG/DL (ref 0.51–0.95)
DEPRECATED HCO3 PLAS-SCNC: 25 MMOL/L (ref 22–29)
EOSINOPHIL # BLD AUTO: 0.2 10E3/UL (ref 0–0.7)
EOSINOPHIL NFR BLD AUTO: 3 %
ERYTHROCYTE [DISTWIDTH] IN BLOOD BY AUTOMATED COUNT: 13.6 % (ref 10–15)
GFR SERPL CREATININE-BSD FRML MDRD: 75 ML/MIN/1.73M2
GLUCOSE SERPL-MCNC: 124 MG/DL (ref 70–99)
HBA1C MFR BLD: 5.8 % (ref 0–5.6)
HCT VFR BLD AUTO: 44.9 % (ref 35–47)
HGB BLD-MCNC: 14 G/DL (ref 11.7–15.7)
IMM GRANULOCYTES # BLD: 0 10E3/UL
IMM GRANULOCYTES NFR BLD: 0 %
LYMPHOCYTES # BLD AUTO: 1.6 10E3/UL (ref 0.8–5.3)
LYMPHOCYTES NFR BLD AUTO: 19 %
MCH RBC QN AUTO: 29.3 PG (ref 26.5–33)
MCHC RBC AUTO-ENTMCNC: 31.2 G/DL (ref 31.5–36.5)
MCV RBC AUTO: 94 FL (ref 78–100)
MONOCYTES # BLD AUTO: 0.8 10E3/UL (ref 0–1.3)
MONOCYTES NFR BLD AUTO: 10 %
NEUTROPHILS # BLD AUTO: 5.5 10E3/UL (ref 1.6–8.3)
NEUTROPHILS NFR BLD AUTO: 67 %
NT-PROBNP SERPL-MCNC: 246 PG/ML (ref 0–900)
PLATELET # BLD AUTO: 354 10E3/UL (ref 150–450)
POTASSIUM SERPL-SCNC: 4.3 MMOL/L (ref 3.4–5.3)
PROT SERPL-MCNC: 7.1 G/DL (ref 6.4–8.3)
RBC # BLD AUTO: 4.78 10E6/UL (ref 3.8–5.2)
SODIUM SERPL-SCNC: 143 MMOL/L (ref 136–145)
WBC # BLD AUTO: 8.2 10E3/UL (ref 4–11)

## 2022-10-31 PROCEDURE — 0124A COVID-19,PF,PFIZER BOOSTER BIVALENT: CPT | Performed by: FAMILY MEDICINE

## 2022-10-31 PROCEDURE — 71046 X-RAY EXAM CHEST 2 VIEWS: CPT | Mod: TC | Performed by: RADIOLOGY

## 2022-10-31 PROCEDURE — 83036 HEMOGLOBIN GLYCOSYLATED A1C: CPT | Performed by: FAMILY MEDICINE

## 2022-10-31 PROCEDURE — 80053 COMPREHEN METABOLIC PANEL: CPT | Performed by: FAMILY MEDICINE

## 2022-10-31 PROCEDURE — 90662 IIV NO PRSV INCREASED AG IM: CPT | Performed by: FAMILY MEDICINE

## 2022-10-31 PROCEDURE — 83880 ASSAY OF NATRIURETIC PEPTIDE: CPT | Performed by: FAMILY MEDICINE

## 2022-10-31 PROCEDURE — 91312 COVID-19,PF,PFIZER BOOSTER BIVALENT: CPT | Performed by: FAMILY MEDICINE

## 2022-10-31 PROCEDURE — 90471 IMMUNIZATION ADMIN: CPT | Performed by: FAMILY MEDICINE

## 2022-10-31 PROCEDURE — 99214 OFFICE O/P EST MOD 30 MIN: CPT | Mod: 25 | Performed by: FAMILY MEDICINE

## 2022-10-31 PROCEDURE — 85025 COMPLETE CBC W/AUTO DIFF WBC: CPT | Performed by: FAMILY MEDICINE

## 2022-10-31 PROCEDURE — 36415 COLL VENOUS BLD VENIPUNCTURE: CPT | Performed by: FAMILY MEDICINE

## 2022-10-31 ASSESSMENT — PATIENT HEALTH QUESTIONNAIRE - PHQ9
SUM OF ALL RESPONSES TO PHQ QUESTIONS 1-9: 4
SUM OF ALL RESPONSES TO PHQ QUESTIONS 1-9: 4
10. IF YOU CHECKED OFF ANY PROBLEMS, HOW DIFFICULT HAVE THESE PROBLEMS MADE IT FOR YOU TO DO YOUR WORK, TAKE CARE OF THINGS AT HOME, OR GET ALONG WITH OTHER PEOPLE: SOMEWHAT DIFFICULT

## 2022-10-31 NOTE — LETTER
My Depression Action Plan  Name: Eileen Donald   Date of Birth 1955  Date: 10/31/2022    My doctor: Drew Hahn   [address] [facility] [address]  [phone]          GREEN    ZONE   Good Control    What it looks like:     Things are going generally well. You have normal ups and downs. You may even feel depressed from time to time, but bad moods usually last less than a day.   What you need to do:  1. Continue to care for yourself (see self care plan)  2. Check your depression survival kit and update it as needed  3. Follow your physician s recommendations including any medication.  4. Do not stop taking medication unless you consult with your physician first.           YELLOW         ZONE Getting Worse    What it looks like:     Depression is starting to interfere with your life.     It may be hard to get out of bed; you may be starting to isolate yourself from others.    Symptoms of depression are starting to last most all day and this has happened for several days.     You may have suicidal thoughts but they are not constant.   What you need to do:     1. Call your care team. Your response to treatment will improve if you keep your care team informed of your progress. Yellow periods are signs an adjustment may need to be made.     2. Continue your self-care.  Just get dressed and ready for the day.  Don't give yourself time to talk yourself out of it.    3. Talk to someone in your support network.    4. Open up your Depression Self-Care Plan/Wellness Kit.           RED    ZONE Medical Alert - Get Help    What it looks like:     Depression is seriously interfering with your life.     You may experience these or other symptoms: You can t get out of bed most days, can t work or engage in other necessary activities, you have trouble taking care of basic hygiene, or basic responsibilities, thoughts of suicide or death  that will not go away, self-injurious behavior.     What you need to do:  1. Call your care team and request a same-day appointment. If they are not available (weekends or after hours) call your local crisis line, emergency room or 911.          Depression Self-Care Plan / Wellness Kit    Many people find that medication and therapy are helpful treatments for managing depression. In addition, making small changes to your everyday life can help to boost your mood and improve your wellbeing. Below are some tips for you to consider. Be sure to talk with your medical provider and/or behavioral health consultant if your symptoms are worsening or not improving.     Sleep   Sleep hygiene  means all of the habits that support good, restful sleep. It includes maintaining a consistent bedtime and wake time, using your bedroom only for sleeping or sex, and keeping the bedroom dark and free of distractions like a computer, smartphone, or television.     Develop a Healthy Routine  Maintain good hygiene. Get out of bed in the morning, make your bed, brush your teeth, take a shower, and get dressed. Don t spend too much time viewing media that makes you feel stressed. Find time to relax each day.    Exercise  Get some form of exercise every day. This will help reduce pain and release endorphins, the  feel good  chemicals in your brain. It can be as simple as just going for a walk or doing some gardening, anything that will get you moving.      Diet  Strive to eat healthy foods, including fruits and vegetables. Drink plenty of water. Avoid excessive sugar, caffeine, alcohol, and other mood-altering substances.     Stay Connected with Others  Stay in touch with friends and family members.    Manage Your Mood  Try deep breathing, massage therapy, biofeedback, or meditation. Take part in fun activities when you can. Try to find something to smile about each day.     Psychotherapy  Be open to working with a therapist if your provider  recommends it.     Medication  Be sure to take your medication as prescribed. Most anti-depressants need to be taken every day. It usually takes several weeks for medications to work. Not all medicines work for all people. It is important to follow-up with your provider to make sure you have a treatment plan that is working for you. Do not stop your medication abruptly without first discussing it with your provider.    Crisis Resources   These hotlines are for both adults and children. They and are open 24 hours a day, 7 days a week unless noted otherwise.      National Suicide Prevention Lifeline   988 or 0-429-345-FGGL (4024)      Crisis Text Line    www.crisistextline.org  Text HOME to 716307 from anywhere in the [place], anytime, about any type of crisis. A live, trained crisis counselor will receive the text and respond quickly.      [employer] Lifeline for LGBTQ Youth  A national crisis intervention and suicide lifeline for LGBTQ youth under 25. Provides a safe place to talk without judgement. Call 1-230.924.2554; text START to 710434 or visit www.thetrevorproject.org to talk to a trained counselor.      For Critical access hospital crisis numbers, visit the [facility] website at:  https://[place].gov/dhs/people-we-serve/adults/health-care/mental-health/resources/crisis-contacts.jsp

## 2022-10-31 NOTE — PATIENT INSTRUCTIONS
Cough   What is coughing?   Coughing is a sudden forcing of air from the lungs. It is a natural reflex to clear the air passages. It can also be a symptom of a disease or other medical problem.   Some coughs are dry and hacking. Some coughs are deeper, even painful at times, and the cough brings up mucus or phlegm. Health care providers call this a productive cough because it produces mucus or phlegm. It brings mucus up out of your airways and can make it easier to breathe. For example, if you have pneumonia, coughing is helpful because it clears the airway of mucus. This relieves chest congestion and it is easier to breathe.   How does it occur?   Coughing often occurs when the airways are irritated. It can be caused by:   a cold or flu   sinus infection   bronchitis   allergies   heartburn (reflux)   asthma.   It may also be caused by more serious illnesses such as:   heart failure   pneumonia   tuberculosis   cancer.   Some drugs may cause coughing as a side effect. Examples of such drugs are ACE inhibitors or beta-blockers, which are drugs used to treat high blood pressure.   Sometimes people just have a nervous habit of coughing or throat clearing.   Any cough that lasts 3 weeks or more is chronic. This is true even if it occurs only in the morning, only at night, or only in the winter. One common cause of a chronic cough is exposure to irritants such as smoke or pollen. Some people with a chronic cough get so used to coughing that they consider it normal. This is often true of the smoker's cough that many smokers come to accept as a part of waking up in the morning. The problem may be more serious than they think.   How is it treated?   Many different medicines for coughs are available without a prescription. If you need relief from a dry, hacking cough, choose a type of cough medicine called a cough suppressant. If you need to loosen mucus, choose an expectorant.   Cough suppressants are medicines that lessen  the urge to cough. If you have a dry, hacking cough and do not have mucus in your airways that needs to be coughed up, a cough suppressant can help you cough less and sleep better. Cough medicines with the initials DM in the name contain the suppressant drug called dextromethorphan.   Expectorants help keep the mucus thin and help bring up mucus from the lungs when you cough. This relieves chest congestion and makes it easier to breathe. The drug used most often as an expectorant is guaifenesin.   How can I take care of myself?   Always follow the instructions on the label of cough medicines.   If you have a wet-sounding cough, do not use medicines that contain antihistamines. Antihistamines dry up the mucus.   Drink a lot of water to help loosen mucus and make it easier to cough it up.   Use a cool-mist humidifier, especially in the bedroom, to help relieve coughing. Be sure to follow the 's instructions for cleaning the humidifier.   Do not smoke.   Avoid exposure to secondhand smoke.   Contact your health care provider if you have:   a cough with fever or thick, foul-smelling, [place] or greenish mucus   a cough that interferes with your sleep or daily activities   a cough that has not gotten better in 7 days   a violent cough that comes on suddenly   a high-pitched sound when you breathe in   unexpected weight loss as well as a cough.   Call your health care provider or 911 right away if you have a cough that causes shortness of breath or severe pain, or if you begin coughing up blood.   Copyright   2006 [employer] and/or one of its subsidiaries. All Rights Reserved.

## 2022-10-31 NOTE — PROGRESS NOTES
"  Assessment & Plan     ICD-10-CM    1. Persistent cough for 3 weeks or longer  R05.3 XR Chest 2 Views     BNP-N terminal pro     Comprehensive metabolic panel (BMP + Alb, Alk Phos, ALT, AST, Total. Bili, TP)     CBC with platelets and differential      2. Pulmonary hypertension (H)  I27.20       3. Recurrent major depressive disorder, in partial remission (H)  F33.41       4. Chronic respiratory failure with hypoxia (H)  J96.11       5. Paroxysmal atrial fibrillation (H)  I48.0       6. Morbid obesity (H)  E66.01       7. Type 2 diabetes mellitus with other specified complication, unspecified whether long term insulin use (H)  E11.69 Hemoglobin A1c        Medical decision making: Patient is here today for follow-up of her emergency room visit last month for cough and treated for pneumonia.  She informs me that the wet cough is gone but she continues to have some persistent dry cough.  This is not related to activity.  She does have chronic respiratory failure with hypoxia but has not had the need to use oxygen recently.  I reviewed her medication list and she is on a low-dose losartan.  We will discontinue the medication at this time.  Reviewed her emergency room visit and noted some troponin leak and some EKG changes, will make sure that this is not related to her heart failure and check a BNP and a chest x-ray.  Labs as above.  Patient does have type 2 diabetes and that has really improved with metformin which has also helped her with some weight loss.  Check A1c today to avoid another blood draw in 2 weeks time.  Denies any concerns with her depression.  Overall feels well.  For her pulmonary hypertension and atrial fibrillation, she follows with cardiology and is on chronic anticoagulation and has an upcoming appointment with cardiology in December per patient.         BMI:   Estimated body mass index is 34.78 kg/m  as calculated from the following:    Height as of this encounter: 1.778 m (5' 10\").    Weight as " of this encounter: 110 kg (242 lb 6.4 oz).   Weight management plan: Discussed healthy diet and exercise guidelines    MEDICATIONS:  Continue current medications without change  See Patient Instructions    No follow-ups on file.    Drew Hahn MD  [facility]    William Hunt is a 67 year old accompanied by her n/a, presenting for the following health issues:  Hospital F/U ([facility] ER follow up from 09/30, pt is doing better. Cough seems to be hanging on. Pretty dry cough, starts around night time. Sometimes coughs so hard it causes hard time breathing. )      HPI     Answers for HPI/ROS submitted by the patient on 10/31/2022  If you checked off any problems, how difficult have these problems made it for you to do your work, take care of things at home, or get along with other people?: Somewhat difficult  PHQ9 TOTAL SCORE: 4    Patient Active Problem List   Diagnosis     Depression     Asthma     Benign essential hypertension     Multiple Sclerosis     Hyperlipidemia     Impaired Glucose Tolerance Test     Dysphagia     Benign Adenomatous Polyp Of The Large Intestine     History of pulmonary embolism     Morbid obesity (H)     Generalized anxiety disorder     Primary osteoarthritis of both hands     Polyarthralgia     Aortic valve replaced     Controlled substance agreement signed     Heart valve replaced     Closed 3-part fracture of proximal humerus with delayed healing, left     Acute on chronic respiratory failure with hypoxia (H)     Pulmonary hypertension (H)     Paroxysmal atrial fibrillation (H)     Dyslipidemia     Dyspnea on exertion     Hypertension     Postoperative anemia due to acute blood loss     Pre-diabetes     Stress hyperglycemia     Recurrent major depressive disorder, in partial remission (H)     Type 2 diabetes mellitus with other specified complication, unspecified whether long term insulin use (H)     Acute bacterial conjunctivitis of both eyes      Community acquired pneumonia, unspecified laterality     Osteoarthrosis     S/P AVR (aortic valve replacement)     Sleep apnea     H/O aortic valve replacement     Encounter for long-term methadone use     History of fall     Leg weakness, bilateral     Muscle spasm     Neurogenic bladder     Other symptoms and signs involving cognitive functions and awareness     Tremor     Unsteady gait     Relapsing remitting multiple sclerosis (H)     Elevated MCV     Hip pain, right     Current Outpatient Medications   Medication     acetaminophen (TYLENOL) 325 MG tablet     acyclovir (ZOVIRAX) 400 MG tablet     albuterol (PROAIR HFA/PROVENTIL HFA/VENTOLIN HFA) 108 (90 Base) MCG/ACT inhaler     aspirin (ASA) 81 MG chewable tablet     budesonide-formoterol (SYMBICORT) 160-4.5 MCG/ACT Inhaler     buPROPion (WELLBUTRIN XL) 150 MG 24 hr tablet     calcium carbonate 1250 (500 Ca) MG CHEW     diclofenac (VOLTAREN) 1 % topical gel     enoxaparin ANTICOAGULANT (LOVENOX) 120 MG/0.8ML syringe     esomeprazole (NEXIUM) 20 MG DR capsule     furosemide (LASIX) 40 MG tablet     INCRUSE ELLIPTA 62.5 MCG/INH Inhaler     loratadine (CLARITIN) 10 MG tablet     LORazepam (ATIVAN) 0.5 MG tablet     magnesium oxide (MAG-OX) 400 MG tablet     metFORMIN (GLUCOPHAGE-XR) 500 MG 24 hr tablet     metoprolol tartrate (LOPRESSOR) 25 MG tablet     montelukast (SINGULAIR) 10 MG tablet     potassium chloride ER (MICRO-K) 10 MEQ CR capsule     simvastatin (ZOCOR) 20 MG tablet     umeclidinium (INCRUSE ELLIPTA) 62.5 MCG/INH inhaler     venlafaxine (EFFEXOR XR) 75 MG 24 hr capsule     warfarin ANTICOAGULANT (COUMADIN) 2.5 MG tablet     Current Facility-Administered Medications   Medication     ropivacaine (NAROPIN) injection 3 mL     triamcinolone (KENALOG-40) injection 40 mg         Review of Systems   Constitutional, HEENT, cardiovascular, pulmonary, gi and gu systems are negative, except as otherwise noted.      Objective    /77 (BP Location: Left arm,  "Patient Position: Sitting, Cuff Size: Adult Large)   Pulse 88   Ht 1.778 m (5' 10\")   Wt 110 kg (242 lb 6.4 oz)   SpO2 98%   BMI 34.78 kg/m    Body mass index is 34.78 kg/m .  Physical Exam   GENERAL: healthy, alert and no distress  NECK: no adenopathy, no asymmetry, masses, or scars and thyroid normal to palpation  RESP: lungs clear to auscultation - no rales, rhonchi or wheezes  CV: regular rate and rhythm, normal S1 S2, no S3 or S4, no murmur, click or rub, no peripheral edema and peripheral pulses strong  ABDOMEN: soft, nontender, no hepatosplenomegaly, no masses and bowel sounds normal  MS: no gross musculoskeletal defects noted, no edema  SKIN: Noted small white raised spots consistent with stucco keratosis  Diabetic foot exam: normal DP and PT pulses, no trophic changes or ulcerative lesions, normal sensory exam and normal monofilament exam    Recent Results (from the past 840 hour(s))   ECG 12-Lead with MUSE - STEPHANI,ERIC,JIGAR    Collection Time: 09/30/22  2:48 PM   Result Value Ref Range    Systolic Blood Pressure  mmHg    Diastolic Blood Pressure  mmHg    Ventricular Rate 72 BPM    Atrial Rate 72 BPM    GA Interval 188 ms    QRS Duration 76 ms     ms    QTc 429 ms    P Axis 56 degrees    R AXIS 0 degrees    T Axis 74 degrees    Interpretation ECG        Poor data quality, interpretation may be adversely affected  Sinus rhythm  Anterior infarct , age undetermined  Abnormal ECG  When compared with ECG of 28-SEP-2021 18:46,  Vent. rate has decreased BY  42 BPM  Questionable change in QRS axis  Confirmed by SEE ED PROVIDER NOTE FOR, ECG INTERPRETATION ([id number]),  YENI HILLS ([id number]) on 10/1/2022 8:34:21 AM     Basic metabolic panel    Collection Time: 09/30/22  2:59 PM   Result Value Ref Range    Sodium 142 136 - 145 mmol/L    Potassium 4.3 3.4 - 5.3 mmol/L    Chloride 105 98 - 107 mmol/L    Carbon Dioxide (CO2) 27 22 - 29 mmol/L    Anion Gap 10 7 - 15 mmol/L    Urea Nitrogen 13.3 8.0 - 23.0 mg/dL "    Creatinine 0.78 0.51 - 0.95 mg/dL    Calcium 8.4 (L) 8.8 - 10.2 mg/dL    Glucose 117 (H) 70 - 99 mg/dL    GFR Estimate 84 >60 mL/min/1.73m2   Troponin T, High Sensitivity (now)    Collection Time: 09/30/22  2:59 PM   Result Value Ref Range    Troponin T, High Sensitivity 15 (H) <=14 ng/L   CBC with platelets and differential    Collection Time: 09/30/22  2:59 PM   Result Value Ref Range    WBC Count 5.7 4.0 - 11.0 10e3/uL    RBC Count 4.40 3.80 - 5.20 10e6/uL    Hemoglobin 13.1 11.7 - 15.7 g/dL    Hematocrit 41.6 35.0 - 47.0 %    MCV 95 78 - 100 fL    MCH 29.8 26.5 - 33.0 pg    MCHC 31.5 31.5 - 36.5 g/dL    RDW 13.8 10.0 - 15.0 %    Platelet Count 312 150 - 450 10e3/uL    % Neutrophils 56 %    % Lymphocytes 31 %    % Monocytes 7 %    % Eosinophils 5 %    % Basophils 1 %    % Immature Granulocytes 0 %    NRBCs per 100 WBC 0 <1 /100    Absolute Neutrophils 3.2 1.6 - 8.3 10e3/uL    Absolute Lymphocytes 1.8 0.8 - 5.3 10e3/uL    Absolute Monocytes 0.4 0.0 - 1.3 10e3/uL    Absolute Eosinophils 0.3 0.0 - 0.7 10e3/uL    Absolute Basophils 0.0 0.0 - 0.2 10e3/uL    Absolute Immature Granulocytes 0.0 <=0.4 10e3/uL    Absolute NRBCs 0.0 10e3/uL   Asymptomatic COVID-19 Virus (Coronavirus) by PCR Nasopharyngeal    Collection Time: 09/30/22  3:00 PM    Specimen: Nasopharyngeal; Swab   Result Value Ref Range    SARS CoV2 PCR Negative Negative   INR    Collection Time: 09/30/22  4:57 PM   Result Value Ref Range    INR 2.32 (H) 0.85 - 1.15   Troponin T, High Sensitivity (now)    Collection Time: 09/30/22  4:57 PM   Result Value Ref Range    Troponin T, High Sensitivity 17 (H) <=14 ng/L   D dimer quantitative    Collection Time: 09/30/22  4:57 PM   Result Value Ref Range    D-Dimer Quantitative 0.28 0.00 - 0.50 ug/mL FEU   ECG 12-LEAD WITH MUSE (E)    Collection Time: 09/30/22  5:14 PM   Result Value Ref Range    Systolic Blood Pressure  mmHg    Diastolic Blood Pressure  mmHg    Ventricular Rate 67 BPM    Atrial Rate 67 BPM    NH  Interval 188 ms    QRS Duration 80 ms     ms    QTc 424 ms    P Axis 65 degrees    R AXIS 2 degrees    T Axis 76 degrees    Interpretation ECG       Sinus rhythm  Possible Anterior infarct (cited on or before 30-SEP-2022)  Abnormal ECG  When compared with ECG of 30-SEP-2022 14:48,  No significant change was found  Confirmed by SEE ED PROVIDER NOTE FOR, ECG INTERPRETATION ([id number]),  YENI HILLS ([id number]) on 10/1/2022 8:33:08 AM     Influenza A/B antigen    Collection Time: 09/30/22  5:16 PM    Specimen: Nasopharyngeal; Swab   Result Value Ref Range    Influenza A antigen Negative Negative    Influenza B antigen Negative Negative   INR point of care    Collection Time: 10/18/22  3:13 PM   Result Value Ref Range    INR 2.7 (H) 0.9 - 1.1

## 2022-11-08 ENCOUNTER — TELEPHONE (OUTPATIENT)
Dept: ANTICOAGULATION | Facility: CLINIC | Age: 67
End: 2022-11-08

## 2022-11-08 NOTE — TELEPHONE ENCOUNTER
ANTICOAGULATION     Eileen Donald is overdue for INR check.      Spoke with Marilee and scheduled lab appointment on 11/22    - Marilee stated had PCP appt on 10/31 and INR was suppose to be checked.  However, test was not included with her labs.   - scheduled INR on 11/22/22 after her physical thereapy.    Alondra Lora RN

## 2022-11-16 ENCOUNTER — DOCUMENTATION ONLY (OUTPATIENT)
Dept: FAMILY MEDICINE | Facility: CLINIC | Age: 67
End: 2022-11-16

## 2022-11-16 DIAGNOSIS — I48.0 PAROXYSMAL ATRIAL FIBRILLATION (H): ICD-10-CM

## 2022-11-16 DIAGNOSIS — I27.20 PULMONARY HYPERTENSION (H): Primary | ICD-10-CM

## 2022-11-16 DIAGNOSIS — Z79.01 LONG TERM (CURRENT) USE OF ANTICOAGULANTS: ICD-10-CM

## 2022-11-16 DIAGNOSIS — Z95.2 S/P AVR (AORTIC VALVE REPLACEMENT): ICD-10-CM

## 2022-11-16 DIAGNOSIS — I26.99 PULMONARY EMBOLISM, OTHER, UNSPECIFIED CHRONICITY, UNSPECIFIED WHETHER ACUTE COR PULMONALE PRESENT (H): ICD-10-CM

## 2022-11-16 NOTE — PROGRESS NOTES
ANTICOAGULATION CLINIC REFERRAL RENEWAL REQUEST       An annual renewal order is required for all patients referred to [facility].?  Please review and sign the pended referral order for Eileen Donald.       ANTICOAGULATION SUMMARY      Warfarin indication(s)   Atrial Fibrillation, paroxysmal, Mechanical AVR and PE    Mechanical heart valve present?  Mechanical  AVR-Bileaflet       Current goal range   INR: 2.5-3.5     Goal appropriate for indication? Goal INR 2.5-3.5 standard for indication(s) above     Time in Therapeutic Range (TTR)  (Goal > 60%) 53.7 %       Office visit with referring provider's group within last year Yes on 10/31/2022       Alondra Lora RN  [facility]

## 2022-11-17 PROBLEM — I26.99 PULMONARY EMBOLISM, OTHER, UNSPECIFIED CHRONICITY, UNSPECIFIED WHETHER ACUTE COR PULMONALE PRESENT (H): Status: ACTIVE | Noted: 2022-11-17

## 2022-11-17 PROBLEM — Z79.01 LONG TERM (CURRENT) USE OF ANTICOAGULANTS: Status: ACTIVE | Noted: 2022-11-17

## 2022-11-22 ENCOUNTER — HOSPITAL ENCOUNTER (OUTPATIENT)
Dept: PHYSICAL THERAPY | Facility: REHABILITATION | Age: 67
Discharge: HOME | End: 2022-11-22
Payer: COMMERCIAL

## 2022-11-22 ENCOUNTER — LAB (OUTPATIENT)
Dept: LAB | Facility: CLINIC | Age: 67
End: 2022-11-22
Payer: COMMERCIAL

## 2022-11-22 ENCOUNTER — ANTICOAGULATION THERAPY VISIT (OUTPATIENT)
Dept: ANTICOAGULATION | Facility: CLINIC | Age: 67
End: 2022-11-22

## 2022-11-22 DIAGNOSIS — N31.9 HIGH COMPLIANCE BLADDER: ICD-10-CM

## 2022-11-22 DIAGNOSIS — G89.29 CHRONIC RIGHT-SIDED LOW BACK PAIN WITHOUT SCIATICA: ICD-10-CM

## 2022-11-22 DIAGNOSIS — Z79.01 LONG TERM (CURRENT) USE OF ANTICOAGULANTS: ICD-10-CM

## 2022-11-22 DIAGNOSIS — Z95.2 S/P AVR (AORTIC VALVE REPLACEMENT): ICD-10-CM

## 2022-11-22 DIAGNOSIS — R29.818 DIFFICULTY BALANCING: ICD-10-CM

## 2022-11-22 DIAGNOSIS — M54.50 CHRONIC RIGHT-SIDED LOW BACK PAIN WITHOUT SCIATICA: ICD-10-CM

## 2022-11-22 DIAGNOSIS — Z95.2 HEART VALVE REPLACED: Primary | ICD-10-CM

## 2022-11-22 DIAGNOSIS — Z95.2 H/O AORTIC VALVE REPLACEMENT: ICD-10-CM

## 2022-11-22 DIAGNOSIS — I48.0 PAROXYSMAL ATRIAL FIBRILLATION (H): ICD-10-CM

## 2022-11-22 DIAGNOSIS — Z79.899 ENCOUNTER FOR LONG-TERM (CURRENT) USE OF HIGH-RISK MEDICATION: ICD-10-CM

## 2022-11-22 DIAGNOSIS — M62.838 SPASM OF MUSCLE: ICD-10-CM

## 2022-11-22 DIAGNOSIS — M25.551 HIP PAIN, RIGHT: ICD-10-CM

## 2022-11-22 DIAGNOSIS — I26.99 PULMONARY EMBOLISM, OTHER, UNSPECIFIED CHRONICITY, UNSPECIFIED WHETHER ACUTE COR PULMONALE PRESENT (H): ICD-10-CM

## 2022-11-22 DIAGNOSIS — R29.898 WEAKNESS OF BOTH LOWER EXTREMITIES: Primary | ICD-10-CM

## 2022-11-22 DIAGNOSIS — R26.81 UNSTEADY: ICD-10-CM

## 2022-11-22 DIAGNOSIS — R25.1 TREMOR: Primary | ICD-10-CM

## 2022-11-22 DIAGNOSIS — Z91.81 HISTORY OF FALL: ICD-10-CM

## 2022-11-22 LAB
BASOPHILS # BLD AUTO: 0.1 10E3/UL (ref 0–0.2)
BASOPHILS NFR BLD AUTO: 1 %
EOSINOPHIL # BLD AUTO: 0.4 10E3/UL (ref 0–0.7)
EOSINOPHIL NFR BLD AUTO: 5 %
ERYTHROCYTE [DISTWIDTH] IN BLOOD BY AUTOMATED COUNT: 14 % (ref 10–15)
HCT VFR BLD AUTO: 43.3 % (ref 35–47)
HGB BLD-MCNC: 13.5 G/DL (ref 11.7–15.7)
IMM GRANULOCYTES # BLD: 0 10E3/UL
IMM GRANULOCYTES NFR BLD: 0 %
INR BLD: 2.3 (ref 0.9–1.1)
LYMPHOCYTES # BLD AUTO: 1.3 10E3/UL (ref 0.8–5.3)
LYMPHOCYTES NFR BLD AUTO: 18 %
MCH RBC QN AUTO: 29.5 PG (ref 26.5–33)
MCHC RBC AUTO-ENTMCNC: 31.2 G/DL (ref 31.5–36.5)
MCV RBC AUTO: 95 FL (ref 78–100)
MONOCYTES # BLD AUTO: 0.5 10E3/UL (ref 0–1.3)
MONOCYTES NFR BLD AUTO: 7 %
NEUTROPHILS # BLD AUTO: 5 10E3/UL (ref 1.6–8.3)
NEUTROPHILS NFR BLD AUTO: 69 %
PLATELET # BLD AUTO: 298 10E3/UL (ref 150–450)
RBC # BLD AUTO: 4.58 10E6/UL (ref 3.8–5.2)
WBC # BLD AUTO: 7.2 10E3/UL (ref 4–11)

## 2022-11-22 PROCEDURE — 99000 SPECIMEN HANDLING OFFICE-LAB: CPT

## 2022-11-22 PROCEDURE — 87340 HEPATITIS B SURFACE AG IA: CPT

## 2022-11-22 PROCEDURE — 86359 T CELLS TOTAL COUNT: CPT

## 2022-11-22 PROCEDURE — 86704 HEP B CORE ANTIBODY TOTAL: CPT

## 2022-11-22 PROCEDURE — 84450 TRANSFERASE (AST) (SGOT): CPT

## 2022-11-22 PROCEDURE — 85025 COMPLETE CBC W/AUTO DIFF WBC: CPT

## 2022-11-22 PROCEDURE — 86706 HEP B SURFACE ANTIBODY: CPT

## 2022-11-22 PROCEDURE — 36416 COLLJ CAPILLARY BLOOD SPEC: CPT

## 2022-11-22 PROCEDURE — 85610 PROTHROMBIN TIME: CPT

## 2022-11-22 PROCEDURE — 86357 NK CELLS TOTAL COUNT: CPT

## 2022-11-22 PROCEDURE — 36415 COLL VENOUS BLD VENIPUNCTURE: CPT

## 2022-11-22 PROCEDURE — 84460 ALANINE AMINO (ALT) (SGPT): CPT

## 2022-11-22 PROCEDURE — 97112 NEUROMUSCULAR REEDUCATION: CPT | Mod: GP | Performed by: PHYSICAL THERAPIST

## 2022-11-22 PROCEDURE — 86355 B CELLS TOTAL COUNT: CPT

## 2022-11-22 PROCEDURE — 87798 DETECT AGENT NOS DNA AMP: CPT | Mod: 90

## 2022-11-22 PROCEDURE — 86360 T CELL ABSOLUTE COUNT/RATIO: CPT

## 2022-11-22 PROCEDURE — 97110 THERAPEUTIC EXERCISES: CPT | Mod: GP | Performed by: PHYSICAL THERAPIST

## 2022-11-22 NOTE — PROGRESS NOTES
[facility]    OUTPATIENT PHYSICAL THERAPY  PLAN OF TREATMENT FOR OUTPATIENT REHABILITATION AND PROGRESS NOTE           Patient's Last Name, First Name, Eileen Mauro Date of Birth  1955   Provider's Name  [facility] Medical Record No.  [id number]    Onset Date  chronic, order date 4/7/22) Start of Care Date  06/21/22   Type:     _X_PT   ___OT   ___SLP Medical Diagnosis  Right-sided low back pain without sciatica, unspecified chronicity  Hip pain, right  Balance problems  Relapsing remitting multiple sclerosis (H)   PT Diagnosis  LE weakness, difficulty balancing and walking, right hip and back pain Plan of Treatment  Frequency/Duration: 1X/week   Certification date from 9/20/22 to 12/2/22     Goals:  Goal Identifier     Goal Description Patient will improve TUG to <13 seconds for dec in falls risk in 12 weeks:   Target Date 11/08/22   Date Met      Progress (detail required for progress note): 18.5 seconds without walker today     Goal Identifier     Goal Description Patient will be able to do 8 sit to stands for improve LE strength and improved function in 8 weeks:   Target Date 11/08/22   Date Met      Progress (detail required for progress note): 6 reps     Goal Identifier     Goal Description Patient will report being able to go up all of her steps at home, without taking a break in 8 weeks:   Target Date 09/13/22   Date Met      Progress (detail required for progress note): Met     Goal Identifier     Goal Description Patient will report exercising > 15 minutes each day for improved overall endurance, balance, strength, and health in 4 weeks:   Target Date 07/19/22   Date Met      Progress (detail required for progress note): variable based on day, working on exercising more consistently     Beginning/End Dates of Progress Note Reporting  Period:  6/21/22 to 9/20/22    Client Self (Subjective) Report for Progress Note Reporting Period: Hips feeling pretty good, still has a catch in the front of the right hip.      Objective Measurements:   Objective Measure: APTA sit to stand 6 reps (5 without UE )  Details: TUG 18.5 seconds without walker  Objective Measure: 2 minute walk test 175 feet  Details:  92% O2 after 2 minute walk test     Details: Gait- significantly less antalgic            I CERTIFY THE NEED FOR THESE SERVICES FURNISHED UNDER        THIS PLAN OF TREATMENT AND WHILE UNDER MY CARE     (Physician co-signature of this document indicates review and certification of the therapy plan).              Referring Provider: MD Wiliam Munroe, PT         09/20/22 1200   Signing Clinician's Name / Credentials   Signing clinician's name / credentials Wiliam Carpenter, PT   Session Number   Session Number 8/12   Progress Note/Recertification   Recertification Due Date 11/08/22   Recertification Completed Date  09/13/22   Adult Goals   PT Ortho Eval Goals 2;1;3;4   Ortho Goal 1   Goal Description Patient will improve TUG to <13 seconds for dec in falls risk in 12 weeks:   Goal Progress 18.5 seconds without walker today   Target Date 11/08/22   Ortho Goal 2   Goal Description Patient will be able to do 8 sit to stands for improve LE strength and improved function in 8 weeks:   Goal Progress 6 reps   Target Date 11/08/22   Ortho Goal 3   Goal Description Patient will report being able to go up all of her steps at home, without taking a break in 8 weeks:   Goal Progress Met   Target Date 09/13/22   Ortho Goal 4   Goal Description Patient will report exercising > 15 minutes each day for improved overall endurance, balance, strength, and health in 4 weeks:   Goal Progress variable based on day, working on exercising more consistently   Target Date 07/19/22   Subjective Report   Subjective Report Hips feeling pretty good,  "still has a catch in the front of the right hip.   Objective Measures   Objective Measures Objective Measure 1;Objective Measure 2;Objective Measure 3;Objective Measure 4   Objective Measure 1   Objective Measure APTA sit to stand 6 reps (5 without UE )   Details TUG 18.5 seconds without walker   Objective Measure 2   Objective Measure 2 minute walk test 175 feet   Details  92% O2 after 2 minute walk test   Objective Measure 3   Details Gait- significantly less antalgic   Treatment Interventions   Interventions Therapeutic Procedure/Exercise;Manual Therapy;Neuromuscular Re-education   Therapeutic Procedure/exercise   Therapeutic Procedures: strength, endurance, ROM, flexibillity minutes ([id number]) 29   Skilled Intervention review and progression of HEP with cues on all exercises, handout provided, exercises for LE strengthening   Patient Response Patient unable to tolerate laying supine for exercises   Treatment Detail Nustep X 10 min level 5, standing hip abductions 2 X 10, step ups X 15 to 4\" step, more difficult with right, side stepping bands 2 X 20 steps orange band   Progress Multiple breaks taken during session   Neuromuscular Re-education   Neuromuscular re-ed of mvmt, balance, coord, kinesthetic sense, posture, proprioception minutes ([id number]) 13   Skilled Intervention Balance exercises   Treatment Detail standing march X 10, standing on foam NBOS and Modified tandem 3 X 30 seconds, standing lunge on foam with hold 3 X 20 seconds   Plan   Homework PTRX   Home program Sit to stands x 10, standing hip abduction X 10, standing march X 10, standing knee flexion X 10   Plan for next session Nustep, static balance, progress strengthening and balance exercises.   Comments   Comments Patient seen for 7fth f/u visit low back and hip pain, with difficulty balancing.  Patient doing well with HEP, review of hip flexor stretch today for anterior hip pain. Patient did well in session with exercise, but does continue to " struggle with heat and needing to take breaks 2/2 MS. Patient making good progress towards goals appropriate to continue with skilled PT per POC.   Total Session Time   Timed Code Treatment Minutes 42   Total Treatment Time (sum of timed and untimed services) 42   Medicare Claim Information   Medical Diagnosis Right-sided low back pain without sciatica, unspecified chronicity  Hip pain, right  Balance problems  Relapsing remitting multiple sclerosis (H)   PT Diagnosis LE weakness, difficulty balancing and walking, right hip and back pain     Wiliam Carpenter, PT

## 2022-11-22 NOTE — ADDENDUM NOTE
Encounter addended by: Wiliam Carpenter, PT on: 11/22/2022 1:00 PM   Actions taken: Pend clinical note, Flowsheet accepted, Clinical Note Signed, Charge Capture section accepted

## 2022-11-22 NOTE — PROGRESS NOTES
ANTICOAGULATION MANAGEMENT     Eileen Donald 67 year old female is on warfarin with subtherapeutic INR result. (Goal INR 2.5-3.5)    Recent labs: (last 7 days)     11/22/22  1359   INR 2.3*       ASSESSMENT       Source(s): Chart Review, Patient/Caregiver Call and Template       Warfarin doses taken: Missed dose(s) may be affecting INR    Noted one missed tablet still in her pill box.    Diet: No new diet changes identified    New illness, injury, or hospitalization: No    Medication/supplement changes: None noted    Signs or symptoms of bleeding or clotting: No    Previous INR: Therapeutic last visit at 2.7; previously outside of goal range at 2.3    Additional findings: None.       PLAN     Recommended plan for temporary change(s) affecting INR     Dosing Instructions: booster dose then continue your current warfarin dose with next INR in 1-2 weeks       Summary  As of 11/22/2022    Full warfarin instructions:  11/22: 7.5 mg; Otherwise 5 mg every Tue, Fri; 2.5 mg all other days; Starting 11/22/2022   Next INR check:  12/6/2022             Telephone call with  Marilee ([phone]) who verbalizes understanding and agrees to plan    Patient offered & declined to schedule next visit    Education provided:     Taking warfarin: Importance of taking warfarin as instructed    Goal range and lab monitoring: goal range and significance of current result    Plan made per [facility] anticoagulation protocol    Alondra Lora RN  Anticoagulation Clinic  11/22/2022    _______________________________________________________________________     Anticoagulation Episode Summary     Current INR goal:  2.5-3.5   TTR:  53.0 % (1 y)   Target end date:  Indefinite   Send INR reminders to:  [facility]    Indications    Heart valve replaced [Z95.2]  Paroxysmal atrial fibrillation (H) [I48.0]  S/P AVR (aortic valve replacement) [Z95.2]  Other pulmonary embolism without acute cor pulmonale  unspecified chronicity (H)  (Resolved) [I26.99]  H/O aortic valve replacement [Z95.2]  Long term (current) use of anticoagulants [Z79.01]  Pulmonary embolism  other  unspecified chronicity  unspecified whether acute cor pulmonale present (H) [I26.99]           Comments:  12/15/21 - amended INR goal to 2.5 - 3.5         Anticoagulation Care Providers     Provider Role Specialty Phone number    Tito Salazar MD Referring Family Medicine [phone]    Drew Hahn MD Referring Family Medicine [phone]

## 2022-11-23 LAB
ALT SERPL W P-5'-P-CCNC: 25 U/L (ref 10–35)
AST SERPL W P-5'-P-CCNC: 38 U/L (ref 10–35)
CD19 CELLS # BLD: <1 CELLS/UL (ref 107–698)
CD19 CELLS NFR BLD: <1 % (ref 6–27)
CD3 CELLS # BLD: 1310 CELLS/UL (ref 603–2990)
CD3 CELLS NFR BLD: 87 % (ref 49–84)
CD3+CD4+ CELLS # BLD: 904 CELLS/UL (ref 441–2156)
CD3+CD4+ CELLS NFR BLD: 60 % (ref 28–63)
CD3+CD4+ CELLS/CD3+CD8+ CLL BLD: 2.01 % (ref 1.4–2.6)
CD3+CD8+ CELLS # BLD: 450 CELLS/UL (ref 125–1312)
CD3+CD8+ CELLS NFR BLD: 30 % (ref 10–40)
CD3-CD16+CD56+ CELLS # BLD: 192 CELLS/UL (ref 95–640)
CD3-CD16+CD56+ CELLS NFR BLD: 13 % (ref 4–25)
HBV CORE AB SERPL QL IA: NONREACTIVE
HBV SURFACE AB SERPL IA-ACNC: 0.2 M[IU]/ML
HBV SURFACE AB SERPL IA-ACNC: NONREACTIVE M[IU]/ML
HBV SURFACE AG SERPL QL IA: NONREACTIVE
T CELL EXTENDED COMMENT: ABNORMAL

## 2022-11-26 LAB — JCPYV DNA SPEC QL NAA+PROBE: NOT DETECTED

## 2022-12-16 ENCOUNTER — ANTICOAGULATION THERAPY VISIT (OUTPATIENT)
Dept: ANTICOAGULATION | Facility: CLINIC | Age: 67
End: 2022-12-16
Payer: COMMERCIAL

## 2022-12-16 ENCOUNTER — TELEPHONE (OUTPATIENT)
Dept: ANTICOAGULATION | Facility: CLINIC | Age: 67
End: 2022-12-16

## 2022-12-16 DIAGNOSIS — I26.99 PULMONARY EMBOLISM, OTHER, UNSPECIFIED CHRONICITY, UNSPECIFIED WHETHER ACUTE COR PULMONALE PRESENT (H): ICD-10-CM

## 2022-12-16 DIAGNOSIS — Z95.2 HEART VALVE REPLACED: Primary | ICD-10-CM

## 2022-12-16 DIAGNOSIS — Z95.2 H/O AORTIC VALVE REPLACEMENT: ICD-10-CM

## 2022-12-16 DIAGNOSIS — Z95.2 S/P AVR (AORTIC VALVE REPLACEMENT): ICD-10-CM

## 2022-12-16 DIAGNOSIS — I48.0 PAROXYSMAL ATRIAL FIBRILLATION (H): ICD-10-CM

## 2022-12-16 DIAGNOSIS — Z79.01 LONG TERM (CURRENT) USE OF ANTICOAGULANTS: ICD-10-CM

## 2022-12-16 DIAGNOSIS — Z53.9 ERRONEOUS ENCOUNTER--DISREGARD: ICD-10-CM

## 2022-12-16 NOTE — TELEPHONE ENCOUNTER
ANTICOAGULATION     Eileen Donald is overdue for INR check.      Left message for patient to call and schedule lab appointment as soon as possible. If returning call, please schedule.     Alondra Lora RN

## 2022-12-19 ENCOUNTER — ANTICOAGULATION THERAPY VISIT (OUTPATIENT)
Dept: ANTICOAGULATION | Facility: CLINIC | Age: 67
End: 2022-12-19

## 2022-12-19 ENCOUNTER — LAB (OUTPATIENT)
Dept: LAB | Facility: CLINIC | Age: 67
End: 2022-12-19
Payer: COMMERCIAL

## 2022-12-19 DIAGNOSIS — I26.99 PULMONARY EMBOLISM, OTHER, UNSPECIFIED CHRONICITY, UNSPECIFIED WHETHER ACUTE COR PULMONALE PRESENT (H): ICD-10-CM

## 2022-12-19 DIAGNOSIS — Z95.2 H/O AORTIC VALVE REPLACEMENT: ICD-10-CM

## 2022-12-19 DIAGNOSIS — Z95.2 S/P AVR (AORTIC VALVE REPLACEMENT): ICD-10-CM

## 2022-12-19 DIAGNOSIS — F33.41 RECURRENT MAJOR DEPRESSIVE DISORDER, IN PARTIAL REMISSION (H): ICD-10-CM

## 2022-12-19 DIAGNOSIS — Z95.2 HEART VALVE REPLACED: Primary | ICD-10-CM

## 2022-12-19 DIAGNOSIS — Z79.01 LONG TERM (CURRENT) USE OF ANTICOAGULANTS: ICD-10-CM

## 2022-12-19 DIAGNOSIS — I48.0 PAROXYSMAL ATRIAL FIBRILLATION (H): ICD-10-CM

## 2022-12-19 LAB — INR BLD: 2 (ref 0.9–1.1)

## 2022-12-19 PROCEDURE — 36416 COLLJ CAPILLARY BLOOD SPEC: CPT

## 2022-12-19 PROCEDURE — 85610 PROTHROMBIN TIME: CPT

## 2022-12-19 NOTE — PROGRESS NOTES
SURGERY VISIT      Today's visit was performed with the assistance of  Services.   Name of : Pawel   Service used: Video: [employer]     REASON FOR VISIT:  I have been asked to evaluate the patient at the request of Dr. Cisneros for gallstones.    HISTORY OF PRESENT ILLNESS:  Ms. Harding is a 69 year old female who underwent a right upper quadrant ultrasound on 09/28/2021 which showed the common bile duct measuring 5 mm, 2.1 cm shadowing echogenic structure in the neck of the gallbladder consistent with a stone, and moderate probable hepatic steatosis.  LFTs are within normal limits when checked within the last month.    She presents to the clinic with her daughter, Talia. She has been experiencing upper abdominal pain intermittently for up to 5 years.  She reports that certain foods seem to upset her stomach and she would periodically experience constipation. She has one bowel movement a day. She does have heartburn, which started many years ago as well but symptoms are under improved control since she initiated Protonix approximately 1 month ago. Peppers, cheese, red meat, fried foods, and salad dressings cause pain and she points to the epigastrium. The pain lasts for about 2 hours. When the pain is intense this can radiate to her back. She does have nausea without nausea and reduced appetite.      Family history:  mother underwent cholecystectomy for diagnosis of gallstones.       I have reviewed the patient's medications and allergies, past medical, surgical, social and family history, updating these as appropriate.  See histories section of the electronic medical record for a display of this information.    Past Surgical History:   Procedure Laterality Date   • Esophagogastroduodenoscopy transoral flex diag N/A 09/16/2021   • Tubal ligation         Past Medical History:   Diagnosis Date   • Bilateral primary osteoarthritis of knee 11/9/2021   • Essential  ANTICOAGULATION MANAGEMENT     Eileen Donald 67 year old female is on warfarin with subtherapeutic INR result. (Goal INR 2.5-3.5)    Recent labs: (last 7 days)     12/19/22  1234   INR 2.0*       ASSESSMENT       Source(s): Chart Review, Patient/Caregiver Call and Template       Warfarin doses taken: Warfarin taken differently, but did not change total weekly dose    Diet: No new diet changes identified    New illness, injury, or hospitalization: No    Medication/supplement changes: None noted    Signs or symptoms of bleeding or clotting: Yes:    Reported she cut her finger and it bled a little longer.    Previous INR: Subtherapeutic at 2.3 on 11/22/22    Additional findings: None       PLAN     Recommended plan for temporary change(s) affecting INR     Dosing Instructions: booster dose then Increase your warfarin dose (11.1% change) with next INR in 5-7 days       Summary  As of 12/19/2022    Full warfarin instructions:  12/19: 5 mg; Otherwise 5 mg every Tue, Thu, Sat; 2.5 mg all other days; Starting 12/19/2022   Next INR check:  12/26/2022             Telephone call with  Marilee ([phone]) who verbalizes understanding and agrees to plan    - advised with bleeding d/t cuts, advised to apply good pressure for 10 min w/o releasing pressure.    Lab visit scheduled - 12/27/22 @ [facility]   - does not drive and  drives her to [facility].    Education provided:     Taking warfarin: Importance of taking warfarin as instructed    Goal range and lab monitoring: goal range and significance of current result    Plan made per [facility] anticoagulation protocol    Alondra Lora RN  Anticoagulation Clinic  12/19/2022    _______________________________________________________________________     Anticoagulation Episode Summary     Current INR goal:  2.5-3.5   TTR:  46.0 % (1 y)   Target end date:  Indefinite   Send INR reminders to:  [facility]    Indications    Heart valve replaced [Z95.2]  Paroxysmal atrial  (primary) hypertension    • GERD (gastroesophageal reflux disease)    • Hyperlipidemia, mixed 9/22/2021   • Refusal of blood transfusions as patient is [employer]    • Stage 2 chronic kidney disease 11/9/2021       Social History     Tobacco Use   • Smoking status: Former Smoker   • Smokeless tobacco: Never Used   Vaping Use   • Vaping Use: never used   Substance Use Topics   • Alcohol use: Not Currently   • Drug use: Never     ADDITIONAL SOCIAL HISTORY:  She has two children, a son and daughter.   Her son lives in [place].   She lives with her daughter.     REVIEW OF SYSTEMS:  A complete review of systems was performed and is negative with the exception of those items located in the history of present illness above. Specifically:    General: No fevers, chills or unintentional weight loss  Neurologic: no balance difficulty, no dizziness  Eyes, Ears, Nose, Throat: No recent visual changes, no earache  Cardiovascular: No ankle swelling, no chest pain  Respiratory: No dyspnea on exertion. No wheezing  Gastrointestinal: No nausea or vomiting, no hematochezia or melena. Gallbladder stone  Genitourinary: No urinary incontinence or hematuria     Psych: No history of anxiety or depression  Hematologic: No history of bleeding or clotting disorder  Endocrine: No heat or cold intolerance  Musculoskeletal: No extremity numbness or tingling   Skin: No new skin lesion or hair loss      PHYSICAL EXAM:  There were no vitals taken for this visit.  General: No acute distress.   Neurologic: Alert and oriented to person, place and time.  HEENT: Pupils are equal, round and reactive to light.  Extraocular movements are intact,  no scleral icterus. No anterior cervical or supraclavicular lymphadenopathy.  Cardiovascular: S1 and S2 are appreciated.  No murmur, rub or gallop; no lower extremity edema.  Lungs: Clear to auscultation; bilateral, equal expansion; no wheeze, no crackles.  Abdomen:  Obese, nondistended, non tympanitic.   fibrillation (H) [I48.0]  S/P AVR (aortic valve replacement) [Z95.2]  Other pulmonary embolism without acute cor pulmonale  unspecified chronicity (H) (Resolved) [I26.99]  H/O aortic valve replacement [Z95.2]  Long term (current) use of anticoagulants [Z79.01]  Pulmonary embolism  other  unspecified chronicity  unspecified whether acute cor pulmonale present (H) [I26.99]           Comments:  12/15/21 - amended INR goal to 2.5 - 3.5         Anticoagulation Care Providers     Provider Role Specialty Phone number    Tito Salazar MD Referring Family Medicine [phone]    Drew Hahn MD Referring Family Medicine [phone]           She has greatest tenderness in the epigastrium but mild tenderness below the right and left costal margins.  No hepatosplenomegaly .  No mass or phlegmon.  Negative clinical Hess sign.    Skin: Warm, dry.    LABS:  Recent Labs   Lab 10/20/21  1241 09/11/21  1038   WBC 7.2 7.5   RBC 3.99* 4.68   HGB 12.8 14.8   HCT 37.1 40.6   MCV 93.0 86.8    402   Absolute Neutrophils 3.7 3.2   Absolute Lymphocytes 2.5 3.3   Absolute Monocytes 0.7 0.7   Absolute Eosinophils  0.2 0.2   Absolute Basophils 0.1 0.1     Recent Labs   Lab 10/20/21  1241 10/04/21  1634 09/11/21  1323 09/11/21  1038   Glucose 82 95 190* 112*   Sodium 139 143 138 138   Potassium 3.7 3.6 2.5* 2.4*   Chloride 103 109* 98 98   Carbon Dioxide 26 26 30 31   BUN 11 30* 21* 21*   Creatinine 0.76 1.19* 0.86 0.76   Calcium 8.6 9.2 8.8 9.1   Magnesium 1.8  --  2.3  --    Albumin 3.4*  --   --  4.0   GOT/AST 16  --   --  22   Alkaline Phosphatase 77  --   --  84   GPT 25  --   --  31   Lipase  --   --  117  --        IMAGING:  Gallbladder US    IMAGING: Results for orders placed during the hospital encounter of 09/28/21    US Liver / Gallbladder / Pancreas    Narrative  . EXAM: US LIVER / GALLBLADDER / PANCREAS    INDICATION: Calculus of gallbladder without cholecystitis without  obstruction    FINDINGS:  The pancreas is poorly seen because of overlying bowel gas. The upper IVC  is normal.  The liver is moderately, diffusely increased in echotexture, consistent  with diffuse hepatic steatosis. No focal lesions or biliary ductal  dilatation. The common bile duct measures 5 mm. Doppler evaluation of the  main portal vein demonstrates hepatopetal flow with a normal waveform and  velocity.  2.1 cm shadowing echogenic structure in the neck of the gallbladder  consistent with a stone. Questionable minimal additional sludge in the  gallbladder is well. Patient however is not point tender in the  gallbladder. No pericholecystic fluid or gallbladder wall  thickening.  The right kidney is normal in appearance and measures 9.9 cm in length.    Impression  Cholelithiasis but no secondary signs for acute cholecystitis.  Moderate probable hepatic steatosis.      ASSESSMENT AND PLAN:  Ms. Harding is a 69 year old female with:    1. Symptomatic cholelithiasis. Plan to proceed with laparoscopic cholecystectomy with possible cholangiogram.   2. Gastroesophageal reflux disease  3. Hypertension  4. Hyperlipidemia   5. Stage 2 chronic kidney disease     I discussed with the patient the risks, benefits and indications of laparoscopic cholecystectomy. We discussed the possibility of bleeding, infection, damage to the common bile duct such as stricture or transection, cystic duct stump leak, damage to intra-abdominal organs and chronic diarrhea. Chronic diarrhea occurs in about 1% of patients who are status post cholecystectomy. We also discussed potential need for intraoperative cholangiography to help clarify the patient's anatomy at the time of operation as well as to determine the presence of choledocholithiasis. Open cholecystectomy was discussed and I described that this would be used for the patient's safety in the circumstance where clarification of ductal anatomy or extensive inflammation or bleeding was present. A handout was used to help the patient to understand the anatomy of the liver, gallbladder and biliary tree.  The patient asked appropriate questions which were answered to the best of my ability through the assistance of the [place] language audio video translation services,  P[employer].    We will proceed at the patient's convenience.     Thank you, Dr. Cisneros, for the opportunity to participate in the care of your patient, Toshia Harding.    On 11/15/2021, Mich SUNSHINE scribed the services personally performed by Alex Leahy MD     The documentation recorded by the scribe accurately and completely reflects the service(s) Alex SUNSHINE MD  AMADO DIAZ personally performed and the decisions made by me.

## 2022-12-20 RX ORDER — VENLAFAXINE HYDROCHLORIDE 75 MG/1
CAPSULE, EXTENDED RELEASE ORAL
Qty: 90 CAPSULE | Refills: 3 | Status: SHIPPED | OUTPATIENT
Start: 2022-12-20 | End: 2023-04-04

## 2022-12-21 NOTE — TELEPHONE ENCOUNTER
"Last Written Prescription Date:  10/7/22  Last Fill Quantity: 90,  # refills: 0   Last office visit provider:  10/31/22     Requested Prescriptions   Pending Prescriptions Disp Refills     venlafaxine (EFFEXOR XR) 75 MG 24 hr capsule 90 capsule 0     Sig: TAKE 3 CAPSULES BY MOUTH EVERY DAY       Serotonin-Norepinephrine Reuptake Inhibitors  Passed - 12/19/2022  4:37 PM        Passed - Blood pressure under 140/90 in past 12 months     BP Readings from Last 3 Encounters:   10/31/22 112/77   09/30/22 126/61   04/07/22 124/71                 Passed - PHQ-9 score of less than 5 in past 6 months     Please review last PHQ-9 score.           Passed - Medication is active on med list        Passed - Patient is age 18 or older        Passed - No active pregnancy on record        Passed - Normal serum creatinine on file in past 12 months     Recent Labs   Lab Test 10/31/22  1446   CR 0.85       Ok to refill medication if creatinine is low          Passed - No positive pregnancy test in past 12 months        Passed - Recent (6 mo) or future (30 days) visit within the authorizing provider's specialty     Patient had office visit in the last 6 months or has a visit in the next 30 days with authorizing provider or within the authorizing provider's specialty.  See \"Patient Info\" tab in inbasket, or \"Choose Columns\" in Meds & Orders section of the refill encounter.                 Daysi Mohan RN 12/20/22 7:17 PM  "

## 2022-12-23 DIAGNOSIS — F33.41 RECURRENT MAJOR DEPRESSIVE DISORDER, IN PARTIAL REMISSION (H): ICD-10-CM

## 2022-12-23 NOTE — TELEPHONE ENCOUNTER
Pending Prescriptions:                       Disp   Refills    buPROPion (WELLBUTRIN XL) 150 MG 24 hr ta*90 tab*3            Sig: Take 1 tablet (150 mg) by mouth daily

## 2022-12-25 RX ORDER — BUPROPION HYDROCHLORIDE 150 MG/1
150 TABLET ORAL DAILY
Qty: 90 TABLET | Refills: 3 | OUTPATIENT
Start: 2022-12-25

## 2022-12-25 NOTE — TELEPHONE ENCOUNTER
Last Written Prescription Date:  5/8/22  Last Fill Quantity: 90,  # refills: 3     Ingrid Reese RN 12/25/22 1:07 PM

## 2022-12-27 ENCOUNTER — LAB (OUTPATIENT)
Dept: LAB | Facility: CLINIC | Age: 67
End: 2022-12-27
Payer: COMMERCIAL

## 2022-12-27 ENCOUNTER — ANTICOAGULATION THERAPY VISIT (OUTPATIENT)
Dept: ANTICOAGULATION | Facility: CLINIC | Age: 67
End: 2022-12-27

## 2022-12-27 DIAGNOSIS — Z95.2 H/O AORTIC VALVE REPLACEMENT: ICD-10-CM

## 2022-12-27 DIAGNOSIS — Z79.01 LONG TERM (CURRENT) USE OF ANTICOAGULANTS: ICD-10-CM

## 2022-12-27 DIAGNOSIS — I26.99 PULMONARY EMBOLISM, OTHER, UNSPECIFIED CHRONICITY, UNSPECIFIED WHETHER ACUTE COR PULMONALE PRESENT (H): ICD-10-CM

## 2022-12-27 DIAGNOSIS — Z95.2 S/P AVR (AORTIC VALVE REPLACEMENT): ICD-10-CM

## 2022-12-27 DIAGNOSIS — I48.0 PAROXYSMAL ATRIAL FIBRILLATION (H): ICD-10-CM

## 2022-12-27 DIAGNOSIS — I48.0 PAROXYSMAL ATRIAL FIBRILLATION (H): Primary | ICD-10-CM

## 2022-12-27 DIAGNOSIS — Z95.2 HEART VALVE REPLACED: Primary | ICD-10-CM

## 2022-12-27 LAB — INR BLD: 1.6 (ref 0.9–1.1)

## 2022-12-27 PROCEDURE — 36416 COLLJ CAPILLARY BLOOD SPEC: CPT

## 2022-12-27 PROCEDURE — 85610 PROTHROMBIN TIME: CPT

## 2022-12-27 RX ORDER — WARFARIN SODIUM 2.5 MG/1
TABLET ORAL
Qty: 130 TABLET | Refills: 1 | Status: SHIPPED | OUTPATIENT
Start: 2022-12-27 | End: 2023-05-15

## 2022-12-27 NOTE — PROGRESS NOTES
ANTICOAGULATION MANAGEMENT     Eileen Donald 67 year old female is on warfarin with subtherapeutic INR result. (Goal INR 2.5-3.5)    Recent labs: (last 7 days)     12/27/22  1322   INR 1.6*       ASSESSMENT       Source(s): Chart Review, Patient/Caregiver Call and Template       Warfarin doses taken: Template incorrect; verbally confirmed home dose.    Reported her dog knocked over her pills box and several doses missed.    Diet: No new diet changes identified    New illness, injury, or hospitalization: No    Medication/supplement changes: None noted    Signs or symptoms of bleeding or clotting: No    Previous INR: Therapeutic last 4 visits    Additional findings: None       PLAN     Recommended plan for temporary change(s) affecting INR     Dosing Instructions: booster dose then continue your current warfarin dose with next INR in 5-7 days       Summary  As of 12/27/2022    Full warfarin instructions:  12/27: 10 mg; Otherwise 5 mg every Tue, Thu, Sat; 2.5 mg all other days   Next INR check:  1/3/2023             Telephone call with  Marilee ([phone]) who verbalizes understanding and agrees to plan    Lab visit scheduled - INR on 1/3/22 @ [facility].    Education provided:     Taking warfarin: Importance of taking warfarin as instructed    Goal range and lab monitoring: goal range and significance of current result    Plan made per [facility] anticoagulation protocol    Alondra Lora RN  Anticoagulation Clinic  12/27/2022    _______________________________________________________________________     Anticoagulation Episode Summary     Current INR goal:  2.5-3.5   TTR:  46.1 % (1 y)   Target end date:  Indefinite   Send INR reminders to:  [facility]    Indications    Heart valve replaced [Z95.2]  Paroxysmal atrial fibrillation (H) [I48.0]  S/P AVR (aortic valve replacement) [Z95.2]  Other pulmonary embolism without acute cor pulmonale  unspecified chronicity (H) (Resolved) [I26.99]  H/O aortic valve  [employer] Approved    Prior Authorization Number: [id number]  Dates of approval: 12/10/2020-12/31/2021  Filling [facility]: [facility]  Additional Information: Pharmacy contacted - received paid claim.  Pharmacy will contact patient when medication is ready to be picked up.          replacement [Z95.2]  Long term (current) use of anticoagulants [Z79.01]  Pulmonary embolism  other  unspecified chronicity  unspecified whether acute cor pulmonale present (H) [I26.99]           Comments:  12/15/21 - amended INR goal to 2.5 - 3.5         Anticoagulation Care Providers     Provider Role Specialty Phone number    Tito Salazar MD Referring Family Medicine [phone]    Drew Hahn MD Referring Family Medicine [phone]

## 2023-01-03 ENCOUNTER — LAB (OUTPATIENT)
Dept: LAB | Facility: CLINIC | Age: 68
End: 2023-01-03
Payer: COMMERCIAL

## 2023-01-03 ENCOUNTER — ANTICOAGULATION THERAPY VISIT (OUTPATIENT)
Dept: ANTICOAGULATION | Facility: CLINIC | Age: 68
End: 2023-01-03

## 2023-01-03 DIAGNOSIS — I26.99 PULMONARY EMBOLISM, OTHER, UNSPECIFIED CHRONICITY, UNSPECIFIED WHETHER ACUTE COR PULMONALE PRESENT (H): ICD-10-CM

## 2023-01-03 DIAGNOSIS — I48.0 PAROXYSMAL ATRIAL FIBRILLATION (H): ICD-10-CM

## 2023-01-03 DIAGNOSIS — Z79.01 LONG TERM (CURRENT) USE OF ANTICOAGULANTS: ICD-10-CM

## 2023-01-03 DIAGNOSIS — Z95.2 H/O AORTIC VALVE REPLACEMENT: ICD-10-CM

## 2023-01-03 DIAGNOSIS — Z95.2 S/P AVR (AORTIC VALVE REPLACEMENT): ICD-10-CM

## 2023-01-03 DIAGNOSIS — Z95.2 HEART VALVE REPLACED: Primary | ICD-10-CM

## 2023-01-03 LAB — INR BLD: 2.6 (ref 0.9–1.1)

## 2023-01-03 PROCEDURE — 85610 PROTHROMBIN TIME: CPT

## 2023-01-03 PROCEDURE — 36416 COLLJ CAPILLARY BLOOD SPEC: CPT

## 2023-01-03 NOTE — PROGRESS NOTES
ANTICOAGULATION MANAGEMENT     Eileen Donald 67 year old female is on warfarin with therapeutic INR result. (Goal INR 2.5-3.5)    Recent labs: (last 7 days)     01/03/23  0920   INR 2.6*       ASSESSMENT       Source(s): Chart Review, Patient/Caregiver Call and Template       Warfarin doses taken: Warfarin taken as instructed    Diet: No new diet changes identified    New illness, injury, or hospitalization: No    Medication/supplement changes: None noted    Signs or symptoms of bleeding or clotting: No    Previous INR: Subtherapeutic    Additional findings: None       PLAN     Recommended plan for no diet, medication or health factor changes affecting INR     Dosing Instructions: Continue your current warfarin dose with next INR in 2 weeks       Summary  As of 1/3/2023    Full warfarin instructions:  5 mg every Tue, Thu, Sat; 2.5 mg all other days   Next INR check:  1/17/2023             Telephone call with Marilee who verbalizes understanding and agrees to plan    Patient offered & declined to schedule next visit    Education provided:     Contact [phone] with any changes, questions or concerns.     Plan made per [facility] anticoagulation protocol    Elle Matias RN  Anticoagulation Clinic  1/3/2023    _______________________________________________________________________     Anticoagulation Episode Summary     Current INR goal:  2.5-3.5   TTR:  46.2 % (1 y)   Target end date:  Indefinite   Send INR reminders to:  [facility]    Indications    Heart valve replaced [Z95.2]  Paroxysmal atrial fibrillation (H) [I48.0]  S/P AVR (aortic valve replacement) [Z95.2]  Other pulmonary embolism without acute cor pulmonale  unspecified chronicity (H) (Resolved) [I26.99]  H/O aortic valve replacement [Z95.2]  Long term (current) use of anticoagulants [Z79.01]  Pulmonary embolism  other  unspecified chronicity  unspecified whether acute cor pulmonale present (H) [I26.99]           Comments:  12/15/21 -  amended INR goal to 2.5 - 3.5         Anticoagulation Care Providers     Provider Role Specialty Phone number    Tito Salazar MD Referring Family Medicine [phone]    Drew Hahn MD Referring Family Medicine [phone]

## 2023-01-24 ENCOUNTER — TELEPHONE (OUTPATIENT)
Dept: ANTICOAGULATION | Facility: CLINIC | Age: 68
End: 2023-01-24

## 2023-01-24 NOTE — TELEPHONE ENCOUNTER
ANTICOAGULATION     Eileen Donald is overdue for INR check.      Spoke with Marilee and scheduled lab appointment on 1/27    - cancelled INR on 1/3/23    Alondra Lora RN

## 2023-01-27 ENCOUNTER — LAB (OUTPATIENT)
Dept: LAB | Facility: CLINIC | Age: 68
End: 2023-01-27

## 2023-01-27 ENCOUNTER — ANTICOAGULATION THERAPY VISIT (OUTPATIENT)
Dept: ANTICOAGULATION | Facility: CLINIC | Age: 68
End: 2023-01-27

## 2023-01-27 DIAGNOSIS — Z95.2 S/P AVR (AORTIC VALVE REPLACEMENT): ICD-10-CM

## 2023-01-27 DIAGNOSIS — Z95.2 HEART VALVE REPLACED: Primary | ICD-10-CM

## 2023-01-27 DIAGNOSIS — I48.0 PAROXYSMAL ATRIAL FIBRILLATION (H): ICD-10-CM

## 2023-01-27 DIAGNOSIS — Z79.01 LONG TERM (CURRENT) USE OF ANTICOAGULANTS: ICD-10-CM

## 2023-01-27 DIAGNOSIS — E11.69 TYPE 2 DIABETES MELLITUS WITH OTHER SPECIFIED COMPLICATION, UNSPECIFIED WHETHER LONG TERM INSULIN USE (H): ICD-10-CM

## 2023-01-27 DIAGNOSIS — I26.99 PULMONARY EMBOLISM, OTHER, UNSPECIFIED CHRONICITY, UNSPECIFIED WHETHER ACUTE COR PULMONALE PRESENT (H): ICD-10-CM

## 2023-01-27 DIAGNOSIS — Z95.2 H/O AORTIC VALVE REPLACEMENT: ICD-10-CM

## 2023-01-27 LAB — INR BLD: 5.4 (ref 0.9–1.1)

## 2023-01-27 PROCEDURE — 85610 PROTHROMBIN TIME: CPT

## 2023-01-27 PROCEDURE — 36415 COLL VENOUS BLD VENIPUNCTURE: CPT

## 2023-01-27 NOTE — PROGRESS NOTES
"ANTICOAGULATION MANAGEMENT     Eileen Donald 67 year old female is on warfarin with supratherapeutic INR result. (Goal INR 2.5-3.5)    Recent labs: (last 7 days)     01/27/23  0948   INR 5.4*       ASSESSMENT       Source(s): Chart Review, Patient/Caregiver Call and Template       Warfarin doses taken: Warfarin taken as instructed    Possible double dose.  (reported at times she takes her meds in the dark and might have switched her pill box on the wrong end).    Diet: No new diet changes identified    New illness, injury, or hospitalization: Yes:    Reported sweating more at night; and last week she had more myalgia and stiff muscles.    Medication/supplement changes:  Yes.    Last week reported taking more Tylenol than usual 1-2 tabs daily, otherwise it is PRN.  (she had more muscle aches / pain that she relates it to the weather).    Signs or symptoms of bleeding or clotting: No    Previous INR: Therapeutic last visit at 2.6; previously outside of goal range at 1.6    Additional findings: None       PLAN     Recommended plan for temporary change(s) affecting INR     Dosing Instructions:   - hold warfarin dose tonight, 1/27.   - advised partial hold and take 2.5mg warfarin dose, 1/28.   - then continue your current warfarin dose with next INR in 3 days.    Summary  As of 1/27/2023    Full warfarin instructions:  1/27: Hold; 1/28: 2.5 mg; Otherwise 5 mg every Tue, Thu, Sat; 2.5 mg all other days   Next INR check:  1/30/2023             Telephone call with  Marilee ([phone]) who verbalizes understanding and agrees to plan    - advised to florencia her pill box with a black marker \"X\" to notify which end is Saturday or Sunday.   - and turn on lights to ensure she is taking correct medications.     Lab visit scheduled - INR on 1/30/23 @ [facility].   - called [facility] lab ([facility])  Given clearance OK for 11a appt.    Education provided:     Taking warfarin: Importance of taking warfarin as instructed    Goal range and lab " monitoring: goal range and significance of current result    Symptom monitoring: monitoring for bleeding signs and symptoms, when to seek medical attention/emergency care and if you hit your head or have a bad fall seek emergency care.    Plan made with [facility] Pharmacist Renee Lora, RN  Anticoagulation Clinic  1/27/2023    _______________________________________________________________________     Anticoagulation Episode Summary     Current INR goal:  2.5-3.5   TTR:  43.5 % (1 y)   Target end date:  Indefinite   Send INR reminders to:  [facility]    Indications    Heart valve replaced [Z95.2]  Paroxysmal atrial fibrillation (H) [I48.0]  S/P AVR (aortic valve replacement) [Z95.2]  Other pulmonary embolism without acute cor pulmonale  unspecified chronicity (H) (Resolved) [I26.99]  H/O aortic valve replacement [Z95.2]  Long term (current) use of anticoagulants [Z79.01]  Pulmonary embolism  other  unspecified chronicity  unspecified whether acute cor pulmonale present (H) [I26.99]           Comments:  12/15/21 - amended INR goal to 2.5 - 3.5         Anticoagulation Care Providers     Provider Role Specialty Phone number    Tito Salazar MD Referring Family Medicine [phone]    Drew Hahn MD Referring Family Medicine [phone]           No

## 2023-02-01 ENCOUNTER — ANTICOAGULATION THERAPY VISIT (OUTPATIENT)
Dept: ANTICOAGULATION | Facility: CLINIC | Age: 68
End: 2023-02-01

## 2023-02-01 ENCOUNTER — LAB (OUTPATIENT)
Dept: LAB | Facility: CLINIC | Age: 68
End: 2023-02-01

## 2023-02-01 DIAGNOSIS — Z79.01 LONG TERM (CURRENT) USE OF ANTICOAGULANTS: ICD-10-CM

## 2023-02-01 DIAGNOSIS — I26.99 PULMONARY EMBOLISM, OTHER, UNSPECIFIED CHRONICITY, UNSPECIFIED WHETHER ACUTE COR PULMONALE PRESENT (H): ICD-10-CM

## 2023-02-01 DIAGNOSIS — Z95.2 H/O AORTIC VALVE REPLACEMENT: ICD-10-CM

## 2023-02-01 DIAGNOSIS — Z95.2 S/P AVR (AORTIC VALVE REPLACEMENT): ICD-10-CM

## 2023-02-01 DIAGNOSIS — I48.0 PAROXYSMAL ATRIAL FIBRILLATION (H): ICD-10-CM

## 2023-02-01 DIAGNOSIS — Z95.2 HEART VALVE REPLACED: Primary | ICD-10-CM

## 2023-02-01 LAB — INR BLD: 2.7 (ref 0.9–1.1)

## 2023-02-01 PROCEDURE — 85610 PROTHROMBIN TIME: CPT

## 2023-02-01 PROCEDURE — 36415 COLL VENOUS BLD VENIPUNCTURE: CPT

## 2023-02-01 NOTE — PROGRESS NOTES
ANTICOAGULATION MANAGEMENT     Eileen Donald 67 year old female is on warfarin with therapeutic INR result. (Goal INR 2.5-3.5)    Recent labs: (last 7 days)     02/01/23  1339   INR 2.7*       ASSESSMENT       Source(s): Chart Review and Patient/Caregiver Call       Warfarin doses taken: Warfarin taken as instructed    Diet: No new diet changes identified    New illness, injury, or hospitalization: No    Medication/supplement changes: None noted    Signs or symptoms of bleeding or clotting: No    Previous INR: Supratherapeutic    Additional findings: None       PLAN     Recommended plan for no diet, medication or health factor changes affecting INR     Dosing Instructions: Continue your current warfarin dose with next INR in 7-10 days       Summary  As of 2/1/2023    Full warfarin instructions:  5 mg every Tue, Thu, Sat; 2.5 mg all other days   Next INR check:  2/10/2023             Telephone call with Marilee who verbalizes understanding and agrees to plan    Lab visit scheduled    Education provided:     Contact [phone] with any changes, questions or concerns.     Plan made per [facility] anticoagulation protocol    Dany Cramer RN  Anticoagulation Clinic  2/1/2023    _______________________________________________________________________     Anticoagulation Episode Summary     Current INR goal:  2.5-3.5   TTR:  42.5 % (1 y)   Target end date:  Indefinite   Send INR reminders to:  [facility]    Indications    Heart valve replaced [Z95.2]  Paroxysmal atrial fibrillation (H) [I48.0]  S/P AVR (aortic valve replacement) [Z95.2]  Other pulmonary embolism without acute cor pulmonale  unspecified chronicity (H) (Resolved) [I26.99]  H/O aortic valve replacement [Z95.2]  Long term (current) use of anticoagulants [Z79.01]  Pulmonary embolism  other  unspecified chronicity  unspecified whether acute cor pulmonale present (H) [I26.99]           Comments:  12/15/21 - amended INR goal to 2.5 - 3.5          Anticoagulation Care Providers     Provider Role Specialty Phone number    Tito Salazar MD Referring Family Medicine [phone]    Drew Hahn MD Referring Family Medicine [phone]

## 2023-02-13 ENCOUNTER — TELEPHONE (OUTPATIENT)
Dept: ANTICOAGULATION | Facility: CLINIC | Age: 68
End: 2023-02-13

## 2023-02-13 NOTE — TELEPHONE ENCOUNTER
ANTICOAGULATION     Eileen Donald is overdue for INR check.      Spoke with Marilee and scheduled lab appointment on 2/20    - has to check for her ride to jethro Lora RN

## 2023-02-14 DIAGNOSIS — Z95.2 S/P AVR (AORTIC VALVE REPLACEMENT): ICD-10-CM

## 2023-02-16 RX ORDER — FUROSEMIDE 40 MG
60 TABLET ORAL DAILY
Qty: 135 TABLET | Refills: 2 | Status: SHIPPED | OUTPATIENT
Start: 2023-02-16 | End: 2023-10-12

## 2023-02-16 NOTE — TELEPHONE ENCOUNTER
"Last Written Prescription Date:  6/28/22  Last Fill Quantity: 90,  # refills: 1   Last office visit provider:  10/31/22     Requested Prescriptions   Pending Prescriptions Disp Refills     furosemide (LASIX) 40 MG tablet 90 tablet 1     Sig: Take 1.5 tablets (60 mg) by mouth daily       Diuretics (Including Combos) Protocol Passed - 2/14/2023  5:25 PM        Passed - Blood pressure under 140/90 in past 12 months     BP Readings from Last 3 Encounters:   10/31/22 112/77   09/30/22 126/61   04/07/22 124/71                 Passed - Recent (12 mo) or future (30 days) visit within the authorizing provider's specialty     Patient has had an office visit with the authorizing provider or a provider within the authorizing providers department within the previous 12 mos or has a future within next 30 days. See \"Patient Info\" tab in inbasket, or \"Choose Columns\" in Meds & Orders section of the refill encounter.              Passed - Medication is active on med list        Passed - Patient is age 18 or older        Passed - No active pregancy on record        Passed - Normal serum creatinine on file in past 12 months     Recent Labs   Lab Test 10/31/22  1446   CR 0.85              Passed - Normal serum potassium on file in past 12 months     Recent Labs   Lab Test 10/31/22  1446   POTASSIUM 4.3                    Passed - Normal serum sodium on file in past 12 months     Recent Labs   Lab Test 10/31/22  1446                 Passed - No positive pregnancy test in past 12 months             Daysi Mohan, RN 02/16/23 3:51 PM  "

## 2023-02-21 ENCOUNTER — TELEPHONE (OUTPATIENT)
Dept: ANTICOAGULATION | Facility: CLINIC | Age: 68
End: 2023-02-21

## 2023-02-21 NOTE — TELEPHONE ENCOUNTER
ANTICOAGULATION     Eileen Donald is overdue for INR check.      Left message for patient to call and schedule lab appointment as soon as possible. If returning call, please schedule.     - cancelled INR on 2/20/23 d/t bad weather conditions.    Alondra Lora RN

## 2023-02-22 NOTE — PROGRESS NOTES
[facility]    Outpatient Physical Therapy Discharge Note  Patient: Eileen Donald  : 1955    Beginning/End Dates of Reporting Period:  22 to 22    Referring Provider: Drew Hahn MD    Therapy Diagnosis:  LE weakness, difficulty balancing and walking, right hip and back pain     Client Self Report: Still has a pretty nasty cough, had borderline pneumonia. Has not been to PT for 2 months. Has done a few of her exercises. Does feel improved overall, plans to continue independently at this time, but return if worsening.    Objective Measurements:  Objective Measure: APTA sit to stand 6 reps (5 without UE )  Details: TUG 18.5 seconds without walker  Objective Measure: 2 minute walk test 175 feet  Details:  92% O2 after 2 minute walk test     Details: Gait- significantly less antalgic    Goals:  Goal Identifier     Goal Description Patient will improve TUG to <13 seconds for dec in falls risk in 12 weeks:   Target Date 22   Date Met      Progress (detail required for progress note): 18.5 seconds without walker today     Goal Identifier     Goal Description Patient will be able to do 8 sit to stands for improve LE strength and improved function in 8 weeks:   Target Date 22   Date Met      Progress (detail required for progress note): 6 reps     Goal Identifier     Goal Description Patient will report being able to go up all of her steps at home, without taking a break in 8 weeks:   Target Date 22   Date Met      Progress (detail required for progress note): Met     Goal Identifier     Goal Description Patient will report exercising > 15 minutes each day for improved overall endurance, balance, strength, and health in 4 weeks:   Target Date 22   Date Met      Progress (detail required for progress note): variable based on day, working on exercising more  consistently       Plan:  Discharge from therapy.    Discharge:    Reason for Discharge: Patient has met all goals.    Equipment Issued: NA    Discharge Plan: Patient to continue home program.    Wiliam Carpenter, PT

## 2023-02-22 NOTE — ADDENDUM NOTE
Encounter addended by: Wiliam Carpenter, PT on: 2/22/2023 9:10 AM   Actions taken: Episode resolved, Clinical Note Signed

## 2023-03-02 ENCOUNTER — LAB (OUTPATIENT)
Dept: LAB | Facility: CLINIC | Age: 68
End: 2023-03-02

## 2023-03-02 ENCOUNTER — NURSE TRIAGE (OUTPATIENT)
Dept: NURSING | Facility: CLINIC | Age: 68
End: 2023-03-02

## 2023-03-02 ENCOUNTER — ANTICOAGULATION THERAPY VISIT (OUTPATIENT)
Dept: ANTICOAGULATION | Facility: CLINIC | Age: 68
End: 2023-03-02

## 2023-03-02 DIAGNOSIS — Z95.2 HEART VALVE REPLACED: Primary | ICD-10-CM

## 2023-03-02 DIAGNOSIS — Z95.2 S/P AVR (AORTIC VALVE REPLACEMENT): ICD-10-CM

## 2023-03-02 DIAGNOSIS — Z79.01 LONG TERM (CURRENT) USE OF ANTICOAGULANTS: ICD-10-CM

## 2023-03-02 DIAGNOSIS — I48.0 PAROXYSMAL ATRIAL FIBRILLATION (H): ICD-10-CM

## 2023-03-02 DIAGNOSIS — I26.99 PULMONARY EMBOLISM, OTHER, UNSPECIFIED CHRONICITY, UNSPECIFIED WHETHER ACUTE COR PULMONALE PRESENT (H): ICD-10-CM

## 2023-03-02 DIAGNOSIS — Z95.2 H/O AORTIC VALVE REPLACEMENT: ICD-10-CM

## 2023-03-02 LAB — INR BLD: 4.5 (ref 0.9–1.1)

## 2023-03-02 PROCEDURE — 36415 COLL VENOUS BLD VENIPUNCTURE: CPT

## 2023-03-02 PROCEDURE — 85610 PROTHROMBIN TIME: CPT

## 2023-03-02 NOTE — PROGRESS NOTES
ANTICOAGULATION MANAGEMENT     Elieen Donald 67 year old female is on warfarin with supratherapeutic INR result. (Goal INR 2.5-3.5)    Recent labs: (last 7 days)     03/02/23  1315   INR 4.5*       ASSESSMENT     Warfarin Lab Questionnaire    Dose in Tablet or Mg 3/1/2023   TAB or MG? milligram (mg)     Pt Rptd Dose STAS MONDAY TUESDAY THURSDAY FRIDAY SATURDAY   3/1/2023 2.5mg 2,5mg 5mg 5mg 2.5mg 5.0mg     Warfarin Lab Questionnaire 3/1/2023   Missed doses? No   Medication changes? No - taking more Tylenol than usual   Abnormal bleeding? No   Shortness of breath? No   Injuries or illness since last INR? No - hip really bothering her from old age / weather.   Changes in diet or alcohol? No   Upcoming surgery, procedure? No   Best number to call with results? [phone]       Additional findings: None       PLAN     Recommended plan for temporary change(s) affecting INR     Dosing Instructions: partial hold then continue your current warfarin dose with next INR in 7-10 days       Summary  As of 3/2/2023    Full warfarin instructions:  3/2: 2.5 mg; Otherwise 5 mg every Tue, Thu, Sat; 2.5 mg all other days   Next INR check:  3/13/2023             Telephone call with  Marilee ([phone]) who verbalizes understanding and agrees to plan    Lab visit scheduled - INR on 3/13/23 @ [facility].    Education provided:     Taking warfarin: Importance of taking warfarin as instructed    Goal range and lab monitoring: goal range and significance of current result    Dietary considerations: importance of consistent vitamin K intake    Interaction IS anticipated between warfarin and ES-Tylenol    Symptom monitoring: monitoring for bleeding signs and symptoms    Plan made per [facility] anticoagulation protocol    Alondra Lora, RN  Anticoagulation Clinic  3/2/2023    _______________________________________________________________________     Anticoagulation Episode Summary     Current INR goal:  2.5-3.5   TTR:  43.0 % (1 y)   Target  end date:  Indefinite   Send INR reminders to:  [facility]    Indications    Heart valve replaced [Z95.2]  Paroxysmal atrial fibrillation (H) [I48.0]  S/P AVR (aortic valve replacement) [Z95.2]  Other pulmonary embolism without acute cor pulmonale  unspecified chronicity (H) (Resolved) [I26.99]  H/O aortic valve replacement [Z95.2]  Long term (current) use of anticoagulants [Z79.01]  Pulmonary embolism  other  unspecified chronicity  unspecified whether acute cor pulmonale present (H) [I26.99]           Comments:  12/15/21 - amended INR goal to 2.5 - 3.5         Anticoagulation Care Providers     Provider Role Specialty Phone number    Tito Salazar MD Referring Family Medicine [phone]    Drew Hahn MD Referring Family Medicine [phone]

## 2023-03-02 NOTE — TELEPHONE ENCOUNTER
Patient returning call from INR clinic. This writer transferred patient to INR clinic.     MARIA LUISA WEBER, RN  [facility] nurse advisors  3/2/2023  3:49 PM      Reason for Disposition    Information only question and nurse able to answer    Additional Information    Negative: Nursing judgment    Negative: Nursing judgment    Negative: Nursing judgment    Negative: Nursing judgment    Protocols used: INFORMATION ONLY CALL - NO TRIAGE-A-OH

## 2023-03-13 ENCOUNTER — LAB (OUTPATIENT)
Dept: LAB | Facility: CLINIC | Age: 68
End: 2023-03-13
Payer: MEDICARE

## 2023-03-13 ENCOUNTER — ANTICOAGULATION THERAPY VISIT (OUTPATIENT)
Dept: ANTICOAGULATION | Facility: CLINIC | Age: 68
End: 2023-03-13

## 2023-03-13 DIAGNOSIS — Z95.2 H/O AORTIC VALVE REPLACEMENT: ICD-10-CM

## 2023-03-13 DIAGNOSIS — Z79.01 LONG TERM (CURRENT) USE OF ANTICOAGULANTS: ICD-10-CM

## 2023-03-13 DIAGNOSIS — Z95.2 HEART VALVE REPLACED: Primary | ICD-10-CM

## 2023-03-13 DIAGNOSIS — I48.0 PAROXYSMAL ATRIAL FIBRILLATION (H): ICD-10-CM

## 2023-03-13 DIAGNOSIS — Z95.2 S/P AVR (AORTIC VALVE REPLACEMENT): ICD-10-CM

## 2023-03-13 DIAGNOSIS — I26.99 PULMONARY EMBOLISM, OTHER, UNSPECIFIED CHRONICITY, UNSPECIFIED WHETHER ACUTE COR PULMONALE PRESENT (H): ICD-10-CM

## 2023-03-13 DIAGNOSIS — E11.69 TYPE 2 DIABETES MELLITUS WITH OTHER SPECIFIED COMPLICATION, UNSPECIFIED WHETHER LONG TERM INSULIN USE (H): ICD-10-CM

## 2023-03-13 LAB
HBA1C MFR BLD: 6.4 % (ref 0–5.6)
INR BLD: 5.3 (ref 0.9–1.1)

## 2023-03-13 PROCEDURE — 36415 COLL VENOUS BLD VENIPUNCTURE: CPT

## 2023-03-13 PROCEDURE — 83036 HEMOGLOBIN GLYCOSYLATED A1C: CPT

## 2023-03-13 PROCEDURE — 85610 PROTHROMBIN TIME: CPT

## 2023-03-13 NOTE — PROGRESS NOTES
ANTICOAGULATION MANAGEMENT     Eileen Donald 67 year old female is on warfarin with supratherapeutic INR result. (Goal INR 2.5-3.5)    Recent labs: (last 7 days)     03/13/23  1249   INR 5.3*       ASSESSMENT     Warfarin Lab Questionnaire    Dose in Tablet or Mg 3/13/2023   TAB or MG? - 2.5mg warfarin milligram (mg)     Pt Rptd Dose STAS MONDAY TUESDAY WEDNESDAY THURSDAY FRIDAY SATURDAY   3/13/2023 2.5 2.5 5 2.5 5 2.5 5     Warfarin Lab Questionnaire 3/13/2023   Missed doses? No   Medication changes? No - has chronic cough - did take OTC plain Robitussin.       - did partial hold with 2.5mg on 3/2/23.       - was taking more Acetaminiophen than usual on last INR of 3.2   Abnormal bleeding? No   Shortness of breath? No   Injuries or illness since last INR? No - arthritic pains.   Changes in diet or alcohol? No - the only change with diet - she ate some bread with poppy seeds.   Upcoming surgery, procedure? No   Best number to call with results? -       Additional findings: None       PLAN     Recommended plan for temporary change(s) affecting INR     Dosing Instructions: hold dose then decrease your warfarin dose (10% change) with next INR in 3 days       Summary  As of 3/13/2023    Full warfarin instructions:  3/13: Hold; Otherwise 5 mg every Sun, Thu; 2.5 mg all other days   Next INR check:  3/16/2023             Telephone call with  Marilee ([phone]) who verbalizes understanding and agrees to plan    Lab visit scheduled    Education provided:     Taking warfarin: Importance of taking warfarin as instructed    Goal range and lab monitoring: goal range and significance of current result    Interaction IS NOT anticipated between warfarin and OTC plain Robitussin cough syrup    Symptom monitoring: monitoring for bleeding signs and symptoms    Plan made with [facility] Pharmacist Trish Lora, MJ  Anticoagulation  Clinic  3/13/2023    _______________________________________________________________________     Anticoagulation Episode Summary     Current INR goal:  2.5-3.5   TTR:  41.5 % (1 y)   Target end date:  Indefinite   Send INR reminders to:  [facility]    Indications    Heart valve replaced [Z95.2]  Paroxysmal atrial fibrillation (H) [I48.0]  S/P AVR (aortic valve replacement) [Z95.2]  Other pulmonary embolism without acute cor pulmonale  unspecified chronicity (H) (Resolved) [I26.99]  H/O aortic valve replacement [Z95.2]  Long term (current) use of anticoagulants [Z79.01]  Pulmonary embolism  other  unspecified chronicity  unspecified whether acute cor pulmonale present (H) [I26.99]           Comments:  12/15/21 - amended INR goal to 2.5 - 3.5         Anticoagulation Care Providers     Provider Role Specialty Phone number    Tito Salazar MD Referring Family Medicine [phone]    Drew Hahn MD Referring Family Medicine [phone]

## 2023-03-13 NOTE — PROGRESS NOTES
Chart reviewed with [facility] RN at time of encounter.    Advise 1 day hold today, and 10% dose decrease, with INR recheck Thursday.    Trish Ross, PharmD BCACP  Anticoagulation Clinical Pharmacist

## 2023-03-16 ENCOUNTER — ANTICOAGULATION THERAPY VISIT (OUTPATIENT)
Dept: ANTICOAGULATION | Facility: CLINIC | Age: 68
End: 2023-03-16

## 2023-03-16 ENCOUNTER — LAB (OUTPATIENT)
Dept: LAB | Facility: CLINIC | Age: 68
End: 2023-03-16
Payer: MEDICARE

## 2023-03-16 DIAGNOSIS — Z95.2 HEART VALVE REPLACED: Primary | ICD-10-CM

## 2023-03-16 DIAGNOSIS — I26.99 PULMONARY EMBOLISM, OTHER, UNSPECIFIED CHRONICITY, UNSPECIFIED WHETHER ACUTE COR PULMONALE PRESENT (H): ICD-10-CM

## 2023-03-16 DIAGNOSIS — I48.0 PAROXYSMAL ATRIAL FIBRILLATION (H): ICD-10-CM

## 2023-03-16 DIAGNOSIS — Z95.2 S/P AVR (AORTIC VALVE REPLACEMENT): ICD-10-CM

## 2023-03-16 DIAGNOSIS — Z79.01 LONG TERM (CURRENT) USE OF ANTICOAGULANTS: ICD-10-CM

## 2023-03-16 DIAGNOSIS — Z95.2 H/O AORTIC VALVE REPLACEMENT: ICD-10-CM

## 2023-03-16 LAB — INR BLD: 3.2 (ref 0.9–1.1)

## 2023-03-16 PROCEDURE — 85610 PROTHROMBIN TIME: CPT

## 2023-03-16 PROCEDURE — 36415 COLL VENOUS BLD VENIPUNCTURE: CPT

## 2023-03-16 NOTE — PROGRESS NOTES
ANTICOAGULATION MANAGEMENT     Eileen Donlad 67 year old female is on warfarin with therapeutic INR result. (Goal INR 2.5-3.5)    Recent labs: (last 7 days)     03/16/23  1229   INR 3.2*       ASSESSMENT     Warfarin Lab Questionnaire    Dose in Tablet or Mg 3/13/2023   TAB or MG? milligram (mg)     Pt Rptd Dose STAS MONDAY TUESDAY WEDNESDAY THURSDAY FRIDAY SATURDAY   3/16/2023 - - 2.5 2.5 - - -     Warfarin Lab Questionnaire 3/16/2023   Missed doses? No   Medication changes? Yes   Please list: Doses. Changed on [id number]   Abnormal bleeding? No   Shortness of breath? No   Injuries or illness since last INR? No   Changes in diet or alcohol? No   Upcoming surgery, procedure? No   Best number to call with results? -       Additional findings: None       PLAN     Recommended plan for no diet, medication or health factor changes affecting INR     Dosing Instructions: Continue your current warfarin dose with next INR in 1 week       Summary  As of 3/16/2023    Full warfarin instructions:  5 mg every Sun, Thu; 2.5 mg all other days   Next INR check:  3/23/2023             Telephone call with Marilee  [phone] who verbalizes understanding and agrees to plan    Lab visit scheduled    Education provided:     Contact [phone] with any changes, questions or concerns.     Plan made per [facility] anticoagulation protocol    Dany Cramer RN  Anticoagulation Clinic  3/16/2023    _______________________________________________________________________     Anticoagulation Episode Summary     Current INR goal:  2.5-3.5   TTR:  40.8 % (1 y)   Target end date:  Indefinite   Send INR reminders to:  [facility]    Indications    Heart valve replaced [Z95.2]  Paroxysmal atrial fibrillation (H) [I48.0]  S/P AVR (aortic valve replacement) [Z95.2]  Other pulmonary embolism without acute cor pulmonale  unspecified chronicity (H) (Resolved) [I26.99]  H/O aortic valve replacement [Z95.2]  Long term (current) use of  anticoagulants [Z79.01]  Pulmonary embolism  other  unspecified chronicity  unspecified whether acute cor pulmonale present (H) [I26.99]           Comments:  12/15/21 - amended INR goal to 2.5 - 3.5         Anticoagulation Care Providers     Provider Role Specialty Phone number    Tito Salazar MD Referring Family Medicine [phone]    Drew Hahn MD Referring Family Medicine [phone]

## 2023-03-21 ENCOUNTER — ANTICOAGULATION THERAPY VISIT (OUTPATIENT)
Dept: ANTICOAGULATION | Facility: CLINIC | Age: 68
End: 2023-03-21

## 2023-03-21 ENCOUNTER — LAB (OUTPATIENT)
Dept: LAB | Facility: CLINIC | Age: 68
End: 2023-03-21

## 2023-03-21 DIAGNOSIS — I48.0 PAROXYSMAL ATRIAL FIBRILLATION (H): ICD-10-CM

## 2023-03-21 DIAGNOSIS — I26.99 PULMONARY EMBOLISM, OTHER, UNSPECIFIED CHRONICITY, UNSPECIFIED WHETHER ACUTE COR PULMONALE PRESENT (H): ICD-10-CM

## 2023-03-21 DIAGNOSIS — Z95.2 H/O AORTIC VALVE REPLACEMENT: ICD-10-CM

## 2023-03-21 DIAGNOSIS — Z95.2 S/P AVR (AORTIC VALVE REPLACEMENT): ICD-10-CM

## 2023-03-21 DIAGNOSIS — Z79.01 LONG TERM (CURRENT) USE OF ANTICOAGULANTS: ICD-10-CM

## 2023-03-21 DIAGNOSIS — Z95.2 HEART VALVE REPLACED: Primary | ICD-10-CM

## 2023-03-21 LAB — INR BLD: 3.2 (ref 0.9–1.1)

## 2023-03-21 PROCEDURE — 36416 COLLJ CAPILLARY BLOOD SPEC: CPT

## 2023-03-21 PROCEDURE — 85610 PROTHROMBIN TIME: CPT

## 2023-03-21 NOTE — PROGRESS NOTES
ANTICOAGULATION MANAGEMENT     Eileen Donald 67 year old female is on warfarin with therapeutic INR result. (Goal INR 2.5-3.5)    Recent labs: (last 7 days)     03/21/23  1230   INR 3.2*       ASSESSMENT     Warfarin Lab Questionnaire    Dose in Tablet or Mg 3/20/2023   TAB or MG? milligram (mg)     Pt Rptd Dose STAS MONDAY TUESDAY WEDNESDAY THURSDAY FRIDAY SATURDAY   3/20/2023 5 2.5 2.5 2.5 5 2.5 2.5     Warfarin Lab Questionnaire 3/20/2023   Missed doses? No   Medication changes? No   Please list: -   Abnormal bleeding? No   Shortness of breath? No   Injuries or illness since last INR? No   Changes in diet or alcohol? No   Upcoming surgery, procedure? No   Best number to call with results? -       Additional findings: Last INR thera, previous supra. 10% dose decrease 3/13/23.       PLAN     Recommended plan for no diet, medication or health factor changes affecting INR     Dosing Instructions: Continue your current warfarin dose with next INR in 1-2 weeks       Summary  As of 3/21/2023    Full warfarin instructions:  5 mg every Sun, Thu; 2.5 mg all other days   Next INR check:  4/4/2023             Telephone call with Marilee who verbalizes understanding and agrees to plan    Lab visit scheduled    Education provided:     Goal range and lab monitoring: goal range and significance of current result and Importance of following up at instructed interval    Contact [phone] with any changes, questions or concerns.     Plan made per [facility] anticoagulation protocol    Melony Guerra RN  Anticoagulation Clinic  3/21/2023    _______________________________________________________________________     Anticoagulation Episode Summary     Current INR goal:  2.5-3.5   TTR:  40.9 % (1 y)   Target end date:  Indefinite   Send INR reminders to:  [facility]    Indications    Heart valve replaced [Z95.2]  Paroxysmal atrial fibrillation (H) [I48.0]  S/P AVR (aortic valve replacement) [Z95.2]  Other pulmonary  embolism without acute cor pulmonale  unspecified chronicity (H) (Resolved) [I26.99]  H/O aortic valve replacement [Z95.2]  Long term (current) use of anticoagulants [Z79.01]  Pulmonary embolism  other  unspecified chronicity  unspecified whether acute cor pulmonale present (H) [I26.99]           Comments:  12/15/21 - amended INR goal to 2.5 - 3.5         Anticoagulation Care Providers     Provider Role Specialty Phone number    Tito Salazar MD Referring Family Medicine [phone]    Drew Hahn MD Referring Family Medicine [phone]

## 2023-04-03 ENCOUNTER — ANTICOAGULATION THERAPY VISIT (OUTPATIENT)
Dept: ANTICOAGULATION | Facility: CLINIC | Age: 68
End: 2023-04-03

## 2023-04-03 ENCOUNTER — LAB (OUTPATIENT)
Dept: LAB | Facility: CLINIC | Age: 68
End: 2023-04-03
Payer: COMMERCIAL

## 2023-04-03 DIAGNOSIS — Z79.01 LONG TERM (CURRENT) USE OF ANTICOAGULANTS: ICD-10-CM

## 2023-04-03 DIAGNOSIS — Z95.2 H/O AORTIC VALVE REPLACEMENT: ICD-10-CM

## 2023-04-03 DIAGNOSIS — I26.99 PULMONARY EMBOLISM, OTHER, UNSPECIFIED CHRONICITY, UNSPECIFIED WHETHER ACUTE COR PULMONALE PRESENT (H): ICD-10-CM

## 2023-04-03 DIAGNOSIS — I48.0 PAROXYSMAL ATRIAL FIBRILLATION (H): ICD-10-CM

## 2023-04-03 DIAGNOSIS — Z95.2 S/P AVR (AORTIC VALVE REPLACEMENT): ICD-10-CM

## 2023-04-03 DIAGNOSIS — Z95.2 HEART VALVE REPLACED: Primary | ICD-10-CM

## 2023-04-03 LAB — INR BLD: 2.9 (ref 0.9–1.1)

## 2023-04-03 PROCEDURE — 36416 COLLJ CAPILLARY BLOOD SPEC: CPT

## 2023-04-03 PROCEDURE — 85610 PROTHROMBIN TIME: CPT

## 2023-04-03 NOTE — PROGRESS NOTES
ANTICOAGULATION MANAGEMENT     Eileen Donald 67 year old female is on warfarin with therapeutic INR result. (Goal INR 2.5-3.5)    Recent labs: (last 7 days)     04/03/23  1251   INR 2.9*       ASSESSMENT       Source(s): Chart Review and Patient/Caregiver Call       Warfarin doses taken: Warfarin taken as instructed    Diet: No new diet changes identified    New illness, injury, or hospitalization: No    Medication/supplement changes: None noted    Signs or symptoms of bleeding or clotting: No    Previous INR: Therapeutic last 2 visits    Additional findings: None         PLAN     Recommended plan for no diet, medication or health factor changes affecting INR     Dosing Instructions: Continue your current warfarin dose with next INR in 4 weeks       Summary  As of 4/3/2023    Full warfarin instructions:  5 mg every Sun, Thu; 2.5 mg all other days   Next INR check:  5/1/2023             Telephone call with  Marilee ([phone]) who verbalizes understanding and agrees to plan    Lab visit scheduled - INR on 5/1/23 @ [facility].    Education provided:     Taking warfarin: Importance of taking warfarin as instructed    Goal range and lab monitoring: goal range and significance of current result    Plan made per [facility] anticoagulation protocol    Alondra Lora RN  Anticoagulation Clinic  4/3/2023    _______________________________________________________________________     Anticoagulation Episode Summary     Current INR goal:  2.5-3.5   TTR:  40.9 % (1 y)   Target end date:  Indefinite   Send INR reminders to:  [email or web address]    Indications    Heart valve replaced [Z95.2]  Paroxysmal atrial fibrillation (H) [I48.0]  S/P AVR (aortic valve replacement) [Z95.2]  Other pulmonary embolism without acute cor pulmonale  unspecified chronicity (H) (Resolved) [I26.99]  H/O aortic valve replacement [Z95.2]  Long term (current) use of anticoagulants [Z79.01]  Pulmonary embolism  other  unspecified chronicity  unspecified  whether acute cor pulmonale present (H) [I26.99]           Comments:  12/15/21 - amended INR goal to 2.5 - 3.5         Anticoagulation Care Providers     Provider Role Specialty Phone number    Tito Salazar MD Referring Family Medicine [phone]    Drew Hahn MD Referring Family Medicine [phone]

## 2023-04-07 ENCOUNTER — TRANSFERRED RECORDS (OUTPATIENT)
Dept: HEALTH INFORMATION MANAGEMENT | Facility: CLINIC | Age: 68
End: 2023-04-07
Payer: COMMERCIAL

## 2023-04-07 LAB — RETINOPATHY: NEGATIVE

## 2023-04-13 DIAGNOSIS — I10 PRIMARY HYPERTENSION: ICD-10-CM

## 2023-04-14 RX ORDER — METOPROLOL TARTRATE 25 MG/1
25 TABLET, FILM COATED ORAL 2 TIMES DAILY
Qty: 180 TABLET | Refills: 0 | Status: SHIPPED | OUTPATIENT
Start: 2023-04-14 | End: 2023-08-07

## 2023-04-14 NOTE — TELEPHONE ENCOUNTER
"Last Written Prescription Date:  5/8/22  Last Fill Quantity: 180,  # refills: 3   Last office visit provider:  10/31/22, PCP     Requested Prescriptions   Pending Prescriptions Disp Refills     metoprolol tartrate (LOPRESSOR) 25 MG tablet 180 tablet 3     Sig: Take 1 tablet (25 mg) by mouth 2 times daily       Beta-Blockers Protocol Passed - 4/13/2023  3:11 PM        Passed - Blood pressure under 140/90 in past 12 months     BP Readings from Last 3 Encounters:   10/31/22 112/77   09/30/22 126/61   04/07/22 124/71                 Passed - Patient is age 6 or older        Passed - Recent (12 mo) or future (30 days) visit within the authorizing provider's specialty     Patient has had an office visit with the authorizing provider or a provider within the authorizing providers department within the previous 12 mos or has a future within next 30 days. See \"Patient Info\" tab in inbasket, or \"Choose Columns\" in Meds & Orders section of the refill encounter.              Passed - Medication is active on med list             Swati Steiner RN 04/14/23 2:57 PM  "

## 2023-04-18 DIAGNOSIS — E11.9 TYPE 2 DIABETES MELLITUS WITHOUT COMPLICATION, WITHOUT LONG-TERM CURRENT USE OF INSULIN (H): ICD-10-CM

## 2023-04-19 DIAGNOSIS — E78.5 DYSLIPIDEMIA: ICD-10-CM

## 2023-04-19 RX ORDER — SIMVASTATIN 20 MG
20 TABLET ORAL AT BEDTIME
Qty: 90 TABLET | Refills: 1 | Status: SHIPPED | OUTPATIENT
Start: 2023-04-19 | End: 2023-07-27

## 2023-04-19 RX ORDER — METFORMIN HCL 500 MG
500 TABLET, EXTENDED RELEASE 24 HR ORAL DAILY
Qty: 90 TABLET | Refills: 1 | Status: SHIPPED | OUTPATIENT
Start: 2023-04-19 | End: 2023-10-12

## 2023-04-19 NOTE — TELEPHONE ENCOUNTER
Medication Question or Refill        What medication are you calling about (include dose and sig)?: simvastatin (ZOCOR) 20 MG tablet - 1 tab daily    Preferred Pharmacy:   [id number] [address] [address]  Phone: [phone] Fax: [phone]      Controlled Substance Agreement on file:   CSA -- Patient Level:    CSA: None found at the patient level.       Who prescribed the medication?: Jovani    Do you need a refill? Yes    When did you use the medication last? Last night    Patient offered an appointment? Yes: pt has upcoming appointment 5/15/23    Do you have any questions or concerns?  No      Could we send this information to you in [place] or would you prefer to receive a phone call?:   Patient would prefer a phone call   Okay to leave a detailed message?: Yes at Cell number on file:    Telephone Information:   Mobile [phone]

## 2023-04-19 NOTE — TELEPHONE ENCOUNTER
"Last Written Prescription Date:  3/28/2022  Last Fill Quantity: 90,  # refills: 3   Last office visit provider:  10/31/2022     Requested Prescriptions   Pending Prescriptions Disp Refills     metFORMIN (GLUCOPHAGE XR) 500 MG 24 hr tablet 90 tablet 3     Sig: Take 1 tablet (500 mg) by mouth daily       Biguanide Agents Passed - 4/18/2023  1:19 PM        Passed - Patient is age 10 or older        Passed - Patient has documented A1c within the specified period of time.     If HgbA1C is 8 or greater, it needs to be on file within the past 3 months.  If less than 8, must be on file within the past 6 months.     Recent Labs   Lab Test 03/13/23  1249   A1C 6.4*             Passed - Patient's CR is NOT>1.4 OR Patient's EGFR is NOT<45 within past 12 mos.     Recent Labs   Lab Test 10/31/22  1446 09/28/21  1838 03/17/21  1517   GFRESTIMATED 75   < > >60   GFRESTBLACK  --   --  >60    < > = values in this interval not displayed.       Recent Labs   Lab Test 10/31/22  1446   CR 0.85             Passed - Patient does NOT have a diagnosis of CHF.        Passed - Medication is active on med list        Passed - Patient is not pregnant        Passed - Patient has not had a positive pregnancy test within the past 12 mos.         Passed - Recent (6 mo) or future (30 days) visit within the authorizing provider's specialty     Patient had office visit in the last 6 months or has a visit in the next 30 days with authorizing provider or within the authorizing provider's specialty.  See \"Patient Info\" tab in inbasket, or \"Choose Columns\" in Meds & Orders section of the refill encounter.                 Jannet Anaya RN 04/19/23 12:28 PM  "

## 2023-04-23 ENCOUNTER — HEALTH MAINTENANCE LETTER (OUTPATIENT)
Age: 68
End: 2023-04-23

## 2023-05-03 DIAGNOSIS — F33.41 RECURRENT MAJOR DEPRESSIVE DISORDER, IN PARTIAL REMISSION (H): ICD-10-CM

## 2023-05-04 ENCOUNTER — ANTICOAGULATION THERAPY VISIT (OUTPATIENT)
Dept: ANTICOAGULATION | Facility: CLINIC | Age: 68
End: 2023-05-04

## 2023-05-04 ENCOUNTER — LAB (OUTPATIENT)
Dept: LAB | Facility: CLINIC | Age: 68
End: 2023-05-04
Payer: COMMERCIAL

## 2023-05-04 DIAGNOSIS — Z95.2 S/P AVR (AORTIC VALVE REPLACEMENT): ICD-10-CM

## 2023-05-04 DIAGNOSIS — Z79.01 LONG TERM (CURRENT) USE OF ANTICOAGULANTS: ICD-10-CM

## 2023-05-04 DIAGNOSIS — I26.99 PULMONARY EMBOLISM, OTHER, UNSPECIFIED CHRONICITY, UNSPECIFIED WHETHER ACUTE COR PULMONALE PRESENT (H): ICD-10-CM

## 2023-05-04 DIAGNOSIS — I48.0 PAROXYSMAL ATRIAL FIBRILLATION (H): ICD-10-CM

## 2023-05-04 DIAGNOSIS — Z95.2 H/O AORTIC VALVE REPLACEMENT: ICD-10-CM

## 2023-05-04 DIAGNOSIS — Z95.2 HEART VALVE REPLACED: Primary | ICD-10-CM

## 2023-05-04 LAB — INR BLD: 2.7 (ref 0.9–1.1)

## 2023-05-04 PROCEDURE — 36416 COLLJ CAPILLARY BLOOD SPEC: CPT

## 2023-05-04 PROCEDURE — 85610 PROTHROMBIN TIME: CPT

## 2023-05-04 RX ORDER — BUPROPION HYDROCHLORIDE 150 MG/1
150 TABLET ORAL DAILY
Qty: 90 TABLET | Refills: 3 | Status: SHIPPED | OUTPATIENT
Start: 2023-05-04 | End: 2024-05-21

## 2023-05-04 NOTE — TELEPHONE ENCOUNTER
"Routing refill request to provider for review/approval because:  PHQ-9 score needs review    Last Written Prescription Date:  5/8/2022  Last Fill Quantity: 90,  # refills: 3   Last office visit provider:  10/31/2022     Requested Prescriptions   Pending Prescriptions Disp Refills     buPROPion (WELLBUTRIN XL) 150 MG 24 hr tablet 90 tablet 3     Sig: Take 1 tablet (150 mg) by mouth daily       SSRIs Protocol Failed - 5/4/2023  2:05 PM        Failed - PHQ-9 score less than 5 in past 6 months     Please review last PHQ-9 score.           Passed - Medication is Bupropion     If the medication is Bupropion (Wellbutrin), and the patient is taking for smoking cessation; OK to refill.          Passed - Medication is active on med list        Passed - Patient is age 18 or older        Passed - No active pregnancy on record        Passed - No positive pregnancy test in last 12 months        Passed - Recent (6 mo) or future (30 days) visit within the authorizing provider's specialty     Patient had office visit in the last 6 months or has a visit in the next 30 days with authorizing provider or within the authorizing provider's specialty.  See \"Patient Info\" tab in inbasket, or \"Choose Columns\" in Meds & Orders section of the refill encounter.                 COCO MARISCAL RN 05/04/23 2:06 PM  "

## 2023-05-04 NOTE — PROGRESS NOTES
ANTICOAGULATION MANAGEMENT     Eileen Donald 67 year old female is on warfarin with therapeutic INR result. (Goal INR 2.5-3.5)    Recent labs: (last 7 days)     05/04/23  1445   INR 2.7*       ASSESSMENT     Warfarin Lab Questionnaire    Warfarin Doses Last 7 Days      5/1/2023     1:25 AM   Dose in Tablet or Mg   TAB or MG? - warfarin 2.5mg tablets (evenings). milligram (mg)     Pt Rptd Dose SUNDAY MONDAY TUESDAY WED THURS FRIDAY SATURDAY 5/1/2023   1:25 AM 2.5 2.5 5 2.5 2.5 2.5 5         5/1/2023   Warfarin Lab Questionnaire   Missed doses within past 14 days? No   Changes in diet or alcohol within past 14 days? No   Medication changes since last result? No - Will update anticoagulation calendar on how patient is taking warfarin.   Injuries or illness since last result? No   New shortness of breath, severe headaches or sudden changes in vision since last result? No   Abnormal bleeding since last result? No   Upcoming surgery, procedure? No        Previous result: Therapeutic last 3 visits.    Additional findings: None       PLAN     Recommended plan for no diet, medication or health factor changes affecting INR     Dosing Instructions: Continue your current warfarin dose with next INR in 4 weeks       Summary  As of 5/4/2023    Full warfarin instructions:  5 mg every Tue, Sat; 2.5 mg all other days   Next INR check:  6/1/2023             Detailed voice message left for  Marilee ([phone]) with dosing instructions and follow up date.     Contact [phone] to schedule and with any changes, questions or concerns.     Education provided:     Please call back if any changes to your diet, medications or how you've been taking warfarin    Taking warfarin: Importance of taking warfarin as instructed    Goal range and lab monitoring: goal range and significance of current result    Dietary considerations: importance of consistent vitamin K intake    Plan made per [facility] anticoagulation protocol    Alondra SEAMAN  [facility]  5/4/2023    _______________________________________________________________________     Anticoagulation Episode Summary     Current INR goal:  2.5-3.5   TTR:  48.8 % (1 y)   Target end date:  Indefinite   Send INR reminders to:  [facility]    Indications    Heart valve replaced [Z95.2]  Paroxysmal atrial fibrillation (H) [I48.0]  S/P AVR (aortic valve replacement) [Z95.2]  Other pulmonary embolism without acute cor pulmonale  unspecified chronicity (H) (Resolved) [I26.99]  H/O aortic valve replacement [Z95.2]  Long term (current) use of anticoagulants [Z79.01]  Pulmonary embolism  other  unspecified chronicity  unspecified whether acute cor pulmonale present (H) [I26.99]           Comments:  12/15/21 - amended INR goal to 2.5 - 3.5         Anticoagulation Care Providers     Provider Role Specialty Phone number    Tito Salazar MD Referring Family Medicine [phone]    Drew Hahn MD Referring Family Medicine [phone]

## 2023-05-14 ASSESSMENT — ASTHMA QUESTIONNAIRES
QUESTION_5 LAST FOUR WEEKS HOW WOULD YOU RATE YOUR ASTHMA CONTROL: WELL CONTROLLED
QUESTION_2 LAST FOUR WEEKS HOW OFTEN HAVE YOU HAD SHORTNESS OF BREATH: ONCE OR TWICE A WEEK
ACT_TOTALSCORE: 22
ACT_TOTALSCORE: 22
QUESTION_4 LAST FOUR WEEKS HOW OFTEN HAVE YOU USED YOUR RESCUE INHALER OR NEBULIZER MEDICATION (SUCH AS ALBUTEROL): ONCE A WEEK OR LESS
QUESTION_1 LAST FOUR WEEKS HOW MUCH OF THE TIME DID YOUR ASTHMA KEEP YOU FROM GETTING AS MUCH DONE AT WORK, SCHOOL OR AT HOME: NONE OF THE TIME
QUESTION_3 LAST FOUR WEEKS HOW OFTEN DID YOUR ASTHMA SYMPTOMS (WHEEZING, COUGHING, SHORTNESS OF BREATH, CHEST TIGHTNESS OR PAIN) WAKE YOU UP AT NIGHT OR EARLIER THAN USUAL IN THE MORNING: NOT AT ALL

## 2023-05-15 ENCOUNTER — OFFICE VISIT (OUTPATIENT)
Dept: FAMILY MEDICINE | Facility: CLINIC | Age: 68
End: 2023-05-15
Payer: COMMERCIAL

## 2023-05-15 ENCOUNTER — MYC MEDICAL ADVICE (OUTPATIENT)
Dept: FAMILY MEDICINE | Facility: CLINIC | Age: 68
End: 2023-05-15

## 2023-05-15 VITALS
HEIGHT: 70 IN | DIASTOLIC BLOOD PRESSURE: 71 MMHG | SYSTOLIC BLOOD PRESSURE: 108 MMHG | WEIGHT: 251.4 LBS | OXYGEN SATURATION: 95 % | BODY MASS INDEX: 35.99 KG/M2 | RESPIRATION RATE: 22 BRPM | HEART RATE: 85 BPM

## 2023-05-15 DIAGNOSIS — E11.9 TYPE 2 DIABETES MELLITUS WITHOUT COMPLICATION, WITHOUT LONG-TERM CURRENT USE OF INSULIN (H): ICD-10-CM

## 2023-05-15 DIAGNOSIS — F33.41 RECURRENT MAJOR DEPRESSIVE DISORDER, IN PARTIAL REMISSION (H): ICD-10-CM

## 2023-05-15 DIAGNOSIS — G35 RELAPSING REMITTING MULTIPLE SCLEROSIS (H): ICD-10-CM

## 2023-05-15 DIAGNOSIS — Z12.31 VISIT FOR SCREENING MAMMOGRAM: ICD-10-CM

## 2023-05-15 DIAGNOSIS — I48.0 PAROXYSMAL ATRIAL FIBRILLATION (H): ICD-10-CM

## 2023-05-15 DIAGNOSIS — Z79.01 LONG TERM (CURRENT) USE OF ANTICOAGULANTS: ICD-10-CM

## 2023-05-15 DIAGNOSIS — Z95.2 H/O AORTIC VALVE REPLACEMENT: ICD-10-CM

## 2023-05-15 DIAGNOSIS — Z00.00 ENCOUNTER FOR MEDICARE ANNUAL WELLNESS EXAM: Primary | ICD-10-CM

## 2023-05-15 DIAGNOSIS — E66.01 MORBID OBESITY (H): ICD-10-CM

## 2023-05-15 DIAGNOSIS — R13.10 DYSPHAGIA, UNSPECIFIED TYPE: ICD-10-CM

## 2023-05-15 DIAGNOSIS — J96.11 CHRONIC RESPIRATORY FAILURE WITH HYPOXIA (H): Primary | ICD-10-CM

## 2023-05-15 DIAGNOSIS — E11.69 TYPE 2 DIABETES MELLITUS WITH OTHER SPECIFIED COMPLICATION, UNSPECIFIED WHETHER LONG TERM INSULIN USE (H): ICD-10-CM

## 2023-05-15 DIAGNOSIS — I26.99 PULMONARY EMBOLISM, OTHER, UNSPECIFIED CHRONICITY, UNSPECIFIED WHETHER ACUTE COR PULMONALE PRESENT (H): ICD-10-CM

## 2023-05-15 DIAGNOSIS — Z95.2 S/P AVR (AORTIC VALVE REPLACEMENT): ICD-10-CM

## 2023-05-15 DIAGNOSIS — R06.09 DYSPNEA ON EXERTION: ICD-10-CM

## 2023-05-15 DIAGNOSIS — I27.20 PULMONARY HYPERTENSION (H): ICD-10-CM

## 2023-05-15 PROCEDURE — 80061 LIPID PANEL: CPT | Performed by: FAMILY MEDICINE

## 2023-05-15 PROCEDURE — 36415 COLL VENOUS BLD VENIPUNCTURE: CPT | Performed by: FAMILY MEDICINE

## 2023-05-15 PROCEDURE — 99214 OFFICE O/P EST MOD 30 MIN: CPT | Mod: 25 | Performed by: FAMILY MEDICINE

## 2023-05-15 PROCEDURE — G0402 INITIAL PREVENTIVE EXAM: HCPCS | Performed by: FAMILY MEDICINE

## 2023-05-15 RX ORDER — AMMONIUM LACTATE 12 G/100G
LOTION TOPICAL
COMMUNITY
Start: 2023-05-12 | End: 2023-05-18

## 2023-05-15 ASSESSMENT — PATIENT HEALTH QUESTIONNAIRE - PHQ9
SUM OF ALL RESPONSES TO PHQ QUESTIONS 1-9: 5
SUM OF ALL RESPONSES TO PHQ QUESTIONS 1-9: 5
10. IF YOU CHECKED OFF ANY PROBLEMS, HOW DIFFICULT HAVE THESE PROBLEMS MADE IT FOR YOU TO DO YOUR WORK, TAKE CARE OF THINGS AT HOME, OR GET ALONG WITH OTHER PEOPLE: EXTREMELY DIFFICULT

## 2023-05-15 NOTE — LETTER
My Asthma Action Plan    Name: Eileen Donald   YOB: 1955  Date: 5/15/2023   My doctor: Drew Hahn MD   My clinic: [facility]        My Control Medicine: COMBINATION INHALED CORTICOSTEROIDS/LONG-ACTING BETA  AGONIST  Umeclidinium (Incruse Ellipta) 6.25 mcg daily  LEUKOTRIENE RECEPTOR ANTAGONISTS/5 LIPOXYGENASE INHIBITORS  My Rescue Medicine: Albuterol (Proair/Ventolin/Proventil HFA) 2-4 puffs EVERY 4 HOURS as needed. Use a spacer if recommended by your provider.   My Asthma Severity:   Mild Persistent  Know your asthma triggers:  Multifactorial               GREEN ZONE   Good Control  I feel good  No cough or wheeze  Can work, sleep and play without asthma symptoms       Take your asthma control medicine every day.     If exercise triggers your asthma, take your rescue medication  15 minutes before exercise or sports, and  During exercise if you have asthma symptoms  Spacer to use with inhaler: If you have a spacer, make sure to use it with your inhaler             YELLOW ZONE Getting Worse  I have ANY of these:  I do not feel good  Cough or wheeze  Chest feels tight  Wake up at night   Keep taking your Green Zone medications  Start taking your rescue medicine:  every 20 minutes for up to 1 hour. Then every 4 hours for 24-48 hours.  If you stay in the Yellow Zone for more than 12-24 hours, contact your doctor.  If you do not return to the Green Zone in 12-24 hours or you get worse, start taking your oral steroid medicine if prescribed by your provider.           RED ZONE Medical Alert - Get Help  I have ANY of these:  I feel awful  Medicine is not helping  Breathing getting harder  Trouble walking or talking  Nose opens wide to breathe       Take your rescue medicine NOW  If your provider has prescribed an oral steroid medicine, start taking it NOW  Call your doctor NOW  If you are still in the Red Zone after 20 minutes and you have not reached your  doctor:  Take your rescue medicine again and  Call 911 or go to the emergency room right away    See your regular doctor within 2 weeks of an Emergency Room or Urgent Care visit for follow-up treatment.          Annual Reminders:  Meet with Asthma Educator,  Flu Shot in the Fall, consider Pneumonia Vaccination for patients with asthma (aged 19 and older).    Pharmacy: [id number] [address] AT [facility] [place] [address]    Electronically signed by Drew Hahn MD   Date: 05/15/23                      Asthma Triggers  How To Control Things That Make Your Asthma Worse    Triggers are things that make your asthma worse.  Look at the list below to help you find your triggers and what you can do about them.  You can help prevent asthma flare-ups by staying away from your triggers.      Trigger                                                          What you can do   Cigarette Smoke  Tobacco smoke can make asthma worse. Do not allow smoking in your home, car or around you.  Be sure no one smokes at a child s day care or school.  If you smoke, ask your health care provider for ways to help you quit.  Ask family members to quit too.  Ask your health care provider for a referral to Quit Plan to help you quit smoking, or call 4-408-404-PLAN.     Colds, Flu, Bronchitis  These are common triggers of asthma. Wash your hands often.  Don t touch your eyes, nose or mouth.  Get a flu shot every year.     Dust Mites  These are tiny bugs that live in cloth or carpet. They are too small to see. Wash sheets and blankets in hot water every week.   Encase pillows and mattress in dust mite proof covers.  Avoid having carpet if you can. If you have carpet, vacuum weekly.   Use a dust mask and HEPA vacuum.   Pollen and Outdoor Mold  Some people are allergic to trees, grass, or weed pollen, or molds. Try to keep your windows closed.  Limit time out doors when pollen count is high.   Ask you health care  provider about taking medicine during allergy season.     Animal Dander  Some people are allergic to skin flakes, urine or saliva from pets with fur or feathers. Keep pets with fur or feathers out of your home.    If you can t keep the pet outdoors, then keep the pet out of your bedroom.  Keep the bedroom door closed.  Keep pets off cloth furniture and away from stuffed toys.     Mice, Rats, and Cockroaches   Some people are allergic to the waste from these pests.   Cover food and garbage.  Clean up spills and food crumbs.  Store grease in the refrigerator.   Keep food out of the bedroom.   Indoor Mold  This can be a trigger if your home has high moisture. Fix leaking faucets, pipes, or other sources of water.   Clean moldy surfaces.  Dehumidify basement if it is damp and smelly.   Smoke, Strong Odors, and Sprays  These can reduce air quality. Stay away from strong odors and sprays, such as perfume, powder, hair spray, paints, smoke incense, paint, cleaning products, candles and new carpet.   Exercise or Sports  Some people with asthma have this trigger. Be active!  Ask your doctor about taking medicine before sports or exercise to prevent symptoms.    Warm up for 5-10 minutes before and after sports or exercise.     Other Triggers of Asthma  Cold air:  Cover your nose and mouth with a scarf.  Sometimes laughing or crying can be a trigger.  Some medicines and food can trigger asthma.

## 2023-05-15 NOTE — PATIENT INSTRUCTIONS
"  Patient Education   Personalized Prevention Plan  You are due for the preventive services outlined below.  Your care team is available to assist you in scheduling these services.  If you have already completed any of these items, please share that information with your care team to update in your medical record.  Health Maintenance Due   Topic Date Due     Asthma Action Plan - yearly  Never done     Discuss Advance Care Planning  Never done     Annual Wellness Visit  02/01/2022     Kidney Microalbumin Urine Test  01/27/2023     COVID-19 Vaccine (6 - Pfizer series) 02/28/2023     Cholesterol Lab  04/07/2023     ANNUAL REVIEW OF HM ORDERS  04/07/2023     Mammogram  06/07/2023       Depression and Suicide in Older Adults  Nearly 2 million older adults in the [place] have some type of depression. Some of them even take their own lives. Yet depression among older adults is often ignored. Learning about the warning signs of depression may help spare a loved one needless pain. You may also save a life.   What is depression?  Depression is a common and serious illness. It affects the way you think and feel. It is not a normal part of aging. It is not a sign of weakness, a character flaw, or something you can \"snap out of.\" Most people with depression need treatment to get better. The most common symptom is a feeling of deep sadness. People who are depressed also may seem tired and listless. And nothing seems to give them pleasure. It s normal to grieve or be sad sometimes. But sadness lessens or passes with time. Depression rarely goes away or improves on its own. Other symptoms of depression are:     Sleeping more or less than normal    Eating more or less than normal    Having headaches, stomachaches, or other pains that don t go away    Feeling nervous,  empty,  or worthless    Crying a lot    Thinking or talking about suicide or death    Loss of interest in activities previously enjoyed    Social isolation    Feeling " confused or forgetful  What causes it?  The causes of depression aren t fully known. But it is thought to result from a complex blend of these factors:     Biochemistry. Certain chemicals in the brain play a role.    Genes. Depression does run in families.    Life stress. Life stresses can also trigger depression in some people. Older adults often face many stressors. These may include isolation, the death of friends or a spouse, health problems, and financial concerns.    Chronic health conditions. This includes diabetes, heart disease, or cancer. These can cause symptoms of depression. Medicine side effects can cause changes in thoughts and behaviors.  Giving support    Depressed people often may not want to ask for help. When they do, they may be ignored. Or they may get the wrong treatment. You can help by showing parents and older friends love and support. If they seem depressed, don t lecture the person or ignore the symptoms. Don't discount the symptoms as a  normal  part of aging. They are not. Get involved, listen, and show interest and support.   Help them understand that depression is a treatable illness. Tell them you can help them find the right treatment. Offer to go to their healthcare provider's appointment with them for support when the symptoms are discussed. With their approval, contact a local mental health center, social service agency, or hospital about services.   Helping at healthcare visits  You can be an advocate for them at healthcare appointments. Many older adults have chronic illnesses. Many of these can cause symptoms of depression. Medicine side effects can change thoughts and behaviors.   You can help make sure that the healthcare provider looks at all of these factors. They should refer your family member or friend to a mental healthcare provider when needed. In some cases, untreated depression can lead to a misdiagnosis. A person may be diagnosed with a brain disorder such as  dementia. If the healthcare provider does not take the issue of depression seriously, help your family member or friend find another provider.   Asking about self-harm thoughts  If you think an older person you care about could be suicidal, ask,  Have you thought about suicide?  Most people will tell you the truth. If they say yes, they may already have a plan for how and when they will attempt it. Find out as much as you can. The more detailed the plan, and the easier it is to carry out, the more danger the person is in right now. Tell the person you are there for them and you do not want them to get harmed. Don't wait to get help for the person. Call the person's healthcare provider, local hospital, or emergency services.   Call 988 in a crisis   Never leave the person alone. A person who is actively suicidal needs crisis care right away. They need constant supervision. Never leave the person out of sight. Call 988 Tell the crisis counselor you need help for a person who is thinking about suicide. The counselor will help you get the right level of crisis help. You may be advised to take the person to the nearest emergency room.   The National Suicide Prevention Lifeline is available at 988, or 800-273-talk (959.182.7048), or www.suicidepreventionlifeline.org. When you call or text 988, you will be connected to trained counselors who are part of the National Suicide Prevention Lifeline network. An online chat option is also available. Lifeline is free and available 24/7.   To learn more    National Suicide Prevention Lifeline at www.suicidepreventionlifeline.org  or 513-644-IJNL (118-080-7410)    National [place] on Mental Illness at www.[facility].org  or 809-741-HZIQ (609-628-2203)    M[facility] at www.nmha.org  or 637-787-5048    National [place] of Mental Health at www.nimh.nih.gov  or 437-469-3108    [place] last reviewed this educational content on 7/1/2022 2000-2022 [employer]. All  "rights reserved. This information is not intended as a substitute for professional medical care. Always follow your healthcare professional's instructions.           Patient Education   Personalized Prevention Plan  You are due for the preventive services outlined below.  Your care team is available to assist you in scheduling these services.  If you have already completed any of these items, please share that information with your care team to update in your medical record.  Health Maintenance Due   Topic Date Due     Annual Wellness Visit  02/01/2022     Kidney Microalbumin Urine Test  01/27/2023     COVID-19 Vaccine (6 - Pfizer series) 02/28/2023     Cholesterol Lab  04/07/2023     Mammogram  06/07/2023       Depression and Suicide in Older Adults  Nearly 2 million older adults in the [place] have some type of depression. Some of them even take their own lives. Yet depression among older adults is often ignored. Learning about the warning signs of depression may help spare a loved one needless pain. You may also save a life.   What is depression?  Depression is a common and serious illness. It affects the way you think and feel. It is not a normal part of aging. It is not a sign of weakness, a character flaw, or something you can \"snap out of.\" Most people with depression need treatment to get better. The most common symptom is a feeling of deep sadness. People who are depressed also may seem tired and listless. And nothing seems to give them pleasure. It s normal to grieve or be sad sometimes. But sadness lessens or passes with time. Depression rarely goes away or improves on its own. Other symptoms of depression are:     Sleeping more or less than normal    Eating more or less than normal    Having headaches, stomachaches, or other pains that don t go away    Feeling nervous,  empty,  or worthless    Crying a lot    Thinking or talking about suicide or death    Loss of interest in activities previously " enjoyed    Social isolation    Feeling confused or forgetful  What causes it?  The causes of depression aren t fully known. But it is thought to result from a complex blend of these factors:     Biochemistry. Certain chemicals in the brain play a role.    Genes. Depression does run in families.    Life stress. Life stresses can also trigger depression in some people. Older adults often face many stressors. These may include isolation, the death of friends or a spouse, health problems, and financial concerns.    Chronic health conditions. This includes diabetes, heart disease, or cancer. These can cause symptoms of depression. Medicine side effects can cause changes in thoughts and behaviors.  Giving support    Depressed people often may not want to ask for help. When they do, they may be ignored. Or they may get the wrong treatment. You can help by showing parents and older friends love and support. If they seem depressed, don t lecture the person or ignore the symptoms. Don't discount the symptoms as a  normal  part of aging. They are not. Get involved, listen, and show interest and support.   Help them understand that depression is a treatable illness. Tell them you can help them find the right treatment. Offer to go to their healthcare provider's appointment with them for support when the symptoms are discussed. With their approval, contact a local mental health center, social service agency, or hospital about services.   Helping at healthcare visits  You can be an advocate for them at healthcare appointments. Many older adults have chronic illnesses. Many of these can cause symptoms of depression. Medicine side effects can change thoughts and behaviors.   You can help make sure that the healthcare provider looks at all of these factors. They should refer your family member or friend to a mental healthcare provider when needed. In some cases, untreated depression can lead to a misdiagnosis. A person may be  diagnosed with a brain disorder such as dementia. If the healthcare provider does not take the issue of depression seriously, help your family member or friend find another provider.   Asking about self-harm thoughts  If you think an older person you care about could be suicidal, ask,  Have you thought about suicide?  Most people will tell you the truth. If they say yes, they may already have a plan for how and when they will attempt it. Find out as much as you can. The more detailed the plan, and the easier it is to carry out, the more danger the person is in right now. Tell the person you are there for them and you do not want them to get harmed. Don't wait to get help for the person. Call the person's healthcare provider, local hospital, or emergency services.   Call 988 in a crisis   Never leave the person alone. A person who is actively suicidal needs crisis care right away. They need constant supervision. Never leave the person out of sight. Call 988 Tell the crisis counselor you need help for a person who is thinking about suicide. The counselor will help you get the right level of crisis help. You may be advised to take the person to the nearest emergency room.   The National Suicide Prevention Lifeline is available at 988, or 234-740-ENJA (596-662-4876), or www.suicidepreventionlifeline.org. When you call or text 988, you will be connected to trained counselors who are part of the National Suicide Prevention Lifeline network. An online chat option is also available. Lifeline is free and available 24/7.   To learn more    National Suicide Prevention Lifeline at www.suicidepreventionlifeline.org  or 550-053-HJJB (147-516-6923)    National [place] on Mental Illness at www.[facility]a.org  or 095-403-EYTL (981-131-4699)    Mental [facility] at www.nmha.org  or 789-403-8126    National [place] of Mental Health at www.nimh.nih.gov  or 015-942-8572    Floyd last reviewed this educational content on 7/1/2022     8686-3828 [employer]. All rights reserved. This information is not intended as a substitute for professional medical care. Always follow your healthcare professional's instructions.          Preventing Falls at Home  A person can fall for many reasons. Older adults may fall because reaction time slows down as we age. Your muscles and joints may get stiff, weak, or less flexible because of illness, medicines, or a physical condition.   Other health problems that make falls more likely include:     Arthritis    Dizziness or lightheadedness when you stand up (orthostatic hypotension)    History of a stroke    Dizziness    Anemia    Certain medicines taken for mental illness or to control blood pressure.    Problems with balance or gait    Bladder or urinary problems    History of falling    Changes in vision (vision impairment)    Changes in thinking skills and memory (cognitive impairment)  Injuries from a fall can include serious injuries such as broken bones, dislocated joints, internal bleeding and cuts. Injuries like these can limit your independence.   Prevention tips  To help prevent falls and fall-related injuries, follow the tips below.    Floors  To make floors safer:     Put nonskid pads under area rugs.    Remove small rugs.    Replace worn floor coverings.    Tack carpets firmly to each step on carpeted stairs. Put nonskid strips on the edges of uncarpeted stairs.    Keep floors and stairs free of clutter and cords.    Arrange furniture so there are clear pathways.    Clean up any spills right away.  Bathrooms    To make bathrooms safer:     Install grab bars in the tub or shower.    Apply nonskid strips or put a nonskid rubber mat in the tub or shower.    Sit on a bath chair to bathe.    Use bathmats with nonskid backing.  Lighting  To improve visibility in your home:      Keep a flashlight in each room. Or put a lamp next to the bed within easy reach.    Put nightlights in the bedrooms,  hallways, kitchen, and bathrooms.    Make sure all stairways have good lighting.    Take your time when going up and down stairs.    Put handrails on both sides of stairs and in walkways for more support. To prevent injury to your wrist or arm, don t use handrails to pull yourself up.    Install grab bars to pull yourself up.    Move or rearrange items that you use often. This will make them easier to find or reach.    Look at your home to find any safety hazards. Especially look at doorways, walkways, and the driveway. Remove or repair any safety problems that you find.  Other changes to make    Look around to find any safety hazards. Look closely at doorways, walkways, and the driveway. Remove or repair any safety problems that you find.    Wear shoes that fit well.    Take your time when going up and down stairs.    Put handrails on both sides of stairs and in walkways for more support. To prevent injury to your wrist or arm, don t use handrails to pull yourself up.    Install grab bars wherever needed to pull yourself up.    Arrange items that you use often. This will make them easier to find or reach.    [place] last reviewed this educational content on 3/1/2020    7608-3807 [employer]. All rights reserved. This information is not intended as a substitute for professional medical care. Always follow your healthcare professional's instructions.

## 2023-05-15 NOTE — PROGRESS NOTES
"  {PROVIDER CHARTING PREFERENCE:879394}    William Hunt is a 67 year old, presenting for the following health issues:  RECHECK (Follow up- discuss meds )        5/15/2023     2:38 PM   Additional Questions   Roomed by Odalis Salas CMA   Accompanied by N/A     History of Present Illness       Reason for visit:  N0t sure if for physical or follow up appointment    She eats 0-1 servings of fruits and vegetables daily.She consumes 1 sweetened beverage(s) daily.She exercises with enough effort to increase her heart rate 9 or less minutes per day.  She exercises with enough effort to increase her heart rate 3 or less days per week.   She is taking medications regularly.    Today's PHQ-9         PHQ-9 Total Score: 5    PHQ-9 Q9 Thoughts of better off dead/self-harm past 2 weeks :   Not at all    How difficult have these problems made it for you to do your work, take care of things at home, or get along with other people: Extremely difficult       {SUPERLIST (Optional):956405}  {additonal problems for provider to add (Optional):987690}      Review of Systems   {ROS COMP (Optional):227129}      Objective    /71 (BP Location: Left arm, Patient Position: Sitting, Cuff Size: Adult Large)   Pulse 85   Resp 22   Ht 1.778 m (5' 10\")   Wt 114 kg (251 lb 6.4 oz)   SpO2 95%   BMI 36.07 kg/m    Body mass index is 36.07 kg/m .  Physical Exam   {Exam List (Optional):727974}    {Diagnostic Test Results (Optional):896955}    {AMBULATORY ATTESTATION (Optional):106542}            "

## 2023-05-15 NOTE — PROGRESS NOTES
ASSESSMENT / PLAN:       ICD-10-CM    1. Encounter for Medicare annual wellness exam  Z00.00       2. Type 2 diabetes mellitus with other specified complication, unspecified whether long term insulin use (H)  E11.69 Lipid panel reflex to direct LDL Non-fasting     Lipid panel reflex to direct LDL Non-fasting      3. Visit for screening mammogram  Z12.31 MA SCREENING DIGITAL BILAT - Future  (s+30)      4. H/O aortic valve replacement  Z95.2       5. Type 2 diabetes mellitus without complication, without long-term current use of insulin (H)  E11.9 PRIMARY CARE FOLLOW-UP SCHEDULING      6. Dysphagia, unspecified type  R13.10 esomeprazole (NEXIUM) 20 MG DR capsule      7. Relapsing remitting multiple sclerosis (H)  G35       8. Pulmonary hypertension (H)  I27.20 Adult Pulmonary Medicine Referral      9. Recurrent major depressive disorder, in partial remission (H)  F33.41       10. Paroxysmal atrial fibrillation (H)  I48.0       11. Morbid obesity (H)  E66.01         Patient is here today for annual wellness visit.  Following issues addressed  1: Type 2 diabetes: This is stable.  However patient was reading about side effect of metformin and has not been using it consistently.  Encouraged that she keep up and that it is recommended medication.  2: Patient has dysphagia from her MS and has been on Nexium and would like refill.  This is provided  3: History of aortic valve replacement.:  Follows with cardiology at [facility].  Care everywhere reviewed.  Stable and no acute concerns.  4: Relapsing remitting multiple sclerosis: On Ocrevus therapy.  This is stable.  5: Pulmonary hypertension: No acute concerns.  Patient in the past has followed with a pulmonologist.  The recommendation was to follow-up in 1 year.  Referral to pulm neurologist is done.  Encouraged to use her inhalers.  It appears that she is only using ProAir.  Advised that she use her Incruse 2.  6: Recurrent major depression in the partial remission: Continue  "medication.  This is stable.  7: Morbid obesity complicated with type 2 diabetes.  Patient has had some successful weight loss and now this has plateaued.  Likely from stopping metformin.  This is discussed with the patient.  8: Paroxysmal atrial fibrillation: On Coumadin.  Stable.    Reviewed her compliance with the medication and encouraged to take them regularly.  Patient verbalizes understanding.      COUNSELING:  Reviewed preventive health counseling, as reflected in patient instructions       Regular exercise       Healthy diet/nutrition      BMI:   Estimated body mass index is 36.07 kg/m  as calculated from the following:    Height as of this encounter: 1.778 m (5' 10\").    Weight as of this encounter: 114 kg (251 lb 6.4 oz).   Weight management plan: Discussed healthy diet and exercise guidelines      She reports that she has never smoked. She has never used smokeless tobacco.      Appropriate preventive services were discussed with this patient, including applicable screening as appropriate for cardiovascular disease, diabetes, osteopenia/osteoporosis, and glaucoma.  As appropriate for age/gender, discussed screening for colorectal cancer, prostate cancer, breast cancer, and cervical cancer. Checklist reviewing preventive services available has been given to the patient.    Reviewed patients plan of care and provided an AVS. The Intermediate Care Plan ( asthma action plan, low back pain action plan, and migraine action plan) for Eileen meets the Care Plan requirement. This Care Plan has been established and reviewed with the Patient.        SUBJECTIVE:   Marilee is a 67 year old who presents for Preventive Visit.      5/15/2023     2:38 PM   Additional Questions   Roomed by Odalis Salas CMA   Accompanied by N/A   Patient has been advised of split billing requirements and indicates understanding: Yes  Are you in the first 12 months of your Medicare coverage?  No    History of Present Illness       Reason " for visit:  N0t sure if for physical or follow up appointment    She eats 0-1 servings of fruits and vegetables daily.She consumes 1 sweetened beverage(s) daily.She exercises with enough effort to increase her heart rate 9 or less minutes per day.  She exercises with enough effort to increase her heart rate 3 or less days per week.   She is taking medications regularly.    Today's PHQ-9         PHQ-9 Total Score: 5    PHQ-9 Q9 Thoughts of better off dead/self-harm past 2 weeks :   Not at all    How difficult have these problems made it for you to do your work, take care of things at home, or get along with other people: Extremely difficult      Have you ever done Advance Care Planning? (For example, a Health Directive, POLST, or a discussion with a medical provider or your loved ones about your wishes): Yes, patient states has an Advance Care Planning document and will bring a copy to the clinic.       Fall risk  Fallen 2 or more times in the past year?: Yes  Any fall with injury in the past year?: No  Timed Up and Go Test (>13.5 is fall risk; contact physician) :  (Pt is mainly wheelchair bound.)    Cognitive Screening   1) Repeat 3 items (Leader, Season, Table)    2) Clock draw: NORMAL  3) 3 item recall: Recalls 3 objects  Results: 3 items recalled: COGNITIVE IMPAIRMENT LESS LIKELY    Mini-CogTM Copyright S Raven. Licensed by the author for use in [facility]; reprinted with permission ([email or web address]@.[email or web address]). All rights reserved.      Do you have sleep apnea, excessive snoring or daytime drowsiness?: no    Reviewed and updated as needed this visit by clinical staff   Tobacco  Allergies  Meds   Med Hx  Surg Hx           Reviewed and updated as needed this visit by Provider   Tobacco     Med Hx  Surg Hx          Social History     Tobacco Use     Smoking status: Never     Smokeless tobacco: Never   Vaping Use     Vaping status: Not on file   Substance Use Topics     Alcohol use: No              12/14/2022     8:57 PM   Alcohol Use   Prescreen: >3 drinks/day or >7 drinks/week? No          View : No data to display.              Do you have a current opioid prescription? No  Do you use any other controlled substances or medications that are not prescribed by a provider? None    Current providers sharing in care for this patient include:   Patient Care Team:  Drew Hahn MD as PCP - General (Family Medicine)  Drew Hahn MD as Assigned PCP  Ravi Patel DO as Assigned Musculoskeletal Provider    The following health maintenance items are reviewed in Epic and correct as of today:  Health Maintenance   Topic Date Due     ASTHMA ACTION PLAN  Never done     MEDICARE ANNUAL WELLNESS VISIT  02/01/2022     MICROALBUMIN  01/27/2023     COVID-19 Vaccine (6 - Pfizer series) 02/28/2023     LIPID  04/07/2023     MAMMO SCREENING  06/07/2023     A1C  06/13/2023     BMP  10/31/2023     DIABETIC FOOT EXAM  10/31/2023     ASTHMA CONTROL TEST  11/15/2023     PHQ-9  11/15/2023     EYE EXAM  04/07/2024     ANNUAL REVIEW OF HM ORDERS  05/15/2024     FALL RISK ASSESSMENT  05/15/2024     COLORECTAL CANCER SCREENING  12/23/2024     Pneumococcal Vaccine: 65+ Years (3 - PPSV23 if available, else PCV20) 02/01/2026     DTAP/TDAP/TD IMMUNIZATION (5 - Td or Tdap) 09/13/2026     ADVANCE CARE PLANNING  05/15/2028     DEXA  04/26/2037     HEPATITIS C SCREENING  Completed     DEPRESSION ACTION PLAN  Completed     INFLUENZA VACCINE  Completed     ZOSTER IMMUNIZATION  Completed     IPV IMMUNIZATION  Aged Out     MENINGITIS IMMUNIZATION  Aged Out     PAP  Discontinued     BP Readings from Last 3 Encounters:   05/15/23 108/71   10/31/22 112/77   09/30/22 126/61    Wt Readings from Last 3 Encounters:   05/15/23 114 kg (251 lb 6.4 oz)   10/31/22 110 kg (242 lb 6.4 oz)   09/30/22 111.9 kg (246 lb 9.6 oz)                  Patient Active Problem List   Diagnosis     Depression     Asthma     Benign essential hypertension      Multiple Sclerosis     Hyperlipidemia     Impaired Glucose Tolerance Test     Dysphagia     Benign Adenomatous Polyp Of The Large Intestine     History of pulmonary embolism     Morbid obesity (H)     Generalized anxiety disorder     Primary osteoarthritis of both hands     Polyarthralgia     Aortic valve replaced     Controlled substance agreement signed     Heart valve replaced     Closed 3-part fracture of proximal humerus with delayed healing, left     Pulmonary hypertension (H)     Paroxysmal atrial fibrillation (H)     Dyslipidemia     Dyspnea on exertion     Hypertension     Postoperative anemia due to acute blood loss     Pre-diabetes     Stress hyperglycemia     Recurrent major depressive disorder, in partial remission (H)     Type 2 diabetes mellitus with other specified complication, unspecified whether long term insulin use (H)     Acute bacterial conjunctivitis of both eyes     Community acquired pneumonia, unspecified laterality     Osteoarthrosis     S/P AVR (aortic valve replacement)     Sleep apnea     H/O aortic valve replacement     Encounter for long-term methadone use     History of fall     Leg weakness, bilateral     Muscle spasm     Neurogenic bladder     Other symptoms and signs involving cognitive functions and awareness     Tremor     Unsteady gait     Relapsing remitting multiple sclerosis (H)     Elevated MCV     Hip pain, right     Long term (current) use of anticoagulants     Past Surgical History:   Procedure Laterality Date     aortic valve surgery       COLONOSCOPY N/A 12/23/2021    Procedure: COLONOSCOPY WITH POLYPECTOMIES;  Surgeon: Tito Suarez MD;  Location: [facility]     COLONOSCOPY W/ BIOPSIES N/A 3/22/2021    Procedure: COLONOSCOPY WITH BIOPSY AND POLYPECTOMY;  Surgeon: Sam Weems MD;  Location: [facility];  Service: Gastroenterology     IR LUMBAR EPIDURAL STEROID INJECTION  1/16/2007     MAMMOPLASTY REDUCTION  1994     OTHER SURGICAL HISTORY   01/24/2017    mechanical aortic prosthesis     RELEASE CARPAL TUNNEL       ZZC RECONSTR TOTAL SHOULDER IMPLANT Left 4/10/2019    Procedure: LEFT REVERSE TOTAL SHOULDER ARTHROPLASTY;  Surgeon: Luis Antonio Telles MD;  Location: [facility];  Service: Orthopedics       Social History     Tobacco Use     Smoking status: Never     Smokeless tobacco: Never   Vaping Use     Vaping status: Not on file   Substance Use Topics     Alcohol use: No     Family History   Problem Relation Age of Onset     Snoring Mother      Snoring Father      Sleep Apnea Sister      Coronary Artery Disease Father      Coronary Artery Disease Sister      Coronary Artery Disease Brother      Breast Cancer No family hx of          Current Outpatient Medications   Medication Sig Dispense Refill     acetaminophen (TYLENOL) 325 MG tablet Take 325-650 mg by mouth every 6 hours as needed        acyclovir (ZOVIRAX) 400 MG tablet Take 400 mg by mouth 2 times daily        albuterol (PROAIR HFA/PROVENTIL HFA/VENTOLIN HFA) 108 (90 Base) MCG/ACT inhaler Inhale 2 puffs into the lungs every 6 hours as needed        aspirin (ASA) 81 MG chewable tablet Take 81 mg by mouth daily        buPROPion (WELLBUTRIN XL) 150 MG 24 hr tablet Take 1 tablet (150 mg) by mouth daily 90 tablet 3     calcium carbonate 1250 (500 Ca) MG CHEW Take 1 tablet by mouth daily        diclofenac (VOLTAREN) 1 % topical gel Apply 4 g topically 4 times daily 350 g 0     enoxaparin ANTICOAGULANT (LOVENOX) 120 MG/0.8ML syringe Inject 0.8 mLs (120 mg) Subcutaneous every 12 hours Before and after procedure as directed 12.8 mL 0     esomeprazole (NEXIUM) 20 MG DR capsule Take 1 capsule (20 mg) by mouth every morning (before breakfast) 90 capsule 3     furosemide (LASIX) 40 MG tablet Take 1.5 tablets (60 mg) by mouth daily 135 tablet 2     INCRUSE ELLIPTA 62.5 MCG/INH Inhaler Inhale 1 puff into the lungs daily        loratadine (CLARITIN) 10 MG tablet Take 1 tablet (10 mg) by mouth daily 90  "tablet 3     LORazepam (ATIVAN) 0.5 MG tablet Take 1 tablet (0.5 mg) by mouth every 8 hours as needed for anxiety 20 tablet 0     magnesium oxide (MAG-OX) 400 MG tablet Take 400 mg by mouth daily        metFORMIN (GLUCOPHAGE XR) 500 MG 24 hr tablet Take 1 tablet (500 mg) by mouth daily 90 tablet 1     metoprolol tartrate (LOPRESSOR) 25 MG tablet Take 1 tablet (25 mg) by mouth 2 times daily 180 tablet 0     montelukast (SINGULAIR) 10 MG tablet Take 1 tablet (10 mg) by mouth At Bedtime 90 tablet 1     potassium chloride ER (MICRO-K) 10 MEQ CR capsule Take 10 mEq by mouth daily        simvastatin (ZOCOR) 20 MG tablet Take 1 tablet (20 mg) by mouth At Bedtime 90 tablet 1     venlafaxine (EFFEXOR XR) 75 MG 24 hr capsule TAKE 3 CAPSULES BY MOUTH EVERY DAY 90 capsule 2     warfarin ANTICOAGULANT (COUMADIN) 2.5 MG tablet Take 1 tab (2.5mg) to 2 tabs (5mg) by mouth daily, as directed.  Adjust dose based on INR results. 130 tablet 1     ammonium lactate (LAC-HYDRIN) 12 % external lotion                12/20/2021     3:01 PM   Breast CA Risk Assessment (FHS-7)   Do you have a family history of breast, colon, or ovarian cancer? No / Unknown         Mammogram Screening: Recommended mammography every 1-2 years with patient discussion and risk factor consideration  Pertinent mammograms are reviewed under the imaging tab.    Review of Systems  Constitutional, HEENT, cardiovascular, pulmonary, gi and gu systems are negative, except as otherwise noted.    OBJECTIVE:   /71 (BP Location: Left arm, Patient Position: Sitting, Cuff Size: Adult Large)   Pulse 85   Resp 22   Ht 1.778 m (5' 10\")   Wt 114 kg (251 lb 6.4 oz)   SpO2 95%   BMI 36.07 kg/m   Estimated body mass index is 36.07 kg/m  as calculated from the following:    Height as of this encounter: 1.778 m (5' 10\").    Weight as of this encounter: 114 kg (251 lb 6.4 oz).  Physical Exam  GENERAL: healthy, alert and no distress  NECK: no adenopathy, no asymmetry, masses, " or scars and thyroid normal to palpation  RESP: lungs clear to auscultation - no rales, rhonchi or wheezes  CV: regular rates and rhythm  MS: no gross musculoskeletal defects noted, no edema    Diagnostic Test Results:  Labs reviewed in Epic    Drew Hahn MD  [facility]    Identified Health Risks:              The patient's PHQ-9 score is consistent with mild depression.  Advised taking medication regularly.  She is at risk for falling and has been provided with information to reduce the risk of falling at home.      The patient's PHQ-9 score is consistent with mild depression. She was provided with information regarding depression and was advised to continue medications    Answers for HPI/ROS submitted by the patient on 5/15/2023    If you checked off any problems, how difficult have these problems made it for you to do your work, take care of things at home, or get along with other people?: Extremely difficult    PHQ9 TOTAL SCORE: 5    What is the reason for your visit today? : N0t sure if for physical or follow up appointment    How many servings of fruits and vegetables do you eat daily?: 0-1    On average, how many sweetened beverages do you drink each day (Examples: soda, juice, sweet tea, etc.  Do NOT count diet or artificially sweetened beverages)?: 1    How many minutes a day do you exercise enough to make your heart beat faster?: 9 or less    How many days a week do you exercise enough to make your heart beat faster?: 3 or less    How many days per week do you miss taking your medication?: 0

## 2023-05-15 NOTE — PROGRESS NOTES
The patient's PHQ-9 score is consistent with mild depression. She was provided with information regarding depression and was advised to continue medications  Answers for HPI/ROS submitted by the patient on 5/15/2023  If you checked off any problems, how difficult have these problems made it for you to do your work, take care of things at home, or get along with other people?: Extremely difficult  PHQ9 TOTAL SCORE: 5  What is the reason for your visit today? : N0t sure if for physical or follow up appointment  How many servings of fruits and vegetables do you eat daily?: 0-1  On average, how many sweetened beverages do you drink each day (Examples: soda, juice, sweet tea, etc.  Do NOT count diet or artificially sweetened beverages)?: 1  How many minutes a day do you exercise enough to make your heart beat faster?: 9 or less  How many days a week do you exercise enough to make your heart beat faster?: 3 or less  How many days per week do you miss taking your medication?: 0

## 2023-05-16 LAB
CHOLEST SERPL-MCNC: 213 MG/DL
HDLC SERPL-MCNC: 65 MG/DL
LDLC SERPL CALC-MCNC: 86 MG/DL
NONHDLC SERPL-MCNC: 148 MG/DL
TRIGL SERPL-MCNC: 308 MG/DL

## 2023-05-16 RX ORDER — WARFARIN SODIUM 2.5 MG/1
TABLET ORAL
Qty: 270 TABLET | Refills: 1 | Status: SHIPPED | OUTPATIENT
Start: 2023-05-16 | End: 2024-07-08

## 2023-05-16 NOTE — TELEPHONE ENCOUNTER
"Routing refill request to provider for review/approval because:  Labs out of range:  INR: 2.7 on 5/4/23    Last Written Prescription Date:  12/27/22  Last Fill Quantity: 130,  # refills: 1   Last office visit provider:  5/15/23, PCP     Requested Prescriptions   Pending Prescriptions Disp Refills     warfarin ANTICOAGULANT (COUMADIN) 2.5 MG tablet 270 tablet 1     Sig: Take 1 tab (2.5mg) to 2 tabs (5mg) by mouth daily, as directed.  Adjust dose based on INR results.       Vitamin K Antagonists Failed - 5/15/2023  5:40 PM        Failed - INR is within goal in the past 6 weeks     Confirm INR is within goal in the past 6 weeks.     Recent Labs   Lab Test 05/04/23  1445   INR 2.7*                       Passed - Recent (12 mo) or future (30 days) visit within the authorizing provider's specialty     Patient has had an office visit with the authorizing provider or a provider within the authorizing providers department within the previous 12 mos or has a future within next 30 days. See \"Patient Info\" tab in inbasket, or \"Choose Columns\" in Meds & Orders section of the refill encounter.              Passed - Medication is active on med list        Passed - Patient is 18 years of age or older        Passed - Patient is not pregnant        Passed - No positive pregnancy on file in past 12 months             Swati Steiner RN 05/16/23 8:56 AM  "

## 2023-05-18 ENCOUNTER — OFFICE VISIT (OUTPATIENT)
Dept: PULMONOLOGY | Facility: CLINIC | Age: 68
End: 2023-05-18
Payer: COMMERCIAL

## 2023-05-18 VITALS — SYSTOLIC BLOOD PRESSURE: 118 MMHG | HEART RATE: 70 BPM | OXYGEN SATURATION: 96 % | DIASTOLIC BLOOD PRESSURE: 70 MMHG

## 2023-05-18 DIAGNOSIS — I27.20 PULMONARY HYPERTENSION (H): ICD-10-CM

## 2023-05-18 DIAGNOSIS — J96.11 CHRONIC RESPIRATORY FAILURE WITH HYPOXIA (H): ICD-10-CM

## 2023-05-18 DIAGNOSIS — G35 RELAPSING REMITTING MULTIPLE SCLEROSIS (H): ICD-10-CM

## 2023-05-18 DIAGNOSIS — R06.09 DYSPNEA ON EXERTION: ICD-10-CM

## 2023-05-18 PROCEDURE — 99214 OFFICE O/P EST MOD 30 MIN: CPT | Performed by: INTERNAL MEDICINE

## 2023-05-18 NOTE — LETTER
5/18/2023         RE: Eileen Donald  [address] [place] [address]        Dear Colleague,    Thank you for referring your patient, Eileen Dnoald, to the [facility]. Please see a copy of my visit note below.    Pulmonary Clinic Outpatient Consultation    Assessment and Plan:   67F with a history of aortic valve stenosis s/p TAVR in 2017, HTN, pulmonary HTN, chart hx of COPD vs. asthma, DM, relapsing remitting MS, PAF on coumadin, morbid obesity, depression, presents for follow up of asthma after not having been seen in our clinic for 3 years. She has no concerning asthma symptoms. She rarely uses the albuterol rescue inhaler. Lung exam and SpO2 are normal today.     Recommendations:  - continue albuterol as needed  - supplemental O2 at night, to keep SpO2 92% or greater  - follow up with sleep medicine as scheduled  - encouraged her to schedule her echocardiogram as recommended by her cardiologist.  - UTD with vaccinations.    Follow up in 1 year.    Aftab Barboza MD (Avi)  [facility] ([facility])  Clinic [phone]  Fax [phone]      CCx: chronic respiratory failure with hypoxemia, pulmonary hypertension, dyspnea on exertion    HPI: 67F with a history of aortic valve stenosis s/p TAVR in 2017, HTN, pulmonary HTN, chart hx of COPD vs. asthma, DM, relapsing remitting MS, PAF on coumadin, morbid obesity, depression, presents for evaluation of several issues. She was last seen by Dr. Grajeda in 2020 on a virtual visit. She was doing well at that time.   She uses albuterol rescue inhaler once a week.  She has not been on the Incruse for years.   She is fairly sedentary due to MS. Denies dyspnea at rest. She hasn't need to use oxygen.  She is going to have a sleep study soon.     Last cardiology office note from Dec 2022 by Dr. Hall was reviewed. She was supposed to have a repeat echo but I don't see that that's been  done.    ROS:  A 12-system review was obtained and was negative with the exception of the symptoms endorsed in the history of present illness.    PMH:  Past Medical History:   Diagnosis Date     Anxiety      Anxiety      Aortic valve replaced 2/15/2017     Aortic valve stenosis 01/24/2017     Asthma      Asthma     Created by Conversion      Chronic shortness of breath      COPD (chronic obstructive pulmonary disease) (H)      Coronary artery disease      Depression      Diabetes mellitus (H)     borderline     Essential hypertension     Created by Conversion  Replacement Utility updated for latest IMO load     History of blood clots     PE     History of pulmonary embolism      HTN (hypertension)      Multiple sclerosis (H)      Multiple sclerosis (H)     Created by Conversion      Obesity      Obesity 10/29/2015     Osteoarthritis      Oxygen dependent      Panic attacks      PONV (postoperative nausea and vomiting)      Pre-diabetes      Pulmonary embolism (H)      Sleep apnea     borderline       PSH:  Past Surgical History:   Procedure Laterality Date     aortic valve surgery       COLONOSCOPY N/A 12/23/2021    Procedure: COLONOSCOPY WITH POLYPECTOMIES;  Surgeon: Tito Suarez MD;  Location: [facility]     COLONOSCOPY W/ BIOPSIES N/A 3/22/2021    Procedure: COLONOSCOPY WITH BIOPSY AND POLYPECTOMY;  Surgeon: Sam Weems MD;  Location: [facility];  Service: Gastroenterology     IR LUMBAR EPIDURAL STEROID INJECTION  1/16/2007     MAMMOPLASTY REDUCTION  1994     OTHER SURGICAL HISTORY  01/24/2017    mechanical aortic prosthesis     RELEASE CARPAL TUNNEL       ZZC RECONSTR TOTAL SHOULDER IMPLANT Left 4/10/2019    Procedure: LEFT REVERSE TOTAL SHOULDER ARTHROPLASTY;  Surgeon: Luis Antonio Telles MD;  Location: [facility];  Service: Orthopedics       Allergies:  Allergies   Allergen Reactions     Morphine Visual Disturbance and Other (See Comments)     hallicinations       Sulfa Antibiotics  Hives and Nausea and Vomiting     Azithromycin Nausea and Vomiting and Rash     Doxycycline Rash       Family HX:  Family History   Problem Relation Age of Onset     Snoring Mother      Snoring Father      Sleep Apnea Sister      Coronary Artery Disease Father      Coronary Artery Disease Sister      Coronary Artery Disease Brother      Breast Cancer No family hx of        Social Hx:  Social History     Socioeconomic History     Marital status:      Spouse name: Not on file     Number of children: Not on file     Years of education: Not on file     Highest education level: Not on file   Occupational History     Not on file   Tobacco Use     Smoking status: Never     Smokeless tobacco: Never   Vaping Use     Vaping status: Never Used   Substance and Sexual Activity     Alcohol use: No     Drug use: No     Sexual activity: Not on file   Other Topics Concern     Not on file   Social History Narrative    Lives with sister in [place] due to help with medical issues.   and daughter live in [place].  Plans to return to be with family hopefully soon.  For MS- last used medications about 3 months. In remission. Needs to be back on medications as  Neurologist has left.    Drew Hahn MD  5/29/2018    Daughters Cecille and Maci Molina are my patients.     Social Determinants of Health     Financial Resource Strain: Not on file   Food Insecurity: Not on file   Transportation Needs: Not on file   Physical Activity: Not on file   Stress: Not on file   Social Connections: Not on file   Intimate Partner Violence: Not on file   Housing Stability: Not on file       Current Meds:  Current Outpatient Medications   Medication Sig Dispense Refill     acetaminophen (TYLENOL) 325 MG tablet Take 325-650 mg by mouth every 6 hours as needed        acyclovir (ZOVIRAX) 400 MG tablet Take 400 mg by mouth 2 times daily        albuterol (PROAIR HFA/PROVENTIL HFA/VENTOLIN HFA) 108 (90 Base) MCG/ACT inhaler Inhale 2  puffs into the lungs every 6 hours as needed        aspirin (ASA) 81 MG chewable tablet Take 81 mg by mouth daily        buPROPion (WELLBUTRIN XL) 150 MG 24 hr tablet Take 1 tablet (150 mg) by mouth daily 90 tablet 3     esomeprazole (NEXIUM) 20 MG DR capsule Take 1 capsule (20 mg) by mouth every morning (before breakfast) 90 capsule 3     furosemide (LASIX) 40 MG tablet Take 1.5 tablets (60 mg) by mouth daily 135 tablet 2     INCRUSE ELLIPTA 62.5 MCG/INH Inhaler Inhale 1 puff into the lungs daily        loratadine (CLARITIN) 10 MG tablet Take 1 tablet (10 mg) by mouth daily 90 tablet 3     LORazepam (ATIVAN) 0.5 MG tablet Take 1 tablet (0.5 mg) by mouth every 8 hours as needed for anxiety 20 tablet 0     metFORMIN (GLUCOPHAGE XR) 500 MG 24 hr tablet Take 1 tablet (500 mg) by mouth daily 90 tablet 1     metoprolol tartrate (LOPRESSOR) 25 MG tablet Take 1 tablet (25 mg) by mouth 2 times daily 180 tablet 0     montelukast (SINGULAIR) 10 MG tablet Take 1 tablet (10 mg) by mouth At Bedtime 90 tablet 1     potassium chloride ER (MICRO-K) 10 MEQ CR capsule Take 10 mEq by mouth daily        simvastatin (ZOCOR) 20 MG tablet Take 1 tablet (20 mg) by mouth At Bedtime 90 tablet 1     venlafaxine (EFFEXOR XR) 75 MG 24 hr capsule TAKE 3 CAPSULES BY MOUTH EVERY DAY 90 capsule 2     warfarin ANTICOAGULANT (COUMADIN) 2.5 MG tablet Take 1 tab (2.5mg) to 2 tabs (5mg) by mouth daily, as directed.  Adjust dose based on INR results. 270 tablet 1       Physical Exam:  /70 (BP Location: Left arm, Patient Position: Chair, Cuff Size: Adult Large)   Pulse 70   SpO2 96%   Gen: awake, alert, oriented, no distress  HEENT: nasal turbinates are unremarkable, no oropharyngeal lesions, no cervical or supraclavicular lymphadenopathy  CV: RRR, no M/G/R  Resp: CTAB, no focal crackles or wheezes  Skin: no apparent rashes  Ext: no cyanosis, clubbing or edema  Neuro: alert, nonfocal    Labs:  reviewed    Imaging studies:  Narrative & Impression    EXAM: XR CHEST 2 VIEWS  LOCATION: [facility]  DATE/TIME: 10/31/2022 2:48 PM     INDICATION:  Persistent cough for 3 weeks or longer  COMPARISON: 10/30/2022                                                                      IMPRESSION: Poststernotomy changes with prosthetic aVR. Chronic elevation of the right hemidiaphragm. Lungs are clear. Heart is at the upper limits of normal. No signs of pneumonia or failure. No effusions. Prior reverse left shoulder arthroplasty.       Echo from [facility], 2020  Final Conclusion Previous Study: 10/13/2017    Very technically difficult study    1. Normal left ventricular chamber size and systolic function. Estimated left ventricular   ejection fraction is 55-60%. No regional wall    motion abnormalities. Normal left ventricular wall thickness.    2. Status post 23-mm [facility] aortic mechanical prosthetic valve (January 2017). Prosthetic aortic valve was not well    visualized.       -Aortic prosthetic systolic mean gradient is 11 mmHg. Aortic prosthesis effective orifice   area by Doppler is 1.7 cm . Aortic prosthesis    dimensionless index is 0.48.       -Trivial aortic prosthetic regurgitation.    3. Adequate comparison with prior study was not possible due to suboptimal quality of prior   study.         Pulmonary Function Testing  From 2017:  FEV1 1.73L 58%  FVC 49%  Ratio 0.9  No BD response  TLC 4.14L, 69%  RV 79%  DLco 82% ivan for hgb  Flow volume curve appears normal.     Again, thank you for allowing me to participate in the care of your patient.        Sincerely,        Aftab Barboza MD

## 2023-05-18 NOTE — PROGRESS NOTES
Pulmonary Clinic Outpatient Consultation    Assessment and Plan:   67F with a history of aortic valve stenosis s/p TAVR in 2017, HTN, pulmonary HTN, chart hx of COPD vs. asthma, DM, relapsing remitting MS, PAF on coumadin, morbid obesity, depression, presents for follow up of asthma after not having been seen in our clinic for 3 years. She has no concerning asthma symptoms. She rarely uses the albuterol rescue inhaler. Lung exam and SpO2 are normal today.     Recommendations:  - continue albuterol as needed  - supplemental O2 at night, to keep SpO2 92% or greater  - follow up with sleep medicine as scheduled  - encouraged her to schedule her echocardiogram as recommended by her cardiologist.  - UTD with vaccinations.    Follow up in 1 year.    Aftab Barboza MD (Avi)  [facility] Pulmonary [facility] Critical [facility] ([facility])  Clinic [phone]  Fax [phone]      CCx: chronic respiratory failure with hypoxemia, pulmonary hypertension, dyspnea on exertion    HPI: 67F with a history of aortic valve stenosis s/p TAVR in 2017, HTN, pulmonary HTN, chart hx of COPD vs. asthma, DM, relapsing remitting MS, PAF on coumadin, morbid obesity, depression, presents for evaluation of several issues. She was last seen by Dr. Grajeda in 2020 on a virtual visit. She was doing well at that time.   She uses albuterol rescue inhaler once a week.  She has not been on the Incruse for years.   She is fairly sedentary due to MS. Denies dyspnea at rest. She hasn't need to use oxygen.  She is going to have a sleep study soon.     Last cardiology office note from Dec 2022 by Dr. Hall was reviewed. She was supposed to have a repeat echo but I don't see that that's been done.    ROS:  A 12-system review was obtained and was negative with the exception of the symptoms endorsed in the history of present illness.    PMH:  Past Medical History:   Diagnosis Date     Anxiety      Anxiety      Aortic valve replaced 2/15/2017     Aortic valve  stenosis 01/24/2017     Asthma      Asthma     Created by Conversion      Chronic shortness of breath      COPD (chronic obstructive pulmonary disease) (H)      Coronary artery disease      Depression      Diabetes mellitus (H)     borderline     Essential hypertension     Created by Conversion  Replacement Utility updated for latest IMO load     History of blood clots     PE     History of pulmonary embolism      HTN (hypertension)      Multiple sclerosis (H)      Multiple sclerosis (H)     Created by Conversion      Obesity      Obesity 10/29/2015     Osteoarthritis      Oxygen dependent      Panic attacks      PONV (postoperative nausea and vomiting)      Pre-diabetes      Pulmonary embolism (H)      Sleep apnea     borderline       PSH:  Past Surgical History:   Procedure Laterality Date     aortic valve surgery       COLONOSCOPY N/A 12/23/2021    Procedure: COLONOSCOPY WITH POLYPECTOMIES;  Surgeon: Tito Suarez MD;  Location: [facility]     COLONOSCOPY W/ BIOPSIES N/A 3/22/2021    Procedure: COLONOSCOPY WITH BIOPSY AND POLYPECTOMY;  Surgeon: Sam Weems MD;  Location: [facility] OR;  Service: Gastroenterology     IR LUMBAR EPIDURAL STEROID INJECTION  1/16/2007     MAMMOPLASTY REDUCTION  1994     OTHER SURGICAL HISTORY  01/24/2017    mechanical aortic prosthesis     RELEASE CARPAL TUNNEL       ZZC RECONSTR TOTAL SHOULDER IMPLANT Left 4/10/2019    Procedure: LEFT REVERSE TOTAL SHOULDER ARTHROPLASTY;  Surgeon: Luis Antonio Telles MD;  Location: [facility];  Service: Orthopedics       Allergies:  Allergies   Allergen Reactions     Morphine Visual Disturbance and Other (See Comments)     hallicinations       Sulfa Antibiotics Hives and Nausea and Vomiting     Azithromycin Nausea and Vomiting and Rash     Doxycycline Rash       Family HX:  Family History   Problem Relation Age of Onset     Snoring Mother      Snoring Father      Sleep Apnea Sister      Coronary Artery Disease Father       Coronary Artery Disease Sister      Coronary Artery Disease Brother      Breast Cancer No family hx of        Social Hx:  Social History     Socioeconomic History     Marital status:      Spouse name: Not on file     Number of children: Not on file     Years of education: Not on file     Highest education level: Not on file   Occupational History     Not on file   Tobacco Use     Smoking status: Never     Smokeless tobacco: Never   Vaping Use     Vaping status: Never Used   Substance and Sexual Activity     Alcohol use: No     Drug use: No     Sexual activity: Not on file   Other Topics Concern     Not on file   Social History Narrative    Lives with sister in [place] due to help with medical issues.   and daughter live in [place].  Plans to return to be with family hopefully soon.  For MS- last used medications about 3 months. In remission. Needs to be back on medications as  Neurologist has left.    Drew Hahn MD  5/29/2018    Daughters Cecille and Maci Molina are my patients.     Social Determinants of Health     Financial Resource Strain: Not on file   Food Insecurity: Not on file   Transportation Needs: Not on file   Physical Activity: Not on file   Stress: Not on file   Social Connections: Not on file   Intimate Partner Violence: Not on file   Housing Stability: Not on file       Current Meds:  Current Outpatient Medications   Medication Sig Dispense Refill     acetaminophen (TYLENOL) 325 MG tablet Take 325-650 mg by mouth every 6 hours as needed        acyclovir (ZOVIRAX) 400 MG tablet Take 400 mg by mouth 2 times daily        albuterol (PROAIR HFA/PROVENTIL HFA/VENTOLIN HFA) 108 (90 Base) MCG/ACT inhaler Inhale 2 puffs into the lungs every 6 hours as needed        aspirin (ASA) 81 MG chewable tablet Take 81 mg by mouth daily        buPROPion (WELLBUTRIN XL) 150 MG 24 hr tablet Take 1 tablet (150 mg) by mouth daily 90 tablet 3     esomeprazole (NEXIUM) 20 MG DR capsule Take 1  capsule (20 mg) by mouth every morning (before breakfast) 90 capsule 3     furosemide (LASIX) 40 MG tablet Take 1.5 tablets (60 mg) by mouth daily 135 tablet 2     INCRUSE ELLIPTA 62.5 MCG/INH Inhaler Inhale 1 puff into the lungs daily        loratadine (CLARITIN) 10 MG tablet Take 1 tablet (10 mg) by mouth daily 90 tablet 3     LORazepam (ATIVAN) 0.5 MG tablet Take 1 tablet (0.5 mg) by mouth every 8 hours as needed for anxiety 20 tablet 0     metFORMIN (GLUCOPHAGE XR) 500 MG 24 hr tablet Take 1 tablet (500 mg) by mouth daily 90 tablet 1     metoprolol tartrate (LOPRESSOR) 25 MG tablet Take 1 tablet (25 mg) by mouth 2 times daily 180 tablet 0     montelukast (SINGULAIR) 10 MG tablet Take 1 tablet (10 mg) by mouth At Bedtime 90 tablet 1     potassium chloride ER (MICRO-K) 10 MEQ CR capsule Take 10 mEq by mouth daily        simvastatin (ZOCOR) 20 MG tablet Take 1 tablet (20 mg) by mouth At Bedtime 90 tablet 1     venlafaxine (EFFEXOR XR) 75 MG 24 hr capsule TAKE 3 CAPSULES BY MOUTH EVERY DAY 90 capsule 2     warfarin ANTICOAGULANT (COUMADIN) 2.5 MG tablet Take 1 tab (2.5mg) to 2 tabs (5mg) by mouth daily, as directed.  Adjust dose based on INR results. 270 tablet 1       Physical Exam:  /70 (BP Location: Left arm, Patient Position: Chair, Cuff Size: Adult Large)   Pulse 70   SpO2 96%   Gen: awake, alert, oriented, no distress  HEENT: nasal turbinates are unremarkable, no oropharyngeal lesions, no cervical or supraclavicular lymphadenopathy  CV: RRR, no M/G/R  Resp: CTAB, no focal crackles or wheezes  Skin: no apparent rashes  Ext: no cyanosis, clubbing or edema  Neuro: alert, nonfocal    Labs:  reviewed    Imaging studies:  Narrative & Impression   EXAM: XR CHEST 2 VIEWS  LOCATION: [facility]  DATE/TIME: 10/31/2022 2:48 PM     INDICATION:  Persistent cough for 3 weeks or longer  COMPARISON: 10/30/2022                                                                      IMPRESSION:  Poststernotomy changes with prosthetic aVR. Chronic elevation of the right hemidiaphragm. Lungs are clear. Heart is at the upper limits of normal. No signs of pneumonia or failure. No effusions. Prior reverse left shoulder arthroplasty.       Echo from [facility], 2020  Final Conclusion Previous Study: 10/13/2017    Very technically difficult study    1. Normal left ventricular chamber size and systolic function. Estimated left ventricular   ejection fraction is 55-60%. No regional wall    motion abnormalities. Normal left ventricular wall thickness.    2. Status post 23-mm [facility] aortic mechanical prosthetic valve (January 2017). Prosthetic aortic valve was not well    visualized.       -Aortic prosthetic systolic mean gradient is 11 mmHg. Aortic prosthesis effective orifice   area by Doppler is 1.7 cm . Aortic prosthesis    dimensionless index is 0.48.       -Trivial aortic prosthetic regurgitation.    3. Adequate comparison with prior study was not possible due to suboptimal quality of prior   study.         Pulmonary Function Testing  From 2017:  FEV1 1.73L 58%  FVC 49%  Ratio 0.9  No BD response  TLC 4.14L, 69%  RV 79%  DLco 82% ivan for hgb  Flow volume curve appears normal.

## 2023-05-19 ENCOUNTER — TELEPHONE (OUTPATIENT)
Dept: FAMILY MEDICINE | Facility: CLINIC | Age: 68
End: 2023-05-19
Payer: COMMERCIAL

## 2023-05-19 NOTE — TELEPHONE ENCOUNTER
Please inform patient that an eye exam is needed for the Medicare annual wellness visit that was done recently.  Please ask her if she is able to come for screening eye exam at this clinic.    Drew Hahn MD

## 2023-05-31 NOTE — TELEPHONE ENCOUNTER
Who is calling:  Patient   Reason for Call:  Patient called to schedule overdue INR appointment.   Patient did schedule for 3/4/19 @ 11:15am [facility] clinic  Date of last appointment with primary care: 02/21/19  Has the patient been recently seen:  Yes  Okay to leave a detailed message: Yes       Average

## 2023-06-01 ENCOUNTER — LAB (OUTPATIENT)
Dept: LAB | Facility: CLINIC | Age: 68
End: 2023-06-01
Payer: COMMERCIAL

## 2023-06-01 ENCOUNTER — ANTICOAGULATION THERAPY VISIT (OUTPATIENT)
Dept: ANTICOAGULATION | Facility: CLINIC | Age: 68
End: 2023-06-01

## 2023-06-01 ENCOUNTER — ANCILLARY PROCEDURE (OUTPATIENT)
Dept: MAMMOGRAPHY | Facility: CLINIC | Age: 68
End: 2023-06-01
Attending: FAMILY MEDICINE
Payer: COMMERCIAL

## 2023-06-01 DIAGNOSIS — E11.69 TYPE 2 DIABETES MELLITUS WITH OTHER SPECIFIED COMPLICATION, UNSPECIFIED WHETHER LONG TERM INSULIN USE (H): Primary | ICD-10-CM

## 2023-06-01 DIAGNOSIS — Z95.2 S/P AVR (AORTIC VALVE REPLACEMENT): ICD-10-CM

## 2023-06-01 DIAGNOSIS — Z95.2 HEART VALVE REPLACED: Primary | ICD-10-CM

## 2023-06-01 DIAGNOSIS — I48.0 PAROXYSMAL ATRIAL FIBRILLATION (H): ICD-10-CM

## 2023-06-01 DIAGNOSIS — Z12.31 VISIT FOR SCREENING MAMMOGRAM: ICD-10-CM

## 2023-06-01 DIAGNOSIS — I26.99 PULMONARY EMBOLISM, OTHER, UNSPECIFIED CHRONICITY, UNSPECIFIED WHETHER ACUTE COR PULMONALE PRESENT (H): ICD-10-CM

## 2023-06-01 DIAGNOSIS — Z95.2 H/O AORTIC VALVE REPLACEMENT: ICD-10-CM

## 2023-06-01 DIAGNOSIS — Z79.01 LONG TERM (CURRENT) USE OF ANTICOAGULANTS: ICD-10-CM

## 2023-06-01 LAB — INR BLD: 4.4 (ref 0.9–1.1)

## 2023-06-01 PROCEDURE — 85610 PROTHROMBIN TIME: CPT

## 2023-06-01 PROCEDURE — 36415 COLL VENOUS BLD VENIPUNCTURE: CPT

## 2023-06-01 PROCEDURE — 77067 SCR MAMMO BI INCL CAD: CPT

## 2023-06-01 NOTE — TELEPHONE ENCOUNTER
Patient states that she had her eye exam done at [facility] on 04/07/23.    Records in chart

## 2023-06-01 NOTE — PROGRESS NOTES
ANTICOAGULATION MANAGEMENT     Eileen Donald 67 year old female is on warfarin with supratherapeutic INR result. (Goal INR 2.5-3.5)    Recent labs: (last 7 days)     06/01/23  1306   INR 4.4*       ASSESSMENT       Source(s): Chart Review and Patient/Caregiver Call       Warfarin doses taken: Warfarin taken as instructed    Diet:  appetite not as good may be affecting diet and INR    Reported WT loss of about 5 lbs.   Last WT on on 5/15/23 was 251 lbs.    Medication/supplement changes:  Yes.    Tylenol 325mg 1-2 tabs per day and recently new RX Baclofen 1-2x/day from Neurologist     New illness, injury, or hospitalization: No   Has nasty spring cold, clear phlegm (occasionally noted small amount of blood, but relates its coming from her nasal passage, and muscle aches x1 wks.  Not feverish.   Reported covid test was negative.    Signs or symptoms of bleeding or clotting: No    Previous result: Therapeutic last 4 visits    Additional findings: None         PLAN     Recommended plan for temporary change(s) affecting INR     Dosing Instructions: hold dose then continue your current warfarin dose with next INR in 1 week       Summary  As of 6/1/2023    Full warfarin instructions:  6/1: Hold; Otherwise 5 mg every Tue, Sat; 2.5 mg all other days   Next INR check:  6/8/2023             Telephone call with  Marilee ([phone]) who verbalizes understanding and agrees to plan    - advised Marilee, if symptoms worsen and persist, especially if she notes any greenish-yellow phlegm with blood, she needs to f/u with PCP to r/o pneumonia.   - will monitor INR closely - due to poor appetite.    Lab visit scheduled - INR on 6/6/23 @ [facility].   - has transportation this day for her infusion @ [facility] in the morning.    Education provided:     Taking warfarin: Importance of taking warfarin as instructed    Goal range and lab monitoring: goal range and significance of current result    Dietary considerations: importance of  consistent vitamin K intake and impact of vitamin K foods on INR    Symptom monitoring: monitoring for bleeding signs and symptoms and when to seek medical attention/emergency care    Plan made per [facility] anticoagulation protocol    Alondra Lora RN  Anticoagulation Clinic  6/1/2023    _______________________________________________________________________     Anticoagulation Episode Summary     Current INR goal:  2.5-3.5   TTR:  48.3 % (1 y)   Target end date:  Indefinite   Send INR reminders to:  [facility]    Indications    Heart valve replaced [Z95.2]  Paroxysmal atrial fibrillation (H) [I48.0]  S/P AVR (aortic valve replacement) [Z95.2]  Other pulmonary embolism without acute cor pulmonale  unspecified chronicity (H) (Resolved) [I26.99]  H/O aortic valve replacement [Z95.2]  Long term (current) use of anticoagulants [Z79.01]  Pulmonary embolism  other  unspecified chronicity  unspecified whether acute cor pulmonale present (H) (Resolved) [I26.99]           Comments:  12/15/21 - amended INR goal to 2.5 - 3.5         Anticoagulation Care Providers     Provider Role Specialty Phone number    Tito Salazar MD Referring Family Medicine [phone]    Drew Hahn MD Referring Family Medicine [phone]

## 2023-06-06 ENCOUNTER — NURSE TRIAGE (OUTPATIENT)
Dept: FAMILY MEDICINE | Facility: CLINIC | Age: 68
End: 2023-06-06

## 2023-06-06 ENCOUNTER — LAB (OUTPATIENT)
Dept: LAB | Facility: CLINIC | Age: 68
End: 2023-06-06
Payer: COMMERCIAL

## 2023-06-06 ENCOUNTER — ANTICOAGULATION THERAPY VISIT (OUTPATIENT)
Dept: ANTICOAGULATION | Facility: CLINIC | Age: 68
End: 2023-06-06

## 2023-06-06 DIAGNOSIS — Z95.2 S/P AVR (AORTIC VALVE REPLACEMENT): ICD-10-CM

## 2023-06-06 DIAGNOSIS — I26.99 PULMONARY EMBOLISM, OTHER, UNSPECIFIED CHRONICITY, UNSPECIFIED WHETHER ACUTE COR PULMONALE PRESENT (H): ICD-10-CM

## 2023-06-06 DIAGNOSIS — I48.0 PAROXYSMAL ATRIAL FIBRILLATION (H): ICD-10-CM

## 2023-06-06 DIAGNOSIS — Z79.01 LONG TERM (CURRENT) USE OF ANTICOAGULANTS: ICD-10-CM

## 2023-06-06 DIAGNOSIS — Z95.2 H/O AORTIC VALVE REPLACEMENT: ICD-10-CM

## 2023-06-06 DIAGNOSIS — Z95.2 HEART VALVE REPLACED: Primary | ICD-10-CM

## 2023-06-06 LAB — INR BLD: 1.6 (ref 0.9–1.1)

## 2023-06-06 PROCEDURE — 85610 PROTHROMBIN TIME: CPT

## 2023-06-06 PROCEDURE — 36415 COLL VENOUS BLD VENIPUNCTURE: CPT

## 2023-06-06 NOTE — TELEPHONE ENCOUNTER
Patient calling at advice of INR nurse to report 2 weeks of cough that is productive of gray, cloudy phlegm. Denies SOB, difficulty breathing, wheezing, or chest pain/pressure. Has been increasing fluids as well as taking tylenol and robitussin. Coughs intermittently throughout the day, worse at night. Patient does have asthma. Disposition for patient to be seen in office today, patient would prefer to wait for an available appointment tomorrow. Discussed more urgent evaluation if SOB, difficulty breathing, or persisting chest pain. Assisted in scheduling with PATSY Chaney for evaluation 6/7. Are you okay with patient waiting until tomorrow for evaluation?    Ana Nath RN    Reason for Disposition    Known COPD or other severe lung disease (i.e., bronchiectasis, cystic fibrosis, lung surgery) and worsening symptoms (i.e., increased sputum purulence or amount, increased breathing difficulty)    Additional Information    Negative: Bluish (or gray) lips or face    Negative: SEVERE difficulty breathing (e.g., struggling for each breath, speaks in single words)    Negative: Rapid onset of cough and has hives    Negative: Coughing started suddenly after medicine, an allergic food or bee sting    Negative: Difficulty breathing after exposure to flames, smoke, or fumes    Negative: Sounds like a life-threatening emergency to the triager    Negative: Previous asthma attacks and this feels like asthma attack    Negative: Dry cough (non-productive; no sputum or minimal clear sputum) and within 14 days of COVID-19 Exposure    Negative: MODERATE difficulty breathing (e.g., speaks in phrases, SOB even at rest, pulse 100-120) and still present when not coughing    Negative: Chest pain present when not coughing    Negative: Passed out (i.e., fainted, collapsed and was not responding)    Negative: Patient sounds very sick or weak to the triager    Negative: MILD difficulty breathing (e.g., minimal/no SOB at rest, SOB with  walking, pulse <100) and still present when not coughing    Negative: Coughed up > 1 tablespoon (15 ml) blood (Exception: Blood-tinged sputum.)    Negative: Fever > 103 F (39.4 C)    Negative: Fever > 101 F (38.3 C) and over 60 years of age    Negative: Fever > 100.0 F (37.8 C) and has diabetes mellitus or a weak immune system (e.g., HIV positive, cancer chemotherapy, organ transplant, splenectomy, chronic steroids)    Negative: Fever > 100.0 F (37.8 C) and bedridden (e.g., nursing home patient, stroke, chronic illness, recovering from surgery)    Negative: Increasing ankle swelling    Negative: Wheezing is present    Negative: SEVERE coughing spells (e.g., whooping sound after coughing, vomiting after coughing)    Negative: Coughing up trin[place]-colored (reddish-brown) or blood-tinged sputum    Negative: Fever present > 3 days (72 hours)    Negative: Fever returns after gone for over 24 hours and symptoms worse or not improved    Negative: Using nasal washes and pain medicine > 24 hours and sinus pain persists    Protocols used: COUGH-A-OH

## 2023-06-07 ENCOUNTER — OFFICE VISIT (OUTPATIENT)
Dept: FAMILY MEDICINE | Facility: CLINIC | Age: 68
End: 2023-06-07
Payer: COMMERCIAL

## 2023-06-07 VITALS
OXYGEN SATURATION: 97 % | RESPIRATION RATE: 16 BRPM | WEIGHT: 248.8 LBS | HEART RATE: 92 BPM | BODY MASS INDEX: 35.62 KG/M2 | SYSTOLIC BLOOD PRESSURE: 117 MMHG | TEMPERATURE: 97.2 F | HEIGHT: 70 IN | DIASTOLIC BLOOD PRESSURE: 84 MMHG

## 2023-06-07 DIAGNOSIS — F33.41 RECURRENT MAJOR DEPRESSIVE DISORDER, IN PARTIAL REMISSION (H): ICD-10-CM

## 2023-06-07 DIAGNOSIS — J40 BRONCHITIS: Primary | ICD-10-CM

## 2023-06-07 PROBLEM — J44.9 COPD (CHRONIC OBSTRUCTIVE PULMONARY DISEASE) (H): Status: ACTIVE | Noted: 2021-02-01

## 2023-06-07 PROCEDURE — 99213 OFFICE O/P EST LOW 20 MIN: CPT | Performed by: PHYSICIAN ASSISTANT

## 2023-06-07 RX ORDER — CEFDINIR 300 MG/1
300 CAPSULE ORAL 2 TIMES DAILY
Qty: 20 CAPSULE | Refills: 0 | Status: SHIPPED | OUTPATIENT
Start: 2023-06-07 | End: 2023-06-17

## 2023-06-07 RX ORDER — VENLAFAXINE HYDROCHLORIDE 75 MG/1
CAPSULE, EXTENDED RELEASE ORAL
Qty: 90 CAPSULE | Refills: 2 | Status: SHIPPED | OUTPATIENT
Start: 2023-06-07 | End: 2023-09-18

## 2023-06-07 RX ORDER — POTASSIUM CHLORIDE 750 MG/1
10 CAPSULE, EXTENDED RELEASE ORAL DAILY
Status: CANCELLED | OUTPATIENT
Start: 2023-06-07

## 2023-06-07 RX ORDER — CODEINE PHOSPHATE AND GUAIFENESIN 10; 100 MG/5ML; MG/5ML
1-2 SOLUTION ORAL EVERY 4 HOURS PRN
Qty: 118 ML | Refills: 0 | Status: SHIPPED | OUTPATIENT
Start: 2023-06-07 | End: 2023-06-27

## 2023-06-07 ASSESSMENT — ENCOUNTER SYMPTOMS: COUGH: 1

## 2023-06-07 NOTE — PROGRESS NOTES
Assessment & Plan     Bronchitis  New problem, given her history of possible COPD vs Asthma recommend treatment with Omnicef BID x 10 days.  Cough syrup with codeine (dicussed no driving with patient, states she has tolerated well in past) given to help with cough.  Recommend close follow up if symptoms worsen or do not improve.  - cefdinir (OMNICEF) 300 MG capsule; Take 1 capsule (300 mg) by mouth 2 times daily for 10 days  - guaiFENesin-codeine (ROBITUSSIN AC) 100-10 MG/5ML solution; Take 5-10 mLs by mouth every 4 hours as needed for cough    Recurrent major depressive disorder, in partial remission (H)  Chronic issue, stable, meds refilled.  - venlafaxine (EFFEXOR XR) 75 MG 24 hr capsule; TAKE 3 CAPSULES BY MOUTH EVERY DAY             Risks, benefits and alternatives were discussed with patient. Agreeable to the plan of care.      Anusha Chaney PA-C  [facility]    William Hunt is a 67 year old, presenting for the following health issues:  Cough (Cough x 2 weeks/Coughing up sticking thick mucus/)        6/7/2023    12:01 PM   Additional Questions   Roomed by Bijal PEREZ CMA     Cough    History of Present Illness       Reason for visit:  Cough andflem  Symptom onset:  1-2 weeks ago  Symptoms include:  Cough cold stuff  Symptom intensity:  Severe  Symptom progression:  Improving  Had these symptoms before:  Yes  Has tried/received treatment for these symptoms:  Yes  Previous treatment was successful:  Yes  Prior treatment description:  Antibiotics ough ed medicine  What makes it worse:  Moving to fast  What makes it better:  Rest aand ice cream    She eats 0-1 servings of fruits and vegetables daily.She consumes 1 sweetened beverage(s) daily.She exercises with enough effort to increase her heart rate 9 or less minutes per day.  She exercises with enough effort to increase her heart rate 3 or less days per week.          Patient is here today for evaluation of a  "cough  Ongoing for about 2 weeks  Started as a \"normal cough\" and now the cough is yellow, thick productive mucous  No fever, no ear pain, no congestion  She is chronically short of breath, has known pulmonary hypertension, is due for ECHO      Review of Systems   Respiratory: Positive for cough.       Constitutional, HEENT, cardiovascular, pulmonary, gi and gu systems are negative, except as otherwise noted.      Objective    /84 (BP Location: Left arm, Patient Position: Sitting, Cuff Size: Adult Large)   Pulse 92   Temp 97.2  F (36.2  C) (Oral)   Resp 16   Ht 1.778 m (5' 10\")   Wt 112.9 kg (248 lb 12.8 oz)   SpO2 97%   BMI 35.70 kg/m    Body mass index is 35.7 kg/m .  Physical Exam   GENERAL: healthy, alert and no distress  EYES: Eyes grossly normal to inspection, PERRL and conjunctivae and sclerae normal  HENT: ear canals and TM's normal, nose and mouth without ulcers or lesions  NECK: no adenopathy, no asymmetry, masses, or scars and thyroid normal to palpation  RESP: lungs clear to auscultation - no rales, rhonchi or wheezes and rales throughout  CV: regular rate and rhythm, normal S1 S2, no S3 or S4, no murmur, click or rub, no peripheral edema and peripheral pulses strong  MS: no gross musculoskeletal defects noted, no edema                    "

## 2023-06-27 ENCOUNTER — OFFICE VISIT (OUTPATIENT)
Dept: FAMILY MEDICINE | Facility: CLINIC | Age: 68
End: 2023-06-27
Attending: FAMILY MEDICINE
Payer: COMMERCIAL

## 2023-06-27 ENCOUNTER — ANTICOAGULATION THERAPY VISIT (OUTPATIENT)
Dept: ANTICOAGULATION | Facility: CLINIC | Age: 68
End: 2023-06-27

## 2023-06-27 VITALS
HEIGHT: 70 IN | WEIGHT: 247.2 LBS | RESPIRATION RATE: 16 BRPM | DIASTOLIC BLOOD PRESSURE: 76 MMHG | BODY MASS INDEX: 35.39 KG/M2 | SYSTOLIC BLOOD PRESSURE: 117 MMHG | HEART RATE: 87 BPM | OXYGEN SATURATION: 97 %

## 2023-06-27 DIAGNOSIS — E11.69 TYPE 2 DIABETES MELLITUS WITH OTHER SPECIFIED COMPLICATION, UNSPECIFIED WHETHER LONG TERM INSULIN USE (H): Primary | ICD-10-CM

## 2023-06-27 DIAGNOSIS — Z95.2 HEART VALVE REPLACED: Primary | ICD-10-CM

## 2023-06-27 DIAGNOSIS — Z95.2 S/P AVR (AORTIC VALVE REPLACEMENT): ICD-10-CM

## 2023-06-27 DIAGNOSIS — G47.30 SLEEP APNEA, UNSPECIFIED TYPE: ICD-10-CM

## 2023-06-27 DIAGNOSIS — I26.99 PULMONARY EMBOLISM, OTHER, UNSPECIFIED CHRONICITY, UNSPECIFIED WHETHER ACUTE COR PULMONALE PRESENT (H): ICD-10-CM

## 2023-06-27 DIAGNOSIS — I48.0 PAROXYSMAL ATRIAL FIBRILLATION (H): ICD-10-CM

## 2023-06-27 DIAGNOSIS — Z95.2 H/O AORTIC VALVE REPLACEMENT: ICD-10-CM

## 2023-06-27 DIAGNOSIS — Z79.01 LONG TERM (CURRENT) USE OF ANTICOAGULANTS: ICD-10-CM

## 2023-06-27 PROBLEM — J44.9 COPD (CHRONIC OBSTRUCTIVE PULMONARY DISEASE) (H): Status: RESOLVED | Noted: 2021-02-01 | Resolved: 2023-06-27

## 2023-06-27 LAB
HBA1C MFR BLD: 6.4 % (ref 0–5.6)
HOLD SPECIMEN: NORMAL
HOLD SPECIMEN: NORMAL
INR BLD: 1.5 (ref 0.9–1.1)

## 2023-06-27 PROCEDURE — 36415 COLL VENOUS BLD VENIPUNCTURE: CPT | Performed by: FAMILY MEDICINE

## 2023-06-27 PROCEDURE — 85610 PROTHROMBIN TIME: CPT | Performed by: FAMILY MEDICINE

## 2023-06-27 PROCEDURE — 36416 COLLJ CAPILLARY BLOOD SPEC: CPT | Performed by: FAMILY MEDICINE

## 2023-06-27 PROCEDURE — 83036 HEMOGLOBIN GLYCOSYLATED A1C: CPT | Performed by: FAMILY MEDICINE

## 2023-06-27 PROCEDURE — 99214 OFFICE O/P EST MOD 30 MIN: CPT | Mod: 25 | Performed by: FAMILY MEDICINE

## 2023-06-27 PROCEDURE — 91312 COVID-19 BIVALENT 12+ (PFIZER): CPT | Performed by: FAMILY MEDICINE

## 2023-06-27 PROCEDURE — 0124A COVID-19 BIVALENT 12+ (PFIZER): CPT | Performed by: FAMILY MEDICINE

## 2023-06-27 RX ORDER — BACLOFEN 10 MG/1
10-20 TABLET ORAL
COMMUNITY
Start: 2023-05-26 | End: 2024-03-29

## 2023-06-27 RX ORDER — ENOXAPARIN SODIUM 150 MG/ML
1 INJECTION SUBCUTANEOUS EVERY 12 HOURS
Qty: 8 ML | Refills: 1 | Status: SHIPPED | OUTPATIENT
Start: 2023-06-27 | End: 2024-03-29

## 2023-06-27 NOTE — PROGRESS NOTES
Assessment & Plan     ICD-10-CM    1. Type 2 diabetes mellitus with other specified complication, unspecified whether long term insulin use (H)  E11.69 HEMOGLOBIN A1C     HEMOGLOBIN A1C      2. Paroxysmal atrial fibrillation (H)  I48.0 INR point of care      3. S/P AVR (aortic valve replacement)  Z95.2 INR point of care      4. Pulmonary embolism, other, unspecified chronicity, unspecified whether acute cor pulmonale present (H)  I26.99 INR point of care      5. Long term (current) use of anticoagulants  Z79.01 INR point of care        Patient is here today for follow-up of her recent bronchitis infection.  This has nearly resolved.  She just has some dry residual cough.  Checked her diabetes today and this is under good control.  Continue metformin 500 mg a day.  She needed an INR for her atrial fibrillation and AVR.  This was pursued in clinic today.  She also has a history of pulmonary embolism and is on continuous anticoagulation as above.  Reviewed her recent visit with pulmonary specialist was only advised to use rescue inhaler as needed.  Follow-up in 1 year is recommended.  She is now scheduled to follow-up with sleep specialist.     MEDICATIONS:  Continue current medications without change    Drew Hahn MD  [facility]    William Hunt is a 67 year old, presenting for the following health issues:  Diabetes (A1C checked today, pt wants covid booster, and INR. )        6/27/2023     2:19 PM   Additional Questions   Roomed by Odalis Salas CMA   Accompanied by N/A     History of Present Illness       Diabetes:   She presents for follow up of diabetes.  She is not checking blood glucose. She has no concerns regarding her diabetes at this time.  She is not experiencing numbness or burning in feet, excessive thirst, blurry vision, weight changes or redness, sores or blisters on feet.           Patient Active Problem List   Diagnosis     Depression     Asthma     Benign  essential hypertension     Multiple Sclerosis     Hyperlipidemia     Impaired Glucose Tolerance Test     Dysphagia     Benign Adenomatous Polyp Of The Large Intestine     History of pulmonary embolism     Morbid obesity (H)     Generalized anxiety disorder     Primary osteoarthritis of both hands     Polyarthralgia     Aortic valve replaced     Controlled substance agreement signed     Heart valve replaced     Closed 3-part fracture of proximal humerus with delayed healing, left     Pulmonary hypertension (H)     Paroxysmal atrial fibrillation (H)     Dyslipidemia     Dyspnea on exertion     Hypertension     Postoperative anemia due to acute blood loss     Pre-diabetes     Stress hyperglycemia     Recurrent major depressive disorder, in partial remission (H)     Type 2 diabetes mellitus with other specified complication, unspecified whether long term insulin use (H)     Acute bacterial conjunctivitis of both eyes     Community acquired pneumonia, unspecified laterality     Osteoarthrosis     S/P AVR (aortic valve replacement)     Sleep apnea     H/O aortic valve replacement     Encounter for long-term methadone use     History of fall     Leg weakness, bilateral     Muscle spasm     Neurogenic bladder     Other symptoms and signs involving cognitive functions and awareness     Tremor     Unsteady gait     Relapsing remitting multiple sclerosis (H)     Elevated MCV     Hip pain, right     Long term (current) use of anticoagulants     Current Outpatient Medications   Medication     acetaminophen (TYLENOL) 325 MG tablet     acyclovir (ZOVIRAX) 400 MG tablet     albuterol (PROAIR HFA/PROVENTIL HFA/VENTOLIN HFA) 108 (90 Base) MCG/ACT inhaler     aspirin (ASA) 81 MG chewable tablet     buPROPion (WELLBUTRIN XL) 150 MG 24 hr tablet     esomeprazole (NEXIUM) 20 MG DR capsule     furosemide (LASIX) 40 MG tablet     loratadine (CLARITIN) 10 MG tablet     LORazepam (ATIVAN) 0.5 MG tablet     metFORMIN (GLUCOPHAGE XR) 500 MG 24  "hr tablet     metoprolol tartrate (LOPRESSOR) 25 MG tablet     montelukast (SINGULAIR) 10 MG tablet     potassium chloride ER (MICRO-K) 10 MEQ CR capsule     simvastatin (ZOCOR) 20 MG tablet     venlafaxine (EFFEXOR XR) 75 MG 24 hr capsule     warfarin ANTICOAGULANT (COUMADIN) 2.5 MG tablet     baclofen (LIORESAL) 10 MG tablet     Current Facility-Administered Medications   Medication     ropivacaine (NAROPIN) injection 3 mL     triamcinolone (KENALOG-40) injection 40 mg         Review of Systems   Constitutional, HEENT, cardiovascular, pulmonary, gi and gu systems are negative, except as otherwise noted.      Objective    /76 (BP Location: Left arm, Patient Position: Sitting, Cuff Size: Adult Large)   Pulse 87   Resp 16   Ht 1.778 m (5' 10\")   Wt 112.1 kg (247 lb 3.2 oz)   SpO2 97%   BMI 35.47 kg/m    Body mass index is 35.47 kg/m .  Physical Exam   GENERAL: healthy, alert and no distress  NECK: no adenopathy, no asymmetry, masses, or scars and thyroid normal to palpation  RESP: lungs clear to auscultation - no rales, rhonchi or wheezes  CV: regular rate and rhythm, normal S1 S2, no S3 or S4, no murmur, click or rub, no peripheral edema and peripheral pulses strong    Results for orders placed or performed in visit on 06/27/23 (from the past 24 hour(s))   HEMOGLOBIN A1C   Result Value Ref Range    Hemoglobin A1C 6.4 (H) 0.0 - 5.6 %   Extra Tube    Narrative    The following orders were created for panel order Extra Tube.  Procedure                               Abnormality         Status                     ---------                               -----------         ------                     Extra Serum Separator Tu...[[id number]]                      Final result               Extra Green Top (Lithium...[[id number]]                      Final result                 Please view results for these tests on the individual orders.   Extra Serum Separator Tube (SST)   Result Value Ref Range    Hold Specimen " JIC    Extra Green Top (Lithium Heparin) Tube   Result Value Ref Range    Hold Specimen JIC    INR point of care   Result Value Ref Range    INR 1.5 (H) 0.9 - 1.1    Narrative    This test is intended for monitoring Coumadin therapy. Results are not accurate in patients with prolonged INR due to factor deficiency.     *Note: Due to a large number of results and/or encounters for the requested time period, some results have not been displayed. A complete set of results can be found in Results Review.

## 2023-06-27 NOTE — PROGRESS NOTES
ANTICOAGULATION MANAGEMENT     Eileen Donald 67 year old female is on warfarin with subtherapeutic INR result. (Goal INR 2.5-3.5)    Recent labs: (last 7 days)     23  1443   INR 1.5*       ASSESSMENT       Source(s): Chart Review and Patient/Caregiver Call       Warfarin doses taken: Booster dose(s) recently taken which may be affecting INR    Had warfarin booster dose on 23.    Diet: No new diet changes identified    Medication/supplement changes: None noted    Was treated with 10 days of Omnicef BID (-) and Guaifenesin w/ codeine.    New illness, injury, or hospitalization: No   Diabetic f/u today with Dr. Hahn - Bronchitis almost resolved.   Was seen on 23 d/t worsening cough for 2 wks r/t Bronchitis.    Signs or symptoms of bleeding or clotting: No    Previous result: Subtherapeutic at 1.6 on 23.    Additional findings: None         PLAN     Recommended plan for ongoing change(s) affecting INR     Dosing Instructions: booster dose then Increase your warfarin dose (11.1% change) with next INR in 3 days       Summary  As of 2023    Full warfarin instructions:  : 10 mg; Otherwise 5 mg every Tue, Thu, Sat; 2.5 mg all other days   Next INR check:  2023             Telephone call with  Marilee ([phone]) who verbalizes understanding and agrees to plan    - Marilee has Lovenox on hand at home (0.8mg syringes), if not  she will expel down to 0.7mg/ml and injection sub-q every 12hrs.   -  If it is  - advused NOT to use.  I have sent in a new RX (entered by pharmacist).    [facility]    Education provided:     Taking warfarin: Importance of taking warfarin as instructed    Goal range and lab monitoring: goal range and significance of current result    Symptom monitoring: monitoring for clotting signs and symptoms    Plan made with [facility] Pharmacist Trish Lora, MJ  Anticoagulation  Clinic  6/27/2023    _______________________________________________________________________     Anticoagulation Episode Summary     Current INR goal:  2.5-3.5   TTR:  43.4 % (1 y)   Target end date:  Indefinite   Send INR reminders to:  [facility]    Indications    Heart valve replaced [Z95.2]  Paroxysmal atrial fibrillation (H) [I48.0]  S/P AVR (aortic valve replacement) [Z95.2]  Other pulmonary embolism without acute cor pulmonale  unspecified chronicity (H) (Resolved) [I26.99]  H/O aortic valve replacement [Z95.2]  Long term (current) use of anticoagulants [Z79.01]  Pulmonary embolism  other  unspecified chronicity  unspecified whether acute cor pulmonale present (H) (Resolved) [I26.99]           Comments:  12/15/21 - amended INR goal to 2.5 - 3.5         Anticoagulation Care Providers     Provider Role Specialty Phone number    Tito Salazar MD Referring Family Medicine [phone]    Drew Hahn MD Referring Family Medicine [phone]

## 2023-06-27 NOTE — PROGRESS NOTES
Chart reviewed with ACC RN at time of encounter.    Due to length of time patient has been subtherapeutic with AVR and Afib advise start Lovenox     Estimated Creatinine Clearance: 88.3 mL/min (based on SCr of 0.85 mg/dL).     BMI 35kg/m2, 112kg      Lovenox 1mg/kg = 105mg Q12H    If not using added food supplements with extra vitamin K such as Boost or Ensure, advise maintenance dose increase ~10% and boost to 10mg today, with INR recheck by end of week.    Trish Ross, PharmD BCACP  Anticoagulation Clinical Pharmacist

## 2023-06-29 NOTE — TELEPHONE ENCOUNTER
Who is calling:  Patient  Reason for Call:  Patient was calling to speak with the nurse about her medication. Patient recently had surgery on wants to make sure she is taking the correct dosage.   Date of last appointment with primary care: n/a  Okay to leave a detailed message: Yes       Finasteride Pregnancy And Lactation Text: This medication is absolutely contraindicated during pregnancy. It is unknown if it is excreted in breast milk.

## 2023-06-30 ENCOUNTER — LAB (OUTPATIENT)
Dept: LAB | Facility: CLINIC | Age: 68
End: 2023-06-30
Payer: COMMERCIAL

## 2023-06-30 ENCOUNTER — ANTICOAGULATION THERAPY VISIT (OUTPATIENT)
Dept: ANTICOAGULATION | Facility: CLINIC | Age: 68
End: 2023-06-30

## 2023-06-30 DIAGNOSIS — Z95.2 HEART VALVE REPLACED: Primary | ICD-10-CM

## 2023-06-30 DIAGNOSIS — Z95.2 H/O AORTIC VALVE REPLACEMENT: ICD-10-CM

## 2023-06-30 DIAGNOSIS — I48.0 PAROXYSMAL ATRIAL FIBRILLATION (H): ICD-10-CM

## 2023-06-30 DIAGNOSIS — Z79.01 LONG TERM (CURRENT) USE OF ANTICOAGULANTS: ICD-10-CM

## 2023-06-30 DIAGNOSIS — I26.99 PULMONARY EMBOLISM, OTHER, UNSPECIFIED CHRONICITY, UNSPECIFIED WHETHER ACUTE COR PULMONALE PRESENT (H): ICD-10-CM

## 2023-06-30 DIAGNOSIS — Z95.2 S/P AVR (AORTIC VALVE REPLACEMENT): ICD-10-CM

## 2023-06-30 LAB — INR BLD: 3.5 (ref 0.9–1.1)

## 2023-06-30 PROCEDURE — 36415 COLL VENOUS BLD VENIPUNCTURE: CPT

## 2023-06-30 PROCEDURE — 85610 PROTHROMBIN TIME: CPT

## 2023-06-30 NOTE — PROGRESS NOTES
ANTICOAGULATION MANAGEMENT     Eileen Donald 67 year old female is on warfarin with therapeutic INR result. (Goal INR 2.5-3.5)    Recent labs: (last 7 days)     06/30/23  1155   INR 3.5*       ASSESSMENT       Source(s): Chart Review and Patient/Caregiver Call       Warfarin doses taken: Booster dose(s) recently taken which may be affecting INR    Booster dose and weekly warfarin dose was increased on 6/27/23.    Diet: No new diet changes identified    Medication/supplement changes: None noted    Enoxaparin injection q12hrs, until INR is >= 2.5.    New illness, injury, or hospitalization: No    Signs or symptoms of bleeding or clotting: No    Previous result: Subtherapeutic last 2 INR results.    Additional findings: None         PLAN     Recommended plan for no diet, medication or health factor changes affecting INR     Dosing Instructions:   - OK to stop Enoxaparin injections.   - partial hold then continue your current warfarin dose with next INR in 1 week       Summary  As of 6/30/2023    Full warfarin instructions:  6/30: 1.25 mg; 7/1: 1.25 mg; Otherwise 5 mg every Mon, Wed, Fri; 2.5 mg all other days   Next INR check:  7/6/2023             Telephone call with  Marilee ([phone]) who verbalizes understanding and agrees to plan    Lab visit scheduled - INR on 7/6/23 @ [facility].    Education provided:     Taking warfarin: Importance of taking warfarin as instructed    Goal range and lab monitoring: goal range and significance of current result    Plan made with [facility] Pharmacist Renee Lora, RN  Anticoagulation Clinic  6/30/2023    _______________________________________________________________________     Anticoagulation Episode Summary     Current INR goal:  2.5-3.5   TTR:  43.8 % (1 y)   Target end date:  Indefinite   Send INR reminders to:  [facility]    Indications    Heart valve replaced [Z95.2]  Paroxysmal atrial fibrillation (H) [I48.0]  S/P AVR (aortic valve  replacement) [Z95.2]  Other pulmonary embolism without acute cor pulmonale  unspecified chronicity (H) (Resolved) [I26.99]  H/O aortic valve replacement [Z95.2]  Long term (current) use of anticoagulants [Z79.01]  Pulmonary embolism  other  unspecified chronicity  unspecified whether acute cor pulmonale present (H) (Resolved) [I26.99]           Comments:  12/15/21 - amended INR goal to 2.5 - 3.5         Anticoagulation Care Providers     Provider Role Specialty Phone number    Tito Salazar MD Referring Family Medicine [phone]    Drew Hahn MD Referring Family Medicine [phone]

## 2023-07-06 ENCOUNTER — LAB (OUTPATIENT)
Dept: LAB | Facility: CLINIC | Age: 68
End: 2023-07-06
Payer: COMMERCIAL

## 2023-07-06 ENCOUNTER — ANTICOAGULATION THERAPY VISIT (OUTPATIENT)
Dept: ANTICOAGULATION | Facility: CLINIC | Age: 68
End: 2023-07-06

## 2023-07-06 DIAGNOSIS — Z79.01 LONG TERM (CURRENT) USE OF ANTICOAGULANTS: ICD-10-CM

## 2023-07-06 DIAGNOSIS — Z95.2 S/P AVR (AORTIC VALVE REPLACEMENT): ICD-10-CM

## 2023-07-06 DIAGNOSIS — I48.0 PAROXYSMAL ATRIAL FIBRILLATION (H): ICD-10-CM

## 2023-07-06 DIAGNOSIS — Z95.2 H/O AORTIC VALVE REPLACEMENT: ICD-10-CM

## 2023-07-06 DIAGNOSIS — I26.99 PULMONARY EMBOLISM, OTHER, UNSPECIFIED CHRONICITY, UNSPECIFIED WHETHER ACUTE COR PULMONALE PRESENT (H): ICD-10-CM

## 2023-07-06 DIAGNOSIS — Z95.2 HEART VALVE REPLACED: Primary | ICD-10-CM

## 2023-07-06 LAB — INR BLD: 4.6 (ref 0.9–1.1)

## 2023-07-06 PROCEDURE — 85610 PROTHROMBIN TIME: CPT

## 2023-07-06 PROCEDURE — 36416 COLLJ CAPILLARY BLOOD SPEC: CPT

## 2023-07-06 NOTE — PROGRESS NOTES
Chart reviewed with ACC RN at time of encounter.    Recent boosts and maintenance dose increases due to sustained low INR.  Advise return to 22.5mg/week, a 10% reduction, since she has a history of therapeutic INRs on that dose, and partial hold tomorrow since dose maintenance dose change does not occur until next Wednesday.    Trish Ross, PharmD BCACP  Anticoagulation Clinical Pharmacist

## 2023-07-06 NOTE — PROGRESS NOTES
ANTICOAGULATION MANAGEMENT     Eileen Donald 67 year old female is on warfarin with supratherapeutic INR result. (Goal INR 2.5-3.5)    Recent labs: (last 7 days)     07/06/23  1333   INR 4.6*       ASSESSMENT       Source(s): Chart Reviewed       Warfarin doses taken: Held partial dose on 6/30/23 which may be affecting INR    Previous result: Therapeutic last visit at 3.5; previously outside of goal range at 1.5       PLAN     Unable to reach Marilee today.    Left message to take reduced dose of warfarin, 2.5mg mg tonight. Request call back for assessment.    Follow up required to confirm warfarin dose taken and assess for changes    Alondra Lora RN  Anticoagulation Clinic  7/6/2023

## 2023-07-07 NOTE — PROGRESS NOTES
ANTICOAGULATION MANAGEMENT     Eileen Donald 67 year old female is on warfarin with supratherapeutic INR result. (Goal INR 2.5-3.5)    Recent labs: (last 7 days)     07/06/23  1333   INR 4.6*       ASSESSMENT       Source(s): Chart Review and Patient/Caregiver Call       Warfarin doses taken: Warfarin taken as instructed    Diet: No new diet changes identified    Medication/supplement changes:  Yes.    Reported she took a little more Tylenol than her usual d/t OA pains.    New illness, injury, or hospitalization: No    Signs or symptoms of bleeding or clotting: No    Previous result: Therapeutic last visit at 3.5; previously outside of goal range at 1.5    Additional findings: None         PLAN     Recommended plan for no diet, medication or health factor changes affecting INR     Dosing Instructions:    - reported she took partial hold of warfarin, as instructed.   - then decrease your warfarin dose (10% change) with next INR in 10 days       Summary  As of 7/6/2023    Full warfarin instructions:  7/7: 2.5 mg; Otherwise 5 mg every Mon, Thu; 2.5 mg all other days   Next INR check:  7/17/2023             Telephone call with  Marilee ([phone]) who verbalizes understanding and agrees to plan    Lab visit scheduled - INR on 7/17/23 @ [facility].    Education provided:     Taking warfarin: Importance of taking warfarin as instructed    Goal range and lab monitoring: goal range and significance of current result    Interaction IS anticipated between warfarin and Tylenol    Symptom monitoring: monitoring for bleeding signs and symptoms    Plan made with [facility] Pharmacist Trish Lora, RN  Anticoagulation Clinic  7/7/2023    _______________________________________________________________________     Anticoagulation Episode Summary     Current INR goal:  2.5-3.5   TTR:  43.9 % (1 y)   Target end date:  Indefinite   Send INR reminders to:  [facility]    Indications    Heart valve replaced  [Z95.2]  Paroxysmal atrial fibrillation (H) [I48.0]  S/P AVR (aortic valve replacement) [Z95.2]  Other pulmonary embolism without acute cor pulmonale  unspecified chronicity (H) (Resolved) [I26.99]  H/O aortic valve replacement [Z95.2]  Long term (current) use of anticoagulants [Z79.01]  Pulmonary embolism  other  unspecified chronicity  unspecified whether acute cor pulmonale present (H) (Resolved) [I26.99]           Comments:  12/15/21 - amended INR goal to 2.5 - 3.5         Anticoagulation Care Providers     Provider Role Specialty Phone number    Tito Salazar MD Referring Family Medicine [phone]    Drew Hahn MD Referring Family Medicine [phone]

## 2023-07-17 ENCOUNTER — LAB (OUTPATIENT)
Dept: LAB | Facility: CLINIC | Age: 68
End: 2023-07-17
Payer: COMMERCIAL

## 2023-07-17 ENCOUNTER — ANTICOAGULATION THERAPY VISIT (OUTPATIENT)
Dept: ANTICOAGULATION | Facility: CLINIC | Age: 68
End: 2023-07-17

## 2023-07-17 DIAGNOSIS — I48.0 PAROXYSMAL ATRIAL FIBRILLATION (H): ICD-10-CM

## 2023-07-17 DIAGNOSIS — I26.99 PULMONARY EMBOLISM, OTHER, UNSPECIFIED CHRONICITY, UNSPECIFIED WHETHER ACUTE COR PULMONALE PRESENT (H): ICD-10-CM

## 2023-07-17 DIAGNOSIS — Z95.2 HEART VALVE REPLACED: Primary | ICD-10-CM

## 2023-07-17 DIAGNOSIS — Z79.01 LONG TERM (CURRENT) USE OF ANTICOAGULANTS: ICD-10-CM

## 2023-07-17 DIAGNOSIS — Z95.2 H/O AORTIC VALVE REPLACEMENT: ICD-10-CM

## 2023-07-17 DIAGNOSIS — Z95.2 S/P AVR (AORTIC VALVE REPLACEMENT): ICD-10-CM

## 2023-07-17 LAB — INR BLD: 1.9 (ref 0.9–1.1)

## 2023-07-17 PROCEDURE — 36416 COLLJ CAPILLARY BLOOD SPEC: CPT

## 2023-07-17 PROCEDURE — 85610 PROTHROMBIN TIME: CPT

## 2023-07-17 NOTE — PROGRESS NOTES
ANTICOAGULATION MANAGEMENT     Eileen Donald 67 year old female is on warfarin with subtherapeutic INR result. (Goal INR 2.5-3.5)    Recent labs: (last 7 days)     07/17/23  1203   INR 1.9*       ASSESSMENT     Source(s): Chart Review and Patient/Caregiver Call     Warfarin doses taken: Held partial dose on 7/6/23 recently which may be affecting INR   Reported, she missed 2.5mg warfarin on 7/15/23.   In addition to decreased weekly warfarin dose on 7/6/23.  Diet: No new diet changes identified  Medication/supplement changes: None noted  New illness, injury, or hospitalization: No   Osteoarthritis / polyarthralgia / right hip pains.  Signs or symptoms of bleeding or clotting: No  Previous result: Supratherapeutic at 4.6 on 7/6/23 d/t taking more Tylenol than usual.  Additional findings: None       PLAN     Recommended plan for temporary change(s) affecting INR     Dosing Instructions: booster dose then continue your current warfarin dose with next INR in 5-7 days       Summary  As of 7/17/2023      Full warfarin instructions:  7/24: 7.5 mg; Otherwise 5 mg every Mon, Thu; 2.5 mg all other days   Next INR check:  7/24/2023               Telephone call with Marilee who verbalizes understanding and agrees to plan    Lab visit scheduled - INR on 7/25/23 @ [facility].    Education provided:   Taking warfarin: Importance of taking warfarin as instructed  Goal range and lab monitoring: goal range and significance of current result    Plan made per [facility] anticoagulation protocol    Alondra Lora RN  Anticoagulation Clinic  7/17/2023    _______________________________________________________________________     Anticoagulation Episode Summary       Current INR goal:  2.5-3.5   TTR:  44.9 % (1 y)   Target end date:  Indefinite   Send INR reminders to:  [facility]    Indications    Heart valve replaced [Z95.2]  Paroxysmal atrial fibrillation (H) [I48.0]  S/P AVR (aortic valve replacement) [Z95.2]  Other pulmonary  embolism without acute cor pulmonale  unspecified chronicity (H) (Resolved) [I26.99]  H/O aortic valve replacement [Z95.2]  Long term (current) use of anticoagulants [Z79.01]  Pulmonary embolism  other  unspecified chronicity  unspecified whether acute cor pulmonale present (H) (Resolved) [I26.99]             Comments:  12/15/21 - amended INR goal to 2.5 - 3.5             Anticoagulation Care Providers       Provider Role Specialty Phone number    Tito Salazar MD Referring Family Medicine [phone]    Drew Hahn MD Referring Family Medicine [phone]

## 2023-07-24 DIAGNOSIS — E78.5 DYSLIPIDEMIA: ICD-10-CM

## 2023-07-25 ENCOUNTER — ANTICOAGULATION THERAPY VISIT (OUTPATIENT)
Dept: ANTICOAGULATION | Facility: CLINIC | Age: 68
End: 2023-07-25

## 2023-07-25 ENCOUNTER — LAB (OUTPATIENT)
Dept: LAB | Facility: CLINIC | Age: 68
End: 2023-07-25
Payer: COMMERCIAL

## 2023-07-25 DIAGNOSIS — I48.0 PAROXYSMAL ATRIAL FIBRILLATION (H): ICD-10-CM

## 2023-07-25 DIAGNOSIS — I26.99 PULMONARY EMBOLISM, OTHER, UNSPECIFIED CHRONICITY, UNSPECIFIED WHETHER ACUTE COR PULMONALE PRESENT (H): ICD-10-CM

## 2023-07-25 DIAGNOSIS — Z95.2 S/P AVR (AORTIC VALVE REPLACEMENT): ICD-10-CM

## 2023-07-25 DIAGNOSIS — Z95.2 H/O AORTIC VALVE REPLACEMENT: ICD-10-CM

## 2023-07-25 DIAGNOSIS — Z95.2 HEART VALVE REPLACED: Primary | ICD-10-CM

## 2023-07-25 DIAGNOSIS — Z79.01 LONG TERM (CURRENT) USE OF ANTICOAGULANTS: ICD-10-CM

## 2023-07-25 LAB — INR BLD: 2.9 (ref 0.9–1.1)

## 2023-07-25 PROCEDURE — 85610 PROTHROMBIN TIME: CPT

## 2023-07-25 PROCEDURE — 36416 COLLJ CAPILLARY BLOOD SPEC: CPT

## 2023-07-25 RX ORDER — SIMVASTATIN 20 MG
20 TABLET ORAL AT BEDTIME
Qty: 90 TABLET | Refills: 1 | OUTPATIENT
Start: 2023-07-25

## 2023-07-25 NOTE — TELEPHONE ENCOUNTER
"Routing refill request to provider for review/approval because:  To soon for refills    Last Written Prescription Date:  04/19/2023  Last Fill Quantity: 90,  # refills: 1   Last office visit provider:  6/27/2023     Requested Prescriptions   Refused Prescriptions Disp Refills    simvastatin (ZOCOR) 20 MG tablet 90 tablet 1     Sig: Take 1 tablet (20 mg) by mouth At Bedtime       Statins Protocol Passed - 7/25/2023  8:44 AM        Passed - LDL on file in past 12 months     Recent Labs   Lab Test 05/15/23  1551   LDL 86             Passed - No abnormal creatine kinase in past 12 months     No lab results found.             Passed - Recent (12 mo) or future (30 days) visit within the authorizing provider's specialty     Patient has had an office visit with the authorizing provider or a provider within the authorizing providers department within the previous 12 mos or has a future within next 30 days. See \"Patient Info\" tab in inbasket, or \"Choose Columns\" in Meds & Orders section of the refill encounter.              Passed - Medication is active on med list        Passed - Patient is age 18 or older        Passed - No active pregnancy on record        Passed - No positive pregnancy test in past 12 months             Franci Savage RN 07/25/23 8:49 AM  "

## 2023-07-25 NOTE — PROGRESS NOTES
ANTICOAGULATION MANAGEMENT     Eileen Donald 67 year old female is on warfarin with therapeutic INR result. (Goal INR 2.5-3.5)    Recent labs: (last 7 days)     07/25/23  1153   INR 2.9*       ASSESSMENT     Warfarin Lab Questionnaire    Warfarin Doses Last 7 Days      7/24/2023    11:30 PM   Dose in Tablet or Mg   TAB or MG? - 2.5mg warfarin tabs (evenings) milligram (mg)     Pt Rptd Dose SUNDAY MONDAY TUESDAY WED THURS FRIDAY SATURDAY 7/24/2023  11:30 PM 5 2.5 2.5 5 2.5 2.5 25         7/24/2023   Warfarin Lab Questionnaire   Missed doses within past 14 days? No - booster dose given on 7/17/23.     Changes in diet or alcohol within past 14 days? No   Medication changes since last result? No   Injuries or illness since last result? No   New shortness of breath, severe headaches or sudden changes in vision since last result? No   Abnormal bleeding since last result? No   Upcoming surgery, procedure? Yes   Please explain, date scheduled? 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    Additional findings: None       PLAN     Recommended plan for no diet, medication or health factor changes affecting INR     Dosing Instructions: Continue your current warfarin dose with next INR in 2 weeks       Summary  As of 7/25/2023      Full warfarin instructions:  5 mg every Mon, Thu; 2.5 mg all other days   Next INR check:  8/8/2023               Telephone call with Marilee who verbalizes understanding and agrees to plan    Lab visit scheduled    Education provided:   Taking warfarin: take warfarin at same time each day; preferably in the evening and Importance of taking warfarin as instructed  Goal range and lab monitoring: goal range and significance of current result  Dietary considerations: importance of consistent vitamin K intake    Plan made per [facility] anticoagulation protocol    Alondra Lora, RN  Anticoagulation  Clinic  7/25/2023    _______________________________________________________________________     Anticoagulation Episode Summary       Current INR goal:  2.5-3.5   TTR:  45.8 % (1 y)   Target end date:  Indefinite   Send INR reminders to:  [facility]    Indications    Heart valve replaced [Z95.2]  Paroxysmal atrial fibrillation (H) [I48.0]  S/P AVR (aortic valve replacement) [Z95.2]  Other pulmonary embolism without acute cor pulmonale  unspecified chronicity (H) (Resolved) [I26.99]  H/O aortic valve replacement [Z95.2]  Long term (current) use of anticoagulants [Z79.01]  Pulmonary embolism  other  unspecified chronicity  unspecified whether acute cor pulmonale present (H) (Resolved) [I26.99]             Comments:  12/15/21 - amended INR goal to 2.5 - 3.5             Anticoagulation Care Providers       Provider Role Specialty Phone number    Tito Salazar MD Referring Family Medicine [phone]    Drew Hahn MD Referring Family Medicine [phone]

## 2023-07-27 DIAGNOSIS — E78.5 DYSLIPIDEMIA: ICD-10-CM

## 2023-07-27 DIAGNOSIS — J30.2 SEASONAL ALLERGIES: ICD-10-CM

## 2023-07-28 RX ORDER — SIMVASTATIN 20 MG
20 TABLET ORAL AT BEDTIME
Qty: 90 TABLET | Refills: 1 | Status: SHIPPED | OUTPATIENT
Start: 2023-07-28 | End: 2023-12-14

## 2023-07-28 RX ORDER — MONTELUKAST SODIUM 10 MG/1
10 TABLET ORAL AT BEDTIME
Qty: 90 TABLET | Refills: 1 | Status: SHIPPED | OUTPATIENT
Start: 2023-07-28 | End: 2023-12-14

## 2023-07-28 NOTE — TELEPHONE ENCOUNTER
"Routing refill request to provider for review/approval because:  A break in medication with montelukast  Too soon for refill on the simvastatin      Last Written Prescription Date:  4/19/2023  Last Fill Quantity: 90,  # refills: 1   Last office visit provider:  6/27/2023     Requested Prescriptions   Pending Prescriptions Disp Refills    simvastatin (ZOCOR) 20 MG tablet 90 tablet 1     Sig: Take 1 tablet (20 mg) by mouth At Bedtime       Statins Protocol Passed - 7/28/2023  8:24 AM        Passed - LDL on file in past 12 months     Recent Labs   Lab Test 05/15/23  1551   LDL 86             Passed - No abnormal creatine kinase in past 12 months     No lab results found.             Passed - Recent (12 mo) or future (30 days) visit within the authorizing provider's specialty     Patient has had an office visit with the authorizing provider or a provider within the authorizing providers department within the previous 12 mos or has a future within next 30 days. See \"Patient Info\" tab in inbasket, or \"Choose Columns\" in Meds & Orders section of the refill encounter.              Passed - Medication is active on med list        Passed - Patient is age 18 or older        Passed - No active pregnancy on record        Passed - No positive pregnancy test in past 12 months      Last Written Prescription Date:  6/28/2022  Last Fill Quantity: 90,  # refills: 1   Last office visit provider:  6/27/2023     montelukast (SINGULAIR) 10 MG tablet 90 tablet 1     Sig: Take 1 tablet (10 mg) by mouth At Bedtime       Leukotriene Inhibitors Protocol Passed - 7/28/2023  8:24 AM        Passed - Patient is age 12 or older     If patient is under 16, ok to refill using age based dosing.           Passed - Asthma control assessment score within normal limits in last 6 months     Please review ACT score.           Passed - Medication is active on med list        Passed - Recent (6 mo) or future (30 days) visit within the authorizing provider's " "specialty     Patient had office visit in the last 6 months or has a visit in the next 30 days with authorizing provider or within the authorizing provider's specialty.  See \"Patient Info\" tab in inbasket, or \"Choose Columns\" in Meds & Orders section of the refill encounter.                 Franci Savage RN 07/28/23 8:25 AM  "

## 2023-07-31 ENCOUNTER — TRANSFERRED RECORDS (OUTPATIENT)
Dept: HEALTH INFORMATION MANAGEMENT | Facility: CLINIC | Age: 68
End: 2023-07-31
Payer: COMMERCIAL

## 2023-08-03 ENCOUNTER — TELEPHONE (OUTPATIENT)
Dept: FAMILY MEDICINE | Facility: CLINIC | Age: 68
End: 2023-08-03
Payer: COMMERCIAL

## 2023-08-03 NOTE — TELEPHONE ENCOUNTER
Patient should be seen for x-ray to evaluate for pneumonia because of her MS.  Please see if we can schedule her tomorrow.    Drew Hahn MD

## 2023-08-03 NOTE — TELEPHONE ENCOUNTER
Medication Question or Refill    Contacts         Type Contact Phone/Fax    08/03/2023 07:18 AM CDT Phone (Incoming) Marilee Donald (Self) [phone] (M)          What medication are you calling about (include dose and sig)?: Antibiotic, and cough syrup with codeine    [facility] Pharmacy:   [id number] [address] & [address] [address]  Phone: [phone] Fax: [phone]      Controlled Substance Agreement on file:   CSA -- Patient Level:    CSA: None found at the patient level.       Who prescribed the medication?: PCP    Do you need a refill? Yes    When did you use the medication last? Last month    Patient offered an appointment? No    Do you have any questions or concerns?  No      Could we send this information to you in [facility] or would you prefer to receive a phone call?:   Patient would prefer a phone call   Okay to leave a detailed message?: Yes at Cell number on file:    Telephone Information:   Mobile [phone]

## 2023-08-04 ENCOUNTER — OFFICE VISIT (OUTPATIENT)
Dept: FAMILY MEDICINE | Facility: CLINIC | Age: 68
End: 2023-08-04
Payer: COMMERCIAL

## 2023-08-04 ENCOUNTER — ANCILLARY PROCEDURE (OUTPATIENT)
Dept: GENERAL RADIOLOGY | Facility: CLINIC | Age: 68
End: 2023-08-04
Attending: FAMILY MEDICINE
Payer: COMMERCIAL

## 2023-08-04 VITALS
HEART RATE: 79 BPM | HEIGHT: 70 IN | SYSTOLIC BLOOD PRESSURE: 118 MMHG | OXYGEN SATURATION: 97 % | BODY MASS INDEX: 35.1 KG/M2 | RESPIRATION RATE: 28 BRPM | DIASTOLIC BLOOD PRESSURE: 60 MMHG | TEMPERATURE: 97.9 F | WEIGHT: 245.2 LBS

## 2023-08-04 DIAGNOSIS — J18.9 COMMUNITY ACQUIRED PNEUMONIA, UNSPECIFIED LATERALITY: ICD-10-CM

## 2023-08-04 DIAGNOSIS — Z86.711 HISTORY OF PULMONARY EMBOLISM: ICD-10-CM

## 2023-08-04 DIAGNOSIS — R05.8 PRODUCTIVE COUGH: ICD-10-CM

## 2023-08-04 DIAGNOSIS — G35 MULTIPLE SCLEROSIS (H): ICD-10-CM

## 2023-08-04 DIAGNOSIS — R05.8 PRODUCTIVE COUGH: Primary | ICD-10-CM

## 2023-08-04 LAB
BASOPHILS # BLD AUTO: 0.1 10E3/UL (ref 0–0.2)
BASOPHILS NFR BLD AUTO: 1 %
EOSINOPHIL # BLD AUTO: 0.3 10E3/UL (ref 0–0.7)
EOSINOPHIL NFR BLD AUTO: 3 %
ERYTHROCYTE [DISTWIDTH] IN BLOOD BY AUTOMATED COUNT: 13.6 % (ref 10–15)
HCT VFR BLD AUTO: 49 % (ref 35–47)
HGB BLD-MCNC: 15.2 G/DL (ref 11.7–15.7)
IMM GRANULOCYTES # BLD: 0 10E3/UL
IMM GRANULOCYTES NFR BLD: 0 %
LYMPHOCYTES # BLD AUTO: 2.1 10E3/UL (ref 0.8–5.3)
LYMPHOCYTES NFR BLD AUTO: 18 %
MCH RBC QN AUTO: 29.5 PG (ref 26.5–33)
MCHC RBC AUTO-ENTMCNC: 31 G/DL (ref 31.5–36.5)
MCV RBC AUTO: 95 FL (ref 78–100)
MONOCYTES # BLD AUTO: 0.8 10E3/UL (ref 0–1.3)
MONOCYTES NFR BLD AUTO: 7 %
NEUTROPHILS # BLD AUTO: 8.4 10E3/UL (ref 1.6–8.3)
NEUTROPHILS NFR BLD AUTO: 72 %
PLATELET # BLD AUTO: 379 10E3/UL (ref 150–450)
RBC # BLD AUTO: 5.15 10E6/UL (ref 3.8–5.2)
WBC # BLD AUTO: 11.7 10E3/UL (ref 4–11)

## 2023-08-04 PROCEDURE — 85025 COMPLETE CBC W/AUTO DIFF WBC: CPT | Performed by: FAMILY MEDICINE

## 2023-08-04 PROCEDURE — 71046 X-RAY EXAM CHEST 2 VIEWS: CPT | Mod: TC | Performed by: RADIOLOGY

## 2023-08-04 PROCEDURE — 36415 COLL VENOUS BLD VENIPUNCTURE: CPT | Performed by: FAMILY MEDICINE

## 2023-08-04 PROCEDURE — 99213 OFFICE O/P EST LOW 20 MIN: CPT | Performed by: FAMILY MEDICINE

## 2023-08-04 RX ORDER — MOXIFLOXACIN HYDROCHLORIDE 400 MG/1
400 TABLET ORAL DAILY
Qty: 5 TABLET | Refills: 0 | Status: SHIPPED | OUTPATIENT
Start: 2023-08-04 | End: 2023-12-28

## 2023-08-04 NOTE — PROGRESS NOTES
Assessment & Plan     ICD-10-CM    1. Productive cough  R05.8 CBC with platelets and differential     XR Chest 2 Views     CBC with platelets and differential      2. Multiple Sclerosis  G35       3. History of pulmonary embolism  Z86.711       4. Community acquired pneumonia, unspecified laterality  J18.9 moxifloxacin (AVELOX) 400 MG tablet        Complex medical decision making: Patient with multiple sclerosis, underlying restrictive lung disease, history of PE on blood thinners presents today for productive cough and sweats.  White count is elevated.  X-rays as reviewed by me shows some possible early pulm filtrates.  We will treat this as community-acquired pneumonia.  Note patient is allergic to doxycycline and azithromycin.  Will treat with quinolones.  Warning signs and symptoms discussed to seek help over the weekend.  Patient verbalizes understanding.  For wheezing, she does have albuterol inhaler that she will continue to use.  Moxifloxacin can potentially increase INR.  Reviewed patient's last INR on 7/25.  It was 2.9.  Her goal is between 2.5-3.5 due to mechanical valve.  Discussed this with the patient.  I think she is in a fair window where we do not need to decrease the medication.  However, I have still asked her to repeat her INR on Monday.  She will schedule a lab only appointment.       MEDICATIONS: Start antibiotic and otherwise, continue other current medications without change  See Patient Instructions    Drew Hahn MD  [facility]    William Hunt is a 67 year old, presenting for the following health issues:  Cough (Intermittent productive cough, clear phlegm, wheezing/SOB, x 2 months)        8/4/2023     2:58 PM   Additional Questions   Roomed by Melina QUEEN LPN       History of Present Illness       Reason for visit:  Cough and wheezing    She eats 0-1 servings of fruits and vegetables daily.She consumes 1 sweetened beverage(s) daily.She exercises with  "enough effort to increase her heart rate 9 or less minutes per day.  She exercises with enough effort to increase her heart rate 3 or less days per week.   She is taking medications regularly.         Review of Systems   Constitutional, HEENT, cardiovascular, pulmonary, GI, , musculoskeletal, neuro, skin, endocrine and psych systems are negative, except as otherwise noted.      Objective    /60   Pulse 79   Temp 97.9  F (36.6  C) (Oral)   Resp 28   Ht 1.778 m (5' 10\")   Wt 111.2 kg (245 lb 3.2 oz)   SpO2 97%   BMI 35.18 kg/m    Body mass index is 35.18 kg/m .  Physical Exam   GENERAL: alert and no distress  NECK: no adenopathy, no asymmetry, masses, or scars and thyroid normal to palpation  RESP: lungs clear to auscultation - no rales, rhonchi or wheezes  CV: regular rate and rhythm, normal S1 S2, no S3 or S4, no murmur, click or rub, no peripheral edema and peripheral pulses strong  ABDOMEN: soft, nontender, no hepatosplenomegaly, no masses and bowel sounds normal  MS: no gross musculoskeletal defects noted, no edema    Results for orders placed or performed in visit on 08/04/23 (from the past 24 hour(s))   CBC with platelets and differential    Narrative    The following orders were created for panel order CBC with platelets and differential.  Procedure                               Abnormality         Status                     ---------                               -----------         ------                     CBC with platelets and d...[[id number]]  Abnormal            Final result                 Please view results for these tests on the individual orders.   CBC with platelets and differential   Result Value Ref Range    WBC Count 11.7 (H) 4.0 - 11.0 10e3/uL    RBC Count 5.15 3.80 - 5.20 10e6/uL    Hemoglobin 15.2 11.7 - 15.7 g/dL    Hematocrit 49.0 (H) 35.0 - 47.0 %    MCV 95 78 - 100 fL    MCH 29.5 26.5 - 33.0 pg    MCHC 31.0 (L) 31.5 - 36.5 g/dL    RDW 13.6 10.0 - 15.0 %    Platelet Count " 379 150 - 450 10e3/uL    % Neutrophils 72 %    % Lymphocytes 18 %    % Monocytes 7 %    % Eosinophils 3 %    % Basophils 1 %    % Immature Granulocytes 0 %    Absolute Neutrophils 8.4 (H) 1.6 - 8.3 10e3/uL    Absolute Lymphocytes 2.1 0.8 - 5.3 10e3/uL    Absolute Monocytes 0.8 0.0 - 1.3 10e3/uL    Absolute Eosinophils 0.3 0.0 - 0.7 10e3/uL    Absolute Basophils 0.1 0.0 - 0.2 10e3/uL    Absolute Immature Granulocytes 0.0 <=0.4 10e3/uL     *Note: Due to a large number of results and/or encounters for the requested time period, some results have not been displayed. A complete set of results can be found in Results Review.                      Suturegard Intro: Intraoperative tissue expansion was performed, utilizing the SUTUREGARD device, in order to reduce wound tension.

## 2023-08-07 ENCOUNTER — LAB (OUTPATIENT)
Dept: LAB | Facility: CLINIC | Age: 68
End: 2023-08-07
Payer: COMMERCIAL

## 2023-08-07 ENCOUNTER — ANTICOAGULATION THERAPY VISIT (OUTPATIENT)
Dept: ANTICOAGULATION | Facility: CLINIC | Age: 68
End: 2023-08-07

## 2023-08-07 DIAGNOSIS — I26.99 PULMONARY EMBOLISM, OTHER, UNSPECIFIED CHRONICITY, UNSPECIFIED WHETHER ACUTE COR PULMONALE PRESENT (H): ICD-10-CM

## 2023-08-07 DIAGNOSIS — I10 PRIMARY HYPERTENSION: ICD-10-CM

## 2023-08-07 DIAGNOSIS — Z79.01 LONG TERM (CURRENT) USE OF ANTICOAGULANTS: ICD-10-CM

## 2023-08-07 DIAGNOSIS — Z95.2 S/P AVR (AORTIC VALVE REPLACEMENT): ICD-10-CM

## 2023-08-07 DIAGNOSIS — Z95.2 HEART VALVE REPLACED: Primary | ICD-10-CM

## 2023-08-07 DIAGNOSIS — I48.0 PAROXYSMAL ATRIAL FIBRILLATION (H): ICD-10-CM

## 2023-08-07 DIAGNOSIS — Z95.2 H/O AORTIC VALVE REPLACEMENT: ICD-10-CM

## 2023-08-07 LAB — INR BLD: 3.9 (ref 0.9–1.1)

## 2023-08-07 PROCEDURE — 85610 PROTHROMBIN TIME: CPT

## 2023-08-07 PROCEDURE — 36416 COLLJ CAPILLARY BLOOD SPEC: CPT

## 2023-08-07 RX ORDER — METOPROLOL TARTRATE 25 MG/1
25 TABLET, FILM COATED ORAL 2 TIMES DAILY
Qty: 180 TABLET | Refills: 3 | Status: SHIPPED | OUTPATIENT
Start: 2023-08-07 | End: 2024-08-12

## 2023-08-07 NOTE — PROGRESS NOTES
ANTICOAGULATION MANAGEMENT     Eileen Donald 67 year old female is on warfarin with supratherapeutic INR result. (Goal INR 2.5-3.5)    Recent labs: (last 7 days)     08/07/23  1221   INR 3.9*       ASSESSMENT     Source(s): Chart Review and Patient/Caregiver Call     Warfarin doses taken: Warfarin taken as instructed  Diet: No new diet changes identified  Medication/supplement changes: Yes.  Moxifloxacin 400mg daily  5 day course (dates: 8/4-9/23) which may be increasing INR today  New illness, injury, or hospitalization: Yes:   Reported cough has improved.  Advised to ensure she completes ABX.   OV on 8/4/23 for productive cough  Signs or symptoms of bleeding or clotting: No  Previous result: Therapeutic last INR visit at 2.9; previously outside of goal range at 1.9  Additional findings: None       PLAN     Recommended plan for temporary change(s) affecting INR     Dosing Instructions: partial hold then continue your current warfarin dose with next INR in 1-2 weeks       Summary  As of 8/7/2023      Full warfarin instructions:  8/7: 2.5 mg; Otherwise 5 mg every Mon, Thu; 2.5 mg all other days   Next INR check:  8/21/2023               Telephone call with Marilee who verbalizes understanding and agrees to plan    Lab visit scheduled - INR on 8/17/23 @ [facility].    Education provided:   Taking warfarin: Importance of taking warfarin as instructed  Goal range and lab monitoring: goal range and significance of current result  Interaction IS anticipated between warfarin and Moxifloxacin  Symptom monitoring: monitoring for bleeding signs and symptoms    Plan made per [facility] anticoagulation protocol    Alondra Lora RN  Anticoagulation Clinic  8/7/2023    _______________________________________________________________________     Anticoagulation Episode Summary       Current INR goal:  2.5-3.5   TTR:  47.0 % (1 y)   Target end date:  Indefinite   Send INR reminders to:  [facility]    [facility]    Heart  valve replaced [Z95.2]  Paroxysmal atrial fibrillation (H) [I48.0]  S/P AVR (aortic valve replacement) [Z95.2]  Other pulmonary embolism without acute cor pulmonale  unspecified chronicity (H) (Resolved) [I26.99]  H/O aortic valve replacement [Z95.2]  Long term (current) use of anticoagulants [Z79.01]  Pulmonary embolism  other  unspecified chronicity  unspecified whether acute cor pulmonale present (H) (Resolved) [I26.99]             Comments:  12/15/21 - amended INR goal to 2.5 - 3.5             Anticoagulation Care Providers       Provider Role Specialty Phone number    Tito Salazar MD Referring Family Medicine [phone]    Drew Hahn MD Referring Family Medicine [phone]

## 2023-08-08 NOTE — TELEPHONE ENCOUNTER
"Last Written Prescription Date:  4/14/23  Last Fill Quantity: 180,  # refills: 0   Last office visit provider:  8/4/23     Requested Prescriptions   Pending Prescriptions Disp Refills    metoprolol tartrate (LOPRESSOR) 25 MG tablet 180 tablet 0     Sig: Take 1 tablet (25 mg) by mouth 2 times daily       Beta-Blockers Protocol Passed - 8/7/2023  5:45 PM        Passed - Blood pressure under 140/90 in past 12 months     BP Readings from Last 3 Encounters:   08/04/23 118/60   06/27/23 117/76   06/07/23 117/84                 Passed - Patient is age 6 or older        Passed - Recent (12 mo) or future (30 days) visit within the authorizing provider's specialty     Patient has had an office visit with the authorizing provider or a provider within the authorizing providers department within the previous 12 mos or has a future within next 30 days. See \"Patient Info\" tab in inbasket, or \"Choose Columns\" in Meds & Orders section of the refill encounter.              Passed - Medication is active on med list             Yaima Rodrigues 08/07/23 7:16 PM  "

## 2023-08-15 DIAGNOSIS — Z95.2 H/O AORTIC VALVE REPLACEMENT: Primary | ICD-10-CM

## 2023-08-15 RX ORDER — AMOXICILLIN 500 MG/1
500 CAPSULE ORAL ONCE
Qty: 4 CAPSULE | Refills: 0 | Status: SHIPPED | OUTPATIENT
Start: 2023-08-15 | End: 2023-08-15

## 2023-08-15 NOTE — TELEPHONE ENCOUNTER
"Routing refill request to provider for review/approval because:  Drug not active on patient's medication list  Need confirmed diagnosis    Last Written Prescription Date:  NA  Last Fill Quantity: NA,  # refills: NA   Last office visit provider:  8/4/2023     Requested Prescriptions   Pending Prescriptions Disp Refills    amoxicillin (AMOXIL) 500 MG capsule 4 capsule 0     Sig: Take 1 capsule (500 mg) by mouth once for 1 dose       Oral Acne/Rosacea Medications Protocol Failed - 8/15/2023  1:11 PM        Failed - Confirmation of diagnosis is required     Please confirm diagnosis is acne or rosacea.     If NOT acne or rosacea; refer request to provider for further evaluation.    If diagnosis IS acne or rosacea, OK to refill BASED ON PREVIOUS REFILL CLINICAL NOTE RECOMMENDATION.          Failed - Medication is active on med list        Passed - Patient is 12 years of age or older        Passed - Recent (12 mo) or future (30 days) visit within the authorizing provider's specialty     Patient has had an office visit with the authorizing provider or a provider within the authorizing providers department within the previous 12 mos or has a future within next 30 days. See \"Patient Info\" tab in inbasket, or \"Choose Columns\" in Meds & Orders section of the refill encounter.              Passed - No active pregnancy on record        Passed - No positive prenancy test is past 12 months             Brittani Salazar RN 08/15/23 3:11 PM  "

## 2023-08-17 ENCOUNTER — ANTICOAGULATION THERAPY VISIT (OUTPATIENT)
Dept: ANTICOAGULATION | Facility: CLINIC | Age: 68
End: 2023-08-17

## 2023-08-17 ENCOUNTER — LAB (OUTPATIENT)
Dept: LAB | Facility: CLINIC | Age: 68
End: 2023-08-17
Payer: COMMERCIAL

## 2023-08-17 DIAGNOSIS — Z79.01 LONG TERM (CURRENT) USE OF ANTICOAGULANTS: ICD-10-CM

## 2023-08-17 DIAGNOSIS — Z95.2 S/P AVR (AORTIC VALVE REPLACEMENT): ICD-10-CM

## 2023-08-17 DIAGNOSIS — I48.0 PAROXYSMAL ATRIAL FIBRILLATION (H): ICD-10-CM

## 2023-08-17 DIAGNOSIS — I26.99 PULMONARY EMBOLISM, OTHER, UNSPECIFIED CHRONICITY, UNSPECIFIED WHETHER ACUTE COR PULMONALE PRESENT (H): ICD-10-CM

## 2023-08-17 DIAGNOSIS — Z95.2 HEART VALVE REPLACED: Primary | ICD-10-CM

## 2023-08-17 DIAGNOSIS — Z95.2 H/O AORTIC VALVE REPLACEMENT: ICD-10-CM

## 2023-08-17 LAB — INR BLD: 3.6 (ref 0.9–1.1)

## 2023-08-17 PROCEDURE — 85610 PROTHROMBIN TIME: CPT

## 2023-08-17 PROCEDURE — 36416 COLLJ CAPILLARY BLOOD SPEC: CPT

## 2023-08-17 NOTE — PROGRESS NOTES
ANTICOAGULATION MANAGEMENT     Eileen Donald 67 year old female is on warfarin with supratherapeutic INR result. (Goal INR 2.5-3.5)    Recent labs: (last 7 days)     08/17/23  1239   INR 3.6*       ASSESSMENT     Warfarin Lab Questionnaire    Warfarin Doses Last 7 Days      8/16/2023     4:32 PM   Dose in Tablet or Mg   TAB or MG? - 2.5mg warfarin tabs (evenings) tablet (tab)     Pt Rptd Dose SUNDAY MONDAY TUESDAY WED THURS FRIDAY SATURDAY 8/16/2023   4:32 PM 2.5 2.5 5 2.5 2.5 2.5 5         8/16/2023   Warfarin Lab Questionnaire   Missed doses within past 14 days? No - partial warfarin HELD on 8/7/23.     Changes in diet or alcohol within past 14 days? No   Medication changes since last result? No  - had been taking Tylenol for leg cramping.  But is better now.         - completed 5 day course with Moxifloxacin on 8/9/23. For productive cough.     Injuries or illness since last result? No   - not coughing anymore, however her legs were cramping and had taken Tylenol.     New shortness of breath, severe headaches or sudden changes in vision since last result? No   Abnormal bleeding since last result? No   Upcoming surgery, procedure? Yes   Please explain, date scheduled? Sept 6  - skin cancer removal of wrist on left wrist areq.   And Dental woork unsue of date d/t a bad tooth.     Best number to call with results? [phone]     Previous result: Supratherapeutic at 3.9 on 8/7/23.    Additional findings: None       PLAN     Recommended plan for temporary change(s) affecting INR     Dosing Instructions: partial hold then continue your current warfarin dose with next INR in 1-2 weeks       Summary  As of 8/17/2023      Full warfarin instructions:  8/17: 2.5 mg; Otherwise 5 mg every Mon, Thu; 2.5 mg all other days   Next INR check:  8/31/2023               Telephone call with Marilee who verbalizes understanding and agrees to plan    Lab visit scheduled - INR on 8/31/23 @ [facility].    Education provided:   Taking  warfarin: Importance of taking warfarin as instructed  Goal range and lab monitoring: goal range and significance of current result  Interaction IS anticipated between warfarin and Tylenol  Symptom monitoring: monitoring for bleeding signs and symptoms    Plan made per [facility] anticoagulation protocol    Alondra Lora RN  Anticoagulation Clinic  8/17/2023    _______________________________________________________________________     Anticoagulation Episode Summary       Current INR goal:  2.5-3.5   TTR:  44.2 % (1 y)   Target end date:  Indefinite   Send INR reminders to:  [facility] [place]    Indications    Heart valve replaced [Z95.2]  Paroxysmal atrial fibrillation (H) [I48.0]  S/P AVR (aortic valve replacement) [Z95.2]  Other pulmonary embolism without acute cor pulmonale  unspecified chronicity (H) (Resolved) [I26.99]  H/O aortic valve replacement [Z95.2]  Long term (current) use of anticoagulants [Z79.01]  Pulmonary embolism  other  unspecified chronicity  unspecified whether acute cor pulmonale present (H) (Resolved) [I26.99]             Comments:  12/15/21 - amended INR goal to 2.5 - 3.5             Anticoagulation Care Providers       Provider Role Specialty Phone number    Tito Salazar MD Referring Family Medicine [phone]    Drew Hahn MD Referring Family Medicine [phone]

## 2023-08-18 NOTE — PROGRESS NOTES
ANTICOAGULATION MANAGEMENT     Eileen Donald 66 year old female is on warfarin with subtherapeutic INR result. (Goal INR 2.5-3.5)    Recent labs: (last 7 days)     04/29/22  1011   INR 1.80*       ASSESSMENT       Source(s): Chart Review and Patient/Caregiver Call       Warfarin doses taken: Warfarin recently held for 1 day which may be affecting INR    Held warfarin dose on 4/26 as instructed.    Diet: No new diet changes identified    New illness, injury, or hospitalization: No    Medication/supplement changes:  No    Continues to use Voltaren gel twice a day or PRN.  Reported the effectiveness of controlling her hip pains.    Signs or symptoms of bleeding or clotting: No    Previous INR: Supratherapeutic at 5.7 on 4/26/22.    Additional findings: None       PLAN     Recommended plan for ongoing change(s) affecting INR     Dosing Instructions:    continue your current warfarin dose with next INR in 1 week      Summary  As of 4/29/2022    Full warfarin instructions:  5 mg every Tue, Fri; 2.5 mg all other days   Next INR check:  5/6/2022             Telephone call with  Marilee ([phone]) who verbalizes understanding and agrees to plan    Lab visit scheduled - INR on 5/922 @ [facility].    Education provided: Importance of consistent vitamin K intake and Goal range and significance of current result    Plan made with [facility] Pharmacist Renee Lora, RN  Anticoagulation Clinic  4/29/2022    _______________________________________________________________________     Anticoagulation Episode Summary     Current INR goal:  2.5-3.5   TTR:  45.0 % (11.7 mo)   Target end date:  Indefinite   Send INR reminders to:  [facility]    Indications    Heart valve replaced [Z95.2]  Paroxysmal atrial fibrillation (H) [I48.0]  S/P AVR (aortic valve replacement) [Z95.2]  Other pulmonary embolism without acute cor pulmonale  unspecified chronicity (H) [I26.99]  H/O aortic valve replacement [Z95.2]            Comments:  12/15/21 - amended INR goal to 2.5 - 3.5         Anticoagulation Care Providers     Provider Role Specialty Phone number    Tito Salazar MD Referring Family Medicine [phone]    Drew Hahn MD Referring Family Medicine [phone]           same name as above

## 2023-08-31 ENCOUNTER — LAB (OUTPATIENT)
Dept: LAB | Facility: CLINIC | Age: 68
End: 2023-08-31
Payer: COMMERCIAL

## 2023-08-31 ENCOUNTER — ANTICOAGULATION THERAPY VISIT (OUTPATIENT)
Dept: ANTICOAGULATION | Facility: CLINIC | Age: 68
End: 2023-08-31

## 2023-08-31 DIAGNOSIS — Z95.2 H/O AORTIC VALVE REPLACEMENT: ICD-10-CM

## 2023-08-31 DIAGNOSIS — Z95.2 HEART VALVE REPLACED: Primary | ICD-10-CM

## 2023-08-31 DIAGNOSIS — Z95.2 S/P AVR (AORTIC VALVE REPLACEMENT): ICD-10-CM

## 2023-08-31 DIAGNOSIS — I48.0 PAROXYSMAL ATRIAL FIBRILLATION (H): ICD-10-CM

## 2023-08-31 DIAGNOSIS — Z79.01 LONG TERM (CURRENT) USE OF ANTICOAGULANTS: ICD-10-CM

## 2023-08-31 DIAGNOSIS — I26.99 PULMONARY EMBOLISM, OTHER, UNSPECIFIED CHRONICITY, UNSPECIFIED WHETHER ACUTE COR PULMONALE PRESENT (H): ICD-10-CM

## 2023-08-31 LAB — INR BLD: 3.9 (ref 0.9–1.1)

## 2023-08-31 PROCEDURE — 36416 COLLJ CAPILLARY BLOOD SPEC: CPT

## 2023-08-31 PROCEDURE — 85610 PROTHROMBIN TIME: CPT

## 2023-08-31 NOTE — PROGRESS NOTES
ANTICOAGULATION MANAGEMENT     Eileen Donald 67 year old female is on warfarin with supratherapeutic INR result. (Goal INR 2.5-3.5)    Recent labs: (last 7 days)     08/31/23  1223   INR 3.9*       ASSESSMENT     Warfarin Lab Questionnaire    Warfarin Doses Last 7 Days      8/30/2023     4:10 PM   Dose in Tablet or Mg   TAB or MG? - 2.5mg warfarin tablets (evenings) milligram (mg)     Pt Rptd Dose SUNDAY MONDAY TUESDAY WED THURS FRIDAY SATURDAY 8/30/2023   4:10 PM 25 25 5 2.5 2.5 2.5 5         8/30/2023   Warfarin Lab Questionnaire   Missed doses within past 14 days? No - taken differently, but not affecting weekly dose.         (Will verify with patient)   Changes in diet or alcohol within past 14 days? No   Medication changes since last result? Yes - reported leg pains and taking more Tylenol.   Please list: Used more Tylenol then normally     Injuries or illness since last result? No -    New shortness of breath, severe headaches or sudden changes in vision since last result? No   Abnormal bleeding since last result? No   Upcoming surgery, procedure? Yes - Dermatology procedure - had biopsy left wrist showed 1 out 5 was positive for skin cancer cell.   Please explain, date scheduled? Sept 6 surgery on  left lower arm, and dental appt 9/1/23.       Best number to call with results? [phone]     Previous result: Supratherapeutic last 2 INRs results.    Additional findings: None       PLAN     Recommended plan for temporary change(s) and ongoing change(s) affecting INR     Dosing Instructions: partial hold then decrease your warfarin dose (11.1% change) with next INR in 1-2 weeks       Summary  As of 8/31/2023      Full warfarin instructions:  8/31: 2.5 mg; Otherwise 3.75 mg every Tue, Sat; 2.5 mg all other days   Next INR check:  9/14/2023               Telephone call with Marilee who verbalizes understanding and agrees to plan    Lab visit scheduled    Education provided:   Taking warfarin: Importance of  taking warfarin as instructed  Goal range and lab monitoring: goal range and significance of current result  Interaction IS anticipated between warfarin and taking more Tylenol  Symptom monitoring: monitoring for bleeding signs and symptoms    Plan made with [facility] Pharmacist Trish Lora, RN  Anticoagulation Clinic  8/31/2023    _______________________________________________________________________     Anticoagulation Episode Summary       Current INR goal:  2.5-3.5   TTR:  40.4 % (1 y)   Target end date:  Indefinite   Send INR reminders to:  [facility]    Indications    Heart valve replaced [Z95.2]  Paroxysmal atrial fibrillation (H) [I48.0]  S/P AVR (aortic valve replacement) [Z95.2]  Other pulmonary embolism without acute cor pulmonale  unspecified chronicity (H) (Resolved) [I26.99]  H/O aortic valve replacement [Z95.2]  Long term (current) use of anticoagulants [Z79.01]  Pulmonary embolism  other  unspecified chronicity  unspecified whether acute cor pulmonale present (H) (Resolved) [I26.99]             Comments:  12/15/21 - amended INR goal to 2.5 - 3.5             Anticoagulation Care Providers       Provider Role Specialty Phone number    Tito Salazar MD Referring Family Medicine [phone]    Drew Hahn MD Referring Family Medicine [phone]

## 2023-09-12 DIAGNOSIS — F33.41 RECURRENT MAJOR DEPRESSIVE DISORDER, IN PARTIAL REMISSION (H): ICD-10-CM

## 2023-09-12 RX ORDER — VENLAFAXINE HYDROCHLORIDE 75 MG/1
CAPSULE, EXTENDED RELEASE ORAL
Qty: 90 CAPSULE | Refills: 2 | OUTPATIENT
Start: 2023-09-12

## 2023-09-14 ENCOUNTER — ANTICOAGULATION THERAPY VISIT (OUTPATIENT)
Dept: ANTICOAGULATION | Facility: CLINIC | Age: 68
End: 2023-09-14

## 2023-09-14 ENCOUNTER — LAB (OUTPATIENT)
Dept: LAB | Facility: CLINIC | Age: 68
End: 2023-09-14
Payer: COMMERCIAL

## 2023-09-14 DIAGNOSIS — Z95.2 HEART VALVE REPLACED: Primary | ICD-10-CM

## 2023-09-14 DIAGNOSIS — Z95.2 S/P AVR (AORTIC VALVE REPLACEMENT): ICD-10-CM

## 2023-09-14 DIAGNOSIS — I48.0 PAROXYSMAL ATRIAL FIBRILLATION (H): ICD-10-CM

## 2023-09-14 DIAGNOSIS — Z79.01 LONG TERM (CURRENT) USE OF ANTICOAGULANTS: ICD-10-CM

## 2023-09-14 DIAGNOSIS — Z95.2 H/O AORTIC VALVE REPLACEMENT: ICD-10-CM

## 2023-09-14 DIAGNOSIS — I26.99 PULMONARY EMBOLISM, OTHER, UNSPECIFIED CHRONICITY, UNSPECIFIED WHETHER ACUTE COR PULMONALE PRESENT (H): ICD-10-CM

## 2023-09-14 DIAGNOSIS — E11.69 TYPE 2 DIABETES MELLITUS WITH OTHER SPECIFIED COMPLICATION, UNSPECIFIED WHETHER LONG TERM INSULIN USE (H): Primary | ICD-10-CM

## 2023-09-14 LAB — INR BLD: 3.2 (ref 0.9–1.1)

## 2023-09-14 PROCEDURE — 85610 PROTHROMBIN TIME: CPT

## 2023-09-14 PROCEDURE — 36416 COLLJ CAPILLARY BLOOD SPEC: CPT

## 2023-09-14 NOTE — PROGRESS NOTES
ANTICOAGULATION MANAGEMENT     Eileen Donald 67 year old female is on warfarin with therapeutic INR result. (Goal INR 2.5-3.5)    Recent labs: (last 7 days)     09/14/23  1159   INR 3.2*       ASSESSMENT     Warfarin Lab Questionnaire    Warfarin Doses Last 7 Days      9/13/2023     1:32 PM   Dose in Tablet or Mg   TAB or MG? -  2.5mg warfarin tablets (evenings)  milligram (mg)     Pt Rptd Dose SUNDAY MONDAY TUESDAY WED THURS FRIDAY SATURDAY 9/13/2023   1:32 PM 25 2.5 5 2.5 2.5 2.5 5         9/13/2023   Warfarin Lab Questionnaire   Missed doses within past 14 days? No - However, template dosing is wrong above and she verbally verified back dose and taking more warfarin dose, than instructed.        - partial warfarin dose on 8/31/23 and also decreased weekly warfarin.     Changes in diet or alcohol within past 14 days? No   Medication changes since last result? No - reported taking less warfarin in the last one week.     Injuries or illness since last result? No   New shortness of breath, severe headaches or sudden changes in vision since last result? No   Abnormal bleeding since last result? No   Upcoming surgery, procedure? No     Previous result: Supratherapeutic last 3 INR results.    Additional findings: None       PLAN     Recommended plan for temporary change(s) affecting INR     Dosing Instructions: Continue your current warfarin dose with next INR in 1-2 weeks       Summary  As of 9/14/2023      Full warfarin instructions:  3.75 mg every Tue, Sat; 2.5 mg all other days   Next INR check:  9/28/2023               Telephone call with Marilee who verbalizes understanding and agrees to plan   - had been taking more warfarin than ordered.  Will check INR sooner than 2 wks.    Lab visit scheduled - INR on 9/25/23 @ [facility].    Education provided:   Taking warfarin: Importance of taking warfarin as instructed  Goal range and lab monitoring: goal range and significance of current result  Interaction IS anticipated  between warfarin and taking less Tylenol than usual    Plan made per [facility] anticoagulation protocol    Alondra Lora, RN  Anticoagulation Clinic  9/14/2023    _______________________________________________________________________     Anticoagulation Episode Summary       Current INR goal:  2.5-3.5   TTR:  39.7 % (1 y)   Target end date:  Indefinite   Send INR reminders to:  [facility]    Indications    Heart valve replaced [Z95.2]  Paroxysmal atrial fibrillation (H) [I48.0]  S/P AVR (aortic valve replacement) [Z95.2]  Other pulmonary embolism without acute cor pulmonale  unspecified chronicity (H) (Resolved) [I26.99]  H/O aortic valve replacement [Z95.2]  Long term (current) use of anticoagulants [Z79.01]  Pulmonary embolism  other  unspecified chronicity  unspecified whether acute cor pulmonale present (H) (Resolved) [I26.99]             Comments:  12/15/21 - amended INR goal to 2.5 - 3.5             Anticoagulation Care Providers       Provider Role Specialty Phone number    Tito Salazar MD Referring Family Medicine [phone]    Drew Hahn MD Referring Family Medicine [phone]

## 2023-09-18 DIAGNOSIS — F33.41 RECURRENT MAJOR DEPRESSIVE DISORDER, IN PARTIAL REMISSION (H): ICD-10-CM

## 2023-09-19 RX ORDER — VENLAFAXINE HYDROCHLORIDE 75 MG/1
CAPSULE, EXTENDED RELEASE ORAL
Qty: 90 CAPSULE | Refills: 2 | Status: SHIPPED | OUTPATIENT
Start: 2023-09-19 | End: 2024-02-19

## 2023-09-22 ENCOUNTER — NURSE TRIAGE (OUTPATIENT)
Dept: FAMILY MEDICINE | Facility: CLINIC | Age: 68
End: 2023-09-22
Payer: COMMERCIAL

## 2023-09-22 DIAGNOSIS — I48.0 PAROXYSMAL ATRIAL FIBRILLATION (H): ICD-10-CM

## 2023-09-22 DIAGNOSIS — I26.99 PULMONARY EMBOLISM, OTHER, UNSPECIFIED CHRONICITY, UNSPECIFIED WHETHER ACUTE COR PULMONALE PRESENT (H): ICD-10-CM

## 2023-09-22 DIAGNOSIS — Z95.2 S/P AVR (AORTIC VALVE REPLACEMENT): ICD-10-CM

## 2023-09-22 DIAGNOSIS — Z79.01 LONG TERM (CURRENT) USE OF ANTICOAGULANTS: ICD-10-CM

## 2023-09-22 NOTE — TELEPHONE ENCOUNTER
Nurse Triage SBAR    Is this a 2nd Level Triage? NO    Situation: Patient was notified that she was possible exposed to a covid positive individual on Wednesday 9/20.     Background: Patient rode in a car for about 2 cumulative hours without PPE on Wednesday 9/20    Assessment: Patient is asymptomatic and otherwise feeling okay.    Protocol Recommended Disposition:   Home Care    Recommendation: Recommended that patient take covid test and wear a well fitting mask  for 10 days. Re-test if any symptoms occur and call right away for possible treatment.    NA    Does the patient meet one of the following criteria for ADS visit consideration? 16+ years old, with an [facility] PCP     TIP  Providers, please consider if this condition is appropriate for management at one of our Acute and Diagnostic Services sites.     If patient is a good candidate, please use dotphrase <dot>triageresponse and select Refer to ADS to document.  Reason for Disposition   COVID-19 EXPOSURE within last 14 days and NO symptoms    Additional Information   Negative: COVID-19 lab test positive   Negative: Lives with someone known to have influenza (flu test positive) and flu-like symptoms (e.g., cough, runny nose, sore throat, SOB; with or without fever)   Negative: Symptoms of COVID-19 (e.g., cough, fever, SOB, or others) and doctor (or NP/PA) diagnosed COVID-19 based on symptoms   Negative: Symptoms of COVID-19 (e.g., cough, fever, SOB, or others) and within 14 days of COVID-19 EXPOSURE   Negative: Symptoms of COVID-19 (e.g., cough, fever, SOB, or others) and within 14 days of being in a high-risk area for COVID-19 community spread (identified by CDC)   Negative: Difficulty breathing (shortness of breath) occurs and onset > 14 days after COVID-19 EXPOSURE   Negative: Cough occurs and onset > 14 days after COVID-19 EXPOSURE   Negative: Common cold symptoms and onset > 14 days after COVID-19 EXPOSURE   Negative: COVID-19 vaccine reaction suspected  "(e.g., fever, headache, muscle aches) occurring during days 1-3 after getting vaccine   Negative: COVID-19 vaccine, questions about   Negative: COVID-19 EXPOSURE within last 14 days and requests COVID-19 lab test to return to work and NO symptoms   Negative: COVID-19 EXPOSURE within last 14 days and weak immune system (e.g., HIV positive, cancer chemo, splenectomy, organ transplant, chronic steroids) and NO symptoms   Negative: Living or working in a correctional facility, long-term care facility, or shelter (i.e., group setting; densely populated) where an outbreak has occurred and NO symptoms    Answer Assessment - Initial Assessment Questions  1. COVID-19 EXPOSURE: \"Please describe how you were exposed to someone with a COVID-19 infection.\"      Exposed to a sister Wednesday, dipika in a car  2. PLACE of CONTACT: \"Where were you when you were exposed to COVID-19?\" (e.g., home, school, medical waiting room; which city?)      In a car  3. TYPE of CONTACT: \"How much contact was there?\" (e.g., sitting next to, live in same house, work in same office, same building)      Substantial  4. DURATION of CONTACT: \"How long were you in contact with the COVID-19 patient?\" (e.g., a few seconds, passed by person, a few minutes, 15 minutes or longer, live with the patient)      2 hours  5. MASK: \"Were you wearing a mask?\" \"Was the other person wearing a mask?\" Note: wearing a mask reduces the risk of an otherwise close contact.      No  6. DATE of CONTACT: \"When did you have contact with a COVID-19 patient?\" (e.g., how many days ago)      Wednesday 9/20/23  7. COMMUNITY SPREAD: \"Do you live in or have you traveled to an area where there are lots of COVID-19 cases (community spread)?\" (See public health department website, if unsure)        Yes  8. SYMPTOMS: \"Do you have any symptoms?\" (e.g., fever, cough, breathing difficulty, loss of taste or smell)      No  9. VACCINE: \"Have you gotten the COVID-19 vaccine?\" If Yes, ask: \"Which " "one, how many shots, when did you get it?\"      Yes  10. PREGNANCY OR POSTPARTUM: \"Is there any chance you are pregnant?\" \"When was your last menstrual period?\" \"Did you deliver in the last 2 weeks?\"        No  11. HIGH RISK: \"Do you have any heart or lung problems?\" (e.g., asthma, COPD, heart failure) \"Do you have a weak immune system or other risk factors?\" (e.g., HIV positive, chemotherapy, renal failure, diabetes mellitus, sickle cell anemia, obesity)        High risk    Protocols used: Coronavirus (COVID-19) Exposure-A-[place]    "

## 2023-09-25 ENCOUNTER — ANTICOAGULATION THERAPY VISIT (OUTPATIENT)
Dept: ANTICOAGULATION | Facility: CLINIC | Age: 68
End: 2023-09-25

## 2023-09-25 ENCOUNTER — LAB (OUTPATIENT)
Dept: LAB | Facility: CLINIC | Age: 68
End: 2023-09-25
Payer: COMMERCIAL

## 2023-09-25 DIAGNOSIS — Z20.822 EXPOSURE TO 2019 NOVEL CORONAVIRUS: Primary | ICD-10-CM

## 2023-09-25 DIAGNOSIS — I48.0 PAROXYSMAL ATRIAL FIBRILLATION (H): ICD-10-CM

## 2023-09-25 DIAGNOSIS — Z95.2 S/P AVR (AORTIC VALVE REPLACEMENT): ICD-10-CM

## 2023-09-25 DIAGNOSIS — Z95.2 H/O AORTIC VALVE REPLACEMENT: ICD-10-CM

## 2023-09-25 DIAGNOSIS — Z79.01 LONG TERM (CURRENT) USE OF ANTICOAGULANTS: ICD-10-CM

## 2023-09-25 DIAGNOSIS — Z20.822 EXPOSURE TO 2019 NOVEL CORONAVIRUS: ICD-10-CM

## 2023-09-25 DIAGNOSIS — E11.69 TYPE 2 DIABETES MELLITUS WITH OTHER SPECIFIED COMPLICATION, UNSPECIFIED WHETHER LONG TERM INSULIN USE (H): ICD-10-CM

## 2023-09-25 DIAGNOSIS — I26.99 PULMONARY EMBOLISM, OTHER, UNSPECIFIED CHRONICITY, UNSPECIFIED WHETHER ACUTE COR PULMONALE PRESENT (H): ICD-10-CM

## 2023-09-25 DIAGNOSIS — Z95.2 HEART VALVE REPLACED: Primary | ICD-10-CM

## 2023-09-25 LAB
HBA1C MFR BLD: 5.9 % (ref 0–5.6)
INR BLD: 3.8 (ref 0.9–1.1)

## 2023-09-25 PROCEDURE — 36415 COLL VENOUS BLD VENIPUNCTURE: CPT

## 2023-09-25 PROCEDURE — 85610 PROTHROMBIN TIME: CPT

## 2023-09-25 PROCEDURE — G0008 ADMIN INFLUENZA VIRUS VAC: HCPCS

## 2023-09-25 PROCEDURE — 83036 HEMOGLOBIN GLYCOSYLATED A1C: CPT

## 2023-09-25 PROCEDURE — 90662 IIV NO PRSV INCREASED AG IM: CPT

## 2023-09-25 PROCEDURE — 87635 SARS-COV-2 COVID-19 AMP PRB: CPT

## 2023-09-25 NOTE — PROGRESS NOTES
ANTICOAGULATION MANAGEMENT     Eileen Donald 67 year old female is on warfarin with supratherapeutic INR result. (Goal INR 2.5-3.5)    Recent labs: (last 7 days)     09/25/23  1140   INR 3.8*       ASSESSMENT     Warfarin Lab Questionnaire    Warfarin Doses Last 7 Days      9/24/2023     6:12 PM   Dose in Tablet or Mg   TAB or MG? - 2.5mg warfarin tablets (evenings) milligram (mg)     Pt Rptd Dose SUNDAY MONDAY TUESDAY WED THURS FRIDAY SATURDAY 9/24/2023   6:12 PM 2.5 2.5 5 2.5 2.5 2.5 5         9/24/2023   Warfarin Lab Questionnaire   Missed doses within past 14 days? No   Changes in diet or alcohol within past 14 days? No   Medication changes since last result? No   Injuries or illness since last result? No -  she is asymptomatic for covid.        - covid testing still pending 9/25/23.        - on 9/22/23, she reported being exposed to some who tested positive for covid and was not masked.        New shortness of breath, severe headaches or sudden changes in vision since last result? No   Abnormal bleeding since last result? No   Upcoming surgery, procedure? No     Previous result: Therapeutic last visit at 3.2; previously outside of goal range last 3 INRs results (3.9, 3.6, and 3.9)    Additional findings: None       PLAN     Recommended plan for no diet, medication or health factor changes affecting INR     Dosing Instructions: decrease your warfarin dose (6.2% change) with next INR in 1-2 weeks       Summary  As of 9/25/2023      Full warfarin instructions:  3.75 mg every Sat; 2.5 mg all other days   Next INR check:  10/9/2023               Telephone call with Marilee who verbalizes understanding and agrees to plan.    Lab visit scheduled - INR on 10/9/23 @ [email or web address]    Education provided:   Taking warfarin: Importance of taking warfarin as instructed  Goal range and lab monitoring: goal range and significance of current result    Plan made with [facility] Pharmacist Trish Lora,  RN  Anticoagulation Clinic  9/25/2023    _______________________________________________________________________     Anticoagulation Episode Summary       Current INR goal:  2.5-3.5   TTR:  40.2 % (1 y)   Target end date:  Indefinite   Send INR reminders to:  [facility]    Indications    Heart valve replaced [Z95.2]  Paroxysmal atrial fibrillation (H) [I48.0]  S/P AVR (aortic valve replacement) [Z95.2]  Other pulmonary embolism without acute cor pulmonale  unspecified chronicity (H) (Resolved) [I26.99]  H/O aortic valve replacement [Z95.2]  Long term (current) use of anticoagulants [Z79.01]  Pulmonary embolism  other  unspecified chronicity  unspecified whether acute cor pulmonale present (H) (Resolved) [I26.99]             Comments:  12/15/21 - amended INR goal to 2.5 - 3.5             Anticoagulation Care Providers       Provider Role Specialty Phone number    Tito Salazar MD Referring Family Medicine [phone]    Drew Hahn MD Referring Family Medicine [phone]

## 2023-09-26 LAB — SARS-COV-2 RNA RESP QL NAA+PROBE: NEGATIVE

## 2023-10-02 ENCOUNTER — TRANSFERRED RECORDS (OUTPATIENT)
Dept: HEALTH INFORMATION MANAGEMENT | Facility: CLINIC | Age: 68
End: 2023-10-02

## 2023-10-09 ENCOUNTER — DOCUMENTATION ONLY (OUTPATIENT)
Dept: FAMILY MEDICINE | Facility: CLINIC | Age: 68
End: 2023-10-09

## 2023-10-09 ENCOUNTER — ANTICOAGULATION THERAPY VISIT (OUTPATIENT)
Dept: ANTICOAGULATION | Facility: CLINIC | Age: 68
End: 2023-10-09

## 2023-10-09 ENCOUNTER — LAB (OUTPATIENT)
Dept: LAB | Facility: CLINIC | Age: 68
End: 2023-10-09
Payer: COMMERCIAL

## 2023-10-09 DIAGNOSIS — Z95.2 HEART VALVE REPLACED: Primary | ICD-10-CM

## 2023-10-09 DIAGNOSIS — Z95.2 S/P AVR (AORTIC VALVE REPLACEMENT): ICD-10-CM

## 2023-10-09 DIAGNOSIS — Z95.2 H/O AORTIC VALVE REPLACEMENT: ICD-10-CM

## 2023-10-09 DIAGNOSIS — I48.0 PAROXYSMAL ATRIAL FIBRILLATION (H): ICD-10-CM

## 2023-10-09 DIAGNOSIS — I26.99 PULMONARY EMBOLISM, OTHER, UNSPECIFIED CHRONICITY, UNSPECIFIED WHETHER ACUTE COR PULMONALE PRESENT (H): Primary | ICD-10-CM

## 2023-10-09 DIAGNOSIS — Z79.01 LONG TERM (CURRENT) USE OF ANTICOAGULANTS: ICD-10-CM

## 2023-10-09 DIAGNOSIS — I26.99 PULMONARY EMBOLISM, OTHER, UNSPECIFIED CHRONICITY, UNSPECIFIED WHETHER ACUTE COR PULMONALE PRESENT (H): ICD-10-CM

## 2023-10-09 LAB — INR BLD: 3 (ref 0.9–1.1)

## 2023-10-09 PROCEDURE — 36416 COLLJ CAPILLARY BLOOD SPEC: CPT

## 2023-10-09 PROCEDURE — 85610 PROTHROMBIN TIME: CPT

## 2023-10-09 NOTE — PROGRESS NOTES
ANTICOAGULATION CLINIC REFERRAL RENEWAL REQUEST       An annual renewal order is required for all patients referred to [facility] Anticoagulation Clinic.?  Please review and sign the pended referral order for Eileen Donald.       ANTICOAGULATION SUMMARY      Warfarin indication(s)   Atrial Fibrillation, paroxysmal, Mechanical AVR ([place] [facility]), and PE    Mechanical heart valve present?  Mechanical  AVR-Bileaflet       Current goal range   INR: 2.5-3.5     Goal appropriate for indication? Goal INR 2.5-3.5 standard for indication(s) above     Time in Therapeutic Range (TTR)  (Goal > 60%) 41.3 %       Office visit with referring provider's group within last year Yes on 8/4/2023       Alondra Lora RN  [facility] Anticoagulation Clinic

## 2023-10-09 NOTE — PROGRESS NOTES
ANTICOAGULATION MANAGEMENT     Eileen Donald 67 year old female is on warfarin with therapeutic INR result. (Goal INR 2.5-3.5)    Recent labs: (last 7 days)     10/09/23  1204   INR 3.0*       ASSESSMENT     Warfarin Lab Questionnaire    Warfarin Doses Last 7 Days      10/9/2023     9:03 AM   Dose in Tablet or Mg   TAB or MG? -  2.5mg warfarin tablets (evenings)  milligram (mg)     Pt Rptd Dose STAS MONDAY TUESDAY WED THURS FRIDAY SATURDAY   10/9/2023   9:03 AM 2.5 2.5 5 2.5 2.5 2.5 2.5         10/9/2023   Warfarin Lab Questionnaire   Missed doses within past 14 days? No - verfied taking correct warfarin dose.        - weekly warfarin dose decreased on 9/25/23.     Changes in diet or alcohol within past 14 days? No   Medication changes since last result? No   Injuries or illness since last result? No   New shortness of breath, severe headaches or sudden changes in vision since last result? No   Abnormal bleeding since last result? No   Upcoming surgery, procedure? No     Previous result: Supratherapeutic at 3.8 on 9/25/23.    Additional findings:  68th birthday on 10/[birth year].       PLAN     Recommended plan for no diet, medication or health factor changes affecting INR     Dosing Instructions: Continue your current warfarin dose with next INR in 2 weeks       Summary  As of 10/9/2023      Full warfarin instructions:  3.75 mg every Sat; 2.5 mg all other days   Next INR check:  10/23/2023               Telephone call with Marilee who verbalizes understanding and agrees to plan    Lab visit scheduled - INR on 10/23/23 @ [facility].    Education provided:   Taking warfarin: Importance of taking warfarin as instructed  Goal range and lab monitoring: goal range and significance of current result    Plan made per [facility] anticoagulation protocol    Alondra Lora, RN  Anticoagulation Clinic  10/9/2023    _______________________________________________________________________     Anticoagulation Episode Summary       Current  INR goal:  2.5-3.5   TTR:  41.3 % (1 y)   Target end date:  Indefinite   Send INR reminders to:  [facility]    Indications    Heart valve replaced [Z95.2]  Paroxysmal atrial fibrillation (H) [I48.0]  S/P AVR (aortic valve replacement) [Z95.2]  Other pulmonary embolism without acute cor pulmonale  unspecified chronicity (H) (Resolved) [I26.99]  H/O aortic valve replacement [Z95.2]  Long term (current) use of anticoagulants [Z79.01]  Pulmonary embolism  other  unspecified chronicity  unspecified whether acute cor pulmonale present (H) (Resolved) [I26.99]             Comments:  12/15/21 - amended INR goal to 2.5 - 3.5             Anticoagulation Care Providers       Provider Role Specialty Phone number    Tito Salazar MD Referring Family Medicine [phone]    Drew Hahn MD Referring Family Medicine [phone]

## 2023-10-12 DIAGNOSIS — E11.9 TYPE 2 DIABETES MELLITUS WITHOUT COMPLICATION, WITHOUT LONG-TERM CURRENT USE OF INSULIN (H): ICD-10-CM

## 2023-10-12 DIAGNOSIS — Z95.2 S/P AVR (AORTIC VALVE REPLACEMENT): ICD-10-CM

## 2023-10-12 RX ORDER — METFORMIN HCL 500 MG
500 TABLET, EXTENDED RELEASE 24 HR ORAL DAILY
Qty: 90 TABLET | Refills: 0 | Status: SHIPPED | OUTPATIENT
Start: 2023-10-12 | End: 2024-08-06

## 2023-10-12 RX ORDER — FUROSEMIDE 40 MG
60 TABLET ORAL DAILY
Qty: 135 TABLET | Refills: 0 | Status: SHIPPED | OUTPATIENT
Start: 2023-10-12 | End: 2024-08-22

## 2023-10-23 ENCOUNTER — ANTICOAGULATION THERAPY VISIT (OUTPATIENT)
Dept: ANTICOAGULATION | Facility: CLINIC | Age: 68
End: 2023-10-23

## 2023-10-23 ENCOUNTER — LAB (OUTPATIENT)
Dept: LAB | Facility: CLINIC | Age: 68
End: 2023-10-23
Payer: COMMERCIAL

## 2023-10-23 DIAGNOSIS — Z79.01 LONG TERM (CURRENT) USE OF ANTICOAGULANTS: ICD-10-CM

## 2023-10-23 DIAGNOSIS — Z95.2 H/O AORTIC VALVE REPLACEMENT: ICD-10-CM

## 2023-10-23 DIAGNOSIS — I48.0 PAROXYSMAL ATRIAL FIBRILLATION (H): ICD-10-CM

## 2023-10-23 DIAGNOSIS — Z95.2 HEART VALVE REPLACED: Primary | ICD-10-CM

## 2023-10-23 DIAGNOSIS — I26.99 PULMONARY EMBOLISM, OTHER, UNSPECIFIED CHRONICITY, UNSPECIFIED WHETHER ACUTE COR PULMONALE PRESENT (H): ICD-10-CM

## 2023-10-23 DIAGNOSIS — Z95.2 S/P AVR (AORTIC VALVE REPLACEMENT): ICD-10-CM

## 2023-10-23 LAB — INR BLD: 2.7 (ref 0.9–1.1)

## 2023-10-23 PROCEDURE — 85610 PROTHROMBIN TIME: CPT

## 2023-10-23 PROCEDURE — 36416 COLLJ CAPILLARY BLOOD SPEC: CPT

## 2023-10-23 NOTE — PROGRESS NOTES
ANTICOAGULATION MANAGEMENT     Eileen Donald 68 year old female is on warfarin with therapeutic INR result. (Goal INR 2.5-3.5)    Recent labs: (last 7 days)     10/23/23  1333   INR 2.7*       ASSESSMENT     Warfarin Lab Questionnaire    Warfarin Doses Last 7 Days      10/22/2023    10:10 PM   Dose in Tablet or Mg   TAB or MG? - 2.5mg warfarin tablets (evenings)   milligram (mg)     Pt Rptd Dose STAS MONDAY TUESDAY WED THURS FRIDAY SATURDAY   10/22/2023  10:10 PM 25 2.5 5 2.5 2.5 2.5 5         10/22/2023   Warfarin Lab Questionnaire   Missed doses within past 14 days? No   Changes in diet or alcohol within past 14 days? No   Medication changes since last result? No   Injuries or illness since last result? No - follow-up with Cardiology @  [facility].     New shortness of breath, severe headaches or sudden changes in vision since last result? No   Abnormal bleeding since last result? No   Upcoming surgery, procedure? No     Previous result: Therapeutic last visit at 3.0; previously outside of goal range at 3.8    Additional findings: None       PLAN     Recommended plan for no diet, medication or health factor changes affecting INR     Dosing Instructions: Continue your current warfarin dose with next INR in 2-3 weeks       Summary  As of 10/23/2023      Full warfarin instructions:  3.75 mg every Sat; 2.5 mg all other days   Next INR check:  11/13/2023               Telephone call with Marilee who verbalizes understanding and agrees to plan    Lab visit scheduled - INR on 11/17/23 @ [facility]   - also scheduled for covid-19 vaccination.    Education provided:   Taking warfarin: Importance of taking warfarin as instructed  Goal range and lab monitoring: goal range and significance of current result    Plan made per [facility] anticoagulation protocol    Alondra Lora, RN  Anticoagulation Clinic  10/23/2023    _______________________________________________________________________     Anticoagulation Episode Summary        Current INR goal:  2.5-3.5   TTR:  41.3% (1 y)   Target end date:  Indefinite   Send INR reminders to:  [facility]    Indications    Heart valve replaced [Z95.2]  Paroxysmal atrial fibrillation (H) [I48.0]  S/P AVR (aortic valve replacement) [Z95.2]  Other pulmonary embolism without acute cor pulmonale  unspecified chronicity (H) (Resolved) [I26.99]  H/O aortic valve replacement [Z95.2]  Long term (current) use of anticoagulants [Z79.01]  Pulmonary embolism  other  unspecified chronicity  unspecified whether acute cor pulmonale present (H) (Resolved) [I26.99]  Pulmonary embolism  other  unspecified chronicity  unspecified whether acute cor pulmonale present (H) [I26.99]             Comments:  12/15/21 - amended INR goal to 2.5 - 3.5             Anticoagulation Care Providers       Provider Role Specialty Phone number    Tito Salazar MD Referring Family Medicine [phone]    Drew Hahn MD Referring Family Medicine [phone]

## 2023-11-08 ENCOUNTER — PATIENT OUTREACH (OUTPATIENT)
Dept: GASTROENTEROLOGY | Facility: CLINIC | Age: 68
End: 2023-11-08
Payer: COMMERCIAL

## 2023-11-10 ENCOUNTER — MYC MEDICAL ADVICE (OUTPATIENT)
Dept: FAMILY MEDICINE | Facility: CLINIC | Age: 68
End: 2023-11-10
Payer: COMMERCIAL

## 2023-11-17 ENCOUNTER — ANTICOAGULATION THERAPY VISIT (OUTPATIENT)
Dept: ANTICOAGULATION | Facility: CLINIC | Age: 68
End: 2023-11-17

## 2023-11-17 ENCOUNTER — IMMUNIZATION (OUTPATIENT)
Dept: FAMILY MEDICINE | Facility: CLINIC | Age: 68
End: 2023-11-17
Payer: COMMERCIAL

## 2023-11-17 ENCOUNTER — LAB (OUTPATIENT)
Dept: LAB | Facility: CLINIC | Age: 68
End: 2023-11-17
Payer: COMMERCIAL

## 2023-11-17 DIAGNOSIS — Z29.11 NEED FOR VACCINATION AGAINST RESPIRATORY SYNCYTIAL VIRUS: ICD-10-CM

## 2023-11-17 DIAGNOSIS — Z79.01 LONG TERM (CURRENT) USE OF ANTICOAGULANTS: ICD-10-CM

## 2023-11-17 DIAGNOSIS — Z95.2 S/P AVR (AORTIC VALVE REPLACEMENT): ICD-10-CM

## 2023-11-17 DIAGNOSIS — I26.99 PULMONARY EMBOLISM, OTHER, UNSPECIFIED CHRONICITY, UNSPECIFIED WHETHER ACUTE COR PULMONALE PRESENT (H): ICD-10-CM

## 2023-11-17 DIAGNOSIS — Z95.2 H/O AORTIC VALVE REPLACEMENT: ICD-10-CM

## 2023-11-17 DIAGNOSIS — Z23 ENCOUNTER FOR IMMUNIZATION: Primary | ICD-10-CM

## 2023-11-17 DIAGNOSIS — Z95.2 HEART VALVE REPLACED: Primary | ICD-10-CM

## 2023-11-17 DIAGNOSIS — I48.0 PAROXYSMAL ATRIAL FIBRILLATION (H): ICD-10-CM

## 2023-11-17 LAB — INR BLD: 2.7 (ref 0.9–1.1)

## 2023-11-17 PROCEDURE — 36416 COLLJ CAPILLARY BLOOD SPEC: CPT

## 2023-11-17 PROCEDURE — 90480 ADMN SARSCOV2 VAC 1/ONLY CMP: CPT

## 2023-11-17 PROCEDURE — 85610 PROTHROMBIN TIME: CPT

## 2023-11-17 PROCEDURE — 91320 SARSCV2 VAC 30MCG TRS-SUC IM: CPT

## 2023-11-17 PROCEDURE — 99207 PR NO CHARGE NURSE ONLY: CPT

## 2023-11-17 NOTE — PROGRESS NOTES
Prior to immunization administration, verified patients identity using patient s name and date of birth. Please see Immunization Activity for additional information.     Screening Questionnaire for Adult Immunization    Are you sick today?   No   Do you have allergies to medications, food, a vaccine component or latex?   No   Have you ever had a serious reaction after receiving a vaccination?   No   Do you have a long-term health problem with heart, lung, kidney, or metabolic disease (e.g., diabetes), asthma, a blood disorder, no spleen, complement component deficiency, a cochlear implant, or a spinal fluid leak?  Are you on long-term aspirin therapy?   No   Do you have cancer, leukemia, HIV/AIDS, or any other immune system problem?   No   Do you have a parent, brother, or sister with an immune system problem?   No   In the past 3 months, have you taken medications that affect  your immune system, such as prednisone, other steroids, or anticancer drugs; drugs for the treatment of rheumatoid arthritis, Crohn s disease, or psoriasis; or have you had radiation treatments?   No   Have you had a seizure, or a brain or other nervous system problem?   No   During the past year, have you received a transfusion of blood or blood    products, or been given immune (gamma) globulin or antiviral drug?   No   For women: Are you pregnant or is there a chance you could become       pregnant during the next month?   No   Have you received any vaccinations in the past 4 weeks?   No     Immunization questionnaire answers were all negative.    I have reviewed the following standing orders:   This patient is due and qualifies for the Covid-19 vaccine.     Click here for COVID-19 Standing Order    I have reviewed the vaccines inclusion and exclusion criteria; No concerns regarding eligibility.     Patient instructed to remain in clinic for 15 minutes afterwards, and to report any adverse reactions.     Screening performed by Melony LUZ  CAMELIA Johnson on 11/17/2023 at 9:46 AM.

## 2023-11-17 NOTE — PROGRESS NOTES
ANTICOAGULATION MANAGEMENT     Eileen Donald 68 year old female is on warfarin with therapeutic INR result. (Goal INR 2.5-3.5)    Recent labs: (last 7 days)     11/17/23  0934   INR 2.7*       ASSESSMENT     Warfarin Lab Questionnaire    Warfarin Doses Last 7 Days      11/16/2023    10:55 AM   Dose in Tablet or Mg   TAB or MG? milligram (mg)     Pt Rptd Dose SUNDAY MONDAY TUESDAY WED THURS FRIDAY SATURDAY 11/16/2023  10:55 AM 2.5 2.5 5 2.5 2.5 2.5 5         11/16/2023   Warfarin Lab Questionnaire   Missed doses within past 14 days? No   Changes in diet or alcohol within past 14 days? No   Medication changes since last result? No   Injuries or illness since last result? No   New shortness of breath, severe headaches or sudden changes in vision since last result? No   Abnormal bleeding since last result? No   Upcoming surgery, procedure? No     Previous result: Therapeutic last visit; previously outside of goal range  Additional findings: None       PLAN     Recommended plan for no diet, medication or health factor changes affecting INR     Dosing Instructions: Continue your current warfarin dose with next INR in 4 weeks       Summary  As of 11/17/2023      Full warfarin instructions:  3.75 mg every Sat; 2.5 mg all other days   Next INR check:  12/15/2023               Telephone call with Marilee who verbalizes understanding and agrees to plan    Lab visit scheduled    Education provided:   Contact [phone] with any changes, questions or concerns.     Plan made per [facility] anticoagulation protocol    Dany Cramer RN  Anticoagulation Clinic  11/17/2023    _______________________________________________________________________     Anticoagulation Episode Summary       Current INR goal:  2.5-3.5   TTR:  44.6% (1 y)   Target end date:  Indefinite   Send INR reminders to:  [facility]    Indications    Heart valve replaced [Z95.2]  Paroxysmal atrial fibrillation (H) [I48.0]  S/P AVR (aortic valve  replacement) [Z95.2]  Other pulmonary embolism without acute cor pulmonale  unspecified chronicity (H) (Resolved) [I26.99]  H/O aortic valve replacement [Z95.2]  Long term (current) use of anticoagulants [Z79.01]  Pulmonary embolism  other  unspecified chronicity  unspecified whether acute cor pulmonale present (H) (Resolved) [I26.99]  Pulmonary embolism  other  unspecified chronicity  unspecified whether acute cor pulmonale present (H) [I26.99]             Comments:  12/15/21 - amended INR goal to 2.5 - 3.5             Anticoagulation Care Providers       Provider Role Specialty Phone number    Tito Salazar MD Referring Family Medicine [phone]    Drew Hahn MD Referring Family Medicine [phone]

## 2023-12-04 ENCOUNTER — TRANSFERRED RECORDS (OUTPATIENT)
Dept: HEALTH INFORMATION MANAGEMENT | Facility: CLINIC | Age: 68
End: 2023-12-04

## 2023-12-04 LAB
ALT SERPL-CCNC: 18 U/L (ref 6–29)
AST SERPL-CCNC: 23 U/L (ref 10–35)

## 2023-12-11 ENCOUNTER — MYC REFILL (OUTPATIENT)
Dept: FAMILY MEDICINE | Facility: CLINIC | Age: 68
End: 2023-12-11
Payer: COMMERCIAL

## 2023-12-11 DIAGNOSIS — F33.41 RECURRENT MAJOR DEPRESSIVE DISORDER, IN PARTIAL REMISSION (H): ICD-10-CM

## 2023-12-11 DIAGNOSIS — J30.2 SEASONAL ALLERGIES: ICD-10-CM

## 2023-12-11 DIAGNOSIS — E78.5 DYSLIPIDEMIA: ICD-10-CM

## 2023-12-12 RX ORDER — MONTELUKAST SODIUM 10 MG/1
10 TABLET ORAL AT BEDTIME
Qty: 90 TABLET | Refills: 1 | OUTPATIENT
Start: 2023-12-12

## 2023-12-12 RX ORDER — SIMVASTATIN 20 MG
20 TABLET ORAL AT BEDTIME
Qty: 90 TABLET | Refills: 1 | OUTPATIENT
Start: 2023-12-12

## 2023-12-12 RX ORDER — VENLAFAXINE HYDROCHLORIDE 75 MG/1
CAPSULE, EXTENDED RELEASE ORAL
Qty: 90 CAPSULE | Refills: 2 | OUTPATIENT
Start: 2023-12-12

## 2023-12-12 RX ORDER — ACYCLOVIR 400 MG/1
400 TABLET ORAL 2 TIMES DAILY
Status: CANCELLED | OUTPATIENT
Start: 2023-12-12

## 2023-12-12 NOTE — TELEPHONE ENCOUNTER
Please check with the patient if she is taking acyclovir indefinitely.  I tried to review the chart and she has been prescribed it for oral canker soes.  Has this been prescribed by an outside provider?  Is it prescribed by the neurologist as she is on immunosuppression for her MS?    Drew Hahn MD

## 2023-12-13 NOTE — TELEPHONE ENCOUNTER
Please check my previous message and let me know if patient needs refill from me?    Drew Hahn MD

## 2023-12-14 ENCOUNTER — MYC MEDICAL ADVICE (OUTPATIENT)
Dept: FAMILY MEDICINE | Facility: CLINIC | Age: 68
End: 2023-12-14
Payer: COMMERCIAL

## 2023-12-14 DIAGNOSIS — J30.2 SEASONAL ALLERGIES: ICD-10-CM

## 2023-12-14 DIAGNOSIS — F33.41 RECURRENT MAJOR DEPRESSIVE DISORDER, IN PARTIAL REMISSION (H): ICD-10-CM

## 2023-12-14 DIAGNOSIS — E78.5 DYSLIPIDEMIA: ICD-10-CM

## 2023-12-14 RX ORDER — MONTELUKAST SODIUM 10 MG/1
10 TABLET ORAL AT BEDTIME
Qty: 90 TABLET | Refills: 1 | Status: SHIPPED | OUTPATIENT
Start: 2023-12-14 | End: 2024-03-28

## 2023-12-14 RX ORDER — VENLAFAXINE HYDROCHLORIDE 75 MG/1
CAPSULE, EXTENDED RELEASE ORAL
Qty: 90 CAPSULE | Refills: 2 | OUTPATIENT
Start: 2023-12-14

## 2023-12-14 RX ORDER — SIMVASTATIN 20 MG
20 TABLET ORAL AT BEDTIME
Qty: 90 TABLET | Refills: 0 | Status: SHIPPED | OUTPATIENT
Start: 2023-12-14 | End: 2024-03-28

## 2023-12-14 NOTE — TELEPHONE ENCOUNTER
Pending Prescriptions:                       Disp   Refills    venlafaxine (EFFEXOR XR) 75 MG 24 hr caps*90 cap*2            Sig: TAKE 3 CAPSULES BY MOUTH EVERY DAY    simvastatin (ZOCOR) 20 MG tablet          90 tab*1            Sig: Take 1 tablet (20 mg) by mouth at bedtime    montelukast (SINGULAIR) 10 MG tablet      90 tab*1            Sig: Take 1 tablet (10 mg) by mouth at bedtime

## 2023-12-14 NOTE — TELEPHONE ENCOUNTER
[facility].     Also sent [facility] message.      Drew Hahn MD   to [facility] Care Team [facility]    12/12/23 11:05 AM  Note  Please check with the patient if she is taking acyclovir indefinitely.  I tried to review the chart and she has been prescribed it for oral canker soes.  Has this been prescribed by an outside provider?  Is it prescribed by the neurologist as she is on immunosuppression for her MS?     Drew Hahn MD

## 2023-12-15 ENCOUNTER — ANTICOAGULATION THERAPY VISIT (OUTPATIENT)
Dept: ANTICOAGULATION | Facility: CLINIC | Age: 68
End: 2023-12-15

## 2023-12-15 ENCOUNTER — LAB (OUTPATIENT)
Dept: LAB | Facility: CLINIC | Age: 68
End: 2023-12-15
Payer: COMMERCIAL

## 2023-12-15 DIAGNOSIS — I48.0 PAROXYSMAL ATRIAL FIBRILLATION (H): ICD-10-CM

## 2023-12-15 DIAGNOSIS — Z79.01 LONG TERM (CURRENT) USE OF ANTICOAGULANTS: ICD-10-CM

## 2023-12-15 DIAGNOSIS — I26.99 PULMONARY EMBOLISM, OTHER, UNSPECIFIED CHRONICITY, UNSPECIFIED WHETHER ACUTE COR PULMONALE PRESENT (H): ICD-10-CM

## 2023-12-15 DIAGNOSIS — Z95.2 S/P AVR (AORTIC VALVE REPLACEMENT): ICD-10-CM

## 2023-12-15 DIAGNOSIS — Z95.2 HEART VALVE REPLACED: Primary | ICD-10-CM

## 2023-12-15 DIAGNOSIS — Z95.2 H/O AORTIC VALVE REPLACEMENT: ICD-10-CM

## 2023-12-15 LAB — INR BLD: 3.4 (ref 0.9–1.1)

## 2023-12-15 PROCEDURE — 85610 PROTHROMBIN TIME: CPT

## 2023-12-15 PROCEDURE — 36416 COLLJ CAPILLARY BLOOD SPEC: CPT

## 2023-12-15 NOTE — PROGRESS NOTES
ANTICOAGULATION MANAGEMENT     Eileen Donald 68 year old female is on warfarin with therapeutic INR result. (Goal INR 2.5-3.5)    Recent labs: (last 7 days)     12/15/23  1251   INR 3.4*       ASSESSMENT     Warfarin Lab Questionnaire    Warfarin Doses Last 7 Days      12/14/2023    11:58 AM   Dose in Tablet or Mg   TAB or MG? - 2.5mg warfarin tablets (evenings) milligram (mg)     Pt Rptd Dose SUNDAY MONDAY TUESDAY WED THURS FRIDAY SATURDAY 12/14/2023  11:58 AM 2.5 2.5 5 2.5 2.5 2.5 2.5         12/14/2023   Warfarin Lab Questionnaire   Missed doses within past 14 days? No - vebalized back taking more warfarin than ordered.  Will update anticoag calendar.     Changes in diet or alcohol within past 14 days? No   Medication changes since last result? No   Injuries or illness since last result? No - 11/29/23 - [facility] referral visit for right hip achy sharp pains.  Recommended surgical Intervention: right, open direct anterior approach total hip replacement. Patient agreed to proceed with surgical intervention.       - progressive osteoarthritis.     New shortness of breath, severe headaches or sudden changes in vision since last result? No   Abnormal bleeding since last result? No   Upcoming surgery, procedure? No - scheduled right total hip arthroplasty on 2/13/24 @ [facility]       Previous result: Therapeutic last 3 INR visits.    Additional findings: None       PLAN     Recommended plan for ongoing change(s) affecting INR     Dosing Instructions: Continue your current warfarin dose with next INR in 2 weeks       Summary  As of 12/15/2023      Full warfarin instructions:  5 mg every Tue; 2.5 mg all other days   Next INR check:  12/29/2023               Telephone call with Marilee who verbalizes understanding and agrees to plan    Check at provider office visit - INR on 12/28/23 at [facility] with Dr. Hahn    Education provided:   Taking warfarin: take warfarin at same time each day and Importance of taking  warfarin as instructed  Goal range and lab monitoring: goal range and significance of current result    Plan made per [facility] anticoagulation protocol    Alondra Lora, RN  Anticoagulation Clinic  12/15/2023    _______________________________________________________________________     Anticoagulation Episode Summary       Current INR goal:  2.5-3.5   TTR:  52.3% (1 y)   Target end date:  Indefinite   Send INR reminders to:  [facility]    Indications    Heart valve replaced [Z95.2]  Paroxysmal atrial fibrillation (H) [I48.0]  S/P AVR (aortic valve replacement) [Z95.2]  Other pulmonary embolism without acute cor pulmonale  unspecified chronicity (H) (Resolved) [I26.99]  H/O aortic valve replacement [Z95.2]  Long term (current) use of anticoagulants [Z79.01]  Pulmonary embolism  other  unspecified chronicity  unspecified whether acute cor pulmonale present (H) (Resolved) [I26.99]  Pulmonary embolism  other  unspecified chronicity  unspecified whether acute cor pulmonale present (H) [I26.99]             Comments:  12/15/21 - amended INR goal to 2.5 - 3.5             Anticoagulation Care Providers       Provider Role Specialty Phone number    Tito Salazar MD Referring Family Medicine [phone]    Drew Hahn MD Referring Family Medicine [phone]

## 2023-12-18 DIAGNOSIS — F33.41 RECURRENT MAJOR DEPRESSIVE DISORDER, IN PARTIAL REMISSION (H): ICD-10-CM

## 2023-12-18 RX ORDER — VENLAFAXINE HYDROCHLORIDE 75 MG/1
CAPSULE, EXTENDED RELEASE ORAL
Qty: 90 CAPSULE | Refills: 2 | OUTPATIENT
Start: 2023-12-18

## 2023-12-25 ASSESSMENT — ASTHMA QUESTIONNAIRES: ACT_TOTALSCORE: 20

## 2023-12-25 NOTE — COMMUNITY RESOURCES LIST (ENGLISH)
12/25/2023   [facility] Best Five Reviewed  N/A  For questions about this resource list or additional care needs, please contact your primary care clinic or care manager.  Phone: [phone]   Email: N/A   Address: [address] [place] [id number]   Hours: N/A        Transportation       Free or low-cost transportation  1  [employer] [facility] Bus Loop - Free or low-cost transportation Distance: 2.37 miles      In-Person   [address] [place] [address]  Language: English  Hours: Mon - Fri 9:00 AM - 5:00 PM  Fees: Free   Phone: (198) 157-4750 Email: info@[employer] Website: https://www.[employer]/     2  [facility] - Free Bus and Gas Cards - Free or low-cost transportation Distance: 3.03 miles      [facility]   [address] [place] [address]  Language: English  Hours: Mon - Fri 9:00 AM - 4:00 PM , Sun 9:30 AM - 12:00 PM  Fees: Free   Phone: (689) 303-6172 Email: [email or web address] Website: [email or web address]/[email or web address]/     Transportation to medical appointments  3  [employer] Ride Distance: 2.24 miles      In-Person   [address] [address]  Language: English  Hours: Mon - Thu 6:00 AM - 6:00 PM , Fri 6:00 AM - 5:00 PM  Fees: Insurance, Self Pay   Phone: (927) 814-1259 Email: office@[employer] Website: https://www.[employer]/     4  [facility] - Non-Emergency Medical Transportation Distance: 6.67 miles      In-Person   [address] [place] [address]  Language: English  Hours: Mon - Fri 8:00 AM - 4:00 PM Appt. Only  Fees: Self Pay   Phone: (726) 542-3777 Website: [email or web address]/Medical-Services/Emergency-medical-services/Non-emergency-transportation/          Important Numbers & Websites       Emergency Services   911  City Services   311  Poison Control   (127) 612-9958  Suicide Prevention Lifeline   (741) 809-2890 (TALK)  Child Abuse Hotline    (596) 346-5707 (4-A-Child)  Sexual Assault Hotline   (302) 913-1181 (HOPE)  National Runaway Safeline   (626) 676-3676 (RUNAWAY)  [employer] Talkline   (460) 747-3278  Substance Abuse Referral   (308) 238-8890 (HELP)

## 2023-12-28 ENCOUNTER — OFFICE VISIT (OUTPATIENT)
Dept: FAMILY MEDICINE | Facility: CLINIC | Age: 68
End: 2023-12-28
Attending: FAMILY MEDICINE
Payer: COMMERCIAL

## 2023-12-28 VITALS
SYSTOLIC BLOOD PRESSURE: 120 MMHG | HEART RATE: 103 BPM | WEIGHT: 245 LBS | RESPIRATION RATE: 22 BRPM | DIASTOLIC BLOOD PRESSURE: 82 MMHG | OXYGEN SATURATION: 94 % | HEIGHT: 70 IN | BODY MASS INDEX: 35.07 KG/M2

## 2023-12-28 DIAGNOSIS — I10 PRIMARY HYPERTENSION: ICD-10-CM

## 2023-12-28 DIAGNOSIS — M25.551 HIP PAIN, RIGHT: ICD-10-CM

## 2023-12-28 DIAGNOSIS — F41.9 ANXIETY: ICD-10-CM

## 2023-12-28 DIAGNOSIS — E11.69 TYPE 2 DIABETES MELLITUS WITH OTHER SPECIFIED COMPLICATION, UNSPECIFIED WHETHER LONG TERM INSULIN USE (H): Primary | ICD-10-CM

## 2023-12-28 DIAGNOSIS — R05.2 SUBACUTE COUGH: ICD-10-CM

## 2023-12-28 PROBLEM — M48.02 SPINAL STENOSIS OF CERVICAL REGION: Status: ACTIVE | Noted: 2023-10-25

## 2023-12-28 LAB
ANION GAP SERPL CALCULATED.3IONS-SCNC: 13 MMOL/L (ref 7–15)
BASOPHILS # BLD AUTO: 0.1 10E3/UL (ref 0–0.2)
BASOPHILS NFR BLD AUTO: 1 %
BUN SERPL-MCNC: 9.3 MG/DL (ref 8–23)
CALCIUM SERPL-MCNC: 9.4 MG/DL (ref 8.8–10.2)
CHLORIDE SERPL-SCNC: 103 MMOL/L (ref 98–107)
CREAT SERPL-MCNC: 0.85 MG/DL (ref 0.51–0.95)
DEPRECATED HCO3 PLAS-SCNC: 28 MMOL/L (ref 22–29)
EGFRCR SERPLBLD CKD-EPI 2021: 75 ML/MIN/1.73M2
EOSINOPHIL # BLD AUTO: 0.3 10E3/UL (ref 0–0.7)
EOSINOPHIL NFR BLD AUTO: 5 %
ERYTHROCYTE [DISTWIDTH] IN BLOOD BY AUTOMATED COUNT: 14 % (ref 10–15)
GLUCOSE SERPL-MCNC: 147 MG/DL (ref 70–99)
HBA1C MFR BLD: 6.4 % (ref 0–5.6)
HCT VFR BLD AUTO: 43.8 % (ref 35–47)
HGB BLD-MCNC: 13.4 G/DL (ref 11.7–15.7)
HOLD SPECIMEN: NORMAL
IMM GRANULOCYTES # BLD: 0 10E3/UL
IMM GRANULOCYTES NFR BLD: 0 %
LYMPHOCYTES # BLD AUTO: 2.2 10E3/UL (ref 0.8–5.3)
LYMPHOCYTES NFR BLD AUTO: 29 %
MCH RBC QN AUTO: 28.8 PG (ref 26.5–33)
MCHC RBC AUTO-ENTMCNC: 30.6 G/DL (ref 31.5–36.5)
MCV RBC AUTO: 94 FL (ref 78–100)
MONOCYTES # BLD AUTO: 0.7 10E3/UL (ref 0–1.3)
MONOCYTES NFR BLD AUTO: 10 %
NEUTROPHILS # BLD AUTO: 4.3 10E3/UL (ref 1.6–8.3)
NEUTROPHILS NFR BLD AUTO: 56 %
PLATELET # BLD AUTO: 315 10E3/UL (ref 150–450)
POTASSIUM SERPL-SCNC: 3.7 MMOL/L (ref 3.4–5.3)
RBC # BLD AUTO: 4.66 10E6/UL (ref 3.8–5.2)
SODIUM SERPL-SCNC: 144 MMOL/L (ref 135–145)
WBC # BLD AUTO: 7.6 10E3/UL (ref 4–11)

## 2023-12-28 PROCEDURE — 85025 COMPLETE CBC W/AUTO DIFF WBC: CPT | Performed by: FAMILY MEDICINE

## 2023-12-28 PROCEDURE — 83036 HEMOGLOBIN GLYCOSYLATED A1C: CPT | Performed by: FAMILY MEDICINE

## 2023-12-28 PROCEDURE — 80048 BASIC METABOLIC PNL TOTAL CA: CPT | Performed by: FAMILY MEDICINE

## 2023-12-28 PROCEDURE — 99215 OFFICE O/P EST HI 40 MIN: CPT | Performed by: FAMILY MEDICINE

## 2023-12-28 PROCEDURE — 36415 COLL VENOUS BLD VENIPUNCTURE: CPT | Performed by: FAMILY MEDICINE

## 2023-12-28 RX ORDER — LEVALBUTEROL INHALATION SOLUTION 1.25 MG/3ML
SOLUTION RESPIRATORY (INHALATION)
COMMUNITY
End: 2024-03-01

## 2023-12-28 RX ORDER — LORAZEPAM 0.5 MG/1
0.5 TABLET ORAL EVERY 8 HOURS PRN
Qty: 20 TABLET | Refills: 0 | Status: SHIPPED | OUTPATIENT
Start: 2023-12-28 | End: 2024-03-29

## 2023-12-28 RX ORDER — LISINOPRIL 20 MG/1
TABLET ORAL
COMMUNITY
End: 2024-02-23

## 2023-12-28 ASSESSMENT — PATIENT HEALTH QUESTIONNAIRE - PHQ9
10. IF YOU CHECKED OFF ANY PROBLEMS, HOW DIFFICULT HAVE THESE PROBLEMS MADE IT FOR YOU TO DO YOUR WORK, TAKE CARE OF THINGS AT HOME, OR GET ALONG WITH OTHER PEOPLE: EXTREMELY DIFFICULT
SUM OF ALL RESPONSES TO PHQ QUESTIONS 1-9: 11
SUM OF ALL RESPONSES TO PHQ QUESTIONS 1-9: 11

## 2023-12-28 ASSESSMENT — ENCOUNTER SYMPTOMS
COUGH: 1
HIP PAIN: 1

## 2023-12-28 NOTE — PROGRESS NOTES
Assessment & Plan     ICD-10-CM    1. Type 2 diabetes mellitus with other specified complication, unspecified whether long term insulin use (H)  E11.69 HEMOGLOBIN A1C     HEMOGLOBIN A1C      2. Anxiety  F41.9 LORazepam (ATIVAN) 0.5 MG tablet      3. Primary hypertension  I10 BASIC METABOLIC PANEL     BASIC METABOLIC PANEL      4. Subacute cough  R05.2 CBC with platelets and differential     CBC with platelets and differential      5. Hip pain, right  M25.551         Patient your presents for follow-up.  Following issues addressed  1: Type 2 diabetes: Was reading online about metformin and various blocks and quit taking it.  Discussed that diabetes has worsened.  I would like her to start taking it daily.  She was wondering about the gummy supplements and I discussed evidence-based medicine versus other advertised medication.  Patient verbalizes understanding and feels that she is going to start metformin again.  2: Subacute cough: Has underlying asthma.  Has seen pulmonology in the past and PFT has not confirmed this.  Uses albuterol as needed.  Exam today is normal.  Since the cough is improving, no antibiotics at this time.  Continue to monitor  3: Right hip pain: Patient is scheduled for surgery but wanted a second opinion as she feels her pain is different from other people who have had hip arthritis.  Discussed with the patient that she is not mobile, she does not have the classic hip pain from arthritis.  Reviewed the x-rays with patient which shows severe arthritis.  Patient already is set up for a preop appointment.  4: Hypertension: Check metabolic panel today.  Stable on current medication.  5: Anxiety: Uses lorazepam sparingly.  20 pills last for a long time.  We did a controlled substance agreement and 20 pills every 3 months (although she uses even less so than this) is totally okay.       BMI:   Estimated body mass index is 35.15 kg/m  as calculated from the following:    Height as of this encounter:  "1.778 m (5' 10\").    Weight as of this encounter: 111.1 kg (245 lb).   Weight management plan: Discussed healthy diet and exercise guidelines    MEDICATIONS:  Continue current medications without change    Total time spent including chart review, patient interview, discussion of plan of care, examination and documentation exceeded 40 minutes.    Drew Hahn MD  [facility] DAMIEN Hunt is a 68 year old, presenting for the following health issues:  Hip Pain (Right hip pain ) and Cough (On going cough)        12/28/2023    11:15 AM   Additional Questions   Roomed by Odalis Salas CMA       History of Present Illness     Asthma:  She presents for follow up of asthma.  She has no cough, some wheezing, and some shortness of breath.  She is using a relief medication a few times a month. She does not have a controller medication. Patient is aware of the following triggers: exercise or sports, humidity, smoke, strong odors and fumes and upper respiratory infections. The patient has not had a visit to the Emergency Room, Urgent Care or Hospital due to asthma since the last clinic visit.     Reason for visit:  To find out if i am understanding my health care options for all concerning  ptoblems past and presentpects listed.    She eats 0-1 servings of fruits and vegetables daily.She consumes 1 sweetened beverage(s) daily.She exercises with enough effort to increase her heart rate 9 or less minutes per day.  She exercises with enough effort to increase her heart rate 3 or less days per week. She is missing 1 dose(s) of medications per week.  She is not taking prescribed medications regularly due to other.     Patient Active Problem List   Diagnosis    Depression    Asthma    Benign essential hypertension    Multiple Sclerosis    Hyperlipidemia    Impaired Glucose Tolerance Test    Dysphagia    Benign Adenomatous Polyp Of The Large Intestine    History of pulmonary embolism    Morbid " obesity (H)    Generalized anxiety disorder    Primary osteoarthritis of both hands    Polyarthralgia    Aortic valve replaced    Controlled substance agreement signed    Heart valve replaced    Closed 3-part fracture of proximal humerus with delayed healing, left    Pulmonary hypertension (H)    Paroxysmal atrial fibrillation (H)    Dyslipidemia    Dyspnea on exertion    Hypertension    Postoperative anemia due to acute blood loss    Pre-diabetes    Stress hyperglycemia    Recurrent major depressive disorder, in partial remission (H24)    Type 2 diabetes mellitus with other specified complication, unspecified whether long term insulin use (H)    Acute bacterial conjunctivitis of both eyes    Community acquired pneumonia, unspecified laterality    Osteoarthrosis    S/P AVR (aortic valve replacement)    Sleep apnea    H/O aortic valve replacement    Encounter for long-term methadone use    History of fall    Leg weakness, bilateral    Muscle spasm    Neurogenic bladder    Other symptoms and signs involving cognitive functions and awareness    Tremor    Unsteady gait    Relapsing remitting multiple sclerosis (H)    Elevated MCV    Hip pain, right    Long term (current) use of anticoagulants    Pulmonary embolism, other, unspecified chronicity, unspecified whether acute cor pulmonale present (H)    Spinal stenosis of cervical region     Current Outpatient Medications   Medication    acetaminophen (TYLENOL) 325 MG tablet    acyclovir (ZOVIRAX) 400 MG tablet    albuterol (PROAIR HFA/PROVENTIL HFA/VENTOLIN HFA) 108 (90 Base) MCG/ACT inhaler    aspirin (ASA) 81 MG chewable tablet    baclofen (LIORESAL) 10 MG tablet    buPROPion (WELLBUTRIN XL) 150 MG 24 hr tablet    enoxaparin ANTICOAGULANT (LOVENOX) 120 MG/0.8ML syringe    esomeprazole (NEXIUM) 20 MG DR capsule    furosemide (LASIX) 40 MG tablet    loratadine (CLARITIN) 10 MG tablet    LORazepam (ATIVAN) 0.5 MG tablet    metFORMIN (GLUCOPHAGE XR) 500 MG 24 hr tablet     "metoprolol tartrate (LOPRESSOR) 25 MG tablet    montelukast (SINGULAIR) 10 MG tablet    potassium chloride ER (MICRO-K) 10 MEQ CR capsule    simvastatin (ZOCOR) 20 MG tablet    venlafaxine (EFFEXOR XR) 75 MG 24 hr capsule    warfarin ANTICOAGULANT (COUMADIN) 2.5 MG tablet    levalbuterol (XOPENEX) 1.25 MG/3ML neb solution    lisinopril (ZESTRIL) 20 MG tablet     Current Facility-Administered Medications   Medication    ropivacaine (NAROPIN) injection 3 mL    triamcinolone (KENALOG-40) injection 40 mg           Review of Systems   Respiratory:  Positive for cough.       Constitutional, HEENT, cardiovascular, pulmonary, gi and gu systems are negative, except as otherwise noted.      Objective    /82 (BP Location: Left arm, Patient Position: Sitting, Cuff Size: Adult Regular)   Pulse 103   Resp 22   Ht 1.778 m (5' 10\")   Wt 111.1 kg (245 lb)   SpO2 94%   BMI 35.15 kg/m    Body mass index is 35.15 kg/m .  Physical Exam   GENERAL: healthy, alert and no distress  NECK: no adenopathy, no asymmetry, masses, or scars and thyroid normal to palpation  RESP: lungs clear to auscultation - no rales, rhonchi or wheezes  CV: regular rate and rhythm, normal S1 S2, no S3 or S4, no murmur, click or rub, no peripheral edema and peripheral pulses strong  ABDOMEN: soft, nontender, no hepatosplenomegaly, no masses and bowel sounds normal  MS: no gross musculoskeletal defects noted, no edema  Diabetic foot exam: normal DP and PT pulses, normal sensory exam, and normal monofilament exam    Results for orders placed or performed in visit on 12/28/23 (from the past 24 hour(s))   HEMOGLOBIN A1C   Result Value Ref Range    Hemoglobin A1C 6.4 (H) 0.0 - 5.6 %   Extra Tube    Narrative    The following orders were created for panel order Extra Tube.  Procedure                               Abnormality         Status                     ---------                               -----------         ------                     Extra Green " Top (Lithium...[[id number]]                      Final result                 Please view results for these tests on the individual orders.   Extra Green Top (Lithium Heparin) Tube   Result Value Ref Range    Hold Specimen JIC    CBC with platelets and differential    Narrative    The following orders were created for panel order CBC with platelets and differential.  Procedure                               Abnormality         Status                     ---------                               -----------         ------                     CBC with platelets and d...[[id number]]  Abnormal            Final result                 Please view results for these tests on the individual orders.   CBC with platelets and differential   Result Value Ref Range    WBC Count 7.6 4.0 - 11.0 10e3/uL    RBC Count 4.66 3.80 - 5.20 10e6/uL    Hemoglobin 13.4 11.7 - 15.7 g/dL    Hematocrit 43.8 35.0 - 47.0 %    MCV 94 78 - 100 fL    MCH 28.8 26.5 - 33.0 pg    MCHC 30.6 (L) 31.5 - 36.5 g/dL    RDW 14.0 10.0 - 15.0 %    Platelet Count 315 150 - 450 10e3/uL    % Neutrophils 56 %    % Lymphocytes 29 %    % Monocytes 10 %    % Eosinophils 5 %    % Basophils 1 %    % Immature Granulocytes 0 %    Absolute Neutrophils 4.3 1.6 - 8.3 10e3/uL    Absolute Lymphocytes 2.2 0.8 - 5.3 10e3/uL    Absolute Monocytes 0.7 0.0 - 1.3 10e3/uL    Absolute Eosinophils 0.3 0.0 - 0.7 10e3/uL    Absolute Basophils 0.1 0.0 - 0.2 10e3/uL    Absolute Immature Granulocytes 0.0 <=0.4 10e3/uL     *Note: Due to a large number of results and/or encounters for the requested time period, some results have not been displayed. A complete set of results can be found in Results Review.

## 2023-12-28 NOTE — LETTER
[facility]  12/28/23  Patient: Eileen Donald  YOB: 1955  Medical Record Number: [id number]                                                                                  Non-Opioid Controlled Substance Agreement    This is an agreement between you and your provider regarding safe and appropriate use of controlled substances prescribed by your care team. Controlled substances are?medicines that can cause physical and mental dependence (abuse).     There are strict laws about having and using these medicines. We here at [facility] are  committed to working with you in your efforts to get better. To support you in this work, we'll help you schedule regular office appointments for medicine refills. If we must cancel or change your appointment for any reason, we'll make sure you have enough medicine to last until your next appointment.     As a Provider, I will:   Listen carefully to your concerns while treating you with respect.   Recommend a treatment plan that I believe is in your best interest and may involve therapies other than medicine.    Talk with you often about the possible benefits and the risk of harm of any medicine that we prescribe for you.  Assess the safety of this medicine and check how well it works.    Provide a plan on how to taper (discontinue or go off) using this medicine if the decision is made to stop its use.      ::  As a Patient, I understand controlled substances:     Are prescribed by my care provider to help me function or work and manage my condition(s).?  Are strong medicines and can cause serious side effects.     Need to be taken exactly as prescribed.?Combining controlled substances with certain medicines or chemicals (such as illegal drugs, alcohol, sedatives, sleeping pills, and benzodiazepines) can be dangerous or even fatal.? If I stop taking my medicines suddenly, I may have severe withdrawal symptoms.     The risks,  benefits, and side effects of these medicine(s) were explained to me. I agree that:    I will take part in other treatments as advised by my care team. This may be psychiatry or counseling, physical therapy, behavioral therapy, group treatment or a referral to specialist.    I will keep all my appointments and understand this is part of the monitoring of controlled substances.?My care team may require an office visit for EVERY controlled substance refill. If I miss appointments or don t follow instructions, my care team may stop my medicine    I will take my medicines as prescribed. I will not change the dose or schedule unless my care team tells me to. There will be no refills if I run out early.      I may be asked to come to the clinic and complete a urine drug test or complete a pill count. If I don t give a urine sample or participate in a pill count, the care team may stop my medicine.    I will only receive controlled substance prescriptions from this clinic. If I am treated by another provider, I will tell them that I am taking controlled substances and that I have a treatment agreement with this provider. I will inform my [facility] care team within one business day if I am given a prescription for any controlled substance by another healthcare provider. My [facility] care team can contact other providers and pharmacists about my use of any medicines.    It is up to me to make sure that I don't run out of my medicines on weekends or holidays.?If my care team is willing to refill my prescription without a visit, I must request refills only during office hours. Refills may take up to 3 business days to process. I will use one pharmacy to fill all my controlled substance prescriptions. I will notify the clinic about any changes to my insurance or medicine availability.    I am responsible for my prescriptions. If the medicine/prescription is lost, stolen or destroyed, it will not be replaced.?I  also agree not to share controlled substance medicines with anyone.     I am aware I should not use any illegal or recreational drugs. I agree not to drink alcohol unless my care team says I can.     If I enroll in the [place] Medical Cannabis program, I will tell my care team before my next refill.    I will tell my care team right away if I become pregnant, have a new medical problem treated outside of my regular clinic, or have a change in my medicines.     I understand that this medicine can affect my thinking, judgment and reaction time.? Alcohol and drugs affect the brain and body, which can affect the safety of my driving. Being under the influence of alcohol or drugs can affect my decision-making, behaviors, personal safety and the safety of others. Driving while impaired (DWI) can occur if a person is driving, operating or in physical control of a car, motorcycle, boat, snowmobile, ATV, motorbike, off-road vehicle or any other motor vehicle ([place] Statute 169A.20). I understand the risk if I choose to drive or operate any vehicle or machinery.    I understand that if I do not follow any of the conditions above, my prescriptions or treatment may be stopped or changed.   I agree that my provider, clinic care team and pharmacy may work with any city, state or federal law enforcement agency that investigates the misuse, sale or other diversion of my controlled medicine. I will allow my provider to discuss my care with, or share a copy of, this agreement with any other treating provider, pharmacy or emergency room where I receive care.     I have read this agreement and have asked questions about anything I did not understand.    ________________________________________________________  Patient Signature - Eileen Donald     ___________________                   Date     ________________________________________________________  Provider Signature - Drew Hahn MD       ___________________                    Date     ________________________________________________________  Witness Signature (required if provider not present while patient signing)          ___________________                   Date

## 2024-01-03 ENCOUNTER — TRANSFERRED RECORDS (OUTPATIENT)
Dept: HEALTH INFORMATION MANAGEMENT | Facility: CLINIC | Age: 69
End: 2024-01-03

## 2024-01-05 ENCOUNTER — LAB (OUTPATIENT)
Dept: LAB | Facility: CLINIC | Age: 69
End: 2024-01-05
Payer: COMMERCIAL

## 2024-01-05 ENCOUNTER — ANTICOAGULATION THERAPY VISIT (OUTPATIENT)
Dept: ANTICOAGULATION | Facility: CLINIC | Age: 69
End: 2024-01-05

## 2024-01-05 DIAGNOSIS — Z95.2 S/P AVR (AORTIC VALVE REPLACEMENT): ICD-10-CM

## 2024-01-05 DIAGNOSIS — I26.99 PULMONARY EMBOLISM, OTHER, UNSPECIFIED CHRONICITY, UNSPECIFIED WHETHER ACUTE COR PULMONALE PRESENT (H): ICD-10-CM

## 2024-01-05 DIAGNOSIS — Z79.01 LONG TERM (CURRENT) USE OF ANTICOAGULANTS: ICD-10-CM

## 2024-01-05 DIAGNOSIS — I48.0 PAROXYSMAL ATRIAL FIBRILLATION (H): ICD-10-CM

## 2024-01-05 DIAGNOSIS — Z95.2 HEART VALVE REPLACED: Primary | ICD-10-CM

## 2024-01-05 DIAGNOSIS — Z95.2 H/O AORTIC VALVE REPLACEMENT: ICD-10-CM

## 2024-01-05 LAB — INR BLD: 1.6 (ref 0.9–1.1)

## 2024-01-05 PROCEDURE — 85610 PROTHROMBIN TIME: CPT

## 2024-01-05 PROCEDURE — 36415 COLL VENOUS BLD VENIPUNCTURE: CPT

## 2024-01-05 NOTE — PROGRESS NOTES
ANTICOAGULATION MANAGEMENT     Eileen Donald 68 year old female is on warfarin with subtherapeutic INR result. (Goal INR 2.5-3.5)    Recent labs: (last 7 days)     01/05/24  1405   INR 1.6*       ASSESSMENT     Warfarin Lab Questionnaire    Warfarin Doses Last 7 Days      1/5/2024     2:24 AM   Dose in Tablet or Mg   TAB or MG? milligram (mg)     Pt Rptd Dose SUNDAY MONDAY TUESDAY WED THURS FRIDAY SATURDAY 1/5/2024   2:24 AM 2.5 2.5 5 2.5 2.5 2.5 5         1/5/2024   Warfarin Lab Questionnaire   Missed doses within past 14 days? No verbally clarified with Marilee that she did miss a dose earlier this week   Changes in diet or alcohol within past 14 days? No   Medication changes since last result? No   Injuries or illness since last result? No   New shortness of breath, severe headaches or sudden changes in vision since last result? No   Abnormal bleeding since last result? No   Upcoming surgery, procedure? No     Previous result: Therapeutic last 2(+) visits  Additional findings:  Dose reported above is more than instructed. Verbally clarified with Marilee and she confirms she is taking 5mg twice weekly. With a missed dose this week it is difficult to determine if maintenance also needs adjustment, therefore will continue the dose as she is taking it and will adjust maintenance if needed once temporary factors have been resolved.       PLAN     Recommended plan for temporary change(s) affecting INR     Dosing Instructions: booster dose then continue your current warfarin dose with next INR in 1 week       Summary  As of 1/5/2024      Full warfarin instructions:  1/5: 5 mg; Otherwise 5 mg every Tue, Sat; 2.5 mg all other days   Next INR check:  1/12/2024               Telephone call with Marilee who verbalizes understanding and agrees to plan    Lab visit scheduled    Education provided:   Contact [phone] with any changes, questions or concerns.     Plan made with [facility] Pharmacist Renee Wilks (per protocol for  bridging)    Cierra Escobar RN  Anticoagulation Clinic  1/5/2024    _______________________________________________________________________     Anticoagulation Episode Summary       Current INR goal:  2.5-3.5   TTR:  54.5% (1 y)   Target end date:  Indefinite   Send INR reminders to:  [facility]    Indications    Heart valve replaced [Z95.2]  Paroxysmal atrial fibrillation (H) [I48.0]  S/P AVR (aortic valve replacement) [Z95.2]  Other pulmonary embolism without acute cor pulmonale  unspecified chronicity (H) (Resolved) [I26.99]  H/O aortic valve replacement [Z95.2]  Long term (current) use of anticoagulants [Z79.01]  Pulmonary embolism  other  unspecified chronicity  unspecified whether acute cor pulmonale present (H) (Resolved) [I26.99]  Pulmonary embolism  other  unspecified chronicity  unspecified whether acute cor pulmonale present (H) [I26.99]             Comments:  12/15/21 - amended INR goal to 2.5 - 3.5             Anticoagulation Care Providers       Provider Role Specialty Phone number    Tito Salazar MD Referring Family Medicine [phone]    Drew Hahn MD Referring Family Medicine [phone]

## 2024-01-12 ENCOUNTER — LAB (OUTPATIENT)
Dept: LAB | Facility: CLINIC | Age: 69
End: 2024-01-12
Payer: COMMERCIAL

## 2024-01-12 ENCOUNTER — TELEPHONE (OUTPATIENT)
Dept: ANTICOAGULATION | Facility: CLINIC | Age: 69
End: 2024-01-12

## 2024-01-12 ENCOUNTER — ANTICOAGULATION THERAPY VISIT (OUTPATIENT)
Dept: ANTICOAGULATION | Facility: CLINIC | Age: 69
End: 2024-01-12

## 2024-01-12 DIAGNOSIS — Z79.01 LONG TERM (CURRENT) USE OF ANTICOAGULANTS: ICD-10-CM

## 2024-01-12 DIAGNOSIS — I26.99 PULMONARY EMBOLISM, OTHER, UNSPECIFIED CHRONICITY, UNSPECIFIED WHETHER ACUTE COR PULMONALE PRESENT (H): ICD-10-CM

## 2024-01-12 DIAGNOSIS — Z95.2 S/P AVR (AORTIC VALVE REPLACEMENT): ICD-10-CM

## 2024-01-12 DIAGNOSIS — I48.0 PAROXYSMAL ATRIAL FIBRILLATION (H): ICD-10-CM

## 2024-01-12 DIAGNOSIS — Z95.2 HEART VALVE REPLACED: Primary | ICD-10-CM

## 2024-01-12 DIAGNOSIS — Z95.2 S/P AVR (AORTIC VALVE REPLACEMENT): Primary | ICD-10-CM

## 2024-01-12 DIAGNOSIS — Z95.2 H/O AORTIC VALVE REPLACEMENT: ICD-10-CM

## 2024-01-12 DIAGNOSIS — Z95.2 HEART VALVE REPLACED: ICD-10-CM

## 2024-01-12 LAB — INR BLD: 2.6 (ref 0.9–1.1)

## 2024-01-12 PROCEDURE — 85610 PROTHROMBIN TIME: CPT

## 2024-01-12 PROCEDURE — 36416 COLLJ CAPILLARY BLOOD SPEC: CPT

## 2024-01-12 NOTE — PROGRESS NOTES
ANTICOAGULATION MANAGEMENT     Eileen Donald 68 year old female is on warfarin with therapeutic INR result. (Goal INR 2.5-3.5)    Recent labs: (last 7 days)     01/12/24  1310   INR 2.6*       ASSESSMENT     Warfarin Lab Questionnaire    Warfarin Doses Last 7 Days      1/11/2024    11:42 PM   Dose in Tablet or Mg   TAB or MG? - 2.5mg warfarin tablets (evenings)  milligram (mg)     Pt Rptd Dose SUNDAY MONDAY TUESDAY WED THURS FRIDAY SATURDAY 1/11/2024  11:42 PM 2.5 2.5 5 2.5 2.5 2.5 5         1/11/2024   Warfarin Lab Questionnaire   Missed doses within past 14 days? No   Changes in diet or alcohol within past 14 days? No   Medication changes since last result? No   Injuries or illness since last result? No   New shortness of breath, severe headaches or sudden changes in vision since last result? No   Abnormal bleeding since last result? No   Upcoming surgery, procedure? Yes - scheduled on 2/13/24 for right direct anterior approach total hip arthroplasty.         - sent to pharmacist (Renee Wilks, [facility]) to review warfarin HOLD and potential bridge.   Please explain, date scheduled? Right hip replacenent  in February 13 not sure off date.             Previous result: Subtherapeutic at 1.6 on 1/5/24    Additional findings: None       PLAN     Recommended plan for no diet, medication or health factor changes affecting INR     Dosing Instructions: Continue your current warfarin dose with next INR in 2 weeks       Summary  As of 1/12/2024      Full warfarin instructions:  5 mg every Tue, Sat; 2.5 mg all other days   Next INR check:  1/26/2024               Telephone call with Marilee who verbalizes understanding and agrees to plan    Check at provider office visit - INR on 2/5/24 at her preop appt with Dr. Cobb    Education provided:   Taking warfarin: Importance of taking warfarin as instructed  Goal range and lab monitoring: goal range and significance of current result    Plan made per [facility] anticoagulation  protocol    Alondra Lora RN  Anticoagulation Clinic  1/12/2024    _______________________________________________________________________     Anticoagulation Episode Summary       Current INR goal:  2.5-3.5   TTR:  53.0% (1 y)   Target end date:  Indefinite   Send INR reminders to:  [facility]    Indications    Heart valve replaced [Z95.2]  Paroxysmal atrial fibrillation (H) [I48.0]  S/P AVR (aortic valve replacement) [Z95.2]  Other pulmonary embolism without acute cor pulmonale  unspecified chronicity (H) (Resolved) [I26.99]  H/O aortic valve replacement [Z95.2]  Long term (current) use of anticoagulants [Z79.01]  Pulmonary embolism  other  unspecified chronicity  unspecified whether acute cor pulmonale present (H) (Resolved) [I26.99]  Pulmonary embolism  other  unspecified chronicity  unspecified whether acute cor pulmonale present (H) [I26.99]             Comments:  12/15/21 - amended INR goal to 2.5 - 3.5             Anticoagulation Care Providers       Provider Role Specialty Phone number    Tito Salazar MD Referring Family Medicine [phone]    Drew Hahn MD Referring Family Medicine [phone]

## 2024-01-12 NOTE — TELEPHONE ENCOUNTER
Renee Wilks, [facility]:     - scheduled on 2/13/24 for right direct anterior approach total hip arthroplasty.     - please review warfarin hold and potential bridge.

## 2024-01-17 ENCOUNTER — TRANSFERRED RECORDS (OUTPATIENT)
Dept: HEALTH INFORMATION MANAGEMENT | Facility: CLINIC | Age: 69
End: 2024-01-17

## 2024-01-30 NOTE — TELEPHONE ENCOUNTER
[facility]-PROCED[facility] ANTICOAGULATION  MANAGEMENT    ASSESSMENT     Warfarin interruption plan for Neck surgery on 3/19/24    Per 1/25/24 [facility] encounter- neck surgery is now planned first; will reschedule hip after neck surgery    Neck surgery pre-op 2/19    Indication for Anticoagulation: Mechanical AVR ([facility] 1/2017) and PE     Held and bridged 12/2021 colonoscopy    AVR: 2020 AHA/ACC Management of Valvular Heart disease guidelines suggests bridging is reasonable on individual basis with risk of bleeding weighed against risks of clotting for mechanical AVR patients with risk factors for thrombosis (Afib, previous thromboembolism, hypercoagulable condition, LV systolic dysfunction, older generation valves, or > 1 valve)    RECOMMENDATION     Pre-Procedure:  Hold warfarin for 5 days, until after procedure   Enoxaparin (Lovenox) 105 mg subq Q 12 hrs (1 mg/kg Q 12 hrs for CrCl >= 60 ml/min and BMI <= 40 kg/m2)   Start enoxaparin: Thur 3/14/24  Last dose of enoxaparin prior to procedure: Mon 3/18 AM  (~24 hours prior to procedure)    Post-Procedure   Per inpatient team. Suggest:  Resume warfarin at maintenance dose if okay with provider doing procedure on night of procedure, 3/19  Resume therapeutic enoxaparin (Lovenox) ~ 48-72 hrs post procedure when okay with provider doing procedure. Delayed restart of therapeutic dose due to high bleeding risk procedure.   Consider enoxaparin 40 mg daily prophylaxis dose until cleared  Continue until cleared for therapeutic; may start 12-24 hours post procedure  Continue bridging until INR >= 2.5    Recheck INR 5-7 days after resuming warfarin   ?     Plan routed to referring provider for approval  ?   Renee Wilks, [facility]    SUBJECTIVE/OBJECTIVE     Eileen Donald, a 68 year old female    Goal INR Range: 2.5-3.5     Wt Readings from Last 3 Encounters:   02/19/24 112 kg (247 lb)   12/28/23 111.1 kg (245 lb)   08/04/23 111.2 kg (245 lb 3.2 oz)      Ideal body weight: 68.5 kg (151 lb  "0.2 oz)  Adjusted ideal body weight: 85.6 kg (188 lb 9.7 oz)     Estimated body mass index is 35.44 kg/m  as calculated from the following:    Height as of 2/19/24: 1.778 m (5' 10\").    Weight as of 2/19/24: 112 kg (247 lb).    Lab Results   Component Value Date    INR 2.41 (H) 02/19/2024    INR 2.6 (H) 02/01/2024    INR 2.6 (H) 01/12/2024     Lab Results   Component Value Date    HGB 14.6 02/19/2024    HCT 46.5 02/19/2024     02/19/2024     Lab Results   Component Value Date    CR 0.83 02/19/2024    CR 0.85 12/28/2023    CR 0.85 10/31/2022     Estimated Creatinine Clearance: 89.2 mL/min (based on SCr of 0.83 mg/dL).    "

## 2024-01-31 ENCOUNTER — TELEPHONE (OUTPATIENT)
Dept: ANTICOAGULATION | Facility: CLINIC | Age: 69
End: 2024-01-31
Payer: COMMERCIAL

## 2024-01-31 NOTE — TELEPHONE ENCOUNTER
ANTICOAGULATION     Eileen Donald is overdue for an INR check.     Spoke with Marilee and scheduled lab appointment on 2@   - scheduled cervical surgery on March 13, 2024.    Alondra Lora RN

## 2024-02-01 ENCOUNTER — ANTICOAGULATION THERAPY VISIT (OUTPATIENT)
Dept: ANTICOAGULATION | Facility: CLINIC | Age: 69
End: 2024-02-01

## 2024-02-01 ENCOUNTER — LAB (OUTPATIENT)
Dept: LAB | Facility: CLINIC | Age: 69
End: 2024-02-01
Payer: COMMERCIAL

## 2024-02-01 DIAGNOSIS — Z95.2 HEART VALVE REPLACED: Primary | ICD-10-CM

## 2024-02-01 DIAGNOSIS — Z79.01 LONG TERM (CURRENT) USE OF ANTICOAGULANTS: ICD-10-CM

## 2024-02-01 DIAGNOSIS — I48.0 PAROXYSMAL ATRIAL FIBRILLATION (H): ICD-10-CM

## 2024-02-01 DIAGNOSIS — Z95.2 S/P AVR (AORTIC VALVE REPLACEMENT): ICD-10-CM

## 2024-02-01 DIAGNOSIS — I26.99 PULMONARY EMBOLISM, OTHER, UNSPECIFIED CHRONICITY, UNSPECIFIED WHETHER ACUTE COR PULMONALE PRESENT (H): ICD-10-CM

## 2024-02-01 DIAGNOSIS — Z95.2 H/O AORTIC VALVE REPLACEMENT: ICD-10-CM

## 2024-02-01 LAB — INR BLD: 2.6 (ref 0.9–1.1)

## 2024-02-01 PROCEDURE — 36415 COLL VENOUS BLD VENIPUNCTURE: CPT

## 2024-02-01 PROCEDURE — 85610 PROTHROMBIN TIME: CPT

## 2024-02-01 NOTE — PROGRESS NOTES
ANTICOAGULATION MANAGEMENT     Eileen Donald 68 year old female is on warfarin with therapeutic INR result. (Goal INR 2.5-3.5)    Recent labs: (last 7 days)     02/01/24  1236   INR 2.6*       ASSESSMENT     Warfarin Lab Questionnaire    Warfarin Doses Last 7 Days      2/1/2024    12:40 PM   Dose in Tablet or Mg   TAB or MG? - 2.5mg warfarin tablets (evenings) milligram (mg)     Pt Rptd Dose SUNDAY MONDAY TUESDAY WED THURS FRIDAY SATURDAY 2/1/2024  12:40 PM 2.5 2.5 5 2.5 2.5 2.5 5         2/1/2024   Warfarin Lab Questionnaire   Missed doses within past 14 days? No   Changes in diet or alcohol within past 14 days? No   Medication changes since last result? No - reported, she resumed Aspirin 81mg daily on 1/26/24 as ordered, after being off for one month.  (She did not think she needed to take it anymore).     Injuries or illness since last result? No   New shortness of breath, severe headaches or sudden changes in vision since last result? No   Abnormal bleeding since last result? No   Upcoming surgery, procedure? Yes - reported right hip surgery cancelled now, and will need cervical surgery first for anterior cervical decompression fusion of C3-C5, d/t cervical stenosis, scheduled on 3/19/24.         - Already sent to pharmacist (Renee Wilks, [facility]) to review warafarin hold and potential bridge.         - reported she still has several Lovenox syringes from previous warfarin hold.   Please explain, date scheduled? march 19     Best number to call with results? [phone]     Previous result: Therapeutic last visit at 2.6; previously outside of goal range at 1.6    Additional findings: None       PLAN     Recommended plan for ongoing change(s) affecting INR     Dosing Instructions: Continue your current warfarin dose with next INR in 2 weeks       Summary  As of 2/1/2024      Full warfarin instructions:  5 mg every Tue, Sat; 2.5 mg all other days   Next INR check:  2/22/2024               Telephone call with  Marilee who verbalizes understanding and agrees to plan    Check at provider office visit - INR on 2/19/24 at preop appt with Dr. Hahn.    Education provided:   Taking warfarin: Importance of taking warfarin as instructed  Goal range and lab monitoring: goal range and significance of current result  Interaction IS anticipated between warfarin and resumed Aspirin    Plan made per [facility] anticoagulation protocol    Alondra Lora, RN  Anticoagulation Clinic  2/1/2024    _______________________________________________________________________     Anticoagulation Episode Summary       Current INR goal:  2.5-3.5   TTR:  57.3% (1 y)   Target end date:  Indefinite   Send INR reminders to:  [facility]    Indications    Heart valve replaced [Z95.2]  Paroxysmal atrial fibrillation (H) [I48.0]  S/P AVR (aortic valve replacement) [Z95.2]  Other pulmonary embolism without acute cor pulmonale  unspecified chronicity (H) (Resolved) [I26.99]  H/O aortic valve replacement [Z95.2]  Long term (current) use of anticoagulants [Z79.01]  Pulmonary embolism  other  unspecified chronicity  unspecified whether acute cor pulmonale present (H) (Resolved) [I26.99]  Pulmonary embolism  other  unspecified chronicity  unspecified whether acute cor pulmonale present (H) (Resolved) [I26.99]             Comments:  12/15/21 - amended INR goal to 2.5 - 3.5             Anticoagulation Care Providers       Provider Role Specialty Phone number    Tito Salazar MD Referring Family Medicine [phone]    Drew Hahn MD Referring Family Medicine [phone]

## 2024-02-19 ENCOUNTER — OFFICE VISIT (OUTPATIENT)
Dept: FAMILY MEDICINE | Facility: CLINIC | Age: 69
End: 2024-02-19
Payer: COMMERCIAL

## 2024-02-19 VITALS
RESPIRATION RATE: 18 BRPM | HEART RATE: 83 BPM | WEIGHT: 247 LBS | DIASTOLIC BLOOD PRESSURE: 76 MMHG | OXYGEN SATURATION: 96 % | SYSTOLIC BLOOD PRESSURE: 128 MMHG | BODY MASS INDEX: 35.36 KG/M2 | HEIGHT: 70 IN

## 2024-02-19 DIAGNOSIS — I48.0 PAROXYSMAL ATRIAL FIBRILLATION (H): ICD-10-CM

## 2024-02-19 DIAGNOSIS — J96.11 CHRONIC RESPIRATORY FAILURE WITH HYPOXIA (H): ICD-10-CM

## 2024-02-19 DIAGNOSIS — E66.01 MORBID OBESITY (H): ICD-10-CM

## 2024-02-19 DIAGNOSIS — Z95.2 S/P AVR (AORTIC VALVE REPLACEMENT): ICD-10-CM

## 2024-02-19 DIAGNOSIS — Z86.711 HISTORY OF PULMONARY EMBOLISM: ICD-10-CM

## 2024-02-19 DIAGNOSIS — Z79.01 LONG TERM (CURRENT) USE OF ANTICOAGULANTS: ICD-10-CM

## 2024-02-19 DIAGNOSIS — Z01.818 PREOP GENERAL PHYSICAL EXAM: Primary | ICD-10-CM

## 2024-02-19 DIAGNOSIS — F33.41 RECURRENT MAJOR DEPRESSIVE DISORDER, IN PARTIAL REMISSION (H): ICD-10-CM

## 2024-02-19 DIAGNOSIS — G35 MULTIPLE SCLEROSIS (H): ICD-10-CM

## 2024-02-19 DIAGNOSIS — R13.10 DYSPHAGIA, UNSPECIFIED TYPE: ICD-10-CM

## 2024-02-19 DIAGNOSIS — I27.20 PULMONARY HYPERTENSION (H): ICD-10-CM

## 2024-02-19 DIAGNOSIS — E11.69 TYPE 2 DIABETES MELLITUS WITH OTHER SPECIFIED COMPLICATION, UNSPECIFIED WHETHER LONG TERM INSULIN USE (H): ICD-10-CM

## 2024-02-19 DIAGNOSIS — E08.21 DIABETES MELLITUS DUE TO UNDERLYING CONDITION WITH DIABETIC NEPHROPATHY, WITHOUT LONG-TERM CURRENT USE OF INSULIN (H): ICD-10-CM

## 2024-02-19 PROBLEM — I26.99 PULMONARY EMBOLISM, OTHER, UNSPECIFIED CHRONICITY, UNSPECIFIED WHETHER ACUTE COR PULMONALE PRESENT (H): Status: RESOLVED | Noted: 2023-10-09 | Resolved: 2024-02-19

## 2024-02-19 LAB
BASOPHILS # BLD AUTO: 0.1 10E3/UL (ref 0–0.2)
BASOPHILS NFR BLD AUTO: 1 %
EOSINOPHIL # BLD AUTO: 0.3 10E3/UL (ref 0–0.7)
EOSINOPHIL NFR BLD AUTO: 4 %
ERYTHROCYTE [DISTWIDTH] IN BLOOD BY AUTOMATED COUNT: 14.3 % (ref 10–15)
HBA1C MFR BLD: 6.3 % (ref 0–5.6)
HCT VFR BLD AUTO: 46.5 % (ref 35–47)
HGB BLD-MCNC: 14.6 G/DL (ref 11.7–15.7)
IMM GRANULOCYTES # BLD: 0 10E3/UL
IMM GRANULOCYTES NFR BLD: 0 %
INR PPP: 2.41 (ref 0.85–1.15)
LYMPHOCYTES # BLD AUTO: 1.5 10E3/UL (ref 0.8–5.3)
LYMPHOCYTES NFR BLD AUTO: 19 %
MCH RBC QN AUTO: 29.3 PG (ref 26.5–33)
MCHC RBC AUTO-ENTMCNC: 31.4 G/DL (ref 31.5–36.5)
MCV RBC AUTO: 93 FL (ref 78–100)
MONOCYTES # BLD AUTO: 0.7 10E3/UL (ref 0–1.3)
MONOCYTES NFR BLD AUTO: 9 %
NEUTROPHILS # BLD AUTO: 5.3 10E3/UL (ref 1.6–8.3)
NEUTROPHILS NFR BLD AUTO: 67 %
PLATELET # BLD AUTO: 306 10E3/UL (ref 150–450)
RBC # BLD AUTO: 4.98 10E6/UL (ref 3.8–5.2)
WBC # BLD AUTO: 7.9 10E3/UL (ref 4–11)

## 2024-02-19 PROCEDURE — 36415 COLL VENOUS BLD VENIPUNCTURE: CPT | Performed by: FAMILY MEDICINE

## 2024-02-19 PROCEDURE — 85025 COMPLETE CBC W/AUTO DIFF WBC: CPT | Performed by: FAMILY MEDICINE

## 2024-02-19 PROCEDURE — 82043 UR ALBUMIN QUANTITATIVE: CPT | Performed by: FAMILY MEDICINE

## 2024-02-19 PROCEDURE — 82570 ASSAY OF URINE CREATININE: CPT | Performed by: FAMILY MEDICINE

## 2024-02-19 PROCEDURE — 83036 HEMOGLOBIN GLYCOSYLATED A1C: CPT | Performed by: FAMILY MEDICINE

## 2024-02-19 PROCEDURE — 80053 COMPREHEN METABOLIC PANEL: CPT | Performed by: FAMILY MEDICINE

## 2024-02-19 PROCEDURE — 85610 PROTHROMBIN TIME: CPT | Performed by: FAMILY MEDICINE

## 2024-02-19 PROCEDURE — 99215 OFFICE O/P EST HI 40 MIN: CPT | Mod: 25 | Performed by: FAMILY MEDICINE

## 2024-02-19 PROCEDURE — 93005 ELECTROCARDIOGRAM TRACING: CPT | Performed by: FAMILY MEDICINE

## 2024-02-19 RX ORDER — VENLAFAXINE HYDROCHLORIDE 75 MG/1
CAPSULE, EXTENDED RELEASE ORAL
Qty: 270 CAPSULE | Refills: 3 | Status: SHIPPED | OUTPATIENT
Start: 2024-02-19

## 2024-02-19 ASSESSMENT — PATIENT HEALTH QUESTIONNAIRE - PHQ9
SUM OF ALL RESPONSES TO PHQ QUESTIONS 1-9: 7
10. IF YOU CHECKED OFF ANY PROBLEMS, HOW DIFFICULT HAVE THESE PROBLEMS MADE IT FOR YOU TO DO YOUR WORK, TAKE CARE OF THINGS AT HOME, OR GET ALONG WITH OTHER PEOPLE: EXTREMELY DIFFICULT
SUM OF ALL RESPONSES TO PHQ QUESTIONS 1-9: 7

## 2024-02-19 NOTE — PATIENT INSTRUCTIONS
Preparing for Your Surgery  Getting started  A nurse will call you to review your health history and instructions. They will give you an arrival time based on your scheduled surgery time. Please be ready to share:  Your doctor's clinic name and phone number  Your medical, surgical, and anesthesia history  A list of allergies and sensitivities  A list of medicines, including herbal treatments and over-the-counter drugs  Whether the patient has a legal guardian (ask how to send us the papers in advance)  Please tell us if you're pregnant--or if there's any chance you might be pregnant. Some surgeries may injure a fetus (unborn baby), so they require a pregnancy test. Surgeries that are safe for a fetus don't always need a test, and you can choose whether to have one.   If you have a child who's having surgery, please ask for a copy of Preparing for Your Child's Surgery.    Preparing for surgery  Within 10 to 30 days of surgery: Have a pre-op exam (sometimes called an H&P, or History and Physical). This can be done at a clinic or pre-operative center.  If you're having a , you may not need this exam. Talk to your care team.  At your pre-op exam, talk to your care team about all medicines you take. If you need to stop any medicines before surgery, ask when to start taking them again.  We do this for your safety. Many medicines can make you bleed too much during surgery. Some change how well surgery (anesthesia) drugs work.  Call your insurance company to let them know you're having surgery. (If you don't have insurance, call [phone].)  Call your clinic if there's any change in your health. This includes signs of a cold or flu (sore throat, runny nose, cough, rash, fever). It also includes a scrape or scratch near the surgery site.  If you have questions on the day of surgery, call your hospital or surgery center.  Eating and drinking guidelines  For your safety: Unless your surgeon tells you otherwise,  follow the guidelines below.  Eat and drink as usual until 8 hours before you arrive for surgery. After that, no food or milk.  Drink clear liquids until 2 hours before you arrive. These are liquids you can see through, like water, Gatorade, and Propel Water. They also include plain black coffee and tea (no cream or milk), candy, and breath mints. You can spit out gum when you arrive.  If you drink alcohol: Stop drinking it the night before surgery.  If your care team tells you to take medicine on the morning of surgery, it's okay to take it with a sip of water.  Preventing infection  Shower or bathe the night before and morning of your surgery. Follow the instructions your clinic gave you. (If no instructions, use regular soap.)  Don't shave or clip hair near your surgery site. We'll remove the hair if needed.  Don't smoke or vape the morning of surgery. You may chew nicotine gum up to 2 hours before surgery. A nicotine patch is okay.  Note: Some surgeries require you to completely quit smoking and nicotine. Check with your surgeon.  Your care team will make every effort to keep you safe from infection. We will:  Clean our hands often with soap and water (or an alcohol-based hand rub).  Clean the skin at your surgery site with a special soap that kills germs.  Give you a special gown to keep you warm. (Cold raises the risk of infection.)  Wear special hair covers, masks, gowns and gloves during surgery.  Give antibiotic medicine, if prescribed. Not all surgeries need antibiotics.  What to bring on the day of surgery  Photo ID and insurance card  Copy of your health care directive, if you have one  Glasses and hearing aids (bring cases)  You can't wear contacts during surgery  Inhaler and eye drops, if you use them (tell us about these when you arrive)  CPAP machine or breathing device, if you use them  A few personal items, if spending the night  If you have . . .  A pacemaker, ICD (cardiac defibrillator) or other  implant: Bring the ID card.  An implanted stimulator: Bring the remote control.  A legal guardian: Bring a copy of the certified (court-stamped) guardianship papers.  Please remove any jewelry, including body piercings. Leave jewelry and other valuables at home.  If you're going home the day of surgery  You must have a responsible adult drive you home. They should stay with you overnight as well.  If you don't have someone to stay with you, and you aren't safe to go home alone, we may keep you overnight. Insurance often won't pay for this.  After surgery  If it's hard to control your pain or you need more pain medicine, please call your surgeon's office.  Questions?   If you have any questions for your care team, list them here: _________________________________________________________________________________________________________________________________________________________________________ ____________________________________ ____________________________________ ____________________________________  For informational purposes only. Not to replace the advice of your health care provider. Copyright   2003, 2019 [employer]. All rights reserved. Clinically reviewed by Josie Helton MD. [employer] 244090 - REV 12/22.    How to Take Your Medication Before Surgery  - HOLD (do not take) your LASIX (FUROSEMIDE) on the morning of surgery.   - HOLD (do not take) your METFORMIN on the morning of surgery.  - HOLD (do not take) Aspirin for 7 days  - HOLD (do not take) Warfarin- Detail instructions will be given to you by Pharm D  - STOP taking all vitamins and herbal supplements 14 days before surgery.

## 2024-02-19 NOTE — PROGRESS NOTES
Preoperative Evaluation  [facility]  [address] [facility] [address]  Phone: [phone]  Fax: [phone]  Primary Provider: Magnolia Cunha  Pre-op Performing Provider: MAGNOLIA CUNHA  Feb 19, 2024       Marilee is a 68 year old, presenting for the following:  Pre-Op Exam (DOS 03/19/2024, Anterior Cervical Decompression Fusion C3-C5, @ [facility], with Dr. Verdin )        2/19/2024     1:33 PM   Additional Questions   Roomed by Odalis Salas CMA     Surgical Information  Surgery/Procedure: Anterior Cervical Decompression Fusion C3-C5   Surgery Location: [facility]  Surgeon: Dr. Verdin   Surgery Date: 03/19/2024  Time of Surgery: 7AM  Where patient plans to recover: Other: Pt is unsure   Fax number for surgical facility: [phone]     Answers submitted by the patient for this visit:  Patient Health Questionnaire (Submitted on 2/19/2024)  If you checked off any problems, how difficult have these problems made it for you to do your work, take care of things at home, or get along with other people?: Extremely difficult  PHQ9 TOTAL SCORE: 7      Assessment & Plan     ICD-10-CM    1. Preop general physical exam  Z01.818 Med Therapy Management Referral     CBC with platelets and differential     Comprehensive metabolic panel (BMP + Alb, Alk Phos, ALT, AST, Total. Bili, TP)     Hemoglobin A1c     EKG 12-lead, tracing only     CBC with platelets and differential     Comprehensive metabolic panel (BMP + Alb, Alk Phos, ALT, AST, Total. Bili, TP)     Hemoglobin A1c      2. Dysphagia, unspecified type  R13.10 esomeprazole (NEXIUM) 20 MG DR capsule      3. Recurrent major depressive disorder, in partial remission (H24)  F33.41 venlafaxine (EFFEXOR XR) 75 MG 24 hr capsule      4. Type 2 diabetes mellitus with other specified complication, unspecified whether long term insulin use (H)  E11.69 Albumin Random Urine Quantitative with Creat Ratio     Albumin Random Urine  Quantitative with Creat Ratio      5. Chronic respiratory failure with hypoxia (H)  J96.11       6. Multiple sclerosis (H)  G35       7. Pulmonary hypertension (H)  I27.20       8. Paroxysmal atrial fibrillation (H)  I48.0 Med Therapy Management Referral      9. Morbid obesity (H)  E66.01       10. S/P AVR (aortic valve replacement)  Z95.2 Med Therapy Management Referral     INR     INR      11. Long term (current) use of anticoagulants  Z79.01 Med Therapy Management Referral      12. Diabetes mellitus due to underlying condition with diabetic nephropathy, without long-term current use of insulin (H)  E08.21 Hemoglobin A1c     Hemoglobin A1c      13. History of pulmonary embolism  Z86.711         Patient presents for preop exam.  She has a multitude of medical conditions.    1: Status post AVR with mechanical valve and atrial fibrillation.  She is on anticoagulation for aortic valve replacement and atrial fibrillation.  She follows with cardiology and last seen 10/23/2023.  An echocardiogram pursued has been stable.  Since we are unable to calculate MED for her, I did do an EKG that remains unchanged.  For atrial fibrillation, she is on rate control with metoprolol.  Chronic diastolic heart failure that is well compensated: She is on furosemide 40 mg with 10 mEq of potassium daily and she is euvolemic per exam.  2: Multiple sclerosis she has MS for which she is on Ocrevus infusion 6 monthly and follows with neurology.  This is stable.  3: Type 2 diabetes she has type 2 diabetes, under good control with metformin.  She has morbid obesity complicated with type 2 diabetes.  4: Chronic respiratory failure: She has chronic respiratory failure  Patient follows with pulmonology.  Her asthma is supposed to be multifactorial and likely from her MS and poor effort, obesity.  She is on albuterol as needed.  She is on supplemental oxygen at night to keep SpO2 92% or greater.  She is scheduled to get a sleep study done.  5:  History of PE: On anticoagulation.    Regarding anticoagulation: She will need bridging with heparin.  Referral to [facility] has been done and patient will be updated with plan of care.        The proposed surgical procedure is considered INTERMEDIATE to HIGH risk.  As the length of surgery proposed is 3 hours and 30 minutes.  We will obtain records from [facility] and from [facility] neurology to update her chart.          Possible Sleep Apnea: Scheduled for sleep study      Implanted Device   - Type of device: 23 mm [employer] mechanical prosthesis for aortic wall in 2017 Patient advised to bring device information on day of surgery.       - No identified additional risk factors other than previously addressed    Antiplatelet or Anticoagulation Medication Instructions   - aspirin: Discontinue aspirin 7-10 days prior to procedure to reduce bleeding risk. It should be resumed postoperatively.    - warfarin: Bridging therapy will be coordinated by [facility] Pharm.D.    Additional Medication Instructions   - Diuretics: HOLD on the day of surgery.   - metformin: HOLD day of surgery.  Hold aspirin for 7 days  Hold warfarin and bridged with Lovenox per MT.    Recommendation  APPROVAL GIVEN to proceed with proposed procedure, without further diagnostic evaluation.    Total time spent including chart review, patient interview, discussion of plan of care, examination and documentation exceeded 40 minutes.    The longitudinal plan of care for the condition(s) below were addressed during this visit. Due to the added complexity in care, I will continue to support Marilee in the subsequent management of this condition(s) and with the ongoing continuity of care of this condition(s).    Problem List Items Addressed This Visit as of 2/19/2024      Multiple Sclerosis    Dysphagia    History of pulmonary embolism    Morbid obesity (H)    Pulmonary hypertension (H)    Paroxysmal atrial fibrillation (H)    Recurrent major  depressive disorder, in partial remission (H24)    Type 2 diabetes mellitus with other specified complication, unspecified whether long term insulin use (H)    S/P AVR (aortic valve replacement)    Long term (current) use of anticoagulants    Chronic respiratory failure with hypoxia (H)        Subjective   Chief Complaint   Patient presents with    Pre-Op Exam     DOS 03/19/2024, Anterior Cervical Decompression Fusion C3-C5, @ [facility], with Dr. Verdin        HPI related to upcoming procedure: Denies any acute concerns.  Remains at her baseline with shortness of breath, gait and mental health.        2/13/2024    11:10 PM   Preop Questions   1. Have you ever had a heart attack or stroke? No   2. Have you ever had surgery on your heart or blood vessels, such as a stent placement, a coronary artery bypass, or surgery on an artery in your head, neck, heart, or legs? YES - AVR   3. Do you have chest pain with activity? No   4. Do you have a history of  heart failure? No   5. Do you currently have a cold, bronchitis or symptoms of other infection? No   6. Do you have a cough, shortness of breath, or wheezing? YES - Baseline   7. Do you or anyone in your family have previous history of blood clots? YES - PE prior to 2015   8. Do you or does anyone in your family have a serious bleeding problem such as prolonged bleeding following surgeries or cuts? YES - Being on warfarin   9. Have you ever had problems with anemia or been told to take iron pills? UNKNOWN    10. Have you had any abnormal blood loss such as black, tarry or bloody stools, or abnormal vaginal bleeding? No   11. Have you ever had a blood transfusion? NO   12. Are you willing to have a blood transfusion if it is medically needed before, during, or after your surgery? Yes   13. Have you or any of your relatives ever had problems with anesthesia? YES - Post operative in [place] in sibling with Morphine   14. Do you have sleep apnea, excessive snoring or  daytime drowsiness? No   15. Do you have any artifical heart valves or other implanted medical devices like a pacemaker, defibrillator, or continuous glucose monitor? YES - Aortic Mechanical valve   15a. What type of device do you have? Aorta valve replacement   15b. Name of the clinic that manages your device:  [facility]   16. Do you have artificial joints? No   17. Are you allergic to latex? YES: Rash       Health Care Directive  Patient does not have a Health Care Directive or Living Will: Discussed advance care planning with patient; information given to patient to review.    Preoperative Review of    reviewed - controlled substances reflected in medication list.      Status of Chronic Conditions:  See problem list for active medical problems.  Problems all longstanding and stable, except as noted/documented.  See ROS for pertinent symptoms related to these conditions.    Patient Active Problem List    Diagnosis Date Noted    Chronic respiratory failure with hypoxia (H) 02/19/2024     Priority: Medium    Spinal stenosis of cervical region 10/25/2023     Priority: Medium    Pulmonary embolism, other, unspecified chronicity, unspecified whether acute cor pulmonale present (H) 10/09/2023     Priority: Medium    Long term (current) use of anticoagulants 11/17/2022     Priority: Medium    Elevated MCV 04/07/2022     Priority: Medium    Hip pain, right 04/07/2022     Priority: Medium    H/O aortic valve replacement 12/15/2021     Priority: Medium    Osteoarthrosis 11/09/2021     Priority: Medium    Sleep apnea 11/09/2021     Priority: Medium    Encounter for long-term methadone use 11/01/2021     Priority: Medium    History of fall 11/01/2021     Priority: Medium    Leg weakness, bilateral 11/01/2021     Priority: Medium    Muscle spasm 11/01/2021     Priority: Medium    Neurogenic bladder 11/01/2021     Priority: Medium    Other symptoms and signs involving cognitive functions and awareness  11/01/2021     Priority: Medium    Tremor 11/01/2021     Priority: Medium    Unsteady gait 11/01/2021     Priority: Medium    Acute bacterial conjunctivitis of both eyes 09/28/2021     Priority: Medium    Community acquired pneumonia, unspecified laterality 09/28/2021     Priority: Medium    Type 2 diabetes mellitus with other specified complication, unspecified whether long term insulin use (H)      Priority: Medium    Dyslipidemia 02/01/2021     Priority: Medium    Dyspnea on exertion 02/01/2021     Priority: Medium    Pre-diabetes 02/01/2021     Priority: Medium    Recurrent major depressive disorder, in partial remission (H24) 02/01/2021     Priority: Medium    Multiple Sclerosis      Priority: Medium     Created by Conversion        Paroxysmal atrial fibrillation (H) 09/17/2019     Priority: Medium    Benign essential hypertension      Priority: Medium     Created by Conversion  Replacement Utility updated for latest IMO load        Pulmonary hypertension (H)      Priority: Medium    Closed 3-part fracture of proximal humerus with delayed healing, left 04/10/2019     Priority: Medium    Relapsing remitting multiple sclerosis (H) 07/20/2018     Priority: Medium    Heart valve replaced 06/26/2018     Priority: Medium    Controlled substance agreement signed 05/29/2018     Priority: Medium    Aortic valve replaced 02/15/2017     Priority: Medium    Hypertension 01/24/2017     Priority: Medium    Postoperative anemia due to acute blood loss 01/24/2017     Priority: Medium    Stress hyperglycemia 01/24/2017     Priority: Medium    S/P AVR (aortic valve replacement) 01/24/2017     Priority: Medium    Polyarthralgia 11/30/2016     Priority: Medium    Primary osteoarthritis of both hands 08/31/2016     Priority: Medium    Generalized anxiety disorder 06/15/2016     Priority: Medium    Asthma      Priority: Medium     Created by Conversion        Dysphagia      Priority: Medium     Created by Conversion        Morbid  obesity (H) 10/29/2015     Priority: Medium    History of pulmonary embolism 10/30/2014     Priority: Medium    Depression      Priority: Medium     Created by Conversion        Hyperlipidemia      Priority: Medium     Created by Conversion        Impaired Glucose Tolerance Test      Priority: Medium     Created by Conversion        Benign Adenomatous Polyp Of The Large Intestine      Priority: Medium     Created by Conversion          Past Medical History:   Diagnosis Date    Anxiety     Aortic valve replaced 2/15/2017    Aortic valve stenosis 01/24/2017    Asthma     Asthma     Created by Conversion     Chronic shortness of breath     COPD (chronic obstructive pulmonary disease) (H)     Coronary artery disease     Depression     Diabetes mellitus (H)     borderline    Essential hypertension     Created by Conversion  Replacement Utility updated for latest IMO load    History of blood clots     PE    History of pulmonary embolism     HTN (hypertension)     Multiple sclerosis (H)     Multiple sclerosis (H)     Created by Conversion     Obesity     Obesity 10/29/2015    Osteoarthritis     Oxygen dependent     Panic attacks     PONV (postoperative nausea and vomiting)     Pre-diabetes     Pulmonary embolism (H)     Sleep apnea     borderline     Past Surgical History:   Procedure Laterality Date    aortic valve surgery      COLONOSCOPY N/A 12/23/2021    Procedure: COLONOSCOPY WITH POLYPECTOMIES;  Surgeon: Tito Suarez MD;  Location: [facility] OR    COLONOSCOPY W/ BIOPSIES N/A 3/22/2021    Procedure: COLONOSCOPY WITH BIOPSY AND POLYPECTOMY;  Surgeon: Sam Weems MD;  Location: [facility] Main OR;  Service: Gastroenterology    IR LUMBAR EPIDURAL STEROID INJECTION  1/16/2007    MAMMOPLASTY REDUCTION  1994    OTHER SURGICAL HISTORY  01/24/2017    mechanical aortic prosthesis    RELEASE CARPAL TUNNEL      ZZC RECONSTR TOTAL SHOULDER IMPLANT Left 4/10/2019    Procedure: LEFT REVERSE TOTAL SHOULDER ARTHROPLASTY;   Surgeon: Luis Antonio Telles MD;  Location: [facility] OR;  Service: Orthopedics     Current Outpatient Medications   Medication Sig Dispense Refill    acetaminophen (TYLENOL) 325 MG tablet Take 325-650 mg by mouth every 6 hours as needed       acyclovir (ZOVIRAX) 400 MG tablet Take 400 mg by mouth 2 times daily       albuterol (PROAIR HFA/PROVENTIL HFA/VENTOLIN HFA) 108 (90 Base) MCG/ACT inhaler Inhale 2 puffs into the lungs every 6 hours as needed       aspirin (ASA) 81 MG chewable tablet Take 81 mg by mouth daily       baclofen (LIORESAL) 10 MG tablet Take 10-20 mg by mouth nightly as needed      buPROPion (WELLBUTRIN XL) 150 MG 24 hr tablet Take 1 tablet (150 mg) by mouth daily 90 tablet 3    enoxaparin ANTICOAGULANT (LOVENOX) 120 MG/0.8ML syringe Inject 0.7 mLs (105 mg) Subcutaneous every 12 hours Until INR >2.0 8 mL 1    esomeprazole (NEXIUM) 20 MG DR capsule Take 1 capsule (20 mg) by mouth every morning (before breakfast) 90 capsule 3    furosemide (LASIX) 40 MG tablet Take 1.5 tablets (60 mg) by mouth daily 135 tablet 0    levalbuterol (XOPENEX) 1.25 MG/3ML neb solution       lisinopril (ZESTRIL) 20 MG tablet       loratadine (CLARITIN) 10 MG tablet Take 1 tablet (10 mg) by mouth daily 90 tablet 3    LORazepam (ATIVAN) 0.5 MG tablet Take 1 tablet (0.5 mg) by mouth every 8 hours as needed for anxiety 20 tablet 0    metFORMIN (GLUCOPHAGE XR) 500 MG 24 hr tablet Take 1 tablet (500 mg) by mouth daily 90 tablet 0    metoprolol tartrate (LOPRESSOR) 25 MG tablet Take 1 tablet (25 mg) by mouth 2 times daily 180 tablet 3    montelukast (SINGULAIR) 10 MG tablet Take 1 tablet (10 mg) by mouth at bedtime 90 tablet 1    potassium chloride ER (MICRO-K) 10 MEQ CR capsule Take 10 mEq by mouth daily       simvastatin (ZOCOR) 20 MG tablet Take 1 tablet (20 mg) by mouth at bedtime 90 tablet 0    venlafaxine (EFFEXOR XR) 75 MG 24 hr capsule TAKE 3 CAPSULES BY MOUTH EVERY  capsule 3    warfarin ANTICOAGULANT  "(COUMADIN) 2.5 MG tablet Take 1 tab (2.5mg) to 2 tabs (5mg) by mouth daily, as directed.  Adjust dose based on INR results. 270 tablet 1       Allergies   Allergen Reactions    Morphine Visual Disturbance and Other (See Comments)     hallicinations      Sulfa Antibiotics Hives and Nausea and Vomiting    Azithromycin Nausea and Vomiting and Rash    Doxycycline Rash        Social History     Tobacco Use    Smoking status: Never    Smokeless tobacco: Never   Substance Use Topics    Alcohol use: No     Family History   Problem Relation Age of Onset    Snoring Mother     Snoring Father     Sleep Apnea Sister     Coronary Artery Disease Father     Coronary Artery Disease Sister     Coronary Artery Disease Brother     Breast Cancer No family hx of      History   Drug Use No         Review of Systems    Review of Systems  Constitutional, HEENT, cardiovascular, pulmonary, GI, , musculoskeletal, neuro, skin, endocrine and psych systems are negative, except as otherwise noted.  Objective    /76 (BP Location: Left arm, Patient Position: Sitting, Cuff Size: Adult Large)   Pulse 83   Resp 18   Ht 1.778 m (5' 10\")   Wt 112 kg (247 lb)   SpO2 96%   BMI 35.44 kg/m     Estimated body mass index is 35.44 kg/m  as calculated from the following:    Height as of this encounter: 1.778 m (5' 10\").    Weight as of this encounter: 112 kg (247 lb).  Physical Exam  GENERAL: alert and no distress  EYES: Eyes grossly normal to inspection, PERRL and conjunctivae and sclerae normal  HENT: ear canals and TM's normal, nose and mouth without ulcers or lesions  NECK: no adenopathy, no asymmetry, masses, or scars  RESP: lungs clear to auscultation - no rales, rhonchi or wheezes  CV: regular rates and rhythm and no peripheral edema  ABDOMEN: soft, nontender, no hepatosplenomegaly, no masses and bowel sounds normal  MS: no gross musculoskeletal defects noted, no edema    Recent Labs   Lab Test 02/01/24  1236 01/12/24  1310 01/05/24  1405 " 12/28/23  1128 10/09/23  1204 09/25/23  1140 08/07/23  1221 08/04/23  1552 11/22/22  1359 10/31/22  1446   HGB  --   --   --  13.4  --   --   --  15.2   < > 14.0   PLT  --   --   --  315  --   --   --  379   < > 354   INR 2.6* 2.6*   < >  --    < > 3.8*   < >  --    < >  --    NA  --   --   --  144  --   --   --   --   --  143   POTASSIUM  --   --   --  3.7  --   --   --   --   --  4.3   CR  --   --   --  0.85  --   --   --   --   --  0.85   A1C  --   --   --  6.4*  --  5.9*  --   --    < > 5.8*    < > = values in this interval not displayed.        Diagnostics  Labs pending at this time.  Results will be reviewed when available.  Recent Results (from the past 24 hour(s))   EKG 12-lead, tracing only    Collection Time: 02/19/24  2:41 PM   Result Value Ref Range    Systolic Blood Pressure  mmHg    Diastolic Blood Pressure  mmHg    Ventricular Rate 64 BPM    Atrial Rate 64 BPM    OK Interval 192 ms    QRS Duration 74 ms     ms    QTc 425 ms    P Axis 68 degrees    R AXIS -23 degrees    T Axis 91 degrees    Interpretation ECG       Sinus rhythm  Septal infarct (cited on or before 30-SEP-2022)  Abnormal ECG  When compared with ECG of 30-SEP-2022 17:14,  No significant change was found     Hemoglobin A1c    Collection Time: 02/19/24  3:00 PM   Result Value Ref Range    Hemoglobin A1C 6.3 (H) 0.0 - 5.6 %   CBC with platelets and differential    Collection Time: 02/19/24  3:00 PM   Result Value Ref Range    WBC Count 7.9 4.0 - 11.0 10e3/uL    RBC Count 4.98 3.80 - 5.20 10e6/uL    Hemoglobin 14.6 11.7 - 15.7 g/dL    Hematocrit 46.5 35.0 - 47.0 %    MCV 93 78 - 100 fL    MCH 29.3 26.5 - 33.0 pg    MCHC 31.4 (L) 31.5 - 36.5 g/dL    RDW 14.3 10.0 - 15.0 %    Platelet Count 306 150 - 450 10e3/uL    % Neutrophils 67 %    % Lymphocytes 19 %    % Monocytes 9 %    % Eosinophils 4 %    % Basophils 1 %    % Immature Granulocytes 0 %    Absolute Neutrophils 5.3 1.6 - 8.3 10e3/uL    Absolute Lymphocytes 1.5 0.8 - 5.3 10e3/uL     Absolute Monocytes 0.7 0.0 - 1.3 10e3/uL    Absolute Eosinophils 0.3 0.0 - 0.7 10e3/uL    Absolute Basophils 0.1 0.0 - 0.2 10e3/uL    Absolute Immature Granulocytes 0.0 <=0.4 10e3/uL      EKG: appears normal, NSR, normal axis, normal intervals, no acute ST/T changes c/w ischemia, no LVH by voltage criteria, unchanged from previous tracings    Revised Cardiac Risk Index (RCRI)  The patient has the following serious cardiovascular risks for perioperative complications:   - High risk surgery (>5% cardiac complication risk) = 1 point     RCRI Interpretation: 1 point: Class II (low risk - 0.9% complication rate)         Signed Electronically by: Drew Hahn MD  Copy of this evaluation report is provided to requesting physician.

## 2024-02-20 ENCOUNTER — ANTICOAGULATION THERAPY VISIT (OUTPATIENT)
Dept: ANTICOAGULATION | Facility: CLINIC | Age: 69
End: 2024-02-20
Payer: COMMERCIAL

## 2024-02-20 DIAGNOSIS — Z95.2 HEART VALVE REPLACED: Primary | ICD-10-CM

## 2024-02-20 DIAGNOSIS — I48.0 PAROXYSMAL ATRIAL FIBRILLATION (H): ICD-10-CM

## 2024-02-20 DIAGNOSIS — Z95.2 S/P AVR (AORTIC VALVE REPLACEMENT): ICD-10-CM

## 2024-02-20 DIAGNOSIS — Z95.2 H/O AORTIC VALVE REPLACEMENT: ICD-10-CM

## 2024-02-20 DIAGNOSIS — Z79.01 LONG TERM (CURRENT) USE OF ANTICOAGULANTS: ICD-10-CM

## 2024-02-20 LAB
ALBUMIN SERPL BCG-MCNC: 4.3 G/DL (ref 3.5–5.2)
ALP SERPL-CCNC: 95 U/L (ref 40–150)
ALT SERPL W P-5'-P-CCNC: 33 U/L (ref 0–50)
ANION GAP SERPL CALCULATED.3IONS-SCNC: 12 MMOL/L (ref 7–15)
AST SERPL W P-5'-P-CCNC: 39 U/L (ref 0–45)
ATRIAL RATE - MUSE: 64 BPM
BILIRUB SERPL-MCNC: 0.4 MG/DL
BUN SERPL-MCNC: 12.1 MG/DL (ref 8–23)
CALCIUM SERPL-MCNC: 9.2 MG/DL (ref 8.8–10.2)
CHLORIDE SERPL-SCNC: 105 MMOL/L (ref 98–107)
CREAT SERPL-MCNC: 0.83 MG/DL (ref 0.51–0.95)
CREAT UR-MCNC: 28 MG/DL
DEPRECATED HCO3 PLAS-SCNC: 28 MMOL/L (ref 22–29)
DIASTOLIC BLOOD PRESSURE - MUSE: NORMAL MMHG
EGFRCR SERPLBLD CKD-EPI 2021: 77 ML/MIN/1.73M2
GLUCOSE SERPL-MCNC: 133 MG/DL (ref 70–99)
INTERPRETATION ECG - MUSE: NORMAL
MICROALBUMIN UR-MCNC: 38.8 MG/L
MICROALBUMIN/CREAT UR: 138.57 MG/G CR (ref 0–25)
P AXIS - MUSE: 68 DEGREES
POTASSIUM SERPL-SCNC: 4.3 MMOL/L (ref 3.4–5.3)
PR INTERVAL - MUSE: 192 MS
PROT SERPL-MCNC: 6.9 G/DL (ref 6.4–8.3)
QRS DURATION - MUSE: 74 MS
QT - MUSE: 412 MS
QTC - MUSE: 425 MS
R AXIS - MUSE: -23 DEGREES
SODIUM SERPL-SCNC: 145 MMOL/L (ref 135–145)
SYSTOLIC BLOOD PRESSURE - MUSE: NORMAL MMHG
T AXIS - MUSE: 91 DEGREES
VENTRICULAR RATE- MUSE: 64 BPM

## 2024-02-20 PROCEDURE — 93010 ELECTROCARDIOGRAM REPORT: CPT | Performed by: INTERNAL MEDICINE

## 2024-02-20 NOTE — PROGRESS NOTES
ANTICOAGULATION MANAGEMENT     Eileen Donald 68 year old female is on warfarin with subtherapeutic INR result. (Goal INR 2.5-3.5)    Recent labs: (last 7 days)     02/19/24  1500   INR 2.41*       ASSESSMENT     Source(s): Chart Review and Patient/Caregiver Call     Warfarin doses taken: Missed dose(s) may be affecting INR   Unsure of day, but did miss a dose in the last 1-2 wks.  Diet: No new diet changes identified  Medication/supplement changes: None noted  New illness, injury, or hospitalization: Yes:   Preop on 2/19/24   Cervical stenosis, mod - severe.  Signs or symptoms of bleeding or clotting: No  Previous result: Therapeutic last 2 INR visits  Additional findings: Upcoming surgery/procedure     - Scheduled on 3/19/24 for anterior cervical decompression fusion of C3-C5, d/t cervical stenosis.   - already sent to pharmacist to review warfarin hold and bridging plan.     PLAN     Recommended plan for temporary change(s) affecting INR     Dosing Instructions: booster dose then continue your current warfarin dose with next INR in 2 weeks       Summary  As of 2/20/2024      Full warfarin instructions:  2/20: 7.5 mg; Otherwise 5 mg every Tue, Sat; 2.5 mg all other days   Next INR check:  3/5/2024               Telephone call with Marilee who verbalizes understanding and agrees to plan    Lab visit scheduled - INR on 3/5/24 @ [facility]    Education provided:   Taking warfarin: take warfarin at same time each day; preferably in the evening and Importance of taking warfarin as instructed  Goal range and lab monitoring: goal range and significance of current result    Plan made per [facility] anticoagulation protocol    Alondra Lora, RN  Anticoagulation Clinic  2/20/2024    _______________________________________________________________________     Anticoagulation Episode Summary       Current INR goal:  2.5-3.5   TTR:  57.3% (1 y)   Target end date:  Indefinite   Send INR reminders to:  [facility]     Indications    Heart valve replaced [Z95.2]  Paroxysmal atrial fibrillation (H) [I48.0]  S/P AVR (aortic valve replacement) [Z95.2]  Other pulmonary embolism without acute cor pulmonale  unspecified chronicity (H) (Resolved) [I26.99]  H/O aortic valve replacement [Z95.2]  Long term (current) use of anticoagulants [Z79.01]  Pulmonary embolism  other  unspecified chronicity  unspecified whether acute cor pulmonale present (H) (Resolved) [I26.99]  Pulmonary embolism  other  unspecified chronicity  unspecified whether acute cor pulmonale present (H) (Resolved) [I26.99]             Comments:  12/15/21 - amended INR goal to 2.5 - 3.5             Anticoagulation Care Providers       Provider Role Specialty Phone number    Tito Salazar MD Referring Family Medicine [phone]    Drew Hahn MD Referring Family Medicine [phone]

## 2024-02-23 DIAGNOSIS — I10 PRIMARY HYPERTENSION: Primary | ICD-10-CM

## 2024-02-23 RX ORDER — LISINOPRIL 20 MG/1
20 TABLET ORAL DAILY
Qty: 90 TABLET | Refills: 1 | Status: SHIPPED | OUTPATIENT
Start: 2024-02-23 | End: 2024-08-06

## 2024-02-29 NOTE — PROGRESS NOTES
Medication Therapy Management (MTM) Encounter    ASSESSMENT:                            Medication Adherence/Access: No issues identified, all preoperative medication recommendations listed below are based on [place] pre-operative guidelines.     Diabetes: Recommend patient hold metformin on day of surgery if going to re-start taking. Since patient's A1c is at goal of <7% despite not being on metformin therapy, will discuss with Dr. Hahn if patient should re-start taking metformin at this time. May consider maximizing dose of lisinopril if patient's blood pressure is able to tolerate given elevated UACR.     Hypertension /S/P AVR (aortic valve replacement): Will discuss with Dr. Hahn if aspirin therapy is needing to be continued given patient is currently on warfarin therapy. Recommend patient hold aspirin for at least 7 days piror to surgery if not indicated otherwise. Recommend furosemide, lisinopril, and potassium be held prior to surgery. Additionally educated on warfarin and enoxaparin bridging as recommended by anticoagulation team. Okay for patient to use left oversupply if syringe is 120mg, not , and has been stored appropriately. Appropriate for patient to continue on lower dose of furosemide since this has been effective per patient reports and appears that patient dose not have heart failure diagnosis at this time.     Hyperlipidemia: Stable, most recent LDL at/near goal of <70 however triglycerides elevated, may consider dose increase of simvastain or changing to more effective statin in future if triglycerides remain elevated. No adjustments needed to simvastatin therapy prior to surgery.     GERD: Stable, no adjustments needed to PPI therapy prior to surgery.     MS: Stable, continues to follow with neurology.     Depression/Anxiety: Stable, no adjustments needed for bupropion, lorazepam, or venlafaxine prior to upcoming surgery. Recommend patient continue to use lorazepam sparingly and  educated on side effects/risks of medication with continued use.     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     General Pains: Stable, no adjustments needed for Acetaminophen and baclofen prior to upcoming surgery. Educated on side effects/risks of baclofen use, recommend patient continue to use sparingly.     Breathing/Allergies: Stable, no adjustments needed for current medications prior to upcoming surgery. Recommend patient consider using loratadine on a regular basis for symptoms post surgery.      PLAN:                            Plan prior to surgery:   Warfarin & Enoxaparin.   You can use your previous enoxaparin injections as long as the syringe is the 120mg syrninge, not , and if storing at room temperature.     Before Procedure:  Hold warfarin for 5 days, until after procedure   Enoxaparin (Lovenox) 105 mg subq every 12 hrs   Start enoxaparin: Thur 3/14/24  Last dose of enoxaparin prior to procedure: Mon 3/18 AM  (~24 hours prior to procedure)     After Procedure   Per inpatient team. Suggestions by anticoagulation team:  Resume warfarin at maintenance dose if okay with provider doing procedure on night of procedure, 3/19  Resume therapeutic enoxaparin (Lovenox) ~ 48-72 hrs post procedure when okay with provider doing procedure. Delayed restart of therapeutic dose due to high bleeding risk procedure.   Consider enoxaparin 40 mg daily prophylaxis dose until cleared  Continue until cleared for therapeutic; may start 12-24 hours post procedure  Continue bridging until INR >= 2.5     Recheck INR 5-7 days after resuming warfarin       Other medications:  Require modification:  Lisinopril- hold 24 hours before   Furosemide- hold day of procedure   Metformin- hold day of procedure   Potassium- hold day of procedure   Aspirin- hold 7 days prior to procedure/follow pre-op instructions     Don't require modification:  Esomeprazole- continue without  modification/follow pre-op instructions   Venlafaxine- continue without modification/follow pre-op instructions   Lorazepam- continue without modification/follow pre-op instructions   Simvastatin- continue without modification/follow pre-op instructions   Montelukast- continue without modification/follow pre-op instructions   Metoprolol- continue without modification/follow pre-op instructions   Baclofen- continue without modification/follow pre-op instructions   Bupropion- continue without modification/follow pre-op instructions   Loratadine- continue without modification/follow pre-op instructions   Acetaminophen- continue without modification/follow pre-op instructions   Acyclovir-  continue without modification/follow pre-op instructions   Albuterol - continue without modification/follow pre-op instructions     After surgery:  Start taking loratadine 10mg daily for allergies after your surgery if you are not already taking to help with allergy symptoms   I will consult with Dr. MASTERSON if you need to re-start taking metformin and if you need to continue taking aspirin     Follow-up: MTM as needed     SUBJECTIVE/OBJECTIVE:                          Marilee Donald is a 68 year old female called for an initial visit. She was referred to me from Dr. Hahn.      Reason for visit: Pre-operative medication adjustments.    Allergies/ADRs: Reviewed in chart  Past Medical History: Reviewed in chart  Tobacco: She reports that she has never smoked. She has never used smokeless tobacco.  Alcohol: not currently using  Other Substance Use: not currently using     Medication Adherence/Access: Patient uses pill box(es).  Patient takes medications 2 time(s) per day.   Per patient, misses medication 1 times per week, patient notes that overall she tries very hard not to forget to take medications and tries to count medications to make sure is she is taking all medicaitons and that she has filled her weekly medication box accurately.  Notes that sometimes she sleeps through when she should take a dose of her medications and is hesitant to take the dose if it is past when she should take.   Medication barriers: none. Currently working with neurology team to re-enroll into [facility] program.     Diabetes   Metformin XR 500mg daily - patient notes that they are not currently taking and have not taken for about 2-3 months. Notes that they took on a regular basis when they were first prescribed. Stop taking due to side effects they read about online.   Aspirin 81mg daily     Eye exam is up to date  Foot exam is up to date  Urine Albumin:   Lab Results   Component Value Date    UMALCR 138.57 (H) 02/19/2024      Lab Results   Component Value Date    A1C 6.3 (H) 02/19/2024     Hypertension /S/P AVR (aortic valve replacement):  Aspirin 81mg daily - patient questions if she needs to continue to take this medication. She notes that she was recommended to take by specialist and Dr. Hahn when asked perviously. She notes that she will consult with specialists again to determine.   Furosemide 60mg daily - patient notes that they are currently just taking one 40mg tablet daily and they find this to be effective to reduce swelling. Declines swelling at this time. Appears that patient does not have a diagnosis of heart failure.  Lisinopril 20mg daily   Metoprolol tartrate 25mg twice daily   Potassium 10 meq daily   Warfarin dose as of 2/20: 7.5 mg; Otherwise 5 mg every Tue and Sat; 2.5 mg all other days, dose per anticoagulation team  Bridge per anticoagulation team, see telephone encounter from 1/12/2024 for additional information.   Patient has left over 120mg enoxaparin injections left from last bridge thus wondering if she can use.   Patient reports no current medication side effects     BP Readings from Last 3 Encounters:   02/19/24 128/76   12/28/23 120/82   08/04/23 118/60     Pulse Readings from Last 3 Encounters:   02/19/24 83   12/28/23 103   08/04/23  79     Hyperlipidemia   simvastatin 20mg daily at bedtime   Patient reports no significant myalgias or other side effects.     Recent Labs   Lab Test 05/15/23  1551 04/07/22  1227   CHOL 213* 219*   HDL 65 62   LDL 86 110   TRIG 308* 234*     GERD:   Esomeprazole 20 mg once daily   Patient notes that they were previously taking omperazole but esomprazole seems to be more effective as patient has no current symptoms.     MS:   Ocrevus injection once every 6 months, believes that last dose was in December and likely due for a next dose again in June or July.   Follows with a neurology for ongoing management.     Depression/Anxiety:  Bupropion XL 150mg daily   Lorazepam 1 tablet daily as needed for anxiety, patient notes not recent use. Notes that her prescription usually last 1-2 years. Notes that she uses for panic attacks, and usually tries to find other coping methods first before considering using the medication.   Venlafaxine 225mg daily in the morning   Patient finds current regmine to be helpful to help keep mood level     Herpes in eye:   Acyclovir 400mg twice daily   Was told by eye doctor that they need to continue on this therapy indefinitely     General Pains:  Acetaminophen 325mg-650mg as needed for general pains, patient notes that use is rare, patient finds this to be helpful when needed.   Baclofen 10-20mg daily as needed - patient notes that she was first prescribed this medication to help with leg spasms, she notes that she has taken this once so far since being prescribed this medication, reports no recent use at this time as she felt it is not needed at this time.     Breathing/Allergies:  Albuterol as needed - patient notes that they usually have to use prior to a doctor appointments. She notes that she does not use regularly during the day when at home. Notes that this seems to be effective to use prior to doctor appointments to help with shortness of breath.   Loratadine 10mg daily - patient is  not sure if she has been taking. Notes that they always have allergy symptoms, usually triggers include dust.   Montelukast 10mg at bedtime   Patient notes that she is not using nebulizer solution and hasn't used in sometime, also notes that they do not have on had at this time.     Today's Vitals: There were no vitals taken for this visit.  ----------------  I spent 55 minutes with this patient today. All changes were made via collaborative practice agreement with Drew Hahn MD. A copy of the visit note was provided to the patient's provider(s).    A summary of these recommendations was sent via [employer].    Laurie CunhaD  Medication Therapy Management Pharmacist   [facility]     Telemedicine Visit Details  Type of service:  Telephone visit  Start Time:  2:00 PM  End Time: 2:55 PM     Medication Therapy Recommendations  Chronic allergic conjunctivitis    Current Medication: loratadine (CLARITIN) 10 MG tablet   Rationale: Patient forgets to take - Adherence - Adherence   Recommendation: Provide Education   Status: Patient Agreed - Adherence/Education         H/O aortic valve replacement    Current Medication: aspirin (ASA) 81 MG chewable tablet   Rationale: No medical indication at this time - Unnecessary medication therapy - Indication   Recommendation: Discontinue Medication   Status: Declined per Provider         Type 2 diabetes mellitus with other specified complication, unspecified whether long term insulin use (H)    Current Medication: metFORMIN (GLUCOPHAGE XR) 500 MG 24 hr tablet   Rationale: No medical indication at this time - Unnecessary medication therapy - Indication   Recommendation: Discontinue Medication   Status: Accepted per Provider

## 2024-03-01 ENCOUNTER — LAB (OUTPATIENT)
Dept: LAB | Facility: CLINIC | Age: 69
End: 2024-03-01
Payer: COMMERCIAL

## 2024-03-01 ENCOUNTER — MYC MEDICAL ADVICE (OUTPATIENT)
Dept: FAMILY MEDICINE | Facility: CLINIC | Age: 69
End: 2024-03-01
Payer: COMMERCIAL

## 2024-03-01 ENCOUNTER — VIRTUAL VISIT (OUTPATIENT)
Dept: PHARMACY | Facility: CLINIC | Age: 69
End: 2024-03-01
Attending: FAMILY MEDICINE
Payer: COMMERCIAL

## 2024-03-01 ENCOUNTER — ANTICOAGULATION THERAPY VISIT (OUTPATIENT)
Dept: ANTICOAGULATION | Facility: CLINIC | Age: 69
End: 2024-03-01

## 2024-03-01 DIAGNOSIS — Z79.01 LONG TERM (CURRENT) USE OF ANTICOAGULANTS: ICD-10-CM

## 2024-03-01 DIAGNOSIS — K21.00 GASTROESOPHAGEAL REFLUX DISEASE WITH ESOPHAGITIS, UNSPECIFIED WHETHER HEMORRHAGE: ICD-10-CM

## 2024-03-01 DIAGNOSIS — Z95.2 S/P AVR (AORTIC VALVE REPLACEMENT): ICD-10-CM

## 2024-03-01 DIAGNOSIS — E78.5 DYSLIPIDEMIA: ICD-10-CM

## 2024-03-01 DIAGNOSIS — Z95.2 H/O AORTIC VALVE REPLACEMENT: ICD-10-CM

## 2024-03-01 DIAGNOSIS — R52 PAIN: ICD-10-CM

## 2024-03-01 DIAGNOSIS — I10 BENIGN ESSENTIAL HYPERTENSION: ICD-10-CM

## 2024-03-01 DIAGNOSIS — R35.0 INCREASED FREQUENCY OF URINATION: Primary | ICD-10-CM

## 2024-03-01 DIAGNOSIS — Z95.2 HEART VALVE REPLACED: ICD-10-CM

## 2024-03-01 DIAGNOSIS — J96.11 CHRONIC RESPIRATORY FAILURE WITH HYPOXIA (H): ICD-10-CM

## 2024-03-01 DIAGNOSIS — I48.0 PAROXYSMAL ATRIAL FIBRILLATION (H): ICD-10-CM

## 2024-03-01 DIAGNOSIS — E11.69 TYPE 2 DIABETES MELLITUS WITH OTHER SPECIFIED COMPLICATION, UNSPECIFIED WHETHER LONG TERM INSULIN USE (H): Primary | ICD-10-CM

## 2024-03-01 DIAGNOSIS — Z95.2 HEART VALVE REPLACED: Primary | ICD-10-CM

## 2024-03-01 DIAGNOSIS — H10.45 CHRONIC ALLERGIC CONJUNCTIVITIS: ICD-10-CM

## 2024-03-01 DIAGNOSIS — R35.0 INCREASED FREQUENCY OF URINATION: ICD-10-CM

## 2024-03-01 DIAGNOSIS — B00.9 HERPES SIMPLEX VIRUS INFECTION: ICD-10-CM

## 2024-03-01 DIAGNOSIS — F33.41 RECURRENT MAJOR DEPRESSIVE DISORDER, IN PARTIAL REMISSION (H): ICD-10-CM

## 2024-03-01 DIAGNOSIS — F41.1 GENERALIZED ANXIETY DISORDER: ICD-10-CM

## 2024-03-01 DIAGNOSIS — G35 MULTIPLE SCLEROSIS (H): ICD-10-CM

## 2024-03-01 DIAGNOSIS — I26.99 PULMONARY EMBOLISM, OTHER, UNSPECIFIED CHRONICITY, UNSPECIFIED WHETHER ACUTE COR PULMONALE PRESENT (H): ICD-10-CM

## 2024-03-01 LAB
ALBUMIN UR-MCNC: 100 MG/DL
APPEARANCE UR: ABNORMAL
BACTERIA #/AREA URNS HPF: ABNORMAL /HPF
BILIRUB UR QL STRIP: ABNORMAL
COLOR UR AUTO: YELLOW
GLUCOSE UR STRIP-MCNC: NEGATIVE MG/DL
HGB UR QL STRIP: ABNORMAL
INR BLD: 3.5 (ref 0.9–1.1)
KETONES UR STRIP-MCNC: ABNORMAL MG/DL
LEUKOCYTE ESTERASE UR QL STRIP: ABNORMAL
NITRATE UR QL: NEGATIVE
PH UR STRIP: 5.5 [PH] (ref 5–8)
RBC #/AREA URNS AUTO: ABNORMAL /HPF
SP GR UR STRIP: >=1.03 (ref 1–1.03)
SQUAMOUS #/AREA URNS AUTO: ABNORMAL /LPF
UROBILINOGEN UR STRIP-ACNC: 1 E.U./DL
WBC #/AREA URNS AUTO: >100 /HPF
WBC CLUMPS #/AREA URNS HPF: PRESENT /HPF

## 2024-03-01 PROCEDURE — 85610 PROTHROMBIN TIME: CPT

## 2024-03-01 PROCEDURE — 36415 COLL VENOUS BLD VENIPUNCTURE: CPT

## 2024-03-01 PROCEDURE — 87086 URINE CULTURE/COLONY COUNT: CPT

## 2024-03-01 PROCEDURE — 99605 MTMS BY PHARM NP 15 MIN: CPT | Mod: 93

## 2024-03-01 PROCEDURE — 81001 URINALYSIS AUTO W/SCOPE: CPT

## 2024-03-01 PROCEDURE — 99607 MTMS BY PHARM ADDL 15 MIN: CPT | Mod: 93

## 2024-03-01 RX ORDER — ENOXAPARIN SODIUM 150 MG/ML
1 INJECTION SUBCUTANEOUS EVERY 12 HOURS
Qty: 22.4 ML | Refills: 0 | Status: SHIPPED | OUTPATIENT
Start: 2024-03-01 | End: 2024-03-29

## 2024-03-01 NOTE — LETTER
_  Medication List        Prepared on: 03/03/2024     Bring your Medication List when you go to the doctor, hospital, or   emergency room. And, share it with your family or caregivers.     Note any changes to how you take your medications.  Cross out medications when you no longer use them.    Medication How I take it Why I use it Prescriber   acetaminophen (TYLENOL) 325 MG tablet Take 325-650 mg by mouth every 6 hours as needed   Pain Patient Reported   acyclovir (ZOVIRAX) 400 MG tablet Take 400 mg by mouth 2 times daily  Herpes virus Patient Reported   albuterol (PROAIR HFA/PROVENTIL HFA/VENTOLIN HFA) 108 (90 Base) MCG/ACT inhaler Inhale 2 puffs into the lungs every 6 hours as needed  Shortness of breath  Patient Reported   aspirin (ASA) 81 MG chewable tablet Take 81 mg by mouth daily  Clot prevention Patient Reported   baclofen (LIORESAL) 10 MG tablet Take 10-20 mg by mouth nightly as needed Muscle pain Patient Reported   buPROPion (WELLBUTRIN XL) 150 MG 24 hr tablet Take 1 tablet (150 mg) by mouth daily Depression  Drew Hahn MD   enoxaparin ANTICOAGULANT (LOVENOX) 120 MG/0.8ML syringe Inject 0.7 mLs (105 mg) Subcutaneous every 12 hours Take Before and after procedure for bridging as directed Clot prevention  Drew Hahn MD   esomeprazole (NEXIUM) 20 MG DR capsule Take 1 capsule (20 mg) by mouth every morning (before breakfast) Heartburn  Drew Hahn MD   furosemide (LASIX) 40 MG tablet Take 1.5 tablets (60 mg) by mouth daily Edema Drew Hahn MD   lisinopril (ZESTRIL) 20 MG tablet Take 1 tablet (20 mg) by mouth daily Blood pressure Drew Hahn MD   loratadine (CLARITIN) 10 MG tablet Take 1 tablet (10 mg) by mouth daily Seasonal Allergies Drew Hahn MD   LORazepam (ATIVAN) 0.5 MG tablet Take 1 tablet (0.5 mg) by mouth every 8 hours as needed for anxiety Anxiety Drew Hahn MD   metFORMIN (GLUCOPHAGE XR) 500 MG 24 hr tablet Take 1 tablet (500 mg) by mouth daily Type 2  diabetes  Drew Hahn MD   metoprolol tartrate (LOPRESSOR) 25 MG tablet Take 1 tablet (25 mg) by mouth 2 times daily Blood pressure  Drew Hahn MD   montelukast (SINGULAIR) 10 MG tablet Take 1 tablet (10 mg) by mouth at bedtime Seasonal Allergies Drew Hahn MD   Ocrelizumab (OCREVUS IV) Inject into the vein every 6 months  MS Patient Reported   potassium chloride ER (MICRO-K) 10 MEQ CR capsule Take 10 mEq by mouth daily  Potassium levels  Patient Reported   simvastatin (ZOCOR) 20 MG tablet Take 1 tablet (20 mg) by mouth at bedtime Cholesterol  Drew Hahn MD   venlafaxine (EFFEXOR XR) 75 MG 24 hr capsule TAKE 3 CAPSULES BY MOUTH EVERY DAY Depression  Drew Hahn MD   warfarin ANTICOAGULANT (COUMADIN) 2.5 MG tablet Take 1 tab (2.5mg) to 2 tabs (5mg) by mouth daily, as directed.  Adjust dose based on INR results. Clot prevention  Drew Hahn MD         Add new medications, over-the-counter drugs, herbals, vitamins, or  minerals in the blank rows below.    Medication How I take it Why I use it Prescriber                                      Allergies:      morphine; sulfa antibiotics; azithromycin; doxycycline        Side effects I have had:               Other Information:              My notes and questions:

## 2024-03-01 NOTE — Clinical Note
Rito MASTERSON,  1. Patient has not been taking metformin for 2-3 months, do you think patient needs to re-start taking?   2. Do you think patient needs to stay on both aspirin and warfarin?   Kelly Martinez, PharmD Medication Therapy Management Pharmacist  [facility]

## 2024-03-01 NOTE — PROGRESS NOTES
ANTICOAGULATION MANAGEMENT     Eileen Donald 68 year old female is on warfarin with therapeutic INR result. (Goal INR 2.5-3.5)    Recent labs: (last 7 days)     03/01/24  1530   INR 3.5*       ASSESSMENT     Source(s): Chart Review and Patient/Caregiver Call     Warfarin doses taken: Warfarin taken as instructed  Diet: No new diet changes identified  Medication/supplement changes: None noted  New illness, injury, or hospitalization: No had UA today, will call us if new rx is started  Signs or symptoms of bleeding or clotting: No  Previous result: Subtherapeutic  Additional findings: Upcoming surgery/procedure   - Scheduled on 3/19/24 for anterior cervical decompression fusion of C3-C5, d/t cervical stenosis.   Hold and bridge plan was reviewed with MTM today and is in mychart as well.  Gabriel says she is comfortable with the plan and will call with any questions       PLAN     Recommended plan for no diet, medication or health factor changes affecting INR     Dosing Instructions: Continue your current warfarin dose until beginning hold for surgery,with next INR after warfarin resumption in approx 4 weeks       Summary  As of 3/1/2024      Full warfarin instructions:  3/14: Hold; 3/15: Hold; 3/16: Hold; 3/17: Hold; 3/18: Hold; Otherwise 5 mg every Tue, Sat; 2.5 mg all other days   Next INR check:  3/26/2024               Telephone call with Marilee who verbalizes understanding and agrees to plan    Contact [phone] to schedule and with any changes, questions or concerns.     Education provided:   Please call back if any changes to your diet, medications or how you've been taking warfarin    Plan made per [facility] anticoagulation protocol    Dany Cramer RN  Anticoagulation Clinic  3/1/2024    _______________________________________________________________________     Anticoagulation Episode Summary       Current INR goal:  2.5-3.5   TTR:  59.9% (1 y)   Target end date:  Indefinite   Send INR reminders to:  LARISA  [facility]    Indications    Heart valve replaced [Z95.2]  Paroxysmal atrial fibrillation (H) [I48.0]  S/P AVR (aortic valve replacement) [Z95.2]  Other pulmonary embolism without acute cor pulmonale  unspecified chronicity (H) (Resolved) [I26.99]  H/O aortic valve replacement [Z95.2]  Long term (current) use of anticoagulants [Z79.01]  Pulmonary embolism  other  unspecified chronicity  unspecified whether acute cor pulmonale present (H) (Resolved) [I26.99]  Pulmonary embolism  other  unspecified chronicity  unspecified whether acute cor pulmonale present (H) (Resolved) [I26.99]             Comments:  12/15/21 - amended INR goal to 2.5 - 3.5             Anticoagulation Care Providers       Provider Role Specialty Phone number    Tito Salazar MD Referring Family Medicine [phone]    Drew Hahn MD Referring Family Medicine [phone]

## 2024-03-01 NOTE — LETTER
"Recommended To-Do List      Prepared on: 03/03/2024       You can get the best results from your medications by completing the items on this \"To-Do List.\"      Bring your To-Do List when you go to your doctor. And, share it with your family or caregivers.    My To-Do List:  What we talked about: What I should do:   A medication that you may no longer need    Stop taking aspirin (ASA) AFTER consulting with Dr. Hahn first           What we talked about: What I should do:   A medication that you may no longer need    Stop taking metFORMIN (GLUCOPHAGE XR) AFTER consulting with Dr. Hahn first           What we talked about: What I should do:   The importance of taking your medication as intended    Education: Start taking loratadine 10mg daily for allergies after your surgery if you are not already taking to help with allergy symptoms            What we talked about: What I should do:                     "

## 2024-03-01 NOTE — LETTER
March 3, 2024  Eileen CASPER Shabana  [address] [place] [facility] [address]    Dear Ms. Donald, JURGEN [facility]     Thank you for talking with me on Mar 1, 2024 about your health and medications. As a follow-up to our conversation, I have included two documents:      Your Recommended To-Do List has steps you should take to get the best results from your medications.  Your Medication List will help you keep track of your medications and how to take them.    If you want to talk about these documents, please call Gary Martinez RPH at phone: [phone], Monday-Friday 8-4:30pm.    I look forward to working with you and your doctors to make sure your medications work well for you.    Sincerely,  Gary Martinez RPH  [facility] Pharmacist, [facility]

## 2024-03-01 NOTE — PROGRESS NOTES
Renee Wilks, [facility]  Gary Martinez, [facility]; Trish Ross, [facility]; Mag Gagnon, [facility]  Rito Mendoza,    I had made a procedure plan for Marilee the other day. See 1/12/24 encounter. Enoxaparin rounded to nearest measurable dose of 105 mg. Let me know if you have any questions    Renee

## 2024-03-02 LAB — BACTERIA UR CULT: NORMAL

## 2024-03-04 NOTE — PROGRESS NOTES
She is worried about metformin side effects.  She can hold off the metformin and we can recheck A1c in 3 months time.  If she is gaining weight or A1c is going up then should start metformin again.    She follows with [facility] cardiology.  Please see Care Everywhere.    Last evaluation by cardiologist mentions-   1.  Bicuspid aortic valve with severe symptomatic aortic stenosis, status post AVR in 01/2017 with a 23 mm SJM [place] mechanical prosthesis.  Normal functioning by examination as well as by echocardiogram in 2020.  Continue aspirin 81 mg once daily.  Continue Coumadin as directed.    Drew Hahn MD

## 2024-03-04 NOTE — PATIENT INSTRUCTIONS
Recommendations from today's MTM visit:                                                      Plan prior to surgery:   Warfarin & Enoxaparin.   You can use your previous enoxaparin injections as long as the syringe is the 120mg syrninge, not , and if storing at room temperature.     Before Procedure:  Hold warfarin for 5 days, until after procedure   Enoxaparin (Lovenox) 105 mg subq every 12 hrs   Start enoxaparin: Thur 3/14/24  Last dose of enoxaparin prior to procedure: Mon 3/18 AM  (~24 hours prior to procedure)     After Procedure   Per inpatient team. Suggestions by anticoagulation team:  Resume warfarin at maintenance dose if okay with provider doing procedure on night of procedure, 3/19  Resume therapeutic enoxaparin (Lovenox) ~ 48-72 hrs post procedure when okay with provider doing procedure. Delayed restart of therapeutic dose due to high bleeding risk procedure.   Consider enoxaparin 40 mg daily prophylaxis dose until cleared  Continue until cleared for therapeutic; may start 12-24 hours post procedure  Continue bridging until INR >= 2.5     Recheck INR 5-7 days after resuming warfarin       Other medications:  Require modification:  Lisinopril- hold 24 hours before   Furosemide- hold day of procedure   Metformin- hold day of procedure   Potassium- hold day of procedure   Aspirin- hold 7 days prior to procedure/follow pre-op instructions     Don't require modification:  Esomeprazole- continue without modification/follow pre-op instructions   Venlafaxine- continue without modification/follow pre-op instructions   Lorazepam- continue without modification/follow pre-op instructions   Simvastatin- continue without modification/follow pre-op instructions   Montelukast- continue without modification/follow pre-op instructions   Metoprolol- continue without modification/follow pre-op instructions   Baclofen- continue without modification/follow pre-op instructions   Bupropion- continue without  "modification/follow pre-op instructions   Loratadine- continue without modification/follow pre-op instructions   Acetaminophen- continue without modification/follow pre-op instructions   Acyclovir-  continue without modification/follow pre-op instructions   Albuterol - continue without modification/follow pre-op instructions     After surgery:  Start taking loratadine 10mg daily for allergies after your surgery if you are not already taking to help with allergy symptoms   I will consult with Dr. MASTERSON if you need to re-start taking metformin and if you need to continue taking aspirin     Follow-up: MTM as needed     It was great speaking with you today.  I value your experience and would be very thankful for your time in providing feedback in our clinic survey. In the next few days, you may receive an email or text message from [employer] with a link to a survey related to your  clinical pharmacist.\"     To schedule another MTM appointment, please call the clinic directly or you may call the MTM scheduling line at [phone].    My Clinical Pharmacist's contact information:                                                      Please feel free to contact me with any questions or concerns you have.      Kelly Martinez, PharmD  Medication Therapy Management Pharmacist   [facility]    "

## 2024-03-06 NOTE — TELEPHONE ENCOUNTER
Called and talked Marilee,     - gave warfarin HOLD and Enoxaparin instructions as outlined by pharmacist.      - States her Enoxaparin RX is already at the pharmacy and ready to .     - she is not sure yet if she will be going to a Rehab or TCU to recover after surgery.     - advised to call back with any questions.

## 2024-03-21 ENCOUNTER — TRANSFERRED RECORDS (OUTPATIENT)
Dept: HEALTH INFORMATION MANAGEMENT | Facility: CLINIC | Age: 69
End: 2024-03-21
Payer: COMMERCIAL

## 2024-03-28 ENCOUNTER — OFFICE VISIT (OUTPATIENT)
Dept: FAMILY MEDICINE | Facility: CLINIC | Age: 69
End: 2024-03-28
Payer: COMMERCIAL

## 2024-03-28 ENCOUNTER — ANTICOAGULATION THERAPY VISIT (OUTPATIENT)
Dept: ANTICOAGULATION | Facility: CLINIC | Age: 69
End: 2024-03-28

## 2024-03-28 VITALS
HEIGHT: 70 IN | WEIGHT: 246.8 LBS | BODY MASS INDEX: 35.33 KG/M2 | HEART RATE: 86 BPM | RESPIRATION RATE: 18 BRPM | DIASTOLIC BLOOD PRESSURE: 73 MMHG | SYSTOLIC BLOOD PRESSURE: 119 MMHG | OXYGEN SATURATION: 94 %

## 2024-03-28 DIAGNOSIS — Z95.2 H/O AORTIC VALVE REPLACEMENT: ICD-10-CM

## 2024-03-28 DIAGNOSIS — E11.69 TYPE 2 DIABETES MELLITUS WITH OTHER SPECIFIED COMPLICATION, UNSPECIFIED WHETHER LONG TERM INSULIN USE (H): ICD-10-CM

## 2024-03-28 DIAGNOSIS — I26.99 PULMONARY EMBOLISM, OTHER, UNSPECIFIED CHRONICITY, UNSPECIFIED WHETHER ACUTE COR PULMONALE PRESENT (H): ICD-10-CM

## 2024-03-28 DIAGNOSIS — Z09 HOSPITAL DISCHARGE FOLLOW-UP: Primary | ICD-10-CM

## 2024-03-28 DIAGNOSIS — Z79.01 LONG TERM (CURRENT) USE OF ANTICOAGULANTS: ICD-10-CM

## 2024-03-28 DIAGNOSIS — I48.0 PAROXYSMAL ATRIAL FIBRILLATION (H): ICD-10-CM

## 2024-03-28 DIAGNOSIS — Z95.2 S/P AVR (AORTIC VALVE REPLACEMENT): ICD-10-CM

## 2024-03-28 DIAGNOSIS — E78.5 DYSLIPIDEMIA: ICD-10-CM

## 2024-03-28 DIAGNOSIS — J30.2 SEASONAL ALLERGIES: ICD-10-CM

## 2024-03-28 DIAGNOSIS — Z95.2 HEART VALVE REPLACED: Primary | ICD-10-CM

## 2024-03-28 LAB — INR BLD: 2 (ref 0.9–1.1)

## 2024-03-28 PROCEDURE — 36416 COLLJ CAPILLARY BLOOD SPEC: CPT | Performed by: FAMILY MEDICINE

## 2024-03-28 PROCEDURE — 85610 PROTHROMBIN TIME: CPT | Performed by: FAMILY MEDICINE

## 2024-03-28 PROCEDURE — 99214 OFFICE O/P EST MOD 30 MIN: CPT | Performed by: FAMILY MEDICINE

## 2024-03-28 RX ORDER — SIMVASTATIN 20 MG
20 TABLET ORAL AT BEDTIME
Qty: 90 TABLET | Refills: 3 | Status: SHIPPED | OUTPATIENT
Start: 2024-03-28 | End: 2024-05-21

## 2024-03-28 RX ORDER — MONTELUKAST SODIUM 10 MG/1
10 TABLET ORAL AT BEDTIME
Qty: 90 TABLET | Refills: 3 | Status: SHIPPED | OUTPATIENT
Start: 2024-03-28 | End: 2024-05-21

## 2024-03-28 NOTE — PROGRESS NOTES
ANTICOAGULATION MANAGEMENT     Eileen Donald 68 year old female is on warfarin with subtherapeutic INR result. (Goal INR 2.5-3.5)    Recent labs: (last 7 days)     03/28/24  1009   INR 2.0*       ASSESSMENT     Source(s): Chart Review  Previous INR was Therapeutic last visit; previously outside of goal range  Medication, diet, health changes since last INR chart reviewed; none identified         PLAN     Recommended plan for no diet, medication or health factor changes affecting INR     Dosing Instructions: booster dose then continue your current warfarin dose with next INR in 2 weeks       Summary  As of 3/28/2024      Full warfarin instructions:  3/28: 7.5 mg; Otherwise 5 mg every Tue, Sat; 2.5 mg all other days   Next INR check:  4/11/2024               Detailed voice message left for Marilee with dosing instructions and follow up date.     Contact [phone] to schedule and with any changes, questions or concerns.     Education provided:   Please call back if any changes to your diet, medications or how you've been taking warfarin    Plan made per [facility] anticoagulation protocol    Dany Cramer RN  Anticoagulation Clinic  3/28/2024    _______________________________________________________________________     Anticoagulation Episode Summary       Current INR goal:  2.5-3.5   TTR:  61.3% (1 y)   Target end date:  Indefinite   Send INR reminders to:  [facility]    Indications    Heart valve replaced [Z95.2]  Paroxysmal atrial fibrillation (H) [I48.0]  S/P AVR (aortic valve replacement) [Z95.2]  Other pulmonary embolism without acute cor pulmonale  unspecified chronicity (H) (Resolved) [I26.99]  H/O aortic valve replacement [Z95.2]  Long term (current) use of anticoagulants [Z79.01]  Pulmonary embolism  other  unspecified chronicity  unspecified whether acute cor pulmonale present (H) (Resolved) [I26.99]  Pulmonary embolism  other  unspecified chronicity  unspecified whether acute cor pulmonale  present (H) (Resolved) [I26.99]             Comments:  12/15/21 - amended INR goal to 2.5 - 3.5             Anticoagulation Care Providers       Provider Role Specialty Phone number    Tito Salazar MD Referring Family Medicine [phone]    Drew Hahn MD Referring Family Medicine [phone]

## 2024-03-28 NOTE — PROGRESS NOTES
Mar 28, 2024       Marilee is a 68 year old, presenting for the following:  Hospital F/U (Pt is here for a post op after spinal surgery. Only complaint is a little sore throat. Otherwise pt states she is doing good! )        2/19/2024     1:33 PM   Additional Questions   Roomed by Odalis Salas CMA     Surgical Information  Surgery/Procedure: Anterior Cervical Decompression Fusion C3-C5   Surgery Location: [facility]  Surgeon: Dr. Verdin   Surgery Date: 03/19/2024      Answers submitted by the patient for this visit:  Patient Health Questionnaire (Submitted on 2/19/2024)  If you checked off any problems, how difficult have these problems made it for you to do your work, take care of things at home, or get along with other people?: Extremely difficult  PHQ9 TOTAL SCORE: 7      Assessment & Plan     ICD-10-CM    1. Hospital discharge follow-up  Z09       2. Type 2 diabetes mellitus with other specified complication, unspecified whether long term insulin use (H)  E11.69 Adult Eye  Referral      3. Seasonal allergies  J30.2 montelukast (SINGULAIR) 10 MG tablet      4. Dyslipidemia  E78.5 simvastatin (ZOCOR) 20 MG tablet      5. Pulmonary embolism, other, unspecified chronicity, unspecified whether acute cor pulmonale present (H)  I26.99 INR point of care      6. Paroxysmal atrial fibrillation (H)  I48.0 INR point of care      7. S/P AVR (aortic valve replacement)  Z95.2 INR point of care      8. Long term (current) use of anticoagulants  Z79.01 INR point of care          Patient presents today for follow-up of cervical spine spinal surgery that included anterior cervical decompression fusion of C3-C5.  Patient had a stable postoperative course.  She was discharged home and restarted on her Coumadin.  Her surgical scar has since then healed.  She denies any pain on the scar except for some difficulty swallowing especially food that is hard.  Patient is not having any problem with urinary or BM.  Exam is fairly  at her baseline.  She needs an INR as she does have paroxysmal atrial fibrillation and is status post AVR on Coumadin.  Her Coumadin was started the next day after her surgery.  Her Lovenox was then discontinued.  She would like refill of her medications Singulair and Zocor that was done today.  We also reviewed that she is due for an eye exam.  Her last eye exam was greater than 1 year ago with [facility].  A referral was placed.        Subjective   Chief Complaint   Patient presents with    Hospital F/U     Pt is here for a post op after spinal surgery. Only complaint is a little sore throat. Otherwise pt states she is doing good!        Denies any acute concerns.  Remains at her baseline with shortness of breath, gait and mental health.        Patient Active Problem List    Diagnosis Date Noted    Chronic respiratory failure with hypoxia (H) 02/19/2024     Priority: Medium    Spinal stenosis of cervical region 10/25/2023     Priority: Medium    Long term (current) use of anticoagulants 11/17/2022     Priority: Medium    Elevated MCV 04/07/2022     Priority: Medium    Hip pain, right 04/07/2022     Priority: Medium    H/O aortic valve replacement 12/15/2021     Priority: Medium    Osteoarthrosis 11/09/2021     Priority: Medium    Sleep apnea 11/09/2021     Priority: Medium    Encounter for long-term methadone use 11/01/2021     Priority: Medium    History of fall 11/01/2021     Priority: Medium    Leg weakness, bilateral 11/01/2021     Priority: Medium    Muscle spasm 11/01/2021     Priority: Medium    Neurogenic bladder 11/01/2021     Priority: Medium    Other symptoms and signs involving cognitive functions and awareness 11/01/2021     Priority: Medium    Tremor 11/01/2021     Priority: Medium    Unsteady gait 11/01/2021     Priority: Medium    Acute bacterial conjunctivitis of both eyes 09/28/2021     Priority: Medium    Community acquired pneumonia, unspecified laterality 09/28/2021     Priority:  Medium    Type 2 diabetes mellitus with other specified complication, unspecified whether long term insulin use (H)      Priority: Medium    Dyslipidemia 02/01/2021     Priority: Medium    Dyspnea on exertion 02/01/2021     Priority: Medium    Pre-diabetes 02/01/2021     Priority: Medium    Recurrent major depressive disorder, in partial remission (H24) 02/01/2021     Priority: Medium    Multiple Sclerosis      Priority: Medium     Created by Conversion        Paroxysmal atrial fibrillation (H) 09/17/2019     Priority: Medium    Benign essential hypertension      Priority: Medium     Created by Conversion  Replacement Utility updated for latest IMO load        Pulmonary hypertension (H)      Priority: Medium    Closed 3-part fracture of proximal humerus with delayed healing, left 04/10/2019     Priority: Medium    Relapsing remitting multiple sclerosis (H) 07/20/2018     Priority: Medium    Heart valve replaced 06/26/2018     Priority: Medium    Controlled substance agreement signed 05/29/2018     Priority: Medium    Aortic valve replaced 02/15/2017     Priority: Medium    Hypertension 01/24/2017     Priority: Medium    Postoperative anemia due to acute blood loss 01/24/2017     Priority: Medium    Stress hyperglycemia 01/24/2017     Priority: Medium    S/P AVR (aortic valve replacement) 01/24/2017     Priority: Medium    Polyarthralgia 11/30/2016     Priority: Medium    Primary osteoarthritis of both hands 08/31/2016     Priority: Medium    Generalized anxiety disorder 06/15/2016     Priority: Medium    Asthma      Priority: Medium     Created by Conversion        Dysphagia      Priority: Medium     Created by Conversion        Morbid obesity (H) 10/29/2015     Priority: Medium    History of pulmonary embolism 10/30/2014     Priority: Medium    Depression      Priority: Medium     Created by Conversion        Hyperlipidemia      Priority: Medium     Created by Conversion        Impaired Glucose Tolerance Test       Priority: Medium     Created by Conversion        Benign Adenomatous Polyp Of The Large Intestine      Priority: Medium     Created by Conversion          Past Medical History:   Diagnosis Date    Anxiety     Aortic valve replaced 2/15/2017    Aortic valve stenosis 01/24/2017    Asthma     Asthma     Created by Conversion     Chronic shortness of breath     COPD (chronic obstructive pulmonary disease) (H)     Coronary artery disease     Depression     Diabetes mellitus (H)     borderline    Essential hypertension     Created by Conversion  Replacement Utility updated for latest IMO load    History of blood clots     PE    History of pulmonary embolism     HTN (hypertension)     Multiple sclerosis (H)     Multiple sclerosis (H)     Created by Conversion     Obesity     Obesity 10/29/2015    Osteoarthritis     Oxygen dependent     Panic attacks     PONV (postoperative nausea and vomiting)     Pre-diabetes     Pulmonary embolism (H)     Sleep apnea     borderline     Past Surgical History:   Procedure Laterality Date    aortic valve surgery      COLONOSCOPY N/A 12/23/2021    Procedure: COLONOSCOPY WITH POLYPECTOMIES;  Surgeon: Tito Suarez MD;  Location: [facility] OR    COLONOSCOPY W/ BIOPSIES N/A 3/22/2021    Procedure: COLONOSCOPY WITH BIOPSY AND POLYPECTOMY;  Surgeon: Sam Weems MD;  Location: [facility] OR;  Service: Gastroenterology    IR LUMBAR EPIDURAL STEROID INJECTION  1/16/2007    MAMMOPLASTY REDUCTION  1994    OTHER SURGICAL HISTORY  01/24/2017    mechanical aortic prosthesis    RELEASE CARPAL TUNNEL      ZZC RECONSTR TOTAL SHOULDER IMPLANT Left 4/10/2019    Procedure: LEFT REVERSE TOTAL SHOULDER ARTHROPLASTY;  Surgeon: Luis Antonio Telles MD;  Location: [facility] OR;  Service: Orthopedics     Current Outpatient Medications   Medication Sig Dispense Refill    acetaminophen (TYLENOL) 325 MG tablet Take 325-650 mg by mouth every 6 hours as needed       acyclovir (ZOVIRAX) 400 MG tablet  Take 400 mg by mouth 2 times daily       albuterol (PROAIR HFA/PROVENTIL HFA/VENTOLIN HFA) 108 (90 Base) MCG/ACT inhaler Inhale 2 puffs into the lungs every 6 hours as needed       aspirin (ASA) 81 MG chewable tablet Take 81 mg by mouth daily       buPROPion (WELLBUTRIN XL) 150 MG 24 hr tablet Take 1 tablet (150 mg) by mouth daily 90 tablet 3    esomeprazole (NEXIUM) 20 MG DR capsule Take 1 capsule (20 mg) by mouth every morning (before breakfast) 90 capsule 3    furosemide (LASIX) 40 MG tablet Take 1.5 tablets (60 mg) by mouth daily (Patient taking differently: Take 40 mg by mouth daily) 135 tablet 0    lisinopril (ZESTRIL) 20 MG tablet Take 1 tablet (20 mg) by mouth daily 90 tablet 1    metFORMIN (GLUCOPHAGE XR) 500 MG 24 hr tablet Take 1 tablet (500 mg) by mouth daily 90 tablet 0    metoprolol tartrate (LOPRESSOR) 25 MG tablet Take 1 tablet (25 mg) by mouth 2 times daily 180 tablet 3    montelukast (SINGULAIR) 10 MG tablet Take 1 tablet (10 mg) by mouth at bedtime 90 tablet 3    Ocrelizumab (OCREVUS IV) Inject into the vein every 6 months      potassium chloride ER (MICRO-K) 10 MEQ CR capsule Take 10 mEq by mouth daily       simvastatin (ZOCOR) 20 MG tablet Take 1 tablet (20 mg) by mouth at bedtime 90 tablet 3    venlafaxine (EFFEXOR XR) 75 MG 24 hr capsule TAKE 3 CAPSULES BY MOUTH EVERY  capsule 3    warfarin ANTICOAGULANT (COUMADIN) 2.5 MG tablet Take 1 tab (2.5mg) to 2 tabs (5mg) by mouth daily, as directed.  Adjust dose based on INR results. 270 tablet 1    baclofen (LIORESAL) 10 MG tablet Take 10-20 mg by mouth nightly as needed (Patient not taking: Reported on 3/1/2024)      enoxaparin ANTICOAGULANT (LOVENOX) 120 MG/0.8ML syringe Inject 0.7 mLs (105 mg) Subcutaneous every 12 hours Take Before and after procedure for bridging as directed (Patient not taking: Reported on 3/28/2024) 22.4 mL 0    enoxaparin ANTICOAGULANT (LOVENOX) 120 MG/0.8ML syringe Inject 0.7 mLs (105 mg) Subcutaneous every 12 hours  "Until INR >2.0 (Patient not taking: Reported on 3/1/2024) 8 mL 1    loratadine (CLARITIN) 10 MG tablet Take 1 tablet (10 mg) by mouth daily (Patient not taking: Reported on 3/1/2024) 90 tablet 3    LORazepam (ATIVAN) 0.5 MG tablet Take 1 tablet (0.5 mg) by mouth every 8 hours as needed for anxiety (Patient not taking: Reported on 3/1/2024) 20 tablet 0       Allergies   Allergen Reactions    Morphine Visual Disturbance and Other (See Comments)     hallicinations      Sulfa Antibiotics Hives and Nausea and Vomiting    Azithromycin Nausea and Vomiting and Rash    Doxycycline Rash    Latex Rash        Social History     Tobacco Use    Smoking status: Never    Smokeless tobacco: Never   Substance Use Topics    Alcohol use: No     Family History   Problem Relation Age of Onset    Snoring Mother     Snoring Father     Sleep Apnea Sister     Coronary Artery Disease Father     Coronary Artery Disease Sister     Coronary Artery Disease Brother     Breast Cancer No family hx of      History   Drug Use No         Review of Systems    Review of Systems  Constitutional, HEENT, cardiovascular, pulmonary, GI, , musculoskeletal, neuro, skin, endocrine and psych systems are negative, except as otherwise noted.  Objective    /73 (BP Location: Left arm, Patient Position: Sitting, Cuff Size: Adult Regular)   Pulse 86   Resp 18   Ht 1.778 m (5' 10\")   Wt 111.9 kg (246 lb 12.8 oz)   SpO2 94%   BMI 35.41 kg/m     Estimated body mass index is 35.41 kg/m  as calculated from the following:    Height as of this encounter: 1.778 m (5' 10\").    Weight as of this encounter: 111.9 kg (246 lb 12.8 oz).  Physical Exam  GENERAL: alert and no distress  EYES: Eyes grossly normal to inspection, PERRL and conjunctivae and sclerae normal  NECK: Surgical scar on the neck anterior shows good healing and approximation with no drainage.  RESP: lungs clear to auscultation - no rales, rhonchi or wheezes  CV: regular rates and rhythm and no " peripheral edema  ABDOMEN: soft, nontender, no hepatosplenomegaly, no masses and bowel sounds normal  MS: no gross musculoskeletal defects noted, no edema    Recent Labs   Lab Test 02/01/24  1236 01/12/24  1310 01/05/24  1405 12/28/23  1128 10/09/23  1204 09/25/23  1140 08/07/23  1221 08/04/23  1552 11/22/22  1359 10/31/22  1446   HGB  --   --   --  13.4  --   --   --  15.2   < > 14.0   PLT  --   --   --  315  --   --   --  379   < > 354   INR 2.6* 2.6*   < >  --    < > 3.8*   < >  --    < >  --    NA  --   --   --  144  --   --   --   --   --  143   POTASSIUM  --   --   --  3.7  --   --   --   --   --  4.3   CR  --   --   --  0.85  --   --   --   --   --  0.85   A1C  --   --   --  6.4*  --  5.9*  --   --    < > 5.8*    < > = values in this interval not displayed.        Diagnostics  Labs pending at this time.  Results will be reviewed when available.  Recent Results (from the past 24 hour(s))   INR point of care    Collection Time: 03/28/24 10:09 AM   Result Value Ref Range    INR 2.0 (H) 0.9 - 1.1            Answers submitted by the patient for this visit:  Patient Health Questionnaire (Submitted on 3/28/2024)  If you checked off any problems, how difficult have these problems made it for you to do your work, take care of things at home, or get along with other people?: Somewhat difficult  PHQ9 TOTAL SCORE: 4          Subjective   Marilee is a 68 year old, presenting for the following health issues:  Hospital F/U (Pt is here for a post op after spinal surgery. Only complaint is a little sore throat. Otherwise pt states she is doing good! )          Hospital Follow-up Visit:    Hospital/Nursing Home/IP Rehab Facility:  [facility]   Date of Admission: 03/19/2024  Date of Discharge: 03/21/2024  Reason(s) for Admission: Surgery    Was your hospitalization related to COVID-19? No   Problems taking medications regularly:  None  Medication changes since discharge: Oxycodone, and stool softer   Problems  adhering to non-medication therapy:  None- pt is not taking     Summary of hospitalization:  CareEverywhere information obtained and reviewed  Diagnostic Tests/Treatments reviewed.  Follow up needed: none  Other Healthcare Providers Involved in Patient s Care:         Surgical follow-up appointment - in 6 weeks  Update since discharge: improved.         Plan of care communicated with patient                 Signed Electronically by: Drew Hahn MD

## 2024-03-28 NOTE — PATIENT INSTRUCTIONS
Follow up with [facility] with any questions related to  your pain control, incision care, brace use or post-surgical education. Our  office number is [phone]

## 2024-04-12 ENCOUNTER — TRANSFERRED RECORDS (OUTPATIENT)
Dept: HEALTH INFORMATION MANAGEMENT | Facility: CLINIC | Age: 69
End: 2024-04-12
Payer: COMMERCIAL

## 2024-04-12 LAB — RETINOPATHY: NEGATIVE

## 2024-04-15 ENCOUNTER — PATIENT OUTREACH (OUTPATIENT)
Dept: CARE COORDINATION | Facility: CLINIC | Age: 69
End: 2024-04-15
Payer: COMMERCIAL

## 2024-04-19 ENCOUNTER — TELEPHONE (OUTPATIENT)
Dept: SLEEP MEDICINE | Facility: CLINIC | Age: 69
End: 2024-04-19
Payer: COMMERCIAL

## 2024-04-19 ENCOUNTER — TELEPHONE (OUTPATIENT)
Dept: ANTICOAGULATION | Facility: CLINIC | Age: 69
End: 2024-04-19
Payer: COMMERCIAL

## 2024-04-19 NOTE — TELEPHONE ENCOUNTER
Left Message informing patient of the changed in up coming appointment.      Everton Desir MA  [facility]

## 2024-04-19 NOTE — TELEPHONE ENCOUNTER
ANTICOAGULATION     Eileen Donald is overdue for an INR check.     Spoke with Marilee and scheduled lab appointment on 4/23    Alondra Lora RN

## 2024-04-23 ENCOUNTER — ANTICOAGULATION THERAPY VISIT (OUTPATIENT)
Dept: ANTICOAGULATION | Facility: CLINIC | Age: 69
End: 2024-04-23

## 2024-04-23 ENCOUNTER — LAB (OUTPATIENT)
Dept: LAB | Facility: CLINIC | Age: 69
End: 2024-04-23
Payer: COMMERCIAL

## 2024-04-23 DIAGNOSIS — I26.99 PULMONARY EMBOLISM, OTHER, UNSPECIFIED CHRONICITY, UNSPECIFIED WHETHER ACUTE COR PULMONALE PRESENT (H): ICD-10-CM

## 2024-04-23 DIAGNOSIS — I48.0 PAROXYSMAL ATRIAL FIBRILLATION (H): ICD-10-CM

## 2024-04-23 DIAGNOSIS — Z79.01 LONG TERM (CURRENT) USE OF ANTICOAGULANTS: ICD-10-CM

## 2024-04-23 DIAGNOSIS — Z95.2 HEART VALVE REPLACED: Primary | ICD-10-CM

## 2024-04-23 DIAGNOSIS — Z95.2 S/P AVR (AORTIC VALVE REPLACEMENT): ICD-10-CM

## 2024-04-23 DIAGNOSIS — Z95.2 H/O AORTIC VALVE REPLACEMENT: ICD-10-CM

## 2024-04-23 LAB — INR BLD: 3.2 (ref 0.9–1.1)

## 2024-04-23 PROCEDURE — 91320 SARSCV2 VAC 30MCG TRS-SUC IM: CPT

## 2024-04-23 PROCEDURE — 90480 ADMN SARSCOV2 VAC 1/ONLY CMP: CPT

## 2024-04-23 PROCEDURE — 85610 PROTHROMBIN TIME: CPT

## 2024-04-23 PROCEDURE — 36416 COLLJ CAPILLARY BLOOD SPEC: CPT

## 2024-04-23 NOTE — PROGRESS NOTES
ANTICOAGULATION MANAGEMENT     Eileen Donald 68 year old female is on warfarin with therapeutic INR result. (Goal INR 2.5-3.5)    Recent labs: (last 7 days)     04/23/24  1255   INR 3.2*       ASSESSMENT     Warfarin Lab Questionnaire    Warfarin Doses Last 7 Days      4/23/2024     2:09 AM   Dose in Tablet or Mg   TAB or MG? - 2.5mg warfarin tablets. (Evenings) milligram (mg)     Pt Rptd Dose SUNDAY MONDAY TUESDAY WED THURS FRIDAY SATURDAY 4/23/2024   2:09 AM 2.5 2.5 50 2.5 2.5 2.5 5         4/23/2024   Warfarin Lab Questionnaire   Missed doses within past 14 days? No - one time booster dose on 3/28/24.     Changes in diet or alcohol within past 14 days? No   Medication changes since last result? No   Injuries or illness since last result? No - 4/12/24 Eye exam with [facility] - to establish care and yearly follow-up due to diabetes.     New shortness of breath, severe headaches or sudden changes in vision since last result? No   Abnormal bleeding since last result? No   Upcoming surgery, procedure? No     Previous result: Subtherapeutic at 2.0 on 3/28/24.    Additional findings:  Chart reviewed.       PLAN     Recommended plan for no diet, medication or health factor changes affecting INR     Dosing Instructions: Continue your current warfarin dose with next INR in 2-4 weeks       Summary  As of 4/23/2024      Full warfarin instructions:  5 mg every Tue, Sat; 2.5 mg all other days   Next INR check:  5/14/2024               Detailed voice message left for Marilee with dosing instructions and follow up date.    - also sent Syntargat message    Check at provider office visit - INR on 5/21/24 at [facility] with Dr. Hahn.    Education provided:   Please call back if any changes to your diet, medications or how you've been taking warfarin  Taking warfarin: Importance of taking warfarin as instructed  Goal range and lab monitoring: goal range and significance of current result  Dietary considerations: importance  of consistent vitamin K intake  Advised to check INR sooner if any s/sx of any active bleeding / bruisings.  Or any regiment changes that can affect INR.    Plan made per [facility] anticoagulation protocol    Alondra Lora RN  Anticoagulation Clinic  4/23/2024    _______________________________________________________________________     Anticoagulation Episode Summary       Current INR goal:  2.5-3.5   TTR:  58.3% (1 y)   Target end date:  Indefinite   Send INR reminders to:  [facility]    Indications    Heart valve replaced [Z95.2]  Paroxysmal atrial fibrillation (H) [I48.0]  S/P AVR (aortic valve replacement) [Z95.2]  Other pulmonary embolism without acute cor pulmonale  unspecified chronicity (H) (Resolved) [I26.99]  H/O aortic valve replacement [Z95.2]  Long term (current) use of anticoagulants [Z79.01]  Pulmonary embolism  other  unspecified chronicity  unspecified whether acute cor pulmonale present (H) (Resolved) [I26.99]  Pulmonary embolism  other  unspecified chronicity  unspecified whether acute cor pulmonale present (H) (Resolved) [I26.99]             Comments:  12/15/21 - amended INR goal to 2.5 - 3.5             Anticoagulation Care Providers       Provider Role Specialty Phone number    Tito Salazar MD Referring Family Medicine [phone]    Drew Hahn MD Referring Family Medicine [phone]

## 2024-04-29 ENCOUNTER — PATIENT OUTREACH (OUTPATIENT)
Dept: CARE COORDINATION | Facility: CLINIC | Age: 69
End: 2024-04-29
Payer: COMMERCIAL

## 2024-05-21 ENCOUNTER — ANTICOAGULATION THERAPY VISIT (OUTPATIENT)
Dept: ANTICOAGULATION | Facility: CLINIC | Age: 69
End: 2024-05-21

## 2024-05-21 ENCOUNTER — OFFICE VISIT (OUTPATIENT)
Dept: FAMILY MEDICINE | Facility: CLINIC | Age: 69
End: 2024-05-21
Attending: FAMILY MEDICINE
Payer: COMMERCIAL

## 2024-05-21 VITALS
DIASTOLIC BLOOD PRESSURE: 77 MMHG | RESPIRATION RATE: 16 BRPM | OXYGEN SATURATION: 95 % | WEIGHT: 254 LBS | SYSTOLIC BLOOD PRESSURE: 135 MMHG | BODY MASS INDEX: 36.36 KG/M2 | HEART RATE: 93 BPM | HEIGHT: 70 IN

## 2024-05-21 DIAGNOSIS — I48.0 PAROXYSMAL ATRIAL FIBRILLATION (H): ICD-10-CM

## 2024-05-21 DIAGNOSIS — Z95.2 S/P AVR (AORTIC VALVE REPLACEMENT): ICD-10-CM

## 2024-05-21 DIAGNOSIS — J30.2 SEASONAL ALLERGIES: ICD-10-CM

## 2024-05-21 DIAGNOSIS — E11.69 TYPE 2 DIABETES MELLITUS WITH OTHER SPECIFIED COMPLICATION, UNSPECIFIED WHETHER LONG TERM INSULIN USE (H): Primary | ICD-10-CM

## 2024-05-21 DIAGNOSIS — E78.5 DYSLIPIDEMIA: ICD-10-CM

## 2024-05-21 DIAGNOSIS — I26.99 PULMONARY EMBOLISM, OTHER, UNSPECIFIED CHRONICITY, UNSPECIFIED WHETHER ACUTE COR PULMONALE PRESENT (H): ICD-10-CM

## 2024-05-21 DIAGNOSIS — F33.41 RECURRENT MAJOR DEPRESSIVE DISORDER, IN PARTIAL REMISSION (H): ICD-10-CM

## 2024-05-21 DIAGNOSIS — Z95.2 H/O AORTIC VALVE REPLACEMENT: ICD-10-CM

## 2024-05-21 DIAGNOSIS — Z95.2 HEART VALVE REPLACED: Primary | ICD-10-CM

## 2024-05-21 DIAGNOSIS — R05.8 PRODUCTIVE COUGH: ICD-10-CM

## 2024-05-21 DIAGNOSIS — Z79.01 LONG TERM (CURRENT) USE OF ANTICOAGULANTS: ICD-10-CM

## 2024-05-21 LAB
BASOPHILS # BLD AUTO: 0.1 10E3/UL (ref 0–0.2)
BASOPHILS NFR BLD AUTO: 1 %
EOSINOPHIL # BLD AUTO: 0.4 10E3/UL (ref 0–0.7)
EOSINOPHIL NFR BLD AUTO: 4 %
ERYTHROCYTE [DISTWIDTH] IN BLOOD BY AUTOMATED COUNT: 14.3 % (ref 10–15)
HBA1C MFR BLD: 6.5 % (ref 0–5.6)
HCT VFR BLD AUTO: 45.7 % (ref 35–47)
HGB BLD-MCNC: 14.5 G/DL (ref 11.7–15.7)
HOLD SPECIMEN: NORMAL
IMM GRANULOCYTES # BLD: 0 10E3/UL
IMM GRANULOCYTES NFR BLD: 0 %
INR BLD: 1.6 (ref 0.9–1.1)
LYMPHOCYTES # BLD AUTO: 1.8 10E3/UL (ref 0.8–5.3)
LYMPHOCYTES NFR BLD AUTO: 20 %
MCH RBC QN AUTO: 31 PG (ref 26.5–33)
MCHC RBC AUTO-ENTMCNC: 31.7 G/DL (ref 31.5–36.5)
MCV RBC AUTO: 98 FL (ref 78–100)
MONOCYTES # BLD AUTO: 0.7 10E3/UL (ref 0–1.3)
MONOCYTES NFR BLD AUTO: 8 %
NEUTROPHILS # BLD AUTO: 6.2 10E3/UL (ref 1.6–8.3)
NEUTROPHILS NFR BLD AUTO: 68 %
PLATELET # BLD AUTO: 321 10E3/UL (ref 150–450)
RBC # BLD AUTO: 4.67 10E6/UL (ref 3.8–5.2)
WBC # BLD AUTO: 9.1 10E3/UL (ref 4–11)

## 2024-05-21 PROCEDURE — G2211 COMPLEX E/M VISIT ADD ON: HCPCS | Performed by: FAMILY MEDICINE

## 2024-05-21 PROCEDURE — 83036 HEMOGLOBIN GLYCOSYLATED A1C: CPT | Performed by: FAMILY MEDICINE

## 2024-05-21 PROCEDURE — 85025 COMPLETE CBC W/AUTO DIFF WBC: CPT | Performed by: FAMILY MEDICINE

## 2024-05-21 PROCEDURE — 99214 OFFICE O/P EST MOD 30 MIN: CPT | Performed by: FAMILY MEDICINE

## 2024-05-21 PROCEDURE — 80061 LIPID PANEL: CPT | Performed by: FAMILY MEDICINE

## 2024-05-21 PROCEDURE — 85610 PROTHROMBIN TIME: CPT | Performed by: FAMILY MEDICINE

## 2024-05-21 PROCEDURE — 36415 COLL VENOUS BLD VENIPUNCTURE: CPT | Performed by: FAMILY MEDICINE

## 2024-05-21 RX ORDER — LORATADINE 10 MG/1
10 TABLET ORAL DAILY
Qty: 30 TABLET | Refills: 3 | Status: SHIPPED | OUTPATIENT
Start: 2024-05-21

## 2024-05-21 RX ORDER — MONTELUKAST SODIUM 10 MG/1
10 TABLET ORAL AT BEDTIME
Qty: 90 TABLET | Refills: 3 | Status: SHIPPED | OUTPATIENT
Start: 2024-05-21

## 2024-05-21 RX ORDER — BUPROPION HYDROCHLORIDE 150 MG/1
150 TABLET ORAL DAILY
Qty: 90 TABLET | Refills: 3 | Status: SHIPPED | OUTPATIENT
Start: 2024-05-21

## 2024-05-21 RX ORDER — SIMVASTATIN 20 MG
20 TABLET ORAL AT BEDTIME
Qty: 90 TABLET | Refills: 3 | Status: SHIPPED | OUTPATIENT
Start: 2024-05-21

## 2024-05-21 NOTE — PROGRESS NOTES
Assessment & Plan     ICD-10-CM    1. Type 2 diabetes mellitus with other specified complication, unspecified whether long term insulin use (H)  E11.69 Lipid panel reflex to direct LDL Non-fasting     HEMOGLOBIN A1C     HEMOGLOBIN A1C     Lipid panel reflex to direct LDL Non-fasting      2. Recurrent major depressive disorder, in partial remission (H24)  F33.41 buPROPion (WELLBUTRIN XL) 150 MG 24 hr tablet      3. Seasonal allergies  J30.2 montelukast (SINGULAIR) 10 MG tablet      4. Dyslipidemia  E78.5 simvastatin (ZOCOR) 20 MG tablet      5. Pulmonary embolism, other, unspecified chronicity, unspecified whether acute cor pulmonale present (H)  I26.99 INR point of care      6. Paroxysmal atrial fibrillation (H)  I48.0 INR point of care      7. S/P AVR (aortic valve replacement)  Z95.2 INR point of care      8. Long term (current) use of anticoagulants  Z79.01 INR point of care      9. Productive cough  R05.8 CBC with platelets and differential     loratadine (CLARITIN) 10 MG tablet     CBC with platelets and differential        Patient presents today for med refills, follow-up of diabetes and INR check.  Medication filled bupropion for  depression, Singulair for seasonal allergies, and simvastatin for hyperlipidemia.  Obtained labs as above.  Patient has been having some nasal drainage.  In the past has used Claritin.  Will refill this.  No concerns about bronchitis or pneumonia at this time.  If symptoms do not improve then patient will follow-up with me.  Patient INR was subtherapeutic and blames it on having blueberries.  Follow-up for anticoagulation nurse guidelines.  Diabetes remains stable.  Continue metformin.  Informs me that surgery went well and no concerns.    MEDICATIONS:   Orders Placed This Encounter   Medications    buPROPion (WELLBUTRIN XL) 150 MG 24 hr tablet     Sig: Take 1 tablet (150 mg) by mouth daily     Dispense:  90 tablet     Refill:  3    montelukast (SINGULAIR) 10 MG tablet     Sig:  Take 1 tablet (10 mg) by mouth at bedtime     Dispense:  90 tablet     Refill:  3    simvastatin (ZOCOR) 20 MG tablet     Sig: Take 1 tablet (20 mg) by mouth at bedtime     Dispense:  90 tablet     Refill:  3    loratadine (CLARITIN) 10 MG tablet     Sig: Take 1 tablet (10 mg) by mouth daily     Dispense:  30 tablet     Refill:  3     Medications Discontinued During This Encounter   Medication Reason    buPROPion (WELLBUTRIN XL) 150 MG 24 hr tablet Reorder (No AVS)    montelukast (SINGULAIR) 10 MG tablet Reorder (No AVS)    simvastatin (ZOCOR) 20 MG tablet Reorder (No AVS)          - Continue other medications without change    The longitudinal plan of care for the diagnosis(es)/condition(s) as documented were addressed during this visit. Due to the added complexity in care, I will continue to support Marilee in the subsequent management and with ongoing continuity of care.      William Hunt is a 68 year old, presenting for the following health issues:  RECHECK (Follow up, pt still feels like she has a cold not sure if this is still from surgery or what. )        5/21/2024     1:51 PM   Additional Questions   Roomed by Odalis Salas CMA     History of Present Illness       Reason for visit:  Folllow up    She eats 0-1 servings of fruits and vegetables daily.She consumes 1 sweetened beverage(s) daily.She exercises with enough effort to increase her heart rate 9 or less minutes per day.  She exercises with enough effort to increase her heart rate 3 or less days per week. She is missing 1 dose(s) of medications per week.       Patient Active Problem List   Diagnosis    Depression    Asthma    Benign essential hypertension    Multiple Sclerosis    Hyperlipidemia    Impaired Glucose Tolerance Test    Dysphagia    Benign Adenomatous Polyp Of The Large Intestine    History of pulmonary embolism    Morbid obesity (H)    Generalized anxiety disorder    Primary osteoarthritis of both hands    Polyarthralgia    Aortic  valve replaced    Controlled substance agreement signed    Heart valve replaced    Closed 3-part fracture of proximal humerus with delayed healing, left    Pulmonary hypertension (H)    Paroxysmal atrial fibrillation (H)    Dyslipidemia    Dyspnea on exertion    Hypertension    Postoperative anemia due to acute blood loss    Pre-diabetes    Stress hyperglycemia    Recurrent major depressive disorder, in partial remission (H24)    Type 2 diabetes mellitus with other specified complication, unspecified whether long term insulin use (H)    Acute bacterial conjunctivitis of both eyes    Community acquired pneumonia, unspecified laterality    Osteoarthrosis    S/P AVR (aortic valve replacement)    Sleep apnea    H/O aortic valve replacement    Encounter for long-term methadone use    History of fall    Leg weakness, bilateral    Muscle spasm    Neurogenic bladder    Other symptoms and signs involving cognitive functions and awareness    Tremor    Unsteady gait    Relapsing remitting multiple sclerosis (H)    Elevated MCV    Hip pain, right    Long term (current) use of anticoagulants    Spinal stenosis of cervical region    Chronic respiratory failure with hypoxia (H)     Current Outpatient Medications   Medication Sig Dispense Refill    acetaminophen (TYLENOL) 325 MG tablet Take 325-650 mg by mouth every 6 hours as needed       acyclovir (ZOVIRAX) 400 MG tablet Take 400 mg by mouth 2 times daily       albuterol (PROAIR HFA/PROVENTIL HFA/VENTOLIN HFA) 108 (90 Base) MCG/ACT inhaler Inhale 2 puffs into the lungs every 6 hours as needed       aspirin (ASA) 81 MG chewable tablet Take 81 mg by mouth daily       buPROPion (WELLBUTRIN XL) 150 MG 24 hr tablet Take 1 tablet (150 mg) by mouth daily 90 tablet 3    esomeprazole (NEXIUM) 20 MG DR capsule Take 1 capsule (20 mg) by mouth every morning (before breakfast) 90 capsule 3    furosemide (LASIX) 40 MG tablet Take 1.5 tablets (60 mg) by mouth daily (Patient taking differently:  "Take 40 mg by mouth daily) 135 tablet 0    lisinopril (ZESTRIL) 20 MG tablet Take 1 tablet (20 mg) by mouth daily 90 tablet 1    loratadine (CLARITIN) 10 MG tablet Take 1 tablet (10 mg) by mouth daily 30 tablet 3    metFORMIN (GLUCOPHAGE XR) 500 MG 24 hr tablet Take 1 tablet (500 mg) by mouth daily 90 tablet 0    metoprolol tartrate (LOPRESSOR) 25 MG tablet Take 1 tablet (25 mg) by mouth 2 times daily 180 tablet 3    montelukast (SINGULAIR) 10 MG tablet Take 1 tablet (10 mg) by mouth at bedtime 90 tablet 3    Ocrelizumab (OCREVUS IV) Inject into the vein every 6 months      potassium chloride ER (MICRO-K) 10 MEQ CR capsule Take 10 mEq by mouth daily       simvastatin (ZOCOR) 20 MG tablet Take 1 tablet (20 mg) by mouth at bedtime 90 tablet 3    venlafaxine (EFFEXOR XR) 75 MG 24 hr capsule TAKE 3 CAPSULES BY MOUTH EVERY  capsule 3    warfarin ANTICOAGULANT (COUMADIN) 2.5 MG tablet Take 1 tab (2.5mg) to 2 tabs (5mg) by mouth daily, as directed.  Adjust dose based on INR results. 270 tablet 1     Current Facility-Administered Medications   Medication Dose Route Frequency Provider Last Rate Last Admin    ropivacaine (NAROPIN) injection 3 mL  3 mL   Ravi Patel DO   3 mL at 09/07/22 1415    triamcinolone (KENALOG-40) injection 40 mg  40 mg   Ravi Patel DO   40 mg at 09/07/22 1415           Review of Systems  Constitutional, neuro, ENT, endocrine, pulmonary, cardiac, gastrointestinal, genitourinary, musculoskeletal, integument and psychiatric systems are negative, except as otherwise noted.      Objective    /77 (BP Location: Left arm, Patient Position: Sitting, Cuff Size: Adult Large)   Pulse 93   Resp 16   Ht 1.778 m (5' 10\")   Wt 115.2 kg (254 lb)   SpO2 95%   BMI 36.45 kg/m    Body mass index is 36.45 kg/m .  Physical Exam   GENERAL: alert and no distress  HENT: ear canals and TM's normal, nose and mouth without ulcers or lesions  RESP: lungs clear to auscultation - no " rales, rhonchi or wheezes  CV: regular rate and rhythm,      Results for orders placed or performed in visit on 05/21/24 (from the past 24 hour(s))   Extra Tube    Narrative    The following orders were created for panel order Extra Tube.  Procedure                               Abnormality         Status                     ---------                               -----------         ------                     Extra Green Top (Lithium...[[id number]]                      In process                   Please view results for these tests on the individual orders.   HEMOGLOBIN A1C   Result Value Ref Range    Hemoglobin A1C 6.5 (H) 0.0 - 5.6 %   CBC with platelets and differential    Narrative    The following orders were created for panel order CBC with platelets and differential.  Procedure                               Abnormality         Status                     ---------                               -----------         ------                     CBC with platelets and d...[[id number]]                      Final result                 Please view results for these tests on the individual orders.   CBC with platelets and differential   Result Value Ref Range    WBC Count 9.1 4.0 - 11.0 10e3/uL    RBC Count 4.67 3.80 - 5.20 10e6/uL    Hemoglobin 14.5 11.7 - 15.7 g/dL    Hematocrit 45.7 35.0 - 47.0 %    MCV 98 78 - 100 fL    MCH 31.0 26.5 - 33.0 pg    MCHC 31.7 31.5 - 36.5 g/dL    RDW 14.3 10.0 - 15.0 %    Platelet Count 321 150 - 450 10e3/uL    % Neutrophils 68 %    % Lymphocytes 20 %    % Monocytes 8 %    % Eosinophils 4 %    % Basophils 1 %    % Immature Granulocytes 0 %    Absolute Neutrophils 6.2 1.6 - 8.3 10e3/uL    Absolute Lymphocytes 1.8 0.8 - 5.3 10e3/uL    Absolute Monocytes 0.7 0.0 - 1.3 10e3/uL    Absolute Eosinophils 0.4 0.0 - 0.7 10e3/uL    Absolute Basophils 0.1 0.0 - 0.2 10e3/uL    Absolute Immature Granulocytes 0.0 <=0.4 10e3/uL   INR point of care   Result Value Ref Range    INR 1.6 (H) 0.9 - 1.1     Narrative    This test is intended for monitoring Coumadin therapy. Results are not accurate in patients with prolonged INR due to factor deficiency.     *Note: Due to a large number of results and/or encounters for the requested time period, some results have not been displayed. A complete set of results can be found in Results Review.           Signed Electronically by: Drew Hahn MD

## 2024-05-21 NOTE — LETTER
My Asthma Action Plan    Name: Eileen Donald   YOB: 1955  Date: 5/15/2023   My doctor: Drew Hahn MD   My clinic: [facility]        My Control Medicine: LEUKOTRIENE RECEPTOR ANTAGONISTS/5 LIPOXYGENASE INHIBITORS  Montelukast (Singulair) -  10 mg daily  My Rescue Medicine: Albuterol (Proair/Ventolin/Proventil HFA) 2-4 puffs EVERY 4 HOURS as needed. Use a spacer if recommended by your provider.   My Asthma Severity:   Mild Persistent  Know your asthma triggers:  Multifactorial               GREEN ZONE   Good Control  I feel good  No cough or wheeze  Can work, sleep and play without asthma symptoms       Take your asthma control medicine every day.     If exercise triggers your asthma, take your rescue medication  15 minutes before exercise or sports, and  During exercise if you have asthma symptoms  Spacer to use with inhaler: If you have a spacer, make sure to use it with your inhaler             YELLOW ZONE Getting Worse  I have ANY of these:  I do not feel good  Cough or wheeze  Chest feels tight  Wake up at night   Keep taking your Green Zone medications  Start taking your rescue medicine:  every 20 minutes for up to 1 hour. Then every 4 hours for 24-48 hours.  If you stay in the Yellow Zone for more than 12-24 hours, contact your doctor.  If you do not return to the Green Zone in 12-24 hours or you get worse, start taking your oral steroid medicine if prescribed by your provider.           RED ZONE Medical Alert - Get Help  I have ANY of these:  I feel awful  Medicine is not helping  Breathing getting harder  Trouble walking or talking  Nose opens wide to breathe       Take your rescue medicine NOW  If your provider has prescribed an oral steroid medicine, start taking it NOW  Call your doctor NOW  If you are still in the Red Zone after 20 minutes and you have not reached your doctor:  Take your rescue medicine again and  Call 911 or go to the emergency  room right away    See your regular doctor within 2 weeks of an Emergency Room or Urgent Care visit for follow-up treatment.          Annual Reminders:  Meet with Asthma Educator,  Flu Shot in the Fall, consider Pneumonia Vaccination for patients with asthma (aged 19 and older).    [id number] [phone]  AT [facility] [address]    Electronically signed by Drew Hahn MD   Date: 05/15/23                      Asthma Triggers  How To Control Things That Make Your Asthma Worse    Triggers are things that make your asthma worse.  Look at the list below to help you find your triggers and what you can do about them.  You can help prevent asthma flare-ups by staying away from your triggers.      Trigger                                                          What you can do   Cigarette Smoke  Tobacco smoke can make asthma worse. Do not allow smoking in your home, car or around you.  Be sure no one smokes at a child s day care or school.  If you smoke, ask your health care provider for ways to help you quit.  Ask family members to quit too.  Ask your health care provider for a referral to Quit Plan to help you quit smoking, or call 7-899-996-PLAN.     Colds, Flu, Bronchitis  These are common triggers of asthma. Wash your hands often.  Don t touch your eyes, nose or mouth.  Get a flu shot every year.     Dust Mites  These are tiny bugs that live in cloth or carpet. They are too small to see. Wash sheets and blankets in hot water every week.   Encase pillows and mattress in dust mite proof covers.  Avoid having carpet if you can. If you have carpet, vacuum weekly.   Use a dust mask and HEPA vacuum.   Pollen and Outdoor Mold  Some people are allergic to trees, grass, or weed pollen, or molds. Try to keep your windows closed.  Limit time out doors when pollen count is high.   Ask you health care provider about taking medicine during allergy season.     Animal Dander  Some people  are allergic to skin flakes, urine or saliva from pets with fur or feathers. Keep pets with fur or feathers out of your home.    If you can t keep the pet outdoors, then keep the pet out of your bedroom.  Keep the bedroom door closed.  Keep pets off cloth furniture and away from stuffed toys.     Mice, Rats, and Cockroaches   Some people are allergic to the waste from these pests.   Cover food and garbage.  Clean up spills and food crumbs.  Store grease in the refrigerator.   Keep food out of the bedroom.   Indoor Mold  This can be a trigger if your home has high moisture. Fix leaking faucets, pipes, or other sources of water.   Clean moldy surfaces.  Dehumidify basement if it is damp and smelly.   Smoke, Strong Odors, and Sprays  These can reduce air quality. Stay away from strong odors and sprays, such as perfume, powder, hair spray, paints, smoke incense, paint, cleaning products, candles and new carpet.   Exercise or Sports  Some people with asthma have this trigger. Be active!  Ask your doctor about taking medicine before sports or exercise to prevent symptoms.    Warm up for 5-10 minutes before and after sports or exercise.     Other Triggers of Asthma  Cold air:  Cover your nose and mouth with a scarf.  Sometimes laughing or crying can be a trigger.  Some medicines and food can trigger asthma.

## 2024-05-21 NOTE — PROGRESS NOTES
ANTICOAGULATION MANAGEMENT     Eileen Donald 68 year old female is on warfarin with subtherapeutic INR result. (Goal INR 2.5-3.5)    Recent labs: (last 7 days)     05/21/24  1410   INR 1.6*       ASSESSMENT     Source(s): Chart Reviewed    Medication/supplement changes:  Yes.   RX Loratadine 10mg daily.  New illness, injury, or hospitalization: No.   OV diabetes follow-up with Dr. Hahn today - also complains of lingering cough.   Known seasonal allergies.  Previous result: Therapeutic last visit at 3.2; previously outside of goal range at 2.0  Additional findings: None       PLAN     Unable to reach Marilee today.    Left message to take a booster dose of warfarin,  7.5 mg tonight. Request call back for assessment.    Follow up required to confirm warfarin dose taken and assess for changes    Alondra Lora RN  Anticoagulation Clinic  5/21/2024

## 2024-05-22 LAB
CHOLEST SERPL-MCNC: 253 MG/DL
HDLC SERPL-MCNC: 73 MG/DL
LDLC SERPL CALC-MCNC: 139 MG/DL
NONHDLC SERPL-MCNC: 180 MG/DL
TRIGL SERPL-MCNC: 204 MG/DL

## 2024-05-22 NOTE — PROGRESS NOTES
ANTICOAGULATION MANAGEMENT     Eileen Donald 68 year old female is on warfarin with subtherapeutic INR result. (Goal INR 2.5-3.5)    Recent labs: (last 7 days)     05/21/24  1410   INR 1.6*       ASSESSMENT     Source(s): Chart Review and Patient/Caregiver Call     Warfarin doses taken: Warfarin taken as instructed  Diet: Increased greens/vitamin K in diet; plans to resume previous intake   Reported increased intake of blueberries.  Can eat one package in one sitting.  Reported has been eating a lot of blueberries lately.   (There is no known interaction with warfarin / blueberries, but is known to be rich in Vitamin-K and decrease action of warfarin with increased intake).  Medication/supplement changes:  Yes.   Rx for Loratadine 10mg daily.  New illness, injury, or hospitalization: Yes:    OV diabetes follow-up with Dr. Hahn today - also complains of lingering cough.              Known seasonal allergies.  Signs or symptoms of bleeding or clotting: No  Previous result: Therapeutic last visit at 3.2; previously outside of goal range at 2.0   Additional findings: None       PLAN     Recommended plan for temporary change(s) affecting INR     Dosing Instructions:   - reported getting the message last night and taken the booster dose as advised.  - then continue your current warfarin dose with next INR in 5-7 days       Summary  As of 5/21/2024      Full warfarin instructions:  5/21: 7.5 mg; Otherwise 5 mg every Tue, Sat; 2.5 mg all other days   Next INR check:  5/28/2024               Telephone call with Marilee who verbalizes understanding and agrees to plan    Lab visit scheduled - INR on 5/29/24 @ [facility]    Education provided:   Taking warfarin: Importance of taking warfarin as instructed  Goal range and lab monitoring: goal range and significance of current result  Dietary considerations: importance of consistent vitamin K intake, impact of vitamin K foods on INR, and vitamin K content of foods  Symptom  monitoring: monitoring for clotting signs and symptoms    Plan made with [facility] Pharmacist Renee Lora, RN  Anticoagulation Clinic  5/22/2024    _______________________________________________________________________     Anticoagulation Episode Summary       Current INR goal:  2.5-3.5   TTR:  55.3% (1 y)   Target end date:  Indefinite   Send INR reminders to:  [facility]    Indications    Heart valve replaced [Z95.2]  Paroxysmal atrial fibrillation (H) [I48.0]  S/P AVR (aortic valve replacement) [Z95.2]  Other pulmonary embolism without acute cor pulmonale  unspecified chronicity (H) (Resolved) [I26.99]  H/O aortic valve replacement [Z95.2]  Long term (current) use of anticoagulants [Z79.01]  Pulmonary embolism  other  unspecified chronicity  unspecified whether acute cor pulmonale present (H) (Resolved) [I26.99]  Pulmonary embolism  other  unspecified chronicity  unspecified whether acute cor pulmonale present (H) (Resolved) [I26.99]             Comments:  12/15/21 - amended INR goal to 2.5 - 3.5             Anticoagulation Care Providers       Provider Role Specialty Phone number    Tito Salazar MD Referring Family Medicine [phone]    Drew Hahn MD Referring Family Medicine [phone]

## 2024-05-23 DIAGNOSIS — E78.5 HYPERLIPIDEMIA, UNSPECIFIED HYPERLIPIDEMIA TYPE: Primary | ICD-10-CM

## 2024-05-31 ENCOUNTER — ANTICOAGULATION THERAPY VISIT (OUTPATIENT)
Dept: ANTICOAGULATION | Facility: CLINIC | Age: 69
End: 2024-05-31

## 2024-05-31 ENCOUNTER — LAB (OUTPATIENT)
Dept: LAB | Facility: CLINIC | Age: 69
End: 2024-05-31
Payer: COMMERCIAL

## 2024-05-31 DIAGNOSIS — I26.99 PULMONARY EMBOLISM, OTHER, UNSPECIFIED CHRONICITY, UNSPECIFIED WHETHER ACUTE COR PULMONALE PRESENT (H): ICD-10-CM

## 2024-05-31 DIAGNOSIS — Z95.2 HEART VALVE REPLACED: Primary | ICD-10-CM

## 2024-05-31 DIAGNOSIS — I48.0 PAROXYSMAL ATRIAL FIBRILLATION (H): ICD-10-CM

## 2024-05-31 DIAGNOSIS — Z95.2 S/P AVR (AORTIC VALVE REPLACEMENT): ICD-10-CM

## 2024-05-31 DIAGNOSIS — Z79.01 LONG TERM (CURRENT) USE OF ANTICOAGULANTS: ICD-10-CM

## 2024-05-31 DIAGNOSIS — E78.5 HYPERLIPIDEMIA, UNSPECIFIED HYPERLIPIDEMIA TYPE: ICD-10-CM

## 2024-05-31 DIAGNOSIS — Z95.2 H/O AORTIC VALVE REPLACEMENT: ICD-10-CM

## 2024-05-31 LAB — INR BLD: 2.4 (ref 0.9–1.1)

## 2024-05-31 PROCEDURE — 85610 PROTHROMBIN TIME: CPT

## 2024-05-31 PROCEDURE — 36415 COLL VENOUS BLD VENIPUNCTURE: CPT

## 2024-05-31 PROCEDURE — 80061 LIPID PANEL: CPT

## 2024-05-31 NOTE — PROGRESS NOTES
ANTICOAGULATION MANAGEMENT     Eileen Donald 68 year old female is on warfarin with subtherapeutic INR result. (Goal INR 2.5-3.5)    Recent labs: (last 7 days)     05/31/24  1307   INR 2.4*       ASSESSMENT     Warfarin Lab Questionnaire    Warfarin Doses Last 7 Days      5/30/2024    10:49 PM   Dose in Tablet or Mg   TAB or MG? milligram (mg)     Pt Rptd Dose SUNDAY MONDAY TUESDAY WED THURS FRIDAY SATURDAY 5/30/2024  10:49 PM 2.5 25 5 2.5 2.5 5 5         5/30/2024   Warfarin Lab Questionnaire   Missed doses within past 14 days? Yes   If yes; please list when: Posibly missed  1 tablet 2.5   Changes in diet or alcohol within past 14 days? Yes   Please explain:  Ate more fresh berries than usual ly. Took tums total of 6   Medication changes since last result? No   Injuries or illness since last result? No   New shortness of breath, severe headaches or sudden changes in vision since last result? No   Abnormal bleeding since last result? No   Upcoming surgery, procedure? No   Best number to call with results? [phone]     Previous result: Subtherapeutic  Additional findings: None       PLAN     Recommended plan for no diet, medication or health factor changes affecting INR     Dosing Instructions: Increase your warfarin dose (11.1% change) with next INR in 2 weeks       Summary  As of 5/31/2024      Full warfarin instructions:  5 mg every Sun, Tue, Fri; 2.5 mg all other days   Next INR check:  6/14/2024               Telephone call with Marilee who verbalizes understanding and agrees to plan    Lab visit scheduled    Education provided:   Please call back if any changes to your diet, medications or how you've been taking warfarin    Plan made per [facility] anticoagulation protocol    Dany Cramer RN  Anticoagulation Clinic  5/31/2024    _______________________________________________________________________     Anticoagulation Episode Summary       Current INR goal:  2.5-3.5   TTR:  55.2% (1 y)   Target end date:   Indefinite   Send INR reminders to:  [facility]    Indications    Heart valve replaced [Z95.2]  Paroxysmal atrial fibrillation (H) [I48.0]  S/P AVR (aortic valve replacement) [Z95.2]  Other pulmonary embolism without acute cor pulmonale  unspecified chronicity (H) (Resolved) [I26.99]  H/O aortic valve replacement [Z95.2]  Long term (current) use of anticoagulants [Z79.01]  Pulmonary embolism  other  unspecified chronicity  unspecified whether acute cor pulmonale present (H) (Resolved) [I26.99]  Pulmonary embolism  other  unspecified chronicity  unspecified whether acute cor pulmonale present (H) (Resolved) [I26.99]             Comments:  12/15/21 - amended INR goal to 2.5 - 3.5             Anticoagulation Care Providers       Provider Role Specialty Phone number    Tito Salazar MD Referring Family Medicine [phone]    Drew Hahn MD Referring Family Medicine [phone]

## 2024-06-02 LAB
CHOLEST SERPL-MCNC: 221 MG/DL
FASTING STATUS PATIENT QL REPORTED: NO
HDLC SERPL-MCNC: 68 MG/DL
LDLC SERPL CALC-MCNC: 107 MG/DL
NONHDLC SERPL-MCNC: 153 MG/DL
TRIGL SERPL-MCNC: 230 MG/DL

## 2024-06-18 ENCOUNTER — ANTICOAGULATION THERAPY VISIT (OUTPATIENT)
Dept: ANTICOAGULATION | Facility: CLINIC | Age: 69
End: 2024-06-18

## 2024-06-18 ENCOUNTER — LAB (OUTPATIENT)
Dept: LAB | Facility: CLINIC | Age: 69
End: 2024-06-18
Payer: COMMERCIAL

## 2024-06-18 DIAGNOSIS — I26.99 PULMONARY EMBOLISM, OTHER, UNSPECIFIED CHRONICITY, UNSPECIFIED WHETHER ACUTE COR PULMONALE PRESENT (H): ICD-10-CM

## 2024-06-18 DIAGNOSIS — I48.0 PAROXYSMAL ATRIAL FIBRILLATION (H): ICD-10-CM

## 2024-06-18 DIAGNOSIS — Z95.2 S/P AVR (AORTIC VALVE REPLACEMENT): ICD-10-CM

## 2024-06-18 DIAGNOSIS — Z79.01 LONG TERM (CURRENT) USE OF ANTICOAGULANTS: ICD-10-CM

## 2024-06-18 DIAGNOSIS — Z95.2 HEART VALVE REPLACED: Primary | ICD-10-CM

## 2024-06-18 DIAGNOSIS — Z95.2 H/O AORTIC VALVE REPLACEMENT: ICD-10-CM

## 2024-06-18 LAB — INR BLD: 4.7 (ref 0.9–1.1)

## 2024-06-18 PROCEDURE — 85610 PROTHROMBIN TIME: CPT

## 2024-06-18 PROCEDURE — 36416 COLLJ CAPILLARY BLOOD SPEC: CPT

## 2024-06-18 NOTE — PROGRESS NOTES
ANTICOAGULATION MANAGEMENT     Eileen Donald 68 year old female is on warfarin with supratherapeutic INR result. (Goal INR 2.5-3.5)    Recent labs: (last 7 days)     06/18/24  1459   INR 4.7*       ASSESSMENT     Warfarin Lab Questionnaire    Warfarin Doses Last 7 Days      6/17/2024     4:57 PM   Dose in Tablet or Mg   TAB or MG? milligram (mg)     Pt Rptd Dose SUNDAY MONDAY TUESDAY WED THURS FRIDAY SATURDAY 6/17/2024   4:57 PM 5 2.5 5 2.5 2.5 5 2.5         6/17/2024   Warfarin Lab Questionnaire   Missed doses within past 14 days? No   Changes in diet or alcohol within past 14 days? No   Medication changes since last result? No   Injuries or illness since last result? No   New shortness of breath, severe headaches or sudden changes in vision since last result? No   Abnormal bleeding since last result? No   Upcoming surgery, procedure? No   Best number to call with results? [phone]     Previous result: Subtherapeutic increased by 11.1%  Additional findings: None       PLAN     Recommended plan for no diet, medication or health factor changes affecting INR     Dosing Instructions: hold dose then decrease your warfarin dose (10% change) with next INR in 1 week.          Summary  As of 6/18/2024      Full warfarin instructions:  5 mg every Mon, Fri; 2.5 mg all other days   Next INR check:  6/25/2024               Telephone call with Marilee who verbalizes understanding and agrees to plan    Lab visit scheduled    Education provided:   Goal range and lab monitoring: goal range and significance of current result  Contact [phone]  with any changes, questions or concerns.     Plan made per [facility] anticoagulation protocol    Shy Hightower, RN  Anticoagulation Clinic  6/18/2024    _______________________________________________________________________     Anticoagulation Episode Summary       Current INR goal:  2.5-3.5   TTR:  56.8% (1 y)   Target end date:  Indefinite   Send INR reminders to:  [facility]    Indications    Heart valve replaced [Z95.2]  Paroxysmal atrial fibrillation (H) [I48.0]  S/P AVR (aortic valve replacement) [Z95.2]  Other pulmonary embolism without acute cor pulmonale  unspecified chronicity (H) (Resolved) [I26.99]  H/O aortic valve replacement [Z95.2]  Long term (current) use of anticoagulants [Z79.01]  Pulmonary embolism  other  unspecified chronicity  unspecified whether acute cor pulmonale present (H) (Resolved) [I26.99]  Pulmonary embolism  other  unspecified chronicity  unspecified whether acute cor pulmonale present (H) (Resolved) [I26.99]             Comments:  12/15/21 - amended INR goal to 2.5 - 3.5             Anticoagulation Care Providers       Provider Role Specialty Phone number    Tito Salazar MD Referring Family Medicine [phone]    Drew Hahn MD Referring Family Medicine [phone]

## 2024-06-26 ENCOUNTER — TELEPHONE (OUTPATIENT)
Dept: ANTICOAGULATION | Facility: CLINIC | Age: 69
End: 2024-06-26
Payer: COMMERCIAL

## 2024-06-26 NOTE — TELEPHONE ENCOUNTER
ANTICOAGULATION     Eileen Donald is overdue for an INR check.     Spoke with Marilee and scheduled lab appointment on 6/28   - reported she missed her 6/25/24 appt.  She had PT @ [place].    Alondra Lora RN

## 2024-06-29 ENCOUNTER — HEALTH MAINTENANCE LETTER (OUTPATIENT)
Age: 69
End: 2024-06-29

## 2024-07-03 ENCOUNTER — TELEPHONE (OUTPATIENT)
Dept: ANTICOAGULATION | Facility: CLINIC | Age: 69
End: 2024-07-03
Payer: COMMERCIAL

## 2024-07-03 NOTE — TELEPHONE ENCOUNTER
ANTICOAGULATION     Eileen Donald is overdue for an INR check.     Left message for patient to call and schedule lab appointment as soon as possible. If returning call, please schedule.    - no show on 6/25 and /28/24.    Alondra oLra, RNA

## 2024-07-08 DIAGNOSIS — I48.0 PAROXYSMAL ATRIAL FIBRILLATION (H): ICD-10-CM

## 2024-07-08 DIAGNOSIS — Z95.2 S/P AVR (AORTIC VALVE REPLACEMENT): ICD-10-CM

## 2024-07-08 DIAGNOSIS — Z79.01 LONG TERM (CURRENT) USE OF ANTICOAGULANTS: ICD-10-CM

## 2024-07-08 DIAGNOSIS — I26.99 PULMONARY EMBOLISM, OTHER, UNSPECIFIED CHRONICITY, UNSPECIFIED WHETHER ACUTE COR PULMONALE PRESENT (H): ICD-10-CM

## 2024-07-08 RX ORDER — WARFARIN SODIUM 2.5 MG/1
TABLET ORAL
Qty: 270 TABLET | Refills: 1 | Status: SHIPPED | OUTPATIENT
Start: 2024-07-08

## 2024-07-10 ENCOUNTER — TELEPHONE (OUTPATIENT)
Dept: ANTICOAGULATION | Facility: CLINIC | Age: 69
End: 2024-07-10
Payer: COMMERCIAL

## 2024-07-10 NOTE — LETTER
[facility] ANTICO[facility]UL[facility]  [address] [facility] [address]  Phone: [phone]  Fax: [phone]   July 10, 2024        Eileen Donald  [address] [place] [facility] [address]            Dear Eileen,    You are currently under the care of [facility] for your warfarin (Coumadin , Jantoven ) therapy.  We are contacting you because our records show you were due for an INR on 6/25/24.    Last INR was high at 4.7    (INR target goal is 2.5 - 3.5)    There are potentially serious risks when taking warfarin without careful monitoring and we want to make sure you are safely managed.  Routine lab monitoring is required for warfarin refills.     Please call [phone] as soon as possible to schedule a lab appointment. If it is difficult for you to get to lab, please call us to discuss options.  If there has been a change in your care or other concerns, please let us know so we can help and/or update our records.         Sincerely,       [facility]

## 2024-07-10 NOTE — TELEPHONE ENCOUNTER
ANTICOAGULATION     Eileen Donald is overdue for an INR check.     Reminder letter sent    Alondra Lora RN

## 2024-07-17 ENCOUNTER — LAB (OUTPATIENT)
Dept: LAB | Facility: CLINIC | Age: 69
End: 2024-07-17
Payer: COMMERCIAL

## 2024-07-17 ENCOUNTER — TELEPHONE (OUTPATIENT)
Dept: FAMILY MEDICINE | Facility: CLINIC | Age: 69
End: 2024-07-17

## 2024-07-17 ENCOUNTER — ANTICOAGULATION THERAPY VISIT (OUTPATIENT)
Dept: ANTICOAGULATION | Facility: CLINIC | Age: 69
End: 2024-07-17

## 2024-07-17 DIAGNOSIS — I48.0 PAROXYSMAL ATRIAL FIBRILLATION (H): ICD-10-CM

## 2024-07-17 DIAGNOSIS — Z79.01 LONG TERM (CURRENT) USE OF ANTICOAGULANTS: ICD-10-CM

## 2024-07-17 DIAGNOSIS — Z95.2 S/P AVR (AORTIC VALVE REPLACEMENT): ICD-10-CM

## 2024-07-17 DIAGNOSIS — I26.99 PULMONARY EMBOLISM, OTHER, UNSPECIFIED CHRONICITY, UNSPECIFIED WHETHER ACUTE COR PULMONALE PRESENT (H): ICD-10-CM

## 2024-07-17 DIAGNOSIS — Z95.2 HEART VALVE REPLACED: Primary | ICD-10-CM

## 2024-07-17 DIAGNOSIS — R13.10 DYSPHAGIA, UNSPECIFIED TYPE: Primary | ICD-10-CM

## 2024-07-17 DIAGNOSIS — Z95.2 H/O AORTIC VALVE REPLACEMENT: ICD-10-CM

## 2024-07-17 LAB — INR BLD: 2.5 (ref 0.9–1.1)

## 2024-07-17 PROCEDURE — 36416 COLLJ CAPILLARY BLOOD SPEC: CPT

## 2024-07-17 PROCEDURE — 85610 PROTHROMBIN TIME: CPT

## 2024-07-17 RX ORDER — PANTOPRAZOLE SODIUM 20 MG/1
20 TABLET, DELAYED RELEASE ORAL DAILY
Qty: 90 TABLET | Refills: 0 | Status: SHIPPED | OUTPATIENT
Start: 2024-07-17

## 2024-07-17 NOTE — PROGRESS NOTES
ANTICOAGULATION MANAGEMENT     Eileen Donald 68 year old female is on warfarin with therapeutic INR result. (Goal INR 2.5-3.5)    Recent labs: (last 7 days)     07/17/24  1357   INR 2.5*       ASSESSMENT     Warfarin Lab Questionnaire    Warfarin Doses Last 7 Days      7/17/2024     1:59 PM   Dose in Tablet or Mg   TAB or MG? - 2.5mg warfarin tablets (evenings) milligram (mg)     Pt Rptd Dose SUNDAY MONDAY TUESDAY WED THURS FRIDAY SATURDAY 7/17/2024   1:59 PM 2.5 2.5 5 2.5 2.5 5 5         7/17/2024   Warfarin Lab Questionnaire   Missed doses within past 14 days? No   Changes in diet or alcohol within past 14 days? No   Medication changes since last result? No - patient's insurance called and wound like Nexxium to be switched back to Pantoprazole 20mg.     Injuries or illness since last result? No   New shortness of breath, severe headaches or sudden changes in vision since last result? No   Abnormal bleeding since last result? No   Upcoming surgery, procedure? No   Best number to call with results? [phone]        Previous result: Supratherapeutic at 4.7 on 6/18/24.    Additional findings:  PT at [facility] sports / Physical therapy d/t cervical stenosis.       PLAN     Recommended plan for no diet, medication or health factor changes affecting INR     Dosing Instructions: Continue your current warfarin dose with next INR in 2 weeks       Summary  As of 7/17/2024      Full warfarin instructions:  5 mg every Mon, Fri; 2.5 mg all other days   Next INR check:  7/31/2024               Detailed voice message left for Marilee with dosing instructions and follow up date.    - also sent [employer] message.    Contact [phone] to schedule and with any changes, questions or concerns.     Education provided: Please call back if any changes to your diet, medications or how you've been taking warfarin  Taking warfarin: Importance of taking warfarin as instructed  Goal range and lab monitoring: goal range and  significance of current result    Plan made per [facility] anticoagulation protocol    Alondra Lora, RN  Anticoagulation Clinic  7/17/2024    _______________________________________________________________________     Anticoagulation Episode Summary       Current INR goal:  2.5-3.5   TTR:  58.9% (1 y)   Target end date:  Indefinite   Send INR reminders to:  [facility]    Indications    Heart valve replaced [Z95.2]  Paroxysmal atrial fibrillation (H) [I48.0]  S/P AVR (aortic valve replacement) [Z95.2]  Other pulmonary embolism without acute cor pulmonale  unspecified chronicity (H) (Resolved) [I26.99]  H/O aortic valve replacement [Z95.2]  Long term (current) use of anticoagulants [Z79.01]  Pulmonary embolism  other  unspecified chronicity  unspecified whether acute cor pulmonale present (H) (Resolved) [I26.99]  Pulmonary embolism  other  unspecified chronicity  unspecified whether acute cor pulmonale present (H) (Resolved) [I26.99]             Comments:  12/15/21 - amended INR goal to 2.5 - 3.5             Anticoagulation Care Providers       Provider Role Specialty Phone number    Tito Salazar MD Referring Family Medicine [phone]    Drew Hahn MD Referring Family Medicine [phone]

## 2024-07-17 NOTE — TELEPHONE ENCOUNTER
1. Dysphagia, unspecified type  - pantoprazole (PROTONIX) 20 MG EC tablet; Take 1 tablet (20 mg) by mouth daily  Dispense: 90 tablet; Refill: 0     PCP out of the office.  I sent in an alternative as requested for 3-month supply.  Follow-up with PCP as scheduled to discuss ongoing use of PPI medication.    Ellen Daniel, DO

## 2024-07-17 NOTE — TELEPHONE ENCOUNTER
New Medication Request    Contacts       Contact Date/Time Type Contact Phone/Fax    07/17/2024 09:30 AM CDT Phone (Incoming) Marilee Donald (Self) [phone] (M)     Medicare ins called to request different prescription [phone]            What medication are you requesting?: Pantoprazole 20 mg    Reason for medication request: Medicare requested above medication in placement of Esomeprazole 20 mg for patient.    Have you taken this medication before?: No    Controlled Substance Agreement on file:   CSA -- Patient Level:    CSA: None found at the patient level.         Patient offered an appointment? No    Preferred Pharmacy:   [id number] [address]  [address] [place] [address]  Phone: [phone] Fax: [phone]      Could we send this information to you in [employer] or would you prefer to receive a phone call?:   Patient would like to be contacted via [employer]

## 2024-07-26 ENCOUNTER — LAB (OUTPATIENT)
Dept: LAB | Facility: CLINIC | Age: 69
End: 2024-07-26
Payer: COMMERCIAL

## 2024-07-26 ENCOUNTER — ANTICOAGULATION THERAPY VISIT (OUTPATIENT)
Dept: ANTICOAGULATION | Facility: CLINIC | Age: 69
End: 2024-07-26

## 2024-07-26 DIAGNOSIS — I48.0 PAROXYSMAL ATRIAL FIBRILLATION (H): ICD-10-CM

## 2024-07-26 DIAGNOSIS — Z95.2 S/P AVR (AORTIC VALVE REPLACEMENT): ICD-10-CM

## 2024-07-26 DIAGNOSIS — Z95.2 H/O AORTIC VALVE REPLACEMENT: ICD-10-CM

## 2024-07-26 DIAGNOSIS — Z95.2 HEART VALVE REPLACED: Primary | ICD-10-CM

## 2024-07-26 DIAGNOSIS — Z79.01 LONG TERM (CURRENT) USE OF ANTICOAGULANTS: ICD-10-CM

## 2024-07-26 DIAGNOSIS — I26.99 PULMONARY EMBOLISM, OTHER, UNSPECIFIED CHRONICITY, UNSPECIFIED WHETHER ACUTE COR PULMONALE PRESENT (H): ICD-10-CM

## 2024-07-26 LAB — INR BLD: 2.4 (ref 0.9–1.1)

## 2024-07-26 PROCEDURE — 85610 PROTHROMBIN TIME: CPT

## 2024-07-26 PROCEDURE — 36416 COLLJ CAPILLARY BLOOD SPEC: CPT

## 2024-07-26 NOTE — PROGRESS NOTES
ANTICOAGULATION MANAGEMENT     Eileen Donald 68 year old female is on warfarin with subtherapeutic INR result. (Goal INR 2.5-3.5)    Recent labs: (last 7 days)     07/26/24  1312   INR 2.4*       ASSESSMENT     Warfarin Lab Questionnaire    Warfarin Doses Last 7 Days      7/26/2024     1:04 PM   Dose in Tablet or Mg   TAB or MG? - 2.5mg warfarin tablets (evenings)  milligram (mg)     Pt Rptd Dose SUNDAY MONDAY TUESDAY WED THURS FRIDAY SATURDAY 7/26/2024   1:04 PM 2.5 5 2.5 2.5 2.5 5 2         7/26/2024   Warfarin Lab Questionnaire   Missed doses within past 14 days? No   Changes in diet or alcohol within past 14 days? No - reported has been eating more blueberries than usual, which is having an effect on her INR.  She loves to eat Blueberries and daughter brought her a lot.     Medication changes since last result? No   Injuries or illness since last result? No   New shortness of breath, severe headaches or sudden changes in vision since last result? No   Abnormal bleeding since last result? No   Upcoming surgery, procedure? No   Best number to call with results? [phone]        Previous result: Therapeutic last visit at 2.5; previously outside of goal range at 4.7    Additional findings: None       PLAN     Recommended plan for temporary change(s) affecting INR     Dosing Instructions: booster dose then continue your current warfarin dose with next INR in 2 weeks       Summary  As of 7/26/2024      Full warfarin instructions:  7/26: 3.75 mg; Otherwise 5 mg every Mon, Fri; 2.5 mg all other days   Next INR check:  8/9/2024               Telephone call with Marilee who verbalizes understanding and agrees to plan.    Lab visit scheduled - INR on 8/9/24 @ [facility]    Education provided: Taking warfarin: Importance of taking warfarin as instructed  Goal range and lab monitoring: goal range and significance of current result  Dietary considerations: importance of consistent vitamin K intake and impact of vitamin K foods  on INR    Plan made per [facility] anticoagulation protocol    Alondra Lora, RN  Anticoagulation Clinic  7/26/2024    _______________________________________________________________________     Anticoagulation Episode Summary       Current INR goal:  2.5-3.5   TTR:  57.7% (1 y)   Target end date:  Indefinite   Send INR reminders to:  [facility]    Indications    Heart valve replaced [Z95.2]  Paroxysmal atrial fibrillation (H) [I48.0]  S/P AVR (aortic valve replacement) [Z95.2]  Other pulmonary embolism without acute cor pulmonale  unspecified chronicity (H) (Resolved) [I26.99]  H/O aortic valve replacement [Z95.2]  Long term (current) use of anticoagulants [Z79.01]  Pulmonary embolism  other  unspecified chronicity  unspecified whether acute cor pulmonale present (H) (Resolved) [I26.99]  Pulmonary embolism  other  unspecified chronicity  unspecified whether acute cor pulmonale present (H) (Resolved) [I26.99]             Comments:  12/15/21 - amended INR goal to 2.5 - 3.5             Anticoagulation Care Providers       Provider Role Specialty Phone number    Tito Salazar MD Referring Family Medicine [phone]    Drew Hahn MD Referring Family Medicine [phone]

## 2024-07-31 ENCOUNTER — TRANSFERRED RECORDS (OUTPATIENT)
Dept: HEALTH INFORMATION MANAGEMENT | Facility: CLINIC | Age: 69
End: 2024-07-31
Payer: COMMERCIAL

## 2024-08-05 ASSESSMENT — ASTHMA QUESTIONNAIRES
QUESTION_5 LAST FOUR WEEKS HOW WOULD YOU RATE YOUR ASTHMA CONTROL: SOMEWHAT CONTROLLED
ACT_TOTALSCORE: 18
ACT_TOTALSCORE: 18
QUESTION_2 LAST FOUR WEEKS HOW OFTEN HAVE YOU HAD SHORTNESS OF BREATH: THREE TO SIX TIMES A WEEK
QUESTION_1 LAST FOUR WEEKS HOW MUCH OF THE TIME DID YOUR ASTHMA KEEP YOU FROM GETTING AS MUCH DONE AT WORK, SCHOOL OR AT HOME: A LITTLE OF THE TIME
QUESTION_3 LAST FOUR WEEKS HOW OFTEN DID YOUR ASTHMA SYMPTOMS (WHEEZING, COUGHING, SHORTNESS OF BREATH, CHEST TIGHTNESS OR PAIN) WAKE YOU UP AT NIGHT OR EARLIER THAN USUAL IN THE MORNING: NOT AT ALL
QUESTION_4 LAST FOUR WEEKS HOW OFTEN HAVE YOU USED YOUR RESCUE INHALER OR NEBULIZER MEDICATION (SUCH AS ALBUTEROL): TWO OR THREE TIMES PER WEEK

## 2024-08-06 ENCOUNTER — OFFICE VISIT (OUTPATIENT)
Dept: FAMILY MEDICINE | Facility: CLINIC | Age: 69
End: 2024-08-06
Payer: COMMERCIAL

## 2024-08-06 ENCOUNTER — ANTICOAGULATION THERAPY VISIT (OUTPATIENT)
Dept: ANTICOAGULATION | Facility: CLINIC | Age: 69
End: 2024-08-06

## 2024-08-06 VITALS
DIASTOLIC BLOOD PRESSURE: 58 MMHG | HEART RATE: 71 BPM | SYSTOLIC BLOOD PRESSURE: 107 MMHG | OXYGEN SATURATION: 95 % | HEIGHT: 70 IN | RESPIRATION RATE: 16 BRPM | WEIGHT: 249.2 LBS | BODY MASS INDEX: 35.68 KG/M2

## 2024-08-06 DIAGNOSIS — E11.9 TYPE 2 DIABETES MELLITUS WITHOUT COMPLICATION, WITHOUT LONG-TERM CURRENT USE OF INSULIN (H): ICD-10-CM

## 2024-08-06 DIAGNOSIS — Z79.01 LONG TERM (CURRENT) USE OF ANTICOAGULANTS: ICD-10-CM

## 2024-08-06 DIAGNOSIS — I48.0 PAROXYSMAL ATRIAL FIBRILLATION (H): ICD-10-CM

## 2024-08-06 DIAGNOSIS — G35 MULTIPLE SCLEROSIS (H): ICD-10-CM

## 2024-08-06 DIAGNOSIS — Z95.2 S/P AVR (AORTIC VALVE REPLACEMENT): ICD-10-CM

## 2024-08-06 DIAGNOSIS — Z95.2 H/O AORTIC VALVE REPLACEMENT: ICD-10-CM

## 2024-08-06 DIAGNOSIS — Z95.2 HEART VALVE REPLACED: Primary | ICD-10-CM

## 2024-08-06 DIAGNOSIS — I83.93 VARICOSE VEINS OF BOTH LOWER EXTREMITIES, UNSPECIFIED WHETHER COMPLICATED: Primary | ICD-10-CM

## 2024-08-06 DIAGNOSIS — I10 PRIMARY HYPERTENSION: ICD-10-CM

## 2024-08-06 LAB — INR BLD: 4.1 (ref 0.9–1.1)

## 2024-08-06 PROCEDURE — 85610 PROTHROMBIN TIME: CPT | Performed by: FAMILY MEDICINE

## 2024-08-06 PROCEDURE — 36416 COLLJ CAPILLARY BLOOD SPEC: CPT | Performed by: FAMILY MEDICINE

## 2024-08-06 PROCEDURE — G2211 COMPLEX E/M VISIT ADD ON: HCPCS | Performed by: FAMILY MEDICINE

## 2024-08-06 PROCEDURE — 99214 OFFICE O/P EST MOD 30 MIN: CPT | Performed by: FAMILY MEDICINE

## 2024-08-06 RX ORDER — METFORMIN HCL 500 MG
500 TABLET, EXTENDED RELEASE 24 HR ORAL DAILY
Qty: 90 TABLET | Refills: 0 | Status: SHIPPED | OUTPATIENT
Start: 2024-08-06

## 2024-08-06 RX ORDER — LISINOPRIL 10 MG/1
10 TABLET ORAL DAILY
Qty: 90 TABLET | Refills: 1 | Status: SHIPPED | OUTPATIENT
Start: 2024-08-06

## 2024-08-06 NOTE — PROGRESS NOTES
ANTICOAGULATION MANAGEMENT     Eileen Donald 68 year old female is on warfarin with supratherapeutic INR result. (Goal INR 2.5-3.5)    Recent labs: (last 7 days)     08/06/24  1401   INR 4.1*       ASSESSMENT     Source(s): Chart Review and Patient/Caregiver Call     Warfarin doses taken: Booster dose on 7/26/24, recently taken which may be affecting INR  Medication/supplement changes:  Yes   Lisinopril decreased from 20mg to 10mg daily.  New illness, injury, or hospitalization: Yes:    OV today with Dr. Hahn for bilateral varicose veins.  Previous result: Subtherapeutic at 2.4 on 7/26/24.  Additional findings:  Referral to [facility]/ Vein Clinic.       PLAN     Unable to reach Marilee today.    Left message to hold warfarin tonight. Request call back for assessment.    Follow up required to confirm warfarin dose taken and assess for changes    Alondra Lora RN  Anticoagulation Clinic  8/6/2024

## 2024-08-06 NOTE — PROGRESS NOTES
"  Assessment & Plan       ICD-10-CM    1. Varicose veins of both lower extremities, unspecified whether complicated  I83.93 Vascular Surgery Referral      2. Type 2 diabetes mellitus without complication, without long-term current use of insulin (H)  E11.9 metFORMIN (GLUCOPHAGE XR) 500 MG 24 hr tablet      3. Primary hypertension  I10 lisinopril (ZESTRIL) 10 MG tablet      4. Long term (current) use of anticoagulants  Z79.01 INR point of care      5. Multiple Sclerosis  G35       6. Paroxysmal atrial fibrillation (H)  I48.0         Patient with multiple sclerosis and other comorbidities presents today for changes in skin of lower legs.  She does believe that this may reflect weight loss that she has sustained.  Exam shows prominence of varicose veins and loss of subcutaneous fat.  Referred to vein clinic as she does have significant prominence.  Medication for diabetes refilled.  Reviewed her previous A1c.  Noting that her blood pressure has been fairly normotensive, decrease lisinopril to 10 mg daily from previous dose of 20 mg daily.   MS remains stable.  She is on metoprolol for paroxysmal atrial fibrillation.  Continue to monitor blood pressure.      BMI  Estimated body mass index is 35.76 kg/m  as calculated from the following:    Height as of this encounter: 1.778 m (5' 10\").    Weight as of this encounter: 113 kg (249 lb 3.2 oz).         MEDICATIONS:        - Decrease lisinopril to 10 mg daily       - Continue other medications without change  See Patient Instructions    The longitudinal plan of care for the diagnosis(es)/condition(s) as documented were addressed during this visit. Due to the added complexity in care, I will continue to support Marilee in the subsequent management and with ongoing continuity of care.    Subjective   Marilee is a 68 year old, presenting for the following health issues:  Weight Loss (Bilateral back of shins- almost like an indent- weight loss? Pt has lost 80pounds. No pain, no " discomfort )        8/6/2024     1:36 PM   Additional Questions   Roomed by Odalis Salas CMA     History of Present Illness       Reason for visit:  Indentation and some discoloring to back calf  Symptom onset:  1-2 weeks ago  Symptoms include:  Dissoioring and looseness of skin  Symptom intensity:  Mild  Symptom progression:  Improving  Had these symptoms before:  No    She eats 0-1 servings of fruits and vegetables daily.She consumes 1 sweetened beverage(s) daily.She exercises with enough effort to increase her heart rate 9 or less minutes per day.  She exercises with enough effort to increase her heart rate 3 or less days per week.   She is taking medications regularly.     Patient Active Problem List   Diagnosis    Depression    Asthma    Benign essential hypertension    Multiple Sclerosis    Hyperlipidemia, unspecified hyperlipidemia type    Impaired Glucose Tolerance Test    Dysphagia    Benign Adenomatous Polyp Of The Large Intestine    History of pulmonary embolism    Morbid obesity (H)    Generalized anxiety disorder    Primary osteoarthritis of both hands    Polyarthralgia    Aortic valve replaced    Controlled substance agreement signed    Heart valve replaced    Closed 3-part fracture of proximal humerus with delayed healing, left    Pulmonary hypertension (H)    Paroxysmal atrial fibrillation (H)    Dyslipidemia    Dyspnea on exertion    Hypertension    Postoperative anemia due to acute blood loss    Pre-diabetes    Stress hyperglycemia    Recurrent major depressive disorder, in partial remission (H24)    Type 2 diabetes mellitus with other specified complication, unspecified whether long term insulin use (H)    Acute bacterial conjunctivitis of both eyes    Community acquired pneumonia, unspecified laterality    Osteoarthrosis    S/P AVR (aortic valve replacement)    Sleep apnea    H/O aortic valve replacement    Encounter for long-term methadone use    History of fall    Leg weakness, bilateral     Muscle spasm    Neurogenic bladder    Other symptoms and signs involving cognitive functions and awareness    Tremor    Unsteady gait    Relapsing remitting multiple sclerosis (H)    Elevated MCV    Hip pain, right    Long term (current) use of anticoagulants    Spinal stenosis of cervical region    Chronic respiratory failure with hypoxia (H)     Current Outpatient Medications   Medication Sig Dispense Refill    acetaminophen (TYLENOL) 325 MG tablet Take 325-650 mg by mouth every 6 hours as needed       acyclovir (ZOVIRAX) 400 MG tablet Take 400 mg by mouth 2 times daily       albuterol (PROAIR HFA/PROVENTIL HFA/VENTOLIN HFA) 108 (90 Base) MCG/ACT inhaler Inhale 2 puffs into the lungs every 6 hours as needed       aspirin (ASA) 81 MG chewable tablet Take 81 mg by mouth daily       buPROPion (WELLBUTRIN XL) 150 MG 24 hr tablet Take 1 tablet (150 mg) by mouth daily 90 tablet 3    esomeprazole (NEXIUM) 20 MG DR capsule Take 1 capsule (20 mg) by mouth every morning (before breakfast) 90 capsule 3    furosemide (LASIX) 40 MG tablet Take 1.5 tablets (60 mg) by mouth daily (Patient taking differently: Take 40 mg by mouth daily) 135 tablet 0    lisinopril (ZESTRIL) 10 MG tablet Take 1 tablet (10 mg) by mouth daily 90 tablet 1    loratadine (CLARITIN) 10 MG tablet Take 1 tablet (10 mg) by mouth daily 30 tablet 3    metFORMIN (GLUCOPHAGE XR) 500 MG 24 hr tablet Take 1 tablet (500 mg) by mouth daily 90 tablet 0    metoprolol tartrate (LOPRESSOR) 25 MG tablet Take 1 tablet (25 mg) by mouth 2 times daily 180 tablet 3    montelukast (SINGULAIR) 10 MG tablet Take 1 tablet (10 mg) by mouth at bedtime 90 tablet 3    Ocrelizumab (OCREVUS IV) Inject into the vein every 6 months      pantoprazole (PROTONIX) 20 MG EC tablet Take 1 tablet (20 mg) by mouth daily 90 tablet 0    potassium chloride ER (MICRO-K) 10 MEQ CR capsule Take 10 mEq by mouth daily       simvastatin (ZOCOR) 20 MG tablet Take 1 tablet (20 mg) by mouth at bedtime 90  "tablet 3    venlafaxine (EFFEXOR XR) 75 MG 24 hr capsule TAKE 3 CAPSULES BY MOUTH EVERY  capsule 3    warfarin ANTICOAGULANT (COUMADIN) 2.5 MG tablet Take 1 tab (2.5mg) to 2 tabs (5mg) by mouth daily, as directed.  Adjust dose based on INR results. 270 tablet 1     Current Facility-Administered Medications   Medication Dose Route Frequency Provider Last Rate Last Admin    ropivacaine (NAROPIN) injection 3 mL  3 mL   Ravi Patel DO   3 mL at 09/07/22 1415    triamcinolone (KENALOG-40) injection 40 mg  40 mg   Ravi Patel DO   40 mg at 09/07/22 1415       Review of Systems  Constitutional, neuro, ENT, endocrine, pulmonary, cardiac, gastrointestinal, genitourinary, musculoskeletal, integument and psychiatric systems are negative, except as otherwise noted.      Objective    /58 (BP Location: Left arm, Patient Position: Sitting, Cuff Size: Adult Large)   Pulse 71   Resp 16   Ht 1.778 m (5' 10\")   Wt 113 kg (249 lb 3.2 oz)   SpO2 95%   BMI 35.76 kg/m    Body mass index is 35.76 kg/m .  Physical Exam   GENERAL: alert and no distress  MS: varicose veins prominence in both lower extremity with some loss of subcutaneous fat    Lab on 07/26/2024   Component Date Value Ref Range Status    INR 07/26/2024 2.4 (H)  0.9 - 1.1 Final           Signed Electronically by: Drew Hahn MD    "

## 2024-08-07 NOTE — PROGRESS NOTES
ANTICOAGULATION MANAGEMENT     Eileen Donald 68 year old female is on warfarin with supratherapeutic INR result. (Goal INR 2.5-3.5)    Recent labs: (last 7 days)     08/06/24  1401   INR 4.1*       ASSESSMENT     Source(s): Chart Review and Patient/Caregiver Call     Warfarin doses taken: Warfarin taken as instructed  Diet: Decreased greens/vitamin K in diet; plans to resume previous intake   She did cut down eating blueberries, which she loves to eat in bunches.  Medication/supplement changes:  Yes.   Did report some missed doses of her Protonix.  New illness, injury, or hospitalization: No  Signs or symptoms of bleeding or clotting: No  Previous result: Subtherapeutic at 2.4 on 7/26/24.  Additional findings: None       PLAN     Recommended plan for temporary change(s) affecting INR     Dosing Instructions: hold dose then continue your current warfarin dose with next INR in 2 weeks       Summary  As of 8/6/2024      Full warfarin instructions:  8/6: Hold; Otherwise 5 mg every Mon, Fri; 2.5 mg all other days   Next INR check:  8/20/2024               Telephone call with Marilee who verbalizes understanding and agrees to plan    Check at provider office visit - INR on 8/22/24 at wellness check with Dr. Hahn    Education provided: Taking warfarin: Importance of taking warfarin as instructed  Goal range and lab monitoring: goal range and significance of current result  Dietary considerations: importance of consistent vitamin K intake  Symptom monitoring: monitoring for bleeding signs and symptoms    Plan made per [facility] anticoagulation protocol    Alondra Lora RN  Anticoagulation Clinic  8/7/2024    _______________________________________________________________________     Anticoagulation Episode Summary       Current INR goal:  2.5-3.5   TTR:  57.9% (1 y)   Target end date:  Indefinite   Send INR reminders to:  [facility]    Indications    Heart valve replaced [Z95.2]  Paroxysmal atrial  fibrillation (H) [I48.0]  S/P AVR (aortic valve replacement) [Z95.2]  Other pulmonary embolism without acute cor pulmonale  unspecified chronicity (H) (Resolved) [I26.99]  H/O aortic valve replacement [Z95.2]  Long term (current) use of anticoagulants [Z79.01]  Pulmonary embolism  other  unspecified chronicity  unspecified whether acute cor pulmonale present (H) (Resolved) [I26.99]  Pulmonary embolism  other  unspecified chronicity  unspecified whether acute cor pulmonale present (H) (Resolved) [I26.99]             Comments:  12/15/21 - amended INR goal to 2.5 - 3.5             Anticoagulation Care Providers       Provider Role Specialty Phone number    Tito Salazar MD Referring Family Medicine [phone]    Drew Hahn MD Referring Family Medicine [phone]

## 2024-08-12 DIAGNOSIS — I10 PRIMARY HYPERTENSION: ICD-10-CM

## 2024-08-12 RX ORDER — METOPROLOL TARTRATE 25 MG/1
25 TABLET, FILM COATED ORAL 2 TIMES DAILY
Qty: 180 TABLET | Refills: 2 | Status: SHIPPED | OUTPATIENT
Start: 2024-08-12

## 2024-08-16 ENCOUNTER — OFFICE VISIT (OUTPATIENT)
Dept: PULMONOLOGY | Facility: CLINIC | Age: 69
End: 2024-08-16
Payer: COMMERCIAL

## 2024-08-16 VITALS
SYSTOLIC BLOOD PRESSURE: 114 MMHG | HEART RATE: 93 BPM | OXYGEN SATURATION: 94 % | WEIGHT: 249 LBS | BODY MASS INDEX: 35.73 KG/M2 | DIASTOLIC BLOOD PRESSURE: 76 MMHG

## 2024-08-16 DIAGNOSIS — J45.909 REACTIVE AIRWAY DISEASE WITHOUT COMPLICATION, UNSPECIFIED ASTHMA SEVERITY, UNSPECIFIED WHETHER PERSISTENT: Primary | ICD-10-CM

## 2024-08-16 PROCEDURE — 99213 OFFICE O/P EST LOW 20 MIN: CPT | Performed by: INTERNAL MEDICINE

## 2024-08-16 PROCEDURE — G2211 COMPLEX E/M VISIT ADD ON: HCPCS | Performed by: INTERNAL MEDICINE

## 2024-08-16 RX ORDER — ALBUTEROL SULFATE 90 UG/1
2 AEROSOL, METERED RESPIRATORY (INHALATION) EVERY 6 HOURS PRN
Qty: 18 G | Refills: 11 | Status: SHIPPED | OUTPATIENT
Start: 2024-08-16

## 2024-08-16 NOTE — PROGRESS NOTES
Pulmonary Clinic Follow-up Visit    Assessment and Plan:   68F with a history of BAV, aortic valve stenosis s/p mechanical aortic valve replacement in 2017, HTN, pulmonary HTN, chart hx of COPD vs. asthma, DM, relapsing remitting MS, PAF on coumadin, morbid obesity, depression, presents for follow up of asthma and KHAN. PFTs in 2017 showed nonspecific reductions in spirometry and some restriction, likely due to body habitus. Unclear if she actually has asthma. She is not having significant respiratory symptoms. The KHAN is likely due to deconditioning and obesity. We discussed enrolling in pulmonary rehab to improve exercise tolerance. Otherwise her lung exam and SpO2 are acceptable today.    Recommendations:  - continue albuterol as needed  - supplemental O2 at night, to keep SpO2 92% or greater  - check SpO2 during exertion to see if she has any desaturation events. She should use O2 if the SpO2 goes below 88%.  - did not address sleep medicine issues today  - consider pulmonary rehab. She will let me know if she wants me to place a referral.   - encouraged her to exercise and remain active as able.   - UTD with vaccinations.    Follow up in 6 months.     The longitudinal plan of care for the diagnosis(es)/condition(s) as documented were addressed during this visit. Due to the added complexity in care, I will continue to support Marilee in the subsequent management and with ongoing continuity of care.     Aftab Barboza MD (Avi)  [facility] ([facility])  Clinic [phone]  Fax [phone]      CCx: chronic respiratory failure with hypoxemia, pulmonary hypertension, dyspnea on exertion    HPI: Interim history: I last saw Marilee on 5/18/2023. Since that time, she reports she's doing OK. She's had multiple surgeries. Uses proair rarely. She is having dyspnea on exertion. She came in a wheelchair today due to hip pain.  No cough or hemoptysis. Having some PND symptoms.     ROS:  A  12-system review was obtained and was negative with the exception of the symptoms endorsed in the history of present illness.    PMH:  Past Medical History:   Diagnosis Date    Anxiety     Aortic valve replaced 2/15/2017    Aortic valve stenosis 01/24/2017    Asthma     Asthma     Created by Conversion     Chronic shortness of breath     COPD (chronic obstructive pulmonary disease) (H)     Coronary artery disease     Depression     Diabetes mellitus (H)     borderline    Essential hypertension     Created by Conversion  Replacement Utility updated for latest IMO load    History of blood clots     PE    History of pulmonary embolism     HTN (hypertension)     Multiple sclerosis (H)     Multiple sclerosis (H)     Created by Conversion     Obesity     Obesity 10/29/2015    Osteoarthritis     Oxygen dependent     Panic attacks     PONV (postoperative nausea and vomiting)     Pre-diabetes     Pulmonary embolism (H)     Sleep apnea     borderline       PSH:  Past Surgical History:   Procedure Laterality Date    aortic valve surgery      COLONOSCOPY N/A 12/23/2021    Procedure: COLONOSCOPY WITH POLYPECTOMIES;  Surgeon: Tito Suarez MD;  Location: [facility]    COLONOSCOPY W/ BIOPSIES N/A 3/22/2021    Procedure: COLONOSCOPY WITH BIOPSY AND POLYPECTOMY;  Surgeon: Sam Weems MD;  Location: [facility] OR;  Service: Gastroenterology    IR LUMBAR EPIDURAL STEROID INJECTION  1/16/2007    MAMMOPLASTY REDUCTION  1994    OTHER SURGICAL HISTORY  01/24/2017    mechanical aortic prosthesis    RELEASE CARPAL TUNNEL      ZZC RECONSTR TOTAL SHOULDER IMPLANT Left 4/10/2019    Procedure: LEFT REVERSE TOTAL SHOULDER ARTHROPLASTY;  Surgeon: Luis Antonio Telles MD;  Location: [facility] OR;  Service: Orthopedics       Allergies:  Allergies   Allergen Reactions    Morphine Visual Disturbance and Other (See Comments)     hallicinations      Sulfa Antibiotics Hives and Nausea and Vomiting    Azithromycin Nausea and  Vomiting and Rash    Doxycycline Rash    Latex Rash       Family HX:  Family History   Problem Relation Age of Onset    Snoring Mother     Snoring Father     Sleep Apnea Sister     Coronary Artery Disease Father     Coronary Artery Disease Sister     Coronary Artery Disease Brother     Breast Cancer No family hx of        Social Hx:  Social History     Socioeconomic History    Marital status:      Spouse name: Not on file    Number of children: Not on file    Years of education: Not on file    Highest education level: Not on file   Occupational History    Not on file   Tobacco Use    Smoking status: Never     Passive exposure: Past    Smokeless tobacco: Never   Vaping Use    Vaping status: Never Used   Substance and Sexual Activity    Alcohol use: No    Drug use: No    Sexual activity: Not on file   Other Topics Concern    Not on file   Social History Narrative    Lives with sister in [place] due to help with medical issues.   and daughter live in [place].  Plans to return to be with family hopefully soon.  For MS- last used medications about 3 months. In remission. Needs to be back on medications as  Neurologist has left.    Drew Hahn MD  5/29/2018    Daughters Cecille and Maci Molina are my patients.    Surgery on 12/2023 for colonoscopy.    The following guidelines were recommended by anticoagulation team and I agree and these are also shared with patient     Recommended goal for patient with AVR plus thrombotic risk factors 2.5-3.5 per ACC/AHA valvular heart guideline      Pre-Procedure     Hold warfarin for 4 days, until after procedure starting: Sunday 12/19     Enoxaparin (Lovenox) 120 mg subq Q 12 hrs (1 mg/kg Q 12 hrs for CrCl greater than 60 and BMI less than 40     Start enoxaparin Tue 12/21 AM     Last dose of enoxaparin prior to procedure: Wed 12/22 AM  (24 hours prior to procedure)     Post-Procedure     Resume warfarin dose if okay with provider doing procedure on night  of procedure, 12/23 PM  5 mg x 1 then resume home dose     Resume  enoxaparin (Lovenox)  24 hrs post procedure when okay with provider doing procedure. Call clinic prior to restarting if polyps removed. Continue until INR therapeutic per protocol. Recheck INR 4-6 days after resuming warfarin      Social Determinants of Health     Financial Resource Strain: Low Risk  (3/19/2024)    Received from [employer] [facility], [facility]    Financial Resource Strain     Difficulty of Paying Living Expenses: 3     Difficulty of Paying Living Expenses: Not on file   Food Insecurity: No Food Insecurity (3/19/2024)    Received from [employer] [facility], [facility]    Food Insecurity     Worried About Running Out of Food in the Last Year: 1   Transportation Needs: No Transportation Needs (3/19/2024)    Received from [facility] [employer] [facility], [facility]    Transportation Needs     Lack of Transportation (Medical): 1   Recent Concern: Transportation Needs - High Risk (12/25/2023)    Transportation Needs     Within the past 12 months, has lack of transportation kept you from medical appointments, getting your medicines, non-medical meetings or appointments, work, or from getting things that you need?: Yes   Physical Activity: Not on file   Stress: Not on file   Social Connections: Socially Integrated (3/19/2024)    Received from [employer] [facility], [facility]    Social Connections     Frequency of Communication with Friends and Family: 0   Interpersonal Safety: Low Risk  (8/6/2024)    Interpersonal Safety     Do you feel physically and emotionally safe where you currently live?: Yes     Within the past 12 months, have you been hit, slapped, kicked or otherwise physically hurt by someone?: No     Within the  past 12 months, have you been humiliated or emotionally abused in other ways by your partner or ex-partner?: No   Housing Stability: Low Risk  (3/19/2024)    [facility] [employer] [facility]    Housing Stability     Unable to Pay for Housing in the Last Year: 1       Current Meds:  Current Outpatient Medications   Medication Sig Dispense Refill    acetaminophen (TYLENOL) 325 MG tablet Take 325-650 mg by mouth every 6 hours as needed       acyclovir (ZOVIRAX) 400 MG tablet Take 400 mg by mouth 2 times daily       albuterol (PROAIR HFA/PROVENTIL HFA/VENTOLIN HFA) 108 (90 Base) MCG/ACT inhaler Inhale 2 puffs into the lungs every 6 hours as needed for shortness of breath or wheezing 18 g 11    aspirin (ASA) 81 MG chewable tablet Take 81 mg by mouth daily       buPROPion (WELLBUTRIN XL) 150 MG 24 hr tablet Take 1 tablet (150 mg) by mouth daily 90 tablet 3    esomeprazole (NEXIUM) 20 MG DR capsule Take 1 capsule (20 mg) by mouth every morning (before breakfast) 90 capsule 3    furosemide (LASIX) 40 MG tablet Take 1.5 tablets (60 mg) by mouth daily (Patient taking differently: Take 40 mg by mouth daily) 135 tablet 0    lisinopril (ZESTRIL) 10 MG tablet Take 1 tablet (10 mg) by mouth daily 90 tablet 1    loratadine (CLARITIN) 10 MG tablet Take 1 tablet (10 mg) by mouth daily 30 tablet 3    metFORMIN (GLUCOPHAGE XR) 500 MG 24 hr tablet Take 1 tablet (500 mg) by mouth daily 90 tablet 0    metoprolol tartrate (LOPRESSOR) 25 MG tablet Take 1 tablet (25 mg) by mouth 2 times daily 180 tablet 2    montelukast (SINGULAIR) 10 MG tablet Take 1 tablet (10 mg) by mouth at bedtime 90 tablet 3    Ocrelizumab (OCREVUS IV) Inject into the vein every 6 months      pantoprazole (PROTONIX) 20 MG EC tablet Take 1 tablet (20 mg) by mouth daily 90 tablet 0    potassium chloride ER (MICRO-K) 10 MEQ CR capsule Take 10 mEq by mouth daily       simvastatin (ZOCOR) 20 MG tablet  Take 1 tablet (20 mg) by mouth at bedtime 90 tablet 3    venlafaxine (EFFEXOR XR) 75 MG 24 hr capsule TAKE 3 CAPSULES BY MOUTH EVERY  capsule 3    warfarin ANTICOAGULANT (COUMADIN) 2.5 MG tablet Take 1 tab (2.5mg) to 2 tabs (5mg) by mouth daily, as directed.  Adjust dose based on INR results. 270 tablet 1       Physical Exam:  /76 (BP Location: Left arm, Patient Position: Chair, Cuff Size: Adult Large)   Pulse 93   Wt 112.9 kg (249 lb)   SpO2 94%   BMI 35.73 kg/m    Gen: awake, alert, oriented, no distress  HEENT: nasal turbinates are unremarkable, no oropharyngeal lesions, no cervical or supraclavicular lymphadenopathy  CV: RRR, no M/G/R  Resp: CTAB, no focal crackles or wheezes  Skin: no apparent rashes  Ext: no cyanosis, clubbing or edema  Neuro: alert, nonfocal    Labs:  reviewed    Imaging studies:  EXAM: XR CHEST 2 VIEWS  LOCATION: [facility]  DATE: 8/4/2023     INDICATION: Productive cough.  COMPARISON: Chest x-ray 2 views 10/31/2022 at 1447 hours.                                                                      IMPRESSION: Sternotomy and aortic valve replacement. Cardiac size approaches upper limits of normal with normal pulmonary vascularity. Elevation of the right hemidiaphragm. Both lungs are clear. No adenopathy or effusion. Reverse left shoulder   arthroplasty. Degenerative changes right shoulder and the thoracic spine. No significant interval change.    Echo from [facility], July 2023  Final Conclusion Previous Study: 12/8/2020    1. Borderline left ventricular chamber enlargement.Normal left ventricular wall thickness.         Normal left ventricular systolic function. Estimated left ventricular ejection fraction is 55-60%.         No regional wall motion abnormalities.    2.Status post 23-mm [facility] aortic mechanical prosthetic valve (1/2017).        Aortic prosthetic systolic mean gradient is 8 mmHg.Aortic prosthetic systolic peak  velocity is 1.9 m/sec.        Aortic prosthesis effective orifice area by Doppler is 1.5 cm .Aortic prosthesis dimensionless index is 0.39.        No aortic prosthetic or shaylee-prosthetic regurgitation.    3.Normal right ventricular size and systolic function.       Right ventricular systolic pressure cannot be estimated due to inability to detect peak tricuspid regurgitation Doppler velocity.    4.No pericardial effusion.    5.Normal inferior vena cava with normal inspiratory collapse.         Pulmonary Function Testing  From 2017:  FEV1 1.73L 58%  FVC 49%  Ratio 0.9  No BD response  TLC 4.14L, 69%  RV 79%  DLco 82% ivan for hgb  Flow volume curve appears normal.

## 2024-08-16 NOTE — LETTER
8/16/2024      Eileen Donald  [address] [place] [address]      Dear Colleague,    Thank you for referring your patient, Eileen Donald, to the [facility]. Please see a copy of my visit note below.    Pulmonary Clinic Follow-up Visit    Assessment and Plan:   68F with a history of BAV, aortic valve stenosis s/p mechanical aortic valve replacement in 2017, HTN, pulmonary HTN, chart hx of COPD vs. asthma, DM, relapsing remitting MS, PAF on coumadin, morbid obesity, depression, presents for follow up of asthma and KHAN. PFTs in 2017 showed nonspecific reductions in spirometry and some restriction, likely due to body habitus. Unclear if she actually has asthma. She is not having significant respiratory symptoms. The KHAN is likely due to deconditioning and obesity. We discussed enrolling in pulmonary rehab to improve exercise tolerance. Otherwise her lung exam and SpO2 are acceptable today.    Recommendations:  - continue albuterol as needed  - supplemental O2 at night, to keep SpO2 92% or greater  - check SpO2 during exertion to see if she has any desaturation events. She should use O2 if the SpO2 goes below 88%.  - did not address sleep medicine issues today  - consider pulmonary rehab. She will let me know if she wants me to place a referral.   - encouraged her to exercise and remain active as able.   - UTD with vaccinations.    Follow up in 6 months.     The longitudinal plan of care for the diagnosis(es)/condition(s) as documented were addressed during this visit. Due to the added complexity in care, I will continue to support Marilee in the subsequent management and with ongoing continuity of care.     Aftab Barboza MD (Avi)  [facility] ([facility])  Clinic [phone]  Fax [phone]      CCx: chronic respiratory failure with hypoxemia, pulmonary hypertension, dyspnea on exertion    HPI: Interim history: I last saw Marilee on  5/18/2023. Since that time, she reports she's doing OK. She's had multiple surgeries. Uses proair rarely. She is having dyspnea on exertion. She came in a wheelchair today due to hip pain.  No cough or hemoptysis. Having some PND symptoms.     ROS:  A 12-system review was obtained and was negative with the exception of the symptoms endorsed in the history of present illness.    PMH:  Past Medical History:   Diagnosis Date     Anxiety      Aortic valve replaced 2/15/2017     Aortic valve stenosis 01/24/2017     Asthma      Asthma     Created by Conversion      Chronic shortness of breath      COPD (chronic obstructive pulmonary disease) (H)      Coronary artery disease      Depression      Diabetes mellitus (H)     borderline     Essential hypertension     Created by Conversion  Replacement Utility updated for latest IMO load     History of blood clots     PE     History of pulmonary embolism      HTN (hypertension)      Multiple sclerosis (H)      Multiple sclerosis (H)     Created by Conversion      Obesity      Obesity 10/29/2015     Osteoarthritis      Oxygen dependent      Panic attacks      PONV (postoperative nausea and vomiting)      Pre-diabetes      Pulmonary embolism (H)      Sleep apnea     borderline       PSH:  Past Surgical History:   Procedure Laterality Date     aortic valve surgery       COLONOSCOPY N/A 12/23/2021    Procedure: COLONOSCOPY WITH POLYPECTOMIES;  Surgeon: Tito Suarez MD;  Location: [facility]     COLONOSCOPY W/ BIOPSIES N/A 3/22/2021    Procedure: COLONOSCOPY WITH BIOPSY AND POLYPECTOMY;  Surgeon: Sam Weems MD;  Location: [facility];  Service: Gastroenterology     IR LUMBAR EPIDURAL STEROID INJECTION  1/16/2007     MAMMOPLASTY REDUCTION  1994     OTHER SURGICAL HISTORY  01/24/2017    mechanical aortic prosthesis     RELEASE CARPAL TUNNEL       ZZC RECONSTR TOTAL SHOULDER IMPLANT Left 4/10/2019    Procedure: LEFT REVERSE TOTAL SHOULDER ARTHROPLASTY;  Surgeon:  Luis Antonio Telles MD;  Location: [facility] OR;  Service: Orthopedics       Allergies:  Allergies   Allergen Reactions     Morphine Visual Disturbance and Other (See Comments)     hallicinations       Sulfa Antibiotics Hives and Nausea and Vomiting     Azithromycin Nausea and Vomiting and Rash     Doxycycline Rash     Latex Rash       Family HX:  Family History   Problem Relation Age of Onset     Snoring Mother      Snoring Father      Sleep Apnea Sister      Coronary Artery Disease Father      Coronary Artery Disease Sister      Coronary Artery Disease Brother      Breast Cancer No family hx of        Social Hx:  Social History     Socioeconomic History     Marital status:      Spouse name: Not on file     Number of children: Not on file     Years of education: Not on file     Highest education level: Not on file   Occupational History     Not on file   Tobacco Use     Smoking status: Never     Passive exposure: Past     Smokeless tobacco: Never   Vaping Use     Vaping status: Never Used   Substance and Sexual Activity     Alcohol use: No     Drug use: No     Sexual activity: Not on file   Other Topics Concern     Not on file   Social History Narrative    Lives with sister in [place] due to help with medical issues.   and daughter live in [place].  Plans to return to be with family hopefully soon.  For MS- last used medications about 3 months. In remission. Needs to be back on medications as  Neurologist has left.    Drew Hahn MD  5/29/2018    Daughters Cecille and Maci Molina are my patients.    Surgery on 12/2023 for colonoscopy.    The following guidelines were recommended by anticoagulation team and I agree and these are also shared with patient     Recommended goal for patient with AVR plus thrombotic risk factors 2.5-3.5 per ACC/AHA valvular heart guideline      Pre-Procedure     Hold warfarin for 4 days, until after procedure starting: Sunday 12/19     Enoxaparin  (Lovenox) 120 mg subq Q 12 hrs (1 mg/kg Q 12 hrs for CrCl greater than 60 and BMI less than 40     Start enoxaparin Tue 12/21 AM     Last dose of enoxaparin prior to procedure: Wed 12/22 AM  (24 hours prior to procedure)     Post-Procedure     Resume warfarin dose if okay with provider doing procedure on night of procedure, 12/23 PM  5 mg x 1 then resume home dose     Resume  enoxaparin (Lovenox)  24 hrs post procedure when okay with provider doing procedure. Call clinic prior to restarting if polyps removed. Continue until INR therapeutic per protocol. Recheck INR 4-6 days after resuming warfarin      Social Determinants of Health     Financial Resource Strain: Low Risk  (3/19/2024)    [email or web address], [facility]    Financial Resource Strain      Difficulty of Paying Living Expenses: 3      Difficulty of Paying Living Expenses: Not on file   Food Insecurity: No Food Insecurity (3/19/2024)    Received from [facility], [facility]    Food Insecurity      Worried About Running Out of Food in the Last Year: 1   Transportation Needs: No Transportation Needs (3/19/2024)    Received from [facility], [facility]    Transportation Needs      Lack of Transportation (Medical): 1   Recent Concern: Transportation Needs - High Risk (12/25/2023)    Transportation Needs      Within the past 12 months, has lack of transportation kept you from medical appointments, getting your medicines, non-medical meetings or appointments, work, or from getting things that you need?: Yes   Physical Activity: Not on file   Stress: Not on file   Social Connections: Socially Integrated (3/19/2024)    [email or web address], [facility]    Social Connections      Frequency of  Communication with Friends and Family: 0   Interpersonal Safety: Low Risk  (8/6/2024)    Interpersonal Safety      Do you feel physically and emotionally safe where you currently live?: Yes      Within the past 12 months, have you been hit, slapped, kicked or otherwise physically hurt by someone?: No      Within the past 12 months, have you been humiliated or emotionally abused in other ways by your partner or ex-partner?: No   Housing Stability: Low Risk  (3/19/2024)    Received from [facility], [facility]    Housing Stability      Unable to Pay for Housing in the Last Year: 1       Current Meds:  Current Outpatient Medications   Medication Sig Dispense Refill     acetaminophen (TYLENOL) 325 MG tablet Take 325-650 mg by mouth every 6 hours as needed        acyclovir (ZOVIRAX) 400 MG tablet Take 400 mg by mouth 2 times daily        albuterol (PROAIR HFA/PROVENTIL HFA/VENTOLIN HFA) 108 (90 Base) MCG/ACT inhaler Inhale 2 puffs into the lungs every 6 hours as needed for shortness of breath or wheezing 18 g 11     aspirin (ASA) 81 MG chewable tablet Take 81 mg by mouth daily        buPROPion (WELLBUTRIN XL) 150 MG 24 hr tablet Take 1 tablet (150 mg) by mouth daily 90 tablet 3     esomeprazole (NEXIUM) 20 MG DR capsule Take 1 capsule (20 mg) by mouth every morning (before breakfast) 90 capsule 3     furosemide (LASIX) 40 MG tablet Take 1.5 tablets (60 mg) by mouth daily (Patient taking differently: Take 40 mg by mouth daily) 135 tablet 0     lisinopril (ZESTRIL) 10 MG tablet Take 1 tablet (10 mg) by mouth daily 90 tablet 1     loratadine (CLARITIN) 10 MG tablet Take 1 tablet (10 mg) by mouth daily 30 tablet 3     metFORMIN (GLUCOPHAGE XR) 500 MG 24 hr tablet Take 1 tablet (500 mg) by mouth daily 90 tablet 0     metoprolol tartrate (LOPRESSOR) 25 MG tablet Take 1 tablet (25 mg) by mouth 2 times daily 180 tablet 2     montelukast (SINGULAIR) 10 MG tablet  Take 1 tablet (10 mg) by mouth at bedtime 90 tablet 3     Ocrelizumab (OCREVUS IV) Inject into the vein every 6 months       pantoprazole (PROTONIX) 20 MG EC tablet Take 1 tablet (20 mg) by mouth daily 90 tablet 0     potassium chloride ER (MICRO-K) 10 MEQ CR capsule Take 10 mEq by mouth daily        simvastatin (ZOCOR) 20 MG tablet Take 1 tablet (20 mg) by mouth at bedtime 90 tablet 3     venlafaxine (EFFEXOR XR) 75 MG 24 hr capsule TAKE 3 CAPSULES BY MOUTH EVERY  capsule 3     warfarin ANTICOAGULANT (COUMADIN) 2.5 MG tablet Take 1 tab (2.5mg) to 2 tabs (5mg) by mouth daily, as directed.  Adjust dose based on INR results. 270 tablet 1       Physical Exam:  /76 (BP Location: Left arm, Patient Position: Chair, Cuff Size: Adult Large)   Pulse 93   Wt 112.9 kg (249 lb)   SpO2 94%   BMI 35.73 kg/m    Gen: awake, alert, oriented, no distress  HEENT: nasal turbinates are unremarkable, no oropharyngeal lesions, no cervical or supraclavicular lymphadenopathy  CV: RRR, no M/G/R  Resp: CTAB, no focal crackles or wheezes  Skin: no apparent rashes  Ext: no cyanosis, clubbing or edema  Neuro: alert, nonfocal    Labs:  reviewed    Imaging studies:  EXAM: XR CHEST 2 VIEWS  LOCATION: [facility]  DATE: 8/4/2023     INDICATION: Productive cough.  COMPARISON: Chest x-ray 2 views 10/31/2022 at 1447 hours.                                                                      IMPRESSION: Sternotomy and aortic valve replacement. Cardiac size approaches upper limits of normal with normal pulmonary vascularity. Elevation of the right hemidiaphragm. Both lungs are clear. No adenopathy or effusion. Reverse left shoulder   arthroplasty. Degenerative changes right shoulder and the thoracic spine. No significant interval change.    Echo from [facility], July 2023  Final Conclusion Previous Study: 12/8/2020    1. Borderline left ventricular chamber enlargement.Normal left ventricular wall thickness.          Normal left ventricular systolic function. Estimated left ventricular ejection fraction is 55-60%.         No regional wall motion abnormalities.    2.Status post 23-mm [facility] [employer] [facility] aortic mechanical prosthetic valve (1/2017).        Aortic prosthetic systolic mean gradient is 8 mmHg.Aortic prosthetic systolic peak velocity is 1.9 m/sec.        Aortic prosthesis effective orifice area by Doppler is 1.5 cm .Aortic prosthesis dimensionless index is 0.39.        No aortic prosthetic or shaylee-prosthetic regurgitation.    3.Normal right ventricular size and systolic function.       Right ventricular systolic pressure cannot be estimated due to inability to detect peak tricuspid regurgitation Doppler velocity.    4.No pericardial effusion.    5.Normal inferior vena cava with normal inspiratory collapse.         Pulmonary Function Testing  From 2017:  FEV1 1.73L 58%  FVC 49%  Ratio 0.9  No BD response  TLC 4.14L, 69%  RV 79%  DLco 82% ivan for hgb  Flow volume curve appears normal.     Again, thank you for allowing me to participate in the care of your patient.        Sincerely,        Aftab Barboza MD

## 2024-08-19 ENCOUNTER — TRANSFERRED RECORDS (OUTPATIENT)
Dept: HEALTH INFORMATION MANAGEMENT | Facility: CLINIC | Age: 69
End: 2024-08-19
Payer: COMMERCIAL

## 2024-08-21 SDOH — HEALTH STABILITY: PHYSICAL HEALTH: ON AVERAGE, HOW MANY MINUTES DO YOU ENGAGE IN EXERCISE AT THIS LEVEL?: 0 MIN

## 2024-08-21 SDOH — HEALTH STABILITY: PHYSICAL HEALTH: ON AVERAGE, HOW MANY DAYS PER WEEK DO YOU ENGAGE IN MODERATE TO STRENUOUS EXERCISE (LIKE A BRISK WALK)?: 0 DAYS

## 2024-08-21 ASSESSMENT — ASTHMA QUESTIONNAIRES
QUESTION_1 LAST FOUR WEEKS HOW MUCH OF THE TIME DID YOUR ASTHMA KEEP YOU FROM GETTING AS MUCH DONE AT WORK, SCHOOL OR AT HOME: SOME OF THE TIME
QUESTION_4 LAST FOUR WEEKS HOW OFTEN HAVE YOU USED YOUR RESCUE INHALER OR NEBULIZER MEDICATION (SUCH AS ALBUTEROL): TWO OR THREE TIMES PER WEEK
QUESTION_2 LAST FOUR WEEKS HOW OFTEN HAVE YOU HAD SHORTNESS OF BREATH: THREE TO SIX TIMES A WEEK
QUESTION_5 LAST FOUR WEEKS HOW WOULD YOU RATE YOUR ASTHMA CONTROL: SOMEWHAT CONTROLLED
ACT_TOTALSCORE: 17
ACT_TOTALSCORE: 17
QUESTION_3 LAST FOUR WEEKS HOW OFTEN DID YOUR ASTHMA SYMPTOMS (WHEEZING, COUGHING, SHORTNESS OF BREATH, CHEST TIGHTNESS OR PAIN) WAKE YOU UP AT NIGHT OR EARLIER THAN USUAL IN THE MORNING: NOT AT ALL

## 2024-08-21 ASSESSMENT — SOCIAL DETERMINANTS OF HEALTH (SDOH): HOW OFTEN DO YOU GET TOGETHER WITH FRIENDS OR RELATIVES?: NEVER

## 2024-08-22 ENCOUNTER — OFFICE VISIT (OUTPATIENT)
Dept: FAMILY MEDICINE | Facility: CLINIC | Age: 69
End: 2024-08-22
Payer: COMMERCIAL

## 2024-08-22 VITALS
RESPIRATION RATE: 16 BRPM | DIASTOLIC BLOOD PRESSURE: 64 MMHG | WEIGHT: 249 LBS | OXYGEN SATURATION: 95 % | HEART RATE: 60 BPM | BODY MASS INDEX: 35.65 KG/M2 | SYSTOLIC BLOOD PRESSURE: 110 MMHG | HEIGHT: 70 IN

## 2024-08-22 DIAGNOSIS — E78.5 HYPERLIPIDEMIA, UNSPECIFIED HYPERLIPIDEMIA TYPE: ICD-10-CM

## 2024-08-22 DIAGNOSIS — G35 MULTIPLE SCLEROSIS (H): ICD-10-CM

## 2024-08-22 DIAGNOSIS — Z00.00 ENCOUNTER FOR MEDICARE ANNUAL WELLNESS EXAM: Primary | ICD-10-CM

## 2024-08-22 DIAGNOSIS — H91.90 DECREASED HEARING, UNSPECIFIED LATERALITY: ICD-10-CM

## 2024-08-22 DIAGNOSIS — E11.69 TYPE 2 DIABETES MELLITUS WITH OTHER SPECIFIED COMPLICATION, UNSPECIFIED WHETHER LONG TERM INSULIN USE (H): ICD-10-CM

## 2024-08-22 DIAGNOSIS — Z95.2 S/P AVR (AORTIC VALVE REPLACEMENT): ICD-10-CM

## 2024-08-22 DIAGNOSIS — R06.09 DOE (DYSPNEA ON EXERTION): ICD-10-CM

## 2024-08-22 LAB
HBA1C MFR BLD: 6.3 % (ref 0–5.6)
HOLD SPECIMEN: NORMAL

## 2024-08-22 PROCEDURE — 83036 HEMOGLOBIN GLYCOSYLATED A1C: CPT | Performed by: FAMILY MEDICINE

## 2024-08-22 PROCEDURE — G0438 PPPS, INITIAL VISIT: HCPCS | Performed by: FAMILY MEDICINE

## 2024-08-22 PROCEDURE — 80061 LIPID PANEL: CPT | Performed by: FAMILY MEDICINE

## 2024-08-22 PROCEDURE — V5008 HEARING SCREENING: HCPCS | Performed by: FAMILY MEDICINE

## 2024-08-22 PROCEDURE — 36415 COLL VENOUS BLD VENIPUNCTURE: CPT | Performed by: FAMILY MEDICINE

## 2024-08-22 PROCEDURE — 99214 OFFICE O/P EST MOD 30 MIN: CPT | Mod: 25 | Performed by: FAMILY MEDICINE

## 2024-08-22 RX ORDER — FUROSEMIDE 40 MG
40 TABLET ORAL DAILY
Status: SHIPPED
Start: 2024-08-22

## 2024-08-22 NOTE — PROGRESS NOTES
Preventive Care Visit  [facility]  Drew Hahn MD, Family Medicine  Aug 22, 2024      Assessment & Plan     ICD-10-CM    1. Encounter for Medicare annual wellness exam  Z00.00       2. Type 2 diabetes mellitus with other specified complication, unspecified whether long term insulin use (H)  E11.69 HEMOGLOBIN A1C     Lipid panel     HEMOGLOBIN A1C     Lipid panel      3. S/P AVR (aortic valve replacement)  Z95.2 furosemide (LASIX) 40 MG tablet      4. KHAN (dyspnea on exertion)  R06.09       5. Decreased hearing, unspecified laterality  H91.90 HEARING SCREENING     Adult Audiology  Referral      6. Hyperlipidemia, unspecified hyperlipidemia type  E78.5       7. Multiple Sclerosis  G35         Patient is here today for 4 Medicare annual wellness visit..  Following issues addressed  1: Type 2 diabetes.  Hemoglobin remained stable.  Continue current med  2: Status post AVR: On blood thinner.  In addition she is on Lasix 40 mg from cardiology.  Reviewed her chart and she has not seen cardiology since 2021 at [facility].  I asked her if she would like to be [facility] but she would like to follow-up with her previous cardiologist.  Encouraged to seek appointment and numbers provided in after visit summary  3: Dyspnea of exertion: This is likely deconditioning.  She has been seen by pulmonary and this is not related to her lung per pulmonologist.  Chart is reviewed.  Discussed this with patient.  She would like to get her hip replacement before pursuing any physical therapy.  Discussed that physical therapy would likely benefit and have better outcome with surgery also.  She had cervical spinal surgery earlier this year.  4: Decreased hearing: Failed hearing screening.  Refer to audiology.  5: Hyperlipidemia: On statin.  6: Multiple sclerosis: Being followed with neurology.  This is stable.      BMI  Estimated body mass index is 35.73 kg/m  as calculated from the  "following:    Height as of this encounter: 1.778 m (5' 10\").    Weight as of this encounter: 112.9 kg (249 lb).       Counseling  Appropriate preventive services were addressed with this patient via screening, questionnaire, or discussion as appropriate for fall prevention, nutrition, physical activity, Tobacco-use cessation, social engagement, weight loss and cognition.  Checklist reviewing preventive services available has been given to the patient.  Reviewed patient's diet, addressing concerns and/or questions.   Patient is at risk for social isolation and has been provided with information about the benefit of social connection.   The patient was instructed to see the dentist every 6 months.   Updated plan of care.  Patient reported difficulty with activities of daily living were addressed today.Patient reported safety concerns were addressed today.Addressed any concerns about safety while driving.  The patient was provided with written information regarding signs of hearing loss.   Information on urinary incontinence and treatment options given to patient.   The patient's PHQ-9 score is consistent with moderate depression. She was provided with information regarding depression.       MEDICATIONS:  Continue current medications without change  Regular exercise  See Patient Instructions    William Hunt is a 68 year old, presenting for the following:  Wellness Visit (Pt is fasting today, A1C done today. Pt is requesting a name for a Urologist. )        8/22/2024    10:30 AM   Additional Questions   Roomed by Odalis Salas CMA       Answers submitted by the patient for this visit:  Patient Health Questionnaire (Submitted on 8/21/2024)  If you checked off any problems, how difficult have these problems made it for you to do your work, take care of things at home, or get along with other people?: Extremely difficult  PHQ9 TOTAL SCORE: 11    Health Care Directive  Patient does not have a Health Care Directive or " Living Will:     HPI        8/21/2024   General Health   How would you rate your overall physical health? (!) FAIR   Feel stress (tense, anxious, or unable to sleep) To some extent      (!) STRESS CONCERN      8/21/2024   Nutrition   Diet: Low salt            8/21/2024   Exercise   Days per week of moderate/strenous exercise 0 days   Average minutes spent exercising at this level 0 min      (!) EXERCISE CONCERN      8/21/2024   Social Factors   Frequency of gathering with friends or relatives Never   Worry food won't last until get money to buy more No   Food not last or not have enough money for food? No   Do you have housing? (Housing is defined as stable permanent housing and does not include staying ouside in a car, in a tent, in an abandoned building, in an overnight shelter, or couch-surfing.) Yes   Are you worried about losing your housing? No   Lack of transportation? Yes   Unable to get utilities (heat,electricity)? No       (!) TRANSPORTATION CONCERN PRESENT(!) SOCIAL CONNECTIONS CONCERN      8/22/2024   Fall Risk   Reason Gait Speed Test Not Completed Patient does not tolerate an upright or standing position (e.g. wheelchair)             8/21/2024   Activities of Daily Living- Home Safety   Needs help with the following daily activites Transportation    Shopping    Preparing meals    Housework    Bathing    Laundry    Dressing   Safety concerns in the home No grab bars in the bathroom       Multiple values from one day are sorted in reverse-chronological order         8/21/2024   Dental   Dentist two times every year? (!) NO            8/21/2024   Hearing Screening   Hearing concerns? (!) I FEEL THAT PEOPLE ARE MUMBLING OR NOT SPEAKING CLEARLY.    (!) I NEED TO ASK PEOPLE TO SPEAK UP OR REPEAT THEMSELVES.    (!) IT'S HARD TO FOLLOW A CONVERSATION IN A NOISY RESTAURANT OR CROWDED ROOM.    (!) TROUBLE UNDESTANDING A SPEAKER IN A PUBLIC MEETING OR [facility] SERVICE.    (!) TROUBLE UNDERSTANDING SOFT OR  WHISPERED SPEECH.       Multiple values from one day are sorted in reverse-chronological order         8/21/2024   Driving Risk Screening   Patient/family members have concerns about driving (!) YES             8/21/2024   General Alertness/Fatigue Screening   Have you been more tired than usual lately? No            8/21/2024   Urinary Incontinence Screening   Bothered by leaking urine in past 6 months Yes            8/21/2024   TB Screening   Were you born outside of the [place]? No          Today's PHQ-9 Score:       8/21/2024    11:16 AM   PHQ-9 SCORE   PHQ-9 Total Score MyChart 11 (Moderate depression)   PHQ-9 Total Score 11         8/21/2024   Substance Use   Alcohol more than 3/day or more than 7/wk Not Applicable   Do you have a current opioid prescription? No   How severe/bad is pain from 1 to 10? 2/10   Do you use any other substances recreationally? No        Social History     Tobacco Use    Smoking status: Never     Passive exposure: Past    Smokeless tobacco: Never   Vaping Use    Vaping status: Never Used   Substance Use Topics    Alcohol use: No    Drug use: No           6/1/2023   LAST FHS-7 RESULTS   1st degree relative breast or ovarian cancer No   Any relative bilateral breast cancer No   Any male have breast cancer No   Any ONE woman have BOTH breast AND ovarian cancer No   Any woman with breast cancer before 50yrs No   2 or more relatives with breast AND/OR ovarian cancer No   2 or more relatives with breast AND/OR bowel cancer No           Mammogram Screening - Mammogram every 1-2 years updated in Health Maintenance based on mutual decision making      History of abnormal Pap smear: No - age 30- 64 PAP with HPV every 5 years recommended        11/9/2016     8:29 AM   PAP / HPV   PAP Negative for squamous intraepithelial lesion or malignancy  Electronically signed by Amalia Shannon CT (ASCP) on 11/14/2016 at  3:10 PM        ASCVD Risk   The 10-year ASCVD risk score (Tamera ROCHA, et al.,  2019) is: 12.5%    Values used to calculate the score:      Age: 68 years      Sex: Female      Is Non- : No      Diabetic: Yes      Tobacco smoker: No      Systolic Blood Pressure: 110 mmHg      Is BP treated: Yes      HDL Cholesterol: 68 mg/dL      Total Cholesterol: 221 mg/dL            Reviewed and updated as needed this visit by Provider   Tobacco  Allergies  Meds  Problems  Med Hx  Surg Hx  Fam Hx            Past Medical History:   Diagnosis Date    Anxiety     Aortic valve replaced 2/15/2017    Aortic valve stenosis 01/24/2017    Asthma     Asthma     Created by Conversion     Chronic shortness of breath     COPD (chronic obstructive pulmonary disease) (H)     Coronary artery disease     Depression     Diabetes mellitus (H)     borderline    Essential hypertension     Created by Conversion  Replacement Utility updated for latest IMO load    History of blood clots     PE    History of pulmonary embolism     HTN (hypertension)     Multiple sclerosis (H)     Multiple sclerosis (H)     Created by Conversion     Obesity     Obesity 10/29/2015    Osteoarthritis     Oxygen dependent     Panic attacks     PONV (postoperative nausea and vomiting)     Pre-diabetes     Pulmonary embolism (H)     Sleep apnea     borderline     Past Surgical History:   Procedure Laterality Date    aortic valve surgery      COLONOSCOPY N/A 12/23/2021    Procedure: COLONOSCOPY WITH POLYPECTOMIES;  Surgeon: Tito Suarez MD;  Location: [facility]    COLONOSCOPY W/ BIOPSIES N/A 3/22/2021    Procedure: COLONOSCOPY WITH BIOPSY AND POLYPECTOMY;  Surgeon: Sam Weems MD;  Location: [facility] OR;  Service: Gastroenterology    IR LUMBAR EPIDURAL STEROID INJECTION  1/16/2007    MAMMOPLASTY REDUCTION  1994    OTHER SURGICAL HISTORY  01/24/2017    mechanical aortic prosthesis    RELEASE CARPAL TUNNEL      ZZC RECONSTR TOTAL SHOULDER IMPLANT Left 4/10/2019    Procedure: LEFT REVERSE TOTAL SHOULDER  ARTHROPLASTY;  Surgeon: Luis Antonio Telles MD;  Location: [facility];  Service: Orthopedics     BP Readings from Last 3 Encounters:   08/22/24 110/64   08/16/24 114/76   08/06/24 107/58    Wt Readings from Last 3 Encounters:   08/22/24 112.9 kg (249 lb)   08/16/24 112.9 kg (249 lb)   08/06/24 113 kg (249 lb 3.2 oz)                  Patient Active Problem List   Diagnosis    Depression    Asthma    Benign essential hypertension    Multiple Sclerosis    Hyperlipidemia, unspecified hyperlipidemia type    Impaired Glucose Tolerance Test    Dysphagia    Benign Adenomatous Polyp Of The Large Intestine    History of pulmonary embolism    Morbid obesity (H)    Generalized anxiety disorder    Primary osteoarthritis of both hands    Polyarthralgia    Aortic valve replaced    Controlled substance agreement signed    Heart valve replaced    Closed 3-part fracture of proximal humerus with delayed healing, left    Pulmonary hypertension (H)    Paroxysmal atrial fibrillation (H)    Dyslipidemia    Dyspnea on exertion    Hypertension    Postoperative anemia due to acute blood loss    Pre-diabetes    Stress hyperglycemia    Recurrent major depressive disorder, in partial remission (H24)    Type 2 diabetes mellitus with other specified complication, unspecified whether long term insulin use (H)    Acute bacterial conjunctivitis of both eyes    Community acquired pneumonia, unspecified laterality    Osteoarthrosis    S/P AVR (aortic valve replacement)    Sleep apnea    H/O aortic valve replacement    Encounter for long-term methadone use    History of fall    Leg weakness, bilateral    Muscle spasm    Neurogenic bladder    Other symptoms and signs involving cognitive functions and awareness    Tremor    Unsteady gait    Relapsing remitting multiple sclerosis (H)    Elevated MCV    Hip pain, right    Long term (current) use of anticoagulants    Spinal stenosis of cervical region    Chronic respiratory failure with hypoxia (H)      Past Surgical History:   Procedure Laterality Date    aortic valve surgery      COLONOSCOPY N/A 12/23/2021    Procedure: COLONOSCOPY WITH POLYPECTOMIES;  Surgeon: Tito Suarez MD;  Location: [facility] OR    COLONOSCOPY W/ BIOPSIES N/A 3/22/2021    Procedure: COLONOSCOPY WITH BIOPSY AND POLYPECTOMY;  Surgeon: Sam Weems MD;  Location: [facility] OR;  Service: Gastroenterology    IR LUMBAR EPIDURAL STEROID INJECTION  1/16/2007    MAMMOPLASTY REDUCTION  1994    OTHER SURGICAL HISTORY  01/24/2017    mechanical aortic prosthesis    RELEASE CARPAL TUNNEL      ZZC RECONSTR TOTAL SHOULDER IMPLANT Left 4/10/2019    Procedure: LEFT REVERSE TOTAL SHOULDER ARTHROPLASTY;  Surgeon: Luis Antonio Telles MD;  Location: [facility] OR;  Service: Orthopedics       Social History     Tobacco Use    Smoking status: Never     Passive exposure: Past    Smokeless tobacco: Never   Substance Use Topics    Alcohol use: No     Family History   Problem Relation Age of Onset    Snoring Mother     Snoring Father     Sleep Apnea Sister     Coronary Artery Disease Father     Coronary Artery Disease Sister     Coronary Artery Disease Brother     Breast Cancer No family hx of          Current Outpatient Medications   Medication Sig Dispense Refill    acetaminophen (TYLENOL) 325 MG tablet Take 325-650 mg by mouth every 6 hours as needed       acyclovir (ZOVIRAX) 400 MG tablet Take 400 mg by mouth 2 times daily       albuterol (PROAIR HFA/PROVENTIL HFA/VENTOLIN HFA) 108 (90 Base) MCG/ACT inhaler Inhale 2 puffs into the lungs every 6 hours as needed for shortness of breath or wheezing 18 g 11    aspirin (ASA) 81 MG chewable tablet Take 81 mg by mouth daily       buPROPion (WELLBUTRIN XL) 150 MG 24 hr tablet Take 1 tablet (150 mg) by mouth daily 90 tablet 3    esomeprazole (NEXIUM) 20 MG DR capsule Take 1 capsule (20 mg) by mouth every morning (before breakfast) 90 capsule 3    furosemide (LASIX) 40 MG tablet Take 1 tablet (40  mg) by mouth daily.      lisinopril (ZESTRIL) 10 MG tablet Take 1 tablet (10 mg) by mouth daily 90 tablet 1    loratadine (CLARITIN) 10 MG tablet Take 1 tablet (10 mg) by mouth daily 30 tablet 3    metFORMIN (GLUCOPHAGE XR) 500 MG 24 hr tablet Take 1 tablet (500 mg) by mouth daily 90 tablet 0    metoprolol tartrate (LOPRESSOR) 25 MG tablet Take 1 tablet (25 mg) by mouth 2 times daily 180 tablet 2    montelukast (SINGULAIR) 10 MG tablet Take 1 tablet (10 mg) by mouth at bedtime 90 tablet 3    Ocrelizumab (OCREVUS IV) Inject into the vein every 6 months      pantoprazole (PROTONIX) 20 MG EC tablet Take 1 tablet (20 mg) by mouth daily 90 tablet 0    potassium chloride ER (MICRO-K) 10 MEQ CR capsule Take 10 mEq by mouth daily       simvastatin (ZOCOR) 20 MG tablet Take 1 tablet (20 mg) by mouth at bedtime 90 tablet 3    venlafaxine (EFFEXOR XR) 75 MG 24 hr capsule TAKE 3 CAPSULES BY MOUTH EVERY  capsule 3    warfarin ANTICOAGULANT (COUMADIN) 2.5 MG tablet Take 1 tab (2.5mg) to 2 tabs (5mg) by mouth daily, as directed.  Adjust dose based on INR results. 270 tablet 1     Current providers sharing in care for this patient include:  Patient Care Team:  Drew Hahn MD as PCP - General (Family Medicine)  Drew Hahn MD as Assigned PCP  Aftab Barboza MD as Assigned Pulmonology Provider  Gary Martinez RPH as Pharmacist (Pharmacist)  Gary Martinez RPH as Assigned [place] Pharmacist  Bere Mcghee RPH as [place] Pharmacist (Pharmacist)    The following health maintenance items are reviewed in Epic and correct as of today:  Health Maintenance   Topic Date Due    COVID-19 Vaccine (9 - 2023-24 season) 06/18/2024    INFLUENZA VACCINE (1) 09/01/2024    A1C  11/22/2024    COLORECTAL CANCER SCREENING  12/23/2024    DIABETIC FOOT EXAM  12/28/2024    BMP  02/19/2025    MICROALBUMIN  02/19/2025    ASTHMA CONTROL TEST  02/22/2025    PHQ-9  02/22/2025    EYE EXAM  04/12/2025    ANNUAL REVIEW OF HM ORDERS   "05/21/2025    ASTHMA ACTION PLAN  05/21/2025    LIPID  05/31/2025    MAMMO SCREENING  06/01/2025    MEDICARE ANNUAL WELLNESS VISIT  08/22/2025    FALL RISK ASSESSMENT  08/22/2025    Pneumococcal Vaccine: 65+ Years (4 of 4 - PPSV23 or PCV20) 02/01/2026    DTAP/TDAP/TD IMMUNIZATION (6 - Td or Tdap) 09/13/2026    ADVANCE CARE PLANNING  02/19/2029    DEXA  04/26/2037    HEPATITIS C SCREENING  Completed    DEPRESSION ACTION PLAN  Completed    ZOSTER IMMUNIZATION  Completed    RSV VACCINE (Pregnancy & 60+)  Completed    HPV IMMUNIZATION  Aged Out    MENINGITIS IMMUNIZATION  Aged Out    RSV MONOCLONAL ANTIBODY  Aged Out    PAP  Discontinued         Review of Systems  Constitutional, neuro, ENT, endocrine, pulmonary, cardiac, gastrointestinal, genitourinary, musculoskeletal, integument and psychiatric systems are negative, except as otherwise noted.     Objective    Exam  /64 (BP Location: Left arm, Patient Position: Sitting, Cuff Size: Adult Large)   Pulse 60   Resp 16   Ht 1.778 m (5' 10\")   Wt 112.9 kg (249 lb)   SpO2 95%   BMI 35.73 kg/m     Estimated body mass index is 35.73 kg/m  as calculated from the following:    Height as of this encounter: 1.778 m (5' 10\").    Weight as of this encounter: 112.9 kg (249 lb).    Physical Exam  GENERAL: alert and no distress        8/22/2024   Mini Cog   Clock Draw Score 2 Normal   3 Item Recall 3 objects recalled   Mini Cog Total Score 5                 Signed Electronically by: Drew Hahn MD    "

## 2024-08-22 NOTE — PATIENT INSTRUCTIONS
Please call to schedule with Cardiology     Mazin Hall MD   [id number] [place] [address]   Phone: [phone]   Fax: [phone]          Patient Education   Preventive Care Advice   This is general advice given by our system to help you stay healthy. However, your care team may have specific advice just for you. Please talk to your care team about your preventive care needs.  Nutrition  Eat 5 or more servings of fruits and vegetables each day.  Try wheat bread, brown rice and whole grain pasta (instead of white bread, rice, and pasta).  Get enough calcium and vitamin D. Check the label on foods and aim for 100% of the RDA (recommended daily allowance).  Lifestyle  Exercise at least 150 minutes each week  (30 minutes a day, 5 days a week).  Do muscle strengthening activities 2 days a week. These help control your weight and prevent disease.  No smoking.  Wear sunscreen to prevent skin cancer.  Have a dental exam and cleaning every 6 months.  Yearly exams  See your health care team every year to talk about:  Any changes in your health.  Any medicines your care team has prescribed.  Preventive care, family planning, and ways to prevent chronic diseases.  Shots (vaccines)   HPV shots (up to age 26), if you've never had them before.  Hepatitis B shots (up to age 59), if you've never had them before.  COVID-19 shot: Get this shot when it's due.  Flu shot: Get a flu shot every year.  Tetanus shot: Get a tetanus shot every 10 years.  Pneumococcal, hepatitis A, and RSV shots: Ask your care team if you need these based on your risk.  Shingles shot (for age 50 and up)  General health tests  Diabetes screening:  Starting at age 35, Get screened for diabetes at least every 3 years.  If you are younger than age 35, ask your care team if you should be screened for diabetes.  Cholesterol test: At age 39, start having a cholesterol test every 5 years, or more often if advised.  Bone density scan (DEXA): At  age 50, ask your care team if you should have this scan for osteoporosis (brittle bones).  Hepatitis C: Get tested at least once in your life.  STIs (sexually transmitted infections)  Before age 24: Ask your care team if you should be screened for STIs.  After age 24: Get screened for STIs if you're at risk. You are at risk for STIs (including HIV) if:  You are sexually active with more than one person.  You don't use condoms every time.  You or a partner was diagnosed with a sexually transmitted infection.  If you are at risk for HIV, ask about PrEP medicine to prevent HIV.  Get tested for HIV at least once in your life, whether you are at risk for HIV or not.  Cancer screening tests  Cervical cancer screening: If you have a cervix, begin getting regular cervical cancer screening tests starting at age 21.  Breast cancer scan (mammogram): If you've ever had breasts, begin having regular mammograms starting at age 40. This is a scan to check for breast cancer.  Colon cancer screening: It is important to start screening for colon cancer at age 45.  Have a colonoscopy test every 10 years (or more often if you're at risk) Or, ask your provider about stool tests like a FIT test every year or Cologuard test every 3 years.  To learn more about your testing options, visit:   .  For help making a decision, visit:   https://bit.ly/eg74460.  Prostate cancer screening test: If you have a prostate, ask your care team if a prostate cancer screening test (PSA) at age 55 is right for you.  Lung cancer screening: If you are a current or former smoker ages 50 to 80, ask your care team if ongoing lung cancer screenings are right for you.  For informational purposes only. Not to replace the advice of your health care provider. Copyright   2023 [employer]. All rights reserved. Clinically reviewed by the [employer] [facility]. [employer] 847852 - REV 01/24.  Learning About Activities of Daily Living  What  are activities of daily living?     Activities of daily living (ADLs) are the basic self-care tasks you do every day. These include eating, bathing, dressing, and moving around.  As you age, and if you have health problems, you may find that it's harder to do some of these tasks. If so, your doctor can suggest ideas that may help.  To measure what kind of help you may need, your doctor will ask how well you are able to do ADLs. Let your doctor know if there are any tasks that you are having trouble doing. This is an important first step to getting help. And when you have the help you need, you can stay as independent as possible.  How will a doctor assess your ADLs?  Asking about ADLs is part of a routine health checkup your doctor will likely do as you age. Your health check might be done in a doctor's office, in your home, or at a hospital. The goal is to find out if you are having any problems that could make it hard to care for yourself or that make it unsafe for you to be on your own.  To measure your ADLs, your doctor will ask how hard it is for you to do routine tasks. Your doctor may also want to know if you have changed the way you do a task because of a health problem. Your doctor may watch how you:  Walk back and forth.  Keep your balance while you stand or walk.  Move from sitting to standing or from a bed to a chair.  Button or unbutton a shirt or sweater.  Remove and put on your shoes.  It's common to feel a little worried or anxious if you find you can't do all the things you used to be able to do. Talking with your doctor about ADLs is a way to make sure you're as safe as possible and able to care for yourself as well as you can. You may want to bring a caregiver, friend, or family member to your checkup. They can help you talk to your doctor.  Follow-up care is a key part of your treatment and safety. Be sure to make and go to all appointments, and call your doctor if you are having problems. It's  also a good idea to know your test results and keep a list of the medicines you take.  Current as of: October 24, 2023  Content Version: 14.1    3824-3281 [employer].   Care instructions adapted under license by your healthcare professional. If you have questions about a medical condition or this instruction, always ask your healthcare professional. [employer] disclaims any warranty or liability for your use of this information.    Preventing Falls: Care Instructions  Injuries and health problems such as trouble walking or poor eyesight can increase your risk of falling. So can some medicines. But there are things you can do to help prevent falls. You can exercise to get stronger. You can also arrange your home to make it safer.    Talk to your doctor about the medicines you take. Ask if any of them increase the risk of falls and whether they can be changed or stopped.   Try to exercise regularly. It can help improve your strength and balance. This can help lower your risk of falling.     Practice fall safety and prevention.    Wear low-heeled shoes that fit well and give your feet good support. Talk to your doctor if you have foot problems that make this hard.  Carry a cellphone or wear a medical alert device that you can use to call for help.  Use stepladders instead of chairs to reach high objects. Don't climb if you're at risk for falls. Ask for help, if needed.  Wear the correct eyeglasses, if you need them.    Make your home safer.    Remove rugs, cords, clutter, and furniture from walkways.  Keep your house well lit. Use night-lights in hallways and bathrooms.  Install and use sturdy handrails on stairways.  Wear nonskid footwear, even inside. Don't walk barefoot or in socks without shoes.    Be safe outside.    Use handrails, curb cuts, and ramps whenever possible.  Keep your hands free by using a shoulder bag or backpack.  Try to walk in well-lit areas. Watch out for uneven  "ground, changes in pavement, and debris.  Be careful in the winter. Walk on the grass or gravel when sidewalks are slippery. Use de-icer on steps and walkways. Add non-slip devices to shoes.    Put grab bars and nonskid mats in your shower or tub and near the toilet. Try to use a shower chair or bath bench when bathing.   Get into a tub or shower by putting in your weaker leg first. Get out with your strong side first. Have a phone or medical alert device in the bathroom with you.   Where can you learn more?  Go to https://www.ROBAUTO.net/patiented  Enter G117 in the search box to learn more about \"Preventing Falls: Care Instructions.\"  Current as of: July 17, 2023               Content Version: 14.0    4473-2122 [employer].   Care instructions adapted under license by your healthcare professional. If you have questions about a medical condition or this instruction, always ask your healthcare professional. [employer] disclaims any warranty or liability for your use of this information.      Hearing Loss: Care Instructions  Overview     Hearing loss is a sudden or slow decrease in how well you hear. It can range from slight to profound. Permanent hearing loss can occur with aging. It also can happen when you are exposed long-term to loud noise. Examples include listening to loud music, riding motorcycles, or being around other loud machines.  Hearing loss can affect your work and home life. It can make you feel lonely or depressed. You may feel that you have lost your independence. But hearing aids and other devices can help you hear better and feel connected to others.  Follow-up care is a key part of your treatment and safety. Be sure to make and go to all appointments, and call your doctor if you are having problems. It's also a good idea to know your test results and keep a list of the medicines you take.  How can you care for yourself at home?  Avoid loud noises whenever possible. " This helps keep your hearing from getting worse.  Always wear hearing protection around loud noises.  Wear a hearing aid as directed.  A professional can help you pick a hearing aid that will work best for you.  You can also get hearing aids over the counter for mild to moderate hearing loss.  Have hearing tests as your doctor suggests. They can show whether your hearing has changed. Your hearing aid may need to be adjusted.  Use other devices as needed. These may include:  Telephone amplifiers and hearing aids that can connect to a television, stereo, radio, or microphone.  Devices that use lights or vibrations. These alert you to the doorbell, a ringing telephone, or a baby monitor.  Television closed-captioning. This shows the words at the bottom of the screen. Most new TVs can do this.  TTY (text telephone). This lets you type messages back and forth on the telephone instead of talking or listening. These devices are also called TDD. When messages are typed on the keyboard, they are sent over the phone line to a receiving TTY. The message is shown on a monitor.  Use text messaging, social media, and email if it is hard for you to communicate by telephone.  Try to learn a listening technique called speechreading. It is not lipreading. You pay attention to people's gestures, expressions, posture, and tone of voice. These clues can help you understand what a person is saying. Face the person you are talking to, and have them face you. Make sure the lighting is good. You need to see the other person's face clearly.  Think about counseling if you need help to adjust to your hearing loss.  When should you call for help?  Watch closely for changes in your health, and be sure to contact your doctor if:    You think your hearing is getting worse.     You have new symptoms, such as dizziness or nausea.   Where can you learn more?  Go to https://www.healthwise.net/patiented  Enter R798 in the search box to learn more about  "\"Hearing Loss: Care Instructions.\"  Current as of: September 27, 2023               Content Version: 14.0    6302-7812 [employer].   Care instructions adapted under license by your healthcare professional. If you have questions about a medical condition or this instruction, always ask your healthcare professional. [employer] disclaims any warranty or liability for your use of this information.      Bladder Training: Care Instructions  Your Care Instructions     Bladder training is used to treat urge incontinence and stress incontinence. Urge incontinence means that the need to urinate comes on so fast that you can't get to a toilet in time. Stress incontinence means that you leak urine because of pressure on your bladder. For example, it may happen when you laugh, cough, or lift something heavy.  Bladder training can increase how long you can wait before you have to urinate. It can also help your bladder hold more urine. And it can give you better control over the urge to urinate.  It is important to remember that bladder training takes a few weeks to a few months to make a difference. You may not see results right away, but don't give up.  Follow-up care is a key part of your treatment and safety. Be sure to make and go to all appointments, and call your doctor if you are having problems. It's also a good idea to know your test results and keep a list of the medicines you take.  How can you care for yourself at home?  Work with your doctor to come up with a bladder training program that is right for you. You may use one or more of the following methods.  Delayed urination  In the beginning, try to keep from urinating for 5 minutes after you first feel the need to go.  While you wait, take deep, slow breaths to relax. Kegel exercises can also help you delay the need to go to the bathroom.  After some practice, when you can easily wait 5 minutes to urinate, try to wait 10 minutes before " "you urinate.  Slowly increase the waiting period until you are able to control when you have to urinate.  Scheduled urination  Empty your bladder when you first wake up in the morning.  Schedule times throughout the day when you will urinate.  Start by going to the bathroom every hour, even if you don't need to go.  Slowly increase the time between trips to the bathroom.  When you have found a schedule that works well for you, keep doing it.  If you wake up during the night and have to urinate, do it. Apply your schedule to waking hours only.  Kegel exercises  These tighten and strengthen pelvic muscles, which can help you control the flow of urine. (If doing these exercises causes pain, stop doing them and talk with your doctor.) To do Kegel exercises:  Squeeze your muscles as if you were trying not to pass gas. Or squeeze your muscles as if you were stopping the flow of urine. Your belly, legs, and buttocks shouldn't move.  Hold the squeeze for 3 seconds, then relax for 5 to 10 seconds.  Start with 3 seconds, then add 1 second each week until you are able to squeeze for 10 seconds.  Repeat the exercise 10 times a session. Do 3 to 8 sessions a day.  When should you call for help?  Watch closely for changes in your health, and be sure to contact your doctor if:    Your incontinence is getting worse.     You do not get better as expected.   Where can you learn more?  Go to https://www.Revstr.net/patiented  Enter V684 in the search box to learn more about \"Bladder Training: Care Instructions.\"  Current as of: November 15, 2023               Content Version: 14.0    2983-9621 [employer].   Care instructions adapted under license by your healthcare professional. If you have questions about a medical condition or this instruction, always ask your healthcare professional. [employer] disclaims any warranty or liability for your use of this information.      Learning About Depression " Screening  What is depression screening?  Depression screening is a way to see if you have depression symptoms. It may be done by a doctor or counselor. It's often part of a routine checkup. That's because your mental health is just as important as your physical health.  Depression is a mental health condition that affects how you feel, think, and act. You may:  Have less energy.  Lose interest in your daily activities.  Feel sad and grouchy for a long time.  Depression is very common. It affects people of all ages.  Many things can lead to depression. Some people become depressed after they have a stroke or find out they have a major illness like cancer or heart disease. The death of a loved one or a breakup may lead to depression. It can run in families. Most experts believe that a combination of inherited genes and stressful life events can cause it.  What happens during screening?  You may be asked to fill out a form about your depression symptoms. You and the doctor will discuss your answers. The doctor may ask you more questions to learn more about how you think, act, and feel.  What happens after screening?  If you have symptoms of depression, your doctor will talk to you about your options.  Doctors usually treat depression with medicines or counseling. Often, combining the two works best. Many people don't get help because they think that they'll get over the depression on their own. But people with depression may not get better unless they get treatment.  The cause of depression is not well understood. There may be many factors involved. But if you have depression, it's not your fault.  A serious symptom of depression is thinking about death or suicide. If you or someone you care about talks about this or about feeling hopeless, get help right away.  It's important to know that depression can be treated. Medicine, counseling, and self-care may help.  Where can you learn more?  Go to  "https://www.healthThe Jetstream.net/patiented  Enter T185 in the search box to learn more about \"Learning About Depression Screening.\"  Current as of: June 24, 2023  Content Version: 14.1 2006-2024 [employer].   Care instructions adapted under license by your healthcare professional. If you have questions about a medical condition or this instruction, always ask your healthcare professional. [employer] disclaims any warranty or liability for your use of this information.       "

## 2024-08-23 ENCOUNTER — TELEPHONE (OUTPATIENT)
Dept: ANTICOAGULATION | Facility: CLINIC | Age: 69
End: 2024-08-23
Payer: COMMERCIAL

## 2024-08-23 LAB
CHOLEST SERPL-MCNC: 253 MG/DL
FASTING STATUS PATIENT QL REPORTED: YES
HDLC SERPL-MCNC: 74 MG/DL
LDLC SERPL CALC-MCNC: 137 MG/DL
NONHDLC SERPL-MCNC: 179 MG/DL
TRIGL SERPL-MCNC: 211 MG/DL

## 2024-08-23 NOTE — TELEPHONE ENCOUNTER
ANTICOAGULATION     Eileen Donald is overdue for an INR check.     Spoke with Marilee and scheduled lab appointment on 9/3/24   - had OV on 8/22 and INR was suppose to be checked, however, it was missed and they only did A1C and lipid profile.    Alondra Lora, RN

## 2024-09-03 ENCOUNTER — ANTICOAGULATION THERAPY VISIT (OUTPATIENT)
Dept: ANTICOAGULATION | Facility: CLINIC | Age: 69
End: 2024-09-03

## 2024-09-03 ENCOUNTER — LAB (OUTPATIENT)
Dept: LAB | Facility: CLINIC | Age: 69
End: 2024-09-03
Payer: COMMERCIAL

## 2024-09-03 DIAGNOSIS — Z95.2 H/O AORTIC VALVE REPLACEMENT: ICD-10-CM

## 2024-09-03 DIAGNOSIS — I48.0 PAROXYSMAL ATRIAL FIBRILLATION (H): ICD-10-CM

## 2024-09-03 DIAGNOSIS — Z95.2 HEART VALVE REPLACED: Primary | ICD-10-CM

## 2024-09-03 DIAGNOSIS — Z79.01 LONG TERM (CURRENT) USE OF ANTICOAGULANTS: ICD-10-CM

## 2024-09-03 DIAGNOSIS — Z95.2 S/P AVR (AORTIC VALVE REPLACEMENT): ICD-10-CM

## 2024-09-03 LAB — INR BLD: 3.2 (ref 0.9–1.1)

## 2024-09-03 PROCEDURE — 36416 COLLJ CAPILLARY BLOOD SPEC: CPT

## 2024-09-03 PROCEDURE — 85610 PROTHROMBIN TIME: CPT

## 2024-09-03 NOTE — PROGRESS NOTES
ANTICOAGULATION MANAGEMENT     Eileen Donald 68 year old female is on warfarin with therapeutic INR result. (Goal INR 2.5-3.5)    Recent labs: (last 7 days)     09/03/24  1458   INR 3.2*       ASSESSMENT     Warfarin Lab Questionnaire    Warfarin Doses Last 7 Days      9/3/2024     2:51 PM   Dose in Tablet or Mg   TAB or MG? - 2.5mg warfarin tablets (evenings) milligram (mg)     Pt Rptd Dose STAS MONDAY TUESDAY WED THURS FRIDAY SATURDAY   9/3/2024   2:51 PM 25 2.5 5 2.5 2.5 5 2.5         9/3/2024   Warfarin Lab Questionnaire   Missed doses within past 14 days? No   Changes in diet or alcohol within past 14 days? No   Medication changes since last result? No - held one dose on 8/6//24.     Injuries or illness since last result? No - 8/22/24 annual wellness check with Dr. Hahn - no new changes, however, was recommended to follow-up with her cardiologist, since she was last seen in 2021.     New shortness of breath, severe headaches or sudden changes in vision since last result? No   Abnormal bleeding since last result? No   Upcoming surgery, procedure? No     Best number to call with results? [phone]        Previous result: Supratherapeutic at 4.1 on 8/6/24.    Additional findings:  appt with Dr. Chico Jade re:  varicose veins.       PLAN     Recommended plan for no diet, medication or health factor changes affecting INR     Dosing Instructions: Continue your current warfarin dose with next INR in 2 weeks       Summary  As of 9/3/2024      Full warfarin instructions:  5 mg every Mon, Fri; 2.5 mg all other days   Next INR check:  9/17/2024               Telephone call with Marilee who verbalizes understanding and agrees to plan    Lab visit scheduled - INR on 9/24/24 @ [facility]    Education provided: Taking warfarin: Importance of taking warfarin as instructed  Goal range and lab monitoring: goal range and significance of current result    Plan made per [facility] anticoagulation protocol    Alondra Lora,  RN  Anticoagulation Clinic  9/3/2024    _______________________________________________________________________     Anticoagulation Episode Summary       Current INR goal:  2.5-3.5   TTR:  60.3% (1 y)   Target end date:  Indefinite   Send INR reminders to:  [facility]    Indications    Heart valve replaced [Z95.2]  Paroxysmal atrial fibrillation (H) [I48.0]  S/P AVR (aortic valve replacement) [Z95.2]  Other pulmonary embolism without acute cor pulmonale  unspecified chronicity (H) (Resolved) [I26.99]  H/O aortic valve replacement [Z95.2]  Long term (current) use of anticoagulants [Z79.01]  Pulmonary embolism  other  unspecified chronicity  unspecified whether acute cor pulmonale present (H) (Resolved) [I26.99]  Pulmonary embolism  other  unspecified chronicity  unspecified whether acute cor pulmonale present (H) (Resolved) [I26.99]             Comments:  12/15/21 - amended INR goal to 2.5 - 3.5             Anticoagulation Care Providers       Provider Role Specialty Phone number    Tito Salazar MD Referring Family Medicine [phone]    Drew Hahn MD Referring Family Medicine [phone]

## 2024-09-04 ENCOUNTER — DOCUMENTATION ONLY (OUTPATIENT)
Dept: ANTICOAGULATION | Facility: CLINIC | Age: 69
End: 2024-09-04
Payer: COMMERCIAL

## 2024-09-04 DIAGNOSIS — I26.99 PULMONARY EMBOLISM, OTHER, UNSPECIFIED CHRONICITY, UNSPECIFIED WHETHER ACUTE COR PULMONALE PRESENT (H): ICD-10-CM

## 2024-09-04 DIAGNOSIS — I48.0 PAROXYSMAL ATRIAL FIBRILLATION (H): Primary | ICD-10-CM

## 2024-09-04 DIAGNOSIS — Z95.2 S/P AVR (AORTIC VALVE REPLACEMENT): ICD-10-CM

## 2024-09-04 DIAGNOSIS — Z79.01 LONG TERM (CURRENT) USE OF ANTICOAGULANTS: ICD-10-CM

## 2024-09-04 NOTE — PROGRESS NOTES
ANTICOAGULATION CLINIC REFERRAL RENEWAL REQUEST       An annual renewal order is required for all patients referred to [facility].?  Please review and sign the pended referral order for Eileen Donald.       ANTICOAGULATION SUMMARY      Warfarin indication(s)   - Hx of PE  - Paroxysmal Atrial Fibrillation  - s/p AVR, 1/2017. ([facility] mechanical valve)       Mechanical heart valve present?  Mechanical  AVR-Bileaflet       Current goal range   INR: 2.5-3.5     Goal appropriate for indication? Goal INR 2.5-3.5 standard for indication(s) above     Time in Therapeutic Range (TTR)  (Goal > 60%) 60.3 %       Office visit with referring provider's group within last year Yes on 8/22/2024 with Dr. Jovani Lora, RN  [facility]

## 2024-09-16 NOTE — PATIENT INSTRUCTIONS
Eileen    Thank you for entrusting your care with us at the [facility].      We are prescribing some compression stockings for you. I have included different suppliers that should help you get measured and fitting to ensure proper fitting socks. You should wear these stockings as much as you can. It is especially important to wear them with long periods of standing, sitting, long car rides or if you will be flying. Compression socks should get refilled every 4-6 months. They do not need to be worn at night while in bed. It is recommended to wear compression level of 20-30mmhg or higher from toes to knees.      We will perform an ultrasound today or prior to your appointment with us in 3 months time to evaluate the valves in your veins.      We would like you to follow up in 3 months after wearing socks to see how you are doing.        Varicose Veins    Varicose veins are twisted, enlarged veins near the surface of the skin. They develop most often in the legs and ankles.    Some people may be more likely than others to get varicose veins because of several things. These include aging, pregnancy, being overweight, or because a parent has them. Standing or sitting for long periods of time can also increase risk of varicose veins.    Follow-up care is a key part of your treatment and safety. Be sure to make and go to all appointments, and call your doctor if you are having problems. It's also a good idea to know your test results and keep a list of the medicines you take.      Varicose veins are caused by weakened valves and veins in your legs. Normally, one-way valves in your veins keep blood flowing from your legs up toward your heart. When these valves don't work as they should, blood collects in your legs, and pressure builds up. The veins become weak, large, and twisted.    How can you care for yourself at home?  Wear compression stockings during the day to help relieve symptoms and  improve blood flow. Talk to your doctor about which ones to get and where to get them.  Prop up your legs at or above the level of your heart when possible. Try to do this for about 30 minutes at a time, about 3 times a day. This helps keep the blood from pooling in your lower legs and improves blood flow to the rest of your body.  Avoid sitting and standing for long periods. This puts added stress on your veins.  Stay at a healthy weight. Lose weight if you need to.  Try to take several short walks every day.  Get at least 30 minutes of exercise on most days of the week. Walking is a good choice. You also may want to do other activities, such as running, swimming, cycling, or playing tennis or team sports.  Do calf muscle exercises every day. When you are sitting down, rotate your feet at the ankles in both directions, making small circles. Extend your legs, and point and flex your feet.  Avoid crossing your legs at the knees when sitting.  Take good care of your skin. Treat cuts and scrapes on your legs right away. Keep your legs clean and moisturized to prevent drying and cracking. Prevent sunburns.  Do not smoke. Smoking can make varicose veins worse. If you need help quitting, talk to your doctor about stop-smoking programs and medicines. These can increase your chances of quitting for good.  If you bump your leg so hard that you know it is likely to bruise, prop up your leg and apply ice or cold packs right away. Apply the ice or cold pack for 10 to 20 minutes, 3 or more times a day. Put a thin cloth between the ice and your skin.  If you cut or scratch the skin over a vein, it may bleed a lot. Prop up your leg and apply firm pressure for a full 15 minutes.  If you have a blood clot in a varicose vein, you may have tenderness and swelling over the vein. The vein may feel firm. Be sure to call your doctor right away if you have these symptoms. If your doctor has told you how to care for the clot, follow the  instructions.     Care may include the following:    Prop up your leg and apply a damp cloth that is warm or cool.  Ask your doctor if you can take an over-the-counter pain medicine, such as acetaminophen (Tylenol), ibuprofen (Advil, Motrin), or naproxen (Aleve). Be safe with medicines. Read and follow all instructions on the label.    When should you call for help?     Call 911 anytime you think you may need emergency care. For example, call if:    You have sudden chest pain and shortness of breath, or you cough up blood.    Call your doctor now or seek immediate medical care if:    You have signs of a blood clot in your leg (called a deep vein thrombosis), such as:  Pain in your calf, back of the knee, thigh, or groin.  Swelling in the leg or groin.  A color change on the leg or groin. The skin may be reddish or purplish, depending on your usual skin color.  A varicose vein begins to bleed and you cannot stop it.  You have a tender lump in your leg.  You get an open sore.    Watch closely for changes in your health, and be sure to contact your doctor if:    Your varicose vein symptoms do not improve with home treatment.    Current as of: December 19, 2022  Author: [employer]wise Staff  Medical Review:Mehdi Shen MD - Family Medicine & JENNIFER Carrasco MD - Internal Medicine & Mikey Juárez MD - Family Medicine & Davonte Polanco MD - Family Medicine & Jesse Guzmán MD - Diagnostic Radiology    Please bring your compression prescription to a home medical supply store. Here is a list of locations but not limited to.     [facility]  [address] [place] [address]  Phone: [phone]  Fax: [phone] [facility] Bldg.  [address] [place] [address]  Phone: [phone]  Fax: [phone]   [facility] Bldg.  [address] [place] [address]  Phone: [phone]  Fax: [phone] [facility]  [address] [place] [address]  Phone: [phone]  Fax: [phone]   [facility]  [address] [place] [address]  Phone: [phone] [facility]   [address] ([facility] [place] [address]  Phone: [phone]   [place]  [address] [place] [address]      Phone: [phone]  Fax: [phone] [place]  [address] [place] [address]      Phone: [phone]  Fax: [phone]     [facility] [email or web address]  [address], [place] [address]  [phone]    [facility]  [email or web address]  [address] [place] [address]  Phone: [phone]     [address] [place] [address]  Phone: [phone]    [address] [place] [address]  Phone: [phone] [address], [place] [address]  Phone: [phone] [address], [place] [address]  Phone: [phone]    [facility] [email or web address]  [address] [place] [place] [address]  Phone: [phone]    [address], [place] [address]  Phone: [phone] [address], [place] [address]  Phone: [phone]    Delia Mcgraw  [email or web address]  [phone]

## 2024-09-17 NOTE — PROGRESS NOTES
[facility]        Patient is here for a consult to discuss Varicose vein(s). The patient has varicose veins that are problematic in bilateral legs. Patient doesn't have pain, only concern is the appearance and indentation. Patient has been disabled since 1999- used w/c off and on d/t hip and MS. Has sisters with varicose veins.    History PE and aortic valve replacement (2015) on coumadin.    Patient has not been using pain medication or anti-inflammatory's. Patient has not had recent imaging on legs done. Patient has not done conservative measures. RTC in 3 months then will access if US required.    Educated patient to work on conservative treatments.      Pt is currently taking Aspirin, Statin, and Warfarin.    BP 95/66   Pulse 51   Temp 97.9  F (36.6  C)     The provider has been notified that the patient has no concerns.     Questions patient would like addressed today are: N/A.    Refills are needed: N/A    Has homecare services and agency name:  No

## 2024-09-18 ENCOUNTER — OFFICE VISIT (OUTPATIENT)
Dept: VASCULAR SURGERY | Facility: CLINIC | Age: 69
End: 2024-09-18
Attending: FAMILY MEDICINE
Payer: COMMERCIAL

## 2024-09-18 VITALS — HEART RATE: 51 BPM | DIASTOLIC BLOOD PRESSURE: 66 MMHG | TEMPERATURE: 97.9 F | SYSTOLIC BLOOD PRESSURE: 95 MMHG

## 2024-09-18 DIAGNOSIS — I83.893 SYMPTOMATIC VARICOSE VEINS OF BOTH LOWER EXTREMITIES: Primary | ICD-10-CM

## 2024-09-18 DIAGNOSIS — I83.93 VARICOSE VEINS OF BOTH LOWER EXTREMITIES, UNSPECIFIED WHETHER COMPLICATED: ICD-10-CM

## 2024-09-18 PROCEDURE — 99203 OFFICE O/P NEW LOW 30 MIN: CPT | Performed by: SPECIALIST

## 2024-09-18 PROCEDURE — G2211 COMPLEX E/M VISIT ADD ON: HCPCS | Performed by: SPECIALIST

## 2024-09-18 PROCEDURE — G0463 HOSPITAL OUTPT CLINIC VISIT: HCPCS | Performed by: SPECIALIST

## 2024-09-18 ASSESSMENT — PAIN SCALES - GENERAL: PAINLEVEL: NO PAIN (0)

## 2024-09-18 NOTE — PROGRESS NOTES
[facility] Vein Consult      Assessment:     1. varicose veins, bilateral   2. spider veins, bilateral   3. MS and disabled sine 1999    Plan:     1. Treatment options of conservative therapy of stockings use, exercise, weight loss, elevating legs when possible.    2. Script for compression stockings 20-30 mm hg  3. Ultrasound to evaluate legs for incompetency of both deep and superficial system .   4. Surgical treatment, discussed briefly today.  5. Follow up: 3 months.   6. Call for any questions concerns or issues    Subjective:      Eileen Donald is a 68 year old female  who was referred by Drew Hahn  for evaluation of varicose veins. Symptoms include pain, aching, fatigue, burning, edema, and dermatitis. Patient has history of leg selling, pain and vein issues that have progressed. Pain and symptoms have affected daily living and work activities needing medications. Here for evaluation today. no stocking or compression devic use    Allergies:Morphine, Sulfa antibiotics, Azithromycin, Doxycycline, and Latex    Past Medical History:   Diagnosis Date    Anxiety     Aortic valve replaced 2/15/2017    Aortic valve stenosis 01/24/2017    Asthma     Asthma     Created by Conversion     Chronic shortness of breath     COPD (chronic obstructive pulmonary disease) (H)     Coronary artery disease     Depression     Diabetes mellitus (H)     borderline    Essential hypertension     Created by Conversion  Replacement Utility updated for latest IMO load    History of blood clots     PE    History of pulmonary embolism     HTN (hypertension)     Multiple sclerosis (H)     Multiple sclerosis (H)     Created by Conversion     Obesity     Obesity 10/29/2015    Osteoarthritis     Oxygen dependent     Panic attacks     PONV (postoperative nausea and vomiting)     Pre-diabetes     Pulmonary embolism (H)     Sleep apnea     borderline       Past Surgical History:   Procedure Laterality Date    aortic valve  surgery      COLONOSCOPY N/A 12/23/2021    Procedure: COLONOSCOPY WITH POLYPECTOMIES;  Surgeon: Tito Suarez MD;  Location: [facility] Main OR    COLONOSCOPY W/ BIOPSIES N/A 3/22/2021    Procedure: COLONOSCOPY WITH BIOPSY AND POLYPECTOMY;  Surgeon: Sam Weems MD;  Location: [facility] Main OR;  Service: Gastroenterology    IR LUMBAR EPIDURAL STEROID INJECTION  1/16/2007    MAMMOPLASTY REDUCTION  1994    OTHER SURGICAL HISTORY  01/24/2017    mechanical aortic prosthesis    RELEASE CARPAL TUNNEL      ZZC RECONSTR TOTAL SHOULDER IMPLANT Left 4/10/2019    Procedure: LEFT REVERSE TOTAL SHOULDER ARTHROPLASTY;  Surgeon: Luis Antonio Telles MD;  Location: [facility] Main OR;  Service: Orthopedics         Current Outpatient Medications:     acetaminophen (TYLENOL) 325 MG tablet, Take 325-650 mg by mouth every 6 hours as needed , Disp: , Rfl:     acyclovir (ZOVIRAX) 400 MG tablet, Take 400 mg by mouth 2 times daily , Disp: , Rfl:     albuterol (PROAIR HFA/PROVENTIL HFA/VENTOLIN HFA) 108 (90 Base) MCG/ACT inhaler, Inhale 2 puffs into the lungs every 6 hours as needed for shortness of breath or wheezing, Disp: 18 g, Rfl: 11    aspirin (ASA) 81 MG chewable tablet, Take 81 mg by mouth daily , Disp: , Rfl:     buPROPion (WELLBUTRIN XL) 150 MG 24 hr tablet, Take 1 tablet (150 mg) by mouth daily, Disp: 90 tablet, Rfl: 3    esomeprazole (NEXIUM) 20 MG DR capsule, Take 1 capsule (20 mg) by mouth every morning (before breakfast), Disp: 90 capsule, Rfl: 3    furosemide (LASIX) 40 MG tablet, Take 1 tablet (40 mg) by mouth daily., Disp: , Rfl:     lisinopril (ZESTRIL) 10 MG tablet, Take 1 tablet (10 mg) by mouth daily, Disp: 90 tablet, Rfl: 1    loratadine (CLARITIN) 10 MG tablet, Take 1 tablet (10 mg) by mouth daily, Disp: 30 tablet, Rfl: 3    metFORMIN (GLUCOPHAGE XR) 500 MG 24 hr tablet, Take 1 tablet (500 mg) by mouth daily, Disp: 90 tablet, Rfl: 0    metoprolol tartrate (LOPRESSOR) 25 MG tablet, Take 1 tablet (25 mg) by mouth 2  times daily, Disp: 180 tablet, Rfl: 2    montelukast (SINGULAIR) 10 MG tablet, Take 1 tablet (10 mg) by mouth at bedtime, Disp: 90 tablet, Rfl: 3    Ocrelizumab (OCREVUS IV), Inject into the vein every 6 months, Disp: , Rfl:     pantoprazole (PROTONIX) 20 MG EC tablet, Take 1 tablet (20 mg) by mouth daily, Disp: 90 tablet, Rfl: 0    potassium chloride ER (MICRO-K) 10 MEQ CR capsule, Take 10 mEq by mouth daily , Disp: , Rfl:     simvastatin (ZOCOR) 20 MG tablet, Take 1 tablet (20 mg) by mouth at bedtime, Disp: 90 tablet, Rfl: 3    venlafaxine (EFFEXOR XR) 75 MG 24 hr capsule, TAKE 3 CAPSULES BY MOUTH EVERY DAY, Disp: 270 capsule, Rfl: 3    warfarin ANTICOAGULANT (COUMADIN) 2.5 MG tablet, Take 1 tab (2.5mg) to 2 tabs (5mg) by mouth daily, as directed.  Adjust dose based on INR results., Disp: 270 tablet, Rfl: 1    Current Facility-Administered Medications:     ropivacaine (NAROPIN) injection 3 mL, 3 mL, , , Ravi Patel, DO, 3 mL at 09/07/22 1415    triamcinolone (KENALOG-40) injection 40 mg, 40 mg, , , Ravi Patel DO, 40 mg at 09/07/22 1415     Family History   Problem Relation Age of Onset    Snoring Mother     Snoring Father     Sleep Apnea Sister     Coronary Artery Disease Father     Coronary Artery Disease Sister     Coronary Artery Disease Brother     Breast Cancer No family hx of         reports that she has never smoked. She has been exposed to tobacco smoke. She has never used smokeless tobacco. She reports that she does not drink alcohol and does not use drugs.      Review of Systems:    Pertinent items are noted in HPI.  Patient has symptomatic veins and changes of bilateral legs. These have progressed to the point of causing symptoms on a daily basis. This causes issues with daily activities and chores such as washing dishes, vacuuming, outdoor upkeep, and standing for long lengths of time       Objective:     Vitals:    09/18/24 0859   BP: 95/66   Pulse: 51   Temp: 97.9  F (36.6   C)     There is no height or weight on file to calculate BMI.    EXAM:  GENERAL: This is a well-developed 68 year old female who appears her stated age  HEAD: normocephalic  HEENT: Pupils equal and reactive bilaterally  MOUTH: mucus membranes intact. Normal dentation  CARDIAC: RRR without murmur  CHEST/LUNG:  Clear to auscultation bilaterally  ABDOMEN: Soft, nontender, nondistended, no masses noted   NEUROLOGIC: Focally intact, nonfocal, alert and oriented x 3  INTEGUMENT: No open lesions or ulcers  VASCULAR: Pulses intact, symmetrical upper and lower extremities. There areskin changes consistent with chronic venous insufficiency. Varicose veins present in bilateral greater saphenous distribution. Spider veins present bilateral.                      Side:: Bilateral  VCSS  PAIN:: Absent: None  Varicose Veins:: Mild: Few scattered  Venous Edema:: Absent: None  Skin Pigmentation:: Mild: Diffuse, but limited in area and old (brown)  Inflamation:: Absent: None  Induration:: Absent: None  Number of active ulcers:: 0  Active ulcer duration:: None  Active ulcer diameter:: None  Compression Therapy:: Not used or patient not compliant  VCSS Score:: 2  CEAP:: Simple varicose veins only  Patient reported symptoms  Heaviness: a little of the time  Achiness: none of the time  Swelling: none of the time  Throbbing: none of the time  Itching: none of the time  Appearance: some of the time  Impact on work/activity: none of the time    Imaging:    Pending      Chico Jade MD  General Surgery [phone]  Vascular Surgery [phone]

## 2024-09-20 ENCOUNTER — PATIENT OUTREACH (OUTPATIENT)
Dept: GASTROENTEROLOGY | Facility: CLINIC | Age: 69
End: 2024-09-20
Payer: COMMERCIAL

## 2024-09-20 DIAGNOSIS — Z12.11 SPECIAL SCREENING FOR MALIGNANT NEOPLASMS, COLON: Primary | ICD-10-CM

## 2024-09-20 NOTE — PROGRESS NOTES
"Patient has a history of polyps and surveillance colonoscopy is due December 2024. Patient meets criteria for CRC high risk standing order protocol.    CRC Screening Colonoscopy Referral Review    Patient meets the inclusion criteria for screening colonoscopy standing order.    Ordering/Referring Provider:  Drew Hahn MD    BMI: Estimated body mass index is 35.73 kg/m  as calculated from the following:    Height as of 8/22/24: 1.778 m (5' 10\").    Weight as of 8/22/24: 112.9 kg (249 lb).     Sedation:  Does patient have any of the following conditions affecting sedation?  Pt needs MAC sedation d/t Pulmonary Hypertension    Previous Scopes:  Any previous recommendations or follow up needs based on previous scope?  na / No recommendations.    Medical Concerns to Postpone Order:  Does patient have any of the following medical concerns that should postpone/delay colonoscopy referral?  No medical conditions affecting colonoscopy referral.    Final Referral Details:  Based on patient's medical history patient is appropriate for referral order with MAC/deep sedation.   BMI<= 45 45 < BMI <= 48 48 < BMI < = 50  BMI > 50   No Restrictions No [facility]  No [facility]  [facility] with exceptions Hospital Only OR Only     "

## 2024-09-24 ENCOUNTER — LAB (OUTPATIENT)
Dept: LAB | Facility: CLINIC | Age: 69
End: 2024-09-24
Payer: COMMERCIAL

## 2024-09-24 ENCOUNTER — ANTICOAGULATION THERAPY VISIT (OUTPATIENT)
Dept: ANTICOAGULATION | Facility: CLINIC | Age: 69
End: 2024-09-24

## 2024-09-24 DIAGNOSIS — Z95.2 S/P AVR (AORTIC VALVE REPLACEMENT): ICD-10-CM

## 2024-09-24 DIAGNOSIS — Z95.2 H/O AORTIC VALVE REPLACEMENT: ICD-10-CM

## 2024-09-24 DIAGNOSIS — Z95.2 HEART VALVE REPLACED: Primary | ICD-10-CM

## 2024-09-24 DIAGNOSIS — Z79.01 LONG TERM (CURRENT) USE OF ANTICOAGULANTS: ICD-10-CM

## 2024-09-24 DIAGNOSIS — I48.0 PAROXYSMAL ATRIAL FIBRILLATION (H): ICD-10-CM

## 2024-09-24 DIAGNOSIS — I26.99 PULMONARY EMBOLISM, OTHER, UNSPECIFIED CHRONICITY, UNSPECIFIED WHETHER ACUTE COR PULMONALE PRESENT (H): ICD-10-CM

## 2024-09-24 LAB — INR BLD: 3.3 (ref 0.9–1.1)

## 2024-09-24 PROCEDURE — 85610 PROTHROMBIN TIME: CPT

## 2024-09-24 PROCEDURE — 36416 COLLJ CAPILLARY BLOOD SPEC: CPT

## 2024-09-24 PROCEDURE — G0008 ADMIN INFLUENZA VIRUS VAC: HCPCS

## 2024-09-24 PROCEDURE — 90662 IIV NO PRSV INCREASED AG IM: CPT

## 2024-09-24 NOTE — PROGRESS NOTES
ANTICOAGULATION MANAGEMENT     Eileen Donald 68 year old female is on warfarin with therapeutic INR result. (Goal INR 2.5-3.5)    Recent labs: (last 7 days)     09/24/24  1240   INR 3.3*       ASSESSMENT     Warfarin Lab Questionnaire    Warfarin Doses Last 7 Days      9/24/2024    12:06 PM   Dose in Tablet or Mg   TAB or MG? - 2.5mg warfarin tablets (evenings)  milligram (mg)     Pt Rptd Dose SUNDAY MONDAY TUESDAY WED THURS FRIDAY SATURDAY 9/24/2024  12:06 PM 2.5 2.5 5 2.5 2.5 2.5 5         9/24/2024   Warfarin Lab Questionnaire   Missed doses within past 14 days? No   Changes in diet or alcohol within past 14 days? No   Medication changes since last result? No   Injuries or illness since last result? No - 9/18/24 referral to Vascular (Dr. Chico Zimmerman) for asymptomatic mild bilateral varicose veins. Complains of no pains.  Will recommend conservative treatment first using compression stockings, then will evaluate in 3 months     New shortness of breath, severe headaches or sudden changes in vision since last result? No   Abnormal bleeding since last result? No   Upcoming surgery, procedure? No   Best number to call with results? [phone]        Previous result: Therapeutic last visit at 3.2; previously outside of goal range at 4.1    Additional findings: None       PLAN     Recommended plan for no diet, medication or health factor changes affecting INR     Dosing Instructions: Continue your current warfarin dose with next INR in 3 weeks       Summary  As of 9/24/2024      Full warfarin instructions:  5 mg every Mon, Fri; 2.5 mg all other days   Next INR check:  10/15/2024               Telephone call with Marilee who verbalizes understanding and agrees to plan    Patient offered & declined to schedule next visit    Education provided: Taking warfarin: Importance of taking warfarin as instructed  Goal range and lab monitoring: goal range and significance of current result    Plan made per [facility]  anticoagulation protocol    Alondra Lora RN  9/24/2024  Anticoagulation Clinic  [facility] for routing messages: p LARISA GAO  [facility] patient phone line: [phone]        _______________________________________________________________________     Anticoagulation Episode Summary       Current INR goal:  2.5-3.5   TTR:  63.0% (1 y)   Target end date:  Indefinite   Send INR reminders to:  LARISA GAO    Indications    Heart valve replaced [Z95.2]  Paroxysmal atrial fibrillation (H) [I48.0]  S/P AVR (aortic valve replacement) [Z95.2]  Other pulmonary embolism without acute cor pulmonale  unspecified chronicity (H) (Resolved) [I26.99]  H/O aortic valve replacement [Z95.2]  Long term (current) use of anticoagulants [Z79.01]  Pulmonary embolism  other  unspecified chronicity  unspecified whether acute cor pulmonale present (H) (Resolved) [I26.99]  Pulmonary embolism  other  unspecified chronicity  unspecified whether acute cor pulmonale present (H) (Resolved) [I26.99]  Pulmonary embolism  other  unspecified chronicity  unspecified whether acute cor pulmonale present (H) [I26.99]             Comments:  12/15/21 - amended INR goal to 2.5 - 3.5             Anticoagulation Care Providers       Provider Role Specialty Phone number    Tito Salazar MD Referring Family Medicine [phone]    Drew Hahn MD Referring Family Medicine [phone]

## 2024-10-01 ENCOUNTER — IMMUNIZATION (OUTPATIENT)
Dept: FAMILY MEDICINE | Facility: CLINIC | Age: 69
End: 2024-10-01
Payer: COMMERCIAL

## 2024-10-01 PROCEDURE — 91320 SARSCV2 VAC 30MCG TRS-SUC IM: CPT

## 2024-10-01 PROCEDURE — 90480 ADMN SARSCOV2 VAC 1/ONLY CMP: CPT

## 2024-10-23 ENCOUNTER — TELEPHONE (OUTPATIENT)
Dept: ANTICOAGULATION | Facility: CLINIC | Age: 69
End: 2024-10-23
Payer: COMMERCIAL

## 2024-10-23 NOTE — TELEPHONE ENCOUNTER
ANTICOAGULATION     Eileen Donald is overdue for an INR check.     Left message for patient to call and schedule lab appointment as soon as possible. If returning call, please schedule.     Alondra Lora RN  10/23/2024  Anticoagulation Clinic  [facility] for routing messages: ariadna QUEZADA [facility] patient phone line: [phone]

## 2024-10-28 ENCOUNTER — LAB (OUTPATIENT)
Dept: LAB | Facility: CLINIC | Age: 69
End: 2024-10-28
Payer: COMMERCIAL

## 2024-10-28 ENCOUNTER — ANTICOAGULATION THERAPY VISIT (OUTPATIENT)
Dept: ANTICOAGULATION | Facility: CLINIC | Age: 69
End: 2024-10-28

## 2024-10-28 DIAGNOSIS — Z95.2 S/P AVR (AORTIC VALVE REPLACEMENT): ICD-10-CM

## 2024-10-28 DIAGNOSIS — I26.99 PULMONARY EMBOLISM, OTHER, UNSPECIFIED CHRONICITY, UNSPECIFIED WHETHER ACUTE COR PULMONALE PRESENT (H): ICD-10-CM

## 2024-10-28 DIAGNOSIS — Z79.01 LONG TERM (CURRENT) USE OF ANTICOAGULANTS: ICD-10-CM

## 2024-10-28 DIAGNOSIS — I48.0 PAROXYSMAL ATRIAL FIBRILLATION (H): ICD-10-CM

## 2024-10-28 DIAGNOSIS — Z95.2 H/O AORTIC VALVE REPLACEMENT: ICD-10-CM

## 2024-10-28 DIAGNOSIS — Z95.2 HEART VALVE REPLACED: Primary | ICD-10-CM

## 2024-10-28 LAB — INR BLD: 3.1 (ref 0.9–1.1)

## 2024-10-28 PROCEDURE — 85610 PROTHROMBIN TIME: CPT

## 2024-10-28 PROCEDURE — 36416 COLLJ CAPILLARY BLOOD SPEC: CPT

## 2024-10-28 NOTE — PROGRESS NOTES
ANTICOAGULATION MANAGEMENT     Eileen Donald 69 year old female is on warfarin with therapeutic INR result. (Goal INR 2.5-3.5)    Recent labs: (last 7 days)     10/28/24  1147   INR 3.1*       ASSESSMENT     Warfarin Lab Questionnaire    Warfarin Doses Last 7 Days      10/28/2024    11:46 AM   Dose in Tablet or Mg   TAB or MG?  - 2.5mg warfarin tablets (evenings)  milligram (mg)     Pt Rptd Dose STAS MONDAY TUESDAY WED THURS FRIDAY SATURDAY   10/28/2024  11:46 AM 2.5 2.5 2.5 5 2.5 2.5 5         10/28/2024   Warfarin Lab Questionnaire   Missed doses within past 14 days? No   Changes in diet or alcohol within past 14 days? No   Medication changes since last result? No   Injuries or illness since last result? No   New shortness of breath, severe headaches or sudden changes in vision since last result? No   Abnormal bleeding since last result? No   Upcoming surgery, procedure? No   Best number to call with results? [phone]        Previous result: Therapeutic last 2 INR visits.    Additional findings: None       PLAN     Recommended plan for no diet, medication or health factor changes affecting INR     Dosing Instructions: Continue your current warfarin dose with next INR in 5 weeks       Summary  As of 10/28/2024      Full warfarin instructions:  5 mg every Mon, Fri; 2.5 mg all other days   Next INR check:  12/2/2024               Detailed voice message left for Marilee with dosing instructions and follow up date.   - Sent etrigg message with dosing and follow up instructions    Check at provider office visit - INR on 11/29/24 at [facility] with Dr. Hahn    Education provided: Please call back if any changes to your diet, medications or how you've been taking warfarin  Taking warfarin: Importance of taking warfarin as instructed  Goal range and lab monitoring: goal range and significance of current result    Plan made per [facility] anticoagulation protocol    Alondra Lora RN  10/28/2024  Anticoagulation  Clinic  Epic pool for routing messages: p LARISA GAO  [facility] patient phone line: [phone]        _______________________________________________________________________     Anticoagulation Episode Summary       Current INR goal:  2.5-3.5   TTR:  64.4% (1 y)   Target end date:  Indefinite   Send INR reminders to:  LARISA GAO    Indications    Heart valve replaced [Z95.2]  Paroxysmal atrial fibrillation (H) [I48.0]  S/P AVR (aortic valve replacement) [Z95.2]  Other pulmonary embolism without acute cor pulmonale  unspecified chronicity (H) (Resolved) [I26.99]  H/O aortic valve replacement [Z95.2]  Long term (current) use of anticoagulants [Z79.01]  Pulmonary embolism  other  unspecified chronicity  unspecified whether acute cor pulmonale present (H) (Resolved) [I26.99]  Pulmonary embolism  other  unspecified chronicity  unspecified whether acute cor pulmonale present (H) (Resolved) [I26.99]  Pulmonary embolism  other  unspecified chronicity  unspecified whether acute cor pulmonale present (H) [I26.99]             Comments:  12/15/21 - amended INR goal to 2.5 - 3.5             Anticoagulation Care Providers       Provider Role Specialty Phone number    Tito Salazar MD Referring Family Medicine [phone]    Drew Hahn MD Referring Family Medicine [phone]

## 2024-10-30 NOTE — PROGRESS NOTES
AUDIOLOGY REPORT    SUBJECTIVE:  Eileen Donald is a 69 year old female who was seen in the Audiology Clinic at the [facility] for audiologic evaluation, referred by Drew Hahn M.D. Patient reports increased difficulty hearing over the past 5 years. Her medical history includes multiple sclerosis (MS). The patient did not pass a recent hearing screening at her primary clinic. At the end of the appt today, she let me know that she has used hearing aids before from an outside clinic but either lost them or they stopped working. She reports tinnitus that sounds like music or someone talking as well as ringing. She has some imbalance but attributes this to MS. She had ear infections when she was young, but none recently. There is a family history of her mother and multiple sisters with hearing loss and hearing aids. Denies otalgia, otorrhea, aural fullness, prior ear surgery, noise exposures.    OBJECTIVE:  Abuse Screening:  Do you feel unsafe at home or work/school? No  Do you feel threatened by someone? No  Does anyone try to keep you from having contact with others, or doing things outside of your home? No  Physical signs of abuse present? No     Fall Risk Screen:  1. Have you fallen two or more times in the past year? No - patient reportedly has many near falls and that her primary provider is aware  2. Have you fallen and had an injury in the past year? No    Otoscopic exam indicates ears are clear of cerumen bilaterally     Pure Tone Thresholds assessed using conventional audiometry with good reliability from 250-8000 Hz bilaterally using insert earphones and circumaural headphones     RIGHT:  Borderline hearing loss sloping to moderately-severe sensorineural hearing loss    LEFT:    Mild sloping to moderately-severe sensorineural hearing loss    Tympanogram:    RIGHT: slightly restricted eardrum mobility     LEFT:   slightly restricted eardrum mobility     Reflexes (reported by  stimulus ear):  RIGHT: Ipsilateral is absent at frequencies tested  RIGHT: Contralateral is unable to be tested due to equipment  LEFT:   Ipsilateral is absent at frequencies tested  LEFT:   Contralateral is unable to be tested due to equipment    Speech Reception Threshold:    RIGHT: 40 dB HL    LEFT:   35 dB HL  Word Recognition Score:     RIGHT: 96% at 80 dB HL using NU-6 recorded word list.    LEFT:   100% at 80 dB HL using NU-6 recorded word list.      ASSESSMENT:   Borderline/mild sloping to moderately-severe sensorineural hearing loss, bilaterally. Today s results were discussed with the patient in detail.     PLAN:  Patient was counseled regarding hearing loss and impact on communication. Patient is a good candidate for amplification at this time. It is recommended that the patient call to schedule Hearing Aid Consultation if interested. She was provided with information about our hearing aid program. Please call this clinic with questions regarding these results or recommendations.      Radha Zavala., [facility]  [place] Licensed Audiologist #[id number]

## 2024-11-01 ENCOUNTER — OFFICE VISIT (OUTPATIENT)
Dept: AUDIOLOGY | Facility: CLINIC | Age: 69
End: 2024-11-01
Attending: FAMILY MEDICINE
Payer: COMMERCIAL

## 2024-11-01 DIAGNOSIS — H90.3 BILATERAL SENSORINEURAL HEARING LOSS: Primary | ICD-10-CM

## 2024-11-01 DIAGNOSIS — H91.90 DECREASED HEARING, UNSPECIFIED LATERALITY: ICD-10-CM

## 2024-11-01 PROCEDURE — 92550 TYMPANOMETRY & REFLEX THRESH: CPT | Mod: 52 | Performed by: AUDIOLOGIST

## 2024-11-01 PROCEDURE — 92557 COMPREHENSIVE HEARING TEST: CPT | Performed by: AUDIOLOGIST

## 2024-11-19 ENCOUNTER — NURSE TRIAGE (OUTPATIENT)
Dept: FAMILY MEDICINE | Facility: CLINIC | Age: 69
End: 2024-11-19
Payer: COMMERCIAL

## 2024-11-19 NOTE — TELEPHONE ENCOUNTER
Patient calling in to discuss a concern in regards to missing some medications. Patient reports either running out or forgetting to put her Effexor in her pillbox over the past 3-4 days.     Over the last day or two she has been getting a flushing sensation, almost feels like a hot flash even though her body is at normal temperature.  This sensation has been coming and going.  She has no other symptoms and otherwise is fine.    It seems like this could be a withdrawal symptom from missing her antidepressant.  Effexor has a very short half-life.    Advised patient to resume her medicine.  Is there anything else she should be doing?  Does it seem reasonable to just have her resume her medications and then follow-up if symptoms persist?      Reason for Disposition   Caller has NON-URGENT medicine question about med that PCP or specialist prescribed and triager unable to answer question    Protocols used: Medication Question Call-A-[place]

## 2024-11-19 NOTE — TELEPHONE ENCOUNTER
Called patient and relayed information/instructions from provider. Patient has no further questions at this time and will follow up as needed/instructed.    Rohit Vilchis RN     [facility]

## 2024-12-14 ASSESSMENT — ASTHMA QUESTIONNAIRES
QUESTION_4 LAST FOUR WEEKS HOW OFTEN HAVE YOU USED YOUR RESCUE INHALER OR NEBULIZER MEDICATION (SUCH AS ALBUTEROL): ONCE A WEEK OR LESS
QUESTION_5 LAST FOUR WEEKS HOW WOULD YOU RATE YOUR ASTHMA CONTROL: SOMEWHAT CONTROLLED
QUESTION_3 LAST FOUR WEEKS HOW OFTEN DID YOUR ASTHMA SYMPTOMS (WHEEZING, COUGHING, SHORTNESS OF BREATH, CHEST TIGHTNESS OR PAIN) WAKE YOU UP AT NIGHT OR EARLIER THAN USUAL IN THE MORNING: NOT AT ALL
ACT_TOTALSCORE: 19
QUESTION_1 LAST FOUR WEEKS HOW MUCH OF THE TIME DID YOUR ASTHMA KEEP YOU FROM GETTING AS MUCH DONE AT WORK, SCHOOL OR AT HOME: SOME OF THE TIME
QUESTION_2 LAST FOUR WEEKS HOW OFTEN HAVE YOU HAD SHORTNESS OF BREATH: ONCE OR TWICE A WEEK
ACT_TOTALSCORE: 19

## 2024-12-16 ENCOUNTER — OFFICE VISIT (OUTPATIENT)
Dept: FAMILY MEDICINE | Facility: CLINIC | Age: 69
End: 2024-12-16
Attending: FAMILY MEDICINE
Payer: COMMERCIAL

## 2024-12-16 ENCOUNTER — ANTICOAGULATION THERAPY VISIT (OUTPATIENT)
Dept: ANTICOAGULATION | Facility: CLINIC | Age: 69
End: 2024-12-16

## 2024-12-16 VITALS
OXYGEN SATURATION: 95 % | HEART RATE: 72 BPM | RESPIRATION RATE: 16 BRPM | WEIGHT: 249.2 LBS | BODY MASS INDEX: 35.68 KG/M2 | HEIGHT: 70 IN | SYSTOLIC BLOOD PRESSURE: 133 MMHG | DIASTOLIC BLOOD PRESSURE: 68 MMHG

## 2024-12-16 DIAGNOSIS — E11.9 TYPE 2 DIABETES MELLITUS WITHOUT COMPLICATION, WITHOUT LONG-TERM CURRENT USE OF INSULIN (H): Primary | ICD-10-CM

## 2024-12-16 DIAGNOSIS — I50.32 CHRONIC DIASTOLIC CONGESTIVE HEART FAILURE (H): ICD-10-CM

## 2024-12-16 DIAGNOSIS — Z95.2 S/P AVR (AORTIC VALVE REPLACEMENT): ICD-10-CM

## 2024-12-16 DIAGNOSIS — Z12.11 SPECIAL SCREENING FOR MALIGNANT NEOPLASMS, COLON: ICD-10-CM

## 2024-12-16 DIAGNOSIS — Z79.01 LONG TERM (CURRENT) USE OF ANTICOAGULANTS: ICD-10-CM

## 2024-12-16 DIAGNOSIS — I10 PRIMARY HYPERTENSION: ICD-10-CM

## 2024-12-16 DIAGNOSIS — J30.2 SEASONAL ALLERGIES: ICD-10-CM

## 2024-12-16 DIAGNOSIS — I48.0 PAROXYSMAL ATRIAL FIBRILLATION (H): Primary | ICD-10-CM

## 2024-12-16 DIAGNOSIS — R13.10 DYSPHAGIA, UNSPECIFIED TYPE: ICD-10-CM

## 2024-12-16 DIAGNOSIS — R06.09 DYSPNEA ON EXERTION: ICD-10-CM

## 2024-12-16 PROBLEM — J18.9 COMMUNITY ACQUIRED PNEUMONIA, UNSPECIFIED LATERALITY: Status: RESOLVED | Noted: 2021-09-28 | Resolved: 2024-12-16

## 2024-12-16 PROBLEM — Z79.899 CONTROLLED SUBSTANCE AGREEMENT SIGNED: Status: RESOLVED | Noted: 2018-05-29 | Resolved: 2024-12-16

## 2024-12-16 PROBLEM — M62.838 MUSCLE SPASM: Status: RESOLVED | Noted: 2021-11-01 | Resolved: 2024-12-16

## 2024-12-16 PROBLEM — R25.1 TREMOR: Status: RESOLVED | Noted: 2021-11-01 | Resolved: 2024-12-16

## 2024-12-16 PROBLEM — H10.33 ACUTE BACTERIAL CONJUNCTIVITIS OF BOTH EYES: Status: RESOLVED | Noted: 2021-09-28 | Resolved: 2024-12-16

## 2024-12-16 PROBLEM — R73.03 PRE-DIABETES: Status: RESOLVED | Noted: 2021-02-01 | Resolved: 2024-12-16

## 2024-12-16 PROBLEM — D62 POSTOPERATIVE ANEMIA DUE TO ACUTE BLOOD LOSS: Status: RESOLVED | Noted: 2017-01-24 | Resolved: 2024-12-16

## 2024-12-16 PROBLEM — Z79.891 ENCOUNTER FOR LONG-TERM METHADONE USE: Status: RESOLVED | Noted: 2021-11-01 | Resolved: 2024-12-16

## 2024-12-16 PROBLEM — R73.9 STRESS HYPERGLYCEMIA: Status: RESOLVED | Noted: 2017-01-24 | Resolved: 2024-12-16

## 2024-12-16 PROBLEM — I50.22 CHRONIC SYSTOLIC CONGESTIVE HEART FAILURE (H): Status: ACTIVE | Noted: 2024-12-16

## 2024-12-16 PROBLEM — I26.99 PULMONARY EMBOLISM, OTHER, UNSPECIFIED CHRONICITY, UNSPECIFIED WHETHER ACUTE COR PULMONALE PRESENT (H): Status: RESOLVED | Noted: 2024-09-04 | Resolved: 2024-12-16

## 2024-12-16 PROBLEM — S42.292G CLOSED 3-PART FRACTURE OF PROXIMAL HUMERUS WITH DELAYED HEALING, LEFT: Status: RESOLVED | Noted: 2019-04-10 | Resolved: 2024-12-16

## 2024-12-16 LAB
EST. AVERAGE GLUCOSE BLD GHB EST-MCNC: 134 MG/DL
HBA1C MFR BLD: 6.3 % (ref 0–5.6)
HOLD SPECIMEN: NORMAL
INR BLD: 3.4 (ref 0.9–1.1)

## 2024-12-16 RX ORDER — MONTELUKAST SODIUM 10 MG/1
10 TABLET ORAL AT BEDTIME
Qty: 90 TABLET | Refills: 3 | Status: SHIPPED | OUTPATIENT
Start: 2024-12-16

## 2024-12-16 RX ORDER — LISINOPRIL 10 MG/1
10 TABLET ORAL DAILY
Qty: 90 TABLET | Refills: 3 | Status: SHIPPED | OUTPATIENT
Start: 2024-12-16

## 2024-12-16 RX ORDER — METFORMIN HYDROCHLORIDE 500 MG/1
500 TABLET, EXTENDED RELEASE ORAL DAILY
Qty: 90 TABLET | Refills: 3 | Status: CANCELLED | OUTPATIENT
Start: 2024-12-16

## 2024-12-16 RX ORDER — AMOXICILLIN 500 MG/1
CAPSULE ORAL
COMMUNITY
Start: 2024-11-18

## 2024-12-16 RX ORDER — PANTOPRAZOLE SODIUM 20 MG/1
20 TABLET, DELAYED RELEASE ORAL DAILY
Qty: 90 TABLET | Refills: 3 | Status: SHIPPED | OUTPATIENT
Start: 2024-12-16

## 2024-12-16 ASSESSMENT — PATIENT HEALTH QUESTIONNAIRE - PHQ9
SUM OF ALL RESPONSES TO PHQ QUESTIONS 1-9: 9
10. IF YOU CHECKED OFF ANY PROBLEMS, HOW DIFFICULT HAVE THESE PROBLEMS MADE IT FOR YOU TO DO YOUR WORK, TAKE CARE OF THINGS AT HOME, OR GET ALONG WITH OTHER PEOPLE: SOMEWHAT DIFFICULT
SUM OF ALL RESPONSES TO PHQ QUESTIONS 1-9: 9

## 2024-12-16 NOTE — PROGRESS NOTES
ANTICOAGULATION MANAGEMENT     Eileen Donald 69 year old female is on warfarin with therapeutic INR result. (Goal INR 2.5-3.5)    Recent labs: (last 7 days)     12/16/24  1309   INR 3.4*       ASSESSMENT     Source(s): Chart Review and Patient/Caregiver Call     Warfarin doses taken: Warfarin taken as instructed  Diet: No new diet changes identified  Medication/supplement changes: None noted  New illness, injury, or hospitalization: No   Complained of nerve pain of foot - she will make appt with her neurologist.  Also has appt with her Vascular doctor on 1/15/25.   OV today with PCP for diabetes follow-up - no new changes with her regimen.  Signs or symptoms of bleeding or clotting: No  Previous result: Therapeutic last  INR visits  Additional findings: None       PLAN     Recommended plan for ongoing change(s) affecting INR     Dosing Instructions: Continue your current warfarin dose with next INR in 4 weeks       Summary  As of 12/16/2024      Full warfarin instructions:  5 mg every Mon, Fri; 2.5 mg all other days   Next INR check:  1/20/2025               Telephone call with Marilee who verbalizes understanding and agrees to plan    Lab visit scheduled - INR on  1/10/25 @ [facility]   - appt also with MTM    Education provided: Taking warfarin: Importance of taking warfarin as instructed  Goal range and lab monitoring: goal range and significance of current result    Plan made per [facility] anticoagulation protocol    Alondra Lora RN  12/16/2024  Anticoagulation Clinic  Smartmarket for routing messages: ariadna GAO  [facility] patient phone line: [phone]        _______________________________________________________________________     Anticoagulation Episode Summary       Current INR goal:  2.5-3.5   TTR:  64.4% (1 y)   Target end date:  Indefinite   Send INR reminders to:  LARISA GAO    Indications    Heart valve replaced (Resolved) [Z95.2]  Paroxysmal atrial fibrillation (H) [I48.0]  S/P  AVR (aortic valve replacement) [Z95.2]  Other pulmonary embolism without acute cor pulmonale  unspecified chronicity (H) (Resolved) [I26.99]  H/O aortic valve replacement (Resolved) [Z95.2]  Long term (current) use of anticoagulants [Z79.01]  Pulmonary embolism  other  unspecified chronicity  unspecified whether acute cor pulmonale present (H) (Resolved) [I26.99]  Pulmonary embolism  other  unspecified chronicity  unspecified whether acute cor pulmonale present (H) (Resolved) [I26.99]  Pulmonary embolism  other  unspecified chronicity  unspecified whether acute cor pulmonale present (H) (Resolved) [I26.99]             Comments:  12/15/21 - amended INR goal to 2.5 - 3.5             Anticoagulation Care Providers       Provider Role Specialty Phone number    Tito Salazar MD Referring Family Medicine [phone]    Drew Hahn MD Referring Family Medicine [phone]

## 2024-12-16 NOTE — PROGRESS NOTES
Assessment & Plan     ICD-10-CM    1. Type 2 diabetes mellitus without complication, without long-term current use of insulin (H)  E11.9       2. Primary hypertension  I10 lisinopril (ZESTRIL) 10 MG tablet      3. Seasonal allergies  J30.2 montelukast (SINGULAIR) 10 MG tablet      4. Dysphagia, unspecified type  R13.10 pantoprazole (PROTONIX) 20 MG EC tablet      5. S/P AVR (aortic valve replacement)  Z95.2 INR point of care (finger stick)     CANCELED: INR point of care (finger stick)- Recommended for same day result      6. Chronic diastolic congestive heart failure (H)  I50.32 Basic metabolic panel  (Ca, Cl, CO2, Creat, Gluc, K, Na, BUN)     Basic metabolic panel  (Ca, Cl, CO2, Creat, Gluc, K, Na, BUN)      7. Dyspnea on exertion  R06.09       8. Special screening for malignant neoplasms, colon  Z12.11 Colonoscopy Screening  Referral      9. Long term (current) use of anticoagulants  Z79.01 INR point of care (finger stick)        Patient presents today for type 2 diabetes.  Following issues addressed    1: Type 2 diabetes: Stopped using metformin because it causes diarrhea.  Not interested in any other medication as she believes she is not too many medication.  Discussed that she has plateaued off for weight loss which has been beneficial.  Discussed about GLP-1 medication and she defers.  2: Primary hypertension: Stable.  Continue current medications  3: Seasonal allergies with asthma: On Singulair and albuterol.  Reviewed pulmonology note from August 2024 and the specialist note mentions that it is not clear if she really has asthma as 2017 showed nonspecific reductions in spirometry and some restriction, likely due to body habitus.  She continues to be using supplemental oxygen at night.  She has upcoming appointment with pulmonology.  Continue cares with pulmonologist  4: Symptoms of dysphagia with global symptoms of hoarseness: Has been evaluated by ENT in the past with direct laryngoscopy and was  "told that her symptoms may be likely related to MS.  She is on PPI that she can continue to use  5: Dyspnea on exertion: Possibly related to deconditioning and MS and general health.  This is not worsening and stable.  6: Chronic diastolic heart failure.  Stable.  Patient is euvolemic.  Follows with [facility] heart specialist.  Reviewed the note and updated problem list.  Continue on furosemide 40 mg daily.  7: History of polyps and screening colonoscopy: Reminded patient that she is due and referral is done.  Reviewed.'s colonoscopy results.  8: On Coumadin and ASA.  Not a candidate for DOAC due to mechanical fall.    There is sleep apnea mentioned on patient's problem list.  She has fragmented sleep but her sleep study did not show sleep apnea.  She is not on CPAP.  Problem list updated.    BMI  Estimated body mass index is 35.76 kg/m  as calculated from the following:    Height as of this encounter: 1.778 m (5' 10\").    Weight as of this encounter: 113 kg (249 lb 3.2 oz).   Weight management plan: Discussed healthy diet and exercise guidelines      MEDICATIONS:  Continue current medications without change  See Patient Instructions  Total time spent including chart review, patient interview, discussion of plan of care, examination and documentation exceeded 40 minutes.    William Hunt is a 69 year old, presenting for the following health issues:  Diabetes (Follow up- just a check in, and get INR checked. A1C check today. Not fasting today. )        12/16/2024    12:02 PM   Additional Questions   Roomed by Odalis Salas CMA     History of Present Illness     Asthma:  She presents for follow up of asthma.  She has no cough, some wheezing, and some shortness of breath.  She is using a relief medication a few times a week. She does not have a controller medication. Patient is aware of the following triggers: same as previous visit and humidity. The patient has not had a visit to the Emergency Room, Urgent Care or " Hospital due to asthma since the last clinic visit.     Vascular Disease:  She presents for follow up of vascular disease.     She never takes nitroglycerin. She takes daily aspirin.    She eats 0-1 servings of fruits and vegetables daily.She consumes 1 sweetened beverage(s) daily.She exercises with enough effort to increase her heart rate 9 or less minutes per day.  She exercises with enough effort to increase her heart rate 3 or less days per week.   She is taking medications regularly.       Patient Active Problem List   Diagnosis    Asthma    Benign essential hypertension    Multiple Sclerosis    Hyperlipidemia, unspecified hyperlipidemia type    Dysphagia    Benign Adenomatous Polyp Of The Large Intestine    History of pulmonary embolism    Morbid obesity (H)    Generalized anxiety disorder    Primary osteoarthritis of both hands    Pulmonary hypertension (H)    Paroxysmal atrial fibrillation (H)    Dyslipidemia    Dyspnea on exertion    Recurrent major depressive disorder, in partial remission (H)    Type 2 diabetes mellitus with other specified complication, unspecified whether long term insulin use (H)    Osteoarthrosis    S/P AVR (aortic valve replacement)    Sleep apnea    History of fall    Leg weakness, bilateral    Neurogenic bladder    Other symptoms and signs involving cognitive functions and awareness    Unsteady gait    Relapsing remitting multiple sclerosis (H)    Elevated MCV    Hip pain, right    Long term (current) use of anticoagulants    Spinal stenosis of cervical region    Chronic respiratory failure with hypoxia (H)    Chronic systolic congestive heart failure (H)     Current Outpatient Medications   Medication Sig Dispense Refill    amoxicillin (AMOXIL) 500 MG capsule TAKE 4 CAPSULES BY MOUTH 30 TO 60 MINUTES PRIOR TO PROCEDURE      lisinopril (ZESTRIL) 10 MG tablet Take 1 tablet (10 mg) by mouth daily. 90 tablet 3    montelukast (SINGULAIR) 10 MG tablet Take 1 tablet (10 mg) by mouth at  bedtime. 90 tablet 3    pantoprazole (PROTONIX) 20 MG EC tablet Take 1 tablet (20 mg) by mouth daily. 90 tablet 3    acetaminophen (TYLENOL) 325 MG tablet Take 325-650 mg by mouth every 6 hours as needed       acyclovir (ZOVIRAX) 400 MG tablet Take 400 mg by mouth 2 times daily       albuterol (PROAIR HFA/PROVENTIL HFA/VENTOLIN HFA) 108 (90 Base) MCG/ACT inhaler Inhale 2 puffs into the lungs every 6 hours as needed for shortness of breath or wheezing 18 g 11    aspirin (ASA) 81 MG chewable tablet Take 81 mg by mouth daily       buPROPion (WELLBUTRIN XL) 150 MG 24 hr tablet Take 1 tablet (150 mg) by mouth daily 90 tablet 3    esomeprazole (NEXIUM) 20 MG DR capsule Take 1 capsule (20 mg) by mouth every morning (before breakfast) 90 capsule 3    furosemide (LASIX) 40 MG tablet Take 1 tablet (40 mg) by mouth daily.      loratadine (CLARITIN) 10 MG tablet Take 1 tablet (10 mg) by mouth daily 30 tablet 3    metoprolol tartrate (LOPRESSOR) 25 MG tablet Take 1 tablet (25 mg) by mouth 2 times daily 180 tablet 2    Ocrelizumab (OCREVUS IV) Inject into the vein every 6 months      potassium chloride ER (MICRO-K) 10 MEQ CR capsule Take 10 mEq by mouth daily       simvastatin (ZOCOR) 20 MG tablet Take 1 tablet (20 mg) by mouth at bedtime 90 tablet 3    venlafaxine (EFFEXOR XR) 75 MG 24 hr capsule TAKE 3 CAPSULES BY MOUTH EVERY  capsule 3    warfarin ANTICOAGULANT (COUMADIN) 2.5 MG tablet Take 1 tab (2.5mg) to 2 tabs (5mg) by mouth daily, as directed.  Adjust dose based on INR results. 270 tablet 1     Current Facility-Administered Medications   Medication Dose Route Frequency Provider Last Rate Last Admin    ropivacaine (NAROPIN) injection 3 mL  3 mL   Ravi Patel DO   3 mL at 09/07/22 1415    triamcinolone (KENALOG-40) injection 40 mg  40 mg   Ravi Patel DO   40 mg at 09/07/22 1415         Review of Systems  Constitutional, neuro, ENT, endocrine, pulmonary, cardiac, gastrointestinal,  "genitourinary, musculoskeletal, integument and psychiatric systems are negative, except as otherwise noted.      Objective    /68 (BP Location: Left arm, Patient Position: Sitting, Cuff Size: Adult Regular)   Pulse 72   Resp 16   Ht 1.778 m (5' 10\")   Wt 113 kg (249 lb 3.2 oz)   SpO2 95%   BMI 35.76 kg/m    Body mass index is 35.76 kg/m .  Physical Exam   GENERAL: alert and no distress  EYES: Eyes grossly normal to inspection, PERRL and conjunctivae and sclerae normal  NECK: no adenopathy and thyroid normal to palpation  RESP: lungs clear to auscultation - no rales, rhonchi or wheezes  CV: regular rates and rhythm  MS: no gross musculoskeletal defects noted, no edema  Diabetic foot exam: normal DP and PT pulses, no trophic changes or ulcerative lesions, normal sensory exam, and normal monofilament exam    Results for orders placed or performed in visit on 12/16/24 (from the past 24 hours)   HEMOGLOBIN A1C   Result Value Ref Range    Estimated Average Glucose 134 (H) <117 mg/dL    Hemoglobin A1C 6.3 (H) 0.0 - 5.6 %   Extra Tube    Narrative    The following orders were created for panel order Extra Tube.  Procedure                               Abnormality         Status                     ---------                               -----------         ------                     Extra Green Top (Lithium...[[id number]]                      Final result                 Please view results for these tests on the individual orders.   Extra Green Top (Lithium Heparin) Tube   Result Value Ref Range    Hold Specimen [facility]    INR point of care (finger stick)   Result Value Ref Range    INR 3.4 (H) 0.9 - 1.1    Narrative    This test is intended for monitoring Coumadin therapy. Results are not accurate in patients with prolonged INR due to factor deficiency.     *Note: Due to a large number of results and/or encounters for the requested time period, some results have not been displayed. A complete set of results can be " found in Results Review.           Signed Electronically by: Drew Hahn MD

## 2024-12-17 LAB
ANION GAP SERPL CALCULATED.3IONS-SCNC: 11 MMOL/L (ref 7–15)
BUN SERPL-MCNC: 9.1 MG/DL (ref 8–23)
CALCIUM SERPL-MCNC: 8.9 MG/DL (ref 8.8–10.4)
CHLORIDE SERPL-SCNC: 103 MMOL/L (ref 98–107)
CREAT SERPL-MCNC: 0.87 MG/DL (ref 0.51–0.95)
EGFRCR SERPLBLD CKD-EPI 2021: 72 ML/MIN/1.73M2
GLUCOSE SERPL-MCNC: 190 MG/DL (ref 70–99)
HCO3 SERPL-SCNC: 28 MMOL/L (ref 22–29)
POTASSIUM SERPL-SCNC: 4.2 MMOL/L (ref 3.4–5.3)
SODIUM SERPL-SCNC: 142 MMOL/L (ref 135–145)

## 2025-01-15 ENCOUNTER — HOSPITAL ENCOUNTER (OUTPATIENT)
Facility: HOSPITAL | Age: 70
Setting detail: OBSERVATION
LOS: 1 days | Discharge: HOME | End: 2025-01-16
Attending: EMERGENCY MEDICINE | Admitting: HOSPITALIST
Payer: COMMERCIAL

## 2025-01-15 ENCOUNTER — APPOINTMENT (OUTPATIENT)
Dept: CT IMAGING | Facility: HOSPITAL | Age: 70
End: 2025-01-15
Attending: EMERGENCY MEDICINE
Payer: COMMERCIAL

## 2025-01-15 ENCOUNTER — APPOINTMENT (OUTPATIENT)
Dept: MRI IMAGING | Facility: HOSPITAL | Age: 70
End: 2025-01-15
Attending: NURSE PRACTITIONER
Payer: COMMERCIAL

## 2025-01-15 ENCOUNTER — OFFICE VISIT (OUTPATIENT)
Dept: VASCULAR SURGERY | Facility: CLINIC | Age: 70
End: 2025-01-15
Attending: SPECIALIST
Payer: COMMERCIAL

## 2025-01-15 ENCOUNTER — APPOINTMENT (OUTPATIENT)
Dept: CARDIOLOGY | Facility: HOSPITAL | Age: 70
End: 2025-01-15
Attending: INTERNAL MEDICINE
Payer: COMMERCIAL

## 2025-01-15 ENCOUNTER — LAB (OUTPATIENT)
Dept: LAB | Facility: CLINIC | Age: 70
End: 2025-01-15
Payer: COMMERCIAL

## 2025-01-15 ENCOUNTER — ANTICOAGULATION THERAPY VISIT (OUTPATIENT)
Dept: ANTICOAGULATION | Facility: CLINIC | Age: 70
End: 2025-01-15

## 2025-01-15 VITALS — OXYGEN SATURATION: 94 % | HEART RATE: 70 BPM | SYSTOLIC BLOOD PRESSURE: 93 MMHG | DIASTOLIC BLOOD PRESSURE: 67 MMHG

## 2025-01-15 DIAGNOSIS — I48.0 PAROXYSMAL ATRIAL FIBRILLATION (H): Primary | ICD-10-CM

## 2025-01-15 DIAGNOSIS — Z79.01 LONG TERM (CURRENT) USE OF ANTICOAGULANTS: ICD-10-CM

## 2025-01-15 DIAGNOSIS — R20.0 ARM NUMBNESS LEFT: ICD-10-CM

## 2025-01-15 DIAGNOSIS — I26.99 PULMONARY EMBOLISM, OTHER, UNSPECIFIED CHRONICITY, UNSPECIFIED WHETHER ACUTE COR PULMONALE PRESENT (H): ICD-10-CM

## 2025-01-15 DIAGNOSIS — Z95.2 S/P AVR (AORTIC VALVE REPLACEMENT): ICD-10-CM

## 2025-01-15 DIAGNOSIS — I48.0 PAROXYSMAL ATRIAL FIBRILLATION (H): ICD-10-CM

## 2025-01-15 DIAGNOSIS — I63.40 CEREBROVASCULAR ACCIDENT (CVA) DUE TO EMBOLISM OF CEREBRAL ARTERY (H): Primary | ICD-10-CM

## 2025-01-15 DIAGNOSIS — I83.893 SYMPTOMATIC VARICOSE VEINS OF BOTH LOWER EXTREMITIES: Primary | ICD-10-CM

## 2025-01-15 DIAGNOSIS — R29.898 LEFT ARM WEAKNESS: ICD-10-CM

## 2025-01-15 LAB
ANION GAP SERPL CALCULATED.3IONS-SCNC: 11 MMOL/L (ref 7–15)
APTT PPP: 28 SECONDS (ref 22–38)
BASOPHILS # BLD AUTO: 0.1 10E3/UL (ref 0–0.2)
BASOPHILS NFR BLD AUTO: 1 %
BUN SERPL-MCNC: 13.4 MG/DL (ref 8–23)
CALCIUM SERPL-MCNC: 9.2 MG/DL (ref 8.8–10.4)
CHLORIDE SERPL-SCNC: 101 MMOL/L (ref 98–107)
CHOLEST SERPL-MCNC: 243 MG/DL
CREAT SERPL-MCNC: 0.95 MG/DL (ref 0.51–0.95)
EGFRCR SERPLBLD CKD-EPI 2021: 65 ML/MIN/1.73M2
EOSINOPHIL # BLD AUTO: 0.4 10E3/UL (ref 0–0.7)
EOSINOPHIL NFR BLD AUTO: 3 %
ERYTHROCYTE [DISTWIDTH] IN BLOOD BY AUTOMATED COUNT: 12.7 % (ref 10–15)
ERYTHROCYTE [DISTWIDTH] IN BLOOD BY AUTOMATED COUNT: 12.8 % (ref 10–15)
GLUCOSE BLDC GLUCOMTR-MCNC: 133 MG/DL (ref 70–99)
GLUCOSE BLDC GLUCOMTR-MCNC: 161 MG/DL (ref 70–99)
GLUCOSE BLDC GLUCOMTR-MCNC: 196 MG/DL (ref 70–99)
GLUCOSE SERPL-MCNC: 130 MG/DL (ref 70–99)
HCO3 SERPL-SCNC: 28 MMOL/L (ref 22–29)
HCT VFR BLD AUTO: 41.6 % (ref 35–47)
HCT VFR BLD AUTO: 47.1 % (ref 35–47)
HDLC SERPL-MCNC: 68 MG/DL
HGB BLD-MCNC: 13.5 G/DL (ref 11.7–15.7)
HGB BLD-MCNC: 14.9 G/DL (ref 11.7–15.7)
HOLD SPECIMEN: NORMAL
IMM GRANULOCYTES # BLD: 0.1 10E3/UL
IMM GRANULOCYTES NFR BLD: 1 %
INR BLD: 1.1 (ref 0.9–1.1)
INR PPP: 1.1 (ref 0.85–1.15)
INR PPP: 1.17 (ref 0.85–1.15)
LDLC SERPL CALC-MCNC: 112 MG/DL
LYMPHOCYTES # BLD AUTO: 2 10E3/UL (ref 0.8–5.3)
LYMPHOCYTES NFR BLD AUTO: 18 %
MCH RBC QN AUTO: 31.1 PG (ref 26.5–33)
MCH RBC QN AUTO: 31.4 PG (ref 26.5–33)
MCHC RBC AUTO-ENTMCNC: 31.6 G/DL (ref 31.5–36.5)
MCHC RBC AUTO-ENTMCNC: 32.5 G/DL (ref 31.5–36.5)
MCV RBC AUTO: 97 FL (ref 78–100)
MCV RBC AUTO: 98 FL (ref 78–100)
MONOCYTES # BLD AUTO: 0.9 10E3/UL (ref 0–1.3)
MONOCYTES NFR BLD AUTO: 8 %
NEUTROPHILS # BLD AUTO: 7.7 10E3/UL (ref 1.6–8.3)
NEUTROPHILS NFR BLD AUTO: 69 %
NONHDLC SERPL-MCNC: 175 MG/DL
NRBC # BLD AUTO: 0 10E3/UL
NRBC BLD AUTO-RTO: 0 /100
NT-PROBNP SERPL-MCNC: 117 PG/ML (ref 0–900)
PLATELET # BLD AUTO: 269 10E3/UL (ref 150–450)
PLATELET # BLD AUTO: 308 10E3/UL (ref 150–450)
POTASSIUM SERPL-SCNC: 4 MMOL/L (ref 3.4–5.3)
RBC # BLD AUTO: 4.3 10E6/UL (ref 3.8–5.2)
RBC # BLD AUTO: 4.79 10E6/UL (ref 3.8–5.2)
SODIUM SERPL-SCNC: 140 MMOL/L (ref 135–145)
TRIGL SERPL-MCNC: 314 MG/DL
TROPONIN T SERPL HS-MCNC: 22 NG/L
TROPONIN T SERPL HS-MCNC: 27 NG/L
UFH PPP CHRO-ACNC: 0.22 IU/ML
WBC # BLD AUTO: 10.3 10E3/UL (ref 4–11)
WBC # BLD AUTO: 11.2 10E3/UL (ref 4–11)

## 2025-01-15 PROCEDURE — G0508 CRIT CARE TELEHEA CONSULT 60: HCPCS | Mod: G0 | Performed by: STUDENT IN AN ORGANIZED HEALTH CARE EDUCATION/TRAINING PROGRAM

## 2025-01-15 PROCEDURE — 36416 COLLJ CAPILLARY BLOOD SPEC: CPT

## 2025-01-15 PROCEDURE — G0463 HOSPITAL OUTPT CLINIC VISIT: HCPCS | Performed by: SPECIALIST

## 2025-01-15 PROCEDURE — 82465 ASSAY BLD/SERUM CHOLESTEROL: CPT | Performed by: INTERNAL MEDICINE

## 2025-01-15 PROCEDURE — 93306 TTE W/DOPPLER COMPLETE: CPT | Mod: 26 | Performed by: INTERNAL MEDICINE

## 2025-01-15 PROCEDURE — 80048 BASIC METABOLIC PNL TOTAL CA: CPT | Performed by: EMERGENCY MEDICINE

## 2025-01-15 PROCEDURE — 36415 COLL VENOUS BLD VENIPUNCTURE: CPT | Performed by: INTERNAL MEDICINE

## 2025-01-15 PROCEDURE — 250N000011 HC RX IP 250 OP 636: Performed by: EMERGENCY MEDICINE

## 2025-01-15 PROCEDURE — 82962 GLUCOSE BLOOD TEST: CPT

## 2025-01-15 PROCEDURE — 999N000226 HC STATISTIC SLP IP EVAL DEFER

## 2025-01-15 PROCEDURE — 93005 ELECTROCARDIOGRAM TRACING: CPT | Performed by: EMERGENCY MEDICINE

## 2025-01-15 PROCEDURE — 96366 THER/PROPH/DIAG IV INF ADDON: CPT

## 2025-01-15 PROCEDURE — 83880 ASSAY OF NATRIURETIC PEPTIDE: CPT | Performed by: INTERNAL MEDICINE

## 2025-01-15 PROCEDURE — 99223 1ST HOSP IP/OBS HIGH 75: CPT | Performed by: INTERNAL MEDICINE

## 2025-01-15 PROCEDURE — 70496 CT ANGIOGRAPHY HEAD: CPT

## 2025-01-15 PROCEDURE — 120N000001 HC R&B MED SURG/OB

## 2025-01-15 PROCEDURE — 99285 EMERGENCY DEPT VISIT HI MDM: CPT | Mod: 25

## 2025-01-15 PROCEDURE — A9585 GADOBUTROL INJECTION: HCPCS | Performed by: NURSE PRACTITIONER

## 2025-01-15 PROCEDURE — 255N000002 HC RX 255 OP 636: Performed by: INTERNAL MEDICINE

## 2025-01-15 PROCEDURE — 82435 ASSAY OF BLOOD CHLORIDE: CPT | Performed by: EMERGENCY MEDICINE

## 2025-01-15 PROCEDURE — 85025 COMPLETE CBC W/AUTO DIFF WBC: CPT | Performed by: EMERGENCY MEDICINE

## 2025-01-15 PROCEDURE — 99213 OFFICE O/P EST LOW 20 MIN: CPT | Performed by: SPECIALIST

## 2025-01-15 PROCEDURE — 96365 THER/PROPH/DIAG IV INF INIT: CPT | Mod: 59

## 2025-01-15 PROCEDURE — 85610 PROTHROMBIN TIME: CPT

## 2025-01-15 PROCEDURE — 70553 MRI BRAIN STEM W/O & W/DYE: CPT

## 2025-01-15 PROCEDURE — 85610 PROTHROMBIN TIME: CPT | Performed by: EMERGENCY MEDICINE

## 2025-01-15 PROCEDURE — 96375 TX/PRO/DX INJ NEW DRUG ADDON: CPT

## 2025-01-15 PROCEDURE — 84484 ASSAY OF TROPONIN QUANT: CPT | Performed by: INTERNAL MEDICINE

## 2025-01-15 PROCEDURE — 85520 HEPARIN ASSAY: CPT | Performed by: INTERNAL MEDICINE

## 2025-01-15 PROCEDURE — 85027 COMPLETE CBC AUTOMATED: CPT | Performed by: INTERNAL MEDICINE

## 2025-01-15 PROCEDURE — 85730 THROMBOPLASTIN TIME PARTIAL: CPT | Performed by: EMERGENCY MEDICINE

## 2025-01-15 PROCEDURE — 250N000013 HC RX MED GY IP 250 OP 250 PS 637: Performed by: INTERNAL MEDICINE

## 2025-01-15 PROCEDURE — 82374 ASSAY BLOOD CARBON DIOXIDE: CPT | Performed by: EMERGENCY MEDICINE

## 2025-01-15 PROCEDURE — 250N000011 HC RX IP 250 OP 636: Performed by: INTERNAL MEDICINE

## 2025-01-15 PROCEDURE — 84484 ASSAY OF TROPONIN QUANT: CPT | Performed by: EMERGENCY MEDICINE

## 2025-01-15 PROCEDURE — 85610 PROTHROMBIN TIME: CPT | Performed by: INTERNAL MEDICINE

## 2025-01-15 PROCEDURE — 255N000002 HC RX 255 OP 636: Performed by: NURSE PRACTITIONER

## 2025-01-15 PROCEDURE — 36415 COLL VENOUS BLD VENIPUNCTURE: CPT | Performed by: EMERGENCY MEDICINE

## 2025-01-15 RX ORDER — PANTOPRAZOLE SODIUM 20 MG/1
20 TABLET, DELAYED RELEASE ORAL DAILY
Status: DISCONTINUED | OUTPATIENT
Start: 2025-01-15 | End: 2025-01-16 | Stop reason: HOSPADM

## 2025-01-15 RX ORDER — ONDANSETRON 4 MG/1
4 TABLET, ORALLY DISINTEGRATING ORAL EVERY 6 HOURS PRN
Status: DISCONTINUED | OUTPATIENT
Start: 2025-01-15 | End: 2025-01-16 | Stop reason: HOSPADM

## 2025-01-15 RX ORDER — FUROSEMIDE 20 MG/1
40 TABLET ORAL DAILY
Status: DISCONTINUED | OUTPATIENT
Start: 2025-01-15 | End: 2025-01-16 | Stop reason: HOSPADM

## 2025-01-15 RX ORDER — IOPAMIDOL 755 MG/ML
67 INJECTION, SOLUTION INTRAVASCULAR ONCE
Status: COMPLETED | OUTPATIENT
Start: 2025-01-15 | End: 2025-01-15

## 2025-01-15 RX ORDER — BUPROPION HYDROCHLORIDE 150 MG/1
150 TABLET ORAL DAILY
Status: DISCONTINUED | OUTPATIENT
Start: 2025-01-15 | End: 2025-01-16 | Stop reason: HOSPADM

## 2025-01-15 RX ORDER — GADOBUTROL 604.72 MG/ML
11 INJECTION INTRAVENOUS ONCE
Status: COMPLETED | OUTPATIENT
Start: 2025-01-15 | End: 2025-01-15

## 2025-01-15 RX ORDER — ACYCLOVIR 400 MG/1
400 TABLET ORAL 2 TIMES DAILY
Status: DISCONTINUED | OUTPATIENT
Start: 2025-01-15 | End: 2025-01-16 | Stop reason: HOSPADM

## 2025-01-15 RX ORDER — ASPIRIN 81 MG/1
81 TABLET, CHEWABLE ORAL DAILY
Status: DISCONTINUED | OUTPATIENT
Start: 2025-01-15 | End: 2025-01-16 | Stop reason: HOSPADM

## 2025-01-15 RX ORDER — LORAZEPAM 2 MG/ML
0.5 INJECTION INTRAMUSCULAR ONCE
Status: COMPLETED | OUTPATIENT
Start: 2025-01-15 | End: 2025-01-15

## 2025-01-15 RX ORDER — ACETAMINOPHEN 325 MG/10.15ML
650 LIQUID ORAL EVERY 4 HOURS PRN
Status: DISCONTINUED | OUTPATIENT
Start: 2025-01-15 | End: 2025-01-16 | Stop reason: HOSPADM

## 2025-01-15 RX ORDER — ACETAMINOPHEN 650 MG/1
650 SUPPOSITORY RECTAL EVERY 4 HOURS PRN
Status: DISCONTINUED | OUTPATIENT
Start: 2025-01-15 | End: 2025-01-16 | Stop reason: HOSPADM

## 2025-01-15 RX ORDER — LORATADINE 10 MG/1
10 TABLET ORAL DAILY
Status: DISCONTINUED | OUTPATIENT
Start: 2025-01-15 | End: 2025-01-16 | Stop reason: HOSPADM

## 2025-01-15 RX ORDER — LIDOCAINE 40 MG/G
CREAM TOPICAL
Status: DISCONTINUED | OUTPATIENT
Start: 2025-01-15 | End: 2025-01-16 | Stop reason: HOSPADM

## 2025-01-15 RX ORDER — ACETAMINOPHEN 325 MG/1
650 TABLET ORAL EVERY 4 HOURS PRN
Status: DISCONTINUED | OUTPATIENT
Start: 2025-01-15 | End: 2025-01-16 | Stop reason: HOSPADM

## 2025-01-15 RX ORDER — HEPARIN SODIUM 10000 [USP'U]/100ML
0-5000 INJECTION, SOLUTION INTRAVENOUS CONTINUOUS
Status: DISCONTINUED | OUTPATIENT
Start: 2025-01-15 | End: 2025-01-16 | Stop reason: HOSPADM

## 2025-01-15 RX ORDER — METOPROLOL TARTRATE 25 MG/1
25 TABLET, FILM COATED ORAL 2 TIMES DAILY
Status: DISCONTINUED | OUTPATIENT
Start: 2025-01-15 | End: 2025-01-16 | Stop reason: HOSPADM

## 2025-01-15 RX ORDER — MONTELUKAST SODIUM 10 MG/1
10 TABLET ORAL AT BEDTIME
Status: DISCONTINUED | OUTPATIENT
Start: 2025-01-15 | End: 2025-01-16 | Stop reason: HOSPADM

## 2025-01-15 RX ORDER — ONDANSETRON 2 MG/ML
4 INJECTION INTRAMUSCULAR; INTRAVENOUS EVERY 6 HOURS PRN
Status: DISCONTINUED | OUTPATIENT
Start: 2025-01-15 | End: 2025-01-16 | Stop reason: HOSPADM

## 2025-01-15 RX ORDER — VITAMIN B COMPLEX
25 TABLET ORAL DAILY
COMMUNITY

## 2025-01-15 RX ORDER — CALCIUM CARBONATE 500 MG/1
1 TABLET, CHEWABLE ORAL 2 TIMES DAILY
COMMUNITY

## 2025-01-15 RX ORDER — CALCIUM CARBONATE 500 MG/1
500 TABLET, CHEWABLE ORAL 2 TIMES DAILY
Status: DISCONTINUED | OUTPATIENT
Start: 2025-01-15 | End: 2025-01-16 | Stop reason: HOSPADM

## 2025-01-15 RX ORDER — WARFARIN SODIUM 5 MG/1
5 TABLET ORAL
Status: COMPLETED | OUTPATIENT
Start: 2025-01-15 | End: 2025-01-15

## 2025-01-15 RX ORDER — ROSUVASTATIN CALCIUM 10 MG/1
20 TABLET, COATED ORAL AT BEDTIME
Status: DISCONTINUED | OUTPATIENT
Start: 2025-01-15 | End: 2025-01-16 | Stop reason: HOSPADM

## 2025-01-15 RX ORDER — POTASSIUM CHLORIDE 750 MG/1
10 CAPSULE, EXTENDED RELEASE ORAL DAILY
Status: DISCONTINUED | OUTPATIENT
Start: 2025-01-15 | End: 2025-01-16 | Stop reason: HOSPADM

## 2025-01-15 RX ORDER — LISINOPRIL 5 MG/1
10 TABLET ORAL DAILY
Status: DISCONTINUED | OUTPATIENT
Start: 2025-01-15 | End: 2025-01-16 | Stop reason: HOSPADM

## 2025-01-15 RX ORDER — ALBUTEROL SULFATE 90 UG/1
2 INHALANT RESPIRATORY (INHALATION) EVERY 6 HOURS PRN
Status: DISCONTINUED | OUTPATIENT
Start: 2025-01-15 | End: 2025-01-16 | Stop reason: HOSPADM

## 2025-01-15 RX ADMIN — WARFARIN SODIUM 5 MG: 5 TABLET ORAL at 18:54

## 2025-01-15 RX ADMIN — LORAZEPAM 0.5 MG: 2 INJECTION INTRAMUSCULAR; INTRAVENOUS at 11:56

## 2025-01-15 RX ADMIN — PANTOPRAZOLE SODIUM 20 MG: 20 TABLET, DELAYED RELEASE ORAL at 16:07

## 2025-01-15 RX ADMIN — ROSUVASTATIN 20 MG: 10 TABLET, FILM COATED ORAL at 20:23

## 2025-01-15 RX ADMIN — IOPAMIDOL 67 ML: 755 INJECTION, SOLUTION INTRAVENOUS at 11:03

## 2025-01-15 RX ADMIN — LORATADINE 10 MG: 10 TABLET ORAL at 16:07

## 2025-01-15 RX ADMIN — GADOBUTROL 11 ML: 604.72 INJECTION INTRAVENOUS at 12:28

## 2025-01-15 RX ADMIN — PERFLUTREN 2 ML: 6.52 INJECTION, SUSPENSION INTRAVENOUS at 14:14

## 2025-01-15 RX ADMIN — BUPROPION HYDROCHLORIDE 150 MG: 150 TABLET, FILM COATED, EXTENDED RELEASE ORAL at 16:08

## 2025-01-15 RX ADMIN — HEPARIN SODIUM 1200 UNITS/HR: 10000 INJECTION, SOLUTION INTRAVENOUS at 16:00

## 2025-01-15 RX ADMIN — ASPIRIN 81 MG CHEWABLE TABLET 81 MG: 81 TABLET CHEWABLE at 16:07

## 2025-01-15 RX ADMIN — ANTACID TABLETS 500 MG: 500 TABLET, CHEWABLE ORAL at 20:23

## 2025-01-15 RX ADMIN — ACYCLOVIR 400 MG: 400 TABLET ORAL at 20:23

## 2025-01-15 RX ADMIN — MONTELUKAST 10 MG: 10 TABLET, FILM COATED ORAL at 20:23

## 2025-01-15 ASSESSMENT — ACTIVITIES OF DAILY LIVING (ADL)
ADLS_ACUITY_SCORE: 31
DRESSING/BATHING_DIFFICULTY: NO
DOING_ERRANDS_INDEPENDENTLY_DIFFICULTY: NO
FALL_HISTORY_WITHIN_LAST_SIX_MONTHS: NO
ADLS_ACUITY_SCORE: 52
ADLS_ACUITY_SCORE: 52
ADLS_ACUITY_SCORE: 38
ADLS_ACUITY_SCORE: 29
ADLS_ACUITY_SCORE: 29
ADLS_ACUITY_SCORE: 31
ADLS_ACUITY_SCORE: 52
ADLS_ACUITY_SCORE: 52
DEPENDENT_IADLS:: CLEANING;COOKING;LAUNDRY;SHOPPING;TRANSPORTATION
ADLS_ACUITY_SCORE: 29
TOILETING_ISSUES: NO
ADLS_ACUITY_SCORE: 31
CHANGE_IN_FUNCTIONAL_STATUS_SINCE_ONSET_OF_CURRENT_ILLNESS/INJURY: NO
ADLS_ACUITY_SCORE: 31
ADLS_ACUITY_SCORE: 29

## 2025-01-15 ASSESSMENT — PAIN SCALES - GENERAL: PAINLEVEL_OUTOF10: NO PAIN (0)

## 2025-01-15 NOTE — PROGRESS NOTES
[facility]        Patient is here for a  follow up  to discuss Varicose vein(s). The patient has varicose veins that are problematic in bilateral legs. Patient states their varicose veins are bothersome when standing, sitting, working, and household chores.     Patient has been using pain medication or anti-inflammatory's. Patient has had recent imaging on legs done. Patient has done conservative measures which include  . Treatment has been unsuccessful.     Educated patient to work on conservative treatments: .      Pt is currently taking Aspirin and Warfarin.    There were no vitals taken for this visit.    The provider has been notified that the patient has no concerns.     Questions patient would like addressed today are: N/A.    Refills are needed: No    Has homecare services and agency name:  No

## 2025-01-15 NOTE — ED TRIAGE NOTES
Pt presents to triage in wheelchair from clinic. Pt was in the clinic when she lost sensation and developed weakness on her left side. LKW 1025 today. Stroke code called in triage per Dr. Stout.

## 2025-01-15 NOTE — H&P
[facility]    History and Physical - Hospitalist Service       Date of Admission:  1/15/2025    Assessment & Plan      Eileen Donald is a 69 year old female with past medical history significant for PAF on warfarin, HTN, HLD, AVS (BAV) s/p AVR, PE (10/30/2014), diastolic heart failure, pulmonary hypertension, relapsing-remitting multiple sclerosis, and diabetes mellitus type 2 admitted on 1/15/2025 due to left-sided weakness and decreased sensation.     Left upper extremity weakness and numbness  Hyperlipidemia  -High suspicion for TIA versus stroke  -CTA head and neck 1/15: No acute intracranial abnormality noted, mild atherosclerotic disease at the carotid bifurcations  -MRI 1/15: Posterior lateral right frontal lobe infarct  -Place Neurology IP Stroke Consult order   -Neurochecks and Vital Signs every 4 hours   -Permissive HTN; goal SBP < 220 mmHg  -Echo ordered  -Continuous cardiac monitoring  -Bedside Glucose Monitoring  -Lipid panel 1/15: Total 243, , HDL 68, triglycerides 314  -PT/OT/SLP consulted  -Stroke Education ordered  -Was on moderate intensity statin simvastatin 20 mg at bedtime outpatient, will increase to Rosuvastatin 20 mg  -Started on heparin bridge until warfarin is therapeutic    Paroxysmal atrial fibrillation on warfarin  AVS (BAV) s/p AVR  History of PE 10/30/014  Subtherapeutic INR-on admission  -Had been missing doses of warfarin prior to arrival as she believes she could not take with Tylenol  -Anticoagulation per neurology recommendations, as above    Essential hypertension  -Holding home medications to allow for permissive hypertension    Chronic diastolic heart failure  Pulmonary hypertension  -Holding diuretics to allow for permissive hypertension    Diabetes mellitus type 2 not on long-term insulin  -Most recent hemoglobin A1c 12/16/2024 6.3%  -Outpatient regimen: Not currently on medications, diet controlled  -Inpatient regimen: Moderate consistent  "carb diet    Relapsing remitting MS  -On Ocrevus outpatient every 6 months    MDD  -continue PTA bupropion and venlafaxine    GERD  -continue PTA pantoprazole    Allergies, Allergic asthma  -continue PTA albuterol prn, montelukast, loratadine    Herpes Simplex PPX  -history of ocular herpes  -continue PTA acyclovir    Obesity class I  -BMI 34        Diet: Regular Diet Adult    DVT Prophylaxis: Warfarin  Paredse Catheter: Not present  Lines: None     Cardiac Monitoring: ACTIVE order. Indication: Stroke, acute (48 hours)  Code Status: Full Code    Clinically Significant Risk Factors Present on Admission                # Drug Induced Coagulation Defect: home medication list includes an anticoagulant medication  # Drug Induced Platelet Defect: home medication list includes an antiplatelet medication   # Hypertension: Noted on problem list           # Obesity: Estimated body mass index is 35.37 kg/m  as calculated from the following:    Height as of this encounter: 1.778 m (5' 10\").    Weight as of this encounter: 111.8 kg (246 lb 7.6 oz).       # Financial/Environmental Concerns: none  # Asthma: noted on problem list        Disposition Plan     Medically Ready for Discharge: Anticipated in 2-4 Days           Yenifer Bernal MD  Hospitalist Service  [facility]  Securely message with [employer] (more info)  Text page via [employer] Paging/Directory     ______________________________________________________________________    Chief Complaint   Left arm and leg weakness with numbness    History is obtained from the patient, electronic health record, emergency department physician, patient's daughter, and patient's spouse    History of Present Illness   Eileen Donald is a 69 year old female who presented to the vascular clinic outpatient to see Dr. Jade for symptomatic varicose veins.  At that appointment she noted that she had loss of sensation in her left upper and lower extremities.  She " developed altered sensation and weakness and was sent to the emergency department for further care.  In the ED her symptoms have improved.  Initially she was unable to lift her left arm, now she is able to hold her arm against gravity and there is only minimal weakness noted.  The left leg seems to be back to baseline.  This sensation continues to be altered but improved from arrival to the ED.  She is on Coumadin for paroxysmal atrial fibrillation and aortic valve replacement.  She also has a history of PE in 2014. She has missed a few doses of Coumadin because she was taking Tylenol for pain and believe that they interact with each other.  Her INR was checked at her vascular lab today and was 1.1. She does have heparin bridging available at home but hasn't taken.       Past Medical History    Past Medical History:   Diagnosis Date    Anxiety     Aortic valve replaced 02/15/2017    Aortic valve stenosis 01/24/2017    Asthma     Asthma     Created by Conversion     Chronic shortness of breath     Closed 3-part fracture of proximal humerus with delayed healing, left 04/10/2019    COPD (chronic obstructive pulmonary disease) (H)     Coronary artery disease     Depression     Diabetes mellitus (H)     borderline    Essential hypertension     Created by Conversion  Replacement Utility updated for latest IMO load    History of blood clots     PE    History of pulmonary embolism     HTN (hypertension)     Multiple sclerosis (H)     Multiple sclerosis (H)     Created by Conversion     Obesity     Obesity 10/29/2015    Osteoarthritis     Oxygen dependent     Panic attacks     PONV (postoperative nausea and vomiting)     Pre-diabetes     Pulmonary embolism (H)     Pulmonary embolism, other, unspecified chronicity, unspecified whether acute cor pulmonale present (H) 09/04/2024    Sleep apnea     borderline       Past Surgical History   Past Surgical History:   Procedure Laterality Date    aortic valve surgery      COLONOSCOPY  N/A 12/23/2021    Procedure: COLONOSCOPY WITH POLYPECTOMIES;  Surgeon: Tito Suarez MD;  Location: [facility] Main OR    COLONOSCOPY W/ BIOPSIES N/A 3/22/2021    Procedure: COLONOSCOPY WITH BIOPSY AND POLYPECTOMY;  Surgeon: Sam Weems MD;  Location: [facility] Main OR;  Service: Gastroenterology    IR LUMBAR EPIDURAL STEROID INJECTION  1/16/2007    MAMMOPLASTY REDUCTION  1994    OTHER SURGICAL HISTORY  01/24/2017    mechanical aortic prosthesis    RELEASE CARPAL TUNNEL      ZZC RECONSTR TOTAL SHOULDER IMPLANT Left 4/10/2019    Procedure: LEFT REVERSE TOTAL SHOULDER ARTHROPLASTY;  Surgeon: Luis Antonio Telles MD;  Location: [facility] Main OR;  Service: Orthopedics       Prior to Admission Medications   Prior to Admission Medications   Prescriptions Last Dose Informant Patient Reported? Taking?   Ocrelizumab (OCREVUS IV) Past Month  Yes Yes   Sig: Inject into the vein every 6 months   Vitamin D3 (CHOLECALCIFEROL) 25 mcg (1000 units) tablet 1/14/2025 Morning  Yes Yes   Sig: Take 25 mcg by mouth daily.   acetaminophen (TYLENOL) 325 MG tablet   Yes Yes   Sig: Take 325-650 mg by mouth every 6 hours as needed    acyclovir (ZOVIRAX) 400 MG tablet 1/14/2025 Evening  Yes Yes   Sig: Take 400 mg by mouth 2 times daily    albuterol (PROAIR HFA/PROVENTIL HFA/VENTOLIN HFA) 108 (90 Base) MCG/ACT inhaler   No Yes   Sig: Inhale 2 puffs into the lungs every 6 hours as needed for shortness of breath or wheezing   amoxicillin (AMOXIL) 500 MG capsule   Yes Yes   Sig: TAKE 4 CAPSULES BY MOUTH 30 TO 60 MINUTES PRIOR TO PROCEDURE   aspirin (ASA) 81 MG chewable tablet 1/14/2025 Morning  Yes Yes   Sig: Take 81 mg by mouth daily    buPROPion (WELLBUTRIN XL) 150 MG 24 hr tablet 1/14/2025 Morning  No Yes   Sig: Take 1 tablet (150 mg) by mouth daily   calcium carbonate (TUMS) 500 MG chewable tablet   Yes Yes   Sig: Take 1 chew tab by mouth 2 times daily.   furosemide (LASIX) 40 MG tablet 1/14/2025 Morning  No Yes   Sig: Take 1 tablet (40  mg) by mouth daily.   lisinopril (ZESTRIL) 10 MG tablet 1/14/2025 Morning  No Yes   Sig: Take 1 tablet (10 mg) by mouth daily.   loratadine (CLARITIN) 10 MG tablet 1/14/2025 Morning  No Yes   Sig: Take 1 tablet (10 mg) by mouth daily   metoprolol tartrate (LOPRESSOR) 25 MG tablet 1/14/2025 Evening  No Yes   Sig: Take 1 tablet (25 mg) by mouth 2 times daily   montelukast (SINGULAIR) 10 MG tablet 1/14/2025 Evening  No Yes   Sig: Take 1 tablet (10 mg) by mouth at bedtime.   pantoprazole (PROTONIX) 20 MG EC tablet 1/14/2025 Morning  No Yes   Sig: Take 1 tablet (20 mg) by mouth daily.   potassium chloride ER (MICRO-K) 10 MEQ CR capsule 1/14/2025 Morning  Yes Yes   Sig: Take 10 mEq by mouth daily    simvastatin (ZOCOR) 20 MG tablet 1/14/2025 Bedtime  No Yes   Sig: Take 1 tablet (20 mg) by mouth at bedtime   venlafaxine (EFFEXOR XR) 75 MG 24 hr capsule 1/14/2025 Morning  No Yes   Sig: TAKE 3 CAPSULES BY MOUTH EVERY DAY   warfarin ANTICOAGULANT (COUMADIN) 2.5 MG tablet 1/14/2025 Evening  No Yes   Sig: Take 1 tab (2.5mg) to 2 tabs (5mg) by mouth daily, as directed.  Adjust dose based on INR results.      Facility-Administered Medications Last Administration Doses Remaining   ropivacaine (NAROPIN) injection 3 mL 9/7/2022  2:15 PM    triamcinolone (KENALOG-40) injection 40 mg 9/7/2022  2:15 PM            Review of Systems    The 10 point Review of Systems is negative other than noted in the HPI or here.    Social History   I have reviewed this patient's social history and updated it with pertinent information if needed.  Social History     Tobacco Use    Smoking status: Never     Passive exposure: Past    Smokeless tobacco: Never   Vaping Use    Vaping status: Never Used   Substance Use Topics    Alcohol use: No    Drug use: No         Family History   I have reviewed this patient's family history and updated it with pertinent information if needed.  Family History   Problem Relation Age of Onset    Snoring Mother     Snoring  Father     Sleep Apnea Sister     Coronary Artery Disease Father     Coronary Artery Disease Sister     Coronary Artery Disease Brother     Breast Cancer No family hx of          Allergies   Allergies   Allergen Reactions    Morphine Visual Disturbance and Other (See Comments)     hallicinations      Sulfa Antibiotics Hives and Nausea and Vomiting    Azithromycin Nausea and Vomiting and Rash    Doxycycline Rash    Latex Rash        Physical Exam   Vital Signs: Temp: 97.9  F (36.6  C) Temp src: Oral BP: 110/60 Pulse: 71   Resp: 25 SpO2: 93 % O2 Device: None (Room air)    Weight: 246 lbs 7.59 oz  Constitutional: awake, alert, cooperative, no apparent distress, and appears stated age  Eyes: Lids and lashes normal, pupils equal, round, extra ocular muscles intact, sclera clear, conjunctiva normal  Respiratory: No increased work of breathing, good air exchange, clear to auscultation bilaterally, no crackles or wheezing  Cardiovascular: regular rate and rhythm, and no murmur noted  GI: normal bowel sounds, soft, non-distended, non-tender  Skin: normal skin color, texture, turgor, no rashes and no jaundice  Musculoskeletal: no lower extremity pitting edema present, tone is normal, no weakness noted  Neurologic: left upper and lower extremity weakness, no LUE drit now, altered sensation  Neuropsychiatric: Appropriate mood and affect        Medical Decision Making       75 MINUTES SPENT BY ME on the date of service doing chart review, history, exam, documentation & further activities per the note.      Data     I have personally reviewed the following data over the past 24 hrs:    11.2 (H)  \   14.9   / 308     140 101 13.4 /  130 (H)   4.0 28 0.95 \     Trop: 27 (H) BNP: 117     INR:  1.10 PTT:  28   D-dimer:  N/A Fibrinogen:  N/A       Imaging results reviewed over the past 24 hrs:   Recent Results (from the past 24 hours)   CTA Head Neck with Contrast    Narrative    EXAM: CTA HEAD NECK W CONTRAST  LOCATION: [facility]  [facility]  DATE: 1/15/2025    INDICATION: Code stroke. Left-sided weakness.  COMPARISON: Brain MR 11/17/2006.  CONTRAST: Isovue 370  67ml.  TECHNIQUE: Head and neck CT angiogram with IV contrast. Noncontrast head CT followed by axial helical CT images of the head and neck vessels obtained during the arterial phase of intravenous contrast administration. Axial 2D reconstructed images and   multiplanar 3D MIP reconstructed images of the head and neck vessels were performed by the technologist. Dose reduction techniques were used. All stenosis measurements made according to NASCET criteria unless otherwise specified.    FINDINGS:   NONCONTRAST HEAD CT:   INTRACRANIAL CONTENTS: No acute intracranial hemorrhage. No mass effect or midline shift. Mild presumed chronic small vessel ischemic changes. Normal ventricles and sulci.     VISUALIZED ORBITS/SINUSES/MASTOIDS: No intraorbital abnormality. No paranasal sinus mucosal disease. No middle ear or mastoid effusion.    BONES/SOFT TISSUES: No acute abnormality.    HEAD CTA:  ANTERIOR CIRCULATION: No stenosis/occlusion, aneurysm, or high flow vascular malformation. Fetal origin of both posterior cerebral arteries from the anterior circulation.    POSTERIOR CIRCULATION: No stenosis/occlusion, aneurysm, or high flow vascular malformation. Balanced vertebral arteries supply a normal basilar artery.     NECK CTA:  RIGHT CAROTID: Atherosclerotic plaque results in less than 50% stenosis in the right ICA. No dissection.    LEFT CAROTID: Atherosclerotic plaque results in less than 50% stenosis in the left ICA. No dissection.    VERTEBRAL ARTERIES: No focal stenosis or dissection. Balanced vertebral arteries.    AORTIC ARCH: Bovine origin left common carotid artery. No significant stenosis at the origin of the great vessels.    NONVASCULAR STRUCTURES: There are degenerative changes in the spine. Anterior fusion hardware from C3 down to C5.      Impression     IMPRESSION:   HEAD CT:  1.  No acute intracranial hemorrhage, mass, or herniation.  2.  Mild nonspecific white matter changes which are most likely due to chronic microvascular ischemic disease.    HEAD CTA:   1.  Normal CTA [place].    NECK CTA:  1.  Patent arteries in the neck without evidence of dissection.  2.  Mild atherosclerotic disease at the carotid bifurcations without significant stenosis.    Noncontrast head CT results discussed with Dr. Stout at 10:53 AM on 1/15/2025.    CTA results discussed with Dr. Stout at 11:01 AM on 1/15/2025.   MR Brain w/o & w Contrast    Narrative    EXAM: MR BRAIN W/O and W CONTRAST  LOCATION: [facility]  DATE: 1/15/2025    INDICATION: c o R hemispheric stroke; assess stroke burden to aid in determinine timeline of anticoagulation  COMPARISON: CT examination 1/15/2025.  CONTRAST: 11 mL Gadavist  TECHNIQUE: Routine multiplanar multisequence head MRI without and with intravenous contrast.    FINDINGS:  INTRACRANIAL CONTENTS: Cortically based region of restricted diffusion in the posterior lateral right frontal lobe.    Moderate chronic small vessel ischemic changes. Mild to moderate global cortical volume loss with expected dilatation of the ventricular system. Susceptibility imaging is negative. No abnormal enhancement on postcontrast imaging.    Midline structures are unremarkable.    SELLA: No abnormality accounting for technique.    OSSEOUS STRUCTURES/SOFT TISSUES: Normal marrow signal. The major intracranial vascular flow voids are maintained.     ORBITS: No abnormality accounting for technique.     SINUSES/MASTOIDS: No paranasal sinus mucosal disease. No middle ear or mastoid effusion.       Impression    IMPRESSION:  1.  Cortically based region of restricted diffusion in the posterior lateral right frontal lobe. This is compatible with a region of acute ischemia. Extensive chronic changes as above.   Echocardiogram Complete    Narrative     [id number]  [id number]  [id number]  [id number]^CARLOS^[place] [address]     Name: PALOMA OSEGUERA  MRN: [id number]  : 1955  Study Date: 01/15/2025 01:07 PM  Age: 69 yrs  Gender: Female  Patient Location: [facility]  Reason For Study: CVA  Ordering Physician: LATANYA GONZALEZ  Performed By:      BSA: 2.3 m2  Height: 70 in  Weight: 241 lb  BP: 126/58 mmHg  ______________________________________________________________________________  Procedure  Echocardiogram with two-dimensional, color and spectral Doppler. Definity (NDC  #32236-165) given intravenously. Bubble Echocardiogram with two-dimensional,  color and spectral Doppler.  ______________________________________________________________________________  Interpretation Summary     1. Normal left ventricular size and systolic performance with a visually  estimated ejection fraction of 65%.  2. There is mild concentric increase in left ventricular wall thickness.  3. There is a mechanical prosthetic aortic valve (documented 23 mm [employer] [place] valve).  Â  Normal aortic valve prosthesis metrics with a mean systolic gradient of 11  mmHg and a peak anterograde velocity of 2.3 m/sec.  Â  There is trace aortic insufficiency which appears valvar.  4. Probable normal right ventricular size with mildly reduced right  ventricular systolic performance (the right ventricle is not well-visualized).  5. There is mild left atrial enlargement.  6. Echo contrast examination was performed using agitated crystalloid as  contrast agent. The right heart opacity was good and the left heart was well  visualized. There was no evidence of right-to-left shunting during spontaneous  respiration or following release of Valsalva.  ______________________________________________________________________________  Left ventricle:  Normal left ventricular size and systolic performance with a visually  estimated ejection fraction  of 65%. There is normal regional wall motion.  There is mild concentric increase in left ventricular wall thickness.     Assessment of LV Diastolic Function: The cumulative findings suggest impaired  diastolic filling [The septal e' velocity is < 7 cm/s & lateral e' velocity  is  < 10 cm/s. The average E/e' is >14. The TR velocity cannot be determined due  to insufficient tricuspid insufficiency signal. Left atrial volume index is  greater than 34 mL/mÂ ].     Assessment of left atrial pressure (LAP): The cumulative findings suggest  moderately elevated left atrial pressure (the E/A is > 0.8 and <2.0 plus 2 or  3 of 3 of the following present: Average E/e' > 14, TRvel > 2.8 m/s, and/or LA  vol. index > 34 ml/mÂ  ).     Right ventricle:  Probable normal right ventricular size with mildly reduced right ventricular  systolic performance (the right ventricle is not well-visualized).     Left atrium:  There is mild left atrial enlargement.     Right atrium:  The right atrium is not well-visualized.     IVC:  The IVC is of normal caliber.     Aortic valve:  There is a mechanical prosthetic aortic valve (documented 23 mm [employer] [place] valve). Normal aortic valve prosthesis metrics with a mean  systolic gradient of 11 mmHg and a peak anterograde velocity of 2.3 m/sec. The  Ao Acceleration Time is 0.08 sec. There is trace aortic insufficiency which  appears valvar.     Mitral valve:  The mitral valve appears morphologically normal. There is trace mitral  insufficiency.     Tricuspid valve:  The tricuspid valve is not well-visualized.     Pulmonic valve:  The aortic root and proximal ascending aorta are of normal dimension.     Thoracic aorta:  The aortic root and proximal ascending aorta are of normal dimension.     Pericardium:  There is a very small, barely detectable, posterior pericardial effusion.  There is no evidence of significant intraventricular dependence/tamponade  physiology. There are  intrapericardial echoes suggestive of adipose tissue.  ______________________________________________________________________________  ______________________________________________________________________________  MMode/2D Measurements & Calculations  IVSd: 1.3 cm  LVIDd: 4.3 cm  LVIDs: 2.6 cm  LVPWd: 1.2 cm  FS: 38.6 %  LV mass(C)d: 196.0 grams  LV mass(C)dI: 86.8 grams/m2  Ao root diam: 3.8 cm  asc Aorta Diam: 3.3 cm  LVOT diam: 2.3 cm  LVOT area: 4.2 cm2  Ao root diam index Ht(cm/m): 2.1  Ao root diam index BSA (cm/m2): 1.7  Asc Ao diam index BSA (cm/m2): 1.5  Asc Ao diam index Ht(cm/m): 1.9  EF Biplane: 76.0 %     LA Volume Indexed (AL/bp): 34.4 ml/m2  RV Base: 3.3 cm  RWT: 0.57     Doppler Measurements & Calculations  MV E max joaquin: 102.0 cm/sec  MV A max joaquin: 88.3 cm/sec  MV E/A: 1.2  MV max PG: 3.7 mmHg  MV mean P.0 mmHg  MV V2 VTI: 30.0 cm  MVA(VTI): 3.5 cm2  MV dec slope: 377.0 cm/sec2  MV dec time: 0.27 sec  Ao V2 max: 229.0 cm/sec  Ao max P.0 mmHg  Ao V2 mean: 147.0 cm/sec  Ao mean P.0 mmHg  Ao V2 VTI: 44.2 cm  LUIS FERNANDO(I,D): 2.4 cm2  LUIS FERNANDO(V,D): 2.0 cm2  Ao acc time: 0.08 sec  LV V1 max P.7 mmHg  LV V1 max: 108.0 cm/sec  LV V1 VTI: 25.5 cm     SV(LVOT): 105.9 ml  SI(LVOT): 46.9 ml/m2  PA V2 max: 84.1 cm/sec  PA max P.8 mmHg  PA acc time: 0.07 sec  AV Joaquin Ratio (DI): 0.47  LUIS FERNANDO Index (cm2/m2): 1.1  E/E' av.4  Lateral E/e': 12.8  Medial E/e': 15.9  RV S Joaquin: 12.3 cm/sec     ______________________________________________________________________________  Report approved by: Jah Wynn MD on 01/15/2025 02:33 PM

## 2025-01-15 NOTE — ED PROVIDER NOTES
EMERGENCY DEPARTMENT ENCOUNTER      NAME: Eileen Donald  AGE: 69 year old female  YOB: 1955  MRN: [id number]  EVALUATION DATE & TIME: No admission date for patient encounter.    PCP: Drew Hahn    ED PROVIDER: Antonia Stout M.D.      Chief Complaint   Patient presents with    Stroke Symptoms         FINAL IMPRESSION:  1. Left arm weakness          ED COURSE & MEDICAL DECISION MAKING:    ED Course as of 01/15/25 1212   Wed Tj 15, 2025   1042 I spoke with Stroke Neurology re: stroke code tier 1 called, patient to CT, , , NIHSS 3 with left arm weakness and left body sensation less than right side, occurred acutely at 1025, INR 1.1 today per patient; and she reports missing coumadin several times this week to take tylenol for arhtritis related pain (she also skips when she eats blueberries), patient in CT now and stroke neuro wants stroke cart to room as patient could be TNK candidate, charge RN aware   1057 Per [facility], CT head noncontrast negative for acute pathology, awaiting CTA read   1111 Stroke assessment underway by telestroke cart   1115 I spoke with Salena from stroke neurology, NIHSS 2 now with improving sytmpoms with persistent sensory deficit left side related to right, now only drift left arm but feeling stronger with rapidly improving symptoms, thus stroke code de-escalated, no TNK given rapidly improving symptoms, need to admit for stroke / TIA and plan get back up to therapeutic INR and likely need ot bridge, she will d/w stroke attending and get back to me with bridging plan. Hopsitalist paged for admission.   1125 Per lab, normal platelets and coags   1125 Per stroke neurology, they need MRI done prior to recommendations re: anticoagulation, plan, MRI ordered   1143 I spoke with hospitalist who accepts patient to neuro tele   1144 I spoke with Hospitalist, Dr. Brenal, about patient's case.   1146 Low dose IV ativan ordered for feelings of  claustrophobia going to MRI       Pertinent Labs & Imaging studies reviewed. (See chart for details)    Medical Decision Making  Obtained supplemental history:Supplemental history obtained?: No  Reviewed external records: External records reviewed?: Outpatient Record: 1/15/2025 [facility] Care Visit  Care impacted by chronic illness:Documented in Chart  Did you consider but not order tests?: Work up considered but not performed and documented in chart, if applicable  Did you interpret images independently?: Independent interpretation of ECG and images noted in documentation, when applicable.  Consultation discussion with other provider:Did you involve another provider (consultant, , pharmacy, etc.)?: I discussed the care with another health care provider, see documentation for details.  Admit.    MIPS: Not Applicable      At the conclusion of the encounter I discussed the results of all of the tests and the disposition. The questions were answered. The patient or family acknowledged understanding and was agreeable with the care plan.     MEDICATIONS GIVEN IN THE EMERGENCY:  Medications   iopamidol (ISOVUE-370) solution 67 mL (67 mLs Intravenous $Given 1/15/25 1103)     And   sodium chloride 0.9 % bag for CT scan flush use (has no administration in time range)   lidocaine 1 % 0.1-1 mL (has no administration in time range)   lidocaine (LMX4) cream (has no administration in time range)   sodium chloride (PF) 0.9% PF flush 3 mL (has no administration in time range)   sodium chloride (PF) 0.9% PF flush 3 mL (has no administration in time range)   ondansetron (ZOFRAN ODT) ODT tab 4 mg (has no administration in time range)     Or   ondansetron (ZOFRAN) injection 4 mg (has no administration in time range)   acetaminophen (TYLENOL) tablet 650 mg (has no administration in time range)     Or   acetaminophen (TYLENOL) oral liquid 650 mg (has no administration in time range)     Or   acetaminophen (TYLENOL)  Suppository 650 mg (has no administration in time range)   gadobutrol (GADAVIST) injection 11 mL (has no administration in time range)   LORazepam (ATIVAN) injection 0.5 mg (0.5 mg Intravenous $Given 1/15/25 2043)       NEW PRESCRIPTIONS STARTED AT TODAY'S ER VISIT  New Prescriptions    No medications on file          =================================================================    HPI      Eileen Donald is a 69 year old female with PMHx of morbid obesity, hypertension, diabetes, aortic valve replacement on coumadin, and paroxysmal atrial fibrillation who presents to the ED today via private car BIB family member with stroke symptoms.    Per chart review:  1/15/2025 Patient was seen at [facility] for follow-up after surgery for varicose veins of both lower extremities. Sudden onset of left arm numbness and decreased strength as visit was occurring. Recommendation to proceed directly to [facility] ED for evaluation.     Patient was being seen at a vascular clinic for varicose veins when around 10:25 AM she had a sudden onset of left arm weakness and numbness with associated loss of sensation. No recent falls. No recent head injuries or trauma. No h/o stroke. No neck pain or recent neck injuries. No headache.     Patient is on Coumadin chronically. She reports no issues with abnormal bleeding. She does note missing a couple doses to take tylenol as she says they interact with each other. INR today at clinic was 1.1.     No other symptoms, complaints, or concerns at this time.    REVIEW OF SYSTEMS   All other systems reviewed and are negative except as noted above in HPI.    PAST MEDICAL HISTORY:  Past Medical History:   Diagnosis Date    Anxiety     Aortic valve replaced 02/15/2017    Aortic valve stenosis 01/24/2017    Asthma     Asthma     Created by Conversion     Chronic shortness of breath     Closed 3-part fracture of proximal humerus with delayed healing, left 04/10/2019     COPD (chronic obstructive pulmonary disease) (H)     Coronary artery disease     Depression     Diabetes mellitus (H)     borderline    Essential hypertension     Created by Conversion  Replacement Utility updated for latest IMO load    History of blood clots     PE    History of pulmonary embolism     HTN (hypertension)     Multiple sclerosis (H)     Multiple sclerosis (H)     Created by Conversion     Obesity     Obesity 10/29/2015    Osteoarthritis     Oxygen dependent     Panic attacks     PONV (postoperative nausea and vomiting)     Pre-diabetes     Pulmonary embolism (H)     Pulmonary embolism, other, unspecified chronicity, unspecified whether acute cor pulmonale present (H) 09/04/2024    Sleep apnea     borderline       PAST SURGICAL HISTORY:  Past Surgical History:   Procedure Laterality Date    aortic valve surgery      COLONOSCOPY N/A 12/23/2021    Procedure: COLONOSCOPY WITH POLYPECTOMIES;  Surgeon: Tito Suarez MD;  Location: [facility] Main OR    COLONOSCOPY W/ BIOPSIES N/A 3/22/2021    Procedure: COLONOSCOPY WITH BIOPSY AND POLYPECTOMY;  Surgeon: Sam Weems MD;  Location: [facility] Main OR;  Service: Gastroenterology    IR LUMBAR EPIDURAL STEROID INJECTION  1/16/2007    MAMMOPLASTY REDUCTION  1994    OTHER SURGICAL HISTORY  01/24/2017    mechanical aortic prosthesis    RELEASE CARPAL TUNNEL      ZZC RECONSTR TOTAL SHOULDER IMPLANT Left 4/10/2019    Procedure: LEFT REVERSE TOTAL SHOULDER ARTHROPLASTY;  Surgeon: Luis Antonio Telles MD;  Location: [facility] Main OR;  Service: Orthopedics       CURRENT MEDICATIONS:    acetaminophen (TYLENOL) 325 MG tablet  acyclovir (ZOVIRAX) 400 MG tablet  albuterol (PROAIR HFA/PROVENTIL HFA/VENTOLIN HFA) 108 (90 Base) MCG/ACT inhaler  amoxicillin (AMOXIL) 500 MG capsule  aspirin (ASA) 81 MG chewable tablet  buPROPion (WELLBUTRIN XL) 150 MG 24 hr tablet  esomeprazole (NEXIUM) 20 MG DR capsule  furosemide (LASIX) 40 MG tablet  lisinopril (ZESTRIL) 10 MG  tablet  loratadine (CLARITIN) 10 MG tablet  metoprolol tartrate (LOPRESSOR) 25 MG tablet  montelukast (SINGULAIR) 10 MG tablet  Ocrelizumab (OCREVUS IV)  pantoprazole (PROTONIX) 20 MG EC tablet  potassium chloride ER (MICRO-K) 10 MEQ CR capsule  simvastatin (ZOCOR) 20 MG tablet  venlafaxine (EFFEXOR XR) 75 MG 24 hr capsule  warfarin ANTICOAGULANT (COUMADIN) 2.5 MG tablet        ALLERGIES:  Allergies   Allergen Reactions    Morphine Visual Disturbance and Other (See Comments)     hallicinations      Sulfa Antibiotics Hives and Nausea and Vomiting    Azithromycin Nausea and Vomiting and Rash    Doxycycline Rash    Latex Rash       FAMILY HISTORY:  Family History   Problem Relation Age of Onset    Snoring Mother     Snoring Father     Sleep Apnea Sister     Coronary Artery Disease Father     Coronary Artery Disease Sister     Coronary Artery Disease Brother     Breast Cancer No family hx of        SOCIAL HISTORY:   Social History     Socioeconomic History    Marital status:      Spouse name: None    Number of children: None    Years of education: None    Highest education level: None   Tobacco Use    Smoking status: Never     Passive exposure: Past    Smokeless tobacco: Never   Vaping Use    Vaping status: Never Used   Substance and Sexual Activity    Alcohol use: No    Drug use: No   Social History Narrative    Lives with sister in [place] due to help with medical issues.   and daughter live in [place].  Plans to return to be with family hopefully soon.  For MS- last used medications about 3 months. In remission. Needs to be back on medications as  Neurologist has left.    Drew Hahn MD  5/29/2018    Daughters Cecille and Maci Molina are my patients.    Surgery on 12/2023 for colonoscopy.    The following guidelines were recommended by anticoagulation team and I agree and these are also shared with patient     Recommended goal for patient with AVR plus thrombotic risk factors 2.5-3.5 per  ACC/AHA valvular heart guideline      Pre-Procedure     Hold warfarin for 4 days, until after procedure starting: Sunday 12/19     Enoxaparin (Lovenox) 120 mg subq Q 12 hrs (1 mg/kg Q 12 hrs for CrCl greater than 60 and BMI less than 40     Start enoxaparin Tue 12/21 AM     Last dose of enoxaparin prior to procedure: Wed 12/22 AM  (24 hours prior to procedure)     Post-Procedure     Resume warfarin dose if okay with provider doing procedure on night of procedure, 12/23 PM  5 mg x 1 then resume home dose     Resume  enoxaparin (Lovenox)  24 hrs post procedure when okay with provider doing procedure. Call clinic prior to restarting if polyps removed. Continue until INR therapeutic per protocol. Recheck INR 4-6 days after resuming warfarin      Social Drivers of Health     Financial Resource Strain: Low Risk  (8/21/2024)    Financial Resource Strain     Within the past 12 months, have you or your family members you live with been unable to get utilities (heat, electricity) when it was really needed?: No   Food Insecurity: Low Risk  (8/21/2024)    Food Insecurity     Within the past 12 months, did you worry that your food would run out before you got money to buy more?: No     Within the past 12 months, did the food you bought just not last and you didn t have money to get more?: No   Transportation Needs: High Risk (8/21/2024)    Transportation Needs     Within the past 12 months, has lack of transportation kept you from medical appointments, getting your medicines, non-medical meetings or appointments, work, or from getting things that you need?: Yes   Physical Activity: Inactive (8/21/2024)    Exercise Vital Sign     Days of Exercise per Week: 0 days     Minutes of Exercise per Session: 0 min   Stress: Stress Concern Present (8/21/2024)    [employer] [place] [employer] - Occupational Stress Questionnaire     Feeling of Stress : To some extent   Social Connections: Unknown (8/21/2024)    Social Connection  "and Isolation Panel [NHANES]     Frequency of Social Gatherings with Friends and Family: Never   Interpersonal Safety: Low Risk  (8/22/2024)    Interpersonal Safety     Do you feel physically and emotionally safe where you currently live?: Yes     Within the past 12 months, have you been hit, slapped, kicked or otherwise physically hurt by someone?: No     Within the past 12 months, have you been humiliated or emotionally abused in other ways by your partner or ex-partner?: No   Housing Stability: Low Risk  (8/21/2024)    Housing Stability     Do you have housing? : Yes     Are you worried about losing your housing?: No       VITALS:  Patient Vitals for the past 24 hrs:   BP Temp Temp src Pulse Resp SpO2 Height Weight   01/15/25 1146 111/55 -- -- 67 23 94 % -- --   01/15/25 1131 104/57 -- -- 66 25 95 % -- --   01/15/25 1039 130/62 -- -- -- -- -- -- --   01/15/25 1037 -- 97.2  F (36.2  C) Temporal 69 18 96 % 1.778 m (5' 10\") 109.6 kg (241 lb 9.6 oz)       PHYSICAL EXAM    GENERAL: Awake, alert.  In no acute distress. Blood glucose was 133. Blood pressure was 130/62.  HEENT: Normocephalic, atraumatic.  Pupils equal, round and reactive.  Conjunctiva normal.  EOMI.  NECK: No stridor or apparent deformity.  PULMONARY: Symmetrical breath sounds without distress.  Lungs clear to auscultation bilaterally without wheezes, rhonchi or rales.  CARDIO: Regular rate and rhythm.  No significant murmur, rub or gallop.  Radial pulses strong and symmetrical.  ABDOMINAL: Abdomen soft, non-distended and non-tender to palpation.  No CVAT, no palpable hepatosplenomegaly.  EXTREMITIES: No lower extremity swelling or edema.    NEURO: Alert and oriented to person, place and time.  Cranial nerves grossly intact.  No focal motor deficit.  PSYCH: Normal mood and affect  SKIN: No rashes      National Institutes of Health Stroke Scale  Exam Interval: Baseline   Score    Level of consciousness: (0)   Alert, keenly responsive    LOC questions: (0) "   Answers both questions correctly    LOC commands: (0)   Performs both tasks correctly    Best gaze: (0)   Normal    Visual: (0)   No visual loss    Facial palsy: (0)   Normal symmetrical movements    Motor arm (left): (2)   Some effort against gravity    Motor arm (right): (0)   No drift    Motor leg (left): (0)   No drift    Motor leg (right): (0)   No drift    Limb ataxia: (0)   Absent    Sensory: (1)   Mild to moderate sensory loss    Best language: (0)   Normal- no aphasia    Dysarthria: (0)   Normal    Extinction and inattention: (0)   No abnormality        Total Score:  3        LAB:  All pertinent labs reviewed and interpreted.  Results for orders placed or performed during the hospital encounter of 01/15/25   CTA Head Neck with Contrast    Impression    IMPRESSION:   HEAD CT:  1.  No acute intracranial hemorrhage, mass, or herniation.  2.  Mild nonspecific white matter changes which are most likely due to chronic microvascular ischemic disease.    HEAD CTA:   1.  Normal CTA [place].    NECK CTA:  1.  Patent arteries in the neck without evidence of dissection.  2.  Mild atherosclerotic disease at the carotid bifurcations without significant stenosis.    Noncontrast head CT results discussed with Dr. Stout at 10:53 AM on 1/15/2025.    CTA results discussed with Dr. Stout at 11:01 AM on 1/15/2025.   Basic metabolic panel   Result Value Ref Range    Sodium 140 135 - 145 mmol/L    Potassium 4.0 3.4 - 5.3 mmol/L    Chloride 101 98 - 107 mmol/L    Carbon Dioxide (CO2) 28 22 - 29 mmol/L    Anion Gap 11 7 - 15 mmol/L    Urea Nitrogen 13.4 8.0 - 23.0 mg/dL    Creatinine 0.95 0.51 - 0.95 mg/dL    GFR Estimate 65 >60 mL/min/1.73m2    Calcium 9.2 8.8 - 10.4 mg/dL    Glucose 130 (H) 70 - 99 mg/dL   Result Value Ref Range    INR 1.10 0.85 - 1.15   Partial thromboplastin time   Result Value Ref Range    aPTT 28 22 - 38 Seconds   Result Value Ref Range    Troponin T, High Sensitivity 27 (H) <=14 ng/L   Glucose  by meter   Result Value Ref Range    GLUCOSE BY METER POCT 133 (H) 70 - 99 mg/dL   CBC with platelets and differential   Result Value Ref Range    WBC Count 11.2 (H) 4.0 - 11.0 10e3/uL    RBC Count 4.79 3.80 - 5.20 10e6/uL    Hemoglobin 14.9 11.7 - 15.7 g/dL    Hematocrit 47.1 (H) 35.0 - 47.0 %    MCV 98 78 - 100 fL    MCH 31.1 26.5 - 33.0 pg    MCHC 31.6 31.5 - 36.5 g/dL    RDW 12.7 10.0 - 15.0 %    Platelet Count 308 150 - 450 10e3/uL    % Neutrophils 69 %    % Lymphocytes 18 %    % Monocytes 8 %    % Eosinophils 3 %    % Basophils 1 %    % Immature Granulocytes 1 %    NRBCs per 100 WBC 0 <1 /100    Absolute Neutrophils 7.7 1.6 - 8.3 10e3/uL    Absolute Lymphocytes 2.0 0.8 - 5.3 10e3/uL    Absolute Monocytes 0.9 0.0 - 1.3 10e3/uL    Absolute Eosinophils 0.4 0.0 - 0.7 10e3/uL    Absolute Basophils 0.1 0.0 - 0.2 10e3/uL    Absolute Immature Granulocytes 0.1 <=0.4 10e3/uL    Absolute NRBCs 0.0 10e3/uL       RADIOLOGY:  Reviewed all pertinent imaging. Please see official radiology report.  CTA Head Neck with Contrast   Final Result   IMPRESSION:    HEAD CT:   1.  No acute intracranial hemorrhage, mass, or herniation.   2.  Mild nonspecific white matter changes which are most likely due to chronic microvascular ischemic disease.      HEAD CTA:    1.  Normal CTA [place].      NECK CTA:   1.  Patent arteries in the neck without evidence of dissection.   2.  Mild atherosclerotic disease at the carotid bifurcations without significant stenosis.      Noncontrast head CT results discussed with Dr. Stout at 10:53 AM on 1/15/2025.      CTA results discussed with Dr. Stout at 11:01 AM on 1/15/2025.      MR Brain w/o & w Contrast    (Results Pending)         EKG:    Performed at: 15-Tj-2025 at 11:20  Ventricular Rate: 66 bpm  Reviewed and interpreted as: Sinus rhythm. No ST abnormalities.  When compared with ECG of 19-Feb-2024 14:41, no significant changes were found.     I have independently reviewed and interpreted  the EKG(s) documented above.        I, Rizwana Tovar, am serving as a scribe to document services personally performed by Dr. Antonia Stout based on my observation and the provider's statements to me. I, Antonia Stout MD attest that Rizwana Tovar is acting in a scribe capacity, has observed my performance of the services and has documented them in accordance with my direction.       Antonia Stout MD  01/15/25 9966

## 2025-01-15 NOTE — PLAN OF CARE
Problem: Stroke, Ischemic (Includes Transient Ischemic Attack)  Goal: Safe and Effective Swallow  Outcome: Progressing   Goal Outcome Evaluation:         Pt came up to the unit, very alert and able to make needs known. Pt denied any pain. Pt also passed the swallow eval at bedside, diet order was released. Pt was able to order her meal, Pt's  and daughter at bedside. NIH scoring 1. Tele on.Bed alarm on, call light was given to Pt. Care ongoing.

## 2025-01-15 NOTE — PHARMACY-ADMISSION MEDICATION HISTORY
"Pharmacist Admission Medication History    Admission medication history is complete. The information provided in this note is only as accurate as the sources available at the time of the update.    Information Source(s): Patient, Family member, and CareEverywhere/SureScripts via in-person    Pertinent Information: Marilee missed her warfarin \"a couple days\" due to running out. She was unable to quantify for me how many days, but regardless, she is aware her INR was not therapeutic. Marilee states she has a refill of warfarin ready for her at her pharmacy and understands the importance of not missing it. I felt she has good insight on the matter.     Changes made to PTA medication list:  Added: Tums, D3  Deleted: None  Changed: None    Allergies reviewed with patient and updates made in EHR: yes    Medication History Completed By: Cheyenne Davila [facility] 1/15/2025 2:39 PM    Current Facility-Administered Medications for the 1/15/25 encounter (Hospital Encounter)   Medication    ropivacaine (NAROPIN) injection 3 mL    triamcinolone (KENALOG-40) injection 40 mg     PTA Med List   Medication Sig Note Last Dose/Taking    acetaminophen (TYLENOL) 325 MG tablet Take 325-650 mg by mouth every 6 hours as needed   Taking As Needed    acyclovir (ZOVIRAX) 400 MG tablet Take 400 mg by mouth 2 times daily   1/14/2025 Evening    albuterol (PROAIR HFA/PROVENTIL HFA/VENTOLIN HFA) 108 (90 Base) MCG/ACT inhaler Inhale 2 puffs into the lungs every 6 hours as needed for shortness of breath or wheezing  Taking As Needed    amoxicillin (AMOXIL) 500 MG capsule TAKE 4 CAPSULES BY MOUTH 30 TO 60 MINUTES PRIOR TO PROCEDURE  Taking    aspirin (ASA) 81 MG chewable tablet Take 81 mg by mouth daily   1/14/2025 Morning    buPROPion (WELLBUTRIN XL) 150 MG 24 hr tablet Take 1 tablet (150 mg) by mouth daily  1/14/2025 Morning    calcium carbonate (TUMS) 500 MG chewable tablet Take 1 chew tab by mouth 2 times daily.  Taking    furosemide (LASIX) 40 MG " tablet Take 1 tablet (40 mg) by mouth daily.  1/14/2025 Morning    lisinopril (ZESTRIL) 10 MG tablet Take 1 tablet (10 mg) by mouth daily.  1/14/2025 Morning    loratadine (CLARITIN) 10 MG tablet Take 1 tablet (10 mg) by mouth daily  1/14/2025 Morning    metoprolol tartrate (LOPRESSOR) 25 MG tablet Take 1 tablet (25 mg) by mouth 2 times daily  1/14/2025 Evening    montelukast (SINGULAIR) 10 MG tablet Take 1 tablet (10 mg) by mouth at bedtime.  1/14/2025 Evening    Ocrelizumab (OCREVUS IV) Inject into the vein every 6 months  Past Month    pantoprazole (PROTONIX) 20 MG EC tablet Take 1 tablet (20 mg) by mouth daily.  1/14/2025 Morning    potassium chloride ER (MICRO-K) 10 MEQ CR capsule Take 10 mEq by mouth daily   1/14/2025 Morning    simvastatin (ZOCOR) 20 MG tablet Take 1 tablet (20 mg) by mouth at bedtime  1/14/2025 Bedtime    venlafaxine (EFFEXOR XR) 75 MG 24 hr capsule TAKE 3 CAPSULES BY MOUTH EVERY DAY  1/14/2025 Morning    Vitamin D3 (CHOLECALCIFEROL) 25 mcg (1000 units) tablet Take 25 mcg by mouth daily.  1/14/2025 Morning    warfarin ANTICOAGULANT (COUMADIN) 2.5 MG tablet Take 1 tab (2.5mg) to 2 tabs (5mg) by mouth daily, as directed.  Adjust dose based on INR results. 1/15/2025: 2.5 mg 1/14 pm 1/14/2025 Evening

## 2025-01-15 NOTE — PROGRESS NOTES
[facility] Surgery Follow up    HPI:    69 year old year old female who returns for a follow up.  Patient comes here today in the clinic for reevaluation.  She has not worn socks or anything but she comes in today and she states that her left arm has become numb she cannot feel it has no sensation and no strength.  With those findings her  is with her and it is kind of happening as we speak and I discussed with him at this time point I recommend she go to emergency room immediately at [facility] ED either that I can call an ambulance or he can take her and he would like to take her quickly across the street did suggest directly across the street and and plans to do so.    Allergies:Morphine, Sulfa antibiotics, Azithromycin, Doxycycline, and Latex    Past Medical History:   Diagnosis Date    Anxiety     Aortic valve replaced 02/15/2017    Aortic valve stenosis 01/24/2017    Asthma     Asthma     Created by Conversion     Chronic shortness of breath     Closed 3-part fracture of proximal humerus with delayed healing, left 04/10/2019    COPD (chronic obstructive pulmonary disease) (H)     Coronary artery disease     Depression     Diabetes mellitus (H)     borderline    Essential hypertension     Created by Conversion  Replacement Utility updated for latest IMO load    History of blood clots     PE    History of pulmonary embolism     HTN (hypertension)     Multiple sclerosis (H)     Multiple sclerosis (H)     Created by Conversion     Obesity     Obesity 10/29/2015    Osteoarthritis     Oxygen dependent     Panic attacks     PONV (postoperative nausea and vomiting)     Pre-diabetes     Pulmonary embolism (H)     Pulmonary embolism, other, unspecified chronicity, unspecified whether acute cor pulmonale present (H) 09/04/2024    Sleep apnea     borderline       Past Surgical History:   Procedure Laterality Date    aortic valve surgery      COLONOSCOPY N/A 12/23/2021    Procedure: COLONOSCOPY WITH POLYPECTOMIES;   Surgeon: Tito Suarez MD;  Location: [facility] OR    COLONOSCOPY W/ BIOPSIES N/A 3/22/2021    Procedure: COLONOSCOPY WITH BIOPSY AND POLYPECTOMY;  Surgeon: Sam Weems MD;  Location: [facility] OR;  Service: Gastroenterology    IR LUMBAR EPIDURAL STEROID INJECTION  1/16/2007    MAMMOPLASTY REDUCTION  1994    OTHER SURGICAL HISTORY  01/24/2017    mechanical aortic prosthesis    RELEASE CARPAL TUNNEL      ZZC RECONSTR TOTAL SHOULDER IMPLANT Left 4/10/2019    Procedure: LEFT REVERSE TOTAL SHOULDER ARTHROPLASTY;  Surgeon: Luis Antonio Telles MD;  Location: [facility];  Service: Orthopedics       CURRENT MEDS:  Current Outpatient Medications   Medication Sig Dispense Refill    acetaminophen (TYLENOL) 325 MG tablet Take 325-650 mg by mouth every 6 hours as needed       acyclovir (ZOVIRAX) 400 MG tablet Take 400 mg by mouth 2 times daily       albuterol (PROAIR HFA/PROVENTIL HFA/VENTOLIN HFA) 108 (90 Base) MCG/ACT inhaler Inhale 2 puffs into the lungs every 6 hours as needed for shortness of breath or wheezing 18 g 11    amoxicillin (AMOXIL) 500 MG capsule TAKE 4 CAPSULES BY MOUTH 30 TO 60 MINUTES PRIOR TO PROCEDURE      aspirin (ASA) 81 MG chewable tablet Take 81 mg by mouth daily       buPROPion (WELLBUTRIN XL) 150 MG 24 hr tablet Take 1 tablet (150 mg) by mouth daily 90 tablet 3    esomeprazole (NEXIUM) 20 MG DR capsule Take 1 capsule (20 mg) by mouth every morning (before breakfast) 90 capsule 3    furosemide (LASIX) 40 MG tablet Take 1 tablet (40 mg) by mouth daily.      lisinopril (ZESTRIL) 10 MG tablet Take 1 tablet (10 mg) by mouth daily. 90 tablet 3    loratadine (CLARITIN) 10 MG tablet Take 1 tablet (10 mg) by mouth daily 30 tablet 3    metoprolol tartrate (LOPRESSOR) 25 MG tablet Take 1 tablet (25 mg) by mouth 2 times daily 180 tablet 2    montelukast (SINGULAIR) 10 MG tablet Take 1 tablet (10 mg) by mouth at bedtime. 90 tablet 3    Ocrelizumab (OCREVUS IV) Inject into the vein every 6  months      pantoprazole (PROTONIX) 20 MG EC tablet Take 1 tablet (20 mg) by mouth daily. 90 tablet 3    potassium chloride ER (MICRO-K) 10 MEQ CR capsule Take 10 mEq by mouth daily       simvastatin (ZOCOR) 20 MG tablet Take 1 tablet (20 mg) by mouth at bedtime 90 tablet 3    venlafaxine (EFFEXOR XR) 75 MG 24 hr capsule TAKE 3 CAPSULES BY MOUTH EVERY  capsule 3    warfarin ANTICOAGULANT (COUMADIN) 2.5 MG tablet Take 1 tab (2.5mg) to 2 tabs (5mg) by mouth daily, as directed.  Adjust dose based on INR results. 270 tablet 1     Current Facility-Administered Medications   Medication Dose Route Frequency Provider Last Rate Last Admin    ropivacaine (NAROPIN) injection 3 mL  3 mL   Ravi Patel DO   3 mL at 09/07/22 1415    triamcinolone (KENALOG-40) injection 40 mg  40 mg   Ravi Patel DO   40 mg at 09/07/22 1415       Family History   Problem Relation Age of Onset    Snoring Mother     Snoring Father     Sleep Apnea Sister     Coronary Artery Disease Father     Coronary Artery Disease Sister     Coronary Artery Disease Brother     Breast Cancer No family hx of         reports that she has never smoked. She has been exposed to tobacco smoke. She has never used smokeless tobacco. She reports that she does not drink alcohol and does not use drugs.    Review of Systems:  Leg numbness in sensation and loss of strength.     OBJECTIVE:  Vitals:    01/15/25 1006   BP: 93/67   Pulse: 70   SpO2: 94%     There is no height or weight on file to calculate BMI.    EXAM:  GENERAL: This is a well-developed 69 year old female who appears her stated age  HEAD: normocephalic  HEENT: Pupils equal and reactive bilaterally  CARDIAC: RRR without murmur  CHEST/LUNG:  Clear to auscultation  ABDOMEN: Soft, nontender, nondistended, no masses    NEUROLOGIC: Focally intact, nonfocal  VASCULAR: Pulses intact, symmetrical upper and lower extremities.    Side:: Bilateral  VCSS  PAIN:: Absent: None  Varicose Veins::  Mild: Few scattered  Venous Edema:: Absent: None  Skin Pigmentation:: Absent: None  Inflamation:: Absent: None  Induration:: Absent: None  Number of active ulcers:: 0  Active ulcer duration:: None  Active ulcer diameter:: None  Compression Therapy:: Not used or patient not compliant  VCSS Score:: 1  CEAP:: Simple varicose veins only    LABS:  Lab Results   Component Value Date    WBC 9.1 05/21/2024    WBC 6.1 04/30/2019    HGB 14.5 05/21/2024    HGB 9.2 04/30/2019    HCT 45.7 05/21/2024    HCT 33.5 04/30/2019    MCV 98 05/21/2024     04/30/2019     05/21/2024     04/30/2019       Lab Results   Component Value Date    ALT 33 02/19/2024    AST 39 02/19/2024    ALKPHOS 95 02/19/2024            Assessment/Plan:   Venous issues    Has not worn socks is not conservative management will need to do so before and see us back in 3 months    Presents and has this numbness and and loss of strength in left arm I am afraid about a stroke and so I am sending over the emergency room immediately she is going over with her  who is with her at this present time I have also called the emergency room and alerted them and while I was talking to them she showed up there and there can evaluate her at that immediately.    No follow-ups on file.     Chico Jade MD ,MD  [facility] Department of Surgery

## 2025-01-15 NOTE — CONSULTS
"Care Management Initial Consult    General Information  Assessment completed with: Patient, Children, Marilee and daughter Maci  Type of CM/SW Visit: Initial Assessment    Primary Care Provider verified and updated as needed: Yes   Readmission within the last 30 days: no previous admission in last 30 days      Reason for Consult: discharge planning  Advance Care Planning: Advance Care Planning Reviewed: no concerns identified          Communication Assessment  Patient's communication style: spoken language (English or Bilingual)             Cognitive  Cognitive/Neuro/Behavioral: WDL                      Living Environment:   People in home: child(yesica), adult, spouse     Current living Arrangements: house (\"one level plus basement\")      Able to return to prior arrangements: other (see comments) (to be determined)       Family/Social Support:  Care provided by: self, spouse/significant other, child(yesica)  Provides care for: no one, unable/limited ability to care for self  Marital Status:   Support system: , Children          Description of Support System: Supportive, Involved    Support Assessment: Adequate family and caregiver support, Patient communicates needs well met    Current Resources:   Patient receiving home care services: No        Community Resources: None  Equipment currently used at home: walker, rolling, cane, straight, shower chair, hospital bed  Supplies currently used at home: Oxygen Tubing/Supplies (\"has oxygen but only uses as needed\")    Employment/Financial:  Employment Status: retired        Financial Concerns: none   Referral to Financial Worker: No       Does the patient's insurance plan have a 3 day qualifying hospital stay waiver?  Yes     Which insurance plan 3 day waiver is available? Alternative insurance waiver    Will the waiver be used for post-acute placement? Undetermined at this time    Lifestyle & Psychosocial Needs:  Social Drivers of Health     Food Insecurity: Low " Risk  (8/21/2024)    Food Insecurity     Within the past 12 months, did you worry that your food would run out before you got money to buy more?: No     Within the past 12 months, did the food you bought just not last and you didn t have money to get more?: No   Depression: Not at risk (12/16/2024)    PHQ-2     PHQ-2 Score: 2   Housing Stability: Low Risk  (8/21/2024)    Housing Stability     Do you have housing? : Yes     Are you worried about losing your housing?: No   Tobacco Use: Low Risk  (1/15/2025)    Patient History     Smoking Tobacco Use: Never     Smokeless Tobacco Use: Never     Passive Exposure: Past   Financial Resource Strain: Low Risk  (8/21/2024)    Financial Resource Strain     Within the past 12 months, have you or your family members you live with been unable to get utilities (heat, electricity) when it was really needed?: No   Alcohol Use: Not on file   Transportation Needs: High Risk (8/21/2024)    Transportation Needs     Within the past 12 months, has lack of transportation kept you from medical appointments, getting your medicines, non-medical meetings or appointments, work, or from getting things that you need?: Yes   Physical Activity: Inactive (8/21/2024)    Exercise Vital Sign     Days of Exercise per Week: 0 days     Minutes of Exercise per Session: 0 min   Interpersonal Safety: Low Risk  (8/22/2024)    Interpersonal Safety     Do you feel physically and emotionally safe where you currently live?: Yes     Within the past 12 months, have you been hit, slapped, kicked or otherwise physically hurt by someone?: No     Within the past 12 months, have you been humiliated or emotionally abused in other ways by your partner or ex-partner?: No   Stress: Stress Concern Present (8/21/2024)    [employer] - Occupational Stress Questionnaire     Feeling of Stress : To some extent   Social Connections: Unknown (8/21/2024)    Social Connection and Isolation Panel [NHANES]      Frequency of Communication with Friends and Family: Not on file     Frequency of Social Gatherings with Friends and Family: Never     Attends Worship Services: Not on file     Active Member of Clubs or Organizations: Not on file     Attends Club or Organization Meetings: Not on file     Marital Status: Not on file   Health Literacy: Not on file       Functional Status:  Prior to admission patient needed assistance:   Dependent ADLs:: Ambulation-walker, Bathing  Dependent IADLs:: Cleaning, Cooking, Laundry, Shopping, Transportation       Mental Health Status:  Mental Health Status: No Current Concerns       Chemical Dependency Status:  Chemical Dependency Status: No Current Concerns             Values/Beliefs:  Spiritual, Cultural Beliefs, Worship Practices, Values that affect care: no               Discussed  Partnership in Safe Discharge Planning  document with patient/family: No    Additional Information:  CM consult received for discharge planning.  Met with patient and daughter Maci at bedside to introduce CM role and perform initial assessment.  Demographics verified and updated as needed.  Marilee lives in a house with her spouse and other daughter.  There are a flight of steps to enter the home, but once inside patient can remain on one level.  No current in home services.  Patient ambulates with a walker and is independent with ADLs except bathing which spouse helps with.  Spouse and daughters assist with IADLs.  Discharge needs to be determined pending medical progression and PT/OT recommendations.  Patient is willing to go to TCU if that is recommended.  She had a good experience in past at [facility] in [place] and that would be her first choice.  Family can transport.      Next Steps: CM to follow for medical progression of care, discharge recommendations, and final discharge plan.      Giovani Goodman CRNI

## 2025-01-15 NOTE — ED NOTES
"[facility] ED Handoff Report    ED Chief Complaint: Left sided weakness-stroke symptoms    ED Diagnosis:  (R29.898) Left arm weakness    PMH:    Past Medical History:   Diagnosis Date    Anxiety     Aortic valve replaced 02/15/2017    Aortic valve stenosis 01/24/2017    Asthma     Asthma     Created by Conversion     Chronic shortness of breath     Closed 3-part fracture of proximal humerus with delayed healing, left 04/10/2019    COPD (chronic obstructive pulmonary disease) (H)     Coronary artery disease     Depression     Diabetes mellitus (H)     borderline    Essential hypertension     Created by Conversion  Replacement Utility updated for latest IMO load    History of blood clots     PE    History of pulmonary embolism     HTN (hypertension)     Multiple sclerosis (H)     Multiple sclerosis (H)     Created by Conversion     Obesity     Obesity 10/29/2015    Osteoarthritis     Oxygen dependent     Panic attacks     PONV (postoperative nausea and vomiting)     Pre-diabetes     Pulmonary embolism (H)     Pulmonary embolism, other, unspecified chronicity, unspecified whether acute cor pulmonale present (H) 09/04/2024    Sleep apnea     borderline        Code Status:  Full Code     Falls Risk: Yes Band: Applied    Current Living Situation/Residence: lives with a significant other     Elimination Status: Continent: Yes     Activity Level: SBA w/ walker    Patients Preferred Language:  English     Needed: No    Vital Signs:  /58   Pulse 68   Temp 97.2  F (36.2  C) (Temporal)   Resp 28   Ht 1.778 m (5' 10\")   Wt 109.6 kg (241 lb 9.6 oz)   SpO2 95%   BMI 34.67 kg/m       Cardiac Rhythm: NSR    Pain Score: 0/10    Is the Patient Confused:  No    Last Food or Drink: 01/15/25    Focused Assessment:  Pt went to clinic today and had sudden onset left sided weakness/numbness.  Primary care referred pt to ED.  Tier 1 stroke code called.  CT did not show any sign of active stroke.  Deescalated. "  Pt went to MRI.    Tests Performed: Done: Labs and Imaging    Treatments Provided:  See MAR    Family Dynamics/Concerns: No    Family Updated On Visitor Policy: Yes    Plan of Care Communicated to Family: Yes    Who Was Updated about Plan of Care: daughter at bedside    Belongings Checklist Done and Signed by Patient: Yes    Medications sent with patient: NA    Additional Information: Pt is on warfarin and missed several doses this past week.  INR 1.1    Piedad Hook RN 1/15/2025 1:35 PM

## 2025-01-15 NOTE — PHARMACY-ANTICOAGULATION SERVICE
Clinical Pharmacy - Warfarin Dosing Consult     Pharmacy has been consulted to manage this patient s warfarin therapy.  Indication: Atrial Fibrillation;Mechanical Aortic Valve Replacement  Therapy Goal: INR 2.5-3.5  Warfarin Prior to Admission: Yes  Warfarin PTA Regimen: recent dosing 5mg qmon and friday and 2.5mg rest of week- recently run out- missed a few days(last dose taking 1/14/25-2.5mg)  Significant drug interactions: heparin infusion, aspirin, acyclovir, montelukast, rosuvastatin, venlafexzine, pantoprazole  Dose Comments: 1/15/25 INR=1.17(subtherapeutic) will dose with 5mg x1 today( on heparin infusion-bridging)    INR   Date Value Ref Range Status   01/15/2025 1.17 (H) 0.85 - 1.15 Final   01/15/2025 1.10 0.85 - 1.15 Final       Recommend warfarin 5 mg today.  Pharmacy will monitor Eileen Donald daily and order warfarin doses to achieve specified goal.      Please contact pharmacy as soon as possible if the warfarin needs to be held for a procedure or if the warfarin goals change.    5

## 2025-01-15 NOTE — PROGRESS NOTES
ANTICOAGULATION MANAGEMENT     Eileen Donald 69 year old female is on warfarin with subtherapeutic INR result. (Goal INR 2.5-3.5)    Recent labs: (last 7 days)     01/15/25  1002   INR 1.1       ASSESSMENT     Warfarin Lab Questionnaire    Warfarin Doses Last 7 Days      1/15/2025     2:36 AM   Dose in Tablet or Mg   TAB or MG? - 2,5ng warfarin tabs (evenings) milligram (mg)     Pt Rptd Dose STAS MONDAY TUESDAY WED THURS FRIDAY SATURDAY   1/15/2025   2:36 AM 2.5 2.5 5 2.5 2.5 0 2.5         1/15/2025   Warfarin Lab Questionnaire   Missed doses within past 14 days? Yes   If yes; please list when: Friday   Changes in diet or alcohol within past 14 days? No   Medication changes since last result? No   Injuries or illness since last result? No - 1/15/25 - currently in ED with stroke symptoms.       - 1/15/25 follow-up bilateral symptomatic varicose veins. Report that her left arm has become numb  she cannot feel it has no sensation and no strength right now.  With this symptom - Dr. Chico Jade - recommend she go to emergency room immediately at [facility] ED or by ambulance.   preferred to drive her now across the street.     New shortness of breath, severe headaches or sudden changes in vision since last result? No   Abnormal bleeding since last result? No   Upcoming surgery, procedure? No   Best number to call with results? [phone]     Previous result: Therapeutic last  INR visits.    Additional findings:  Patient admitted for stroke symptoms.   - no plans made re:  warfarin instructions.      Alondra Lora RN  1/15/2025  Anticoagulation Clinic  [facility] for routing messages: ariadna QUEZADA [facility] patient phone line: [phone]        _______________________________________________________________________     Anticoagulation Episode Summary       Current INR goal:  2.5-3.5   TTR:  64.0% (1 y)   Target end date:  Indefinite   Send INR reminders to:  LARISA GAO     Indications    Heart valve replaced (Resolved) [Z95.2]  Paroxysmal atrial fibrillation (H) [I48.0]  S/P AVR (aortic valve replacement) [Z95.2]  Other pulmonary embolism without acute cor pulmonale  unspecified chronicity (H) (Resolved) [I26.99]  H/O aortic valve replacement (Resolved) [Z95.2]  Long term (current) use of anticoagulants [Z79.01]  Pulmonary embolism  other  unspecified chronicity  unspecified whether acute cor pulmonale present (H) (Resolved) [I26.99]  Pulmonary embolism  other  unspecified chronicity  unspecified whether acute cor pulmonale present (H) (Resolved) [I26.99]  Pulmonary embolism  other  unspecified chronicity  unspecified whether acute cor pulmonale present (H) (Resolved) [I26.99]             Comments:  12/15/21 - amended INR goal to 2.5 - 3.5             Anticoagulation Care Providers       Provider Role Specialty Phone number    Tito Salazar MD Referring Family Medicine [phone]    Drew Hahn MD Referring Family Medicine [phone]

## 2025-01-15 NOTE — PROGRESS NOTES
SLP orders received. Chart reviewed and discussed with care team as well as the pt and her family in the room.? Speech-Language Pathology Evaluations not indicated due to no reported or resolved deficits related to speech, language, or cognition. Family reports speech can be slurred at times when she is tired or before taking medication for MS. She has a hx of reflux and should follow reflux precautions when eating. Passed nursing dysphagia screen with RN, no s/s aspiration observed. No acute SLP needs identified.? Defer discharge recommendations to treatment team.? Will complete orders.

## 2025-01-15 NOTE — PLAN OF CARE
Goal Outcome Evaluation:      Plan of Care Reviewed With: patient, child          Outcome Evaluation: Discharge needs to be determined pending medical progression and PT/OT recommendations.

## 2025-01-15 NOTE — CONSULTS
"Addendum  1/15/25 @1448:  MRI with cortical R frontal infarct, suspect is cardoembolic in setting of afib and mechanical valve and subtherepuetic INR. Patient remains at risk of additional clot formation. Hospitalist notes patient is clinically doing well--armdrift is no longer present.     Recommendations:  -restart coumadin. Bridge with heparin, no initial bolus but ok with subsequent bolus  -admission and additional recommendations as outlined below           [facility]     Stroke Code Note          History of Present Illness     Chief Complaint: Stroke Symptoms      Eileen Donald is a 69 year old female who presents for evaluation of LUE weakness.  LKW reported as 1025 when she was at a clinic appointment and had acute onset of left arm weakness and numbness.  Per ED provider on initial ED examination she has left upper extremity weakness that drifts to hit the wheelchair (not able to maintain antigravity) along with numbness to the left arm and leg.  He has not been taking her Coumadin consistently and INR at clinic visit this morning was 1.1.    On tele examination NIH= 2 with subtle drift to the left arm and reduced sensation to the left face and arm.  Eileen feels that her symptoms are \"a lot better\" than they were at onset reports that at that time she was essentially unable to use her arm.  She confirms that symptoms acutely started at 1025, she had no symptoms in the arm earlier today and was able to function as normally.  She reports that she did take her Coumadin last night, but estimates that prior to that she missed 2-3 dosages of medication.  INR in the ED confirms level of 1.1.     On repeat evaluation NIH improved to 1, now with only residual sensory symptoms and no drift.          Past Medical History     Stroke risk factors: afib not taking coumadin consistently, HLD, HTN, DM2    Preadmission antithrombotic regimen: coumadin (but reports not taking recently) " "                     Assessment and Plan       1.  Left upper extremity numbness/weakness, improving with only sensory symptoms at present.  High suspicion for TIA versus acute stroke in the setting of atrial fibrillation and mechanical valve not currently therapeutic on Coumadin     Intravenous Thrombolysis  Not given due to:   - minor/isolated/quickly resolving symptoms     Endovascular Treatment  Not initiated due to absence of proximal vessel occlusion     Plan:  -If worsening or development of new neurological symptoms while in standard TNK window (until approximately 1500) reactivate stroke code for reconsideration of acute intervention  - STAT MRI Brain with and without contrast, ordered. Please page stroke team following completion for further recommendations regarding anticoagulation  - Use orderset: \"Ischemic Stroke Routine Admission\" or \"Ischemic Stroke No Thrombolytics/No Thrombectomy ICU Admission\"  - Place Neurology IP Stroke Consult order   - anticoagulation recommendations pending MRI  - Neurochecks and Vital Signs every 4 hours   - Permissive HTN; goal SBP < 220 mmHg  - TTE (with Bubble Study if age 60 yrs or less)  - Telemetry, EKG  - Bedside Glucose Monitoring  - A1c, Lipid Panel, Troponin x 3  - PT/OT/SLP  - Stroke Education  - Euthermia, Euglycemia    ___________________________________________________________________    LJ Henson CNP  Vascular Neurology    To page me or covering stroke neurology team member, click here: [facility]  Choose \"On Call\" tab at top, then select \"NEUROLOGY/ALL SITES\" from middle drop-down box, press Enter, then look for \"stroke\" or \"telestroke\" for your site.  ___________________________________________________________________        Imaging/Labs   (personally reviewed)    CT head: no hemorrhage or other acute findings  CTA head/neck: No LVO, significant stenosis or dissection          Physical Examination     BP: 130/62   Pulse: 69   Resp: 18   Temp: 97.2  F " (36.2  C)   Temp src: Temporal   SpO2: 96 %   O2 Device: None (Room air)   Weight: 109.6 kg (241 lb 9.6 oz)    Wt Readings from Last 2 Encounters:   01/15/25 109.6 kg (241 lb 9.6 oz)   12/16/24 113 kg (249 lb 3.2 oz)     Neuro Exam  Mental Status:  alert, oriented x 3, follows commands, speech clear and fluent, naming and repetition normal  Cranial Nerves:  visual fields intact (tested by nurse), EOMI with normal smooth pursuit reduced sensation to light touch on L face (tested by nurse), facial movements symmetric, hearing not formally tested but intact to conversation, no dysarthria, shoulder shrug equal bilaterally, tongue protrusion midline  Motor:  no abnormal movements, able to move all limbs antigravity spontaneously with no signs of hemiparesis observed, mild drift in LUE that does not hit bed, no drift in RUE or BLE  Reflexes:  unable to test (telestroke)  Sensory: reduced sensation to light touch in LUE, normal sensation to light touch in RUE and BLE (assessed by nurse), no extinction on double simultaneous stimulation (assessed by nurse)  Coordination:  normal finger-to-nose and heel-to-shin bilaterally without dysmetria  Station/Gait:  unable to test due to telestroke        Stroke Scales       NIHSS  1a. Level of Consciousness 0-->Alert, keenly responsive   1b. LOC Questions 0-->Answers both questions correctly   1c. LOC Commands 0-->Performs both tasks correctly   2.   Best Gaze 0-->Normal   3.   Visual 0-->No visual loss   4.   Facial Palsy 0-->Normal symmetrical movements   5a. Motor Arm, Left 1-->Drift, limb holds 90 (or 45) degrees, but drifts down before full 10 seconds, does not hit bed or other support   5b. Motor Arm, Right 0-->No drift, limb holds 90 (or 45) degrees for full 10 secs   6a. Motor Leg, Left 0-->No drift, leg holds 30 degree position for full 5 secs   6b. Motor Leg, right 0-->No drift, leg holds 30 degree position for full 5 secs   7.   Limb Ataxia 0-->Absent   8.   Sensory  1-->Mild-to-moderate sensory loss, patient feels pinprick is less sharp or is dull on the affected side, or there is a loss of superficial pain with pinprick, but patient is aware of being touched   9.   Best Language 0-->No aphasia, normal   10. Dysarthria 0-->Normal   11. Extinction and Inattention  0-->No abnormality   Total 2 (01/15/25 1101)            Labs     CBC  Lab Results   Component Value Date    HGB 14.5 05/21/2024    HCT 45.7 05/21/2024    WBC 9.1 05/21/2024     05/21/2024       BMP  Lab Results   Component Value Date     12/16/2024    POTASSIUM 4.2 12/16/2024    CHLORIDE 103 12/16/2024    CO2 28 12/16/2024    BUN 9.1 12/16/2024    CR 0.87 12/16/2024     (H) 12/16/2024    HAN 8.9 12/16/2024       INR  INR   Date Value Ref Range Status   01/15/2025 1.1 0.9 - 1.1 Final   12/16/2024 3.4 (H) 0.9 - 1.1 Final   10/28/2024 3.1 (H) 0.9 - 1.1 Final       Data   Stroke Code Data  (for stroke code with tele)  Stroke code activated 01/15/25  1040   First stroke provider response 01/15/25  1041   Video start time 01/15/25  1100   Video end time 01/15/25  1113   Last known normal 01/15/25  1025   Time of discovery (or onset of symptoms)  01/15/25  1025   Head CT read by Stroke Neuro Provider 01/15/25  1052   Was stroke code de-escalated? Yes  01/15/25  1114     Telestroke Service Details  Type of service telemedicine diagnostic assessment of acute neurological changes   Reason telemedicine is appropriate patient requires assessment with a specialist for diagnosis and treatment of neurological symptoms   Mode of transmission secure interactive audio and video communication per Gloria   Originating site (patient location) [facility]    Distant site (provider location) [facility]        Clinically Significant Risk Factors Present on Admission                # Drug Induced Coagulation Defect: home medication list includes an anticoagulant  "medication  # Drug Induced Platelet Defect: home medication list includes an antiplatelet medication   # Hypertension: Noted on problem list           # Obesity: Estimated body mass index is 34.67 kg/m  as calculated from the following:    Height as of this encounter: 1.778 m (5' 10\").    Weight as of this encounter: 109.6 kg (241 lb 9.6 oz).       # Asthma: noted on problem list       Time Spent on this Encounter   Billing: I personally examined and evaluated the patient today. At the time of my evaluation and management the patient was critical condition today due to stroke code. I personally managed stroke code. Key decisions made today included need for advanced neuroimaging and/or infication for acute stroke treatments. I spent a total of 50 minutes providing critical care services, evaluating the patient, directing care and reviewing laboratory values and radiologic reports.   "

## 2025-01-16 ENCOUNTER — APPOINTMENT (OUTPATIENT)
Dept: PHYSICAL THERAPY | Facility: HOSPITAL | Age: 70
End: 2025-01-16
Attending: INTERNAL MEDICINE
Payer: COMMERCIAL

## 2025-01-16 ENCOUNTER — TELEPHONE (OUTPATIENT)
Dept: ANTICOAGULATION | Facility: CLINIC | Age: 70
End: 2025-01-16

## 2025-01-16 VITALS
RESPIRATION RATE: 18 BRPM | HEART RATE: 81 BPM | WEIGHT: 246.47 LBS | SYSTOLIC BLOOD PRESSURE: 128 MMHG | BODY MASS INDEX: 35.29 KG/M2 | OXYGEN SATURATION: 93 % | DIASTOLIC BLOOD PRESSURE: 65 MMHG | HEIGHT: 70 IN | TEMPERATURE: 98 F

## 2025-01-16 DIAGNOSIS — Z79.01 LONG TERM (CURRENT) USE OF ANTICOAGULANTS: Primary | ICD-10-CM

## 2025-01-16 PROBLEM — I63.40 CEREBROVASCULAR ACCIDENT (CVA) DUE TO EMBOLISM OF CEREBRAL ARTERY (H): Status: ACTIVE | Noted: 2025-01-16

## 2025-01-16 PROBLEM — I48.0 PAROXYSMAL ATRIAL FIBRILLATION (H): Status: RESOLVED | Noted: 2019-09-17 | Resolved: 2025-01-16

## 2025-01-16 PROBLEM — I48.0 PAROXYSMAL ATRIAL FIBRILLATION (H): Status: ACTIVE | Noted: 2019-09-17

## 2025-01-16 PROBLEM — R29.898 LEFT ARM WEAKNESS: Status: ACTIVE | Noted: 2025-01-16

## 2025-01-16 PROBLEM — Z95.2 S/P AVR (AORTIC VALVE REPLACEMENT): Status: ACTIVE | Noted: 2017-01-24

## 2025-01-16 PROBLEM — R29.898 LEFT ARM WEAKNESS: Status: RESOLVED | Noted: 2025-01-15 | Resolved: 2025-01-16

## 2025-01-16 PROBLEM — Z95.2 S/P AVR (AORTIC VALVE REPLACEMENT): Status: RESOLVED | Noted: 2017-01-24 | Resolved: 2025-01-16

## 2025-01-16 PROBLEM — R29.898 LEFT ARM WEAKNESS: Status: RESOLVED | Noted: 2025-01-16 | Resolved: 2025-01-16

## 2025-01-16 LAB
ANION GAP SERPL CALCULATED.3IONS-SCNC: 8 MMOL/L (ref 7–15)
BUN SERPL-MCNC: 12.3 MG/DL (ref 8–23)
CALCIUM SERPL-MCNC: 9.2 MG/DL (ref 8.8–10.4)
CHLORIDE SERPL-SCNC: 103 MMOL/L (ref 98–107)
CREAT SERPL-MCNC: 0.84 MG/DL (ref 0.51–0.95)
EGFRCR SERPLBLD CKD-EPI 2021: 75 ML/MIN/1.73M2
ERYTHROCYTE [DISTWIDTH] IN BLOOD BY AUTOMATED COUNT: 12.6 % (ref 10–15)
GLUCOSE BLDC GLUCOMTR-MCNC: 114 MG/DL (ref 70–99)
GLUCOSE BLDC GLUCOMTR-MCNC: 127 MG/DL (ref 70–99)
GLUCOSE SERPL-MCNC: 113 MG/DL (ref 70–99)
HCO3 SERPL-SCNC: 29 MMOL/L (ref 22–29)
HCT VFR BLD AUTO: 42.3 % (ref 35–47)
HGB BLD-MCNC: 13.4 G/DL (ref 11.7–15.7)
INR PPP: 1.26 (ref 0.85–1.15)
MCH RBC QN AUTO: 30.9 PG (ref 26.5–33)
MCHC RBC AUTO-ENTMCNC: 31.7 G/DL (ref 31.5–36.5)
MCV RBC AUTO: 98 FL (ref 78–100)
PLATELET # BLD AUTO: 245 10E3/UL (ref 150–450)
POTASSIUM SERPL-SCNC: 3.9 MMOL/L (ref 3.4–5.3)
RBC # BLD AUTO: 4.34 10E6/UL (ref 3.8–5.2)
SODIUM SERPL-SCNC: 140 MMOL/L (ref 135–145)
UFH PPP CHRO-ACNC: 0.37 IU/ML
UFH PPP CHRO-ACNC: 0.61 IU/ML
WBC # BLD AUTO: 8.5 10E3/UL (ref 4–11)

## 2025-01-16 PROCEDURE — 85520 HEPARIN ASSAY: CPT | Performed by: INTERNAL MEDICINE

## 2025-01-16 PROCEDURE — 85014 HEMATOCRIT: CPT | Performed by: INTERNAL MEDICINE

## 2025-01-16 PROCEDURE — 250N000011 HC RX IP 250 OP 636: Performed by: HOSPITALIST

## 2025-01-16 PROCEDURE — 82565 ASSAY OF CREATININE: CPT | Performed by: INTERNAL MEDICINE

## 2025-01-16 PROCEDURE — 250N000013 HC RX MED GY IP 250 OP 250 PS 637: Performed by: INTERNAL MEDICINE

## 2025-01-16 PROCEDURE — 36415 COLL VENOUS BLD VENIPUNCTURE: CPT | Performed by: INTERNAL MEDICINE

## 2025-01-16 PROCEDURE — 82962 GLUCOSE BLOOD TEST: CPT

## 2025-01-16 PROCEDURE — 99221 1ST HOSP IP/OBS SF/LOW 40: CPT | Performed by: PSYCHIATRY & NEUROLOGY

## 2025-01-16 PROCEDURE — G0378 HOSPITAL OBSERVATION PER HR: HCPCS

## 2025-01-16 PROCEDURE — 97161 PT EVAL LOW COMPLEX 20 MIN: CPT | Mod: GP

## 2025-01-16 PROCEDURE — 999N000111 HC STATISTIC OT IP EVAL DEFER

## 2025-01-16 PROCEDURE — 85041 AUTOMATED RBC COUNT: CPT | Performed by: INTERNAL MEDICINE

## 2025-01-16 PROCEDURE — 99239 HOSP IP/OBS DSCHRG MGMT >30: CPT | Performed by: HOSPITALIST

## 2025-01-16 PROCEDURE — 82435 ASSAY OF BLOOD CHLORIDE: CPT | Performed by: INTERNAL MEDICINE

## 2025-01-16 PROCEDURE — 96366 THER/PROPH/DIAG IV INF ADDON: CPT

## 2025-01-16 PROCEDURE — 97116 GAIT TRAINING THERAPY: CPT | Mod: GP

## 2025-01-16 PROCEDURE — 85610 PROTHROMBIN TIME: CPT | Performed by: INTERNAL MEDICINE

## 2025-01-16 PROCEDURE — 80048 BASIC METABOLIC PNL TOTAL CA: CPT | Performed by: INTERNAL MEDICINE

## 2025-01-16 PROCEDURE — 96372 THER/PROPH/DIAG INJ SC/IM: CPT | Performed by: HOSPITALIST

## 2025-01-16 RX ORDER — ENOXAPARIN SODIUM 150 MG/ML
120 INJECTION SUBCUTANEOUS 2 TIMES DAILY
Qty: 20 ML | Refills: 0 | Status: SHIPPED | OUTPATIENT
Start: 2025-01-17

## 2025-01-16 RX ORDER — ENOXAPARIN SODIUM 150 MG/ML
110 INJECTION SUBCUTANEOUS DAILY
Qty: 20 ML | Refills: 0 | Status: SHIPPED | OUTPATIENT
Start: 2025-01-16 | End: 2025-01-16

## 2025-01-16 RX ORDER — WARFARIN SODIUM 2.5 MG/1
TABLET ORAL
Qty: 30 TABLET | Refills: 2 | Status: SHIPPED | OUTPATIENT
Start: 2025-01-16

## 2025-01-16 RX ORDER — ENOXAPARIN SODIUM 150 MG/ML
1 INJECTION SUBCUTANEOUS ONCE
Status: COMPLETED | OUTPATIENT
Start: 2025-01-16 | End: 2025-01-16

## 2025-01-16 RX ORDER — ROSUVASTATIN CALCIUM 20 MG/1
20 TABLET, COATED ORAL AT BEDTIME
Qty: 30 TABLET | Refills: 1 | Status: SHIPPED | OUTPATIENT
Start: 2025-01-16

## 2025-01-16 RX ADMIN — PANTOPRAZOLE SODIUM 20 MG: 20 TABLET, DELAYED RELEASE ORAL at 08:15

## 2025-01-16 RX ADMIN — VENLAFAXINE HYDROCHLORIDE 225 MG: 150 CAPSULE, EXTENDED RELEASE ORAL at 08:15

## 2025-01-16 RX ADMIN — BUPROPION HYDROCHLORIDE 150 MG: 150 TABLET, FILM COATED, EXTENDED RELEASE ORAL at 08:15

## 2025-01-16 RX ADMIN — ACYCLOVIR 400 MG: 400 TABLET ORAL at 08:15

## 2025-01-16 RX ADMIN — ANTACID TABLETS 500 MG: 500 TABLET, CHEWABLE ORAL at 08:15

## 2025-01-16 RX ADMIN — LORATADINE 10 MG: 10 TABLET ORAL at 08:15

## 2025-01-16 RX ADMIN — ASPIRIN 81 MG CHEWABLE TABLET 81 MG: 81 TABLET CHEWABLE at 08:15

## 2025-01-16 RX ADMIN — ENOXAPARIN SODIUM 120 MG: 120 INJECTION SUBCUTANEOUS at 14:19

## 2025-01-16 ASSESSMENT — ACTIVITIES OF DAILY LIVING (ADL)
ADLS_ACUITY_SCORE: 35
ADLS_ACUITY_SCORE: 39
ADLS_ACUITY_SCORE: 31
ADLS_ACUITY_SCORE: 30
ADLS_ACUITY_SCORE: 31
ADLS_ACUITY_SCORE: 31
ADLS_ACUITY_SCORE: 35

## 2025-01-16 NOTE — PLAN OF CARE
PRIMARY DIAGNOSIS: SYNCOPE/TIA  OUTPATIENT/OBSERVATION GOALS TO BE MET BEFORE DISCHARGE:  1. Orthostatic performed: N/A    2. Diagnostic testing complete & at baseline neurologic testing: Yes    3. Cleared by consultants (if involved): Yes    4. Interpretation of cardiac rhythm per telemetry tech: NSR    5. Tolerating adequate PO diet and medications: Yes    6. Return to near baseline physical activity or neurologic status: Yes    Discharge Planner Nurse   Safe discharge environment identified: Yes  Barriers to discharge: No       Entered by: Radha Parikh RN 01/16/2025 2:43 PM     Please review provider order for any additional goals.   Nurse to notify provider when observation goals have been met and patient is ready for discharge.Goal Outcome Evaluation:  Pt is feeling much better today.  She feels that her left hand and arm are much better, almost back to normal.  Pt moves well with a stand by assist.  Pt daughter and  are here.  They are in agreement with getting up at 03-04am to take the Lovenox and then shifting the times.      Stroke handouts given and discussed before the pt went home.

## 2025-01-16 NOTE — PROGRESS NOTES
"SPIRITUAL HEALTH SERVICES NOTE  [facility]; P1    Reason for Visit: admission screening response    Patient/Family Understanding of Illness and Goals of Care - Saw Marilee and her daughter Maci due to admission screening response. Marilee shared about her stroke yesterday. She is grateful that she has minimal lingering symptoms today. She anticipates that she will be discharged within the next 24 hours.     Distress and Loss - Because of the impact that this experience has had on her family, Marilee feels a bit of guilt about not taking her medications more consistently. She is also feeling vulnerable after her stroke. She has lived with MS for 28 years and grieves the many things she has had to give up - like driving, and making stained glass. She hesitates to ask her sister for help because she has another sister \"who needs it more\".     Strengths, Coping, and Resources - Marilee identifies her , daughters and sister as sources of support. She is growing in her ability to ask for help.     Meaning, Beliefs, and Spirituality - Marilee is Sabianism. She is not connected to a Jain community. She notes that talking with her parents (internally) is a helpful practice for her.     Plan of Care: No plans for follow-up at this time due to patient's anticipated discharge today/tomorrow.  available by patient or staff request.     Bijal Talbot M.Div.    Office: [phone] (for non-urgent requests)  Please Vocera or page through [facility] for time-sensitive requests    "

## 2025-01-16 NOTE — PLAN OF CARE
Occupational Therapy: Orders received, chart reviewed, discussed with care team and met with patient/family. Patient and family declining need for OT evaluation at this time. Patient, patient's daughter, and patient's spouse all report they feel she is back to baseline, patient feels LUE function back to baseline. Will complete orders and defer discharge recommendations to PT.    Melina Land, OTR/L 1/16/25

## 2025-01-16 NOTE — TELEPHONE ENCOUNTER
ANTICOAGULATION  MANAGEMENT: Discharge Review    Eileen Donald chart reviewed for anticoagulation continuity of care    Hospital Admission on 1/25-26/25 for  left upper arm numbess.and weakness.   - evaluated for stroke symptoms - small right cortical stroke likely due to embolism with mechanical valve.  - CTA head and neck 1/15: No acute intracranial abnormality noted, mild atherosclerotic disease at the carotid bifurcations  - MRI 1/15: Posterior lateral right frontal lobe infarct  - Place Neurology IP Stroke Consult order   - Neurochecks and Vital Signs every 4 hours     - Subtherapeutic INR-on admission  - Had been missing doses of warfarin prior to arrival as she believes she could not take with Tylenol  - Started on heparin bridge until warfarin is therapeutic, per neurology,   - She does have heparin bridging available at home but hasn't taken.     Discharge disposition: Home    Results:    Recent labs: (last 7 days)     01/15/25  1002 01/15/25  1103 01/15/25  1638 01/15/25  2154 01/16/25  0412 01/16/25  1242   INR 1.1 1.10 1.17*  --  1.26*  --    AAUFH  --   --   --  0.22 0.61 0.37     Anticoagulation inpatient management:     anticoagulation calendar updated with inpatient dosing    Anticoagulation discharge instructions:     Warfarin dosing: home regimen continued and currently bridging   Bridging: bridging with enoxaparin (Lovenox)   INR goal change: No      Medication changes affecting anticoagulation: Yes:    - started on: Enoxaparin 0.8 mls q12hrs, until Inr therapeutic >=2.5     Rosuvastatn 20mg one tab at bedtime,    Additional factors affecting anticoagulation: No     PLAN     No adjustment to anticoagulation plan needed    Spoke with Marilee WALTER on 1/21/25 @ [facility]    Anticoagulation Calendar updated    Alondra Lora, RN  1/16/2025  Anticoagulation Clinic  [employer] for routing messages: [facility]riadna [facility] patient phone line: [phone]

## 2025-01-16 NOTE — CONSULTS
[facility] Neurology  [place]    Eileen Donald MRN# [id number]   Age: 69 year old YOB: 1955               Assessment and Plan:      Small right cortical stroke  Likely due to embolism with mechanical heart valve     CTA shows only mild atherosclerosis in the carotids, no significant stenosis  Echocardiogram shows the mechanical valve, normal ejection fraction, no focal wall motion abnormality    Telemetry has shown no atrial fibrillation thus far    Clinically she does look quite good, I do not find any clear deficits with that left arm now.  Okay to discharge home from my perspective as soon as the anticoagulation is figured out.    No change in MS treatment.  She is followed by Dr. Bonilla who is with [facility].            Chief Complaint/HPI:     This patient is a 69-year-old woman seen for neurologic evaluation today regarding stroke.  Yesterday she was at her vascular surgery clinic and mentioned new onset of left arm numbness and weakness.  She says she could not lift the left arm.  The hand and forearm did feel numb as well.  She has a history of mechanical heart valve and had not been compliant with her anticoagulation.  MRI ultimately showed a very small infarct laterally in the right convexity, near the central sulcus.  She feels the arm is fully normal today.            Past Medical History:    has a past medical history of Anxiety, Aortic valve replaced (02/15/2017), Aortic valve stenosis (01/24/2017), Asthma, Asthma, Chronic shortness of breath, Closed 3-part fracture of proximal humerus with delayed healing, left (04/10/2019), COPD (chronic obstructive pulmonary disease) (H), Coronary artery disease, Depression, Diabetes mellitus (H), Essential hypertension, History of blood clots, History of pulmonary embolism, HTN (hypertension), Multiple sclerosis (H), Multiple sclerosis (H), Obesity, Obesity (10/29/2015), Osteoarthritis, Oxygen dependent, Panic attacks,  PONV (postoperative nausea and vomiting), Pre-diabetes, Pulmonary embolism (H), Pulmonary embolism, other, unspecified chronicity, unspecified whether acute cor pulmonale present (H) (09/04/2024), and Sleep apnea.          Past Surgical History:    has a past surgical history that includes aortic valve surgery; IR Lumbar Epidural Steroid Injection (1/16/2007); Mammoplasty reduction (1994); other surgical history (01/24/2017); Release carpal tunnel; RECONSTR TOTAL SHOULDER IMPLANT (Left, 4/10/2019); Colonoscopy W/ Biopsies (N/A, 3/22/2021); and Colonoscopy (N/A, 12/23/2021).          Social History:     Social History     Tobacco Use    Smoking status: Never     Passive exposure: Past    Smokeless tobacco: Never   Substance Use Topics    Alcohol use: No             Family History:     Family History   Problem Relation Age of Onset    Snoring Mother     Snoring Father     Sleep Apnea Sister     Coronary Artery Disease Father     Coronary Artery Disease Sister     Coronary Artery Disease Brother     Breast Cancer No family hx of                 Allergies:     Allergies   Allergen Reactions    Morphine Visual Disturbance and Other (See Comments)     hallicinations      Sulfa Antibiotics Hives and Nausea and Vomiting    Azithromycin Nausea and Vomiting and Rash    Doxycycline Rash    Latex Rash             Medications:     Current Facility-Administered Medications:     acetaminophen (TYLENOL) tablet 650 mg, 650 mg, Oral, Q4H PRN **OR** acetaminophen (TYLENOL) oral liquid 650 mg, 650 mg, Oral, Q4H PRN **OR** acetaminophen (TYLENOL) Suppository 650 mg, 650 mg, Rectal, Q4H PRN, Yenifer Bernal MD    acyclovir (ZOVIRAX) tablet 400 mg, 400 mg, Oral, BID, Yenifer Bernal MD, 400 mg at 01/16/25 0815    albuterol (PROVENTIL HFA/VENTOLIN HFA) inhaler, 2 puff, Inhalation, Q6H PRN, Yenifer Bernal MD    aspirin (ASA) chewable tablet 81 mg, 81 mg, Oral, Daily, Yenifer Bernal MD, 81 mg at 01/16/25 0815    buPROPion  (WELLBUTRIN XL) 24 hr tablet 150 mg, 150 mg, Oral, Daily, Yenifer Bernal MD, 150 mg at 01/16/25 0815    calcium carbonate (TUMS) chewable tablet 500 mg, 500 mg, Oral, BID, Yenifer Bernal MD, 500 mg at 01/16/25 0815    [Held by provider] furosemide (LASIX) tablet 40 mg, 40 mg, Oral, Daily, Yenifer Bernal MD    heparin 25,000 units in 0.45% NaCl 250 mL ANTICOAGULANT infusion, 0-5,000 Units/hr, Intravenous, Continuous, Yenifer Bernal MD, Last Rate: 11.5 mL/hr at 01/16/25 0655, 1,150 Units/hr at 01/16/25 0655    lidocaine (LMX4) cream, , Topical, Q1H PRN, Yenifer Bernal MD    lidocaine 1 % 0.1-1 mL, 0.1-1 mL, Other, Q1H PRN, Yenifer Bernal MD    [Held by provider] lisinopril (ZESTRIL) tablet 10 mg, 10 mg, Oral, Daily, Yenifer Bernal MD    loratadine (CLARITIN) tablet 10 mg, 10 mg, Oral, Daily, Yenifer Bernal MD, 10 mg at 01/16/25 0815    [Held by provider] metoprolol tartrate (LOPRESSOR) tablet 25 mg, 25 mg, Oral, BID, Yenifer Bernal MD    montelukast (SINGULAIR) tablet 10 mg, 10 mg, Oral, At Bedtime, Yenifer Bernal MD, 10 mg at 01/15/25 2023    ondansetron (ZOFRAN ODT) ODT tab 4 mg, 4 mg, Oral, Q6H PRN **OR** ondansetron (ZOFRAN) injection 4 mg, 4 mg, Intravenous, Q6H PRN, Yenifer Bernal MD    pantoprazole (PROTONIX) EC tablet 20 mg, 20 mg, Oral, Daily, Yenifer Bernal MD, 20 mg at 01/16/25 0815    [Held by provider] potassium chloride ER (MICRO-K) CR capsule 10 mEq, 10 mEq, Oral, Daily, Yenifer Bernal MD    rosuvastatin (CRESTOR) tablet 20 mg, 20 mg, Oral, At Bedtime, Yenifer Bernal MD, 20 mg at 01/15/25 2023    sodium chloride (PF) 0.9% PF flush 3 mL, 3 mL, Intracatheter, Q8H, Yenifer Bernal MD, 3 mL at 01/15/25 2025    sodium chloride (PF) 0.9% PF flush 3 mL, 3 mL, Intracatheter, q1 min prn, Yenifer Bernal MD    [COMPLETED] iopamidol (ISOVUE-370) solution 67 mL, 67 mL, Intravenous, Once, 67 mL at 01/15/25 1103 **AND** sodium chloride 0.9  "% bag for CT scan flush use, 100 mL, As instructed, Once, Yenifer Bernal MD    venlafaxine (EFFEXOR XR) 24 hr capsule 225 mg, 225 mg, Oral, Daily with breakfast, Yenifer Bernal MD, 225 mg at 01/16/25 0815    Warfarin Dose Required Daily - Pharmacist Managed, 1 each, Oral, See Admin Instructions, Yenifer Bernal MD              Physical Exam:      Vitals: /65 (BP Location: Left arm)   Pulse 81   Temp 98  F (36.7  C) (Oral)   Resp 18   Ht 1.778 m (5' 10\")   Wt 111.8 kg (246 lb 7.6 oz)   SpO2 93%   BMI 35.37 kg/m    BMI= Body mass index is 35.37 kg/m .     Patient is alert and in no acute distress. Neck was supple, no carotid bruits, thyromegaly, lymphadenopathy or JVD noted.   Neurological Exam:   Mental status: Patient is alert and oriented x 3. Speech is clear and fluent    CN II: PERRLA.   CN III, IV, VI: EOMI.    CN VII: Face is symmetric with normal    CN VII: Hearing is normal to conversation   CN IX, X:  Phonation is normal.    CN XI: Head turning and shoulder shrug are intact   Motor: Muscle bulk and tone are normal. No pronator drift. Strength is 5/5 bilaterally. No fasciculations noted.   Reflexes: Reflexes are symmetric, diminished throughout. Plantar responses are flexor.   Sensory: Light touch, pinprick, and vibration sense are intact bilaterally.    Coordination: Rapid alternating movements and fine finger movements are intact. There is no dysmetria on finger-to-nose testing. (Left arm movement somewhat limited by left shoulder injury)   Gait: gait is steady and independent    Tito Montejo MD       More than 50 minutes spent reviewing records and results, evaluating patient, discussing with pt and on documentation    "

## 2025-01-16 NOTE — PLAN OF CARE
Goal Outcome Evaluation:    Pt AO x4. Denies pain. NIH (1,0). A of 1 with walker. Hep drip at 1350 U/hr. Able to make needs known.

## 2025-01-16 NOTE — PROGRESS NOTES
"   01/16/25 1100   Appointment Info   Signing Clinician's Name / Credentials (PT) Ana Canas, PT, DPT   Living Environment   People in Home spouse;child(yesica), adult  ( and daughter)   Current Living Arrangements house   Home Accessibility stairs to enter home   Number of Stairs, Main Entrance greater than 10 stairs   Stair Railings, Main Entrance railings safe and in good condition   Transportation Anticipated family or friend will provide   Living Environment Comments All needs met on one level once inside.   Self-Care   Usual Activity Tolerance moderate   Current Activity Tolerance moderate   Equipment Currently Used at Home cane, straight;walker, rolling  (4WW)   Fall history within last six months no   Activity/Exercise/Self-Care Comment Patient is typically IND with mobility inside but uses a cane or a 4WW outside.   General Information   Onset of Illness/Injury or Date of Surgery 01/15/25   Referring Physician Yenifer Bernal MD   Patient/Family Therapy Goals Statement (PT) Return home   Pertinent History of Current Problem (include personal factors and/or comorbidities that impact the POC) Per chart, \"Eileen Donald is a 69 year old female with past medical history significant for PAF on warfarin, HTN, HLD, AVS (BAV) s/p AVR, PE (10/30/2014), diastolic heart failure, pulmonary hypertension, relapsing-remitting multiple sclerosis, and diabetes mellitus type 2 admitted on 1/15/2025 due to left-sided weakness and decreased sensation.\"   Existing Precautions/Restrictions fall   Posture    Posture Forward head position   Range of Motion (ROM)   Range of Motion ROM is WFL   Strength (Manual Muscle Testing)   Strength (Manual Muscle Testing) strength is WFL   Bed Mobility   Comment, (Bed Mobility) Patient up in recliner.   Transfers   Transfers sit-stand transfer   Sit-Stand Transfer   Sit-Stand [place] (Transfers) supervision   Assistive Device (Sit-Stand Transfers) walker, front-wheeled "   Gait/Stairs (Locomotion)   [place] Level (Gait) supervision   Assistive Device (Gait) walker, front-wheeled   Distance in Feet (Gait) 50'   Pattern (Gait) step-through   Deviations/Abnormal Patterns (Gait) gait speed decreased;stride length decreased;weight shifting decreased   Clinical Impression   Criteria for Skilled Therapeutic Intervention Yes, treatment indicated   PT Diagnosis (PT) Impaired functional mobility   Influenced by the following impairments Impaired balance, MS   Functional limitations due to impairments Transfers, gait, stairs   Clinical Presentation (PT Evaluation Complexity) stable   Clinical Presentation Rationale Patient presents as medically diagnosed.   Clinical Decision Making (Complexity) low complexity   Planned Therapy Interventions (PT) balance training;gait training;patient/family education;stair training;transfer training   Risk & Benefits of therapy have been explained evaluation/treatment results reviewed;care plan/treatment goals reviewed;patient;daughter   PT Total Evaluation Time   PT Eval, Low Complexity Minutes (41028) 15   Physical Therapy Goals   PT Frequency One time eval and treatment only   PT Predicted Duration/Target Date for Goal Attainment 01/16/25   PT Goals Transfers;Gait;Stairs   Interventions   Interventions Quick Adds Gait Training   Gait Training   Gait Training Minutes (77646) 15   Symptoms Noted During/After Treatment (Gait Training) fatigue;increased pain  (Chronic L hip pain)   Treatment Detail/Skilled Intervention After brief bout of ambulation with FWW, patient switched to 4WW and was able to ambulate with SBA, demonstrating smooth, steady gait. Patient complained of increased chronic L hip pain at end of session. Recommended use of cane in R hand to offload L hip at home.   Distance in Feet 250'   [place] Level (Gait Training) stand-by assist   Physical Assistance Level (Gait Training) supervision   Weight Bearing (Gait Training) full  weight-bearing   Assistive Device (Gait Training) rolling walker  (4WW)   PT Discharge Planning   PT Plan Discharge PT. All goals met.   PT Discharge Recommendation (DC Rec) home with assist   PT Rationale for DC Rec Patient appears to be at her baseline for balance, mobility, and strength. Recommended use of cane for mobility due to L hip pain.   PT Brief overview of current status SBA with 4WW x 250'.   PT Total Distance Amb During Session (feet) 300   PT Equipment Needed at Discharge cane, straight;walker, rolling  (4WW)   Physical Therapy Time and Intention   Timed Code Treatment Minutes 15   Total Session Time (sum of timed and untimed services) 30     Physical Therapy Discharge Summary    Reason for therapy discharge:    All goals and outcomes met, no further needs identified.    Progress towards therapy goal(s). See goals on Care Plan in [facility] electronic health record for goal details.  Goals met    Therapy recommendation(s):    No further therapy is recommended.

## 2025-01-16 NOTE — PHARMACY-CONSULT NOTE
Pharmacy Consult to evaluate for medication related stroke core measures    Eileen Donald, 69 year old female admitted for stroke symptoms on 1/15/2025.    Thrombolytic was not given because of minor/isolated/quickly resolving    VTE Prophylaxis  warfarin/heparin given on 1/15/25 as appropriate prior to end of hospital day 2.    Antithrombotic: warfarin and aspirin started on 1/15/25, as appropriate by end of hospital day 2. Continue antithrombotic therapy on discharge to meet quality measures, unless contraindicated.    Anticoagulation if history of A-fib/flutter: Patient on warfarin and heparin infusion; continue anticoagulation on discharge to meet quality measures, unless contraindicated.    LDL Cholesterol Calculated   Date Value Ref Range Status   01/15/2025 112 (H) <100 mg/dL Final     LDL Cholesterol Direct   Date Value Ref Range Status   07/19/2013 140 (H) <130 mg/dL Final       Patient currently receiving Crestor (rosuvastatin) continue statin on discharge to meet quality measures, unless contraindicated.(Changed home simvastatin to rosuvastatin inpatient)    Recommendations: None at this time    Thank you for the consult.    Nadir Hammer RPH 1/15/2025 6:11 PM

## 2025-01-16 NOTE — DISCHARGE SUMMARY
{MEDICINE AND PEDS DC SUMMARY:[id number]}   ADULT IP CONSULT  OCCUPATIONAL THERAPY ADULT IP CONSULT  REHAB ADMISSIONS LIAISON IP CONSULT  CARE MANAGEMENT / SOCIAL WORK IP CONSULT  CARE MANAGEMENT / SOCIAL WORK IP CONSULT  PHARMACY IP CONSULT  PHARMACY TO DOSE WARFARIN  SMOKING CESSATION PROGRAM IP CONSULT    Code Status   Prior    Time Spent on this Encounter   I, Rogelio Johnson MD, personally saw the patient today and spent greater than 30 minutes discharging this patient.       Rogelio Johnson MD  [address] [address]  Phone: [phone]  Fax: [phone]  ______________________________________________________________________    Physical Exam   Vital Signs:                    Weight: 246 lbs 7.59 oz  Well appearing, neuro exam benign, HR regular, lungs clear, no edema       Primary Care Physician   Drew Hahn    Discharge Orders      Primary Care - Care Coordination Referral      Reason for your hospital stay    Stroke likely from your heart valve with subtherapeutic INR level.     Activity    Your activity upon discharge: activity as tolerated     Discharge Instructions    We will continue lovenox bridge twice daily until your INR is at goal. Make sure to get INR check Monday or Tuesday next week to determine what next steps are needed and if ongoing lovenox is needed.    To gradually get to AM/PM dosing, plan to take next dose 3-4 AM 1/17, then 4-5 PM, then 5-6 AM, then 6-7 PM, then proceed with morning and night roughly 12 hours apart.     Diet    Follow this diet upon discharge: Current Diet:Orders Placed This Encounter      Regular Diet Adult     Hospital Follow-up with Existing Primary Care Provider (PCP)    Please see details below            Significant Results and Procedures   Most Recent 3 CBC's:  Recent Labs   Lab Test 01/16/25  0412 01/15/25  1638 01/15/25  1103   WBC 8.5 10.3 11.2*   HGB 13.4 13.5 14.9   MCV 98 97 98    269 308     Most Recent 3 BMP's:  Recent Labs    Lab Test 01/16/25  1240 01/16/25  0739 01/16/25  0412 01/15/25  1635 01/15/25  1103 01/15/25  1040 12/16/24  1210   NA  --   --  140  --  140  --  142   POTASSIUM  --   --  3.9  --  4.0  --  4.2   CHLORIDE  --   --  103  --  101  --  103   CO2  --   --  29  --  28  --  28   BUN  --   --  12.3  --  13.4  --  9.1   CR  --   --  0.84  --  0.95  --  0.87   ANIONGAP  --   --  8  --  11  --  11   HAN  --   --  9.2  --  9.2  --  8.9   * 114* 113*   < > 130*   < > 190*    < > = values in this interval not displayed.     Most Recent 2 LFT's:  Recent Labs   Lab Test 02/19/24  1500 11/22/22  1431 10/31/22  1446   AST 39 38* 36*   ALT 33 25 22   ALKPHOS 95  --  89   BILITOTAL 0.4  --  0.4   ,   Results for orders placed or performed during the hospital encounter of 01/15/25   CTA Head Neck with Contrast    Narrative    EXAM: CTA HEAD NECK W CONTRAST  LOCATION: [facility]  DATE: 1/15/2025    INDICATION: Code stroke. Left-sided weakness.  COMPARISON: Brain MR 11/17/2006.  CONTRAST: Isovue 370  67ml.  TECHNIQUE: Head and neck CT angiogram with IV contrast. Noncontrast head CT followed by axial helical CT images of the head and neck vessels obtained during the arterial phase of intravenous contrast administration. Axial 2D reconstructed images and   multiplanar 3D MIP reconstructed images of the head and neck vessels were performed by the technologist. Dose reduction techniques were used. All stenosis measurements made according to NASCET criteria unless otherwise specified.    FINDINGS:   NONCONTRAST HEAD CT:   INTRACRANIAL CONTENTS: No acute intracranial hemorrhage. No mass effect or midline shift. Mild presumed chronic small vessel ischemic changes. Normal ventricles and sulci.     VISUALIZED ORBITS/SINUSES/MASTOIDS: No intraorbital abnormality. No paranasal sinus mucosal disease. No middle ear or mastoid effusion.    BONES/SOFT TISSUES: No acute abnormality.    HEAD CTA:  ANTERIOR CIRCULATION:  No stenosis/occlusion, aneurysm, or high flow vascular malformation. Fetal origin of both posterior cerebral arteries from the anterior circulation.    POSTERIOR CIRCULATION: No stenosis/occlusion, aneurysm, or high flow vascular malformation. Balanced vertebral arteries supply a normal basilar artery.     NECK CTA:  RIGHT CAROTID: Atherosclerotic plaque results in less than 50% stenosis in the right ICA. No dissection.    LEFT CAROTID: Atherosclerotic plaque results in less than 50% stenosis in the left ICA. No dissection.    VERTEBRAL ARTERIES: No focal stenosis or dissection. Balanced vertebral arteries.    AORTIC ARCH: Bovine origin left common carotid artery. No significant stenosis at the origin of the great vessels.    NONVASCULAR STRUCTURES: There are degenerative changes in the spine. Anterior fusion hardware from C3 down to C5.      Impression    IMPRESSION:   HEAD CT:  1.  No acute intracranial hemorrhage, mass, or herniation.  2.  Mild nonspecific white matter changes which are most likely due to chronic microvascular ischemic disease.    HEAD CTA:   1.  Normal CTA [place].    NECK CTA:  1.  Patent arteries in the neck without evidence of dissection.  2.  Mild atherosclerotic disease at the carotid bifurcations without significant stenosis.    Noncontrast head CT results discussed with Dr. Stout at 10:53 AM on 1/15/2025.    CTA results discussed with Dr. Stout at 11:01 AM on 1/15/2025.   MR Brain w/o & w Contrast    Narrative    EXAM: MR BRAIN W/O and W CONTRAST  LOCATION: [facility]  DATE: 1/15/2025    INDICATION: c o R hemispheric stroke; assess stroke burden to aid in determinine timeline of anticoagulation  COMPARISON: CT examination 1/15/2025.  CONTRAST: 11 mL Gadavist  TECHNIQUE: Routine multiplanar multisequence head MRI without and with intravenous contrast.    FINDINGS:  INTRACRANIAL CONTENTS: Cortically based region of restricted diffusion in the posterior  lateral right frontal lobe.    Moderate chronic small vessel ischemic changes. Mild to moderate global cortical volume loss with expected dilatation of the ventricular system. Susceptibility imaging is negative. No abnormal enhancement on postcontrast imaging.    Midline structures are unremarkable.    SELLA: No abnormality accounting for technique.    OSSEOUS STRUCTURES/SOFT TISSUES: Normal marrow signal. The major intracranial vascular flow voids are maintained.     ORBITS: No abnormality accounting for technique.     SINUSES/MASTOIDS: No paranasal sinus mucosal disease. No middle ear or mastoid effusion.       Impression    IMPRESSION:  1.  Cortically based region of restricted diffusion in the posterior lateral right frontal lobe. This is compatible with a region of acute ischemia. Extensive chronic changes as above.   Echocardiogram Complete    Narrative    [id number]  [id number]  [id number]  [id number]^CARLOS^[place] [address]     Name: PALOMA OSEGUERA  MRN: [id number]  : 1955  Study Date: 01/15/2025 01:07 PM  Age: 69 yrs  Gender: Female  Patient Location: [facility]  Reason For Study: CVA  Ordering Physician: LATANYA GONZALEZ  Performed By: MANJULA     BSA: 2.3 m2  Height: 70 in  Weight: 241 lb  BP: 126/58 mmHg  ______________________________________________________________________________  Procedure  Echocardiogram with two-dimensional, color and spectral Doppler. Definity (NDC  #[id number]) given intravenously. Bubble Echocardiogram with two-dimensional,  color and spectral Doppler.  ______________________________________________________________________________  Interpretation Summary     1. Normal left ventricular size and systolic performance with a visually  estimated ejection fraction of 65%.  2. There is mild concentric increase in left ventricular wall thickness.  3. There is a mechanical prosthetic aortic valve (documented 23 mm [employer]  [place] valve).  Â  Normal aortic valve prosthesis metrics with a mean systolic gradient of 11  mmHg and a peak anterograde velocity of 2.3 m/sec.  Â  There is trace aortic insufficiency which appears valvar.  4. Probable normal right ventricular size with mildly reduced right  ventricular systolic performance (the right ventricle is not well-visualized).  5. There is mild left atrial enlargement.  6. Echo contrast examination was performed using agitated crystalloid as  contrast agent. The right heart opacity was good and the left heart was well  visualized. There was no evidence of right-to-left shunting during spontaneous  respiration or following release of Valsalva.  ______________________________________________________________________________  Left ventricle:  Normal left ventricular size and systolic performance with a visually  estimated ejection fraction of 65%. There is normal regional wall motion.  There is mild concentric increase in left ventricular wall thickness.     Assessment of LV Diastolic Function: The cumulative findings suggest impaired  diastolic filling [The septal e' velocity is < 7 cm/s & lateral e' velocity  is  < 10 cm/s. The average E/e' is >14. The TR velocity cannot be determined due  to insufficient tricuspid insufficiency signal. Left atrial volume index is  greater than 34 mL/mÂ ].     Assessment of left atrial pressure (LAP): The cumulative findings suggest  moderately elevated left atrial pressure (the E/A is > 0.8 and <2.0 plus 2 or  3 of 3 of the following present: Average E/e' > 14, TRvel > 2.8 m/s, and/or LA  vol. index > 34 ml/mÂ  ).     Right ventricle:  Probable normal right ventricular size with mildly reduced right ventricular  systolic performance (the right ventricle is not well-visualized).     Left atrium:  There is mild left atrial enlargement.     Right atrium:  The right atrium is not well-visualized.     IVC:  The IVC is of normal caliber.     Aortic valve:  There  is a mechanical prosthetic aortic valve (documented 23 mm [employer] [place] valve). Normal aortic valve prosthesis metrics with a mean  systolic gradient of 11 mmHg and a peak anterograde velocity of 2.3 m/sec. The  Ao Acceleration Time is 0.08 sec. There is trace aortic insufficiency which  appears valvar.     Mitral valve:  The mitral valve appears morphologically normal. There is trace mitral  insufficiency.     Tricuspid valve:  The tricuspid valve is not well-visualized.     Pulmonic valve:  The aortic root and proximal ascending aorta are of normal dimension.     Thoracic aorta:  The aortic root and proximal ascending aorta are of normal dimension.     Pericardium:  There is a very small, barely detectable, posterior pericardial effusion.  There is no evidence of significant intraventricular dependence/tamponade  physiology. There are intrapericardial echoes suggestive of adipose tissue.  ______________________________________________________________________________  ______________________________________________________________________________  MMode/2D Measurements & Calculations  IVSd: 1.3 cm  LVIDd: 4.3 cm  LVIDs: 2.6 cm  LVPWd: 1.2 cm  FS: 38.6 %  LV mass(C)d: 196.0 grams  LV mass(C)dI: 86.8 grams/m2  Ao root diam: 3.8 cm  asc Aorta Diam: 3.3 cm  LVOT diam: 2.3 cm  LVOT area: 4.2 cm2  Ao root diam index Ht(cm/m): 2.1  Ao root diam index BSA (cm/m2): 1.7  Asc Ao diam index BSA (cm/m2): 1.5  Asc Ao diam index Ht(cm/m): 1.9  EF Biplane: 76.0 %     LA Volume Indexed (AL/bp): 34.4 ml/m2  RV Base: 3.3 cm  RWT: 0.57     Doppler Measurements & Calculations  MV E max jackie: 102.0 cm/sec  MV A max jackie: 88.3 cm/sec  MV E/A: 1.2  MV max PG: 3.7 mmHg  MV mean P.0 mmHg  MV V2 VTI: 30.0 cm  MVA(VTI): 3.5 cm2  MV dec slope: 377.0 cm/sec2  MV dec time: 0.27 sec  Ao V2 max: 229.0 cm/sec  Ao max P.0 mmHg  Ao V2 mean: 147.0 cm/sec  Ao mean P.0 mmHg  Ao V2 VTI: 44.2 cm  LUIS FERNANDO(I,D): 2.4 cm2  LUIS FERNANDO(V,D): 2.0  cm2  Ao acc time: 0.08 sec  LV V1 max P.7 mmHg  LV V1 max: 108.0 cm/sec  LV V1 VTI: 25.5 cm     SV(LVOT): 105.9 ml  SI(LVOT): 46.9 ml/m2  PA V2 max: 84.1 cm/sec  PA max P.8 mmHg  PA acc time: 0.07 sec  AV Joaquin Ratio (DI): 0.47  LUIS FERNANDO Index (cm2/m2): 1.1  E/E' av.4  Lateral E/e': 12.8  Medial E/e': 15.9  RV S Joaquin: 12.3 cm/sec     ______________________________________________________________________________  Report approved by: Jah Wynn MD on 01/15/2025 02:33 PM           *Note: Due to a large number of results and/or encounters for the requested time period, some results have not been displayed. A complete set of results can be found in Results Review.       Discharge Medications   Discharge Medication List as of 2025  1:51 PM        START taking these medications    Details   rosuvastatin (CRESTOR) 20 MG tablet Take 1 tablet (20 mg) by mouth at bedtime., Disp-30 tablet, R-1, E-Prescribe           CONTINUE these medications which have CHANGED    Details   enoxaparin ANTICOAGULANT (LOVENOX) 120 MG/0.8ML syringe Inject 0.8 mLs (120 mg) subcutaneously 2 times daily., Disp-20 mL, R-0, E-Prescribe      warfarin ANTICOAGULANT (COUMADIN) 2.5 MG tablet Take 1 tab (2.5mg) to 2 tabs (5mg) by mouth daily, as directed.  Adjust dose based on INR results., Disp-30 tablet, R-2, E-Prescribe           CONTINUE these medications which have NOT CHANGED    Details   acetaminophen (TYLENOL) 325 MG tablet Take 325-650 mg by mouth every 6 hours as needed , Historical      acyclovir (ZOVIRAX) 400 MG tablet Take 400 mg by mouth 2 times daily , Historical      albuterol (PROAIR HFA/PROVENTIL HFA/VENTOLIN HFA) 108 (90 Base) MCG/ACT inhaler Inhale 2 puffs into the lungs every 6 hours as needed for shortness of breath or wheezing, Disp-18 g, R-11, E-PrescribePharmacy may dispense brand covered by insurance (Proair, or proventil or ventolin or generic albuterol inhaler)      amoxicillin (AMOXIL) 500 MG capsule TAKE 4  CAPSULES BY MOUTH 30 TO 60 MINUTES PRIOR TO PROCEDURE, Historical      aspirin (ASA) 81 MG chewable tablet Take 81 mg by mouth daily , Historical      buPROPion (WELLBUTRIN XL) 150 MG 24 hr tablet Take 1 tablet (150 mg) by mouth daily, Disp-90 tablet, R-3, E-Prescribe      calcium carbonate (TUMS) 500 MG chewable tablet Take 1 chew tab by mouth 2 times daily., Historical      furosemide (LASIX) 40 MG tablet Take 1 tablet (40 mg) by mouth daily., No Print Out      lisinopril (ZESTRIL) 10 MG tablet Take 1 tablet (10 mg) by mouth daily., Disp-90 tablet, R-3, E-Prescribe      loratadine (CLARITIN) 10 MG tablet Take 1 tablet (10 mg) by mouth daily, Disp-30 tablet, R-3, E-Prescribe      metoprolol tartrate (LOPRESSOR) 25 MG tablet Take 1 tablet (25 mg) by mouth 2 times daily, Disp-180 tablet, R-2, E-Prescribe      montelukast (SINGULAIR) 10 MG tablet Take 1 tablet (10 mg) by mouth at bedtime., Disp-90 tablet, R-3, E-Prescribe      Ocrelizumab (OCREVUS IV) Inject into the vein every 6 months, Historical      pantoprazole (PROTONIX) 20 MG EC tablet Take 1 tablet (20 mg) by mouth daily., Disp-90 tablet, R-3, E-Prescribe      potassium chloride ER (MICRO-K) 10 MEQ CR capsule Take 10 mEq by mouth daily , Historical      venlafaxine (EFFEXOR XR) 75 MG 24 hr capsule TAKE 3 CAPSULES BY MOUTH EVERY DAY, Disp-270 capsule, R-3, E-Prescribe      Vitamin D3 (CHOLECALCIFEROL) 25 mcg (1000 units) tablet Take 25 mcg by mouth daily., Historical           STOP taking these medications       simvastatin (ZOCOR) 20 MG tablet Comments:   Reason for Stopping:             Allergies   Allergies   Allergen Reactions    Morphine Visual Disturbance and Other (See Comments)     hallicinations      Sulfa Antibiotics Hives and Nausea and Vomiting    Azithromycin Nausea and Vomiting and Rash    Doxycycline Rash    Latex Rash

## 2025-01-16 NOTE — PLAN OF CARE
Problem: Stroke, Ischemic (Includes Transient Ischemic Attack)  Goal: Optimal Cognitive Function  Outcome: Progressing  Intervention: Optimize Cognitive Function  Recent Flowsheet Documentation  Taken 1/16/2025 0000 by Jesse Ballard RN  Reorientation Measures:   calendar in view   clock in view  Environment Familiarity/Consistency: daily routine followed     Problem: Stroke, Ischemic (Includes Transient Ischemic Attack)  Goal: Effective Oxygenation and Ventilation  Outcome: Progressing       Problem: Adult Inpatient Plan of Care  Goal: Absence of Hospital-Acquired Illness or Injury  Intervention: Identify and Manage Fall Risk  Recent Flowsheet Documentation  Taken 1/16/2025 0000 by Jesse Ballard, RN  Safety Promotion/Fall Prevention:   activity supervised   clutter free environment maintained   lighting adjusted   nonskid shoes/slippers when out of bed   treat reversible contributory factors   treat underlying cause     Problem: Adult Inpatient Plan of Care  Goal: Readiness for Transition of Care  Outcome: Progressing   Goal Outcome Evaluation:       A/Ox4, cooperative and pleasant. NIHS 0. VSS. Afebrile. On Ra. Voiding without difficulty. Hep 10A 0.0.61. Held gtt for one hour and decrease gtt to 1150units/hr. Next Hep10a at 1256.

## 2025-01-16 NOTE — PROGRESS NOTES
Care Management Discharge Note    Discharge Date: 01/16/2025       Discharge Disposition: Home    Discharge Services: None    Discharge DME: None    Discharge Transportation: family or friend will provide    Private pay costs discussed: Not applicable    Does the patient's insurance plan have a 3 day qualifying hospital stay waiver?  Yes     Which insurance plan 3 day waiver is available? Alternative insurance waiver    Will the waiver be used for post-acute placement? No    PAS Confirmation Code:    Patient/family educated on Medicare website which has current facility and service quality ratings: no    Education Provided on the Discharge Plan: Yes  Persons Notified of Discharge Plans: patient   Patient/Family in Agreement with the Plan: yes    Handoff Referral Completed: Yes, [facility] PCP: Internal handoff referral completed    Additional Information:  Patient discharging home to prior living environment with family. No CM needs identified. Family transport.     Met with patient at bedside, daughter and  present in room. Introduced self and role. Patient reports feeling comfortable with plan to discharge home. Informed patient of observation status.     GUERRERO Renteria at 1:16 PM on 01/16/25

## 2025-01-20 ENCOUNTER — PATIENT OUTREACH (OUTPATIENT)
Dept: CARE COORDINATION | Facility: CLINIC | Age: 70
End: 2025-01-20
Payer: COMMERCIAL

## 2025-01-20 ASSESSMENT — ASTHMA QUESTIONNAIRES
QUESTION_2 LAST FOUR WEEKS HOW OFTEN HAVE YOU HAD SHORTNESS OF BREATH: ONCE OR TWICE A WEEK
ACT_TOTALSCORE: 21
QUESTION_3 LAST FOUR WEEKS HOW OFTEN DID YOUR ASTHMA SYMPTOMS (WHEEZING, COUGHING, SHORTNESS OF BREATH, CHEST TIGHTNESS OR PAIN) WAKE YOU UP AT NIGHT OR EARLIER THAN USUAL IN THE MORNING: NOT AT ALL
ACT_TOTALSCORE: 21
QUESTION_5 LAST FOUR WEEKS HOW WOULD YOU RATE YOUR ASTHMA CONTROL: WELL CONTROLLED
QUESTION_4 LAST FOUR WEEKS HOW OFTEN HAVE YOU USED YOUR RESCUE INHALER OR NEBULIZER MEDICATION (SUCH AS ALBUTEROL): ONCE A WEEK OR LESS
QUESTION_1 LAST FOUR WEEKS HOW MUCH OF THE TIME DID YOUR ASTHMA KEEP YOU FROM GETTING AS MUCH DONE AT WORK, SCHOOL OR AT HOME: A LITTLE OF THE TIME

## 2025-01-20 ASSESSMENT — PATIENT HEALTH QUESTIONNAIRE - PHQ9
SUM OF ALL RESPONSES TO PHQ QUESTIONS 1-9: 4
10. IF YOU CHECKED OFF ANY PROBLEMS, HOW DIFFICULT HAVE THESE PROBLEMS MADE IT FOR YOU TO DO YOUR WORK, TAKE CARE OF THINGS AT HOME, OR GET ALONG WITH OTHER PEOPLE: SOMEWHAT DIFFICULT
SUM OF ALL RESPONSES TO PHQ QUESTIONS 1-9: 4

## 2025-01-20 NOTE — PROGRESS NOTES
Clinic Care Coordination Contact  Transitions of Care Outreach  Chief Complaint   Patient presents with    Clinic Care Coordination - Post Hospital       Most Recent Admission Date: 1/15/2025   Most Recent Admission Diagnosis: Left arm weakness - R29.898     Most Recent Discharge Date: 1/16/2025   Most Recent Discharge Diagnosis: Left arm weakness - R29.898  Cerebrovascular accident (CVA) due to embolism of cerebral artery (H) - I63.40  Paroxysmal atrial fibrillation (H) - I48.0  S/P AVR (aortic valve replacement) - Z95.2  Pulmonary embolism, other, unspecified chronicity, unspecified whether acute cor pulmonale present (H) - I26.99  Long term (current) use of anticoagulants - Z79.01     Transitions of Care Assessment    Discharge Assessment  How are you doing now that you are home?: doing well - has all medications picked up from pharmacy; has appointment 1/21 with primary care - pt noted they have no questiosn or concerns at this time - will try to think of everything they are wondering before appointment tomorrow; is aware of how to reach out to clinic  How are your symptoms? (Red Flag symptoms escalate to triage hotline per guidelines): Improved  Do you know how to contact your clinic care team if you have future questions or changes to your health status? : Yes  Does the patient have their discharge instructions? : Yes  Does the patient have questions regarding their discharge instructions? : No  Were you started on any new medications or were there changes to any of your previous medications? : Yes  Does the patient have all of their medications?: Yes  Do you have questions regarding any of your medications? : No  Do you have all of your needed medical supplies or equipment (DME)?  (i.e. oxygen tank, CPAP, cane, etc.): Yes         Post-op (Clinicians Only)  Eating & Drinking: eating and drinking without complaints/concerns  PO Intake: regular diet        Follow up Plan     Discharge Follow-Up  Discharge follow  up appointment scheduled in alignment with recommended follow up timeframe or Transitions of Risk Category? (Low = within 30 days; Moderate= within 14 days; High= within 7 days): Yes  Discharge Follow Up Appointment Date: 01/21/25  Discharge Follow Up Appointment Scheduled with?: Primary Care Provider    Future Appointments   Date Time Provider Department Center   1/21/2025 11:30 AM Alex Granado PA-C [facility]   1/21/2025  1:15 PM TS [facility]   1/28/2025 10:00 AM Bere Mcghee, [facility]   3/19/2025 10:30 AM Aftab Barboza MD [facility]   6/17/2025  1:10 PM Drew Hahn MD [facility]   8/29/2025 12:50 PM Drew Hahn MD [facility]       Outpatient Plan as outlined on AVS reviewed with patient.    For any urgent concerns, please contact our 24 hour nurse triage line: [phone] ([phone])       KELSEY French

## 2025-01-21 ENCOUNTER — ANTICOAGULATION THERAPY VISIT (OUTPATIENT)
Dept: ANTICOAGULATION | Facility: CLINIC | Age: 70
End: 2025-01-21

## 2025-01-21 ENCOUNTER — OFFICE VISIT (OUTPATIENT)
Dept: FAMILY MEDICINE | Facility: CLINIC | Age: 70
End: 2025-01-21
Attending: HOSPITALIST
Payer: COMMERCIAL

## 2025-01-21 ENCOUNTER — LAB (OUTPATIENT)
Dept: LAB | Facility: CLINIC | Age: 70
End: 2025-01-21
Payer: COMMERCIAL

## 2025-01-21 VITALS
RESPIRATION RATE: 20 BRPM | TEMPERATURE: 98.1 F | HEIGHT: 70 IN | BODY MASS INDEX: 35.15 KG/M2 | DIASTOLIC BLOOD PRESSURE: 63 MMHG | HEART RATE: 61 BPM | WEIGHT: 245.5 LBS | OXYGEN SATURATION: 93 % | SYSTOLIC BLOOD PRESSURE: 110 MMHG

## 2025-01-21 DIAGNOSIS — Z95.2 STATUS POST HEART VALVE REPLACEMENT WITH MECHANICAL VALVE: ICD-10-CM

## 2025-01-21 DIAGNOSIS — Z79.01 LONG TERM (CURRENT) USE OF ANTICOAGULANTS: ICD-10-CM

## 2025-01-21 DIAGNOSIS — E78.5 HYPERLIPIDEMIA, UNSPECIFIED HYPERLIPIDEMIA TYPE: Primary | ICD-10-CM

## 2025-01-21 DIAGNOSIS — E66.01 MORBID OBESITY (H): ICD-10-CM

## 2025-01-21 DIAGNOSIS — Z79.01 LONG TERM (CURRENT) USE OF ANTICOAGULANTS: Primary | ICD-10-CM

## 2025-01-21 DIAGNOSIS — I63.40 CEREBROVASCULAR ACCIDENT (CVA) DUE TO EMBOLISM OF CEREBRAL ARTERY (H): Primary | ICD-10-CM

## 2025-01-21 DIAGNOSIS — I51.89 DIASTOLIC DYSFUNCTION: ICD-10-CM

## 2025-01-21 DIAGNOSIS — Z95.2 S/P AVR (AORTIC VALVE REPLACEMENT): ICD-10-CM

## 2025-01-21 DIAGNOSIS — Z79.01 CHRONIC ANTICOAGULATION: ICD-10-CM

## 2025-01-21 DIAGNOSIS — R29.898 LEFT ARM WEAKNESS: ICD-10-CM

## 2025-01-21 DIAGNOSIS — F33.1 MODERATE RECURRENT MAJOR DEPRESSION (H): ICD-10-CM

## 2025-01-21 DIAGNOSIS — R05.3 CHRONIC COUGH: ICD-10-CM

## 2025-01-21 DIAGNOSIS — E11.69 TYPE 2 DIABETES MELLITUS WITH OTHER SPECIFIED COMPLICATION, UNSPECIFIED WHETHER LONG TERM INSULIN USE (H): ICD-10-CM

## 2025-01-21 PROBLEM — I50.22 CHRONIC SYSTOLIC CONGESTIVE HEART FAILURE (H): Status: RESOLVED | Noted: 2024-12-16 | Resolved: 2025-01-21

## 2025-01-21 PROBLEM — J96.11 CHRONIC RESPIRATORY FAILURE WITH HYPOXIA (H): Status: RESOLVED | Noted: 2024-02-19 | Resolved: 2025-01-21

## 2025-01-21 LAB
HOLD SPECIMEN: NORMAL
INR BLD: 2.5 (ref 0.9–1.1)

## 2025-01-21 PROCEDURE — 85610 PROTHROMBIN TIME: CPT

## 2025-01-21 PROCEDURE — 36415 COLL VENOUS BLD VENIPUNCTURE: CPT

## 2025-01-21 PROCEDURE — 84460 ALANINE AMINO (ALT) (SGPT): CPT

## 2025-01-21 NOTE — PROGRESS NOTES
Assessment & Plan     Cerebrovascular accident (CVA) due to embolism of cerebral artery (H)  Recent hospitalization deemed likely CVA.  Fortunately, symptoms resolved within 24 hours.  She was subtherapeutic due to noncompliance of warfarin leading to hospitalization.  Now she is therapeutic.  Anticoagulation clinic to manage but likely to stop Lovenox at this point and maintain warfarin with close follow-ups.  Workup for other etiologies during hospitalization was not found.  Did not suffer from atrial fibrillation during hospitalization.  Does have cardiology follow-up next month.  May benefit in terms of consideration of possible event monitor for evidence of A-fib otherwise will defer to cardiology.  She is chronically anticoagulated and now therapeutic.  Maintain future follow-up with her neurologist as well as scheduled    Did have statin changed to higher potency statin in hopes of reducing LDL to goal less than 70.  She is to follow-up with PCP later this month.  If over 30 days, would suggest rechecking lipids to assess for therapeutic dose.  Statin can be increased if necessary.  She is tolerating this well.    Left arm weakness  As above symptoms are resolved  - Primary Care - Care Coordination Referral    Chronic cough  Reported for years.  Following pulmonology as well.  History of asthma which may be playing a role.  As well as MS.  Does take both lisinopril and metoprolol.  Each of which are known to result in possible chronic coughs, especially in the presence of asthma/atopic history.  Will monitor for now and maintain follow-up with pulmonology but she may benefit from changing her lisinopril to a ARB in the future.    Chronic anticoagulation  As above.  Mechanical heart valve.  Followed by cardiology and has this future scheduled in February.    Type 2 diabetes mellitus with other specified complication, unspecified whether long term insulin use (H)  Controlled.  No medication needed at this  time.  Does have history of diastolic dysfunction.  Discussed Jardiance/Farxiga.  Educated this is the first-line treatment in individuals with systolic or diastolic dysfunction with a history of diabetes.  Recommending discussing this with her cardiologist at future appointments.  This may be beneficial to start assuming cost is not a concern.  Also has [place] pharmacy future scheduled.  This can be discussed with [facility] pharmacy as well.    Status post heart valve replacement with mechanical valve  As above    Moderate recurrent major depression (H)  Labile on current occasion regimen    Morbid obesity (H)  Present due to BMI as well as history of diabetes and asthma and CVA.    Diastolic dysfunction  As above        MED REC REQUIRED  Post Medication Reconciliation Status:  Discharge medications reconciled, continue medications without change      Subjective   Marilee is a 69 year old, presenting for the following health issues:  Hospital F/U      1/21/2025    11:15 AM   Additional Questions   Roomed by Elle POLANCO CMA   Accompanied by Self     HPI         1/20/2025   Post Discharge Outreach   How are you doing now that you are home? doing well - has all medications picked up from pharmacy; has appointment 1/21 with primary care - pt noted they have no questiosn or concerns at this time - will try to think of everything they are wondering before appointment tomorrow; is aware of how to reach out to clinic   How are your symptoms? (Red Flag symptoms escalate to triage hotline per guidelines) Improved   Does the patient have their discharge instructions?  Yes   Does the patient have questions regarding their discharge instructions?  No   Were you started on any new medications or were there changes to any of your previous medications?  Yes   Does the patient have all of their medications? Yes   Do you have questions regarding any of your medications?  No   Do you have all of your needed medical supplies or equipment (DME)?   (i.e. oxygen tank, CPAP, cane, etc.) Yes   Discharge Follow Up Appointment Date 1/21/2025   Discharge Follow Up Appointment Scheduled with? Primary Care Provider       Hospital Follow-up Visit:    Hospital/Nursing Home/IP Rehab Facility: [facility]  Date of Admission: 1/15/25  Date of Discharge: 1/16/25  Reason(s) for Admission: Stroke  Was the patient in the ICU or did the patient experience delirium during hospitalization?  No  Do you have any other stressors you would like to discuss with your provider? No    Problems taking medications regularly:  Sometimes forget  Medication changes since discharge: None  Problems adhering to non-medication therapy:  None    Summary of hospitalization:  [facility] discharge summary reviewed  Diagnostic Tests/Treatments reviewed.  Follow up needed: INR.  Obtained today at 2.5.  Other Healthcare Providers Involved in Patient s Care:         Specialist appointment - cardiology in February, pulmonology in March, [facility] pharmacy later this month, ongoing vascular medicine and neurology through [facility] clinic  Update since discharge: improved    Patient is a pleasant 69-year-old female with a past history of PAF on warfarin, hypertension, hyperlipidemia, bicuspid aortic valve status post mechanical AVR followed by cardiology, pulmonary embolism, diastolic heart failure, pulmonary hypertension, relapsing remitting multiple sclerosis, and type 2 diabetes controlled.  She was seen on 15 January while suffering acute left arm weakness while at her vascular medicine clinic.  She was transported to the hospital and found to have evidence of an acute ischemic CVA.  She was also found to be subtherapeutic due to not taking her warfarin routinely.  She was started on bridging therapy and discharged on the 16th with this bridging therapy and close follow-up.  She also had her simvastatin changed from 20 mg to 20 mg of Crestor due to LDL being greater than  "70.    Since discharge, she denies any new or worsening weakness from her typical baseline.  Symptoms that resulted in presentation to the ER has not relapsed.  Denies any sudden vision changes or slurred speech or confusion.  Taking all medications including Lovenox as prescribed.  INR was obtained prior to visit today at 2.5.  Denies any blood in the stool or new bruising.    In regards to diabetes, she states she has been hesitant to restart metformin due to concerns of potential future renal impairment with this.  She has discussed this with PCP.  Of note, echocardiogram showed persistent diastolic dysfunction during recent hospitalization.      Patient also states she has intermittent ongoing shortness of breath at her baseline.  Follows closely by pulmonology history of asthma has appoint with pulmonology later this spring.  She states she has been having ongoing chronic productive cough for years.  She states pulmonologist as well as other specialist has not been able to determine the cause of the symptoms.    Patient has past history of depression stable on current Effexor.  Plan of care communicated with patient           Objective    /63 (BP Location: Left arm, Patient Position: Sitting, Cuff Size: Adult Regular)   Pulse 61   Temp 98.1  F (36.7  C) (Oral)   Resp 20   Ht 1.778 m (5' 10\")   Wt 111.4 kg (245 lb 8 oz)   SpO2 93%   BMI 35.23 kg/m    Body mass index is 35.23 kg/m .  Physical Exam   GENERAL: alert and no distress  RESP: lungs clear to auscultation - no rales, rhonchi or wheezes  CV: regular rates and rhythm  NEURO: mentation intact and bilateral arm strengths appear equal and full.  Full range of motion of left elbow and shoulder.  No residual weakness noted.  PSYCH: mentation appears normal, affect normal/bright    INR   Date Value Ref Range Status   01/21/2025 2.5 (H) 0.9 - 1.1 Final   01/16/2025 1.26 (H) 0.85 - 1.15 Final                Signed Electronically by: Alex Castro " EDISON Granado  The longitudinal plan of care for the diagnosis(es)/condition(s) as documented were addressed during this visit. Due to the added complexity in care,  will continue to support Marilee in the subsequent management and with ongoing continuity of care.

## 2025-01-21 NOTE — PROGRESS NOTES
ANTICOAGULATION MANAGEMENT     Eileen Donald 69 year old female is on warfarin with therapeutic INR result. (Goal INR 2.5-3.5)    Recent labs: (last 7 days)     01/21/25  1103   INR 2.5*       ASSESSMENT     Warfarin Lab Questionnaire    Warfarin Doses Last 7 Days      1/20/2025    11:20 AM   Dose in Tablet or Mg   TAB or MG? - 2.5mg warfarin tabs (evenings) milligram (mg)           1/20/2025   Warfarin Lab Questionnaire   Missed doses within past 14 days? Yes   If yes; please list when: Not sure of days     Changes in diet or alcohol within past 14 days? No   Medication changes since last result? Yes   Please list: Doing lovanex shots to balance inr         - Lovenox 0.8 mls q 12hrs until INR is >=2.5         - started on 1/16/25 Rosuvastatin 20mg at bedtime.  (Known to increase INR and risk for bleeding).     Injuries or illness since last result? Yes   If yes, please explain: Stroke         - Hospital follow-up today [facility] d/ue to embolism of cerebral artery.          - 1/16/25 hospitalized with stroke symptoms.d/t small right cortical stroke likely due to embolism with mechanical valve.  Was non-compliant taking her warfarin.  INR on admission was 1.1           - Hx of AVR ([place] mechanical valve) on 1/2017. Hx of PE and paroxysmal atrial fibrillation.     New shortness of breath, severe headaches or sudden changes in vision since last result? No   Abnormal bleeding since last result? No   Upcoming surgery, procedure? No     Best number to call with results? [phone]     Previous result: Subtherapeutic last 2 INR results;( 1.10, 1.17, 1/26)    Additional findings: None       PLAN     Recommended plan for ongoing change(s) affecting INR     Dosing Instructions:  - will do one more Lovenox injection tonight, then stop tomorrow.  INR is now 2.5  - Continue your current warfarin dose   - with next INR in 1 week       Summary  As of 1/21/2025      Full warfarin instructions:  5 mg every Mon, Fri; 2.5 mg all  other days   Next INR check:  1/28/2025               Telephone call with Marilee who verbalizes understanding and agrees to plan    - advised an overlap of her Lovenox injection for tonight, as she was unsure if she missed any warfarin doses.    Check at provider office visit - INR on 1/27/25 at [facility] with her PCP.    Education provided: Taking warfarin: take warfarin at same time each day; preferably in the evening and Importance of taking warfarin as instructed  Goal range and lab monitoring: goal range and significance of current result and Importance of therapeutic range  Interaction IS anticipated between warfarin and Rosuvastatin    Plan made per [facility] anticoagulation protocol    Alondra Lora, RN  1/21/2025  Anticoagulation Clinic  [employer] for routing messages: ariadna GAO  [facility] patient phone line: [phone]        _______________________________________________________________________     Anticoagulation Episode Summary       Current INR goal:  2.5-3.5   TTR:  62.4% (1 y)   Target end date:  Indefinite   Send INR reminders to:  LARISA GAO    Indications    Heart valve replaced (Resolved) [Z95.2]  Paroxysmal atrial fibrillation (H) (Resolved) [I48.0]  S/P AVR (aortic valve replacement) (Resolved) [Z95.2]  Other pulmonary embolism without acute cor pulmonale  unspecified chronicity (H) (Resolved) [I26.99]  H/O aortic valve replacement (Resolved) [Z95.2]  Long term (current) use of anticoagulants [Z79.01]  Pulmonary embolism  other  unspecified chronicity  unspecified whether acute cor pulmonale present (H) (Resolved) [I26.99]  Pulmonary embolism  other  unspecified chronicity  unspecified whether acute cor pulmonale present (H) (Resolved) [I26.99]  Pulmonary embolism  other  unspecified chronicity  unspecified whether acute cor pulmonale present (H) (Resolved) [I26.99]             Comments:  12/15/21 - amended INR goal to 2.5 - 3.5             Anticoagulation Care Providers        Provider Role Specialty Phone number    Tito Salazar MD Referring Family Medicine [phone]    Drew Hahn MD Referring Family Medicine [phone]

## 2025-01-22 LAB — ALT SERPL W P-5'-P-CCNC: 30 U/L (ref 0–50)

## 2025-01-27 ENCOUNTER — OFFICE VISIT (OUTPATIENT)
Dept: FAMILY MEDICINE | Facility: CLINIC | Age: 70
End: 2025-01-27
Payer: COMMERCIAL

## 2025-01-27 ENCOUNTER — ANTICOAGULATION THERAPY VISIT (OUTPATIENT)
Dept: ANTICOAGULATION | Facility: CLINIC | Age: 70
End: 2025-01-27

## 2025-01-27 VITALS
HEIGHT: 70 IN | RESPIRATION RATE: 20 BRPM | OXYGEN SATURATION: 93 % | HEART RATE: 86 BPM | WEIGHT: 245 LBS | SYSTOLIC BLOOD PRESSURE: 119 MMHG | BODY MASS INDEX: 35.07 KG/M2 | DIASTOLIC BLOOD PRESSURE: 66 MMHG

## 2025-01-27 DIAGNOSIS — I63.9 CEREBROVASCULAR ACCIDENT (CVA), UNSPECIFIED MECHANISM (H): Primary | ICD-10-CM

## 2025-01-27 DIAGNOSIS — E66.01 MORBID OBESITY (H): ICD-10-CM

## 2025-01-27 DIAGNOSIS — E11.69 TYPE 2 DIABETES MELLITUS WITH HYPERLIPIDEMIA (H): ICD-10-CM

## 2025-01-27 DIAGNOSIS — F33.42 RECURRENT MAJOR DEPRESSION IN COMPLETE REMISSION: ICD-10-CM

## 2025-01-27 DIAGNOSIS — Z79.01 LONG TERM (CURRENT) USE OF ANTICOAGULANTS: ICD-10-CM

## 2025-01-27 DIAGNOSIS — E78.5 TYPE 2 DIABETES MELLITUS WITH HYPERLIPIDEMIA (H): ICD-10-CM

## 2025-01-27 DIAGNOSIS — F33.1 MODERATE RECURRENT MAJOR DEPRESSION (H): ICD-10-CM

## 2025-01-27 DIAGNOSIS — Z79.01 LONG TERM (CURRENT) USE OF ANTICOAGULANTS: Primary | ICD-10-CM

## 2025-01-27 DIAGNOSIS — E78.5 DYSLIPIDEMIA: ICD-10-CM

## 2025-01-27 DIAGNOSIS — Z95.2 S/P AVR (AORTIC VALVE REPLACEMENT): ICD-10-CM

## 2025-01-27 LAB
ATRIAL RATE - MUSE: 66 BPM
DIASTOLIC BLOOD PRESSURE - MUSE: 57 MMHG
INR BLD: 3 (ref 0.9–1.1)
INTERPRETATION ECG - MUSE: NORMAL
P AXIS - MUSE: 77 DEGREES
PR INTERVAL - MUSE: 194 MS
QRS DURATION - MUSE: 76 MS
QT - MUSE: 416 MS
QTC - MUSE: 436 MS
R AXIS - MUSE: -35 DEGREES
SYSTOLIC BLOOD PRESSURE - MUSE: 106 MMHG
T AXIS - MUSE: 84 DEGREES
VENTRICULAR RATE- MUSE: 66 BPM

## 2025-01-27 PROCEDURE — G2211 COMPLEX E/M VISIT ADD ON: HCPCS | Performed by: FAMILY MEDICINE

## 2025-01-27 PROCEDURE — 36415 COLL VENOUS BLD VENIPUNCTURE: CPT | Performed by: FAMILY MEDICINE

## 2025-01-27 PROCEDURE — 80061 LIPID PANEL: CPT | Performed by: FAMILY MEDICINE

## 2025-01-27 PROCEDURE — 99214 OFFICE O/P EST MOD 30 MIN: CPT | Performed by: FAMILY MEDICINE

## 2025-01-27 PROCEDURE — 85610 PROTHROMBIN TIME: CPT | Performed by: FAMILY MEDICINE

## 2025-01-27 NOTE — PROGRESS NOTES
Assessment & Plan     ICD-10-CM    1. Cerebrovascular accident (CVA), unspecified mechanism (H)  I63.9       2. Recurrent major depression in complete remission  F33.42       3. Morbid obesity (H)  E66.01       4. Type 2 diabetes mellitus with hyperlipidemia (H)  E11.69     E78.5       5. Moderate recurrent major depression (H)  F33.1       6. Dyslipidemia  E78.5 Lipid panel reflex to direct LDL Non-fasting     Lipid panel reflex to direct LDL Non-fasting      7. S/P AVR (aortic valve replacement)  Z95.2 INR point of care (finger stick)      8. Long term (current) use of anticoagulants  Z79.01 INR point of care (finger stick)        Patient presents today for follow-up regarding her hospitalization.  Note that she had already seen another provider at this clinic.  Issues addressed today in  1: CVA: This happened at her doctor's office and she was seen immediately.  It was thought to be due to an emboli.  As symptoms totally resolved.  She was noted to have subtherapeutic see INR.  Reviewed the cause and she believes she forgot that she had run out of medication.  She sets her own medication  Discussed that I would like to have her home health nurse evaluation to see what services she is eligible for due to her MS and limited mobility.  She defers for now.  She does have an upcoming appointment with Pharm.D. at this clinic  2: Recurrent depression: Remains in remission.  3: Morbid obesity complicated with type 2 diabetes and dyslipidemia.  Remains fairly static with limited mobility.  I will review if she is a candidate for medication management with bariatrics.  3: Dyslipidemia: On medication that was changed to Crestor.  I do believe that she was noncompliant with her earlier simvastatin as prior years had shown good control.  Will check a lipid panel today.  4: Status post AVR: Hence on Coumadin and not a candidate for oral anticoagulant.  6: Type 2 diabetes: Under fair control.  MED REC REQUIRED  Post  Medication Reconciliation Status:  Discharge medications reconciled, continue medications without change    MEDICATIONS:  Continue current medications without change    The longitudinal plan of care for the diagnosis(es)/condition(s) as documented were addressed during this visit. Due to the added complexity in care, I will continue to support Marilee in the subsequent management and with ongoing continuity of care.            8/21/2024    11:16 AM 12/16/2024    12:05 PM 1/20/2025    11:05 AM   PHQ   PHQ-9 Total Score 11 9  4    Q9: Thoughts of better off dead/self-harm past 2 weeks Not at all Not at all Not at all       Patient-reported       Subjective   Marilee is a 69 year old, presenting for the following health issues:  RECHECK (Follow up- no new concerns. Still having a lot of phlegm )    Eileen Donald is a 69 year old female with past medical history significant for PAF on warfarin, HTN, HLD, AVS (BAV) s/p AVR, PE (10/30/2014), diastolic heart failure, pulmonary hypertension, relapsing-remitting multiple sclerosis, and diabetes mellitus type 2 admitted on 1/15/2025 due to left-sided weakness and decreased sensation.      Her symptoms slowly and fully resolved 24 hours after admission. Neurology evaluated. She was bridged w/ lovenox upon discharge until INR 2.5 - 3.5. No other changes to her meds. She was ambulatory and eating without issue upon discharg        1/27/2025     1:22 PM   Additional Questions   Roomed by Odalis Salas CMA     History of Present Illness       Diabetes:   She presents for follow up of diabetes.    She is not checking blood glucose.        She is concerned about other.    She is not experiencing numbness or burning in feet, excessive thirst, blurry vision, weight changes or redness, sores or blisters on feet.           Hyperlipidemia:  She presents for follow up of hyperlipidemia.   She is taking medication to lower cholesterol. She is not having myalgia or other side effects to  statin medications.    Hypertension: She presents for follow up of hypertension.  She does not check blood pressure  regularly outside of the clinic. Outpatient blood pressures have not been over 140/90. She does not follow a low salt diet.     Vascular Disease:  She presents for follow up of vascular disease.     She never takes nitroglycerin. She takes daily aspirin.    Reason for visit:  Follow up    She eats 0-1 servings of fruits and vegetables daily.She consumes 1 sweetened beverage(s) daily.She exercises with enough effort to increase her heart rate 9 or less minutes per day.  She exercises with enough effort to increase her heart rate 3 or less days per week.   She is taking medications regularly.           1/20/2025   Post Discharge Outreach   How are you doing now that you are home? doing well - has all medications picked up from pharmacy; has appointment 1/21 with primary care - pt noted they have no questiosn or concerns at this time - will try to think of everything they are wondering before appointment tomorrow; is aware of how to reach out to clinic   How are your symptoms? (Red Flag symptoms escalate to triage hotline per guidelines) Improved   Does the patient have their discharge instructions?  Yes   Does the patient have questions regarding their discharge instructions?  No   Were you started on any new medications or were there changes to any of your previous medications?  Yes   Does the patient have all of their medications? Yes   Do you have questions regarding any of your medications?  No   Do you have all of your needed medical supplies or equipment (DME)?  (i.e. oxygen tank, CPAP, cane, etc.) Yes   Discharge Follow Up Appointment Date 1/21/2025   Discharge Follow Up Appointment Scheduled with? Primary Care Provider                  Patient Active Problem List   Diagnosis    Asthma    Benign essential hypertension    Multiple Sclerosis    Hyperlipidemia, unspecified hyperlipidemia type     Dysphagia    Benign Adenomatous Polyp Of The Large Intestine    History of pulmonary embolism    Morbid obesity (H)    Generalized anxiety disorder    Primary osteoarthritis of both hands    Dyslipidemia    Dyspnea on exertion    Recurrent major depressive disorder, in partial remission    Osteoarthrosis    Sleep apnea    History of fall    Leg weakness, bilateral    Neurogenic bladder    Other symptoms and signs involving cognitive functions and awareness    Unsteady gait    Relapsing remitting multiple sclerosis (H)    Elevated MCV    Hip pain, right    Long term (current) use of anticoagulants    Spinal stenosis of cervical region    Cerebrovascular accident (CVA) due to embolism of cerebral artery (H)    Moderate recurrent major depression (H)    Type 2 diabetes mellitus with hyperlipidemia (H)     Current Outpatient Medications   Medication Sig Dispense Refill    acetaminophen (TYLENOL) 325 MG tablet Take 325-650 mg by mouth every 6 hours as needed       acyclovir (ZOVIRAX) 400 MG tablet Take 400 mg by mouth 2 times daily       albuterol (PROAIR HFA/PROVENTIL HFA/VENTOLIN HFA) 108 (90 Base) MCG/ACT inhaler Inhale 2 puffs into the lungs every 6 hours as needed for shortness of breath or wheezing 18 g 11    amoxicillin (AMOXIL) 500 MG capsule TAKE 4 CAPSULES BY MOUTH 30 TO 60 MINUTES PRIOR TO PROCEDURE      aspirin (ASA) 81 MG chewable tablet Take 81 mg by mouth daily       buPROPion (WELLBUTRIN XL) 150 MG 24 hr tablet Take 1 tablet (150 mg) by mouth daily 90 tablet 3    calcium carbonate (TUMS) 500 MG chewable tablet Take 1 chew tab by mouth 2 times daily.      enoxaparin ANTICOAGULANT (LOVENOX) 120 MG/0.8ML syringe Inject 0.8 mLs (120 mg) subcutaneously 2 times daily. 20 mL 0    furosemide (LASIX) 40 MG tablet Take 1 tablet (40 mg) by mouth daily.      lisinopril (ZESTRIL) 10 MG tablet Take 1 tablet (10 mg) by mouth daily. 90 tablet 3    loratadine (CLARITIN) 10 MG tablet Take 1 tablet (10 mg) by mouth daily 30  "tablet 3    metoprolol tartrate (LOPRESSOR) 25 MG tablet Take 1 tablet (25 mg) by mouth 2 times daily 180 tablet 2    montelukast (SINGULAIR) 10 MG tablet Take 1 tablet (10 mg) by mouth at bedtime. 90 tablet 3    Ocrelizumab (OCREVUS IV) Inject into the vein every 6 months      pantoprazole (PROTONIX) 20 MG EC tablet Take 1 tablet (20 mg) by mouth daily. 90 tablet 3    potassium chloride ER (MICRO-K) 10 MEQ CR capsule Take 10 mEq by mouth daily       rosuvastatin (CRESTOR) 20 MG tablet Take 1 tablet (20 mg) by mouth at bedtime. 30 tablet 1    venlafaxine (EFFEXOR XR) 75 MG 24 hr capsule TAKE 3 CAPSULES BY MOUTH EVERY  capsule 3    Vitamin D3 (CHOLECALCIFEROL) 25 mcg (1000 units) tablet Take 25 mcg by mouth daily.      warfarin ANTICOAGULANT (COUMADIN) 2.5 MG tablet Take 1 tab (2.5mg) to 2 tabs (5mg) by mouth daily, as directed.  Adjust dose based on INR results. 30 tablet 2     Current Facility-Administered Medications   Medication Dose Route Frequency Provider Last Rate Last Admin    ropivacaine (NAROPIN) injection 3 mL  3 mL   Ravi Patel DO   3 mL at 09/07/22 1415    triamcinolone (KENALOG-40) injection 40 mg  40 mg   Ravi Patel DO   40 mg at 09/07/22 1415           Review of Systems  Constitutional, HEENT, cardiovascular, pulmonary, GI, , musculoskeletal, neuro, skin, endocrine and psych systems are negative, except as otherwise noted.      Objective    /66 (BP Location: Left arm, Patient Position: Sitting, Cuff Size: Adult Regular)   Pulse 86   Resp 20   Ht 1.778 m (5' 10\")   Wt 111.1 kg (245 lb)   SpO2 93%   BMI 35.15 kg/m    Body mass index is 35.15 kg/m .  Physical Exam   GENERAL: alert and no distress    Lab on 01/21/2025   Component Date Value Ref Range Status    INR 01/21/2025 2.5 (H)  0.9 - 1.1 Final    ALT 01/21/2025 30  0 - 50 U/L Final    Hold Specimen 01/21/2025 [facility]   Final           Signed Electronically by: Drew Hahn MD    "

## 2025-01-27 NOTE — PROGRESS NOTES
ANTICOAGULATION MANAGEMENT     Eileen Donald 69 year old female is on warfarin with therapeutic INR result. (Goal INR 2.5-3.5)    Recent labs: (last 7 days)     01/27/25  1427   INR 3.0*       ASSESSMENT     Warfarin Lab Questionnaire    Warfarin Doses Last 7 Days    Pt Rptd Dose SUNDAY MONDAY TUESDAY WED THURS FRIDAY SATURDAY 1/27/2025  12:56 PM 2.5 2.5 5 2.5 2.5 2.5 5         1/27/2025   Warfarin Lab Questionnaire   Missed doses within past 14 days? No   Changes in diet or alcohol within past 14 days? No   Medication changes since last result? No - Yes.  Started Rosuvastatin 20mg at bedtime.on 1/16/25.    (Known to increase INR and risk for bleeding).         - completed Lovenox injections on 1/22/25, INR was >2.5.     Injuries or illness since last result? No - 1/27/25 Hosp follow-up with PCP due to CVA, d/t not compliant in taking warfarin.  She forgot she ran out of warfarin RX.     New shortness of breath, severe headaches or sudden changes in vision since last result? No   Abnormal bleeding since last result? No   Upcoming surgery, procedure? No   Best number to call with results? [phone]     Previous result: Therapeutic last visit at 2.5; previously outside of goal range were subtherapeutic (patient was not compliant in taking her warfarin daily).    Additional findings: None       PLAN     Recommended plan for no diet, medication or health factor changes affecting INR     Dosing Instructions: Continue your current warfarin dose with next INR in 3 weeks       Summary  As of 1/27/2025      Full warfarin instructions:  5 mg every Mon, Fri; 2.5 mg all other days   Next INR check:  2/17/2025               Telephone call with Marilee who verbalizes understanding and agrees to plan    Patient offered & declined to schedule next visit    Education provided: Taking warfarin: Importance of taking warfarin as instructed  Goal range and lab monitoring: goal range and significance of current result    Plan made  per [facility] anticoagulation protocol    Alondra Lora RN  1/27/2025  Anticoagulation Clinic  [facility] for routing messages: p LARISA GAO  [facility] patient phone line: [phone]        _______________________________________________________________________     Anticoagulation Episode Summary       Current INR goal:  2.5-3.5   TTR:  62.4% (1 y)   Target end date:  Indefinite   Send INR reminders to:  LARISA GAO    Indications    Heart valve replaced (Resolved) [Z95.2]  Paroxysmal atrial fibrillation (H) (Resolved) [I48.0]  S/P AVR (aortic valve replacement) (Resolved) [Z95.2]  Other pulmonary embolism without acute cor pulmonale  unspecified chronicity (H) (Resolved) [I26.99]  H/O aortic valve replacement (Resolved) [Z95.2]  Long term (current) use of anticoagulants [Z79.01]  Pulmonary embolism  other  unspecified chronicity  unspecified whether acute cor pulmonale present (H) (Resolved) [I26.99]  Pulmonary embolism  other  unspecified chronicity  unspecified whether acute cor pulmonale present (H) (Resolved) [I26.99]  Pulmonary embolism  other  unspecified chronicity  unspecified whether acute cor pulmonale present (H) (Resolved) [I26.99]             Comments:  12/15/21 - amended INR goal to 2.5 - 3.5             Anticoagulation Care Providers       Provider Role Specialty Phone number    Tito Salazar MD Referring Family Medicine [phone]    Drew Hahn MD Referring Family Medicine [phone]

## 2025-01-28 PROBLEM — E78.5 TYPE 2 DIABETES MELLITUS WITH HYPERLIPIDEMIA (H): Status: ACTIVE | Noted: 2025-01-28

## 2025-01-28 PROBLEM — E11.69 TYPE 2 DIABETES MELLITUS WITH HYPERLIPIDEMIA (H): Status: ACTIVE | Noted: 2025-01-28

## 2025-01-28 LAB
CHOLEST SERPL-MCNC: 199 MG/DL
FASTING STATUS PATIENT QL REPORTED: NO
HDLC SERPL-MCNC: 74 MG/DL
LDLC SERPL CALC-MCNC: 85 MG/DL
NONHDLC SERPL-MCNC: 125 MG/DL
TRIGL SERPL-MCNC: 198 MG/DL

## 2025-02-08 ENCOUNTER — HEALTH MAINTENANCE LETTER (OUTPATIENT)
Age: 70
End: 2025-02-08

## 2025-02-25 ENCOUNTER — TELEPHONE (OUTPATIENT)
Dept: ANTICOAGULATION | Facility: CLINIC | Age: 70
End: 2025-02-25
Payer: COMMERCIAL

## 2025-02-25 NOTE — TELEPHONE ENCOUNTER
ANTICOAGULATION     Eileen Donald is overdue for an INR check.     Spoke with Marilee and scheduled lab appointment on 3/5    Alondra Lora, RN  2/25/2025  Anticoagulation Clinic  [facility] for routing messages: ariadna QUEZADA [facility] patient phone line: [phone]

## 2025-02-27 ENCOUNTER — TELEPHONE (OUTPATIENT)
Dept: FAMILY MEDICINE | Facility: CLINIC | Age: 70
End: 2025-02-27
Payer: COMMERCIAL

## 2025-02-27 ENCOUNTER — TELEPHONE (OUTPATIENT)
Dept: GASTROENTEROLOGY | Facility: CLINIC | Age: 70
End: 2025-02-27
Payer: COMMERCIAL

## 2025-02-27 DIAGNOSIS — Z12.11 SCREENING FOR COLON CANCER: Primary | ICD-10-CM

## 2025-02-27 NOTE — TELEPHONE ENCOUNTER
General Call      Reason for Call:  referral    What are your questions or concerns:  Patient is requesting colonoscopy referral to [facility] in [place], Patient did not have fax number of [facility]    Date of last appointment with provider: n/a    Could we send this information to you in [facility] or would you prefer to receive a phone call?:   Patient would prefer a phone call   Okay to leave a detailed message?: Yes at Cell number on file:    Telephone Information:   Mobile [phone]

## 2025-03-03 DIAGNOSIS — I63.40 CEREBROVASCULAR ACCIDENT (CVA) DUE TO EMBOLISM OF CEREBRAL ARTERY (H): ICD-10-CM

## 2025-03-03 RX ORDER — ROSUVASTATIN CALCIUM 20 MG/1
20 TABLET, COATED ORAL AT BEDTIME
Qty: 30 TABLET | Refills: 1 | Status: SHIPPED | OUTPATIENT
Start: 2025-03-03

## 2025-03-04 ENCOUNTER — TRANSFERRED RECORDS (OUTPATIENT)
Dept: HEALTH INFORMATION MANAGEMENT | Facility: CLINIC | Age: 70
End: 2025-03-04
Payer: COMMERCIAL

## 2025-03-04 ENCOUNTER — HOSPITAL ENCOUNTER (OUTPATIENT)
Facility: HOSPITAL | Age: 70
End: 2025-03-04
Attending: INTERNAL MEDICINE | Admitting: INTERNAL MEDICINE
Payer: COMMERCIAL

## 2025-03-26 ENCOUNTER — TELEPHONE (OUTPATIENT)
Dept: PULMONOLOGY | Facility: CLINIC | Age: 70
End: 2025-03-26
Payer: COMMERCIAL

## 2025-03-27 ENCOUNTER — TRANSFERRED RECORDS (OUTPATIENT)
Dept: HEALTH INFORMATION MANAGEMENT | Facility: CLINIC | Age: 70
End: 2025-03-27
Payer: COMMERCIAL

## 2025-03-31 ENCOUNTER — ANTICOAGULATION THERAPY VISIT (OUTPATIENT)
Dept: ANTICOAGULATION | Facility: CLINIC | Age: 70
End: 2025-03-31

## 2025-03-31 ENCOUNTER — LAB (OUTPATIENT)
Dept: LAB | Facility: CLINIC | Age: 70
End: 2025-03-31
Payer: COMMERCIAL

## 2025-03-31 DIAGNOSIS — Z79.01 LONG TERM (CURRENT) USE OF ANTICOAGULANTS: ICD-10-CM

## 2025-03-31 DIAGNOSIS — I63.40 CEREBROVASCULAR ACCIDENT (CVA) DUE TO EMBOLISM OF CEREBRAL ARTERY (H): Primary | ICD-10-CM

## 2025-03-31 DIAGNOSIS — Z95.2 S/P AVR (AORTIC VALVE REPLACEMENT): ICD-10-CM

## 2025-03-31 LAB — INR BLD: 1.2 (ref 0.9–1.1)

## 2025-03-31 PROCEDURE — 36416 COLLJ CAPILLARY BLOOD SPEC: CPT

## 2025-03-31 PROCEDURE — 85610 PROTHROMBIN TIME: CPT

## 2025-03-31 NOTE — PROGRESS NOTES
ANTICOAGULATION MANAGEMENT     Eileen Donald 69 year old female is on warfarin with subtherapeutic INR result. (Goal INR 2.5-3.5)    Recent labs: (last 7 days)     03/31/25  1305   INR 1.2*       ASSESSMENT     Warfarin Lab Questionnaire    Warfarin Doses Last 7 Days      3/31/2025     1:06 PM   Dose in Tablet or Mg   TAB or MG? milligram (mg)     Pt Rptd Dose STAS MONDAY TUESDAY WED THURS FRIDAY SATURDAY   3/31/2025   1:06 PM 0 2.5 5 2.5 2.5 2.5-7.5 0         3/31/2025   Warfarin Lab Questionnaire   Missed doses within past 14 days? Yes -she thought should not take warfarin because on the shots.    Yes-takes dose differently then template but does not effect the weekly dose. Calendar adjusted.   If yes; please list when: did  shots to adjust level    Sat and Sun  shots   Changes in diet or alcohol within past 14 days? No    No   Medication changes since last result? Yes    No   Please list: shots   Injuries or illness since last result? Yes    Yes   If yes, please explain: had left  hand skin cancer removed    Surgery left hand cancer removal   New shortness of breath, severe headaches or sudden changes in vision since last result? No    No   Abnormal bleeding since last result? No    No   Upcoming surgery, procedure? No    No   Best number to call with results? [phone]    [phone]       Multiple values from one day are sorted in reverse-chronological order     Previous result: Subtherapeutic  Additional findings: None and Bridging with Enoxaparin until INR >= 2.5 Says she has plenty of Lovenox.        PLAN     Recommended plan for temporary change(s) affecting INR     Dosing Instructions: booster dose then continue your current warfarin dose Continue bridging with Enoxaparin with next INR in 4 days       Summary  As of 3/31/2025      Full warfarin instructions:  3/31: 7.5 mg; Otherwise 5 mg every Tue, Sat; 2.5 mg all other days   Next INR check:  4/4/2025               Telephone call with Marilee vazquez  agrees to plan and repeated back plan correctly    Had lab already scheduled for 4/4.    Education provided: Please call back if any changes to your diet, medications or how you've been taking warfarin  Goal range and lab monitoring: goal range and significance of current result and Importance of therapeutic range  Symptom monitoring: monitoring for bleeding signs and symptoms  Lovenox/Heparin education provided: role of enoxaparin/heparin in bridge therapy   Contact [phone] with any changes, questions or concerns.     Plan made with [facility] Pharmacist Renee Ashraf RN  3/31/2025  Anticoagulation Clinic  [facility] for routing messages: p LARISA DAMIEN GAO  [facility] patient phone line: [phone]        _______________________________________________________________________     Anticoagulation Episode Summary       Current INR goal:  2.5-3.5   TTR:  63.1% (1 y)   Target end date:  Indefinite   Send INR reminders to:  LARISA GAO    Indications    Paroxysmal atrial fibrillation (H) (Resolved) [I48.0]  Other pulmonary embolism without acute cor pulmonale  unspecified chronicity (H) (Resolved) [I26.99]  S/P AVR (aortic valve replacement) (Resolved) [Z95.2]  Cerebrovascular accident (CVA) due to embolism of cerebral artery (H) [I63.40]             Comments:  12/15/21 - amended INR goal to 2.5 - 3.5             Anticoagulation Care Providers       Provider Role Specialty Phone number    Tito Salazar MD Referring Family Medicine [phone]    Drew Hahn MD Referring Family Medicine [phone]

## 2025-04-08 DIAGNOSIS — F33.41 RECURRENT MAJOR DEPRESSIVE DISORDER, IN PARTIAL REMISSION: ICD-10-CM

## 2025-04-08 RX ORDER — VENLAFAXINE HYDROCHLORIDE 75 MG/1
CAPSULE, EXTENDED RELEASE ORAL
Qty: 270 CAPSULE | Refills: 2 | Status: SHIPPED | OUTPATIENT
Start: 2025-04-08

## 2025-04-11 ENCOUNTER — ANTICOAGULATION THERAPY VISIT (OUTPATIENT)
Dept: ANTICOAGULATION | Facility: CLINIC | Age: 70
End: 2025-04-11

## 2025-04-11 DIAGNOSIS — I63.40 CEREBROVASCULAR ACCIDENT (CVA) DUE TO EMBOLISM OF CEREBRAL ARTERY (H): Primary | ICD-10-CM

## 2025-04-11 NOTE — PROGRESS NOTES
ANTICOAGULATION MANAGEMENT     Eileen Donald 69 year old female is on warfarin with supratherapeutic INR result. (Goal INR 2.5-3.5)    Recent labs: (last 7 days)     04/11/25  1315   INR 4.4*       ASSESSMENT     Warfarin Lab Questionnaire    Warfarin Doses Last 7 Days    Pt Rptd Dose STAS MONDAY TUESDAY WED THURS FRIDAY SATURDAY   4/10/2025   3:38 PM 2.5 2.5 5 5 5 5 5         4/10/2025   Warfarin Lab Questionnaire   Missed doses within past 14 days? Yes   If yes; please list when: Took lovenox 2xd Friday Saturday Sunday  with the warafin   Changes in diet or alcohol within past 14 days? No   Medication changes since last result? No   Injuries or illness since last result? No   New shortness of breath, severe headaches or sudden changes in vision since last result? No   Abnormal bleeding since last result? No   Upcoming surgery, procedure? No   Best number to call with results? [phone]     Previous result: Subtherapeutic  Additional findings: Bridging with Enoxaparin until INR >= 2.5       PLAN     Unable to reach Marilee today.    [facility] and sent [employer] message to take 2.5 mg QD this weekend and to STOP bridging with enoxaparin. Request call back for assessment.    Follow up required to discuss dosing instructions and confirm understanding of instructions    Elle Matias RN  4/11/2025  Anticoagulation Clinic  [facility] for routing messages: ariadna PERES [facility] patient phone line: [phone]

## 2025-04-14 ENCOUNTER — TRANSFERRED RECORDS (OUTPATIENT)
Dept: HEALTH INFORMATION MANAGEMENT | Facility: CLINIC | Age: 70
End: 2025-04-14
Payer: COMMERCIAL

## 2025-04-14 NOTE — PROGRESS NOTES
ANTICOAGULATION MANAGEMENT     Eileen Donald 69 year old female is on warfarin with supratherapeutic INR result. (Goal INR 2.5-3.5)    Recent labs: (last 7 days)     04/11/25  1315   INR 4.4*       ASSESSMENT     Source(s): Chart Review and Patient/Caregiver Call     Warfarin doses taken: Warfarin taken as instructed   Was instructed to take 2.5mg daily over the weekend, 4/11, 4/12,13,  (patient responded thru MyChart and agreed with plan.)  Diet: No new diet changes identified  Medication/supplement changes:  Yes   Stop bridging with Lovenox injections on 4/11.   Did report she had been taking Tylenol for a few days after hand surgery.  New illness, injury, or hospitalization: Yes:    On 3/26/25, s/p MOHS left hand due to squamous cell carcinoma.  Signs or symptoms of bleeding or clotting: No  Previous result: Subtherapeutic last 3 INR results; (2.0, 1.2, 1.5)  Additional findings: None       PLAN     Recommended plan for temporary change(s) affecting INR     Dosing Instructions: Continue your current warfarin dose with next INR in 1 week       Summary  As of 4/11/2025      Full warfarin instructions:  4/11: 2.5 mg; Otherwise 5 mg every Mon, Wed, Fri; 2.5 mg all other days   Next INR check:  4/23/2025               Telephone call with Marilee who verbalizes understanding and agrees to plan    [facility]    Education provided: Taking warfarin: Importance of taking warfarin as instructed  Goal range and lab monitoring: goal range and significance of current result  Interaction IS anticipated between warfarin and Tytenol    Plan made per [facility] anticoagulation protocol    Alondra Lora, RN  4/14/2025  Anticoagulation Clinic  [employer] for routing messages[facility] phone [facility]: [phone]        _______________________________________________________________________     Anticoagulation Episode Summary       Current INR goal:  2.5-3.5   TTR:   62.8% (1 y)   Target end date:  Indefinite   Send INR reminders to:  [facility]    Indications    Paroxysmal atrial fibrillation (H) (Resolved) [I48.0]  Other pulmonary embolism without acute cor pulmonale  unspecified chronicity (H) (Resolved) [I26.99]  S/P AVR (aortic valve replacement) (Resolved) [Z95.2]  Cerebrovascular accident (CVA) due to embolism of cerebral artery (H) [I63.40]             Comments:  12/15/21 - amended INR goal to 2.5 - 3.5             Anticoagulation Care Providers       Provider Role Specialty Phone number    Tito Salazar MD Referring Family Medicine [phone]    Drew Hahn MD Referring Family Medicine [phone]

## 2025-04-15 ENCOUNTER — TRANSFERRED RECORDS (OUTPATIENT)
Dept: HEALTH INFORMATION MANAGEMENT | Facility: CLINIC | Age: 70
End: 2025-04-15
Payer: COMMERCIAL

## 2025-04-22 ENCOUNTER — LAB (OUTPATIENT)
Dept: LAB | Facility: CLINIC | Age: 70
End: 2025-04-22
Payer: COMMERCIAL

## 2025-04-22 ENCOUNTER — ANTICOAGULATION THERAPY VISIT (OUTPATIENT)
Dept: ANTICOAGULATION | Facility: CLINIC | Age: 70
End: 2025-04-22

## 2025-04-22 DIAGNOSIS — I63.40 CEREBROVASCULAR ACCIDENT (CVA) DUE TO EMBOLISM OF CEREBRAL ARTERY (H): Primary | ICD-10-CM

## 2025-04-22 DIAGNOSIS — Z79.01 LONG TERM (CURRENT) USE OF ANTICOAGULANTS: ICD-10-CM

## 2025-04-22 DIAGNOSIS — Z95.2 S/P AVR (AORTIC VALVE REPLACEMENT): ICD-10-CM

## 2025-04-22 LAB — INR BLD: 3.1 (ref 0.9–1.1)

## 2025-04-22 PROCEDURE — 36416 COLLJ CAPILLARY BLOOD SPEC: CPT

## 2025-04-22 PROCEDURE — 85610 PROTHROMBIN TIME: CPT

## 2025-04-22 NOTE — PROGRESS NOTES
ANTICOAGULATION MANAGEMENT     Eileen Donald 69 year old female is on warfarin with therapeutic INR result. (Goal INR 2.5-3.5)    Recent labs: (last 7 days)     04/22/25  1221   INR 3.1*       ASSESSMENT     Warfarin Lab Questionnaire    Warfarin Doses Last 7 Days      4/22/2025     9:57 AM   Dose in Tablet or Mg   TAB or MG? - 2.5mg warfarin tabs (evening / bedtime) milligram (mg)     Pt Rptd Dose SUNDAY MONDAY TUESDAY WED THURS FRIDAY SATURDAY 4/22/2025   9:57 AM 2.5 2.5 5 5 2.5 2.5 5         4/22/2025   Warfarin Lab Questionnaire   Missed doses within past 14 days? No   Changes in diet or alcohol within past 14 days? No   Medication changes since last result? No   Injuries or illness since last result? No   New shortness of breath, severe headaches or sudden changes in vision since last result? No   Abnormal bleeding since last result? No   Upcoming surgery, procedure? No   Best number to call with results? [phone]     Previous result: Supratherapeutic at 4.4 on 4/11/25.    Additional findings:  Chart reviewed       PLAN     Recommended plan for no diet, medication or health factor changes affecting INR     Dosing Instructions: Continue your current warfarin dose with next INR in 2 weeks       Summary  As of 4/22/2025      Full warfarin instructions:  5 mg every Mon, Wed, Fri; 2.5 mg all other days   Next INR check:  5/6/2025               Detailed voice message left for Marilee with dosing instructions and follow up date.   Sent [employer] message with dosing and follow up instructions    Contact [phone] to schedule and with any changes, questions or concerns.     Education provided: Please call back if any changes to your diet, medications or how you've been taking warfarin  Taking warfarin: Importance of taking warfarin as instructed  Goal range and lab monitoring: goal range and significance of current result    Plan made per [facility] anticoagulation protocol    Alondra Lora,  RN  4/22/2025  Anticoagulation Clinic  [facility] for routing messages: p LARISA GAO  [facility] patient phone line: [phone]        _______________________________________________________________________     Anticoagulation Episode Summary       Current INR goal:  2.5-3.5   TTR:  61.0% (1 y)   Target end date:  Indefinite   Send INR reminders to:  LARISA GAO    Indications    Paroxysmal atrial fibrillation (H) (Resolved) [I48.0]  Other pulmonary embolism without acute cor pulmonale  unspecified chronicity (H) (Resolved) [I26.99]  S/P AVR (aortic valve replacement) (Resolved) [Z95.2]  Cerebrovascular accident (CVA) due to embolism of cerebral artery (H) [I63.40]             Comments:  12/15/21 - amended INR goal to 2.5 - 3.5             Anticoagulation Care Providers       Provider Role Specialty Phone number    Tito Salazar MD Referring Family Medicine [phone]    Drew Hahn MD Referring Family Medicine [phone]

## 2025-05-07 ENCOUNTER — TRANSFERRED RECORDS (OUTPATIENT)
Dept: HEALTH INFORMATION MANAGEMENT | Facility: CLINIC | Age: 70
End: 2025-05-07
Payer: COMMERCIAL

## 2025-05-08 ENCOUNTER — ANTICOAGULATION THERAPY VISIT (OUTPATIENT)
Dept: ANTICOAGULATION | Facility: CLINIC | Age: 70
End: 2025-05-08

## 2025-05-08 ENCOUNTER — LAB (OUTPATIENT)
Dept: LAB | Facility: CLINIC | Age: 70
End: 2025-05-08
Payer: COMMERCIAL

## 2025-05-08 DIAGNOSIS — Z95.2 S/P AVR (AORTIC VALVE REPLACEMENT): ICD-10-CM

## 2025-05-08 DIAGNOSIS — Z79.01 LONG TERM (CURRENT) USE OF ANTICOAGULANTS: ICD-10-CM

## 2025-05-08 DIAGNOSIS — I63.40 CEREBROVASCULAR ACCIDENT (CVA) DUE TO EMBOLISM OF CEREBRAL ARTERY (H): Primary | ICD-10-CM

## 2025-05-08 LAB — INR BLD: 3 (ref 0.9–1.1)

## 2025-05-08 NOTE — PROGRESS NOTES
"ANTICOAGULATION MANAGEMENT     Eileen Donald 69 year old female is on warfarin with therapeutic INR result. (Goal INR 2.5-3.5)    Recent labs: (last 7 days)     05/08/25  1248   INR 3.0*       ASSESSMENT     Warfarin Lab Questionnaire    Warfarin Doses Last 7 Days      5/8/2025    12:26 PM   Dose in Tablet or Mg   TAB or MG? - 2.5mg warfarin tabs (bedtime) milligram (mg)     Pt Rptd Dose SUNDAY MONDAY TUESDAY WED THURS FRIDAY SATURDAY 5/8/2025  12:26 PM 2.5 5 5 2.5 2.5 5 2.5         5/8/2025   Warfarin Lab Questionnaire   Missed doses within past 14 days? No   Changes in diet or alcohol within past 14 days? No   Medication changes since last result? No   Injuries or illness since last result? No   New shortness of breath, severe headaches or sudden changes in vision since last result? No   Abnormal bleeding since last result? No   Upcoming surgery, procedure? No   Best number to call with results? [phone]     Previous result: Therapeutic last visit at 3.1; previously outside of goal range at 4.4    Additional findings: {additional findings:122067::\"None\"}       PLAN     {INRPLAN:525820}    "

## 2025-05-08 NOTE — PROGRESS NOTES
ANTICOAGULATION MANAGEMENT     Eileen Donald 69 year old female is on warfarin with therapeutic INR result. (Goal INR 2.5-3.5)    Recent labs: (last 7 days)     05/08/25  1248   INR 3.0*       ASSESSMENT     Warfarin Lab Questionnaire    Warfarin Doses Last 7 Days      5/8/2025    12:26 PM   Dose in Tablet or Mg   TAB or MG? milligram (mg)     Pt Rptd Dose SUNDAY MONDAY TUESDAY WED THURS FRIDAY SATURDAY 5/8/2025  12:26 PM 2.5 5 5 2.5 2.5 5 2.5         5/8/2025   Warfarin Lab Questionnaire   Missed doses within past 14 days? No   Changes in diet or alcohol within past 14 days? No   Medication changes since last result? No   Injuries or illness since last result? No   New shortness of breath, severe headaches or sudden changes in vision since last result? No   Abnormal bleeding since last result? No   Upcoming surgery, procedure? No   Best number to call with results? [phone]     Previous result: Therapeutic last visit; previously outside of goal range  Additional findings: Appears Tues and Wed dose above is swapped in calendar that Marilee filled out.       PLAN     Recommended plan for no diet, medication or health factor changes affecting INR     Dosing Instructions: Continue your current warfarin dose with next INR in 3 weeks       Summary  As of 5/8/2025      Full warfarin instructions:  5 mg every Mon, Wed, Fri; 2.5 mg all other days   Next INR check:  5/29/2025               Detailed voice message left for Marilee with dosing instructions and follow up date.   Sent [employer] message with dosing and follow up instructions    Contact [phone] to schedule and with any changes, questions or concerns.     Education provided: Please call back if any changes to your diet, medications or how you've been taking warfarin  Goal range and lab monitoring: goal range and significance of current result  Written instructions provided  Contact [phone] with any changes, questions or concerns.     Plan made per [facility]  anticoagulation protocol    Melony Guerra RN  5/8/2025  Anticoagulation Clinic  [facility] for routing messages: p LARISA GAO  [facility] patient phone line: [phone]        _______________________________________________________________________     Anticoagulation Episode Summary       Current INR goal:  2.5-3.5   TTR:  61.6% (1 y)   Target end date:  Indefinite   Send INR reminders to:  LARISA GAO    Indications    Paroxysmal atrial fibrillation (H) (Resolved) [I48.0]  Other pulmonary embolism without acute cor pulmonale  unspecified chronicity (H) (Resolved) [I26.99]  S/P AVR (aortic valve replacement) (Resolved) [Z95.2]  Cerebrovascular accident (CVA) due to embolism of cerebral artery (H) [I63.40]             Comments:  12/15/21 - amended INR goal to 2.5 - 3.5             Anticoagulation Care Providers       Provider Role Specialty Phone number    Tito Salazar MD Referring Family Medicine [phone]    Drew Hahn MD Referring Family Medicine [phone]

## 2025-05-09 DIAGNOSIS — F33.41 RECURRENT MAJOR DEPRESSIVE DISORDER, IN PARTIAL REMISSION: ICD-10-CM

## 2025-05-09 DIAGNOSIS — I10 PRIMARY HYPERTENSION: ICD-10-CM

## 2025-05-12 RX ORDER — METOPROLOL TARTRATE 25 MG/1
25 TABLET, FILM COATED ORAL 2 TIMES DAILY
Qty: 180 TABLET | Refills: 1 | Status: SHIPPED | OUTPATIENT
Start: 2025-05-12

## 2025-05-12 RX ORDER — BUPROPION HYDROCHLORIDE 150 MG/1
150 TABLET ORAL DAILY
Qty: 90 TABLET | Refills: 1 | Status: SHIPPED | OUTPATIENT
Start: 2025-05-12

## 2025-05-20 ENCOUNTER — ALLIED HEALTH/NURSE VISIT (OUTPATIENT)
Dept: FAMILY MEDICINE | Facility: CLINIC | Age: 70
End: 2025-05-20
Payer: COMMERCIAL

## 2025-05-20 DIAGNOSIS — Z23 NEED FOR VACCINATION: Primary | ICD-10-CM

## 2025-05-20 PROCEDURE — 99207 PR NO CHARGE NURSE ONLY: CPT

## 2025-05-20 PROCEDURE — 91320 SARSCV2 VAC 30MCG TRS-SUC IM: CPT

## 2025-05-20 PROCEDURE — 90480 ADMN SARSCOV2 VAC 1/ONLY CMP: CPT

## 2025-06-09 ENCOUNTER — PATIENT OUTREACH (OUTPATIENT)
Dept: CARE COORDINATION | Facility: CLINIC | Age: 70
End: 2025-06-09
Payer: COMMERCIAL

## 2025-06-09 NOTE — PROGRESS NOTES
Clinical Product Navigator RN reviewed chart; patient on payer product coverage.  Review results:   CPN Initial Information Gathering  Referral Source: Health Plan  Referrals Places: Care Coordination    Patient identified by Health Plan as a candidate for Primary Care Coordination due to the below open gaps in care/concerns identified by Health Plan:     !Clinical Complexity: Condition (adult) - Multiple major medical conditions;  !Clinical Complexity: Cost - Emerging: Costs expected to increase significantly  !Clinical Complexity: Cost - Emerging: ETG severity trending higher   Medication Adherence Care Opps - Diabetes; Medication Adherence Care Opps - Hyperlipidemia; Not on appropriate medications - Cardiac; Not on appropriate medications - Diabetes;   SDOH - Diet, Exercise or other Lifestyle behaviors  SDOH - Treatment Non-Compliance  SDoH - Medication Noncompliance; Chronic condition and BH condition  SDoH Access to care concerns or transportation insecurity    Primary Care Coordination program opened for patient outreach and assessment of above open gaps in care/concerns and to offer ongoing support.     Tawny Lund RN   Clinical Product Navigator   [email or web address]   Office: [phone]

## 2025-06-10 ENCOUNTER — PATIENT OUTREACH (OUTPATIENT)
Dept: CARE COORDINATION | Facility: CLINIC | Age: 70
End: 2025-06-10

## 2025-06-10 NOTE — PROGRESS NOTES
Clinic Care Coordination Contact  Community Health Worker Initial Outreach    CHW Initial Information Gathering:  Referral Source: Health Plan  Preferred Hospital: [facility]  [phone]  Preferred Urgent Care: [facility], [phone]  Current living arrangement:: I live in a private home with spouse  Type of residence:: Private home - stairs  Community Resources: None  Informal Support system:: Family  No PCP office visit in Past Year: No  Transportation means:: Regular car, Family       Patient accepts CC: Yes. Patient scheduled for assessment with the SW on 6/13/25 at 9:00 am. Patient noted desire to discuss supports for possibly in the home and the patient stated that there are sometimes concerns regarding transportation.     Brent CHW  [phone]  Connected Care Resource [facility]

## 2025-06-25 ENCOUNTER — DOCUMENTATION ONLY (OUTPATIENT)
Dept: ANTICOAGULATION | Facility: CLINIC | Age: 70
End: 2025-06-25
Payer: COMMERCIAL

## 2025-06-25 NOTE — LETTER
"     [facility] ANTICOAGULATION CLINIC  [address] [facility] [address]  Phone: [phone]  Fax: [phone]   June 25, 2025        Eileen Donald  [address] [place] [address]            Dear Marilee,    You are currently under the care of [facility] Anticoagulation [facility] for your warfarin (Coumadin , Jantoven ) therapy.  We are contacting you because our records show you were due for an INR on 5/29/25.    Last INR was in 5/8/25.    There are potentially serious risks when taking warfarin without careful monitoring and we want to make sure you are safely managed.  Routine lab monitoring is required for warfarin refills.     Please schedule a lab appointment as soon as possible either by calling [phone] or scheduling directly in [email or web address]. To schedule in [email or web address] open the \"schedule an appointment\" section then select \"lab only\" as the reason you are coming in and \"INR\" as the lab test. If it is difficult for you to get to lab, please call us to discuss options.  If there has been a change in your care or other concerns, please let us know so we can help and/or update our records.         Sincerely,       [facility] Anticoagulation Clinic   "

## 2025-06-25 NOTE — PROGRESS NOTES
ANTICOAGULATION     Eileen Donald is overdue for an INR check.     Care Everywhere updated: no    Reminder letter sent    Alondra Lora RN  6/25/2025  Anticoagulation Clinic  [facility] for routing messages: ariadna GAO  [facility] patient phone line: [phone]

## 2025-06-30 ENCOUNTER — TELEPHONE (OUTPATIENT)
Dept: FAMILY MEDICINE | Facility: CLINIC | Age: 70
End: 2025-06-30
Payer: COMMERCIAL

## 2025-06-30 DIAGNOSIS — R05.8 PRODUCTIVE COUGH: ICD-10-CM

## 2025-06-30 DIAGNOSIS — I63.40 CEREBROVASCULAR ACCIDENT (CVA) DUE TO EMBOLISM OF CEREBRAL ARTERY (H): ICD-10-CM

## 2025-06-30 DIAGNOSIS — R13.10 DYSPHAGIA, UNSPECIFIED TYPE: ICD-10-CM

## 2025-06-30 RX ORDER — LORATADINE 10 MG/1
10 TABLET ORAL DAILY
Qty: 30 TABLET | Refills: 3 | Status: SHIPPED | OUTPATIENT
Start: 2025-06-30

## 2025-06-30 RX ORDER — ROSUVASTATIN CALCIUM 20 MG/1
20 TABLET, COATED ORAL AT BEDTIME
Qty: 30 TABLET | Refills: 1 | Status: SHIPPED | OUTPATIENT
Start: 2025-06-30

## 2025-06-30 NOTE — TELEPHONE ENCOUNTER
Per pharmacy patient needs refill of Esomeprazole magnesium 20MG DR CAPS. Not on current or past medication list. Please refill send to attached pharmacy

## 2025-06-30 NOTE — TELEPHONE ENCOUNTER
The medication is in med history.  You have to use the wrench and click on med history to get previous medication history.  Another easier ways to search on the epic bar    I have refilled it for now.  Please inform pharmacy/patient    Drew Hahn MD

## 2025-07-01 ENCOUNTER — TRANSFERRED RECORDS (OUTPATIENT)
Dept: HEALTH INFORMATION MANAGEMENT | Facility: CLINIC | Age: 70
End: 2025-07-01
Payer: COMMERCIAL

## 2025-07-13 ENCOUNTER — HEALTH MAINTENANCE LETTER (OUTPATIENT)
Age: 70
End: 2025-07-13

## 2025-07-16 ENCOUNTER — VIRTUAL VISIT (OUTPATIENT)
Dept: PHARMACY | Facility: CLINIC | Age: 70
End: 2025-07-16
Payer: COMMERCIAL

## 2025-07-16 ENCOUNTER — DOCUMENTATION ONLY (OUTPATIENT)
Dept: ANTICOAGULATION | Facility: CLINIC | Age: 70
End: 2025-07-16
Payer: COMMERCIAL

## 2025-07-16 DIAGNOSIS — Z95.2 HEART VALVE REPLACED: ICD-10-CM

## 2025-07-16 DIAGNOSIS — I83.90 VARICOSE VEINS OF LOWER EXTREMITY, UNSPECIFIED LATERALITY, UNSPECIFIED WHETHER COMPLICATED: ICD-10-CM

## 2025-07-16 DIAGNOSIS — B00.9 HERPES SIMPLEX VIRUS INFECTION: ICD-10-CM

## 2025-07-16 DIAGNOSIS — J96.11 CHRONIC RESPIRATORY FAILURE WITH HYPOXIA (H): ICD-10-CM

## 2025-07-16 DIAGNOSIS — G35 MULTIPLE SCLEROSIS (H): Primary | ICD-10-CM

## 2025-07-16 DIAGNOSIS — Z86.711 HISTORY OF PULMONARY EMBOLISM: ICD-10-CM

## 2025-07-16 DIAGNOSIS — I10 BENIGN ESSENTIAL HYPERTENSION: ICD-10-CM

## 2025-07-16 DIAGNOSIS — R13.10 DYSPHAGIA, UNSPECIFIED TYPE: ICD-10-CM

## 2025-07-16 DIAGNOSIS — E78.5 DYSLIPIDEMIA: ICD-10-CM

## 2025-07-16 DIAGNOSIS — K21.00 GASTROESOPHAGEAL REFLUX DISEASE WITH ESOPHAGITIS, UNSPECIFIED WHETHER HEMORRHAGE: ICD-10-CM

## 2025-07-16 DIAGNOSIS — I48.0 PAROXYSMAL ATRIAL FIBRILLATION (H): ICD-10-CM

## 2025-07-16 DIAGNOSIS — R52 PAIN: ICD-10-CM

## 2025-07-16 DIAGNOSIS — Z00.00 PREVENTATIVE HEALTH CARE: ICD-10-CM

## 2025-07-16 PROCEDURE — 99607 MTMS BY PHARM ADDL 15 MIN: CPT | Mod: 93 | Performed by: PHARMACIST

## 2025-07-16 PROCEDURE — 99605 MTMS BY PHARM NP 15 MIN: CPT | Mod: 93 | Performed by: PHARMACIST

## 2025-07-16 NOTE — PROGRESS NOTES
Anticoagulation Clinic Notification    Marilee, is past due for an INR. Their last result was 3.0 on 5/8/25 and was due to come back on 5/29/25.    she received phone calls and letters over the last several weeks in attempt to arrange follow up labs. Eileen Donald will be contacted again today.     Please contact patient directly to discuss compliance with monitoring or schedule visit to review ongoing anticoagulation therapy.    Thank you,     [facility] Anticoagulation Clinic

## 2025-07-16 NOTE — PROGRESS NOTES
Medication Therapy Management (MTM) Encounter    ASSESSMENT:                            Medication Adherence/Access: See below for considerations.    Multiple Sclerosis/General Pain: Continue following with neurology clinic; recommend Patient send anem of new medication so this can be added to her med list.    GERD  /dysphagia: appears that duplicate therapy might be accidental due to insurance coverage changes. I Will confirm with primary care provider whether there is a preferred agent for Patient.    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    Asthma /shortness of breath: continue following with pulm as directed.    Preventative health care: no changes.    Hypertension /S/P AVR (aortic valve replacement)/ history of PE/ Paroxysmal Atrial Fibrillation/varicose vein  Due for follow-up with vascular - education provided to Patient.  Continue following with [facility] cardiology.  Due for repeat INR - INR team contacting Patient today.    Hyperlipidemia continue current medication, and following with specialists as noted above.    Will address other medical conditions at follow-up appointment - prioritized the above conditions due to time limitations.    PLAN:                            1. MTM will check with Dr. MASTERSON to determine whether to take esomeprazole or pantoprazole. Cyrus sent to primary care provider.    Patient requests follow-up phone call.  Can leave detailed message on [phone] phone number.    2. When you find the name of the medication that you're taking as a muscle relaxant, send that by [employer] so we can add this to your medication list.    3. Recommend rescheduling appointment with Dr. Jade, vascular provider. [phone]    Follow-up: 8/7/2025 at 12pm by telephone.    Addended on July 17, 2025  Response from primary care provider re: proton pump inhibitor - okay to use either med - see chart routing.  Called Patient to communicate recommendation:    Okay for  Patient use home supply of esomeprazole, then resume using pantoprazole daily.  Left voicemail on [phone].    Laurie FofanaD  Medication Therapy Management (MTM) Pharmacist    SUBJECTIVE/OBJECTIVE:                          Marilee Donald is a 69 year old female seen for an initial visit. This visit is also a follow up visit from appointment with Kelly Martinez PharmD.     Reason for visit: medication review.  Patient has question about:  -duplicate proton pump inhibitor therapy, Patient wondering which she should take.    Allergies/ADRs: Reviewed in chart  Past Medical History: Reviewed in chart  Tobacco: She reports that she has never smoked. She has been exposed to tobacco smoke. She has never used smokeless tobacco.  Alcohol: less than once per month, special occasions.  Caffeine: soda - 3 times per week.    Medication Adherence/Access: Patient uses pill box(es).  Patient takes medications 2 time(s) per day: AM and PM.    Multiple Sclerosis/General Pain:  Follows with [facility] in [place]  States that her aches and pains are usually connected to her MS.  Acetaminophen 325 mg: often takes only 1 tab and this relieves the pain. Sometimes does take 2 tabs. Some weeks will use daily and other weeks will hardly use it at all.  Ocrevus infunsion: IV infusion every 6 months in clinic.  *new med was started, she's not sure who prescribed it or the name of it. Cannot find bottle during the visit today. Will look for it.  Medication History: baclofen (Patient states that this was prescribed for muscle spasms, but then didn't feel like she needed the medication).    GERD  /dysphagia:  Symptoms: symptoms in her throat, feels like a lump of air in throat. Stomach feels gassy, like it's bubbling around, churning around.  No history of GI bleed per Patient report.  Triggers: fried food.    Meds:  Has been taking pantoprazole consistently, but this past time when she tried to get a refill, the pharmacy also filled  esomeprazole which left her questioning which one she should take.  -She has both medications at home: 1 month supply of pantoprazole and 3 months of esomeprazole.  Pantoprazole 20 mg: once daily  Esomeprazole 20 mg once daily     Calcium carbonate (TUMS) 500 mg: Pt taking twice daily as needed for heartburn symptoms. Estimated use: about once/week.    Per chart review - Note from 2024 - - patient's insurance called and wound like Nexxium to be switched back to pantoprazole 20mg.   Today 2025 Patient thinks her insurance covered both and isn't aware of one being more expensive than another.    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 Was told by eye doctor that they need to continue on this therapy indefinitely   Acyclovir 400mg twice daily     Asthma /shortness of breath:  Follows with Dr. Barboza, pulmonology care. Has follow-up visit scheduled.  Per LOV With pulm (2024): chart hx of COPD vs. Asthma; PFTs in 2017 showed nonspecific reductions in spirometry and some restriction, likely due to body habitus. Unclear if she actually has asthma. She is not having significant respiratory symptoms. The KHAN is likely due to deconditioning and obesity. We discussed enrolling in pulmonary rehab to improve exercise tolerance.    Triggers: exercise, activity.    Albuterol MDI: Patient using as needed for shortness of breath, cough or wheezing - states that she will use this before leaving the house, at home if exerts herself with house chores.  Estimates using 3-4 days per week, sometimes needs to use twice on the days when she uses it.  Montelukast 10 mg: once daily  Oxygen: recommended by pulm to use supplementation O2 at night to keep SpO2 92% or greater.- Patient states she has this to use at her house if needed, but hasn't needed to use recently.       Preventative health care:  Amoxicillin 500 m mg once before dental procedure as directed. No  concerns per Patient.    Hypertension /S/P AVR (aortic valve replacement)/ history of PE/ Paroxysmal Atrial Fibrillation/varicose vein:  Patient follows with [facility] for cardiology care, Dr. Hall.  Patient follows with [facility] for vascular care. LOV she was having a stroke and left visit to go to ER. Needs to reschedule.  States that she has a history of going into A.fib after surgery or stressful events, she doesn't have any symptoms. Patient states that she had a cardiac monitor and there was not a.fib for a week so they told her she gets it sometimes, but doesn't always have it.  No lightheadedness / dizziness, no chest pain concerns.    Aspirin 81 mg daily - no bloody, black, red or tarry stool. Notes that she does bruise easily because also taking warfarin.  Furosemide 40 mg daily; no concerns about lower leg edema at this time.  Lisinopril 10 mg daily   Metoprolol tartrate 25 mg twice daily   Potassium 10 meq daily   Warfarin 2.5 mg tab: Patient follows with anticoagulation clinic.  taking 5 mg,  taking 2.5 mg.   INR   Date Value Ref Range Status   2025 3.0 (H) 0.9 - 1.1 Final   2025 1.26 (H) 0.85 - 1.15 Final     Home blood pressure monitoring: Patient does not check.    BP Readings from Last 3 Encounters:   25 119/66   25 110/63   25 128/65     Pulse Readings from Last 3 Encounters:   25 86   25 61   25 81        Hyperlipidemia   Rosuvastatin 20 m tab once daily  Patient reports no significant myalgias or other side effects.  Medication History: simvastatin (changed to rosuvastatin)       Today's Vitals: There were no vitals taken for this visit.  ----------------    I spent 54 minutes with this patient today. I offer these suggestions for consideration by Drew Hahn .     A summary of these recommendations was sent via [employer].    Carolina Mcghee, Katharine  Medication Therapy Management (MTM) Pharmacist    Telemedicine Visit Details  The  patient's medications can be safely assessed via a telemedicine encounter.  Type of service:  Telephone visit  Originating Location (pt. Location): Home    Distant Location (provider location):  Off-site  Start Time: 1:05 pm  End Time: 1:59 PM     Medication Therapy Recommendations  Gastroesophageal reflux disease with esophagitis, unspecified whether hemorrhage   1 Current Medication: pantoprazole (PROTONIX) 20 MG EC tablet   Current Medication Sig: Take 1 tablet (20 mg) by mouth daily.   Rationale: Duplicate Therapy - Unnecessary medication therapy - Indication   Recommendation: Discontinue Medication   Status: Contact Provider - Awaiting Response   Identified Date: 7/16/2025

## 2025-07-16 NOTE — LETTER
"     [facility] ANTICOAGULATION CLINIC  [address] [facility] [address]  Phone: [phone]  Fax: [phone]   July 16, 2025        Eileen Donald  [address] [place] [facility] [address]            Dear Eileen,    You are currently under the care of [facility] Anticoagulation [facility] for your warfarin (Coumadin , Jantoven ) therapy.  We are contacting you because our records show you were due for an INR on 5/29/25.    Last INR was in 5/8/25     There are potentially serious risks when taking warfarin without careful monitoring and we want to make sure you are safely managed.  Routine lab monitoring is required for warfarin refills.     Please schedule a lab appointment as soon as possible either by calling [phone] or scheduling directly in [employer]. To schedule in [employer] open the \"schedule an appointment\" section then select \"lab only\" as the reason you are coming in and \"INR\" as the lab test. If it is difficult for you to get to lab, please call us to discuss options.  If there has been a change in your care or other concerns, please let us know so we can help and/or update our records.         Sincerely,       [facility] Anticoagulation Clinic   "

## 2025-07-16 NOTE — PATIENT INSTRUCTIONS
"Recommendations from today's MTM visit:                                                    MTM (medication therapy management) is a service provided by a clinical pharmacist designed to help you get the most of out of your medicines.      1. MTM will check with Dr. MASTERSON to determine whether to take esomeprazole or pantoprazole.     2. When you find the name of the medication that you're taking as a muscle relaxant, send that by [employer] so we can add this to your medication list.    3. Recommend rescheduling appointment with Dr. Jade, vascular provider. [phone]    Follow-up: 8/7/2025 at 12pm by telephone.    It was great speaking with you today.  I value your experience and would be very thankful for your time in providing feedback in our clinic survey. In the next few days, you may receive an email or text message from [employer] with a link to a survey related to your  clinical pharmacist.\"     To schedule another MTM appointment, please call the clinic directly or you may call the MTM scheduling line at [phone].    My Clinical Pharmacist's contact information:                                                      Please feel free to contact me with any questions or concerns you have.      Carolina Mcghee, PharmD  Medication Therapy Management (MTM) Pharmacist   "

## 2025-07-16 NOTE — LETTER
"Recommended To-Do List      Prepared on: Jul 16, 2025       You can get the best results from your medications by completing the items on this \"To-Do List.\"      Bring your To-Do List when you go to your doctor. And, share it with your family or caregivers.    My To-Do List:  What we talked about: What I should do:   A medication that you may no longer need    Consider stopping pantoprazole (PROTONIX) or esomeprazole. MTM will discuss with your provider and follow-up with you.          What we talked about: What I should do:                     "

## 2025-07-16 NOTE — LETTER
July 16, 2025  Eileen Donald  [address] [place] [facility] [address]    Dear Ms. Donald, JURGEN [facility]     Thank you for talking with me on Jul 16, 2025 about your health and medications. As a follow-up to our conversation, I have included two documents:      Your Recommended To-Do List has steps you should take to get the best results from your medications.  Your Medication List will help you keep track of your medications and how to take them.    If you want to talk about these documents, please call Bere Mcghee RPH at phone: [phone], Monday-Friday 8-4:30pm.    I look forward to working with you and your doctors to make sure your medications work well for you.    Sincerely,  Bere Mcghee RPH  [facility] Pharmacist, [facility]

## 2025-07-16 NOTE — LETTER
_  Medication List        Prepared on: Jul 16, 2025     Bring your Medication List when you go to the doctor, hospital, or   emergency room. And, share it with your family or caregivers.     Note any changes to how you take your medications.  Cross out medications when you no longer use them.    Medication How I take it Why I use it Prescriber   acetaminophen (TYLENOL) 325 MG tablet Take 325-650 mg by mouth every 6 hours as needed  pain Patient Reported   acyclovir (ZOVIRAX) 400 MG tablet Take 400 mg by mouth 2 times daily. 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 for dental work Patient Reported   aspirin (ASA) 81 MG chewable tablet Take 81 mg by mouth daily  Heart health Patient Reported   buPROPion (WELLBUTRIN XL) 150 MG 24 hr tablet Take 1 tablet (150 mg) by mouth daily. Recurrent major depressive disorder, in partial remission Drew Hahn MD   calcium carbonate (TUMS) 500 MG chewable tablet Take 1 chew tab by mouth 2 times daily as needed for heartburn. Heartburn Patient Reported   esomeprazole (NEXIUM) 20 MG DR capsule Take 1 capsule (20 mg) by mouth every morning (before breakfast). Dysphagia, unspecified type Drew Hahn MD   furosemide (LASIX) 40 MG tablet Take 1 tablet (40 mg) by mouth daily. S/P AVR (Aortic Valve Replacement) Drew Hahn MD   lisinopril (ZESTRIL) 10 MG tablet Take 1 tablet (10 mg) by mouth daily. Primary Hypertension Drew Hahn MD   loratadine (CLARITIN) 10 MG tablet Take 1 tablet (10 mg) by mouth daily. Productive Cough Drew Hahn MD   metoprolol tartrate (LOPRESSOR) 25 MG tablet  Take 1 tablet (25 mg) by mouth 2 times daily. Primary Hypertension Drew Hahn MD   montelukast (SINGULAIR) 10 MG tablet Take 1 tablet (10 mg) by mouth at bedtime. Seasonal Allergies Drew Hahn MD   Ocrelizumab (OCREVUS IV) Inject into the vein every 6 months. Prescribed by provider at [facility] in [place] Multiple Sclerosis Patient Reported   pantoprazole (PROTONIX) 20 MG EC tablet Take 1 tablet (20 mg) by mouth daily. Dysphagia, unspecified type Drew Hahn MD   potassium chloride ER (MICRO-K) 10 MEQ CR capsule Take 10 mEq by mouth daily  Low potassium Patient Reported   rosuvastatin (CRESTOR) 20 MG tablet Take 1 tablet (20 mg) by mouth at bedtime. Cerebrovascular accident (CVA) due to embolism of cerebral artery (H) Drew Hahn MD   venlafaxine (EFFEXOR XR) 75 MG 24 hr capsule TAKE 3 CAPSULES BY MOUTH EVERY DAY Recurrent major depressive disorder, in partial remission Drew Hahn MD   Vitamin D3 (CHOLECALCIFEROL) 25 mcg (1000 units) tablet Take 25 mcg by mouth daily. bone health Patient Reported   warfarin ANTICOAGULANT (COUMADIN) 2.5 MG tablet Take 1 tab (2.5mg) to 2 tabs (5mg) by mouth daily, as directed.  Adjust dose based on INR results. Paroxysmal Atrial Fibrillation (H) Rogelio Johnson MD         Add new medications, over-the-counter drugs, herbals, vitamins, or  minerals in the blank rows below.    Medication How I take it Why I use it Prescriber                                      Allergies:      - Dust Mites  - Mold  - Morphine - Visual Disturbance, Hallucination  - Sulfa Antibiotics - Hives, Nausea and Vomiting  - Azithromycin - Nausea and Vomiting, Rash  - Doxycycline - Rash  - Latex - Rash        Side effects I have had:      Not on File        Other Information:              My notes and questions:

## 2025-07-16 NOTE — Clinical Note
Dr. MASTERSON - did you have a preference whether Patient takes esomeprazole v. Pantoprazole? Seems that both have been on her med list for several months.   Has been taking pantoprazole consistently, but this past time when she tried to get a refill, the pharmacy also filled esomeprazole which left her questioning which one she should take. -She has both medications at home: 1 month supply of pantoprazole and 3 months of esomeprazole. Per chart review - Note from 7/17/2024 - - patient's insurance called and wound like Nexxium to be switched back to pantoprazole 20mg.  Today 7/16/2025 Patient thinks her insurance covered both and isn't aware of one being more expensive than another.   Please advise, thanks!   See MTM visit note for other details.  Carolina Mcghee, LaurieD Medication Therapy Management (MT[facility]) Pharmacist   yes

## 2025-07-23 ENCOUNTER — ANTICOAGULATION THERAPY VISIT (OUTPATIENT)
Dept: ANTICOAGULATION | Facility: CLINIC | Age: 70
End: 2025-07-23

## 2025-07-23 ENCOUNTER — LAB (OUTPATIENT)
Dept: LAB | Facility: CLINIC | Age: 70
End: 2025-07-23
Payer: COMMERCIAL

## 2025-07-23 ENCOUNTER — DOCUMENTATION ONLY (OUTPATIENT)
Dept: ANTICOAGULATION | Facility: CLINIC | Age: 70
End: 2025-07-23

## 2025-07-23 DIAGNOSIS — I63.40 CEREBROVASCULAR ACCIDENT (CVA) DUE TO EMBOLISM OF CEREBRAL ARTERY (H): Primary | ICD-10-CM

## 2025-07-23 DIAGNOSIS — Z79.01 LONG TERM (CURRENT) USE OF ANTICOAGULANTS: ICD-10-CM

## 2025-07-23 DIAGNOSIS — Z95.2 S/P AVR (AORTIC VALVE REPLACEMENT): ICD-10-CM

## 2025-07-23 DIAGNOSIS — I48.0 PAROXYSMAL ATRIAL FIBRILLATION (H): ICD-10-CM

## 2025-07-23 DIAGNOSIS — I26.99 PULMONARY EMBOLISM, OTHER, UNSPECIFIED CHRONICITY, UNSPECIFIED WHETHER ACUTE COR PULMONALE PRESENT (H): ICD-10-CM

## 2025-07-23 LAB — INR BLD: 3.7 (ref 0.9–1.1)

## 2025-07-23 PROCEDURE — 36416 COLLJ CAPILLARY BLOOD SPEC: CPT

## 2025-07-23 PROCEDURE — 85610 PROTHROMBIN TIME: CPT

## 2025-07-23 NOTE — PROGRESS NOTES
ANTICOAGULATION CLINIC REFERRAL RENEWAL REQUEST       An annual renewal order is required for all patients referred to [facility].?  Please review and sign the pended referral order for Eileen Donald.       ANTICOAGULATION SUMMARY      Warfarin indication(s)   - Hx of PE  - Paroxysmal Atrial Fibrillation  - s/p AVR, 1/2017. ([facility] mechanical valve)  - 1/15/25 - small right cortical stroke,     Mechanical heart valve present?  Mechanical  AVR-Bileaflet       Current goal range   INR: 2.5-3.5     Goal appropriate for indication? Goal INR 2.5-3.5 standard for indication(s) above   Time in Therapeutic Range (TTR)  (Goal > 60%) 70.7 %       Office visit with referring provider's group within last year yes on 1/27/2025 with Drew Case   Additional standing orders None       Alondra Lora, RN  [facility]

## 2025-07-23 NOTE — PROGRESS NOTES
ANTICOAGULATION MANAGEMENT     Eileen Donald 69 year old female is on warfarin with supratherapeutic INR result. (Goal INR 2.5-3.5)    Recent labs: (last 7 days)     07/23/25  1255   INR 3.7*       ASSESSMENT     Warfarin Lab Questionnaire    Warfarin Doses Last 7 Days      7/23/2025     3:07 AM   Dose in Tablet or Mg   TAB or MG? - 2.5mg warfarin tabs (bedtime) milligram (mg)     Pt Rptd Dose SUNDAY MONDAY TUESDAY WED THURS FRIDAY SATURDAY 7/23/2025   3:07 AM 2.5 5 2.5 5 2.5 2.5 5         7/23/2025   Warfarin Lab Questionnaire   Missed doses within past 14 days? No   Changes in diet or alcohol within past 14 days? No - ate a little more blueberries than usual.  She understands it affects INR to go high.     Medication changes since last result? No - reported taking some Tylenol for pain.     Injuries or illness since last result? No   New shortness of breath, severe headaches or sudden changes in vision since last result? No   Abnormal bleeding since last result? No   Upcoming surgery, procedure? No   Best number to call with results? [phone]     Previous result: Therapeutic last 2 INR visits.    Additional findings: None       PLAN     Recommended plan for temporary change(s) affecting INR     Dosing Instructions: partial hold then continue your current warfarin dose with next INR in 2 weeks       Summary  As of 7/23/2025      Full warfarin instructions:  7/30: 2.5 mg; Otherwise 5 mg every Mon, Wed, Fri; 2.5 mg all other days   Next INR check:  8/6/2025               Telephone call with Marilee who verbalizes understanding and agrees to plan    Lab visit scheduled - INR on 8/6/25 @ [facility] [place]    Education provided: Taking warfarin: Importance of taking warfarin as instructed  Goal range and lab monitoring: goal range and significance of current result  Symptom monitoring: monitoring for bleeding signs and symptoms    Plan made per [facility] anticoagulation protocol    Alondra Lora,  RN  7/23/2025  Anticoagulation Clinic  [facility] for routing messages: p LARISA GAO  [facility] patient phone line: [phone]        _______________________________________________________________________     Anticoagulation Episode Summary       Current INR goal:  2.5-3.5   TTR:  70.7% (1 y)   Target end date:  Indefinite   Send INR reminders to:  LARISA GAO    Indications    Paroxysmal atrial fibrillation (H) (Resolved) [I48.0]  Other pulmonary embolism without acute cor pulmonale  unspecified chronicity (H) (Resolved) [I26.99]  S/P AVR (aortic valve replacement) (Resolved) [Z95.2]  Cerebrovascular accident (CVA) due to embolism of cerebral artery (H) [I63.40]             Comments:  12/15/21 - amended INR goal to 2.5 - 3.5             Anticoagulation Care Providers       Provider Role Specialty Phone number    Tito Salazar MD Referring Family Medicine [phone]    Drew Hahn MD Referring Family Medicine [phone]

## 2025-07-24 PROBLEM — Z95.2 S/P AVR (AORTIC VALVE REPLACEMENT): Status: ACTIVE | Noted: 2025-07-24

## 2025-07-24 PROBLEM — I26.99 PULMONARY EMBOLISM, OTHER, UNSPECIFIED CHRONICITY, UNSPECIFIED WHETHER ACUTE COR PULMONALE PRESENT (H): Status: ACTIVE | Noted: 2025-07-24

## 2025-07-24 PROBLEM — I48.0 PAROXYSMAL ATRIAL FIBRILLATION (H): Status: ACTIVE | Noted: 2025-07-24

## 2025-08-03 ENCOUNTER — HEALTH MAINTENANCE LETTER (OUTPATIENT)
Age: 70
End: 2025-08-03

## 2025-08-05 ENCOUNTER — TRANSFERRED RECORDS (OUTPATIENT)
Dept: HEALTH INFORMATION MANAGEMENT | Facility: CLINIC | Age: 70
End: 2025-08-05
Payer: COMMERCIAL

## 2025-08-07 ENCOUNTER — TRANSFERRED RECORDS (OUTPATIENT)
Dept: HEALTH INFORMATION MANAGEMENT | Facility: CLINIC | Age: 70
End: 2025-08-07
Payer: COMMERCIAL

## 2025-08-11 ENCOUNTER — ANTICOAGULATION THERAPY VISIT (OUTPATIENT)
Dept: ANTICOAGULATION | Facility: CLINIC | Age: 70
End: 2025-08-11

## 2025-08-11 ENCOUNTER — LAB (OUTPATIENT)
Dept: LAB | Facility: CLINIC | Age: 70
End: 2025-08-11
Payer: COMMERCIAL

## 2025-08-11 DIAGNOSIS — I63.40 CEREBROVASCULAR ACCIDENT (CVA) DUE TO EMBOLISM OF CEREBRAL ARTERY (H): Primary | ICD-10-CM

## 2025-08-11 DIAGNOSIS — I48.0 PAROXYSMAL ATRIAL FIBRILLATION (H): ICD-10-CM

## 2025-08-11 DIAGNOSIS — Z95.2 S/P AVR (AORTIC VALVE REPLACEMENT): ICD-10-CM

## 2025-08-11 DIAGNOSIS — I26.99 PULMONARY EMBOLISM, OTHER, UNSPECIFIED CHRONICITY, UNSPECIFIED WHETHER ACUTE COR PULMONALE PRESENT (H): ICD-10-CM

## 2025-08-11 DIAGNOSIS — Z79.01 LONG TERM (CURRENT) USE OF ANTICOAGULANTS: ICD-10-CM

## 2025-08-11 DIAGNOSIS — E11.69 TYPE 2 DIABETES MELLITUS WITH HYPERLIPIDEMIA (H): Primary | ICD-10-CM

## 2025-08-11 DIAGNOSIS — I63.40 CEREBROVASCULAR ACCIDENT (CVA) DUE TO EMBOLISM OF CEREBRAL ARTERY (H): ICD-10-CM

## 2025-08-11 DIAGNOSIS — I10 BENIGN ESSENTIAL HYPERTENSION: ICD-10-CM

## 2025-08-11 DIAGNOSIS — E78.5 TYPE 2 DIABETES MELLITUS WITH HYPERLIPIDEMIA (H): Primary | ICD-10-CM

## 2025-08-11 LAB
EST. AVERAGE GLUCOSE BLD GHB EST-MCNC: 137 MG/DL
HBA1C MFR BLD: 6.4 % (ref 0–5.6)
INR BLD: 2.8 (ref 0.9–1.1)

## 2025-08-11 PROCEDURE — 85610 PROTHROMBIN TIME: CPT

## 2025-08-11 PROCEDURE — 36415 COLL VENOUS BLD VENIPUNCTURE: CPT

## 2025-08-11 PROCEDURE — 80048 BASIC METABOLIC PNL TOTAL CA: CPT

## 2025-08-11 PROCEDURE — 83036 HEMOGLOBIN GLYCOSYLATED A1C: CPT

## 2025-08-12 LAB
ANION GAP SERPL CALCULATED.3IONS-SCNC: 11 MMOL/L (ref 7–15)
BUN SERPL-MCNC: 14.2 MG/DL (ref 8–23)
CALCIUM SERPL-MCNC: 9.5 MG/DL (ref 8.8–10.4)
CHLORIDE SERPL-SCNC: 102 MMOL/L (ref 98–107)
CREAT SERPL-MCNC: 0.84 MG/DL (ref 0.51–0.95)
EGFRCR SERPLBLD CKD-EPI 2021: 75 ML/MIN/1.73M2
GLUCOSE SERPL-MCNC: 128 MG/DL (ref 70–99)
HCO3 SERPL-SCNC: 29 MMOL/L (ref 22–29)
POTASSIUM SERPL-SCNC: 4.3 MMOL/L (ref 3.4–5.3)
SODIUM SERPL-SCNC: 142 MMOL/L (ref 135–145)

## 2025-08-18 ASSESSMENT — ASTHMA QUESTIONNAIRES
QUESTION_1 LAST FOUR WEEKS HOW MUCH OF THE TIME DID YOUR ASTHMA KEEP YOU FROM GETTING AS MUCH DONE AT WORK, SCHOOL OR AT HOME: A LITTLE OF THE TIME
QUESTION_5 LAST FOUR WEEKS HOW WOULD YOU RATE YOUR ASTHMA CONTROL: SOMEWHAT CONTROLLED
QUESTION_3 LAST FOUR WEEKS HOW OFTEN DID YOUR ASTHMA SYMPTOMS (WHEEZING, COUGHING, SHORTNESS OF BREATH, CHEST TIGHTNESS OR PAIN) WAKE YOU UP AT NIGHT OR EARLIER THAN USUAL IN THE MORNING: NOT AT ALL
QUESTION_2 LAST FOUR WEEKS HOW OFTEN HAVE YOU HAD SHORTNESS OF BREATH: ONCE OR TWICE A WEEK
ACT_TOTALSCORE: 19
QUESTION_4 LAST FOUR WEEKS HOW OFTEN HAVE YOU USED YOUR RESCUE INHALER OR NEBULIZER MEDICATION (SUCH AS ALBUTEROL): TWO OR THREE TIMES PER WEEK

## 2025-08-19 ENCOUNTER — OFFICE VISIT (OUTPATIENT)
Dept: PULMONOLOGY | Facility: CLINIC | Age: 70
End: 2025-08-19
Payer: COMMERCIAL

## 2025-08-19 VITALS
WEIGHT: 245.4 LBS | OXYGEN SATURATION: 94 % | BODY MASS INDEX: 35.21 KG/M2 | HEART RATE: 74 BPM | SYSTOLIC BLOOD PRESSURE: 118 MMHG | DIASTOLIC BLOOD PRESSURE: 76 MMHG

## 2025-08-19 DIAGNOSIS — R06.09 DOE (DYSPNEA ON EXERTION): Primary | ICD-10-CM

## 2025-08-19 PROCEDURE — 3074F SYST BP LT 130 MM HG: CPT | Performed by: INTERNAL MEDICINE

## 2025-08-19 PROCEDURE — G2211 COMPLEX E/M VISIT ADD ON: HCPCS | Performed by: INTERNAL MEDICINE

## 2025-08-19 PROCEDURE — 99212 OFFICE O/P EST SF 10 MIN: CPT | Performed by: INTERNAL MEDICINE

## 2025-08-19 PROCEDURE — 3078F DIAST BP <80 MM HG: CPT | Performed by: INTERNAL MEDICINE

## 2025-08-19 RX ORDER — TIZANIDINE 2 MG/1
TABLET ORAL PRN
COMMUNITY
Start: 2025-07-01

## 2025-08-19 RX ORDER — MUPIROCIN 2 %
OINTMENT (GRAM) TOPICAL PRN
COMMUNITY
Start: 2025-07-28

## 2025-08-19 RX ORDER — CLOBETASOL PROPIONATE 0.5 MG/G
OINTMENT TOPICAL PRN
COMMUNITY
Start: 2025-07-22

## 2025-08-19 RX ORDER — DOXYCYCLINE 50 MG/1
CAPSULE ORAL
COMMUNITY
Start: 2025-08-05

## 2025-08-22 ENCOUNTER — TRANSFERRED RECORDS (OUTPATIENT)
Dept: HEALTH INFORMATION MANAGEMENT | Facility: CLINIC | Age: 70
End: 2025-08-22
Payer: COMMERCIAL

## 2025-08-26 DIAGNOSIS — I48.0 PAROXYSMAL ATRIAL FIBRILLATION (H): ICD-10-CM

## 2025-08-26 DIAGNOSIS — Z79.01 LONG TERM (CURRENT) USE OF ANTICOAGULANTS: ICD-10-CM

## 2025-08-26 DIAGNOSIS — Z95.2 S/P AVR (AORTIC VALVE REPLACEMENT): ICD-10-CM

## 2025-08-26 DIAGNOSIS — I26.99 PULMONARY EMBOLISM, OTHER, UNSPECIFIED CHRONICITY, UNSPECIFIED WHETHER ACUTE COR PULMONALE PRESENT (H): ICD-10-CM

## 2025-08-27 DIAGNOSIS — J45.909 REACTIVE AIRWAY DISEASE WITHOUT COMPLICATION, UNSPECIFIED ASTHMA SEVERITY, UNSPECIFIED WHETHER PERSISTENT: ICD-10-CM

## 2025-08-27 RX ORDER — ALBUTEROL SULFATE 90 UG/1
2 INHALANT RESPIRATORY (INHALATION) EVERY 6 HOURS PRN
Qty: 18 G | Refills: 6 | Status: SHIPPED | OUTPATIENT
Start: 2025-08-27

## 2025-08-27 RX ORDER — WARFARIN SODIUM 2.5 MG/1
TABLET ORAL
Qty: 130 TABLET | Refills: 1 | Status: SHIPPED | OUTPATIENT
Start: 2025-08-27

## 2025-09-03 ENCOUNTER — TELEPHONE (OUTPATIENT)
Dept: ANTICOAGULATION | Facility: CLINIC | Age: 70
End: 2025-09-03
Payer: COMMERCIAL

## 2025-09-04 ENCOUNTER — VIRTUAL VISIT (OUTPATIENT)
Dept: PHARMACY | Facility: CLINIC | Age: 70
End: 2025-09-04
Payer: COMMERCIAL

## 2025-09-04 DIAGNOSIS — R13.10 DYSPHAGIA, UNSPECIFIED TYPE: ICD-10-CM

## 2025-09-04 DIAGNOSIS — C44.629 SQUAMOUS CELL CARCINOMA OF LEFT HAND: ICD-10-CM

## 2025-09-04 DIAGNOSIS — F33.41 RECURRENT MAJOR DEPRESSIVE DISORDER, IN PARTIAL REMISSION: Primary | ICD-10-CM

## 2025-09-04 DIAGNOSIS — K21.00 GASTROESOPHAGEAL REFLUX DISEASE WITH ESOPHAGITIS, UNSPECIFIED WHETHER HEMORRHAGE: ICD-10-CM

## 2025-09-04 DIAGNOSIS — F41.1 GENERALIZED ANXIETY DISORDER: ICD-10-CM

## 2025-09-04 DIAGNOSIS — E11.69 TYPE 2 DIABETES MELLITUS WITH OTHER SPECIFIED COMPLICATION, UNSPECIFIED WHETHER LONG TERM INSULIN USE (H): ICD-10-CM

## 2025-09-04 RX ORDER — VENLAFAXINE HYDROCHLORIDE 225 MG/1
225 TABLET, EXTENDED RELEASE ORAL DAILY
Qty: 90 TABLET | Refills: 3 | Status: SHIPPED | OUTPATIENT
Start: 2025-09-04

## 2025-09-04 RX ORDER — LORAZEPAM 0.5 MG/1
0.5 TABLET ORAL EVERY 6 HOURS PRN
COMMUNITY

## (undated) DEVICE — SOL WATER IRRIG 1000ML BOTTLE 2F7114

## (undated) DEVICE — TUBING SUCTION MEDI-VAC 1/4"X20' N620A - HE

## (undated) DEVICE — SUCTION MANIFOLD NEPTUNE 2 SYS 1 PORT 702-025-000

## (undated) DEVICE — ENDO SNARE EXACTO COLD 9MM LOOP 2.4MMX230CM 00711115